# Patient Record
Sex: MALE | Race: WHITE | Employment: UNEMPLOYED | URBAN - METROPOLITAN AREA
[De-identification: names, ages, dates, MRNs, and addresses within clinical notes are randomized per-mention and may not be internally consistent; named-entity substitution may affect disease eponyms.]

---

## 2017-12-01 ENCOUNTER — HOSPITAL ENCOUNTER (OUTPATIENT)
Dept: OTHER | Age: 57
Discharge: HOME | End: 2017-12-01
Attending: EMERGENCY MEDICINE | Admitting: EMERGENCY MEDICINE

## 2017-12-05 ENCOUNTER — HOSPITAL ENCOUNTER (OUTPATIENT)
Dept: GENERAL RADIOLOGY | Age: 57
Discharge: HOME | End: 2017-12-05
Attending: INTERNAL MEDICINE | Admitting: INTERNAL MEDICINE

## 2017-12-05 LAB
ANION GAP SERPL CALCULATED.3IONS-SCNC: 12 MMOL/L (ref 4–16)
APTT: 23.5 SECONDS (ref 21.2–33)
BASOPHILS ABSOLUTE: 0.1 K/CU MM
BASOPHILS RELATIVE PERCENT: 0.6 % (ref 0–1)
BUN BLDV-MCNC: 17 MG/DL (ref 6–23)
CALCIUM SERPL-MCNC: 9.2 MG/DL (ref 8.3–10.6)
CHLORIDE BLD-SCNC: 100 MMOL/L (ref 99–110)
CO2: 30 MMOL/L (ref 21–32)
CREAT SERPL-MCNC: 0.8 MG/DL (ref 0.9–1.3)
DIFFERENTIAL TYPE: ABNORMAL
EOSINOPHILS ABSOLUTE: 0.1 K/CU MM
EOSINOPHILS RELATIVE PERCENT: 1.4 % (ref 0–3)
GFR AFRICAN AMERICAN: >60 ML/MIN/1.73M2
GFR NON-AFRICAN AMERICAN: >60 ML/MIN/1.73M2
GLUCOSE BLD-MCNC: 91 MG/DL (ref 70–99)
HCT VFR BLD CALC: 49.4 % (ref 42–52)
HEMOGLOBIN: 15.7 GM/DL (ref 13.5–18)
IMMATURE NEUTROPHIL %: 0.5 % (ref 0–0.43)
INR BLD: 0.97 INDEX
LYMPHOCYTES ABSOLUTE: 1.5 K/CU MM
LYMPHOCYTES RELATIVE PERCENT: 14.4 % (ref 24–44)
MCH RBC QN AUTO: 28.3 PG (ref 27–31)
MCHC RBC AUTO-ENTMCNC: 31.8 % (ref 32–36)
MCV RBC AUTO: 89 FL (ref 78–100)
MONOCYTES ABSOLUTE: 0.9 K/CU MM
MONOCYTES RELATIVE PERCENT: 9.1 % (ref 0–4)
NUCLEATED RBC %: 0 %
PDW BLD-RTO: 14.2 % (ref 11.7–14.9)
PLATELET # BLD: 247 K/CU MM (ref 140–440)
PMV BLD AUTO: 11.7 FL (ref 7.5–11.1)
POTASSIUM SERPL-SCNC: 5.5 MMOL/L (ref 3.5–5.1)
PROTHROMBIN TIME: 11.3 SECONDS (ref 9.12–12.5)
RBC # BLD: 5.55 M/CU MM (ref 4.6–6.2)
SEGMENTED NEUTROPHILS ABSOLUTE COUNT: 7.4 K/CU MM
SEGMENTED NEUTROPHILS RELATIVE PERCENT: 74 % (ref 36–66)
SODIUM BLD-SCNC: 142 MMOL/L (ref 135–145)
TOTAL IMMATURE NEUTOROPHIL: 0.05 K/CU MM
TOTAL NUCLEATED RBC: 0 K/CU MM
WBC # BLD: 10.1 K/CU MM (ref 4–10.5)

## 2017-12-08 PROBLEM — I50.43 CHF (CONGESTIVE HEART FAILURE), NYHA CLASS I, ACUTE ON CHRONIC, COMBINED (HCC): Status: ACTIVE | Noted: 2017-12-08

## 2017-12-08 PROBLEM — I50.31 ACUTE DIASTOLIC CHF (CONGESTIVE HEART FAILURE), NYHA CLASS 4 (HCC): Status: ACTIVE | Noted: 2017-12-08

## 2017-12-10 PROBLEM — I50.31 ACUTE DIASTOLIC CHF (CONGESTIVE HEART FAILURE), NYHA CLASS 4 (HCC): Status: RESOLVED | Noted: 2017-12-08 | Resolved: 2017-12-10

## 2017-12-10 PROBLEM — I50.43 CHF (CONGESTIVE HEART FAILURE), NYHA CLASS I, ACUTE ON CHRONIC, COMBINED (HCC): Status: RESOLVED | Noted: 2017-12-08 | Resolved: 2017-12-10

## 2018-09-25 ENCOUNTER — HOSPITAL ENCOUNTER (INPATIENT)
Age: 58
LOS: 2 days | Discharge: HOME | DRG: 309 | End: 2018-09-27
Attending: EMERGENCY MEDICINE | Admitting: INTERNAL MEDICINE
Payer: COMMERCIAL

## 2018-09-25 ENCOUNTER — APPOINTMENT (OUTPATIENT)
Dept: GENERAL RADIOLOGY | Age: 58
DRG: 309 | End: 2018-09-25
Payer: COMMERCIAL

## 2018-09-25 ENCOUNTER — APPOINTMENT (OUTPATIENT)
Dept: ULTRASOUND IMAGING | Age: 58
DRG: 309 | End: 2018-09-25
Payer: COMMERCIAL

## 2018-09-25 DIAGNOSIS — R77.8 ELEVATED TROPONIN: ICD-10-CM

## 2018-09-25 DIAGNOSIS — I48.91 NEW ONSET ATRIAL FIBRILLATION (HCC): Primary | ICD-10-CM

## 2018-09-25 PROBLEM — E66.01 CLASS 3 SEVERE OBESITY DUE TO EXCESS CALORIES WITH BODY MASS INDEX (BMI) OF 45.0 TO 49.9 IN ADULT (HCC): Status: ACTIVE | Noted: 2018-09-25

## 2018-09-25 PROBLEM — Z79.4 TYPE 2 DIABETES MELLITUS WITH HYPERGLYCEMIA, WITH LONG-TERM CURRENT USE OF INSULIN (HCC): Status: ACTIVE | Noted: 2018-09-25

## 2018-09-25 PROBLEM — E66.813 CLASS 3 SEVERE OBESITY DUE TO EXCESS CALORIES WITH BODY MASS INDEX (BMI) OF 45.0 TO 49.9 IN ADULT: Status: ACTIVE | Noted: 2018-09-25

## 2018-09-25 PROBLEM — R79.89 ELEVATED TROPONIN: Status: ACTIVE | Noted: 2018-09-25

## 2018-09-25 PROBLEM — E11.65 TYPE 2 DIABETES MELLITUS WITH HYPERGLYCEMIA, WITH LONG-TERM CURRENT USE OF INSULIN (HCC): Status: ACTIVE | Noted: 2018-09-25

## 2018-09-25 LAB
ALBUMIN SERPL-MCNC: 3.8 GM/DL (ref 3.4–5)
ALP BLD-CCNC: 84 IU/L (ref 40–128)
ALT SERPL-CCNC: 19 U/L (ref 10–40)
ANION GAP SERPL CALCULATED.3IONS-SCNC: 14 MMOL/L (ref 4–16)
AST SERPL-CCNC: 18 IU/L (ref 15–37)
BASOPHILS ABSOLUTE: 0 K/CU MM
BASOPHILS RELATIVE PERCENT: 0.4 % (ref 0–1)
BILIRUB SERPL-MCNC: 0.3 MG/DL (ref 0–1)
BUN BLDV-MCNC: 16 MG/DL (ref 6–23)
CALCIUM SERPL-MCNC: 9.6 MG/DL (ref 8.3–10.6)
CHLORIDE BLD-SCNC: 96 MMOL/L (ref 99–110)
CO2: 26 MMOL/L (ref 21–32)
CREAT SERPL-MCNC: 0.8 MG/DL (ref 0.9–1.3)
DIFFERENTIAL TYPE: ABNORMAL
EOSINOPHILS ABSOLUTE: 0.1 K/CU MM
EOSINOPHILS RELATIVE PERCENT: 1.1 % (ref 0–3)
GFR AFRICAN AMERICAN: >60 ML/MIN/1.73M2
GFR NON-AFRICAN AMERICAN: >60 ML/MIN/1.73M2
GLUCOSE BLD-MCNC: 177 MG/DL (ref 70–99)
GLUCOSE BLD-MCNC: 178 MG/DL (ref 70–99)
HCT VFR BLD CALC: 50.5 % (ref 42–52)
HEMOGLOBIN: 16.4 GM/DL (ref 13.5–18)
IMMATURE NEUTROPHIL %: 0.4 % (ref 0–0.43)
LYMPHOCYTES ABSOLUTE: 1.2 K/CU MM
LYMPHOCYTES RELATIVE PERCENT: 11.9 % (ref 24–44)
MCH RBC QN AUTO: 28.8 PG (ref 27–31)
MCHC RBC AUTO-ENTMCNC: 32.5 % (ref 32–36)
MCV RBC AUTO: 88.8 FL (ref 78–100)
MONOCYTES ABSOLUTE: 0.8 K/CU MM
MONOCYTES RELATIVE PERCENT: 8 % (ref 0–4)
NUCLEATED RBC %: 0 %
PDW BLD-RTO: 14 % (ref 11.7–14.9)
PLATELET # BLD: 252 K/CU MM (ref 140–440)
PMV BLD AUTO: 10.9 FL (ref 7.5–11.1)
POTASSIUM SERPL-SCNC: 3.8 MMOL/L (ref 3.5–5.1)
PRO-BNP: 239.7 PG/ML
RBC # BLD: 5.69 M/CU MM (ref 4.6–6.2)
SEGMENTED NEUTROPHILS ABSOLUTE COUNT: 7.8 K/CU MM
SEGMENTED NEUTROPHILS RELATIVE PERCENT: 78.2 % (ref 36–66)
SODIUM BLD-SCNC: 136 MMOL/L (ref 135–145)
TOTAL IMMATURE NEUTOROPHIL: 0.04 K/CU MM
TOTAL NUCLEATED RBC: 0 K/CU MM
TOTAL PROTEIN: 7.3 GM/DL (ref 6.4–8.2)
TROPONIN T: 0.03 NG/ML
TROPONIN T: 0.03 NG/ML
TROPONIN T: 0.04 NG/ML
TSH HIGH SENSITIVITY: 2.09 UIU/ML (ref 0.27–4.2)
WBC # BLD: 10 K/CU MM (ref 4–10.5)

## 2018-09-25 PROCEDURE — 83036 HEMOGLOBIN GLYCOSYLATED A1C: CPT

## 2018-09-25 PROCEDURE — 83880 ASSAY OF NATRIURETIC PEPTIDE: CPT

## 2018-09-25 PROCEDURE — 93005 ELECTROCARDIOGRAM TRACING: CPT | Performed by: EMERGENCY MEDICINE

## 2018-09-25 PROCEDURE — 36415 COLL VENOUS BLD VENIPUNCTURE: CPT

## 2018-09-25 PROCEDURE — 84484 ASSAY OF TROPONIN QUANT: CPT

## 2018-09-25 PROCEDURE — 2140000000 HC CCU INTERMEDIATE R&B

## 2018-09-25 PROCEDURE — 71045 X-RAY EXAM CHEST 1 VIEW: CPT

## 2018-09-25 PROCEDURE — G0378 HOSPITAL OBSERVATION PER HR: HCPCS

## 2018-09-25 PROCEDURE — 96372 THER/PROPH/DIAG INJ SC/IM: CPT

## 2018-09-25 PROCEDURE — 96361 HYDRATE IV INFUSION ADD-ON: CPT

## 2018-09-25 PROCEDURE — 99285 EMERGENCY DEPT VISIT HI MDM: CPT

## 2018-09-25 PROCEDURE — 80050 GENERAL HEALTH PANEL: CPT

## 2018-09-25 PROCEDURE — 96374 THER/PROPH/DIAG INJ IV PUSH: CPT

## 2018-09-25 PROCEDURE — 6360000002 HC RX W HCPCS: Performed by: NURSE PRACTITIONER

## 2018-09-25 PROCEDURE — 6370000000 HC RX 637 (ALT 250 FOR IP): Performed by: NURSE PRACTITIONER

## 2018-09-25 PROCEDURE — 93971 EXTREMITY STUDY: CPT

## 2018-09-25 PROCEDURE — 2500000003 HC RX 250 WO HCPCS: Performed by: EMERGENCY MEDICINE

## 2018-09-25 PROCEDURE — 2580000003 HC RX 258: Performed by: EMERGENCY MEDICINE

## 2018-09-25 PROCEDURE — 2580000003 HC RX 258: Performed by: NURSE PRACTITIONER

## 2018-09-25 PROCEDURE — 82962 GLUCOSE BLOOD TEST: CPT

## 2018-09-25 PROCEDURE — 6370000000 HC RX 637 (ALT 250 FOR IP): Performed by: EMERGENCY MEDICINE

## 2018-09-25 PROCEDURE — 93005 ELECTROCARDIOGRAM TRACING: CPT | Performed by: PHYSICIAN ASSISTANT

## 2018-09-25 RX ORDER — FAMOTIDINE 20 MG/1
20 TABLET, FILM COATED ORAL 2 TIMES DAILY
Status: DISCONTINUED | OUTPATIENT
Start: 2018-09-25 | End: 2018-09-27 | Stop reason: HOSPADM

## 2018-09-25 RX ORDER — ASPIRIN 81 MG/1
324 TABLET, CHEWABLE ORAL ONCE
Status: COMPLETED | OUTPATIENT
Start: 2018-09-25 | End: 2018-09-25

## 2018-09-25 RX ORDER — SODIUM CHLORIDE 0.9 % (FLUSH) 0.9 %
10 SYRINGE (ML) INJECTION EVERY 12 HOURS SCHEDULED
Status: DISCONTINUED | OUTPATIENT
Start: 2018-09-25 | End: 2018-09-27 | Stop reason: HOSPADM

## 2018-09-25 RX ORDER — NICOTINE POLACRILEX 4 MG
15 LOZENGE BUCCAL PRN
Status: DISCONTINUED | OUTPATIENT
Start: 2018-09-25 | End: 2018-09-27 | Stop reason: HOSPADM

## 2018-09-25 RX ORDER — DEXTROSE MONOHYDRATE 25 G/50ML
12.5 INJECTION, SOLUTION INTRAVENOUS PRN
Status: DISCONTINUED | OUTPATIENT
Start: 2018-09-25 | End: 2018-09-27 | Stop reason: HOSPADM

## 2018-09-25 RX ORDER — LISINOPRIL 10 MG/1
10 TABLET ORAL DAILY
Status: DISCONTINUED | OUTPATIENT
Start: 2018-09-26 | End: 2018-09-27 | Stop reason: HOSPADM

## 2018-09-25 RX ORDER — SODIUM CHLORIDE 0.9 % (FLUSH) 0.9 %
10 SYRINGE (ML) INJECTION PRN
Status: DISCONTINUED | OUTPATIENT
Start: 2018-09-25 | End: 2018-09-27 | Stop reason: HOSPADM

## 2018-09-25 RX ORDER — FUROSEMIDE 40 MG/1
80 TABLET ORAL DAILY
Status: DISCONTINUED | OUTPATIENT
Start: 2018-09-25 | End: 2018-09-25

## 2018-09-25 RX ORDER — SIMVASTATIN 40 MG
40 TABLET ORAL NIGHTLY
Status: DISCONTINUED | OUTPATIENT
Start: 2018-09-25 | End: 2018-09-27 | Stop reason: HOSPADM

## 2018-09-25 RX ORDER — ASPIRIN 81 MG/1
81 TABLET, CHEWABLE ORAL DAILY
Status: DISCONTINUED | OUTPATIENT
Start: 2018-09-25 | End: 2018-09-27 | Stop reason: HOSPADM

## 2018-09-25 RX ORDER — DILTIAZEM HYDROCHLORIDE 5 MG/ML
10 INJECTION INTRAVENOUS ONCE
Status: COMPLETED | OUTPATIENT
Start: 2018-09-25 | End: 2018-09-25

## 2018-09-25 RX ORDER — FUROSEMIDE 80 MG
80 TABLET ORAL DAILY
Status: DISCONTINUED | OUTPATIENT
Start: 2018-09-26 | End: 2018-09-27 | Stop reason: HOSPADM

## 2018-09-25 RX ORDER — METOLAZONE 5 MG/1
5 TABLET ORAL
Status: DISCONTINUED | OUTPATIENT
Start: 2018-09-27 | End: 2018-09-27 | Stop reason: HOSPADM

## 2018-09-25 RX ORDER — SODIUM CHLORIDE 9 MG/ML
INJECTION, SOLUTION INTRAVENOUS CONTINUOUS
Status: DISCONTINUED | OUTPATIENT
Start: 2018-09-25 | End: 2018-09-27

## 2018-09-25 RX ORDER — DEXTROSE MONOHYDRATE 50 MG/ML
100 INJECTION, SOLUTION INTRAVENOUS PRN
Status: DISCONTINUED | OUTPATIENT
Start: 2018-09-25 | End: 2018-09-27 | Stop reason: HOSPADM

## 2018-09-25 RX ORDER — ONDANSETRON 2 MG/ML
4 INJECTION INTRAMUSCULAR; INTRAVENOUS EVERY 6 HOURS PRN
Status: DISCONTINUED | OUTPATIENT
Start: 2018-09-25 | End: 2018-09-27 | Stop reason: HOSPADM

## 2018-09-25 RX ORDER — HYDROCODONE BITARTRATE AND ACETAMINOPHEN 5; 325 MG/1; MG/1
1 TABLET ORAL ONCE
Status: COMPLETED | OUTPATIENT
Start: 2018-09-25 | End: 2018-09-25

## 2018-09-25 RX ADMIN — ENOXAPARIN SODIUM 160 MG: 80 INJECTION SUBCUTANEOUS at 20:42

## 2018-09-25 RX ADMIN — ASPIRIN 81 MG CHEWABLE TABLET 324 MG: 81 TABLET CHEWABLE at 15:58

## 2018-09-25 RX ADMIN — HYDROCODONE BITARTRATE AND ACETAMINOPHEN 1 TABLET: 5; 325 TABLET ORAL at 15:58

## 2018-09-25 RX ADMIN — SODIUM CHLORIDE: 9 INJECTION, SOLUTION INTRAVENOUS at 15:58

## 2018-09-25 RX ADMIN — DILTIAZEM HYDROCHLORIDE 30 MG: 30 TABLET, FILM COATED ORAL at 20:41

## 2018-09-25 RX ADMIN — DILTIAZEM HYDROCHLORIDE 10 MG: 5 INJECTION INTRAVENOUS at 15:59

## 2018-09-25 RX ADMIN — SIMVASTATIN 40 MG: 40 TABLET, FILM COATED ORAL at 20:41

## 2018-09-25 RX ADMIN — FAMOTIDINE 20 MG: 20 TABLET ORAL at 20:41

## 2018-09-25 RX ADMIN — SODIUM CHLORIDE, PRESERVATIVE FREE 10 ML: 5 INJECTION INTRAVENOUS at 20:42

## 2018-09-25 ASSESSMENT — PAIN DESCRIPTION - PAIN TYPE
TYPE: ACUTE PAIN
TYPE: ACUTE PAIN

## 2018-09-25 ASSESSMENT — PAIN SCALES - GENERAL
PAINLEVEL_OUTOF10: 0
PAINLEVEL_OUTOF10: 8
PAINLEVEL_OUTOF10: 7
PAINLEVEL_OUTOF10: 0
PAINLEVEL_OUTOF10: 0
PAINLEVEL_OUTOF10: 2

## 2018-09-25 ASSESSMENT — PAIN DESCRIPTION - FREQUENCY
FREQUENCY: INTERMITTENT
FREQUENCY: INTERMITTENT

## 2018-09-25 ASSESSMENT — PAIN DESCRIPTION - LOCATION
LOCATION: ARM
LOCATION: ARM

## 2018-09-25 ASSESSMENT — PAIN DESCRIPTION - ONSET: ONSET: ON-GOING

## 2018-09-25 ASSESSMENT — PAIN DESCRIPTION - ORIENTATION
ORIENTATION: RIGHT
ORIENTATION: RIGHT

## 2018-09-25 ASSESSMENT — PAIN DESCRIPTION - DESCRIPTORS: DESCRIPTORS: ACHING

## 2018-09-25 NOTE — ED NOTES
REPORT CALLED TO JAMES RN 3N.  PT READY FOR TRANSPORT TO [facility]     Rica Bynum RN  09/25/18 [id number]

## 2018-09-25 NOTE — LETTER
Erika Shah [facility] [address]  Phone: [phone]             September 27, 2018    Patient: Ariadna Anderson   YOB: 1960   Date of Visit: 9/25/2018       To Whom It May Concern:    Ariadna Anderson  may return to work on 9/28/2018.       Sincerely,       Riley Reyes MD         Signature:__________________________________

## 2018-09-25 NOTE — CARE COORDINATION
CM -reviewed pt admission criterion for inpatient status --which was met -MCG . Consideration upon admission was given to the Vaso - Vagal pt experienced leaving pt with a significant drop in BP, this added to the recognition that this was a new onset of Atrial fibrillation with RVR. The rate was eventually controlled with Cardizem , and further interventions were instituted with ASA, NTG Paste, and fluids. History reveals pt was at his PCP where the new onset of A-fib was discovered , it was further revealed that the pt was experiencing right arm pain for 10 days prior to his PCP visit.  Additionally his EKG was reviewed for considered results, and the pt was known to have a heart cath 1 year prior to this, MURTAZA / RN

## 2018-09-25 NOTE — ED PROVIDER NOTES
I independently examined and evaluated Ankur Banerjee. In brief their history revealed right arm pain for several weeks intermittently. Denies any chest pain, went to see PCP and was found to be in new onset a fib, Had heart cath with Dr. Shana Shah last year. . Their exam revealed obese male in no acute distress, clear lungs, soft abdomen. No pitting leg edema. . All diagnostic, treatment, and disposition decisions were made by myself in conjunction with the mid-level provider. Before disposition, questions were sought and answered with the patient. They have voiced understanding and agree with the plan: CBC and CMP are within normal limits. Troponin is detectable at 0.029. BNP is normal..  Chest x-ray does not show any acute findings. Patient was given aspirin, Nitropaste, IV fluids and a Cardizem pressure 10 mg. His heart rate did come down into the 90s. Did discuss with patient's cardiologist, Dr. Kyra Buitrago, Will admit to the hospitalist for further eval and treatment. For all further details of the patient's emergency department visit, please see the mid-level practitioner's documentation. CRITICAL CARE NOTE:  There was a high probability of clinically significant life-threatening deterioration of the patient's condition requiring my urgent intervention due to new onset af ib with rapid ventricular rate, concern for anginal equivalent. ASA, nitro, cardizem, push, Iv antiarrthymic consult, admit was performed to address this. Total critical care time is at least  35 minutes. This includes vital sign monitoring, pulse oximetry monitoring, telemetry monitoring, clinical response to the IV medications, reviewing the nursing notes, consultation time, dictation/documentation time, and interpretation of the lab work. This time excludes time spent performing procedures and separately billable procedures and family discussion time.         The Ekg interpreted by me shows  atrial fibrillation with a rate of 113  Axis is   Left axis deviation  QTc is  normal  Intervals and Durations are unremarkable. ST Segments: no acute change  No no old EKG         Repeat EKG: The Ekg interpreted by me shows  A flutter at rate of 85  Axis is   Left axis deviation  QTc is  normal  Intervals and Durations are unremarkable.       ST Segments: no acute change  No significant change from prior EKG             Ishan Peterson MD  09/25/18 [id number]

## 2018-09-26 ENCOUNTER — APPOINTMENT (OUTPATIENT)
Dept: NUCLEAR MEDICINE | Age: 58
DRG: 309 | End: 2018-09-26
Payer: COMMERCIAL

## 2018-09-26 LAB
ANION GAP SERPL CALCULATED.3IONS-SCNC: 13 MMOL/L (ref 4–16)
BASOPHILS ABSOLUTE: 0.1 K/CU MM
BASOPHILS RELATIVE PERCENT: 0.7 % (ref 0–1)
BUN BLDV-MCNC: 17 MG/DL (ref 6–23)
CALCIUM SERPL-MCNC: 9.2 MG/DL (ref 8.3–10.6)
CHLORIDE BLD-SCNC: 98 MMOL/L (ref 99–110)
CO2: 28 MMOL/L (ref 21–32)
CREAT SERPL-MCNC: 0.9 MG/DL (ref 0.9–1.3)
DIFFERENTIAL TYPE: ABNORMAL
EKG ATRIAL RATE: 141 BPM
EKG DIAGNOSIS: NORMAL
EKG Q-T INTERVAL: 338 MS
EKG QRS DURATION: 88 MS
EKG QTC CALCULATION (BAZETT): 463 MS
EKG R AXIS: -7 DEGREES
EKG T AXIS: 83 DEGREES
EKG VENTRICULAR RATE: 113 BPM
EOSINOPHILS ABSOLUTE: 0.2 K/CU MM
EOSINOPHILS RELATIVE PERCENT: 3.2 % (ref 0–3)
ESTIMATED AVERAGE GLUCOSE: 246 MG/DL
GFR AFRICAN AMERICAN: >60 ML/MIN/1.73M2
GFR NON-AFRICAN AMERICAN: >60 ML/MIN/1.73M2
GLUCOSE BLD-MCNC: 140 MG/DL (ref 70–99)
GLUCOSE BLD-MCNC: 148 MG/DL (ref 70–99)
GLUCOSE BLD-MCNC: 181 MG/DL (ref 70–99)
GLUCOSE BLD-MCNC: 195 MG/DL (ref 70–99)
GLUCOSE BLD-MCNC: 227 MG/DL (ref 70–99)
GLUCOSE BLD-MCNC: 70 MG/DL (ref 70–99)
HBA1C MFR BLD: 10.2 % (ref 4.2–6.3)
HCT VFR BLD CALC: 48.3 % (ref 42–52)
HEMOGLOBIN: 15.2 GM/DL (ref 13.5–18)
IMMATURE NEUTROPHIL %: 0.4 % (ref 0–0.43)
LV EF: 55 %
LV EF: 58 %
LVEF MODALITY: NORMAL
LVEF MODALITY: NORMAL
LYMPHOCYTES ABSOLUTE: 1.5 K/CU MM
LYMPHOCYTES RELATIVE PERCENT: 20.1 % (ref 24–44)
MCH RBC QN AUTO: 28.4 PG (ref 27–31)
MCHC RBC AUTO-ENTMCNC: 31.5 % (ref 32–36)
MCV RBC AUTO: 90.1 FL (ref 78–100)
MONOCYTES ABSOLUTE: 0.9 K/CU MM
MONOCYTES RELATIVE PERCENT: 11.6 % (ref 0–4)
NUCLEATED RBC %: 0 %
PDW BLD-RTO: 14.3 % (ref 11.7–14.9)
PLATELET # BLD: 232 K/CU MM (ref 140–440)
PMV BLD AUTO: 10.9 FL (ref 7.5–11.1)
POTASSIUM SERPL-SCNC: 3.6 MMOL/L (ref 3.5–5.1)
RBC # BLD: 5.36 M/CU MM (ref 4.6–6.2)
SEGMENTED NEUTROPHILS ABSOLUTE COUNT: 4.8 K/CU MM
SEGMENTED NEUTROPHILS RELATIVE PERCENT: 64 % (ref 36–66)
SODIUM BLD-SCNC: 139 MMOL/L (ref 135–145)
TOTAL IMMATURE NEUTOROPHIL: 0.03 K/CU MM
TOTAL NUCLEATED RBC: 0 K/CU MM
WBC # BLD: 7.6 K/CU MM (ref 4–10.5)

## 2018-09-26 PROCEDURE — 93010 ELECTROCARDIOGRAM REPORT: CPT | Performed by: INTERNAL MEDICINE

## 2018-09-26 PROCEDURE — 78452 HT MUSCLE IMAGE SPECT MULT: CPT

## 2018-09-26 PROCEDURE — 2140000000 HC CCU INTERMEDIATE R&B

## 2018-09-26 PROCEDURE — 85025 COMPLETE CBC W/AUTO DIFF WBC: CPT

## 2018-09-26 PROCEDURE — 6370000000 HC RX 637 (ALT 250 FOR IP): Performed by: INTERNAL MEDICINE

## 2018-09-26 PROCEDURE — 6360000002 HC RX W HCPCS: Performed by: INTERNAL MEDICINE

## 2018-09-26 PROCEDURE — 82962 GLUCOSE BLOOD TEST: CPT

## 2018-09-26 PROCEDURE — G0378 HOSPITAL OBSERVATION PER HR: HCPCS

## 2018-09-26 PROCEDURE — 36415 COLL VENOUS BLD VENIPUNCTURE: CPT

## 2018-09-26 PROCEDURE — A9500 TC99M SESTAMIBI: HCPCS | Performed by: INTERNAL MEDICINE

## 2018-09-26 PROCEDURE — 93306 TTE W/DOPPLER COMPLETE: CPT

## 2018-09-26 PROCEDURE — 93017 CV STRESS TEST TRACING ONLY: CPT

## 2018-09-26 PROCEDURE — 80048 BASIC METABOLIC PNL TOTAL CA: CPT

## 2018-09-26 PROCEDURE — 90686 IIV4 VACC NO PRSV 0.5 ML IM: CPT | Performed by: INTERNAL MEDICINE

## 2018-09-26 PROCEDURE — 2580000003 HC RX 258: Performed by: NURSE PRACTITIONER

## 2018-09-26 PROCEDURE — 6370000000 HC RX 637 (ALT 250 FOR IP): Performed by: NURSE PRACTITIONER

## 2018-09-26 PROCEDURE — G0008 ADMIN INFLUENZA VIRUS VAC: HCPCS | Performed by: INTERNAL MEDICINE

## 2018-09-26 PROCEDURE — 3430000000 HC RX DIAGNOSTIC RADIOPHARMACEUTICAL: Performed by: INTERNAL MEDICINE

## 2018-09-26 RX ADMIN — APIXABAN 5 MG: 5 TABLET, FILM COATED ORAL at 21:12

## 2018-09-26 RX ADMIN — Medication 10 MILLICURIE: at 13:15

## 2018-09-26 RX ADMIN — DILTIAZEM HYDROCHLORIDE 30 MG: 30 TABLET, FILM COATED ORAL at 21:11

## 2018-09-26 RX ADMIN — SODIUM CHLORIDE, PRESERVATIVE FREE 10 ML: 5 INJECTION INTRAVENOUS at 08:15

## 2018-09-26 RX ADMIN — FUROSEMIDE 80 MG: 80 TABLET ORAL at 08:13

## 2018-09-26 RX ADMIN — Medication 30 MILLICURIE: at 13:15

## 2018-09-26 RX ADMIN — DILTIAZEM HYDROCHLORIDE 30 MG: 30 TABLET, FILM COATED ORAL at 08:12

## 2018-09-26 RX ADMIN — INFLUENZA A VIRUS A/MICHIGAN/45/2015 X-275 (H1N1) ANTIGEN (FORMALDEHYDE INACTIVATED), INFLUENZA A VIRUS A/SINGAPORE/INFIMH-16-0019/2016 IVR-186 (H3N2) ANTIGEN (FORMALDEHYDE INACTIVATED), INFLUENZA B VIRUS B/PHUKET/3073/2013 ANTIGEN (FORMALDEHYDE INACTIVATED), AND INFLUENZA B VIRUS B/MARYLAND/15/2016 BX-69A ANTIGEN (FORMALDEHYDE INACTIVATED) 0.5 ML: 15; 15; 15; 15 INJECTION, SUSPENSION INTRAMUSCULAR at 08:14

## 2018-09-26 RX ADMIN — FAMOTIDINE 20 MG: 20 TABLET ORAL at 08:12

## 2018-09-26 RX ADMIN — ASPIRIN 81 MG CHEWABLE TABLET 81 MG: 81 TABLET CHEWABLE at 08:12

## 2018-09-26 RX ADMIN — FAMOTIDINE 20 MG: 20 TABLET ORAL at 21:13

## 2018-09-26 RX ADMIN — LISINOPRIL 10 MG: 10 TABLET ORAL at 08:12

## 2018-09-26 RX ADMIN — SIMVASTATIN 40 MG: 40 TABLET, FILM COATED ORAL at 21:13

## 2018-09-26 RX ADMIN — REGADENOSON 0.4 MG: 0.08 INJECTION, SOLUTION INTRAVENOUS at 12:24

## 2018-09-26 ASSESSMENT — PAIN SCALES - GENERAL
PAINLEVEL_OUTOF10: 0

## 2018-09-26 NOTE — CONSULTS
LUNGS:  Has Vesicular Breath Sounds, Some wheezing  CARDIOVASCULAR:  Atrial fibrillation with slow ventricular response  ABDOMEN:  No scars, normal bowel sounds, soft, non-distended, non-tender, no masses palpated, no hepatolienomegaly  Musculoskeletal: Normal  Extremities: Normal, peripheral pulses normal, , has  4+ edema and signs of venous stasis of the legs  NEUROLOGIC:  Awake, alert, oriented to name, place and time. Cranial nerves II-XII are grossly intact. Motor is  intact. SensoryNeuropathy ,  and gait is normal.    DATA:    CBC:   Recent Labs      09/25/18   1520   WBC  10.0   HGB  16.4   PLT  252    CMP:  Recent Labs      09/25/18   1520   NA  136   K  3.8   CL  96*   CO2  26   BUN  16   CREATININE  0.8*   CALCIUM  9.6   PROT  7.3   LABALBU  3.8   BILITOT  0.3   ALKPHOS  84   AST  18   ALT  19     Lipids:   Lab Results   Component Value Date    CHOL 134 12/08/2017    HDL 32 12/08/2017    TRIG 190 12/08/2017     Glucose: No results for input(s): POCGLU in the last 72 hours. Hemoglobin A1C: No results found for: LABA1C  Free T4: No results found for: T4FREE  Free T3: No results found for: FT3  TSH High Sensitivity:   Lab Results   Component Value Date    TSHHS 2.090 09/25/2018       Xr Chest Portable    Result Date: 9/25/2018  EXAMINATION: SINGLE XRAY VIEW OF THE CHEST 9/25/2018 3:00 pm COMPARISON: 12/08/2017 HISTORY: ORDERING SYSTEM PROVIDED HISTORY: Chest pain TECHNOLOGIST PROVIDED HISTORY: Reason for exam:->Chest pain Ordering Physician Provided Reason for Exam: irr heart beat Acuity: Acute Type of Exam: Initial FINDINGS: Low lung volumes. No focal consolidation, pleural effusion or pneumothorax. The cardiomediastinal silhouette is stable. No overt pulmonary edema. The osseous structures are stable. Low lung volumes. Stable cardiomegaly. No acute cardiopulmonary findings.      Vl Dup Upper Extremity Venous Right    Result Date: 9/25/2018  EXAMINATION: VENOUS ULTRASOUND OF THE RIGHT UPPER Insulin  4. Monitor Blood glucose frequently   5. Modify  the dose of Insulin  as needed        Will follow with you  Again thank you for sharing pt's care with me.      Truly yours,       Viet Kebede MD

## 2018-09-26 NOTE — CONSULTS
per day   enoxaparin (LOVENOX) injection 160 mg BID   glucose (GLUTOSE) 40 % oral gel 15 g PRN   dextrose 50 % solution 12.5 g PRN   glucagon (rDNA) injection 1 mg PRN   dextrose 5 % solution PRN   furosemide (LASIX) tablet 80 mg Daily   insulin regular human (humuLIN R U-500 KWIKPEN) 500 UNIT/ML concentrated injection pen 50 Units BID WC   insulin regular human (humuLIN R U-500 KWIKPEN) 500 UNIT/ML concentrated injection pen 25 Units Lunch   insulin regular human (humuLIN R U-500 KWIKPEN) 500 UNIT/ML concentrated injection pen 0-30 Units 4x Daily AC & HS     Current Facility-Administered Medications   Medication Dose Route Frequency Provider Last Rate Last Dose    0.9 % sodium chloride infusion   Intravenous Continuous Barbi Davila  mL/hr at 09/25/18 1558      aspirin chewable tablet 81 mg  81 mg Oral Daily Lucero Helton APRN - CNP   81 mg at 09/26/18 0380    simvastatin (ZOCOR) tablet 40 mg  40 mg Oral Nightly Adam Raul, APRN - CNP   40 mg at 09/25/18 2041    lisinopril (PRINIVIL;ZESTRIL) tablet 10 mg  10 mg Oral Daily Adam Raul, APRN - CNP   10 mg at 09/26/18 0812    [START ON 9/27/2018] metolazone (ZAROXOLYN) tablet 5 mg  5 mg Oral Once per day on Tue Thu Adam Raul, APRN - CNP        sodium chloride flush 0.9 % injection 10 mL  10 mL Intravenous 2 times per day Adam Raul, APRN - CNP   10 mL at 09/26/18 0815    sodium chloride flush 0.9 % injection 10 mL  10 mL Intravenous PRN Lucero Helton APRN - CNP        magnesium hydroxide (MILK OF MAGNESIA) 400 MG/5ML suspension 30 mL  30 mL Oral Daily PRN Adam Raul, APRN - CNP        ondansetron (ZOFRAN) injection 4 mg  4 mg Intravenous Q6H PRN Adam Raul, APRN - CNP        famotidine (PEPCID) tablet 20 mg  20 mg Oral BID Adam Raul, APRN - CNP   20 mg at 09/26/18 0812    diltiazem (CARDIZEM) tablet 30 mg  30 mg Oral 2 times per day HAYLEY White CNP   30 mg at 09/26/18 0812    enoxaparin (LOVENOX)

## 2018-09-26 NOTE — PLAN OF CARE
Problem: Safety:  Goal: Free from accidental physical injury  Free from accidental physical injury   Outcome: Ongoing      Problem: Daily Care:  Goal: Daily care needs are met  Daily care needs are met   Outcome: Ongoing      Problem: Pain:  Goal: Patient's pain/discomfort is manageable  Patient's pain/discomfort is manageable   Outcome: Ongoing      Problem: Skin Integrity:  Goal: Skin integrity will stabilize  Skin integrity will stabilize   Outcome: Ongoing      Problem: Discharge Planning:  Goal: Patients continuum of care needs are met  Patients continuum of care needs are met   Outcome: Ongoing

## 2018-09-27 VITALS
DIASTOLIC BLOOD PRESSURE: 64 MMHG | WEIGHT: 315 LBS | HEART RATE: 94 BPM | OXYGEN SATURATION: 94 % | SYSTOLIC BLOOD PRESSURE: 113 MMHG | BODY MASS INDEX: 42.66 KG/M2 | RESPIRATION RATE: 20 BRPM | TEMPERATURE: 98.2 F | HEIGHT: 72 IN

## 2018-09-27 LAB
EKG ATRIAL RATE: 394 BPM
EKG DIAGNOSIS: NORMAL
EKG P AXIS: 0 DEGREES
EKG P-R INTERVAL: 126 MS
EKG Q-T INTERVAL: 342 MS
EKG QRS DURATION: 84 MS
EKG QTC CALCULATION (BAZETT): 406 MS
EKG R AXIS: -23 DEGREES
EKG T AXIS: -2 DEGREES
EKG VENTRICULAR RATE: 85 BPM
GLUCOSE BLD-MCNC: 166 MG/DL (ref 70–99)
GLUCOSE BLD-MCNC: 173 MG/DL (ref 70–99)

## 2018-09-27 PROCEDURE — 2580000003 HC RX 258: Performed by: NURSE PRACTITIONER

## 2018-09-27 PROCEDURE — 93010 ELECTROCARDIOGRAM REPORT: CPT | Performed by: INTERNAL MEDICINE

## 2018-09-27 PROCEDURE — 82962 GLUCOSE BLOOD TEST: CPT

## 2018-09-27 PROCEDURE — 6370000000 HC RX 637 (ALT 250 FOR IP): Performed by: INTERNAL MEDICINE

## 2018-09-27 PROCEDURE — G0378 HOSPITAL OBSERVATION PER HR: HCPCS

## 2018-09-27 PROCEDURE — 6370000000 HC RX 637 (ALT 250 FOR IP): Performed by: NURSE PRACTITIONER

## 2018-09-27 RX ORDER — METOPROLOL SUCCINATE 50 MG/1
50 TABLET, EXTENDED RELEASE ORAL DAILY
Status: DISCONTINUED | OUTPATIENT
Start: 2018-09-27 | End: 2018-09-27 | Stop reason: HOSPADM

## 2018-09-27 RX ORDER — METOPROLOL SUCCINATE 50 MG/1
50 TABLET, EXTENDED RELEASE ORAL DAILY
Qty: 30 TABLET | Refills: 0 | Status: SHIPPED | OUTPATIENT
Start: 2018-09-28 | End: 2018-09-27

## 2018-09-27 RX ORDER — METOPROLOL SUCCINATE 50 MG/1
50 TABLET, EXTENDED RELEASE ORAL DAILY
Qty: 30 TABLET | Refills: 0 | Status: ON HOLD | OUTPATIENT
Start: 2018-09-28 | End: 2018-12-04

## 2018-09-27 RX ADMIN — METOPROLOL SUCCINATE 50 MG: 50 TABLET, EXTENDED RELEASE ORAL at 08:40

## 2018-09-27 RX ADMIN — ASPIRIN 81 MG CHEWABLE TABLET 81 MG: 81 TABLET CHEWABLE at 08:40

## 2018-09-27 RX ADMIN — METOLAZONE 5 MG: 5 TABLET ORAL at 08:40

## 2018-09-27 RX ADMIN — FAMOTIDINE 20 MG: 20 TABLET ORAL at 08:39

## 2018-09-27 RX ADMIN — APIXABAN 5 MG: 5 TABLET, FILM COATED ORAL at 08:39

## 2018-09-27 RX ADMIN — SODIUM CHLORIDE, PRESERVATIVE FREE 10 ML: 5 INJECTION INTRAVENOUS at 08:42

## 2018-09-27 RX ADMIN — FUROSEMIDE 80 MG: 80 TABLET ORAL at 08:40

## 2018-09-27 RX ADMIN — LISINOPRIL 10 MG: 10 TABLET ORAL at 08:39

## 2018-09-27 NOTE — H&P
Oral Once per day on Tue Thu    sodium chloride flush  10 mL Intravenous 2 times per day    famotidine  20 mg Oral BID    diltiazem  30 mg Oral 2 times per day    furosemide  80 mg Oral Daily    insulin regular human  50 Units Subcutaneous BID WC    insulin regular human  25 Units Subcutaneous Lunch    insulin regular human  0-30 Units Subcutaneous 4x Daily AC & HS      Infusions:    sodium chloride 100 mL/hr at 09/25/18 1558    dextrose       PRN Meds:   sodium chloride flush 10 mL PRN   magnesium hydroxide 30 mL Daily PRN   ondansetron 4 mg Q6H PRN   glucose 15 g PRN   dextrose 12.5 g PRN   glucagon (rDNA) 1 mg PRN   dextrose 100 mL/hr PRN         Electronically signed by James Aguiar MD on 9/26/2018 at 10:45 PM
FINDINGS: Low lung volumes. No focal consolidation, pleural effusion or pneumothorax. The cardiomediastinal silhouette is stable. No overt pulmonary edema. The osseous structures are stable. Low lung volumes. Stable cardiomegaly. No acute cardiopulmonary findings. Vl Dup Upper Extremity Venous Right    Result Date: 9/25/2018  EXAMINATION: VENOUS ULTRASOUND OF THE RIGHT UPPER EXTREMITY, 9/25/2018 3:55 pm TECHNIQUE: Duplex ultrasound and Doppler images were obtained of the deep venous structures of the upper extremity. COMPARISON: None. HISTORY: ORDERING SYSTEM PROVIDED HISTORY: states arm pain TECHNOLOGIST PROVIDED HISTORY: Reason for exam:->states arm pain Ordering Physician Provided Reason for Exam: right arm pain Acuity: Acute Type of Exam: Initial FINDINGS: There is normal flow and compressibility of the visualized venous structures. There is no evidence of echogenic thrombus. No evidence of DVT. EKG this visit:  EKG results personally reviewed. EKG: Atrial fibrillation, rate 85. Abnormal ECG. No previous ECGs available.     Current Treatment Team:  Treatment Team: Attending Provider: Bentley Billy MD; Physician Assistant: Jayashree Yeh PA-C; Registered Nurse: Michael Salazar RN; Consulting Physician: Danny Prader, MD Kyla Marie, [facility]   Internal Medicine Hospitalist  [facility]  9/25/2018 5:19 PM      Electronically signed by HAYLEY Christianson CNP on 9/25/2018 at 5:19 PM

## 2018-09-30 NOTE — DISCHARGE SUMMARY
criteria. No infarct or ischemia noted. Normal perfusion scan   Normal EF 58 % with normal ventricular contractility. Recommendation   Medical management. Signatures      ------------------------------------------------------------------   Electronically signed by Ijeoma Mario MD   (Interpreting cardiologist) on 09/26/2018 at 14:48   ------------------------------------------------------------------     Procedure  Procedure Type:      Nuclear Stress Test:Pharmacological, Myocardial Perfusion Imaging with   Pharm, NM MYOCARDIAL SPECT REST EXERCISE OR RX     Indications: Elevated Troponins and abnormal rest ECG. Stress Protocols      Resting HR:90 bpm     Resting BP:119/70 mmHg      Pre-stress physical exam: Within normal limits. Stress Protocol:Pharmacologic - Lexiscan     Peak HR:100 bpm                  HR response: Appropriate   Peak BP:141/67 mmHg              BP response: Normal resting BP -   Predicted HR: 162 bpm            appropriate response   % of predicted HR: 62            HR/BP product:[id number]      Exercise duration: 01:10 min   Reason for termination:Completed      ECG Findings   Normal sinus rhythm. Arrhythmias   No rhythm abnormality. Symptoms   No cardiovascular symptoms with maximal exercise. Stress Interpretation   ECG portion of stress test is negative for ischemia by diagnostic criteria. Procedure Medications    - Lexiscan I.V. bolus (over 15sec.) 0.4 mg admininstered @ 09/26/2018 12:25. Imaging Protocols      Rest                             Stress      Isotope:Sestamibi 99mTc          Isotope: Sestamibi 99mTc   Isotope dose:11 mCi              Isotope dose:33 mCi   Administration route: I.V. Administration route: I.V.    Injection Date:09/26/2018 11:10  Injection Date:09/26/2018 12:25   Scan Date:09/26/2018 11:55       Scan Date:09/26/2018 13:25      Technique:        SPECT          Technique:        Gated

## 2018-10-08 PROBLEM — Z98.890 HISTORY OF CARDIAC CATH: Status: ACTIVE | Noted: 2018-10-08

## 2018-10-25 PROBLEM — R77.8 ELEVATED TROPONIN: Status: RESOLVED | Noted: 2018-09-25 | Resolved: 2018-10-25

## 2018-10-25 PROBLEM — R79.89 ELEVATED TROPONIN: Status: RESOLVED | Noted: 2018-09-25 | Resolved: 2018-10-25

## 2018-11-18 ENCOUNTER — APPOINTMENT (OUTPATIENT)
Dept: GENERAL RADIOLOGY | Age: 58
DRG: 065 | End: 2018-11-18
Payer: COMMERCIAL

## 2018-11-18 ENCOUNTER — APPOINTMENT (OUTPATIENT)
Dept: CT IMAGING | Age: 58
DRG: 065 | End: 2018-11-18
Payer: COMMERCIAL

## 2018-11-18 ENCOUNTER — HOSPITAL ENCOUNTER (INPATIENT)
Age: 58
LOS: 2 days | Discharge: NURSING, REHAB OR LONG-TERM CARE | DRG: 065 | End: 2018-11-20
Attending: EMERGENCY MEDICINE | Admitting: HOSPITALIST
Payer: COMMERCIAL

## 2018-11-18 DIAGNOSIS — I63.9 CEREBROVASCULAR ACCIDENT (CVA), UNSPECIFIED MECHANISM (HCC): Primary | ICD-10-CM

## 2018-11-18 PROBLEM — R29.898 WEAKNESS OF RIGHT ARM: Status: ACTIVE | Noted: 2018-11-18

## 2018-11-18 LAB
ALBUMIN SERPL-MCNC: 3.7 GM/DL (ref 3.4–5)
ALP BLD-CCNC: 85 IU/L (ref 40–129)
ALT SERPL-CCNC: 22 U/L (ref 10–40)
ANION GAP SERPL CALCULATED.3IONS-SCNC: 14 MMOL/L (ref 4–16)
APTT: 30.7 SECONDS (ref 21.2–33)
AST SERPL-CCNC: 14 IU/L (ref 15–37)
BASOPHILS ABSOLUTE: 0.1 K/CU MM
BASOPHILS RELATIVE PERCENT: 0.8 % (ref 0–1)
BILIRUB SERPL-MCNC: 0.2 MG/DL (ref 0–1)
BUN BLDV-MCNC: 21 MG/DL (ref 6–23)
CALCIUM SERPL-MCNC: 9.1 MG/DL (ref 8.3–10.6)
CHLORIDE BLD-SCNC: 100 MMOL/L (ref 99–110)
CHP ED QC CHECK: YES
CO2: 25 MMOL/L (ref 21–32)
CREAT SERPL-MCNC: 0.8 MG/DL (ref 0.9–1.3)
DIFFERENTIAL TYPE: ABNORMAL
EOSINOPHILS ABSOLUTE: 0.3 K/CU MM
EOSINOPHILS RELATIVE PERCENT: 3.7 % (ref 0–3)
GFR AFRICAN AMERICAN: >60 ML/MIN/1.73M2
GFR NON-AFRICAN AMERICAN: >60 ML/MIN/1.73M2
GLUCOSE BLD-MCNC: 152 MG/DL (ref 70–99)
GLUCOSE BLD-MCNC: 239 MG/DL
GLUCOSE BLD-MCNC: 239 MG/DL (ref 70–99)
GLUCOSE BLD-MCNC: 254 MG/DL (ref 70–99)
HCT VFR BLD CALC: 46.4 % (ref 42–52)
HEMOGLOBIN: 15.1 GM/DL (ref 13.5–18)
IMMATURE NEUTROPHIL %: 0.5 % (ref 0–0.43)
INR BLD: 1.4 INDEX
LYMPHOCYTES ABSOLUTE: 1.6 K/CU MM
LYMPHOCYTES RELATIVE PERCENT: 18.4 % (ref 24–44)
MCH RBC QN AUTO: 28.9 PG (ref 27–31)
MCHC RBC AUTO-ENTMCNC: 32.5 % (ref 32–36)
MCV RBC AUTO: 88.9 FL (ref 78–100)
MONOCYTES ABSOLUTE: 0.8 K/CU MM
MONOCYTES RELATIVE PERCENT: 9.6 % (ref 0–4)
NUCLEATED RBC %: 0 %
PDW BLD-RTO: 14 % (ref 11.7–14.9)
PLATELET # BLD: 234 K/CU MM (ref 140–440)
PMV BLD AUTO: 11.5 FL (ref 7.5–11.1)
POTASSIUM SERPL-SCNC: 4.1 MMOL/L (ref 3.5–5.1)
PROTHROMBIN TIME: 15.9 SECONDS (ref 9.12–12.5)
RBC # BLD: 5.22 M/CU MM (ref 4.6–6.2)
SEGMENTED NEUTROPHILS ABSOLUTE COUNT: 5.9 K/CU MM
SEGMENTED NEUTROPHILS RELATIVE PERCENT: 67 % (ref 36–66)
SODIUM BLD-SCNC: 139 MMOL/L (ref 135–145)
TOTAL IMMATURE NEUTOROPHIL: 0.04 K/CU MM
TOTAL NUCLEATED RBC: 0 K/CU MM
TOTAL PROTEIN: 7.1 GM/DL (ref 6.4–8.2)
TOTAL RETICULOCYTE COUNT: 0.07 K/CU MM
TROPONIN T: <0.01 NG/ML
WBC # BLD: 8.7 K/CU MM (ref 4–10.5)

## 2018-11-18 PROCEDURE — 36415 COLL VENOUS BLD VENIPUNCTURE: CPT

## 2018-11-18 PROCEDURE — 80053 COMPREHEN METABOLIC PANEL: CPT

## 2018-11-18 PROCEDURE — 6370000000 HC RX 637 (ALT 250 FOR IP): Performed by: HOSPITALIST

## 2018-11-18 PROCEDURE — 1200000000 HC SEMI PRIVATE

## 2018-11-18 PROCEDURE — 71045 X-RAY EXAM CHEST 1 VIEW: CPT

## 2018-11-18 PROCEDURE — 85025 COMPLETE CBC W/AUTO DIFF WBC: CPT

## 2018-11-18 PROCEDURE — 2580000003 HC RX 258: Performed by: HOSPITALIST

## 2018-11-18 PROCEDURE — 6360000004 HC RX CONTRAST MEDICATION: Performed by: EMERGENCY MEDICINE

## 2018-11-18 PROCEDURE — 84484 ASSAY OF TROPONIN QUANT: CPT

## 2018-11-18 PROCEDURE — 70498 CT ANGIOGRAPHY NECK: CPT

## 2018-11-18 PROCEDURE — 93005 ELECTROCARDIOGRAM TRACING: CPT | Performed by: EMERGENCY MEDICINE

## 2018-11-18 PROCEDURE — 99285 EMERGENCY DEPT VISIT HI MDM: CPT

## 2018-11-18 PROCEDURE — 6370000000 HC RX 637 (ALT 250 FOR IP): Performed by: EMERGENCY MEDICINE

## 2018-11-18 PROCEDURE — G0378 HOSPITAL OBSERVATION PER HR: HCPCS

## 2018-11-18 PROCEDURE — 85610 PROTHROMBIN TIME: CPT

## 2018-11-18 PROCEDURE — 70496 CT ANGIOGRAPHY HEAD: CPT

## 2018-11-18 PROCEDURE — 82962 GLUCOSE BLOOD TEST: CPT

## 2018-11-18 PROCEDURE — 70450 CT HEAD/BRAIN W/O DYE: CPT

## 2018-11-18 PROCEDURE — 85730 THROMBOPLASTIN TIME PARTIAL: CPT

## 2018-11-18 RX ORDER — ASPIRIN 81 MG/1
81 TABLET, CHEWABLE ORAL DAILY
Status: DISCONTINUED | OUTPATIENT
Start: 2018-11-19 | End: 2018-11-19

## 2018-11-18 RX ORDER — SODIUM CHLORIDE 0.9 % (FLUSH) 0.9 %
10 SYRINGE (ML) INJECTION PRN
Status: DISCONTINUED | OUTPATIENT
Start: 2018-11-18 | End: 2018-11-20 | Stop reason: HOSPADM

## 2018-11-18 RX ORDER — ASPIRIN 81 MG/1
81 TABLET ORAL DAILY
Status: DISCONTINUED | OUTPATIENT
Start: 2018-11-19 | End: 2018-11-18

## 2018-11-18 RX ORDER — LISINOPRIL 10 MG/1
10 TABLET ORAL DAILY
Status: DISCONTINUED | OUTPATIENT
Start: 2018-11-19 | End: 2018-11-20 | Stop reason: HOSPADM

## 2018-11-18 RX ORDER — FUROSEMIDE 40 MG/1
80 TABLET ORAL DAILY
Status: DISCONTINUED | OUTPATIENT
Start: 2018-11-19 | End: 2018-11-20 | Stop reason: HOSPADM

## 2018-11-18 RX ORDER — NICOTINE POLACRILEX 4 MG
15 LOZENGE BUCCAL PRN
Status: DISCONTINUED | OUTPATIENT
Start: 2018-11-18 | End: 2018-11-20 | Stop reason: HOSPADM

## 2018-11-18 RX ORDER — DEXTROSE MONOHYDRATE 50 MG/ML
100 INJECTION, SOLUTION INTRAVENOUS PRN
Status: DISCONTINUED | OUTPATIENT
Start: 2018-11-18 | End: 2018-11-20 | Stop reason: HOSPADM

## 2018-11-18 RX ORDER — METOLAZONE 5 MG/1
5 TABLET ORAL
Status: DISCONTINUED | OUTPATIENT
Start: 2018-11-20 | End: 2018-11-20 | Stop reason: HOSPADM

## 2018-11-18 RX ORDER — SIMVASTATIN 10 MG
10 TABLET ORAL NIGHTLY
Status: DISCONTINUED | OUTPATIENT
Start: 2018-11-18 | End: 2018-11-20 | Stop reason: HOSPADM

## 2018-11-18 RX ORDER — SODIUM CHLORIDE 0.9 % (FLUSH) 0.9 %
10 SYRINGE (ML) INJECTION EVERY 12 HOURS SCHEDULED
Status: DISCONTINUED | OUTPATIENT
Start: 2018-11-18 | End: 2018-11-20 | Stop reason: HOSPADM

## 2018-11-18 RX ORDER — ONDANSETRON 2 MG/ML
4 INJECTION INTRAMUSCULAR; INTRAVENOUS EVERY 6 HOURS PRN
Status: DISCONTINUED | OUTPATIENT
Start: 2018-11-18 | End: 2018-11-20 | Stop reason: HOSPADM

## 2018-11-18 RX ORDER — ASPIRIN 81 MG/1
324 TABLET, CHEWABLE ORAL ONCE
Status: COMPLETED | OUTPATIENT
Start: 2018-11-18 | End: 2018-11-18

## 2018-11-18 RX ORDER — DEXTROSE MONOHYDRATE 25 G/50ML
12.5 INJECTION, SOLUTION INTRAVENOUS PRN
Status: DISCONTINUED | OUTPATIENT
Start: 2018-11-18 | End: 2018-11-20 | Stop reason: HOSPADM

## 2018-11-18 RX ORDER — METOPROLOL SUCCINATE 50 MG/1
50 TABLET, EXTENDED RELEASE ORAL DAILY
Status: DISCONTINUED | OUTPATIENT
Start: 2018-11-19 | End: 2018-11-20 | Stop reason: HOSPADM

## 2018-11-18 RX ADMIN — ASPIRIN 81 MG 324 MG: 81 TABLET ORAL at 22:54

## 2018-11-18 RX ADMIN — SIMVASTATIN 10 MG: 10 TABLET, FILM COATED ORAL at 22:55

## 2018-11-18 RX ADMIN — SODIUM CHLORIDE, PRESERVATIVE FREE 10 ML: 5 INJECTION INTRAVENOUS at 22:56

## 2018-11-18 RX ADMIN — IOPAMIDOL 100 ML: 755 INJECTION, SOLUTION INTRAVENOUS at 18:05

## 2018-11-18 ASSESSMENT — PAIN SCALES - GENERAL: PAINLEVEL_OUTOF10: 0

## 2018-11-18 NOTE — ED PROVIDER NOTES
Triage Chief Complaint:   Dizziness and Extremity Weakness (right arm onset last pm at about 7 pm)    [place]:  Mellissa Peter is a 62 y.o. male that presents with right sided weakness, dizziness and slurred speech. Patient's last known well is reported as 200 yesterday, per patient and wife. Patient reports symptoms worsened today, which prompted ED visit. Patient has never felt like this before. No fall or trauma. No numbness. No change in vision. No headache. Patient is on eliquis; last dose this evening. ROS:  At least 10 systems reviewed and otherwise acutely negative except as in the [address] Past Medical History:   Diagnosis Date    Atrial fibrillation ([facility] [address]     History of cardiac cath 2017    --RCA has mid 50% stenosis and PLB branch has 70% stenosis noted. LVEDP very high    Hypertension     Type II or unspecified type diabetes mellitus without mention of complication, not stated as uncontrolled     Diagnsed about 1998    Unspecified sleep apnea      Past Surgical History:   Procedure Laterality Date    CARDIAC CATHETERIZATION      TONSILLECTOMY      AT 13YEARS OLD     Family History   Problem Relation Age of Onset    Diabetes Mother     Diabetes Father     Cancer Father     Cancer Maternal Grandfather         PROSTATE CANCER     Social History     Social History    Marital status:      Spouse name: N/A    Number of children: N/A    Years of education: N/A     Occupational History    Not on file.      Social History Main Topics    Smoking status: Never Smoker    Smokeless tobacco: Never Used    Alcohol use No    Drug use: No    Sexual activity: Not on file     Other Topics Concern    Not on file     Social History Narrative    No narrative on file     Current Facility-Administered Medications   Medication Dose Route Frequency Provider Last Rate Last Dose    sodium chloride flush 0.9 % injection 10 mL  10 mL Intravenous PRN Stewart Nunn MD        aspirin chewable visualized skull or soft tissues. No acute intracranial abnormality. Xr Chest Portable    Result Date: 11/18/2018  EXAMINATION: SINGLE XRAY VIEW OF THE CHEST 11/18/2018 6:02 pm COMPARISON: Chest radiograph 09/25/2018. HISTORY: ORDERING SYSTEM PROVIDED HISTORY: CVA TECHNOLOGIST PROVIDED HISTORY: Reason for exam:->CVA Ordering Physician Provided Reason for Exam: cva FINDINGS: The cardiac silhouette is enlarged without significant change. Remaining mediastinal contours are unremarkable. Insert chest no acute osseous abnormality. No acute process. Cta Neck W Contrast    Result Date: 11/18/2018  EXAMINATION: CTA OF THE NECK; CTA OF THE HEAD WITH CONTRAST 11/18/2018 5:54 pm: TECHNIQUE: CTA of the neck was performed with the administration of intravenous contrast. Multiplanar reformatted images are provided for review. MIP images are provided for review. Stenosis of the internal carotid arteries measured using NASCET criteria. Dose modulation, iterative reconstruction, and/or weight based adjustment of the mA/kV was utilized to reduce the radiation dose to as low as reasonably achievable.; CTA of the head/brain was performed with the administration of intravenous contrast. Multiplanar reformatted images are provided for review. MIP images are provided for review. Dose modulation, iterative reconstruction, and/or weight based adjustment of the mA/kV was utilized to reduce the radiation dose to as low as reasonably achievable. COMPARISON: None. HISTORY: ORDERING SYSTEM PROVIDED HISTORY: right sided weakness, dizziness TECHNOLOGIST PROVIDED HISTORY: Has a \"code stroke\" or \"stroke alert\" been called? ->Yes; ORDERING SYSTEM PROVIDED HISTORY: dizziness, right sided weakness TECHNOLOGIST PROVIDED HISTORY: Has a \"code stroke\" or \"stroke alert\" been called? ->Yes FINDINGS: CTA NECK: AORTIC ARCH/ARCH VESSELS: No significant stenosis is seen of the innominate artery or subclavian arteries.  CAROTID ARTERIES: The common carotid arteries are normal in appearance without evidence of a flow limiting stenosis. Mild atherosclerotic disease at the bifurcation. Otherwise, the internal carotid arteries are normal in appearance without evidence of a flow limiting stenosis by NASCET criteria. No dissection or arterial injury is seen. VERTEBRAL ARTERIES: The vertebral arteries both arise from the subclavian arteries and are normal in caliber without evidence of flow limiting stenosis or dissection. SOFT TISSUES: The lung apices are clear. No significant cervical lymphadenopathy by size criteria. No evidence of acute findings within the visualized neck. The nasopharynx, oral cavity, oropharynx, hypopharynx, and laryngeal structures are unremarkable. BONES: Mild multilevel degenerative changes. CTA HEAD: ANTERIOR CIRCULATION: The internal carotid arteries are normal in course and caliber without focal stenosis. The anterior cerebral and middle cerebral arteries demonstrate no focal stenosis. POSTERIOR CIRCULATION: The posterior cerebral arteries demonstrate no focal stenosis. The vertebral and basilar arteries appear unremarkable. No high-grade stenosis or focal occlusion involving the intracranial vasculature or cervical vasculature. No evidence of acute dissection. No evidence of aneurysm. Cta Head W Contrast    Result Date: 11/18/2018  EXAMINATION: CTA OF THE NECK; CTA OF THE HEAD WITH CONTRAST 11/18/2018 5:54 pm: TECHNIQUE: CTA of the neck was performed with the administration of intravenous contrast. Multiplanar reformatted images are provided for review. MIP images are provided for review. Stenosis of the internal carotid arteries measured using NASCET criteria.  Dose modulation, iterative reconstruction, and/or weight based adjustment of the mA/kV was utilized to reduce the radiation dose to as low as reasonably achievable.; CTA of the head/brain was performed with the administration of intravenous contrast. LP within last 24 hours may be at higher risk. Assess  for signs of traumatic or repeated punctures. [] Gastrointestinal or urinary tract hemorrhage within last 21 days  [] Myocardial infarction involving left anterior myocardium within last 3 months  [] Suspected or known infective endocarditis or pericarditis    t-PA NOT given due to consideration of the following RELATIVE CONTRAINDICATIONS in those patients with last known well within 3-4.5 hours:  []Taking an oral anticoagulant (apixaban, dabigatran, edoxaban, rivaroxaban) other than warfarin regardless of time since last ose. Note: This does not include warfarn. A patient on Tari Dodrill remains eligible if INR less than or equal to 1.7    5. Patient will be disposition[facility] to [facility]      Clinical Impression:  1.  Cerebrovascular accident (CVA), unspecified mechanism ([facility] [address]      (Please note that portions of this note may have been completed with a voice recognition program. Efforts were made to edit the dictations but occasionally words are mis-transcribed.)    MD Tc Hermosillo MD  11/19/18 4753

## 2018-11-19 ENCOUNTER — APPOINTMENT (OUTPATIENT)
Dept: MRI IMAGING | Age: 58
DRG: 065 | End: 2018-11-19
Payer: COMMERCIAL

## 2018-11-19 LAB
BASOPHILS ABSOLUTE: 0 K/CU MM
BASOPHILS RELATIVE PERCENT: 0.6 % (ref 0–1)
CHOLESTEROL: 161 MG/DL
DIFFERENTIAL TYPE: ABNORMAL
EOSINOPHILS ABSOLUTE: 0.4 K/CU MM
EOSINOPHILS RELATIVE PERCENT: 5 % (ref 0–3)
GLUCOSE BLD-MCNC: 237 MG/DL (ref 70–99)
GLUCOSE BLD-MCNC: 238 MG/DL (ref 70–99)
GLUCOSE BLD-MCNC: 254 MG/DL (ref 70–99)
GLUCOSE BLD-MCNC: 264 MG/DL (ref 70–99)
GLUCOSE BLD-MCNC: 97 MG/DL (ref 70–99)
HCT VFR BLD CALC: 44.1 % (ref 42–52)
HDLC SERPL-MCNC: 40 MG/DL
HEMOGLOBIN: 14.3 GM/DL (ref 13.5–18)
IMMATURE NEUTROPHIL %: 0.6 % (ref 0–0.43)
LDL CHOLESTEROL DIRECT: 118 MG/DL
LYMPHOCYTES ABSOLUTE: 1.2 K/CU MM
LYMPHOCYTES RELATIVE PERCENT: 17.3 % (ref 24–44)
MCH RBC QN AUTO: 28.8 PG (ref 27–31)
MCHC RBC AUTO-ENTMCNC: 32.4 % (ref 32–36)
MCV RBC AUTO: 88.7 FL (ref 78–100)
MONOCYTES ABSOLUTE: 0.8 K/CU MM
MONOCYTES RELATIVE PERCENT: 10.5 % (ref 0–4)
NUCLEATED RBC %: 0 %
PDW BLD-RTO: 14 % (ref 11.7–14.9)
PLATELET # BLD: 231 K/CU MM (ref 140–440)
PMV BLD AUTO: 11 FL (ref 7.5–11.1)
RBC # BLD: 4.97 M/CU MM (ref 4.6–6.2)
SEGMENTED NEUTROPHILS ABSOLUTE COUNT: 4.7 K/CU MM
SEGMENTED NEUTROPHILS RELATIVE PERCENT: 66 % (ref 36–66)
TOTAL IMMATURE NEUTOROPHIL: 0.04 K/CU MM
TOTAL NUCLEATED RBC: 0 K/CU MM
TRIGL SERPL-MCNC: 100 MG/DL
TROPONIN T: <0.01 NG/ML
TROPONIN T: <0.01 NG/ML
WBC # BLD: 7.1 K/CU MM (ref 4–10.5)

## 2018-11-19 PROCEDURE — 6370000000 HC RX 637 (ALT 250 FOR IP): Performed by: INTERNAL MEDICINE

## 2018-11-19 PROCEDURE — G0378 HOSPITAL OBSERVATION PER HR: HCPCS

## 2018-11-19 PROCEDURE — 97163 PT EVAL HIGH COMPLEX 45 MIN: CPT

## 2018-11-19 PROCEDURE — 1200000000 HC SEMI PRIVATE

## 2018-11-19 PROCEDURE — 90670 PCV13 VACCINE IM: CPT | Performed by: INTERNAL MEDICINE

## 2018-11-19 PROCEDURE — G8988 SELF CARE GOAL STATUS: HCPCS

## 2018-11-19 PROCEDURE — G8978 MOBILITY CURRENT STATUS: HCPCS

## 2018-11-19 PROCEDURE — 97166 OT EVAL MOD COMPLEX 45 MIN: CPT

## 2018-11-19 PROCEDURE — 97116 GAIT TRAINING THERAPY: CPT

## 2018-11-19 PROCEDURE — 6360000002 HC RX W HCPCS: Performed by: INTERNAL MEDICINE

## 2018-11-19 PROCEDURE — G8987 SELF CARE CURRENT STATUS: HCPCS

## 2018-11-19 PROCEDURE — 97167 OT EVAL HIGH COMPLEX 60 MIN: CPT

## 2018-11-19 PROCEDURE — 85025 COMPLETE CBC W/AUTO DIFF WBC: CPT

## 2018-11-19 PROCEDURE — 97530 THERAPEUTIC ACTIVITIES: CPT

## 2018-11-19 PROCEDURE — G0009 ADMIN PNEUMOCOCCAL VACCINE: HCPCS | Performed by: INTERNAL MEDICINE

## 2018-11-19 PROCEDURE — G8979 MOBILITY GOAL STATUS: HCPCS

## 2018-11-19 PROCEDURE — 84484 ASSAY OF TROPONIN QUANT: CPT

## 2018-11-19 PROCEDURE — 82962 GLUCOSE BLOOD TEST: CPT

## 2018-11-19 PROCEDURE — 80061 LIPID PANEL: CPT

## 2018-11-19 PROCEDURE — 97112 NEUROMUSCULAR REEDUCATION: CPT

## 2018-11-19 PROCEDURE — 36415 COLL VENOUS BLD VENIPUNCTURE: CPT

## 2018-11-19 PROCEDURE — 70551 MRI BRAIN STEM W/O DYE: CPT

## 2018-11-19 PROCEDURE — 2580000003 HC RX 258: Performed by: HOSPITALIST

## 2018-11-19 PROCEDURE — 83721 ASSAY OF BLOOD LIPOPROTEIN: CPT

## 2018-11-19 PROCEDURE — 6370000000 HC RX 637 (ALT 250 FOR IP): Performed by: HOSPITALIST

## 2018-11-19 RX ORDER — CLOPIDOGREL BISULFATE 75 MG/1
75 TABLET ORAL DAILY
Status: DISCONTINUED | OUTPATIENT
Start: 2018-11-20 | End: 2018-11-20 | Stop reason: HOSPADM

## 2018-11-19 RX ADMIN — ASPIRIN 81 MG 81 MG: 81 TABLET ORAL at 10:12

## 2018-11-19 RX ADMIN — SODIUM CHLORIDE, PRESERVATIVE FREE 10 ML: 5 INJECTION INTRAVENOUS at 20:42

## 2018-11-19 RX ADMIN — PNEUMOCOCCAL 13-VALENT CONJUGATE VACCINE 0.5 ML: 2.2; 2.2; 2.2; 2.2; 2.2; 4.4; 2.2; 2.2; 2.2; 2.2; 2.2; 2.2; 2.2 INJECTION, SUSPENSION INTRAMUSCULAR at 13:51

## 2018-11-19 RX ADMIN — INSULIN HUMAN 10 UNITS: 500 INJECTION, SOLUTION SUBCUTANEOUS at 17:43

## 2018-11-19 RX ADMIN — APIXABAN 5 MG: 5 TABLET, FILM COATED ORAL at 10:11

## 2018-11-19 RX ADMIN — METOPROLOL SUCCINATE 50 MG: 50 TABLET, EXTENDED RELEASE ORAL at 10:12

## 2018-11-19 RX ADMIN — SODIUM CHLORIDE, PRESERVATIVE FREE 10 ML: 5 INJECTION INTRAVENOUS at 10:13

## 2018-11-19 RX ADMIN — FUROSEMIDE 80 MG: 40 TABLET ORAL at 10:10

## 2018-11-19 RX ADMIN — LISINOPRIL 10 MG: 10 TABLET ORAL at 10:11

## 2018-11-19 RX ADMIN — SIMVASTATIN 10 MG: 10 TABLET, FILM COATED ORAL at 20:42

## 2018-11-19 RX ADMIN — APIXABAN 5 MG: 5 TABLET, FILM COATED ORAL at 20:42

## 2018-11-19 ASSESSMENT — PAIN SCALES - GENERAL
PAINLEVEL_OUTOF10: 0

## 2018-11-19 NOTE — PROGRESS NOTES
Skin assessment complete with Sissy MCLEOD. Pt has healing wound on L great toe. BLE are red, flaky, warm and swollen with scabbed areas, pt states this is normal for him. Redness present on pt bottom.

## 2018-11-19 NOTE — FLOWSHEET NOTE
Notified Dr Ananda Espino of MRI brain result at [phone]: 7x6 mm infarct within left thalamus. Physician stated he will see patient tonight. Notified Dr Zachary Bourne of MRI result at 1904. Message read without reply.

## 2018-11-19 NOTE — PROGRESS NOTES
Occupational Therapy   Occupational Therapy Initial Assessment  Date: 2018   Patient Name: Mellissa Peter  MRN: [id number]     : 1960    Date of Service: 2018    Discharge Recommendations:  IP Rehab, S Level 3, Home with Home health OT, Home with assist PRN (wife works FT and pt will be alone during the day)    Based on reported PLOF, current status, and anticipated d/c needs,pt will benefit from acute OT services as well as cont'd OT services at d/c on ARU. If ARU is not an option d/t insurance or if pt refuses, home w/HHC services, Level 3, is recommended (wife works FT days and pt will be alone during this time. He is unsafe and unable to drive for any OP services at this time d/t R side ataxia/dyscoordination). Patient Diagnosis(es): The encounter diagnosis was Cerebrovascular accident (CVA), unspecified mechanism ([facility] [address] has a past medical history of Atrial fibrillation ([facility] [address] History of cardiac cath; Hyperlipidemia; Hypertension; Type II or unspecified type diabetes mellitus without mention of complication, not stated as uncontrolled; and Unspecified sleep apnea. has a past surgical history that includes Tonsillectomy and Cardiac catheterization. Restrictions  Restrictions/Precautions  Restrictions/Precautions: General Precautions, Fall Risk  Position Activity Restriction  Other position/activity restrictions: Cleared by RN, José Cifuentes, for OT/PT evals. HR 89, PIV, bed exit. Sitting /83; standing /74. Pt states he gets a bit dizzy when transitioning positions, new per pt for 1-2 days. Subjective   General  Chart Reviewed: Yes  Patient assessed for rehabilitation services?: Yes  Family / Caregiver Present: Yes (wife, Tristan Sumner)  Subjective  Subjective: Pt lying in bed, agreable to services. Confirmed and demonstrated speech is back to baseline.    Pain Assessment  Patient Currently in Pain: Denies  Pain Assessment: 0-10  Pain Level: 0    Social/Functional History  Social/Functional History  Lives With: Spouse (dogs that do not get under feet + cat)  Type of Home: House  Home Layout: Two level, Laundry in basement, Bed/Bath upstairs (does not go to basement)  Home Access: Stairs to enter with rails  Entrance Stairs - Number of Steps: 4-5  Entrance Stairs - Rails: Both (front entrance, most often used; full flight of steps to 2nd level w/L asc HR; does not go to basement)  Bathroom Shower/Tub: Tub/Shower unit (will use OH shower door to steady self)  Bathroom Toilet: Standard (half wall L side plus wall on R for support getting up/down)  Home Equipment:  (no DME)  ADL Assistance: Independent  Homemaking Assistance: Independent (wife is primary but pt does meal prep)  Ambulation Assistance: Independent  Transfer Assistance: Independent  Active : Yes  Occupation: Full time employment  Type of occupation: [address]: denies  Additional Comments: Pt denies any falls. Wife also work FT days. Objective   Vision: Impaired  Vision Exceptions:  (trifocals; wears for driving; denies new onset of visual changes but reports blurred vision when BS elevated)  Hearing: Within functional limits    Orientation  Overall Orientation Status: Within Functional Limits  Observation/Palpation  Posture: Good  Balance  Sitting Balance: Supervision (to mod indep dynamic unsupported on side of bed)  Standing Balance: Contact guard assistance (to min A light dynamic)  Standing Balance  Sit to stand: Contact guard assistance (to SBA from bed, CGA from toilet)  Stand to sit: Contact guard assistance (to toilet, chair)     Tone RUE  RUE Tone: Normotonic;Hypotonic  Tone LUE  LUE Tone: Normotonic  Coordination  Coordination and Movement description: Fine motor impairments;Gross motor impairments;Right UE; Ataxia; Decreased speed;Decreased accuracy  Quality of Movement Other  Comment: Pronator drift noted w/gross AROM RUE.  Dysdiadochokinesia w/sup/pro R forearm,

## 2018-11-19 NOTE — PROGRESS NOTES
Static: Good  Sitting - Dynamic: Good  Standing - Static: Fair;-  Standing - Dynamic: Poor;+  Exercises  Comments: patient completed 10 sets of cup stacking to improve RUE coordination (with verbal cues for slow and controlled movement)     Gait Training:  Cues were given for safety, sequence, device management, balance, posture, awareness, path. Therapeutic Activity Training:   Therapeutic activity training was instructed today. Cues were given for safety, sequence, UE/LE placement, awareness, and balance. Activities performed today included bed mobility training, sup-sit, sit-stand. Neuro-Muscular re-education:  Cues were given for position, posture, kinesthetic sense, safety, recruitment, and rationale. Cues were verbal and/or tactile. Assessment   Assessment: Pt is a 62 y.o. Male with medical history, surgical history, co-morbidities, and personal factors including Atrial fibrillation; History of cardiac cath; Hyperlipidemia; Hypertension; Type II or unspecified type diabetes mellitus without mention of complication, not stated as uncontrolled; Unspecified sleep apnea, Tonsillectomy, and Cardiac catheterization with admission for acute infarct within the left thalamus. Prior to admission, pt was independent with functional mobility and ADLs. Examination of body systems reveals decreased strength, decreased balance, decreased aerobic capacity, impaired coordination, and decreased independence with functional mobility.         Prognosis: Good  Decision Making: High Complexity  Clinical Presentation: unpredictable characteristics  REQUIRES PT FOLLOW UP: Yes  Activity Tolerance  Activity Tolerance: Patient Tolerated treatment well         Plan   Plan  Times per week: 5+  Current Treatment Recommendations: Strengthening, ROM, Balance Training, Functional Mobility Training, Transfer Training, ADL/Self-care Training, IADL Training, Gait Training, Stair training, Patient/Caregiver Education &

## 2018-11-20 ENCOUNTER — HOSPITAL ENCOUNTER (INPATIENT)
Age: 58
LOS: 14 days | Discharge: HOME | DRG: 057 | End: 2018-12-04
Attending: PHYSICAL MEDICINE & REHABILITATION | Admitting: PHYSICAL MEDICINE & REHABILITATION
Payer: COMMERCIAL

## 2018-11-20 VITALS
HEART RATE: 90 BPM | HEIGHT: 72 IN | RESPIRATION RATE: 21 BRPM | WEIGHT: 315 LBS | OXYGEN SATURATION: 94 % | BODY MASS INDEX: 42.66 KG/M2 | TEMPERATURE: 97.6 F | SYSTOLIC BLOOD PRESSURE: 132 MMHG | DIASTOLIC BLOOD PRESSURE: 72 MMHG

## 2018-11-20 PROBLEM — I10 ESSENTIAL HYPERTENSION: Status: ACTIVE | Noted: 2018-11-20

## 2018-11-20 PROBLEM — I63.9 ACUTE CVA (CEREBROVASCULAR ACCIDENT) (HCC): Status: ACTIVE | Noted: 2018-11-20

## 2018-11-20 PROBLEM — I69.391 DYSPHAGIA DUE TO RECENT STROKE: Status: ACTIVE | Noted: 2018-11-20

## 2018-11-20 PROBLEM — E66.01 MORBID OBESITY WITH BODY MASS INDEX OF 45.0-49.9 IN ADULT (HCC): Status: ACTIVE | Noted: 2018-11-20

## 2018-11-20 PROBLEM — G81.11 SPASTIC HEMIPARESIS OF RIGHT DOMINANT SIDE (HCC): Status: ACTIVE | Noted: 2018-11-20

## 2018-11-20 LAB
ANION GAP SERPL CALCULATED.3IONS-SCNC: 11 MMOL/L (ref 4–16)
APTT: 25.9 SECONDS (ref 21.2–33)
BUN BLDV-MCNC: 25 MG/DL (ref 6–23)
CALCIUM SERPL-MCNC: 9.1 MG/DL (ref 8.3–10.6)
CHLORIDE BLD-SCNC: 102 MMOL/L (ref 99–110)
CO2: 30 MMOL/L (ref 21–32)
CREAT SERPL-MCNC: 1 MG/DL (ref 0.9–1.3)
EKG ATRIAL RATE: 81 BPM
EKG DIAGNOSIS: NORMAL
EKG P AXIS: 25 DEGREES
EKG P-R INTERVAL: 134 MS
EKG Q-T INTERVAL: 378 MS
EKG QRS DURATION: 92 MS
EKG QTC CALCULATION (BAZETT): 439 MS
EKG R AXIS: -10 DEGREES
EKG T AXIS: -11 DEGREES
EKG VENTRICULAR RATE: 81 BPM
FIBRINOGEN LEVEL: 501 MG/DL (ref 196.9–442.1)
GFR AFRICAN AMERICAN: >60 ML/MIN/1.73M2
GFR NON-AFRICAN AMERICAN: >60 ML/MIN/1.73M2
GLUCOSE BLD-MCNC: 108 MG/DL (ref 70–99)
GLUCOSE BLD-MCNC: 111 MG/DL (ref 70–99)
GLUCOSE BLD-MCNC: 140 MG/DL (ref 70–99)
GLUCOSE BLD-MCNC: 216 MG/DL (ref 70–99)
GLUCOSE BLD-MCNC: 220 MG/DL (ref 70–99)
GLUCOSE BLD-MCNC: 54 MG/DL (ref 70–99)
GLUCOSE BLD-MCNC: 80 MG/DL (ref 70–99)
HCT VFR BLD CALC: 43.5 % (ref 42–52)
HEMOGLOBIN: 14 GM/DL (ref 13.5–18)
HOMOCYSTEINE: 12.8 UMOL/L (ref 0–10)
INR BLD: 1.25 INDEX
LV EF: 53 %
LVEF MODALITY: NORMAL
MCH RBC QN AUTO: 28.3 PG (ref 27–31)
MCHC RBC AUTO-ENTMCNC: 32.2 % (ref 32–36)
MCV RBC AUTO: 88.1 FL (ref 78–100)
PDW BLD-RTO: 14.3 % (ref 11.7–14.9)
PLATELET # BLD: 240 K/CU MM (ref 140–440)
PMV BLD AUTO: 10.7 FL (ref 7.5–11.1)
POTASSIUM SERPL-SCNC: 3.7 MMOL/L (ref 3.5–5.1)
PROTHROMBIN TIME: 14.2 SECONDS (ref 9.12–12.5)
RBC # BLD: 4.94 M/CU MM (ref 4.6–6.2)
SODIUM BLD-SCNC: 143 MMOL/L (ref 135–145)
WBC # BLD: 9 K/CU MM (ref 4–10.5)

## 2018-11-20 PROCEDURE — 99223 1ST HOSP IP/OBS HIGH 75: CPT | Performed by: PHYSICAL MEDICINE & REHABILITATION

## 2018-11-20 PROCEDURE — 97530 THERAPEUTIC ACTIVITIES: CPT

## 2018-11-20 PROCEDURE — 81241 F5 GENE: CPT

## 2018-11-20 PROCEDURE — 6370000000 HC RX 637 (ALT 250 FOR IP): Performed by: INTERNAL MEDICINE

## 2018-11-20 PROCEDURE — 85305 CLOT INHIBIT PROT S TOTAL: CPT

## 2018-11-20 PROCEDURE — 7100000000 HC PACU RECOVERY - FIRST 15 MIN

## 2018-11-20 PROCEDURE — 81240 F2 GENE: CPT

## 2018-11-20 PROCEDURE — 93312 ECHO TRANSESOPHAGEAL: CPT

## 2018-11-20 PROCEDURE — 36415 COLL VENOUS BLD VENIPUNCTURE: CPT

## 2018-11-20 PROCEDURE — 85027 COMPLETE CBC AUTOMATED: CPT

## 2018-11-20 PROCEDURE — 93226 XTRNL ECG REC<48 HR SCAN A/R: CPT

## 2018-11-20 PROCEDURE — 85610 PROTHROMBIN TIME: CPT

## 2018-11-20 PROCEDURE — 81291 MTHFR GENE: CPT

## 2018-11-20 PROCEDURE — 85384 FIBRINOGEN ACTIVITY: CPT

## 2018-11-20 PROCEDURE — 93225 XTRNL ECG REC<48 HRS REC: CPT

## 2018-11-20 PROCEDURE — 86147 CARDIOLIPIN ANTIBODY EA IG: CPT

## 2018-11-20 PROCEDURE — 86148 ANTI-PHOSPHOLIPID ANTIBODY: CPT

## 2018-11-20 PROCEDURE — 6370000000 HC RX 637 (ALT 250 FOR IP): Performed by: HOSPITALIST

## 2018-11-20 PROCEDURE — 97110 THERAPEUTIC EXERCISES: CPT

## 2018-11-20 PROCEDURE — 85303 CLOT INHIBIT PROT C ACTIVITY: CPT

## 2018-11-20 PROCEDURE — 97116 GAIT TRAINING THERAPY: CPT

## 2018-11-20 PROCEDURE — 80048 BASIC METABOLIC PNL TOTAL CA: CPT

## 2018-11-20 PROCEDURE — 85240 CLOT FACTOR VIII AHG 1 STAGE: CPT

## 2018-11-20 PROCEDURE — 82962 GLUCOSE BLOOD TEST: CPT

## 2018-11-20 PROCEDURE — 1280000000 HC REHAB R&B

## 2018-11-20 PROCEDURE — 97112 NEUROMUSCULAR REEDUCATION: CPT

## 2018-11-20 PROCEDURE — 85300 ANTITHROMBIN III ACTIVITY: CPT

## 2018-11-20 PROCEDURE — 2580000003 HC RX 258: Performed by: HOSPITALIST

## 2018-11-20 PROCEDURE — 7100000001 HC PACU RECOVERY - ADDTL 15 MIN

## 2018-11-20 PROCEDURE — 2580000003 HC RX 258: Performed by: INTERNAL MEDICINE

## 2018-11-20 PROCEDURE — 85730 THROMBOPLASTIN TIME PARTIAL: CPT

## 2018-11-20 PROCEDURE — 6370000000 HC RX 637 (ALT 250 FOR IP): Performed by: PSYCHIATRY & NEUROLOGY

## 2018-11-20 PROCEDURE — 83090 ASSAY OF HOMOCYSTEINE: CPT

## 2018-11-20 PROCEDURE — B246ZZ4 ULTRASONOGRAPHY OF RIGHT AND LEFT HEART, TRANSESOPHAGEAL: ICD-10-PCS | Performed by: INTERNAL MEDICINE

## 2018-11-20 PROCEDURE — 93308 TTE F-UP OR LMTD: CPT

## 2018-11-20 RX ORDER — CLOPIDOGREL BISULFATE 75 MG/1
75 TABLET ORAL DAILY
Status: CANCELLED | OUTPATIENT
Start: 2018-11-21

## 2018-11-20 RX ORDER — METOLAZONE 5 MG/1
5 TABLET ORAL
Status: CANCELLED | OUTPATIENT
Start: 2018-11-22

## 2018-11-20 RX ORDER — NICOTINE POLACRILEX 4 MG
15 LOZENGE BUCCAL PRN
Status: DISCONTINUED | OUTPATIENT
Start: 2018-11-20 | End: 2018-12-04 | Stop reason: HOSPADM

## 2018-11-20 RX ORDER — ATORVASTATIN CALCIUM 40 MG/1
40 TABLET, FILM COATED ORAL DAILY
Qty: 30 TABLET | Refills: 0 | Status: SHIPPED | OUTPATIENT
Start: 2018-11-20 | End: 2019-04-09

## 2018-11-20 RX ORDER — B12/LEVOMEFOLATE CALCIUM/B-6 2-1.13-25
1 TABLET ORAL DAILY
Status: DISCONTINUED | OUTPATIENT
Start: 2018-11-21 | End: 2018-12-04 | Stop reason: HOSPADM

## 2018-11-20 RX ORDER — METOPROLOL SUCCINATE 50 MG/1
50 TABLET, EXTENDED RELEASE ORAL DAILY
Status: DISCONTINUED | OUTPATIENT
Start: 2018-11-21 | End: 2018-11-30

## 2018-11-20 RX ORDER — LISINOPRIL 10 MG/1
10 TABLET ORAL DAILY
Status: DISCONTINUED | OUTPATIENT
Start: 2018-11-21 | End: 2018-11-30

## 2018-11-20 RX ORDER — CLOPIDOGREL BISULFATE 75 MG/1
75 TABLET ORAL DAILY
Qty: 30 TABLET | Refills: 0 | Status: ON HOLD
Start: 2018-11-21 | End: 2018-12-04

## 2018-11-20 RX ORDER — ATORVASTATIN CALCIUM 40 MG/1
40 TABLET, FILM COATED ORAL NIGHTLY
Status: CANCELLED | OUTPATIENT
Start: 2018-11-20

## 2018-11-20 RX ORDER — SODIUM CHLORIDE 9 MG/ML
INJECTION, SOLUTION INTRAVENOUS CONTINUOUS
Status: DISCONTINUED | OUTPATIENT
Start: 2018-11-20 | End: 2018-11-20

## 2018-11-20 RX ORDER — CLOPIDOGREL BISULFATE 75 MG/1
75 TABLET ORAL DAILY
Status: DISCONTINUED | OUTPATIENT
Start: 2018-11-21 | End: 2018-12-04 | Stop reason: HOSPADM

## 2018-11-20 RX ORDER — FUROSEMIDE 40 MG/1
80 TABLET ORAL DAILY
Status: DISCONTINUED | OUTPATIENT
Start: 2018-11-21 | End: 2018-12-04 | Stop reason: HOSPADM

## 2018-11-20 RX ORDER — NICOTINE POLACRILEX 4 MG
15 LOZENGE BUCCAL PRN
Status: CANCELLED | OUTPATIENT
Start: 2018-11-20

## 2018-11-20 RX ORDER — B12/LEVOMEFOLATE CALCIUM/B-6 2-1.13-25
1 TABLET ORAL DAILY
Qty: 30 TABLET | Refills: 0 | Status: ON HOLD
Start: 2018-11-21 | End: 2018-12-04

## 2018-11-20 RX ORDER — METOPROLOL SUCCINATE 50 MG/1
50 TABLET, EXTENDED RELEASE ORAL DAILY
Status: CANCELLED | OUTPATIENT
Start: 2018-11-21

## 2018-11-20 RX ORDER — SIMVASTATIN 10 MG
10 TABLET ORAL NIGHTLY
Status: CANCELLED | OUTPATIENT
Start: 2018-11-20

## 2018-11-20 RX ORDER — EZETIMIBE 10 MG/1
10 TABLET ORAL DAILY
Qty: 30 TABLET | Refills: 1 | Status: SHIPPED | OUTPATIENT
Start: 2018-11-20 | End: 2019-10-10

## 2018-11-20 RX ORDER — B12/LEVOMEFOLATE CALCIUM/B-6 2-1.13-25
1 TABLET ORAL DAILY
Status: DISCONTINUED | OUTPATIENT
Start: 2018-11-20 | End: 2018-11-20 | Stop reason: HOSPADM

## 2018-11-20 RX ORDER — B12/LEVOMEFOLATE CALCIUM/B-6 2-1.13-25
1 TABLET ORAL DAILY
Status: CANCELLED | OUTPATIENT
Start: 2018-11-21

## 2018-11-20 RX ORDER — METOLAZONE 2.5 MG/1
5 TABLET ORAL
Status: DISCONTINUED | OUTPATIENT
Start: 2018-11-22 | End: 2018-11-30

## 2018-11-20 RX ORDER — ACETAMINOPHEN 325 MG/1
650 TABLET ORAL EVERY 4 HOURS PRN
Status: DISCONTINUED | OUTPATIENT
Start: 2018-11-20 | End: 2018-12-04 | Stop reason: HOSPADM

## 2018-11-20 RX ORDER — ATORVASTATIN CALCIUM 40 MG/1
40 TABLET, FILM COATED ORAL NIGHTLY
Status: DISCONTINUED | OUTPATIENT
Start: 2018-11-20 | End: 2018-12-04 | Stop reason: HOSPADM

## 2018-11-20 RX ORDER — LISINOPRIL 10 MG/1
10 TABLET ORAL DAILY
Status: CANCELLED | OUTPATIENT
Start: 2018-11-21

## 2018-11-20 RX ORDER — FUROSEMIDE 40 MG/1
80 TABLET ORAL DAILY
Status: CANCELLED | OUTPATIENT
Start: 2018-11-21

## 2018-11-20 RX ADMIN — METOLAZONE 5 MG: 5 TABLET ORAL at 12:16

## 2018-11-20 RX ADMIN — SODIUM CHLORIDE: 9 INJECTION, SOLUTION INTRAVENOUS at 08:23

## 2018-11-20 RX ADMIN — LISINOPRIL 10 MG: 10 TABLET ORAL at 12:16

## 2018-11-20 RX ADMIN — METOPROLOL SUCCINATE 50 MG: 50 TABLET, EXTENDED RELEASE ORAL at 12:15

## 2018-11-20 RX ADMIN — Medication 1 TABLET: at 14:22

## 2018-11-20 RX ADMIN — CLOPIDOGREL BISULFATE 75 MG: 75 TABLET ORAL at 14:47

## 2018-11-20 RX ADMIN — RIVAROXABAN 20 MG: 20 TABLET, FILM COATED ORAL at 16:52

## 2018-11-20 RX ADMIN — FUROSEMIDE 80 MG: 40 TABLET ORAL at 12:15

## 2018-11-20 RX ADMIN — INSULIN HUMAN 10 UNITS: 500 INJECTION, SOLUTION SUBCUTANEOUS at 13:06

## 2018-11-20 RX ADMIN — SODIUM CHLORIDE, PRESERVATIVE FREE 10 ML: 5 INJECTION INTRAVENOUS at 08:26

## 2018-11-20 RX ADMIN — ATORVASTATIN CALCIUM 40 MG: 40 TABLET, FILM COATED ORAL at 21:02

## 2018-11-20 ASSESSMENT — PAIN SCALES - GENERAL
PAINLEVEL_OUTOF10: 0

## 2018-11-20 NOTE — PLAN OF CARE
ARU Interdisciplinary Plan of Care [facility] Dr. Katheryn Cummings [facility], [address]  [phone]  Fax: [phone]        Harley Dyson    : 1960  Acct #: [de-identified]  MRN: [id number]   PHYSICIAN:  Milind Tian MD  Primary Active Problems:   Active Hospital Problems    Diagnosis Date Noted    Acute CVA (cerebrovascular accident) ([address] [I63.9] 2018    Spastic hemiparesis of right dominant side ([address] [G81.11] 2018    Dysphagia due to recent stroke [I69.391] 2018    Essential hypertension [I10] 2018    Morbid obesity with body mass index of 45.0-49.9 in adult [facility]) [E66.01, Z68.42] 2018    Type 2 diabetes mellitus with hyperglycemia, with long-term current use of insulin ([facility] [E11.65, Z79.4] 2018    New onset atrial fibrillation ([facility] [I48.91] 2018       Rehabilitation Diagnosis:     Acute CVA (cerebrovascular accident) [facility]) [I63.9]       ADMIT DATE:2018   CARE PLAN     NURSING:  Harley Dyson while on this unit will:      Bowel and Bladder   [x] Be continent of bowel and bladder      [x] Have an adequate number of bowel movements   [] Urinate with no urinary retention >300ml in bladder   [] Bladder Scan: (details)   [] Complete bladder protocol with gill removal   [] Initiate Bladder Program to toilet every ___ hours   [] Initiate Bowel Program to toilet every ___hours   [] Bladder training    [] Bowel training  Pulmonary   [x] Maintain O2 SATs at 92%or greater  Pain Management   [x] Have pain managed while on ARU        [x] Be pain free by discharge    [x] Medication Management and Education  Maintenance of Skin Integrity/Wound Management   [x] Have no skin breakdown while on ARU   [] Have improved skin integrity via wound measurements   [] Have no signs/symptoms of infection via infection protection and monitoring at the          wound site  Fall Prevention   [x] Be free from injury during hospitalization via fall prevention measures     [x] Disease management and Education  Precautions   [] Weight Bearing Precautions   [] Swallowing Precautions   [x] Monitoring of Risks of Complications   [x] DVT Prophylaxis    [x] Fluid/electrolyte/Nutrition Management    [x] Complete education with patient/family with understanding demonstrated for          in-room safety with transfers to bed, toilet, wheelchair, shower as well as                bathroom activities and hygiene. [] Adjustment   [] Other:   Nursing interventions may include bowel/bladder training, education for medical assistive devices, medication education, O2 saturation management, energy conservation, stress management techniques, fall prevention, alarms protocol, seating and positioning, skin/wound care, pressure relief instruction,dressing changes,  infection protection, DVT prophylaxis, and/or assistance with in room safety with transfers to bed, toilet, wheelchair, shower as well as bathroom activities and hygiene. Patient/caregiver education for:   [] Disease/sustained injury/management      [x] Medication Use   [] Surgical intervention   [x] Safety/Precautions   [] Body mechanics and or joint protection   [x] Health maintenance         PHYSICAL THERAPY:  Goals:                  Short term goals  Time Frame for Short term goals: 10 tx days or until discharge:   Short term goal 1: Pt will consistently complete bed mobility and sup<->sit Ind. Short term goal 2: Pt will complete OOB transfers Mod Ind. Short term goal 3: Pt will ambulate >/=150 ft using LRAD Mod Ind. Short term goal 4: Pt will ascend/descend 1 flight of stairs with 1 rail Mod Ind following step-to pattern. Short term goal 5: Pt will ascend/descend curb step and ambulte over uneven surfaces using LRAD Mod Ind. These goals were reviewed with this patient at the time of assessment and Woodrow Guajardo is in agreement.      Plan of

## 2018-11-20 NOTE — PROGRESS NOTES
Patient's tolerance of treatment:  well   Adverse Reaction: none  Significant change in status and impact:  none  Barriers to improvement:  Right sided weakness  Discharge plan: Recommending ARU    Plan for Next Session:    Per POC    Time in:  1130  Time out:  1208  Timed treatment minutes:  38  Total treatment time:  45    Previously filed items:  Social/Functional History  Lives With: Spouse (dogs that do not get under feet + cat)  Type of Home: House  Home Layout: Two level, Laundry in basement, Bed/Bath upstairs (does not go to basement)  Home Access: Stairs to enter with rails  Entrance Stairs - Number of Steps: 4-5  Entrance Stairs - Rails: Both (front entrance, most often used; full flight of steps to 2nd level w/L asc HR; does not go to basement)  Bathroom Shower/Tub: Tub/Shower unit (will use OH shower door to steady self)  Bathroom Toilet: Standard (half wall L side plus wall on R for support getting up/down)  Home Equipment:  (no DME)  ADL Assistance: Independent  Homemaking Assistance: Independent (wife is primary but pt does meal prep)  Ambulation Assistance: Independent  Transfer Assistance: Independent  Active : Yes  Occupation: Full time employment  Type of occupation: [employer] [address]: denies  Additional Comments: Pt denies any falls. Wife also work FT days. Long term goals  Long term goal 1: In one week, pt will complete all bed mobility independently  Long term goal 2: In one week, pt will complete sit <> stand transfers with supervision  Long term goal 3: In one week, pt will ambulate 100 feet with CGAx1 with LRAD  Long term goal 4: In one week, pt will ascend/descend 6 steps with a handrail with modAx2  Long term goal 5:  In one week, pt will independently complete 3 sets of 10 reps of BLE AROM exercises in available and allowed ROM    Electronically signed by:    Daniel Bell PTA [id number]

## 2018-11-20 NOTE — PROCEDURES
[address], [address] [place] [address]                                 ECHOCARDIOGRAM    PATIENT NAME: Jaime Medina                    :        1960  MED REC NO:   [id number]                          ROOM:       0291  ACCOUNT NO:   [de-identified]                           ADMIT DATE: 2018  PROVIDER:     Karena Wade MD    TEJ    INDICATION:  Stroke. This is a 51-year-old male patient brought to noninvasive lab today. The patient received 4 mg Versed and 25 mg Fentanyl in incremental  doses. The posterior pharynx was anesthetized using lidocaine viscous  gel. A mouthguard was placed. The TEJ probe was advanced to the  posterior pharynx, mid esophagus and images were obtained. The finding is that the left atrium is normal sized. No clot or  thrombus noted in the left atrium or left atrial appendage. Mitral  valve was normal, mild mitral regurgitation. LV cavity was normal  sized. LV function was preserved, greater than 55%. No pericardial  effusion present. Inter-atrial septum was aneurysmal.  It bows to the  right. There is a left-to-right shunt present with color Doppler _____  left-to-right shunt present. Tricuspid is normal.  Pulmonic is normal.   Aortic valve is normal.  Descending aorta is normal.  Ascending is  normal.    IMPRESSION:  1. No clot or thrombus noted in the left atrium, left atrial appendage  or LV cavity. 2.  LV function preserved greater than 55%. 3.  No pericardial effusion noted. 4. Inter-atrial septum is aneurysmal and it bows to the right. There  is a small PFO noted with left-to-right shunt present. 5.  No cardioembolic source was noted for stroke. 6.  Tricuspid valve, pulmonic valve, mitral valve and aortic valve are  grossly normal.    The TEJ probe was removed with no complication. The patient tolerated  the procedure well.         Kesha Cid MD    D: 2018 10:22:37       T: 11/20/2018 10:57:21     [email or web address]  Job#: [id number]     Doc#: [id number]    CC:

## 2018-11-20 NOTE — CARE COORDINATION
LSW reviewed Pt chart. PT/OT are recommending IP Rehab. LSW into room to speak with Pt and spouse at bedside. Pt does not want to go to IP Rehab. Pt would like to go home. Pt is undecided on weather Pt would like to go home with [facility] or do out pt PT. Pt and spouse to decide. LSW notified ARU/[facility] that the Pt has declined their services.    Electronically signed by MALU Tao on 11/20/2018 at 11:13 AM

## 2018-11-20 NOTE — DISCHARGE SUMMARY
cardiology this admission. The patient did tell me that me that he was not taking Eliquis regularly prior to admission. Recent admit in September with new onset AFIB at that time - he was started on Eliquis and Toprol XL then    DMII for about 20 years    Hyperhomocysteinemia - level is 12.8 - neuro started Foltx    MARINO - on CPAP at home - continue    Cath in 2017 - had RCA 50% and PLB branch 70% - has moderate CAD per Dr. Ferny Doherty    HFpEF - cath in 2017 showed severely elevated LVEDP and severe right heart pressure elevation - resume Lasix and he is also on metolazone 5 mg twice a week, continue    PCP: Dr. Edvin Freire    Morbid Obesity. Body mass index is 45.33 kg/m². PFO seen on TEJ this admit. Per neuro note \"Dr. Sally Orta foster not think its significant for cardio embolic event\". The patient expressed appropriate understanding of and agreement with the discharge recommendations, medications, and plan. Consults this admission:  IP CONSULT TO HOSPITALIST  IP CONSULT TO NEUROLOGY  IP CONSULT TO ENDOCRINOLOGY  IP CONSULT TO ENDOCRINOLOGY  IP CONSULT TO CARDIOLOGY  IP CONSULT TO SOCIAL WORK    Discharge Instruction:   Follow up appointments: neuro  Primary care physician:  within 2 weeks    Diet:  diabetic diet   Activity: activity as tolerated  Disposition: Discharged to:   []Home, []HHC, []SNF, []Acute Rehab, []Hospice    Home  Condition on discharge: Stable    Discharge Medications:      Viva    Home Medication Instructions KALYAN:[id number]    Printed on:11/28/18 1801   Medication Information                      atorvastatin (LIPITOR) 40 MG tablet  Take 1 tablet by mouth daily             clopidogrel (PLAVIX) 75 MG tablet  Take 1 tablet by mouth daily             ezetimibe (ZETIA) 10 MG tablet  Take 1 tablet by mouth daily             folic acid-pyridoxine-cyancobalamin (FOLTX) 1.13-25-2 MG TABS  Take 1 tablet by mouth daily             furosemide (LASIX) 80 MG tablet  Take 80 mg by mouth daily. images are provided for review. MIP images  are provided for review. Stenosis of the internal carotid arteries measured  using NASCET criteria. Dose modulation, iterative reconstruction, and/or  weight based adjustment of the mA/kV was utilized to reduce the radiation  dose to as low as reasonably achievable.; CTA of the head/brain was performed  with the administration of intravenous contrast. Multiplanar reformatted  images are provided for review. MIP images are provided for review. Dose  modulation, iterative reconstruction, and/or weight based adjustment of the  mA/kV was utilized to reduce the radiation dose to as low as reasonably  achievable. COMPARISON:  None. HISTORY:  ORDERING SYSTEM PROVIDED HISTORY: right sided weakness, dizziness  TECHNOLOGIST PROVIDED HISTORY:  Has a \"code stroke\" or \"stroke alert\" been called? ->Yes; ORDERING SYSTEM  PROVIDED HISTORY: dizziness, right sided weakness  TECHNOLOGIST PROVIDED HISTORY:  Has a \"code stroke\" or \"stroke alert\" been called? ->Yes    FINDINGS:    CTA NECK:    AORTIC ARCH/ARCH VESSELS:    No significant stenosis is seen of the innominate artery or subclavian  arteries. CAROTID ARTERIES:    The common carotid arteries are normal in appearance without evidence of a  flow limiting stenosis. Mild atherosclerotic disease at the bifurcation. Otherwise, the internal carotid arteries are normal in appearance without  evidence of a flow limiting stenosis by NASCET criteria. No dissection or  arterial injury is seen. VERTEBRAL ARTERIES:    The vertebral arteries both arise from the subclavian arteries and are normal  in caliber without evidence of flow limiting stenosis or dissection. SOFT TISSUES:    No significant abnormalities. BONES:    Mild multilevel degenerative changes. CTA HEAD:    ANTERIOR CIRCULATION:    The internal carotid arteries are normal in course and caliber without focal  stenosis.  The anterior cerebral and middle cerebral arteries demonstrate no  focal stenosis. POSTERIOR CIRCULATION:    The posterior cerebral arteries demonstrate no focal stenosis. The vertebral  and basilar arteries appear unremarkable. Impression:       No high-grade stenosis or focal occlusion involving the intracranial  vasculature or cervical vasculature. No evidence of acute dissection. No  evidence of aneurysm. CTA NECK W CONTRAST [[id number]] Collected: 11/18/18 1824     Order Status: Completed Updated: 11/20/18 5129     Narrative:       EXAMINATION:  CTA OF THE NECK; CTA OF THE HEAD WITH CONTRAST 11/18/2018 5:54 pm:    TECHNIQUE:  CTA of the neck was performed with the administration of intravenous  contrast. Multiplanar reformatted images are provided for review. MIP images  are provided for review. Stenosis of the internal carotid arteries measured  using NASCET criteria. Dose modulation, iterative reconstruction, and/or  weight based adjustment of the mA/kV was utilized to reduce the radiation  dose to as low as reasonably achievable.; CTA of the head/brain was performed  with the administration of intravenous contrast. Multiplanar reformatted  images are provided for review. MIP images are provided for review. Dose  modulation, iterative reconstruction, and/or weight based adjustment of the  mA/kV was utilized to reduce the radiation dose to as low as reasonably  achievable. COMPARISON:  None. HISTORY:  ORDERING SYSTEM PROVIDED HISTORY: right sided weakness, dizziness  TECHNOLOGIST PROVIDED HISTORY:  Has a \"code stroke\" or \"stroke alert\" been called? ->Yes; ORDERING SYSTEM  PROVIDED HISTORY: dizziness, right sided weakness  TECHNOLOGIST PROVIDED HISTORY:  Has a \"code stroke\" or \"stroke alert\" been called? ->Yes    FINDINGS:    CTA NECK:    AORTIC ARCH/ARCH VESSELS:    No significant stenosis is seen of the innominate artery or subclavian  arteries.     CAROTID ARTERIES:    The common carotid arteries are normal in appearance without evidence of a  flow limiting stenosis. Mild atherosclerotic disease at the bifurcation. Otherwise, the internal carotid arteries are normal in appearance without  evidence of a flow limiting stenosis by NASCET criteria. No dissection or  arterial injury is seen. VERTEBRAL ARTERIES:    The vertebral arteries both arise from the subclavian arteries and are normal  in caliber without evidence of flow limiting stenosis or dissection. SOFT TISSUES:    No significant abnormalities. BONES:    Mild multilevel degenerative changes. CTA HEAD:    ANTERIOR CIRCULATION:    The internal carotid arteries are normal in course and caliber without focal  stenosis. The anterior cerebral and middle cerebral arteries demonstrate no  focal stenosis. POSTERIOR CIRCULATION:    The posterior cerebral arteries demonstrate no focal stenosis. The vertebral  and basilar arteries appear unremarkable. Impression:       No high-grade stenosis or focal occlusion involving the intracranial  vasculature or cervical vasculature. No evidence of acute dissection. No  evidence of aneurysm. MRI brain without contrast [[id number]] Collected: 11/19/18 1602     Order Status: Completed Updated: 11/19/18 2229     Narrative:       EXAMINATION:  MRI OF THE BRAIN WITHOUT CONTRAST  11/19/2018 3:54 pm    TECHNIQUE:  Multiplanar multisequence MRI of the brain was performed without the  administration of intravenous contrast.    COMPARISON:  CT brain and CT angiogram brain 11/18/2018    HISTORY:  ORDERING SYSTEM PROVIDED HISTORY: SPEECH CHANGES (OR APHASIA), NEW OR  PROGRESSIVE  TECHNOLOGIST PROVIDED HISTORY:  Ordering Physician Provided Reason for Exam: rt side weakness//dizzy    FINDINGS:  INTRACRANIAL STRUCTURES/VENTRICLES: There is an ovoid 7 x 6 mm acute infarct  within the left thalamus. There is no acute intracranial hemorrhage. There  is no mass effect or midline shift.   There are several foci of abnormal  increased T2/FLAIR

## 2018-11-20 NOTE — PROGRESS NOTES
NEUROLOGY NOTE  DR. Tanya Sawant MD.  -------------------------------------------------  Subjective:    Doing well    No new symptoms    Doing better. Denies any new symptoms. Denies headache nausea vomiting dizziness    Denies numbness weakness extremities    Denies blurring of vision double vision    Objective:    /76   Pulse 91   Temp 98.6 °F (37 °C) (Oral)   Resp 20   Ht 6' (1.829 m)   Wt (!) 334 lb 3.2 oz (151.6 kg)   SpO2 94%   BMI 45.33 kg/m²   HEENT nl      Neuro exam    Alert Oriented  X 3  Follow simple commands  EOMI Pupils 3 mm av  5/5 all 4 extremities      RADIOLOGY  -----------------    Ct Head Wo Contrast    Result Date: 11/19/2018  EXAMINATION: CT OF THE HEAD WITHOUT CONTRAST,  11/18/2018 5:54 pm TECHNIQUE: CT of the head was performed without the administration of intravenous contrast. Dose modulation, iterative reconstruction, and/or weight based adjustment of the mA/kV was utilized to reduce the radiation dose to as low as reasonably achievable. COMPARISON: None HISTORY: ORDERING SYSTEM PROVIDED HISTORY: R sided weakness, dizziness TECHNOLOGIST PROVIDED HISTORY: CVA Has a \"code stroke\" or \"stroke alert\" been called? ->Yes Ordering Physician Provided Reason for Exam: R sided weakness, dizziness Acuity: Acute Type of Exam: Initial Additional signs and symptoms: pt states symptoms started last night Relevant Medical/Surgical History: None FINDINGS: BRAIN/VENTRICLES: There is no acute intracranial hemorrhage, mass effect or midline shift. No abnormal extra-axial fluid collection. The gray-white differentiation is maintained without evidence of an acute infarct. There is prominence of the ventricles and sulci due to global parenchymal volume loss. There are nonspecific areas of hypoattenuation within the periventricular and subcortical white matter, which likely represent chronic microvascular ischemic change.  ORBITS: The visualized portion of the orbits demonstrate no acute CO2 30 21 - 32 MMOL/L    Anion Gap 11 4 - 16    BUN 25 (H) 6 - 23 MG/DL    CREATININE 1.0 0.9 - 1.3 MG/DL    Glucose 111 (H) 70 - 99 MG/DL    Calcium 9.1 8.3 - 10.6 MG/DL    GFR Non-African American >60 >60 mL/min/1.73m2    GFR African American >60 >60 mL/min/1.73m2   CBC    Collection Time: 11/20/18  3:27 AM   Result Value Ref Range    WBC 9.0 4.0 - 10.5 K/CU MM    RBC 4.94 4.6 - 6.2 M/CU MM    Hemoglobin 14.0 13.5 - 18.0 GM/DL    Hematocrit 43.5 42 - 52 %    MCV 88.1 78 - 100 FL    MCH 28.3 27 - 31 PG    MCHC 32.2 32.0 - 36.0 %    RDW 14.3 11.7 - 14.9 %    Platelets 267 466 - 219 K/CU MM    MPV 10.7 7.5 - 11.1 FL   Homocysteine, Serum    Collection Time: 11/20/18  3:27 AM   Result Value Ref Range    Homocysteine 12.8 (H) 0 - 10 umol/L   Fibrinogen    Collection Time: 11/20/18  3:27 AM   Result Value Ref Range    Fibrinogen 501 (H) 196.9 - 442.1 MG/DL   Protime/INR & PTT    Collection Time: 11/20/18  3:27 AM   Result Value Ref Range    Protime 14.2 (H) 9.12 - 12.5 SECONDS    INR 1.25 INDEX    aPTT 25.9 21.2 - 33.0 SECONDS   POCT Glucose    Collection Time: 11/20/18  6:41 AM   Result Value Ref Range    POC Glucose 140 (H) 70 - 99 MG/DL   POCT Glucose    Collection Time: 11/20/18 12:11 PM   Result Value Ref Range    POC Glucose 216 (H) 70 - 99 MG/DL         Medical problems    Patient Active Problem List:     MARINO on CPAP     New onset atrial fibrillation (HCC)     Type 2 diabetes mellitus with hyperglycemia, with long-term current use of insulin (HCC)     Class 3 severe obesity due to excess calories with body mass index (BMI) of 45.0 to 49.9 in adult [facility])     History of cardiac cath     Weakness of right arm      ASSESSMENT:  ---------------------    Left Thalamic acute infarct r/o cardio-carotid embolic event     DM HTN     PLAN:     CT brain neg     Mri brain as above     CTA head neck neg     Hyper coag labs     Continue eliquis     Continue  Plavix    Dr. Shauna Lawler changed eliquis to [facility]     Consult cardiology for TEJ- PFO and interatrial septal aneurysm-Dr. Bro foster not think its significant for cardio embolic event     Discussed dx prognosis meds side effects benefit vs risk of using plavix with eliquis with patient and his wife and answered all qustions.         Electronically signed by Noemy Miller MD on 11/20/2018 at 2:08 PM

## 2018-11-20 NOTE — CONSULTS
[address] [place] [address], [facility]                                  CONSULTATION    PATIENT NAME: Flaco Urias                    :        1960  MED REC NO:   [id number]                          ROOM:       0258  ACCOUNT NO:   [de-identified]                           ADMIT DATE: 2018  PROVIDER:     Kelvin Peterson MD    CONSULT DATE:  2018    INDICATION:  Stroke. HISTORY OF PRESENT ILLNESS:  This is a 14-year-old male patient who came  to the hospital with slurred speech for one day and weakness present and  also he was having difficulty with his movements. This morning, he was  feeling better. The patient came on 2018. The patient was seen  by neurologist and recommended for TEJ to rule out the cardioembolic  source for stroke. The patient had an MRI of the head done, found to have acute 7 x 6 mm  infarct within the left thalamus present. No acute hemorrhage present. CT of the head without contrast was performed. No acute finding was  noted. CT of the neck was performed and no high-grade stenosis or focal  occlusion involving the intracranial or cervical vasculature. The patient is undergoing echo this morning. We will review that. The  patient had an echo done back in 2018 and echo at that time showed LV  function was preserved. The patient had a stress test done in 2018 and stress test was  negative for ischemia, LV function was preserved. The patient had a heart catheterization done back in 2017. Left main  was patent. LAD and circ had mild disease. RCA had 50% stenosis. PLV  branch had 70% stenosis and was a 2 mm vessel. The patient was treated  medically. Moderate pulmonary hypertension noted with elevated right  side pressures, PA pressure was 59/26 with a mean of 41 present.     PAST MEDICAL HISTORY:  The patient has a history of having right heart  failure, sleep
Consult to Neurology  Consult performed by: Gaviota Don  Consult ordered by: Shira Avery  Assessment/Recommendations: Candis Smart MD.  Section of General Neurology - Adult  Consult Note        Reason for Consult:    Requesting Physician:  No referring provider defined for this encounter. Thank you for your kind referral.    CHIEF COMPLAINT:  Right sided weakness and slurred speech         HISTORY OF PRESENT ILLNESS:              The patient is a 62 y.o. male with a history of male who presented to the emergency department due to a 1 day onset of right-sided numbness and weakness of his right upper arm and right lower leg with dizziness. It resolved and then returned with slurred speech today. So he presented to the emergency department with worsening symptoms. Patient remains with some right-sided weakness compared to the left side at this time his speech is clear. Past Medical History:       No date: Atrial fibrillation ([facility] [address]  2017: History of cardiac cath      Comment: --RCA has mid 50% stenosis and PLB branch has                70% stenosis noted.  LVEDP very high  No date: Hyperlipidemia  No date: Hypertension  No date: Type II or unspecified type diabetes mellitus *      Comment: Diagnsed about 1998  No date: Unspecified sleep apnea  Past Surgical History:       No date: CARDIAC CATHETERIZATION  No date: TONSILLECTOMY      Comment: AT 13YEARS OLD  Current Medications:   Current Facility-Administered Medications: insulin regular human (humuLIN R U-500) 500 UNIT/ML concentrated injection vial 50 Units, 50 Units, Subcutaneous, TID ACinsulin regular human (humuLIN R U-500) 500 UNIT/ML concentrated injection vial 0-12 Units, 0-12 Units, Subcutaneous, Onceinsulin regular human (humuLIN R U-500) 500 UNIT/ML concentrated injection vial 0-30 Units, 0-30 Units, Subcutaneous, 4x Daily AC & HSsodium chloride flush 0.9 % injection 10 mL, 10 mL, Intravenous, PRNaspirin chewable tablet 81 mg, 81 mg,
sided weakness, dizziness TECHNOLOGIST PROVIDED HISTORY: Has a \"code stroke\" or \"stroke alert\" been called? ->Yes; ORDERING SYSTEM PROVIDED HISTORY: dizziness, right sided weakness TECHNOLOGIST PROVIDED HISTORY: Has a \"code stroke\" or \"stroke alert\" been called? ->Yes FINDINGS: CTA NECK: AORTIC ARCH/ARCH VESSELS: No significant stenosis is seen of the innominate artery or subclavian arteries. CAROTID ARTERIES: The common carotid arteries are normal in appearance without evidence of a flow limiting stenosis. Mild atherosclerotic disease at the bifurcation. Otherwise, the internal carotid arteries are normal in appearance without evidence of a flow limiting stenosis by NASCET criteria. No dissection or arterial injury is seen. VERTEBRAL ARTERIES: The vertebral arteries both arise from the subclavian arteries and are normal in caliber without evidence of flow limiting stenosis or dissection. SOFT TISSUES: The lung apices are clear. No significant cervical lymphadenopathy by size criteria. No evidence of acute findings within the visualized neck. The nasopharynx, oral cavity, oropharynx, hypopharynx, and laryngeal structures are unremarkable. BONES: Mild multilevel degenerative changes. CTA HEAD: ANTERIOR CIRCULATION: The internal carotid arteries are normal in course and caliber without focal stenosis. The anterior cerebral and middle cerebral arteries demonstrate no focal stenosis. POSTERIOR CIRCULATION: The posterior cerebral arteries demonstrate no focal stenosis. The vertebral and basilar arteries appear unremarkable. No high-grade stenosis or focal occlusion involving the intracranial vasculature or cervical vasculature. No evidence of acute dissection. No evidence of aneurysm.        Scheduled Medicines   Medications:    aspirin  81 mg Oral Daily    simvastatin  10 mg Oral Nightly    furosemide  80 mg Oral Daily    lisinopril  10 mg Oral Daily    [START ON 11/20/2018] metolazone  5 mg Oral Once per

## 2018-11-21 LAB
GLUCOSE BLD-MCNC: 149 MG/DL (ref 70–99)
GLUCOSE BLD-MCNC: 160 MG/DL (ref 70–99)
GLUCOSE BLD-MCNC: 179 MG/DL (ref 70–99)
GLUCOSE BLD-MCNC: 207 MG/DL (ref 70–99)
GLUCOSE BLD-MCNC: 215 MG/DL (ref 70–99)
GLUCOSE BLD-MCNC: 217 MG/DL (ref 70–99)
GLUCOSE BLD-MCNC: 226 MG/DL (ref 70–99)
GLUCOSE BLD-MCNC: 267 MG/DL (ref 70–99)
GLUCOSE BLD-MCNC: 53 MG/DL (ref 70–99)
GLUCOSE BLD-MCNC: 62 MG/DL (ref 70–99)

## 2018-11-21 PROCEDURE — 92522 EVALUATE SPEECH PRODUCTION: CPT

## 2018-11-21 PROCEDURE — 6370000000 HC RX 637 (ALT 250 FOR IP): Performed by: INTERNAL MEDICINE

## 2018-11-21 PROCEDURE — 92507 TX SP LANG VOICE COMM INDIV: CPT

## 2018-11-21 PROCEDURE — 82962 GLUCOSE BLOOD TEST: CPT

## 2018-11-21 PROCEDURE — 1280000000 HC REHAB R&B

## 2018-11-21 PROCEDURE — 97163 PT EVAL HIGH COMPLEX 45 MIN: CPT

## 2018-11-21 PROCEDURE — 94761 N-INVAS EAR/PLS OXIMETRY MLT: CPT

## 2018-11-21 PROCEDURE — 94150 VITAL CAPACITY TEST: CPT

## 2018-11-21 PROCEDURE — 97535 SELF CARE MNGMENT TRAINING: CPT

## 2018-11-21 PROCEDURE — 97530 THERAPEUTIC ACTIVITIES: CPT

## 2018-11-21 PROCEDURE — 97116 GAIT TRAINING THERAPY: CPT

## 2018-11-21 PROCEDURE — 97167 OT EVAL HIGH COMPLEX 60 MIN: CPT

## 2018-11-21 RX ADMIN — Medication 1 TABLET: at 08:54

## 2018-11-21 RX ADMIN — METOPROLOL SUCCINATE 50 MG: 50 TABLET, EXTENDED RELEASE ORAL at 09:00

## 2018-11-21 RX ADMIN — CLOPIDOGREL BISULFATE 75 MG: 75 TABLET ORAL at 08:54

## 2018-11-21 RX ADMIN — ATORVASTATIN CALCIUM 40 MG: 40 TABLET, FILM COATED ORAL at 20:54

## 2018-11-21 RX ADMIN — RIVAROXABAN 20 MG: 20 TABLET, FILM COATED ORAL at 18:12

## 2018-11-21 RX ADMIN — FUROSEMIDE 80 MG: 40 TABLET ORAL at 08:54

## 2018-11-21 RX ADMIN — LISINOPRIL 10 MG: 10 TABLET ORAL at 08:54

## 2018-11-21 ASSESSMENT — PAIN SCALES - GENERAL: PAINLEVEL_OUTOF10: 0

## 2018-11-21 NOTE — PROGRESS NOTES
Speech Language Pathology  Facility/Department: [address] [place] [address]  Initial Speech/Language/Cognitive Assessment    NAME: Jorge Orellana  : 1960   MRN: [id number]  ADMISSION DATE: 2018  ADMITTING DIAGNOSIS: has MARINO on CPAP; New onset atrial fibrillation ([facility] [address] Type 2 diabetes mellitus with hyperglycemia, with long-term current use of insulin ([facility] [address] Class 3 severe obesity due to excess calories with body mass index (BMI) of 45.0 to 49.9 in [facility]); History of cardiac cath; Weakness of right arm; Acute CVA (cerebrovascular accident) ([facility] [address] Spastic hemiparesis of right dominant side ([facility] [address] Dysphagia due to recent stroke; Essential hypertension; and Morbid obesity with body mass index of 45.0-49.9 in [facility]) on his problem list.  DATE ONSET: 18    Date of Eval: 2018   Evaluating Therapist: IRMA Soto    RECENT RESULTS  CT OF HEAD/MRI:      Impression   Acute 7 x 6 mm infarct within the left thalamus.       No acute intracranial hemorrhage. Primary Complaint: My speech changes when I get tired. It gets slurred. Wife reported reduced volume. Pain:  Pain Assessment  Patient Currently in Pain: Denies  Pain Assessment: 0-10  Pain Level: 0    Assessment: This 63 yo male was admitted with slurred speech, difficulty moving his right side, and choking. Imaging showed an acute L thalamic infarct without hemorrhage. Pt with hx of HTN, emphysema, obesity,Afib, and diabetes. Cognitive Diagnosis: WFL  Speech Diagnosis: Minimal dysarthria characterized by R labial facial weakness and reduced articulation of labial sounds. Recommendations:  Requires SLP Intervention: Yes  Duration/Frequency of Treatment: 1-2 x/week x 1 week. Plan:   Goals:  Short-term Goals  Timeframe for Short-term Goals: 1-2 sessions to target education for labial facial ROM/STR exercises. LTG: Pt will improve R Labial Facial ROM/STR/Coord with minimal to no distortions in conversatiknoal speech. Limits  Problem Solving  Problem Solving: Within Functional Limits  Numeric Reasoning  Numeric Reasoning: Within Functional Limits  Safety/Judgement  Safety/Judgement: Within Functional Limits (Some impulsivity that pt id--said \"I just feel like the more I do and try the better I will get. \")    Additional Assessments:    The [place] Cognitive Assessment (MoCA) was administered 11/21/2018 as a rapid screening instrument for mild cognitive dysfunction. It assesses different cognitive domains: attention and concentration, executive functions, memory, language, visuoconstructional skills, conceptual thinking, calculations, and orientation. The total possible score = 30 points  26 or above is considered normal  Below 26 indicates mild cognitive impairment. Carly Queen scored  27/30 indicating normal cognitive fx. PT/OT report some impulsivity. Prognosis:  Speech Therapy Prognosis  Prognosis: Good  Prognosis Considerations: Potential;Participation Level  Individuals consulted  Consulted and agree with results and recommendations: Patient; Family member (wife)    Education:  Patient Education: completed facial massage and tapping demonstration and practice with pt.   Patient Education Response: Verbalizes understanding                Therapy Time:   Individual Concurrent Group Co-treatment   Time In [facility] 73         Time Out 1435         Minutes [place], [place]  11/21/2018 3:57 PM

## 2018-11-21 NOTE — PROGRESS NOTES
Records reviewed. Pt examined on the ARU this afternoon.     ADMITTING DIAGNOSIS:  Ischemic stroke with infarction. COMORBID DIAGNOSES IMPACTING REHABILITATION:  Right hemiparesis,  uncontrolled diabetes, hypertension, morbid obesity with BMI of 45.0,  atrial fibrillation requiring anticoagulation, and dysphagia. CHIEF COMPLAINT:  Right-sided weakness and slurred speech. He has weakness on the right side, slurred speech, choking with meals,  and infrequent bowel movements. He is controlling his bladder. He  denies numbness or tingling of the limbs aside from his baseline toe  tingling with his diabetes. He does get out of breathe with exertion. Remainder of his review of systems is negative. PHYSICAL EXAMINATION:  VITAL SIGNS:  Blood pressure (!) 140/70, pulse 84, temperature 98.1 °F (36.7 °C), resp. rate 14, height 6' (1.829 m), weight (!) 331 lb (150.1 kg), SpO2 98 %. GENERAL:  The patient is seen semi-recumbent in bed. He is alert and  oriented x3, and able to participate in simple conversation. HEENT:  He gets out of breathe with conversation. He has right facial  droop and slurred speech. His tongue deviates to the right. There are  no signs of trauma to his head or neck. Mucous membranes are moist.   There is no cervical adenopathy. PSYCHIATRIC:  He follows one-step commands consistently. LUNGS:  The patient's respirations are labored under his girth. No  rales or rhonchi are noted. HEART:  Sounds reveal an occasional premature beat with no murmur, rub,  or gallop. The rate is controlled. ABDOMEN:  His obese abdomen is soft. Bowel sounds are present. There  is no rebound, guarding, or masses noted. BACK:  The skin overlying his spine is moist, but intact. Lumbar  lordosis is exaggerated. Trunk rotation is compromised by his  significant weakness and girth. EXTREMITIES:  The patient has spastic weakness to the right arm and  right leg.   He can bring his hand

## 2018-11-21 NOTE — PROGRESS NOTES
Progress Note( Dr. Teresa Alvarado)  11/21/2018  Subjective:   Admit Date: 11/20/2018  PCP: Mitch Green MD    Admitted For :  Right arm weakness and slurred speech     Consulted For: Better control of DM    Interval History: was transferred to ARU last night 11/20/2018   Had hypoglycemic episodes yesterday     Denies any chest pains,   Denies SOB . Denies nausea or vomiting. No new bowel or bladder symptoms. No intake or output data in the 24 hours ending 11/21/18 0708    DATA    CBC:   Recent Labs      11/18/18   1742  11/19/18   0458  11/20/18   0327   WBC  8.7  7.1  9.0   HGB  15.1  14.3  14.0   PLT  234  231  240    CMP:  Recent Labs      11/18/18   1742  11/20/18   0327   NA  139  143   K  4.1  3.7   CL  100  102   CO2  25  30   BUN  21  25*   CREATININE  0.8*  1.0   CALCIUM  9.1  9.1   PROT  7.1   --    LABALBU  3.7   --    BILITOT  0.2   --    ALKPHOS  85   --    AST  14*   --    ALT  22   --      Lipids:   Lab Results   Component Value Date    CHOL 161 11/19/2018    HDL 40 11/19/2018    TRIG 100 11/19/2018     Glucose:  Recent Labs      11/20/18   2237  11/20/18   2302  11/21/18   0302   POCGLU  54*  80  149*     [id number]:  Lab Results   Component Value Date    LABA1C 10.2 09/25/2018     High Sensitivity TSH:   Lab Results   Component Value Date    TSHHS 2.090 09/25/2018     Free T3: No results found for: FT3  Free T4:No results found for: T4FREE    Ct Head Wo Contrast    Result Date: 11/19/2018  EXAMINATION: CT OF THE HEAD WITHOUT CONTRAST,  11/18/2018 5:54 pm TECHNIQUE: . No acute intracranial abnormality. Findings were discussed with Dr. Rimma Carney at 6:05pm on 11/18/2018. Xr Chest Portable    Result Date: 11/18/2018  EXAMINATION: SINGLE XRAY VIEW OF THE CHEST 11/18/2018 6:02 pm COMPARISON: Chest radiograph 09/25/2018.  HISTORY: ORDERING SYSTEM PROVIDED HISTORY: CVA TECHNOLOGIST PROVIDED HISTORY: Reason for exam:->CVA Ordering Physician Provided Reason for Exam: cva FINDINGS: The cardiac

## 2018-11-21 NOTE — H&P
[address] [place] [address], [address] [place] [address]                              HISTORY AND PHYSICAL    PATIENT NAME: Jaime Medina                    :        1960  MED REC NO:   [id number]                          ROOM:       3388  ACCOUNT NO:   [de-identified]                           ADMIT DATE: 2018  PROVIDER:     Mary Chirinos MD    ADMITTING DIAGNOSIS:  Ischemic stroke with infarction. COMORBID DIAGNOSES IMPACTING REHABILITATION:  Right hemiparesis,  uncontrolled diabetes, hypertension, morbid obesity with BMI of 45.0,  atrial fibrillation requiring anticoagulation, and dysphagia. CHIEF COMPLAINT:  Right-sided weakness and slurred speech. HISTORY OF PRESENT ILLNESS:  The patient is a 63-year-old previously  right-hand dominant male, who presented to our ED on 2018 with  difficulty moving his right side, slurred speech, and choking. These  symptoms had been present for nearly a full day upon presentation. He  was not a candidate for thrombolytic therapy. Brain imaging showed an  acute left thalamic infarction without hemorrhage, mass, or erythema. He has required a diet modified for carbohydrates, thickness, and solid  consistency. No seizure activity, falls with injury to his head, or  change in vision were noted. He has had slurred speech, choking with  meals, and significant right hemiparesis. He is participating with  therapies, but is requiring moderate physical assistance with basic  mobility and self-care. He has weakness on the right side, slurred speech, choking with meals,  and infrequent bowel movements. He is controlling his bladder. He  denies numbness or tingling of the limbs aside from his baseline toe  tingling with his diabetes. He does get out of breathe with exertion. Remainder of his review of systems is negative.     SOCIAL HISTORY:  The patient is  and he has several pets

## 2018-11-21 NOTE — PROGRESS NOTES
Nutrition Assessment    Type and Reason for Visit: Initial (rehab admit )    Nutrition Recommendations: Continue cardiac diet at this time, resume carb controlled diet     Nutrition Assessment: Patient currently low nutrition risk at this time, appetite remains good, no malnutrition identified. Usually monitors CHO intake and meal timing for glycemic control with meal time insulin.      Malnutrition Assessment:  · Malnutrition Status: No malnutrition  · Context: Acute illness or injury    Nutrition Risk Level: Low    Nutrient Needs:  · Estimated Daily Total Kcal: 4911-8132 (may decrease if loss desired)  · Estimated Daily Protein (g): 65-81 (0.8-1 g/kg for maintentance)  · Estimated Daily Total Fluid (ml/day): 4905-9846 (1 ml/des)     Nutrition Diagnosis:   · Problem: Altered nutrition-related lab values  · Etiology: related to Endocrine dysfunction     Signs and symptoms:  as evidenced by Lab values    Objective Information:  · Nutrition-Focused Physical Findings: nutrition emmanuel 4, abdominal obesity, edema in lower legs   · Wound Type: None  · Current Nutrition Therapies:  · Oral Diet Orders: Cardiac   · Oral Diet intake: 51-75%, %  · Oral Nutrition Supplement (ONS) Orders: None  · ONS intake:    · Anthropometric Measures:  · Ht: 6' (182.9 cm)   · Current Body Wt: 330 lb 14.6 oz (150.1 kg)  · Usual Body Wt:  (per records weight over 300# for past year)  · % Weight Change:  ,  potential loss in past 3 months ~3%   · Ideal Body Wt: 178 lb (80.7 kg), % Ideal Body 185  · BMI Classification: BMI > or equal to 40.0 Obese Class III (BMI-45)    Nutrition Interventions:   Continue current diet  Continued Inpatient Monitoring, Education not appropriate at this time, Coordination of Care    Nutrition Evaluation:   · Evaluation: Goals set   · Goals: Patient will consume consistent meals with consistent carb intake at meals    · Monitoring: Meal Intake, Diet Tolerance, Weight, Pertinent Labs      Electronically

## 2018-11-21 NOTE — PROGRESS NOTES
thalamus. Pt with Hx of DM and HTN. Pt continued to present with RUE/LE weakness and slurred speech on this date. He was alert and receptive to instructions, however appeared anxious. He had varying level of dizziness at rest and with activities today along with some vision changes during this tx session that he believes is related to his high blood sugar. Due to the unpredictable nature of his dizziness, he required x2-person assist to safely complete ambulation over level surface and stairs today. His dizziness improved the more he participated in mobility tasks. He denied pain or sensory deficits on the affected site, however he presented with coordination and balance deficits. Due to his balance and strength deficits, the use of a RW today has allowed him to be able to ambulate at least a household distance. It is anticipated that he will benefit from AD training for mobility considering all the deficits that were aforementioned, however this specific AD is uncertain at this time as it will highly depend on his progress from his current physical deficits. Pt demonstrates good potential to improve towards established PT goals based on his Ind PLOF, motivation, and ability for new learning.       Body structures, Functions, Activity limitations: Decreased functional mobility , Decreased endurance, Decreased balance, Decreased high-level IADLs, Decreased coordination, Decreased ADL status, Decreased strength, Decreased fine motor control     Prognosis: Good  Decision Making: High Complexity  Clinical Presentation: unstable/unpredictable characteristics (DM, HTN, A-fib, R hemiparesis, dizziness, anxiety)   Patient education:   ARU schedule, ARU expectations for participation, plan of care  Treatment Initiated:  Functional mobility training, gait training, patient education  Barriers to Improvement:  Dizziness   Discharge Recommendations:  Home with spouse, likely OP PT   Equipment Recommendations:  TBD

## 2018-11-21 NOTE — PROGRESS NOTES
Occupational Therapy                                                  [facility] OCCUPATIONAL THERAPY EVALUATION    Chart Review:  Past Medical History:   Diagnosis Date    Atrial fibrillation ([facility] [address]     Cerebral artery occlusion with cerebral infarction ([facility] [address]     History of cardiac cath 2017    --RCA has mid 50% stenosis and PLB branch has 70% stenosis noted. LVEDP very high    Hyperlipidemia     Hypertension     Type II or unspecified type diabetes mellitus without mention of complication, not stated as uncontrolled     Diagnsed about 1998    Unspecified sleep apnea      Past Surgical History:   Procedure Laterality Date    CARDIAC CATHETERIZATION      TONSILLECTOMY      AT 13YEARS OLD     Social History:  Social/Functional History  Lives With: Spouse Vero Church (works full time))  Type of Home: [employer] Layout: Two level, Bed/Bath upstairs (1 flight of stairs with L ascending rail )  Home Access: Stairs to enter with rails  Entrance Stairs - Number of Steps: 4; pt also has another entrance available with 4-5 steps with 1 rail   Entrance Stairs - Rails: None  Bathroom Shower/Tub: Tub/Shower unit  Bathroom Toilet: Standard  ADL Assistance: Independent  Homemaking Assistance: Independent  Meal Prep Responsibility: Primary  Laundry Responsibility: No  Cleaning Responsibility: No  Bill Paying/Finance Responsibility: No  Shopping Responsibility: Primary  Dependent Care Responsibility: No (spouse takes care of 2 dogs and 1 cat )  Health Care Management: Primary (pt indicated instances of missing doses of prescribed meds )  Ambulation Assistance: Independent  Transfer Assistance: Independent  Active : Yes  Mode of Transportation: [facility]  Occupation: Full time employment  Type of occupation: ;   Leisure & Hobbies: Has animals at home, Netflix, garden--out to eat, pt manages meds by stacking pill bottles on shelf, wife manages finances. Pt has tablet at home.    Additional

## 2018-11-22 LAB
ANTICARDIOLIPIN IGA ANTIBODY: 4
ANTICARDIOLIPIN IGG ANTIBODY: 3
CARDIOLIPIN AB IGM: 0
FACTOR VIII ACTIVITY: 280 % (ref 50–150)
GLUCOSE BLD-MCNC: 148 MG/DL (ref 70–99)
GLUCOSE BLD-MCNC: 204 MG/DL (ref 70–99)
GLUCOSE BLD-MCNC: 230 MG/DL (ref 70–99)
GLUCOSE BLD-MCNC: 232 MG/DL (ref 70–99)
GLUCOSE BLD-MCNC: 243 MG/DL (ref 70–99)
GLUCOSE BLD-MCNC: 300 MG/DL (ref 70–99)
PROTEIN C ACTIVITY: 132 % (ref 70–130)
PROTEIN S ACTIVITY: 110 % (ref 65–140)

## 2018-11-22 PROCEDURE — 6370000000 HC RX 637 (ALT 250 FOR IP): Performed by: PHYSICAL MEDICINE & REHABILITATION

## 2018-11-22 PROCEDURE — 6370000000 HC RX 637 (ALT 250 FOR IP): Performed by: INTERNAL MEDICINE

## 2018-11-22 PROCEDURE — 82962 GLUCOSE BLOOD TEST: CPT

## 2018-11-22 PROCEDURE — 1280000000 HC REHAB R&B

## 2018-11-22 RX ADMIN — RIVAROXABAN 20 MG: 20 TABLET, FILM COATED ORAL at 16:51

## 2018-11-22 RX ADMIN — CLOPIDOGREL BISULFATE 75 MG: 75 TABLET ORAL at 08:06

## 2018-11-22 RX ADMIN — LISINOPRIL 10 MG: 10 TABLET ORAL at 08:06

## 2018-11-22 RX ADMIN — METOLAZONE 5 MG: 2.5 TABLET ORAL at 08:06

## 2018-11-22 RX ADMIN — ATORVASTATIN CALCIUM 40 MG: 40 TABLET, FILM COATED ORAL at 21:08

## 2018-11-22 RX ADMIN — FUROSEMIDE 80 MG: 40 TABLET ORAL at 08:07

## 2018-11-22 RX ADMIN — Medication 1 TABLET: at 08:07

## 2018-11-22 RX ADMIN — METOPROLOL SUCCINATE 50 MG: 50 TABLET, EXTENDED RELEASE ORAL at 08:07

## 2018-11-22 RX ADMIN — MAGNESIUM HYDROXIDE 30 ML: 400 SUSPENSION ORAL at 08:06

## 2018-11-23 LAB
ANTITHROMBIN ACTIVITY: 81
GLUCOSE BLD-MCNC: 187 MG/DL (ref 70–99)
GLUCOSE BLD-MCNC: 188 MG/DL (ref 70–99)
GLUCOSE BLD-MCNC: 227 MG/DL (ref 70–99)
GLUCOSE BLD-MCNC: 252 MG/DL (ref 70–99)
GLUCOSE BLD-MCNC: 281 MG/DL (ref 70–99)
GLUCOSE BLD-MCNC: 286 MG/DL (ref 70–99)

## 2018-11-23 PROCEDURE — 97112 NEUROMUSCULAR REEDUCATION: CPT

## 2018-11-23 PROCEDURE — 97530 THERAPEUTIC ACTIVITIES: CPT

## 2018-11-23 PROCEDURE — 6370000000 HC RX 637 (ALT 250 FOR IP): Performed by: INTERNAL MEDICINE

## 2018-11-23 PROCEDURE — 82962 GLUCOSE BLOOD TEST: CPT

## 2018-11-23 PROCEDURE — 97535 SELF CARE MNGMENT TRAINING: CPT

## 2018-11-23 PROCEDURE — 1280000000 HC REHAB R&B

## 2018-11-23 PROCEDURE — 97110 THERAPEUTIC EXERCISES: CPT

## 2018-11-23 PROCEDURE — 97150 GROUP THERAPEUTIC PROCEDURES: CPT

## 2018-11-23 PROCEDURE — 97116 GAIT TRAINING THERAPY: CPT

## 2018-11-23 PROCEDURE — 92507 TX SP LANG VOICE COMM INDIV: CPT

## 2018-11-23 RX ORDER — SENNA PLUS 8.6 MG/1
1 TABLET ORAL 2 TIMES DAILY
Status: DISCONTINUED | OUTPATIENT
Start: 2018-11-23 | End: 2018-12-03

## 2018-11-23 RX ORDER — POLYETHYLENE GLYCOL 3350 17 G/17G
17 POWDER, FOR SOLUTION ORAL DAILY PRN
Status: DISCONTINUED | OUTPATIENT
Start: 2018-11-23 | End: 2018-12-04 | Stop reason: HOSPADM

## 2018-11-23 RX ORDER — DOCUSATE SODIUM 100 MG/1
100 CAPSULE, LIQUID FILLED ORAL 2 TIMES DAILY
Status: DISCONTINUED | OUTPATIENT
Start: 2018-11-23 | End: 2018-12-03

## 2018-11-23 RX ADMIN — RIVAROXABAN 20 MG: 20 TABLET, FILM COATED ORAL at 17:04

## 2018-11-23 RX ADMIN — ATORVASTATIN CALCIUM 40 MG: 40 TABLET, FILM COATED ORAL at 21:48

## 2018-11-23 RX ADMIN — LISINOPRIL 10 MG: 10 TABLET ORAL at 08:23

## 2018-11-23 RX ADMIN — Medication 1 TABLET: at 08:23

## 2018-11-23 RX ADMIN — FUROSEMIDE 80 MG: 40 TABLET ORAL at 08:23

## 2018-11-23 RX ADMIN — CLOPIDOGREL BISULFATE 75 MG: 75 TABLET ORAL at 08:23

## 2018-11-23 RX ADMIN — METOPROLOL SUCCINATE 50 MG: 50 TABLET, EXTENDED RELEASE ORAL at 08:23

## 2018-11-23 NOTE — PROGRESS NOTES
[facility]  ACUTE REHAB UNIT  SPEECH/LANGUAGE PATHOLOGY      [x] Daily           [] Discharge    Ciera Conway      QAA:[birth year]  XSV:[id number]  Rehab Dx/Hx: Acute CVA (cerebrovascular accident) ([facility] [address] [I63.9]   No Known Allergies  Precautions: ; Restrictions/Precautions: General Precautions, Fall Risk (R hemiparesis )          Home Situation/IADL:   Social/Functional History  Lives With: Spouse Anastasia Cole (works full time))  Type of Home: '[employer] Layout: Two level, Bed/Bath upstairs (1 flight of stairs with L ascending rail )  Home Access: Stairs to enter with rails  Entrance Stairs - Number of Steps: 4; pt also has another entrance available with 4-5 steps with 1 rail   Entrance Stairs - Rails: None  Bathroom Shower/Tub: Tub/Shower unit  Bathroom Toilet: Standard  ADL Assistance: Independent  Homemaking Assistance: Independent  Meal Prep Responsibility: Primary  Laundry Responsibility: No  Cleaning Responsibility: No  Bill Paying/Finance Responsibility: No  Shopping Responsibility: Primary  Dependent Care Responsibility: No (spouse takes care of 2 dogs and 1 cat )  Health Care Management: Primary (pt indicated instances of missing doses of prescribed meds )  Ambulation Assistance: Independent  Transfer Assistance: Independent  Active : Yes  Mode of Transportation: SUV  Occupation: Full time employment  Type of occupation: ;   Leisure & Hobbies: Has animals at home, Netflix, garden--out to eat, pt manages meds by stacking pill bottles on shelf, wife manages finances. Pt has tablet at home. Additional Comments: sleeps in regular bed, no fall Hx, R hand dominant     POC: Pt will be seen 5x/week for a minimum of 30 minutes per day. Co-treats where appropriate with PT or OT to facilitate patient goals in functional tasks.     Date of Admit: 11/20/2018  Room #: 1008/1008-A     ST Dx:   []   Cognition/  Memory Deficits  []   Dysphagia

## 2018-11-23 NOTE — PROGRESS NOTES
Assistive device required for transfer:  RW, // bars     Gait:    Distance:  6 feet forward/backwards x 7 trials   Assistance:  Min A  Device:  // bars   Activity was focused on R knee control during midstance and to assess any change of motor function status on the RLE as pt reports feeling weaker today    Additional Therapeutic activities/exercises completed this date:     []   Nu-step:  Time:        Level:         #Steps:       []   Rebounder:    []  Seated     []  Standing        []   Balance training         [x]   Postural training in standing using postural mirror for visual cues and VCs to have equal weight bearing on BLE with wide MICHELLE for increased stability. []   Supine ther ex (reps/sets):     [x]   Seated ther ex (reps/sets): AROM R ankle PF/DF, LAQ x 5 sec hold, knee flexion, and hip flexion x 10 reps x 1 set each for strengthening. [x]   Standing ther ex (reps/sets) with BUE support in // bars: mini squats, unilateral high marches x 10 reps x 1 set each. Pt with 5 episodes of mild R knee buckling when performing L hip flexion. []   Picked up object from floor     Assist Level:                     []   Reacher used   []   Other:   []   Other:   []   Other:    Comments:      Patient/Caregiver Education and Training:   [x]   Bed Mobility/Transfer technique/safety  [x]   Gait technique/sequencing  []   Proper use of assistive device  []   Advanced mobility safety and technique  []   Reinforced patient's precautions/mobility while maintaining precautions  [x]   Postural awareness  []   Family training  []   Progress was updated and reviewed in Rehabtracker with patient and/or family this         date.   Treatment Plan for Next Session: strengthening exercises, gait training (will need close w/c follow of 2nd person if gait training with RW will be addressed due to pt's high fall risk)    Assessment:  Pt appeared to have increased anxiety following episode of LOB during the 1st sit->stand transfer during this tx session. He was convinced that his RUE/RLE were not working today but was actively moving it as assessed on PT eval. The movements were slow and weak however. He then perseverated about his blood sugar, BP, and RLE edema as factors that weree making him feel weaker today. He denied dizziness throughout this tx session. Due to pt's increased weakness, gait training using RW was not safe to perform today. He tolerated gait training in the // bars instead and his gait quality improved with repeated trials.      Treatment/Activity Tolerance:   [] Tolerated treatment with no adverse effects    [] Patient limited by fatigue  [] Patient limited by pain   [x] Patient limited by medical complications: increased weakness   [] Adverse reaction to Tx:   [] Significant change in status    Safety:       []  bed alarm set    []  chair alarm set    []  Pt refused alarms                []  Telesitter activated      [x]  Gait belt used during tx session      [x]other: pt left in w/c under PTA's care for group therapy          Number of Minutes/Billable Intervention  Gait Training 25   Therapeutic Exercise 20   Neuro Re-Ed    Therapeutic Activity 10   Wheelchair Propulsion    Group    Other:    TOTAL 55         Social History  Social/Functional History  Lives With: Spouse Silvia Heard (works full time))  Type of Home: [employer] Layout: Two level, Bed/Bath upstairs (1 flight of stairs with L ascending rail )  Home Access: Stairs to enter with rails  Entrance Stairs - Number of Steps: 4; pt also has another entrance available with 4-5 steps with 1 rail   Entrance Stairs - Rails: None  Bathroom Shower/Tub: Tub/Shower unit  Bathroom Toilet: Standard  ADL Assistance: Independent  Homemaking Assistance: Independent  Meal Prep Responsibility: Primary  Laundry Responsibility: No  Cleaning Responsibility: No  Bill Paying/Finance Responsibility: No  Shopping Responsibility: Primary  Dependent Care Responsibility: No (spouse takes care of 2 dogs and 1 cat )  Health Care Management: Primary (pt indicated instances of missing doses of prescribed meds )  Ambulation Assistance: Independent  Transfer Assistance: Independent  Active : Yes  Mode of Transportation: SUV  Occupation: Full time employment  Type of occupation: ;   Leisure & Hobbies: Has animals at home, Netflix, garden--out to eat, pt manages meds by stacking pill bottles on shelf, wife manages finances. Pt has tablet at home. Additional Comments: sleeps in regular bed, no fall Hx, R hand dominant     Objective                                                                                    Goals:  (Update in navigator)  Short term goals  Time Frame for Short term goals: 10 tx days or until discharge:   Short term goal 1: Pt will consistently complete bed mobility and sup<->sit Ind. Short term goal 2: Pt will complete OOB transfers Mod Ind. Short term goal 3: Pt will ambulate >/=150 ft using LRAD Mod Ind. Short term goal 4: Pt will ascend/descend 1 flight of stairs with 1 rail Mod Ind following step-to pattern. Short term goal 5: Pt will ascend/descend curb step and ambulte over uneven surfaces using LRAD Mod Ind. :   :        Plan of Care                                                                              Times per week: 5 days per week for a minimum of 60 minutes/day plus group as appropriate for 60 minutes.   Treatment to include Current Treatment Recommendations: Strengthening, Balance Training, Transfer Training, Endurance Training, Gait Training, Neuromuscular Re-education, Home Exercise Program, Patient/Caregiver Education & Training, Equipment Evaluation, Education, & procurement, [employer], Functional Mobility Training, Stair training, Safety Education & Training    Electronically signed by   Luc Landin PT   11/23/2018, 12:09 PM

## 2018-11-23 NOTE — PROGRESS NOTES
Occupational Therapy   Physical Rehabilitation: OCCUPATIONAL THERAPY     [x] daily progress note       [] discharge       Patient Name:  Daniel Buchanan   :  1960 MRN: [id number]  Room:  [address] [place], [place] [address] Date of Admission: 2018  Rehabilitation Diagnosis:   Acute CVA (cerebrovascular accident) [facility]) [I63.9]       Date       2018       Day of ARU Week:  4   Time IN/OUT 3319-0924   Individual Tx Minutes 45   Group Tx Minutes    Co-Treat Minutes    Concurrent Tx Minutes    TOTAL Tx Time Mins 45   Variance Time    Variance Time []   Refusal due to:     []   Medical hold/reason:    []   Illness   []   Off Unit for test/procedure  []   Extra time needed to complete task  []   Therapeutic need  []   Other (specify):   Restrictions Restrictions/Precautions: General Precautions, Fall Risk (R hemiparesis )         Communication with other providers: [x]   OK to see per nursing:     []   Spoke with team member regarding:      Subjective observations and cognitive status: Pleasant cooperative     Pain level/location:    /10       Location:   No c/o    Discharge recommendations  Anticipated discharge date:  TBD  Destination: []home alone   []home alone w assist prn   [] home w/ family    [] Continuous supervision       []SNF    [] Assisted living     [] Other:   Continued therapy: []HHC OT  []OUTPATIENT  OT   [] No Further OT  Equipment needs: TBD     Toileting:   na       Toilet Transfers:  na  Device Used:    []   Standard Toilet         []   Grab Bars           []  Bedside Commode       []   Elevated Toilet          []   Other:        Bed Mobility:           [x]   Pt received out of bed   Rolling R/L:    Scooting:    Supine --> Sit:    Sit --> Supine:      Transfers:    Sit--> Stand:  S v/cs for tech  Stand --> Sit:   S. V/cs for tech  Stand-Pivot:     Other:    Assistive device required for transfer:         Functional Mobility:   Declined ambulation and standing bal act.   PT reports near fall this AM. RUE neuro re-ed) c CGA.:  Long term goals  Time Frame for Long term goals : 10-14 days or until d/c. Long term goal 1: Pt will complete toileting mod I.  Long term goal 2: Pt will complete UB dressing I; LB dressing mod I using AE PRN. Long term goal 3: Pt will complete total body bathing mod I using AE PRN. Long term goal 4: Pt will complete grooming tasks I. Long term goal 5: Pt will complete functional transfers (bed, chair, shower, toilet) c DME PRN and mod I.  Long term goals 6: Pt will perform therex/therax to facilitate increased strength/endurance/ax tolerance (c emphasis on dynamic standing balance/tolerance >12 mins and RUE neuro re-ed) c SBA. Long term goal 7: Pt will complete simple homemaking tasks c DME PRN and mod I.:        Plan of Care                                                                              Times per week: 5 days per week for a minimum of 60 minutes/day plus group as appropriate for 60 minutes.   Treatment to include Plan  Times per day: Daily  Current Treatment Recommendations: Strengthening, Balance Training, Functional Mobility Training, Endurance Training, Neuromuscular Re-education, Safety Education & Training, Patient/Caregiver Education & Training, Equipment Evaluation, Education, & procurement, Self-Care / ADL, Home Management Training    Electronically signed by   mindi Aguirre  11/23/2018, 8:24 AM

## 2018-11-24 LAB
FACTOR V LEIDEN: NEGATIVE
FACTOR V LEIDEN: NORMAL
GLUCOSE BLD-MCNC: 187 MG/DL (ref 70–99)
GLUCOSE BLD-MCNC: 204 MG/DL (ref 70–99)
GLUCOSE BLD-MCNC: 226 MG/DL (ref 70–99)
GLUCOSE BLD-MCNC: 236 MG/DL (ref 70–99)
MTHFR BY PCR SPECIMEN: NORMAL
MTHFR BY PCR SPECIMEN: NORMAL
MTHFR INTERPRETATION: NORMAL
MTHFR MUTATION A1298C: NORMAL
MTHFR MUTATION C677T: NORMAL
PHOSPHATIDYLSERINE IGA ANTIBODY: 6
PHOSPHATIDYLSERINE IGG ANTIBODY: 4
PHOSPHATIDYLSERINE IGM ANTIBODY: 3
PROTHROMBIN G20210A MUTATION: NEGATIVE
PROTHROMBIN SPECIMEN SOURCE: NORMAL
PROTHROMBIN SPECIMEN SOURCE: NORMAL

## 2018-11-24 PROCEDURE — 94664 DEMO&/EVAL PT USE INHALER: CPT

## 2018-11-24 PROCEDURE — 94150 VITAL CAPACITY TEST: CPT

## 2018-11-24 PROCEDURE — 97116 GAIT TRAINING THERAPY: CPT

## 2018-11-24 PROCEDURE — 6370000000 HC RX 637 (ALT 250 FOR IP): Performed by: INTERNAL MEDICINE

## 2018-11-24 PROCEDURE — 94761 N-INVAS EAR/PLS OXIMETRY MLT: CPT

## 2018-11-24 PROCEDURE — 97542 WHEELCHAIR MNGMENT TRAINING: CPT

## 2018-11-24 PROCEDURE — 97530 THERAPEUTIC ACTIVITIES: CPT

## 2018-11-24 PROCEDURE — 1280000000 HC REHAB R&B

## 2018-11-24 PROCEDURE — 97535 SELF CARE MNGMENT TRAINING: CPT

## 2018-11-24 PROCEDURE — 97110 THERAPEUTIC EXERCISES: CPT

## 2018-11-24 PROCEDURE — 97150 GROUP THERAPEUTIC PROCEDURES: CPT

## 2018-11-24 PROCEDURE — 82962 GLUCOSE BLOOD TEST: CPT

## 2018-11-24 RX ADMIN — ATORVASTATIN CALCIUM 40 MG: 40 TABLET, FILM COATED ORAL at 20:26

## 2018-11-24 RX ADMIN — METOPROLOL SUCCINATE 50 MG: 50 TABLET, EXTENDED RELEASE ORAL at 08:26

## 2018-11-24 RX ADMIN — CLOPIDOGREL BISULFATE 75 MG: 75 TABLET ORAL at 08:26

## 2018-11-24 RX ADMIN — Medication 1 TABLET: at 08:26

## 2018-11-24 RX ADMIN — FUROSEMIDE 80 MG: 40 TABLET ORAL at 08:26

## 2018-11-24 RX ADMIN — LISINOPRIL 10 MG: 10 TABLET ORAL at 08:25

## 2018-11-24 RX ADMIN — RIVAROXABAN 20 MG: 20 TABLET, FILM COATED ORAL at 17:05

## 2018-11-24 NOTE — PROGRESS NOTES
Objective:   Vitals: /72   Pulse 71   Temp 95.3 °F (35.2 °C) (Oral)   Resp 16   Ht 6' (1.829 m)   Wt (!) 331 lb (150.1 kg)   SpO2 95%   BMI 44.89 kg/m²   General appearance: alert and cooperative with exam  Neck: no JVD or bruit  Thyroid : Normal lobes   Lungs: Has Vesicular Breath sounds   Heart:  Atrial Fib . Abdomen: soft, non-tender; bowel sounds normal; no masses,  no organomegaly  Musculoskeletal: Normal  Extremities: extremities normal, , has edema  Neurologic:  Awake, alert, oriented to name, place and time. Cranial nerves II-XII are grossly intact. Motor right side weakness better   Sensory right dose paresthesia better   and gait is normal.    Assessment:     Patient Active Problem List:     MARINO on CPAP     New onset atrial fibrillation (HCC)     Type 2 diabetes mellitus with hyperglycemia, with long-term current use of insulin (HCC)     Class 3 severe obesity due to excess calories with body mass index (BMI) of 45.0 to 49.9 in adult [facility])     History of cardiac cath     Weakness of right arm      Plan:     1. Reviewed POC blood glucose . Labs and X ray results   2. Reviewed Current Medicines   3. On meal/ Correction bolus U 500 Regular Insulin  4. Monitor Blood glucose frequently   5. Modified  the dose of Insulin/ other medicines as needed   6. Now in ARU as of 11/20 /2018   7. Will follow     .      Trisha Larsen MD

## 2018-11-24 NOTE — PROGRESS NOTES
Reinforced patient's precautions/mobility while maintaining precautions  []   Postural awareness  []   Family training  []   Progress was updated and reviewed in Rehabtracker with patient and/or family this         date.   Treatment Plan for Next Session: gait training, B LE therapeutic strengthening exercises     Treatment/Activity Tolerance:   [x] Tolerated treatment with no adverse effects    [] Patient limited by fatigue  [] Patient limited by pain   [] Patient limited by medical complications:    [] Adverse reaction to Tx:   [] Significant change in status    Safety:       []  bed alarm set    []  chair alarm set    []  Pt refused alarms                []  Telesitter activated      [x]  Gait belt used during tx session      []other:         Number of Minutes/Billable Intervention  Gait Training 15   Therapeutic Exercise 15   Neuro Re-Ed    Therapeutic Activity 15   Wheelchair Propulsion 15   Group 60   Other:    TOTAL 120         Social History  Social/Functional History  Lives With: Spouse Nneka Sharma (works full time))  Type of Home: [employer] Layout: Two level, Bed/Bath upstairs (1 flight of stairs with L ascending rail )  Home Access: Stairs to enter with rails  Entrance Stairs - Number of Steps: 4; pt also has another entrance available with 4-5 steps with 1 rail   Entrance Stairs - Rails: None  Bathroom Shower/Tub: Tub/Shower unit  Bathroom Toilet: Standard  ADL Assistance: Independent  Homemaking Assistance: Independent  Meal Prep Responsibility: Primary  Laundry Responsibility: No  Cleaning Responsibility: No  Bill Paying/Finance Responsibility: No  Shopping Responsibility: Primary  Dependent Care Responsibility: No (spouse takes care of 2 dogs and 1 cat )  Health Care Management: Primary (pt indicated instances of missing doses of prescribed meds )  Ambulation Assistance: Independent  Transfer Assistance: Independent  Active : Yes  Mode of Transportation: [facility]  Occupation: Full time employment  Type

## 2018-11-25 LAB
GLUCOSE BLD-MCNC: 209 MG/DL (ref 70–99)
GLUCOSE BLD-MCNC: 289 MG/DL (ref 70–99)
GLUCOSE BLD-MCNC: 291 MG/DL (ref 70–99)
GLUCOSE BLD-MCNC: 309 MG/DL (ref 70–99)
GLUCOSE BLD-MCNC: 64 MG/DL (ref 70–99)
GLUCOSE BLD-MCNC: 73 MG/DL (ref 70–99)

## 2018-11-25 PROCEDURE — 97530 THERAPEUTIC ACTIVITIES: CPT

## 2018-11-25 PROCEDURE — 97110 THERAPEUTIC EXERCISES: CPT

## 2018-11-25 PROCEDURE — 1280000000 HC REHAB R&B

## 2018-11-25 PROCEDURE — 97150 GROUP THERAPEUTIC PROCEDURES: CPT

## 2018-11-25 PROCEDURE — 82962 GLUCOSE BLOOD TEST: CPT

## 2018-11-25 PROCEDURE — 94150 VITAL CAPACITY TEST: CPT

## 2018-11-25 PROCEDURE — 97116 GAIT TRAINING THERAPY: CPT

## 2018-11-25 PROCEDURE — 92507 TX SP LANG VOICE COMM INDIV: CPT

## 2018-11-25 PROCEDURE — 6370000000 HC RX 637 (ALT 250 FOR IP): Performed by: INTERNAL MEDICINE

## 2018-11-25 PROCEDURE — 6370000000 HC RX 637 (ALT 250 FOR IP): Performed by: PHYSICAL MEDICINE & REHABILITATION

## 2018-11-25 RX ADMIN — CLOPIDOGREL BISULFATE 75 MG: 75 TABLET ORAL at 08:28

## 2018-11-25 RX ADMIN — LISINOPRIL 10 MG: 10 TABLET ORAL at 08:33

## 2018-11-25 RX ADMIN — RIVAROXABAN 20 MG: 20 TABLET, FILM COATED ORAL at 17:46

## 2018-11-25 RX ADMIN — FUROSEMIDE 80 MG: 40 TABLET ORAL at 08:27

## 2018-11-25 RX ADMIN — DOCUSATE SODIUM 100 MG: 100 CAPSULE, LIQUID FILLED ORAL at 19:54

## 2018-11-25 RX ADMIN — ATORVASTATIN CALCIUM 40 MG: 40 TABLET, FILM COATED ORAL at 19:54

## 2018-11-25 RX ADMIN — METOPROLOL SUCCINATE 50 MG: 50 TABLET, EXTENDED RELEASE ORAL at 08:33

## 2018-11-25 RX ADMIN — Medication 1 TABLET: at 08:28

## 2018-11-25 RX ADMIN — DOCUSATE SODIUM 100 MG: 100 CAPSULE, LIQUID FILLED ORAL at 08:33

## 2018-11-25 NOTE — PROGRESS NOTES
Speech Language Pathology  [facility] UNIT  SPEECH/LANGUAGE PATHOLOGY      [x] Daily           [] Discharge    Silva Leventhal      MORELIA:[id number]  DID:[id number]  Rehab Dx/Hx: Acute CVA (cerebrovascular accident) ([facility] [address] [I63.9]   No Known Allergies  Precautions:  none          Home Situation/IADL:   Social/Functional History  Lives With: Spouse Colton Estrada (works full time))  Type of Home: [employer] Layout: Two level, Bed/Bath upstairs (1 flight of stairs with L ascending rail )  Home Access: Stairs to enter with rails  Entrance Stairs - Number of Steps: 4; pt also has another entrance available with 4-5 steps with 1 rail   Entrance Stairs - Rails: None  Bathroom Shower/Tub: Tub/Shower unit  Bathroom Toilet: Standard  ADL Assistance: Independent  Homemaking Assistance: Independent  Meal Prep Responsibility: Primary  Laundry Responsibility: No  Cleaning Responsibility: No  Bill Paying/Finance Responsibility: No  Shopping Responsibility: Primary  Dependent Care Responsibility: No (spouse takes care of 2 dogs and 1 cat )  Health Care Management: Primary (pt indicated instances of missing doses of prescribed meds )  Ambulation Assistance: Independent  Transfer Assistance: Independent  Active : Yes  Mode of Transportation: SUV  Occupation: Full time employment  Type of occupation: ;   Leisure & Hobbies: Has animals at home, Netflix, garden--out to eat, pt manages meds by stacking pill bottles on shelf, wife manages finances. Pt has tablet at home. Additional Comments: sleeps in regular bed, no fall Hx, R hand dominant     POC: Pt will be seen 5x/week for a minimum of 30 minutes per day. Co-treats where appropriate with PT or OT to facilitate patient goals in functional tasks.     Date of Admit: 11/20/2018  Room #: 1008/1008-A     ST Dx:   []   Cognition/  Memory Deficits  []   Dysphagia  [x]  Dysarthria  []  Apraxia  []   Aphasia

## 2018-11-26 LAB
ANION GAP SERPL CALCULATED.3IONS-SCNC: 17 MMOL/L (ref 4–16)
BUN BLDV-MCNC: 37 MG/DL (ref 6–23)
CALCIUM SERPL-MCNC: 9.5 MG/DL (ref 8.3–10.6)
CHLORIDE BLD-SCNC: 90 MMOL/L (ref 99–110)
CO2: 27 MMOL/L (ref 21–32)
CREAT SERPL-MCNC: 1 MG/DL (ref 0.9–1.3)
GFR AFRICAN AMERICAN: >60 ML/MIN/1.73M2
GFR NON-AFRICAN AMERICAN: >60 ML/MIN/1.73M2
GLUCOSE BLD-MCNC: 122 MG/DL (ref 70–99)
GLUCOSE BLD-MCNC: 124 MG/DL (ref 70–99)
GLUCOSE BLD-MCNC: 183 MG/DL (ref 70–99)
GLUCOSE BLD-MCNC: 247 MG/DL (ref 70–99)
GLUCOSE BLD-MCNC: 248 MG/DL (ref 70–99)
GLUCOSE BLD-MCNC: 273 MG/DL (ref 70–99)
HCT VFR BLD CALC: 47.3 % (ref 42–52)
HEMOGLOBIN: 15 GM/DL (ref 13.5–18)
MCH RBC QN AUTO: 28.4 PG (ref 27–31)
MCHC RBC AUTO-ENTMCNC: 31.7 % (ref 32–36)
MCV RBC AUTO: 89.4 FL (ref 78–100)
PDW BLD-RTO: 13.5 % (ref 11.7–14.9)
PLATELET # BLD: 259 K/CU MM (ref 140–440)
PMV BLD AUTO: 11.2 FL (ref 7.5–11.1)
POTASSIUM SERPL-SCNC: 3.9 MMOL/L (ref 3.5–5.1)
RBC # BLD: 5.29 M/CU MM (ref 4.6–6.2)
SODIUM BLD-SCNC: 134 MMOL/L (ref 135–145)
WBC # BLD: 11.1 K/CU MM (ref 4–10.5)

## 2018-11-26 PROCEDURE — 97530 THERAPEUTIC ACTIVITIES: CPT

## 2018-11-26 PROCEDURE — 80048 BASIC METABOLIC PNL TOTAL CA: CPT

## 2018-11-26 PROCEDURE — 6370000000 HC RX 637 (ALT 250 FOR IP): Performed by: PHYSICAL MEDICINE & REHABILITATION

## 2018-11-26 PROCEDURE — 1280000000 HC REHAB R&B

## 2018-11-26 PROCEDURE — 97116 GAIT TRAINING THERAPY: CPT

## 2018-11-26 PROCEDURE — 97110 THERAPEUTIC EXERCISES: CPT

## 2018-11-26 PROCEDURE — 94150 VITAL CAPACITY TEST: CPT

## 2018-11-26 PROCEDURE — 97150 GROUP THERAPEUTIC PROCEDURES: CPT

## 2018-11-26 PROCEDURE — 82962 GLUCOSE BLOOD TEST: CPT

## 2018-11-26 PROCEDURE — 99232 SBSQ HOSP IP/OBS MODERATE 35: CPT | Performed by: PHYSICAL MEDICINE & REHABILITATION

## 2018-11-26 PROCEDURE — 97112 NEUROMUSCULAR REEDUCATION: CPT

## 2018-11-26 PROCEDURE — 36415 COLL VENOUS BLD VENIPUNCTURE: CPT

## 2018-11-26 PROCEDURE — 85027 COMPLETE CBC AUTOMATED: CPT

## 2018-11-26 PROCEDURE — 6370000000 HC RX 637 (ALT 250 FOR IP): Performed by: INTERNAL MEDICINE

## 2018-11-26 PROCEDURE — 94761 N-INVAS EAR/PLS OXIMETRY MLT: CPT

## 2018-11-26 RX ORDER — HYDROXYZINE HYDROCHLORIDE 10 MG/1
10 TABLET, FILM COATED ORAL 3 TIMES DAILY PRN
Status: DISCONTINUED | OUTPATIENT
Start: 2018-11-26 | End: 2018-12-04 | Stop reason: HOSPADM

## 2018-11-26 RX ORDER — HYDROXYZINE HYDROCHLORIDE 10 MG/1
10 TABLET, FILM COATED ORAL 3 TIMES DAILY PRN
Status: DISCONTINUED | OUTPATIENT
Start: 2018-11-26 | End: 2018-11-26

## 2018-11-26 RX ADMIN — METFORMIN HYDROCHLORIDE 500 MG: 500 TABLET ORAL at 12:31

## 2018-11-26 RX ADMIN — ATORVASTATIN CALCIUM 40 MG: 40 TABLET, FILM COATED ORAL at 19:49

## 2018-11-26 RX ADMIN — Medication 1 TABLET: at 08:17

## 2018-11-26 RX ADMIN — METOPROLOL SUCCINATE 50 MG: 50 TABLET, EXTENDED RELEASE ORAL at 08:17

## 2018-11-26 RX ADMIN — FUROSEMIDE 80 MG: 40 TABLET ORAL at 08:18

## 2018-11-26 RX ADMIN — DOCUSATE SODIUM 100 MG: 100 CAPSULE, LIQUID FILLED ORAL at 08:17

## 2018-11-26 RX ADMIN — LISINOPRIL 10 MG: 10 TABLET ORAL at 08:18

## 2018-11-26 RX ADMIN — CLOPIDOGREL BISULFATE 75 MG: 75 TABLET ORAL at 08:17

## 2018-11-26 RX ADMIN — RIVAROXABAN 20 MG: 20 TABLET, FILM COATED ORAL at 17:13

## 2018-11-26 RX ADMIN — METFORMIN HYDROCHLORIDE 500 MG: 500 TABLET ORAL at 17:13

## 2018-11-26 RX ADMIN — DOCUSATE SODIUM 100 MG: 100 CAPSULE, LIQUID FILLED ORAL at 19:49

## 2018-11-26 RX ADMIN — SENNOSIDES 8.6 MG: 8.6 TABLET, FILM COATED ORAL at 19:49

## 2018-11-26 NOTE — PROGRESS NOTES
Occupational Therapy  Physical Rehabilitation: OCCUPATIONAL THERAPY     [x] daily progress note       [] discharge       Patient Name:  Jonathan Negron   :  1960 MRN: [id number]  Room:  [address] [place], [place] [address] Date of Admission: 2018  Rehabilitation Diagnosis:   Acute CVA (cerebrovascular accident) [facility]) [I63.9]       Date 2018       Day of ARU Week:  7   Time IN/OUT 1450 / 26   Individual Tx Minutes 40   Group Tx Minutes    Co-Treat Minutes    Concurrent Tx Minutes    TOTAL Tx Time Mins 37    Variance Time N/A (See PT note for balance of minutes this date)   Variance Time []   Refusal due to:     []   Medical hold/reason:    []   Illness   []   Off Unit for test/procedure  []   Extra time needed to complete task  []   Therapeutic need  []   Other (specify):   Restrictions Restrictions/Precautions: General Precautions, Fall Risk (R hemiparesis )         Communication with other providers: [x]   OK to see per nursing:     []   Spoke with team member regarding:      Subjective observations and cognitive status: Pt seated in wheelchair on approach, agreeable to OT Tx session, actively participated c use of rest breaks PRN.    Pain level/location:   0 /10       Location:    Discharge recommendations  Anticipated discharge date:  TBD  Destination: []home alone   []home alone w assist prn   [] home w/ family    [] Continuous supervision       []SNF    [] Assisted living     [] Other:   Continued therapy: []HHC OT  []OUTPATIENT  OT   [] No Further OT  Equipment needs: TBD     Toileting:   Declined need x2       Toilet Transfers:   Declined need x2  Device Used:    []   Standard Toilet         []   Grab Bars           []  Bedside Commode       []   Elevated Toilet          []   Other:        Bed Mobility:           [x]   Pt received out of bed        Transfers:    Sit--> Stand:  CGA  Stand --> Sit:   CGA  Stand-Pivot:   SBA to CGA  Other:  Required occasional to min vc's for safe body positioning  Assistive device tolerance and RUE neuro re-ed during functional tasks      Assessment:        Treatment/Activity Tolerance:   [x] Tolerated treatment with no adverse effects    [x] Patient limited by fatigue - managed c rest breaks PRN  [] Patient limited by pain   [] Patient limited by medical complications:    [] Adverse reaction to Tx:   [] Significant change in status    Safety:       []  bed alarm set    []  chair alarm set    [x]  Pt refused alarms                []  Telesitter activated      [x]  Gait belt used during tx session      [x]other:   All needs within reach, spouse present at end of Tx session    Number of Minutes/Billable Intervention  Therapeutic Exercise    ADL Self-care    Neuro Re-Ed 15   Therapeutic Activity 22   Group    Other:    TOTAL 37       Social History  Social/Functional History  Lives With: Spouse Ghada Dykes (works full time))  Type of Home: [employer] Layout: Two level, Bed/Bath upstairs (1 flight of stairs with L ascending rail )  Home Access: Stairs to enter with rails  Entrance Stairs - Number of Steps: 4; pt also has another entrance available with 4-5 steps with 1 rail   Entrance Stairs - Rails: None  Bathroom Shower/Tub: Tub/Shower unit  Bathroom Toilet: Standard  ADL Assistance: Independent  Homemaking Assistance: Independent  Meal Prep Responsibility: Primary  Laundry Responsibility: No  Cleaning Responsibility: No  Bill Paying/Finance Responsibility: No  Shopping Responsibility: Primary  Dependent Care Responsibility: No (spouse takes care of 2 dogs and 1 cat )  Health Care Management: Primary (pt indicated instances of missing doses of prescribed meds )  Ambulation Assistance: Independent  Transfer Assistance: Independent  Active : Yes  Mode of Transportation: [facility]  Occupation: Full time employment  Type of occupation: ;   Leisure & Hobbies: Has animals at home, Netflix, garden--out to eat, pt manages meds by stacking pill bottles on shelf, wife manages

## 2018-11-26 NOTE — FLOWSHEET NOTE
Leisure & Hobbies: Has animals at home, Netflix, garden--out to eat, pt manages meds by stacking pill bottles on shelf, wife manages finances. Pt has tablet at home. Additional Comments: sleeps in regular bed, no fall Hx, R hand dominant     Objective                                                                                    Goals:  (Update in navigator)  Short term goals  Time Frame for Short term goals: 10 tx days or until discharge:   Short term goal 1: Pt will consistently complete bed mobility and sup<->sit Ind. Short term goal 2: Pt will complete OOB transfers Mod Ind. Short term goal 3: Pt will ambulate >/=150 ft using LRAD Mod Ind. Short term goal 4: Pt will ascend/descend 1 flight of stairs with 1 rail Mod Ind following step-to pattern. Short term goal 5: Pt will ascend/descend curb step and ambulte over uneven surfaces using LRAD Mod Ind. :   :        Plan of Care                                                                              Times per week: Pt to be seen at least  5x per week for a minimum of 60 minutes as                               appropriate.   Treatment to include Current Treatment Recommendations: Strengthening, Balance Training, Transfer Training, Endurance Training, Gait Training, Neuromuscular Re-education, Home Exercise Program, Patient/Caregiver Education & Training, Equipment Evaluation, Education, & procurement, [employer], Functional Mobility Training, Stair training, Safety Education & Training      Electronically signed by   Riley Bird,  [id number]  11/26/2018, 3:17 PM

## 2018-11-26 NOTE — PATIENT CARE CONFERENCE
no urinary retention >300ml in bladder   [] Targeting bladder protocol with gill removal   [x] Maintaining O2 SATs at 92%   [x] Has pain managed while on ARU         [] Has had no skin breakdown while on ARU   [x] Has edema and scaly skin/discoloration to lower legs   [] Has no signs/symptoms of infection at the wound site  [x] Has been free from injury during hospitalization   [] Has experienced a fall during hospitalization  [x] Ongoing education with patient/family with understanding demonstrated for CVA  [] Receives IV Fluids  [x] Diabetic PTA and on insulin at home    NURSING  FIMS: Bladder Level of Assistance: 7- Urinates in toilet Independently, does not take medication to manager bladder function  Bladder Frequency of Accidents: 7 - Urinates in toilet without need for device or medication, no bladder accidents  Bowel Level of Assistance: 6- Requires device like bedpan, diaper, bedside commode, but patient obtains and empties own device including colostomy. Or requires bowel management medication including stool softeners, laxatives, inserts own suppository (colace)  Bowel Frequency of Accidents: 6 - No accidents, uses device like bedpan, diaper, bedside commode colostomy, or requires medication to manage bowels    NUTRITION  Weight: (!) 329 lb (149.2 kg) / Body mass index is 44.62 kg/m². Current diet: DIET CARDIAC; Intake: Consistent meal intake %, good appetite. Describe changes in above areas: Improved mobility, fatigues, blood sugars fluctuating, good intake    Team goals for next treatment period/Intervention for current barriers:   1.  Improve lower body dressing with reduced assist.  2. Improve shower transfers with reduced assist       Patient Strengths: Motivated, Cooperative and Pleasant    Ongoing tx following discharge: []HHC   [x]OUTPATIENT PT/OT      [] No Further Treatment     [] Family/Caregiver Training  []  Transitional Living Arrangement   [] Home Assessment (date  )     []

## 2018-11-26 NOTE — PROGRESS NOTES
training:  Time: 10 min       Level: 2       #Steps: 857       []   Rebounder:    []  Seated     []  Standing        []   Balance training         []   Postural training    []   Supine ther ex (reps/sets):     []   Seated ther ex (reps/sets):     [x]   Standing ther ex (reps/sets) with BUE support in // bars: mini squats x 10 reps x 2 sets, unilateral high marches x 10 reps, foot taps on 8\" step height x 10 reps, R hip extension with knee extended x 10 reps x 2 sets, and R hip abduction x 10 reps x  1 set for strengthening for steadier gait quality    []   Picked up object from floor     Assist Level:                     []   Reacher used   []   Other:   []   Other:   []   Other:    Comments:      Patient/Caregiver Education and Training:   [x]   Bed Mobility/Transfer technique/safety  [x]   Gait technique/sequencing  []   Proper use of assistive device  []   Advanced mobility safety and technique  []   Reinforced patient's precautions/mobility while maintaining precautions  []   Postural awareness  [x]   Pt and spouse were educated about care conference process and goals, benefits of OP PT versus HHC PT, pt's current functional status, and PT POC  [x]   Progress was updated and reviewed in Rehabtracker with patient and/or family this         date. Treatment Plan for Next Session: gait training, stairs, advanced gait training, strengthening thera ex       Assessment: Pt was able to tolerate 2 bouts of gait training with 1-person assist consistently today. He was able to functionally involve his R hand during transfers, ambulation, and thera ex. He reported vision changes as he was more challenged physically during thera ex but denied dizziness or pain. Pt's discharge location appears to be uncertain at this time as they are considering different options that include staying temporarily at his son's house that is a 1-level set-up versus purchasing a 1-level, accessible home.      Treatment/Activity Tolerance:   [x]

## 2018-11-27 LAB
GLUCOSE BLD-MCNC: 182 MG/DL (ref 70–99)
GLUCOSE BLD-MCNC: 265 MG/DL (ref 70–99)
GLUCOSE BLD-MCNC: 289 MG/DL (ref 70–99)
GLUCOSE BLD-MCNC: 307 MG/DL (ref 70–99)
GLUCOSE BLD-MCNC: 326 MG/DL (ref 70–99)
GLUCOSE BLD-MCNC: 72 MG/DL (ref 70–99)

## 2018-11-27 PROCEDURE — 97110 THERAPEUTIC EXERCISES: CPT

## 2018-11-27 PROCEDURE — 97535 SELF CARE MNGMENT TRAINING: CPT

## 2018-11-27 PROCEDURE — 1280000000 HC REHAB R&B

## 2018-11-27 PROCEDURE — 97116 GAIT TRAINING THERAPY: CPT

## 2018-11-27 PROCEDURE — 6370000000 HC RX 637 (ALT 250 FOR IP): Performed by: PHYSICAL MEDICINE & REHABILITATION

## 2018-11-27 PROCEDURE — 82962 GLUCOSE BLOOD TEST: CPT

## 2018-11-27 PROCEDURE — 99233 SBSQ HOSP IP/OBS HIGH 50: CPT | Performed by: PHYSICAL MEDICINE & REHABILITATION

## 2018-11-27 PROCEDURE — 97542 WHEELCHAIR MNGMENT TRAINING: CPT

## 2018-11-27 PROCEDURE — 97530 THERAPEUTIC ACTIVITIES: CPT

## 2018-11-27 PROCEDURE — 94150 VITAL CAPACITY TEST: CPT

## 2018-11-27 PROCEDURE — 6370000000 HC RX 637 (ALT 250 FOR IP): Performed by: INTERNAL MEDICINE

## 2018-11-27 RX ADMIN — METOLAZONE 5 MG: 2.5 TABLET ORAL at 08:35

## 2018-11-27 RX ADMIN — ATORVASTATIN CALCIUM 40 MG: 40 TABLET, FILM COATED ORAL at 21:07

## 2018-11-27 RX ADMIN — CLOPIDOGREL BISULFATE 75 MG: 75 TABLET ORAL at 08:35

## 2018-11-27 RX ADMIN — METFORMIN HYDROCHLORIDE 500 MG: 500 TABLET ORAL at 17:07

## 2018-11-27 RX ADMIN — RIVAROXABAN 20 MG: 20 TABLET, FILM COATED ORAL at 17:07

## 2018-11-27 RX ADMIN — SENNOSIDES 8.6 MG: 8.6 TABLET, FILM COATED ORAL at 08:35

## 2018-11-27 RX ADMIN — LISINOPRIL 10 MG: 10 TABLET ORAL at 08:35

## 2018-11-27 RX ADMIN — METOPROLOL SUCCINATE 50 MG: 50 TABLET, EXTENDED RELEASE ORAL at 08:35

## 2018-11-27 RX ADMIN — METFORMIN HYDROCHLORIDE 500 MG: 500 TABLET ORAL at 08:35

## 2018-11-27 RX ADMIN — DOCUSATE SODIUM 100 MG: 100 CAPSULE, LIQUID FILLED ORAL at 08:34

## 2018-11-27 RX ADMIN — Medication 1 TABLET: at 08:35

## 2018-11-27 RX ADMIN — FUROSEMIDE 80 MG: 40 TABLET ORAL at 08:34

## 2018-11-27 NOTE — PROGRESS NOTES
due to the patient's unsteady gait, upper body weakness, and inability to  an ambulation device; and he can ambulate only by pushing a walker instead of a lesser assistive device such as a cane, crutch, or standard walker. The need for this equipment was discussed with the patient and he understands and is in agreement. Bed Mobility:           [x]   Pt received out of bed  []  Mat    Transfers:    Sit--> Stand:  CGA to Min A   Stand --> Sit:   CGA to Min A   Stand-Pivot:   CGA to Min A to Nu-step  Other:    Assistive device required for transfer:   RW    Gait:   - Ommitted in the AM session d/t increased fatigue & lethargy     Wheelchair Propulsion:  Distance:   69 + 30 feet  Assistance:   Sup  Extremities Used:   BLE forward for increased strengthening    Stairs - Performed to increase R knee stability and coordination, as well as, increased overall strength  # Completed:   20 step ups initially with the LLE, then with the RLE  Assistance:    Dep (SBA x 1 + CGA to Min A x 1)  Supportive Device:  B handrails  Height:   4\"    Additional Therapeutic activities/exercises completed this date:     [x]   Nu-step:  Time:  10      Level:      3   #Steps:    680   [x]   Seated ther ex (reps/sets):   Red resistance band: Knee flexion x 20 B focusing on increased eccentric control;    [x]   Standing ther ex (reps/sets):   Red resistance band training: Standing R knee TKE x 20, then stabilizing on the R as the stance limb L knee bending/stabilizing; Anterior/posterior/lateral tap outs with resistance at R knee throughout both stabilizing and active tap out phases x 20 B;      Comments:      Patient/Caregiver Education and Training:   [x]   Bed Mobility/Transfer technique/safety  []   Gait technique/sequencing  [x]   Proper use of assistive device  [x]   Advanced mobility safety and technique  []   Reinforced patient's precautions/mobility while maintaining precautions  [x]   Postural awareness  []   Family Stair training, Safety Education & Training    Electronically signed by   Mecca Milan PT, DPT #[id number]  11/27/2018, 10:44 AM

## 2018-11-27 NOTE — PROGRESS NOTES
Objective:   Vitals: /75   Pulse 91   Temp 98.5 °F (36.9 °C) (Oral)   Resp 16   Ht 6' (1.829 m)   Wt (!) 329 lb (149.2 kg)   SpO2 95%   BMI 44.62 kg/m²   General appearance: alert and cooperative with exam  Neck: no JVD or bruit  Thyroid : Normal lobes   Lungs: Has Vesicular Breath sounds   Heart:  Atrial Fib . Abdomen: soft, non-tender; bowel sounds normal; no masses,  no organomegaly  Musculoskeletal: Normal  Extremities: extremities normal, , has edema  Neurologic:  Awake, alert, oriented to name, place and time. Cranial nerves II-XII are grossly intact. Motor right side weakness better   Sensory right dose paresthesia better   and gait is normal.    Assessment:     Patient Active Problem List:     MARINO on CPAP     New onset atrial fibrillation (HCC)     Type 2 diabetes mellitus with hyperglycemia, with long-term current use of insulin (HCC)     Class 3 severe obesity due to excess calories with body mass index (BMI) of 45.0 to 49.9 in adult [facility])     History of cardiac cath     Weakness of right arm      Plan:     1. Reviewed POC blood glucose . Labs and X ray results   2. Reviewed Current Medicines   3. On meal/ Correction bolus U 500 Regular Insulin  4. Monitor Blood glucose frequently   5. Modified  the dose of Insulin/ other medicines as needed   6. Now in ARU as of 11/20 /2018   7. Will follow     .      Micaela Awan MD

## 2018-11-27 NOTE — PROGRESS NOTES
Records reviewed. Pt examined on the ARU this afternoon.     ADMITTING DIAGNOSIS:  Ischemic stroke with infarction. COMORBID DIAGNOSES IMPACTING REHABILITATION:  Right hemiparesis,  uncontrolled diabetes, hypertension, morbid obesity with BMI of 45.0,  atrial fibrillation requiring anticoagulation, and dysphagia. CHIEF COMPLAINT:  Right-sided weakness and slurred speech. He has weakness on the right side, slurred speech, choking with meals,  and infrequent bowel movements. He is controlling his bladder. He  denies numbness or tingling of the limbs aside from his baseline toe  tingling with his diabetes. He does get out of breathe with exertion. Remainder of his review of systems is negative. PHYSICAL EXAMINATION:  VITAL SIGNS:  Blood pressure 130/75, pulse 91, temperature 98.5 °F (36.9 °C), temperature source Oral, resp. rate 16, height 6' (1.829 m), weight (!) 329 lb (149.2 kg), SpO2 95 %. GENERAL:  The patient is seen s sitting up in a bedside chair. He is much more alert alert and  oriented x3, and able to participate in simple conversation. HEENT: Minimal effort of respiration. He has right facial  droop and slurred speech. His tongue deviates to the right. .  Mucous membranes are moist.     PSYCHIATRIC:  He follows one-step commands consistently. LUNGS:  The patient's respirations are labored under his girth. No  rales or rhonchi are noted. HEART:  Sounds reveal an occasional premature beat with no murmur, rub,  or gallop. The rate is controlled. ABDOMEN:  His obese abdomen is soft. Bowel sounds are present. There  is no rebound, guarding, or masses noted. BACK:  The skin overlying his spine is moist, but intact. Lumbar  lordosis is exaggerated. Trunk rotation is compromised by his  significant weakness and girth. EXTREMITIES:  The patient has spastic weakness to the right arm and  right leg.   He can bring his hand up to his chin, but has weakened   compared to

## 2018-11-27 NOTE — PROGRESS NOTES
Body Dressing:  Setup  Lower Body Dressing:  Min A: Used reacher to doff and don clothing PRN. Able to don L sock and shoe and R sock, unable to don R shoe d/t RLE weakness and obesity despite trial of long handled shoe horn at this time. May benefit from elastic shoelaces pending progress. Tub/Shower Transfer:   CGA to<>from W/C  Device Used:    [x]   Shower Bench +grab bar      []   Shower Chair      []   Tub Transfer Bench           []   Bathtub    []   Shower         []   Other:         Bed Mobility:           [x]   Pt received out of bed     Transfers:    Sit--> Stand:  CGA-SBA  Stand --> Sit:   CGA-SBA  Stand-Pivot:   CGA  Other:    Assistive device required for transfer:   W/C      Functional Mobility:    Assistance:  D/t pt not having non skid footwear on and proceeding to shower, W/C was used for safety (with supervision). Device:   []   Rolling Walker     []   Standard Kati Morn [x]   Wheelchair        []   [employer]       []   4-Wheeled Kati Morn         []   Cardiac Kati Morn       []   Other:        Additional Therapeutic activities/exercises completed this date:     [x]   ADL Training   [x]   Balance/Postural training     [x]   Bed/Transfer Training   [x]   Endurance Training   []   Neuromuscular Re-ed   []   Nu-step:  Time:        Level:         #Steps:       []   Rebounder:    []  Seated     []  Standing        []   Supine Ther Ex (reps/sets):     [x]   Seated Ther Ex (reps/sets): To increase RUE endurance for ADLs and transfers, pt completed 1 set x10 reps of the following therex c a 3# weight:   Shoulder flexion/extension   Shoulder horizontal abduction/adduction  Concentric arm circles (clockwise and counterclockwise)  Bicep curls    To increase R intrinsic hand strength and coordination, pt performed gross grasp/release and alternating tip pinch c green theraputty. Comments:  All intervention performed to increase pt's endurance, ax tolerance, balance, RUE strength/coordination, and I c ADLs/IADLs and functional transfers/mobility. Patient/Caregiver Education and Training:   []   [employer] Equipment Use  [x]   Bed Mobility/Transfer Technique/Safety  []   Energy Conservation Tips  []   Family training  [x]   Postural Awareness  [x]   Safety During Functional Activities  []   Reinforced Patient's Precautions   []   Progress was updated and reviewed in Rehabtracker with patient and/or family this         date. Treatment Plan for Next Session: Continue OT POC, RUE neuro re-ed     Assessment:  Pt is progressing towards goals; RLE weakness combined with obesity are ongoing barriers for LB dressing.         Treatment/Activity Tolerance:   [x] Tolerated treatment with no adverse effects    [] Patient limited by fatigue  [] Patient limited by pain   [] Patient limited by medical complications:    [] Adverse reaction to Tx:   [] Significant change in status    Safety:       []  bed alarm set    []  chair alarm set    [x]  Pt refused alarms                []  Telesitter activated      [x]  Gait belt used during tx session      []other:       Number of Minutes/Billable Intervention  Therapeutic Exercise 20   ADL Self-care 42   Neuro Re-Ed    Therapeutic Activity    Group    Other:    TOTAL 62       Social History  Social/Functional History  Lives With: Spouse Isidro Ann (works full time))  Type of Home: [employer] Layout: Two level, Bed/Bath upstairs (1 flight of stairs with L ascending rail )  Home Access: Stairs to enter with rails  Entrance Stairs - Number of Steps: 4; pt also has another entrance available with 4-5 steps with 1 rail   Entrance Stairs - Rails: None  Bathroom Shower/Tub: Tub/Shower unit  Bathroom Toilet: Standard  ADL Assistance: Independent  Homemaking Assistance: Independent  Meal Prep Responsibility: Primary  Laundry Responsibility: No  Cleaning Responsibility: No  Bill Paying/Finance Responsibility: No  Shopping Responsibility: Primary  Dependent Care Responsibility: No (spouse takes care of 2 dogs and 1 cat )  Health Care Management: Primary (pt indicated instances of missing doses of prescribed meds )  Ambulation Assistance: Independent  Transfer Assistance: Independent  Active : Yes  Mode of Transportation: SUV  Occupation: Full time employment  Type of occupation: ;   Leisure & Hobbies: Has animals at home, Netflix, garden--out to eat, pt manages meds by stacking pill bottles on shelf, wife manages finances. Pt has tablet at home. Additional Comments: sleeps in regular bed, no fall Hx, R hand dominant     Objective                                                                                    Goals:  (Update in navigator)  Short term goals  Time Frame for Short term goals: 1 week. Short term goal 1: Pt will complete toileting c SBA. Short term goal 2: Pt will complete LB dressing c CGA using AE PRN. Short term goal 3: Pt will complete functional transfers (bed, chair, shower, toilet) c DME PRN and SBA. Short term goal 4: Pt will perform therex/therax to facilitate increased strength/endurance/ax tolerance (c emphasis on dynamic standing balance/tolerance >6 mins and RUE neuro re-ed) c CGA.:  Long term goals  Time Frame for Long term goals : 10-14 days or until d/c. Long term goal 1: Pt will complete toileting mod I.  Long term goal 2: Pt will complete UB dressing I; LB dressing mod I using AE PRN. Long term goal 3: Pt will complete total body bathing mod I using AE PRN. Long term goal 4: Pt will complete grooming tasks I. Long term goal 5: Pt will complete functional transfers (bed, chair, shower, toilet) c DME PRN and mod I.  Long term goals 6: Pt will perform therex/therax to facilitate increased strength/endurance/ax tolerance (c emphasis on dynamic standing balance/tolerance >12 mins and RUE neuro re-ed) c SBA.   Long term goal 7: Pt will complete simple homemaking tasks c DME PRN and mod I.:        Plan of Care

## 2018-11-27 NOTE — PROGRESS NOTES
Objective:   Vitals: /72   Pulse 73   Temp 97.6 °F (36.4 °C)   Resp 14   Ht 6' (1.829 m)   Wt (!) 329 lb (149.2 kg)   SpO2 97%   BMI 44.62 kg/m²   General appearance: alert and cooperative with exam  Neck: no JVD or bruit  Thyroid : Normal lobes   Lungs: Has Vesicular Breath sounds   Heart:  Atrial Fib . Abdomen: soft, non-tender; bowel sounds normal; no masses,  no organomegaly  Musculoskeletal: Normal  Extremities: extremities normal, , has edema  Neurologic:  Awake, alert, oriented to name, place and time. Cranial nerves II-XII are grossly intact. Motor right side weakness better   Sensory right dose paresthesia better   and gait is normal.    Assessment:     Patient Active Problem List:     MARINO on CPAP     New onset atrial fibrillation (HCC)     Type 2 diabetes mellitus with hyperglycemia, with long-term current use of insulin (HCC)     Class 3 severe obesity due to excess calories with body mass index (BMI) of 45.0 to 49.9 in adult [facility])     History of cardiac cath     Weakness of right arm      Plan:     1. Reviewed POC blood glucose . Labs and X ray results   2. Reviewed Current Medicines   3. On meal/ Correction bolus U 500 Regular Insulin  4. Monitor Blood glucose frequently   5. Modified  the dose of Insulin/ other medicines as needed   6. Now in ARU as of 11/20 /2018   7. Will follow     .      Brigitte White MD

## 2018-11-28 LAB
GLUCOSE BLD-MCNC: 100 MG/DL (ref 70–99)
GLUCOSE BLD-MCNC: 122 MG/DL (ref 70–99)
GLUCOSE BLD-MCNC: 193 MG/DL (ref 70–99)
GLUCOSE BLD-MCNC: 201 MG/DL (ref 70–99)
GLUCOSE BLD-MCNC: 301 MG/DL (ref 70–99)

## 2018-11-28 PROCEDURE — 97530 THERAPEUTIC ACTIVITIES: CPT

## 2018-11-28 PROCEDURE — 97112 NEUROMUSCULAR REEDUCATION: CPT

## 2018-11-28 PROCEDURE — 6370000000 HC RX 637 (ALT 250 FOR IP): Performed by: INTERNAL MEDICINE

## 2018-11-28 PROCEDURE — 99232 SBSQ HOSP IP/OBS MODERATE 35: CPT | Performed by: PHYSICAL MEDICINE & REHABILITATION

## 2018-11-28 PROCEDURE — 97110 THERAPEUTIC EXERCISES: CPT

## 2018-11-28 PROCEDURE — 94150 VITAL CAPACITY TEST: CPT

## 2018-11-28 PROCEDURE — 1280000000 HC REHAB R&B

## 2018-11-28 PROCEDURE — 82962 GLUCOSE BLOOD TEST: CPT

## 2018-11-28 PROCEDURE — 6370000000 HC RX 637 (ALT 250 FOR IP): Performed by: PHYSICAL MEDICINE & REHABILITATION

## 2018-11-28 PROCEDURE — 97116 GAIT TRAINING THERAPY: CPT

## 2018-11-28 PROCEDURE — 94760 N-INVAS EAR/PLS OXIMETRY 1: CPT

## 2018-11-28 PROCEDURE — 99211 OFF/OP EST MAY X REQ PHY/QHP: CPT

## 2018-11-28 RX ADMIN — FUROSEMIDE 80 MG: 40 TABLET ORAL at 08:05

## 2018-11-28 RX ADMIN — METOPROLOL SUCCINATE 50 MG: 50 TABLET, EXTENDED RELEASE ORAL at 08:05

## 2018-11-28 RX ADMIN — LISINOPRIL 10 MG: 10 TABLET ORAL at 08:06

## 2018-11-28 RX ADMIN — INSULIN HUMAN 25 UNITS: 500 INJECTION, SOLUTION SUBCUTANEOUS at 16:56

## 2018-11-28 RX ADMIN — Medication 1 TABLET: at 08:05

## 2018-11-28 RX ADMIN — INSULIN HUMAN 25 UNITS: 500 INJECTION, SOLUTION SUBCUTANEOUS at 08:04

## 2018-11-28 RX ADMIN — CLOPIDOGREL BISULFATE 75 MG: 75 TABLET ORAL at 08:05

## 2018-11-28 RX ADMIN — METFORMIN HYDROCHLORIDE 500 MG: 500 TABLET ORAL at 16:51

## 2018-11-28 RX ADMIN — ATORVASTATIN CALCIUM 40 MG: 40 TABLET, FILM COATED ORAL at 21:41

## 2018-11-28 RX ADMIN — METFORMIN HYDROCHLORIDE 500 MG: 500 TABLET ORAL at 08:05

## 2018-11-28 RX ADMIN — RIVAROXABAN 20 MG: 20 TABLET, FILM COATED ORAL at 16:51

## 2018-11-28 RX ADMIN — DOCUSATE SODIUM 100 MG: 100 CAPSULE, LIQUID FILLED ORAL at 21:42

## 2018-11-28 NOTE — PROGRESS NOTES
Physical Therapy    [x] daily progress note       [] discharge       Patient Name:  Erna Blanco   :  1960 MRN: [id number]  Room:  [address] [place], [place] [address]A Date of Admission: 2018  Rehabilitation Diagnosis:   Acute CVA (cerebrovascular accident) [facility]) [I63.9]       Date 2018       Day of ARU Week:  2   Time IN/OUT 1105/1205  1355/1455   Individual Tx Minutes 60+60   Group Tx Minutes    Co-Treat Minutes    Concurrent Tx Minutes    TOTAL Tx Time Mins 120   Variance Time    Variance Time []   Refusal due to:     []   Medical hold/reason:    []   Illness   []   Off Unit for test/procedure  []   Extra time needed to complete task  []   Therapeutic need  []   Other (specify):   Restrictions Restrictions/Precautions  Restrictions/Precautions: General Precautions, Fall Risk (R hemiparesis )  Position Activity Restriction  Other position/activity restrictions: holter monitor    Communication with other providers: [x]   OK to see per nursing:     [x]   (AM) Spoke with RN Catherine Patel regarding pt's Sx and self-report that his blood sugar may be high; RN assessed blood glucose at 122   Subjective observations and cognitive status: (AM) Pt alert, cooperative, and reports an incident of dizziness earlier this AM and resolved after resting in bed. (PM) Pt alert, reports feeling tired but willing to participate in PT. Pain level/location:   0 /10       Location:    Discharge recommendations  Anticipated discharge date:  18  Destination: []home alone   []home alone with assist PRN     [x] home w/ family      [] Continuous supervision  []SNF    [] Assisted living     [] Other:   Continued therapy: []HHC PT  [x]OUTPATIENT  PT   [] No Further PT  Equipment needs: heavy duty RW  Erna Blanco requires the assistance of a heavy duty rolling walker to successfully ambulate from room to room at home to allow completion of daily living tasks such as: bathing, toileting, dressing and grooming.  A heavy duty rolling walker is necessary due to the patient's weight of 329 lbs,  impaired ambulation and mobility restrictions. This patient can ambulate around the home only by using a heavy duty rolling walker instead of a lesser assistive device, such as a cane or crutch. Germain Melendez is able to safely use a walker with functional tasks. Bed Mobility:           [x]   Pt received out of bed     Transfers:    Sit--> Stand:  CGA  Stand --> Sit:   Min A (R hand placement was inconsistent; 1 episode of abandoning RW to the side but initiated correction when prompted)   Stand-Pivot:   CGA with/without AD  Assistive device required for transfer:  RW    Gait:    Distance:  (AM) 228 feet  (PM) 176 ft   Assistance:  CGA x 1  Device:  RW  Gait Quality:  Step-through, slow fortunato  Pt's ambulation tolerance was limited by RLE weakness/fatigue    Wheelchair Propulsion:  Distance:   180 ft with 1 rest break  Assistance: Mod Ind   Extremities Used:  BLE     Stairs   # Completed:   15  Assistance:    Min A x 1  Supportive Device:  bilat rails   Height:   4/6\"    Curb       Assistance:    Min A x 1  Supportive Device:  RW  Height:   4\" x 4 trials     Additional Therapeutic activities/exercises completed this date:     [x]   Nu-step for BUE/BLE strength and endurance training:  Time: 10 minutes        Level: 3        #Steps: 940      [x]   Reactive balance control and hand-eye coordination training using rebounder in standing with 2.2 weighted ball x 2 min  X 2 trials with Min A. Pt's standing tolerance was limited by LLE fatigue. [x]   Balance training: Ambulation around obstacles (~50 ft total distance) x 5 trials using RW with Min A->CGA; Functional mobility training with focus on safe sit<->stand transfers in regular chair with armrests and short distance ambulation (~40 ft distance; straight path ambulation and then progressed with obstacles along path) using RW x 6 trials with SBA->CGA.           []   Postural training    []

## 2018-11-28 NOTE — CONSULTS
[address] [id number] Continence Nurse  Consult Note       Kathleen Gamboa  AGE: 62 y.o. GENDER: male  : 1960  TODAY'S DATE:  2018    Subjective:     Reason for [facility] Evaluation and Assessment: wound       Kathleen Gamboa is a 62 y.o. male referred by:   [x] Physician  [] Nursing  [] Other:     Wound Identification:  Wound Type: traumatic  Contributing Factors: obesity        PAST MEDICAL HISTORY        Diagnosis Date    [facility] fibrillation ([facility] [facility]     Cerebral artery occlusion with cerebral infarction ([facility] [address] [facility]     History of cardiac cath     --RCA has mid 50% stenosis and PLB branch has 70% stenosis noted. LVEDP very high    Hyperlipidemia     Hypertension     Type II or unspecified type diabetes mellitus without mention of complication, not stated as uncontrolled     Diagnsed about     Unspecified sleep apnea        PAST SURGICAL HISTORY    Past Surgical History:   Procedure Laterality Date    CARDIAC CATHETERIZATION      TONSILLECTOMY      AT 13YEARS OLD       FAMILY HISTORY    Family History   Problem Relation Age of Onset    Diabetes Mother     Diabetes Father     Cancer Father     Cancer Maternal Grandfather         PROSTATE CANCER       SOCIAL HISTORY    Social History   Substance Use Topics    Smoking status: Never Smoker    Smokeless tobacco: Never Used    Alcohol use No       ALLERGIES    No Known Allergies    MEDICATIONS    No current facility-administered medications on file prior to encounter.       Current Outpatient Prescriptions on File Prior to Encounter   Medication Sig Dispense Refill    atorvastatin (LIPITOR) 40 MG tablet Take 1 tablet by mouth daily 30 tablet 0    metoprolol succinate (TOPROL XL) 50 MG extended release tablet Take 1 tablet by mouth daily 30 tablet 0    insulin regular human (HUMULIN R U-500 KWIKPEN) 500 UNIT/ML SOPN concentrated injection pen Inject 25-50 Units into the skin 3 times daily (before meals) (Patient taking differently: Inject 50

## 2018-11-28 NOTE — PROGRESS NOTES
Case mgt telephoned PCP to schedule followup visit. No answer. msg left requesting return call to schedule appt.

## 2018-11-28 NOTE — PROGRESS NOTES
abnormality. No acute process. Cta Neck W Contrast    Result Date: 11/18/2018  EXAMINATION: CTA OF THE NECK; No high-grade stenosis or focal occlusion involving the intracranial vasculature or cervical vasculature. No evidence of acute dissection. No evidence of aneurysm. Cta Head W Contrast    Result Date: 11/18/2018  EXAMINATION: CTA OF THE NECK;  reduce the radiation dose to as low as reasonably achievable. COMPARISON: None. HISTORY: ORDERING SYSTEM PROVIDED HISTORY: right sided weakness, dizziness     No high-grade stenosis or focal occlusion involving the intracranial vasculature or cervical vasculature. No evidence of acute dissection. No evidence of aneurysm. Mri Brain Without Contrast    Result Date: 11/19/2018  EXAMINATION: MRI OF THE BRAIN WITHOUT CONTRAST  11/19/2018 3:54 pm TECHNIQUE: Multiplanar multisequence MRI of the brain was performed without the administration of intravenous contrast. COMPARISON: CT brain and CT angiogram brain 11/18/2018 HISTORY: ORDERING SYSTEM PROVIDED HISTORY: SPEECH CHANGES (OR APHASIA),    Acute 7 x 6 mm infarct within the left thalamus. No acute intracranial hemorrhage. The findings were sent to the Radiology Results [address] at 4:04 pm on 11/19/2018to be communicated to a licensed caregiver.        Scheduled Medicines   Medications:    insulin regular human  25 Units Subcutaneous TID AC    metFORMIN  500 mg Oral BID WC    senna  1 tablet Oral BID    docusate sodium  100 mg Oral BID    insulin regular human  0-20 Units Subcutaneous 4x Daily AC & HS    atorvastatin  40 mg Oral Nightly    rivaroxaban  20 mg Oral Dinner    clopidogrel  75 mg Oral Daily    folic acid-pyridoxine-cyancobalamin  1 tablet Oral Daily    furosemide  80 mg Oral Daily    lisinopril  10 mg Oral Daily    metolazone  5 mg Oral Once per day on Tue Thu    metoprolol succinate  50 mg Oral Daily      Infusions:         Objective:   Vitals: /70   Pulse 78

## 2018-11-29 LAB
GLUCOSE BLD-MCNC: 123 MG/DL (ref 70–99)
GLUCOSE BLD-MCNC: 158 MG/DL (ref 70–99)
GLUCOSE BLD-MCNC: 215 MG/DL (ref 70–99)
GLUCOSE BLD-MCNC: 233 MG/DL (ref 70–99)
GLUCOSE BLD-MCNC: 243 MG/DL (ref 70–99)

## 2018-11-29 PROCEDURE — 6370000000 HC RX 637 (ALT 250 FOR IP): Performed by: PHYSICAL MEDICINE & REHABILITATION

## 2018-11-29 PROCEDURE — 97116 GAIT TRAINING THERAPY: CPT

## 2018-11-29 PROCEDURE — 97530 THERAPEUTIC ACTIVITIES: CPT

## 2018-11-29 PROCEDURE — 97110 THERAPEUTIC EXERCISES: CPT

## 2018-11-29 PROCEDURE — 99232 SBSQ HOSP IP/OBS MODERATE 35: CPT | Performed by: PHYSICAL MEDICINE & REHABILITATION

## 2018-11-29 PROCEDURE — 94761 N-INVAS EAR/PLS OXIMETRY MLT: CPT

## 2018-11-29 PROCEDURE — 6370000000 HC RX 637 (ALT 250 FOR IP): Performed by: INTERNAL MEDICINE

## 2018-11-29 PROCEDURE — 94150 VITAL CAPACITY TEST: CPT

## 2018-11-29 PROCEDURE — 82962 GLUCOSE BLOOD TEST: CPT

## 2018-11-29 PROCEDURE — 1280000000 HC REHAB R&B

## 2018-11-29 PROCEDURE — 97112 NEUROMUSCULAR REEDUCATION: CPT

## 2018-11-29 RX ADMIN — SENNOSIDES 8.6 MG: 8.6 TABLET, FILM COATED ORAL at 08:10

## 2018-11-29 RX ADMIN — INSULIN HUMAN 25 UNITS: 500 INJECTION, SOLUTION SUBCUTANEOUS at 12:29

## 2018-11-29 RX ADMIN — METOLAZONE 2.5 MG: 2.5 TABLET ORAL at 08:08

## 2018-11-29 RX ADMIN — SENNOSIDES 8.6 MG: 8.6 TABLET, FILM COATED ORAL at 20:35

## 2018-11-29 RX ADMIN — Medication 1 TABLET: at 08:07

## 2018-11-29 RX ADMIN — DOCUSATE SODIUM 100 MG: 100 CAPSULE, LIQUID FILLED ORAL at 08:09

## 2018-11-29 RX ADMIN — RIVAROXABAN 20 MG: 20 TABLET, FILM COATED ORAL at 17:49

## 2018-11-29 RX ADMIN — CLOPIDOGREL BISULFATE 75 MG: 75 TABLET ORAL at 08:09

## 2018-11-29 RX ADMIN — METOPROLOL SUCCINATE 50 MG: 50 TABLET, EXTENDED RELEASE ORAL at 08:10

## 2018-11-29 RX ADMIN — METFORMIN HYDROCHLORIDE 500 MG: 500 TABLET ORAL at 08:08

## 2018-11-29 RX ADMIN — METFORMIN HYDROCHLORIDE 500 MG: 500 TABLET ORAL at 17:49

## 2018-11-29 RX ADMIN — FUROSEMIDE 80 MG: 40 TABLET ORAL at 08:07

## 2018-11-29 RX ADMIN — DOCUSATE SODIUM 100 MG: 100 CAPSULE, LIQUID FILLED ORAL at 20:35

## 2018-11-29 RX ADMIN — ATORVASTATIN CALCIUM 40 MG: 40 TABLET, FILM COATED ORAL at 20:35

## 2018-11-29 RX ADMIN — LISINOPRIL 10 MG: 10 TABLET ORAL at 08:08

## 2018-11-29 RX ADMIN — INSULIN HUMAN 25 UNITS: 500 INJECTION, SOLUTION SUBCUTANEOUS at 08:17

## 2018-11-29 NOTE — PROGRESS NOTES
Pulse 78   Temp 97.7 °F (36.5 °C) (Oral)   Resp 16   Ht 6' (1.829 m)   Wt (!) 329 lb (149.2 kg)   SpO2 95%   BMI 44.62 kg/m²   General appearance: alert and cooperative with exam  Neck: no JVD or bruit  Thyroid : Normal lobes   Lungs: Has Vesicular Breath sounds   Heart:  Atrial Fib . Abdomen: soft, non-tender; bowel sounds normal; no masses,  no organomegaly  Musculoskeletal: Normal  Extremities: extremities normal, , has edema  Neurologic:  Awake, alert, oriented to name, place and time. Cranial nerves II-XII are grossly intact. Motor right side weakness better   Sensory right dose paresthesia better   and gait is normal.    Assessment:     Patient Active Problem List:     MARINO on CPAP     New onset atrial fibrillation (HCC)     Type 2 diabetes mellitus with hyperglycemia, with long-term current use of insulin (HCC)     Class 3 severe obesity due to excess calories with body mass index (BMI) of 45.0 to 49.9 in adult [facility])     History of cardiac cath     Weakness of right arm      Plan:     1. Reviewed POC blood glucose . Labs and X ray results   2. Reviewed Current Medicines   3. On meal/ Correction bolus U 500 Regular Insulin  4. Monitor Blood glucose frequently   5. Modified  the dose of Insulin/ other medicines as needed   6. Now in ARU as of 11/20 /2018   7. Will follow     .      Nannette Gates MD

## 2018-11-29 NOTE — PROGRESS NOTES
Occupational Therapy  Physical Rehabilitation: OCCUPATIONAL THERAPY     [x] daily progress note       [] discharge       Patient Name:  Deyanira Carlisle   :  1960 MRN: [id number]  Room:  [address] [place], [place] [address] Date of Admission: 2018  Rehabilitation Diagnosis:   Acute CVA (cerebrovascular accident) [facility]) [I63.9]       Date 2018       Day of ARU Week:  3   Time IN/OUT 1407 / 1507   Individual Tx Minutes 60   Group Tx Minutes    Co-Treat Minutes    Concurrent Tx Minutes    TOTAL Tx Time Mins 60   Variance Time    Variance Time []   Refusal due to:     []   Medical hold/reason:    []   Illness   []   Off Unit for test/procedure  []   Extra time needed to complete task  []   Therapeutic need  []   Other (specify):   Restrictions Restrictions/Precautions: General Precautions, Fall Risk (R hemiparesis )         Communication with other providers: [x]   OK to see per nursing:     []   Spoke with team member regarding:      Subjective observations and cognitive status: Pt seated in wheelchair on approach manipulating Rubic's cube on approach. \"Look what I've been doing all morning. It's helping my (R) hand. \" Pt agreeable to OT Tx session. Pt actively participated in therapeutic discussion regarding recommendations for home DME needs, including introduction to TTB and sequencing a dry transfer c emphasis on safe body positioning. Pt reports plan for installation of grab bars near toilet and in shower area. Pain level/location:    0/10       Location:    Discharge recommendations  Anticipated discharge date:  18  Destination: []home alone   []home alone w assist prn   [] home w/ family    [] Continuous supervision       []SNF    [] Assisted living     [] Other:   Continued therapy: []HHC OT  []OUTPATIENT  OT   [] No Further OT  Equipment needs:   Recommend TTB and installation of grab bars in shower and near toilet s/p pt expressed preference for grab bars vs use of BSC frame over toilet.  Pt also provide support. Toileting:   Declined need       Toilet Transfers:   Declined need  Device Used:    []   Standard Toilet         []   Grab Bars           []  Bedside Commode       []   Elevated Toilet          []   Other:        Bed Mobility:           [x]   Pt received out of bed        Transfers:    Sit--> Stand:  CGA to Min A  Stand --> Sit:   SBA to CGA  Stand-Pivot:   CGA to Min A  Other:  Required occasional vc for safe body positioning  Assistive device required for transfer:   RW      Functional Mobility:    Assistance:  CGA c RW to perform functional mobility inside room, otherwise Sup c wheelchair for pt to conserve energy for PT Tx session to follow. Device:   []   Rolling Walker     []   Standard Walker []   Wheelchair        []   [place]       []   4-Wheeled [place]         []   Cardiac Walker       []   Other:          Additional Therapeutic activities/exercises completed this date:     [x]   ADL Training   [x]   Balance/Postural training     [x]   Transfer Training   [x]   Endurance Training - multiple stands, exhibited 7 minutes standing tolerance   [x]   Neuromuscular Re-ed - Emphasis on RUE functional use, including fine motor tasks for therapeutic nuts and bolts activity, Sup seated or SBA in stance   []   Nu-step:  Time:        Level:         #Steps:       []   Rebounder:    []  Seated     []  Standing        []   Supine Ther Ex (reps/sets):     [x]   Seated Ther Ex (reps/sets):  Sup seated + occasional cue for technique to perform BUE therapeutic exercises c 2# weight x20 reps each for shoulder flex/extension, chest presses, biceps curls, supination/prontation, internal/external rotation, and rowing c rest breaks PRN. []   Standing Ther Ex (reps/sets):     []   Other:      Comments:   All therapeutic intervention performed c emphasis on RUE neuromuscular re-education /  dynamic balance / standing tolerance to inc strength, endurance and act tolerance for inc Indep c ADL / IADL tasks, func transfers

## 2018-11-30 LAB
ANION GAP SERPL CALCULATED.3IONS-SCNC: 15 MMOL/L (ref 4–16)
BUN BLDV-MCNC: 52 MG/DL (ref 6–23)
CALCIUM SERPL-MCNC: 9.8 MG/DL (ref 8.3–10.6)
CHLORIDE BLD-SCNC: 88 MMOL/L (ref 99–110)
CO2: 30 MMOL/L (ref 21–32)
CREAT SERPL-MCNC: 1.4 MG/DL (ref 0.9–1.3)
EKG ATRIAL RATE: 89 BPM
EKG DIAGNOSIS: NORMAL
EKG P AXIS: 35 DEGREES
EKG P-R INTERVAL: 134 MS
EKG Q-T INTERVAL: 366 MS
EKG QRS DURATION: 116 MS
EKG QTC CALCULATION (BAZETT): 445 MS
EKG R AXIS: -7 DEGREES
EKG T AXIS: 255 DEGREES
EKG VENTRICULAR RATE: 89 BPM
GFR AFRICAN AMERICAN: >60 ML/MIN/1.73M2
GFR NON-AFRICAN AMERICAN: 52 ML/MIN/1.73M2
GLUCOSE BLD-MCNC: 147 MG/DL (ref 70–99)
GLUCOSE BLD-MCNC: 153 MG/DL (ref 70–99)
GLUCOSE BLD-MCNC: 193 MG/DL (ref 70–99)
GLUCOSE BLD-MCNC: 201 MG/DL (ref 70–99)
GLUCOSE BLD-MCNC: 230 MG/DL (ref 70–99)
GLUCOSE BLD-MCNC: 269 MG/DL (ref 70–99)
GLUCOSE BLD-MCNC: 305 MG/DL (ref 70–99)
HCT VFR BLD CALC: 44.9 % (ref 42–52)
HEMOGLOBIN: 14.5 GM/DL (ref 13.5–18)
MCH RBC QN AUTO: 28.2 PG (ref 27–31)
MCHC RBC AUTO-ENTMCNC: 32.3 % (ref 32–36)
MCV RBC AUTO: 87.2 FL (ref 78–100)
PDW BLD-RTO: 13.3 % (ref 11.7–14.9)
PLATELET # BLD: 244 K/CU MM (ref 140–440)
PMV BLD AUTO: 11.2 FL (ref 7.5–11.1)
POTASSIUM SERPL-SCNC: 3.2 MMOL/L (ref 3.5–5.1)
RBC # BLD: 5.15 M/CU MM (ref 4.6–6.2)
SODIUM BLD-SCNC: 133 MMOL/L (ref 135–145)
TROPONIN T: 0.02 NG/ML
WBC # BLD: 10.2 K/CU MM (ref 4–10.5)

## 2018-11-30 PROCEDURE — 99232 SBSQ HOSP IP/OBS MODERATE 35: CPT | Performed by: PHYSICAL MEDICINE & REHABILITATION

## 2018-11-30 PROCEDURE — 36415 COLL VENOUS BLD VENIPUNCTURE: CPT

## 2018-11-30 PROCEDURE — 97530 THERAPEUTIC ACTIVITIES: CPT

## 2018-11-30 PROCEDURE — 6370000000 HC RX 637 (ALT 250 FOR IP): Performed by: INTERNAL MEDICINE

## 2018-11-30 PROCEDURE — 1280000000 HC REHAB R&B

## 2018-11-30 PROCEDURE — 97110 THERAPEUTIC EXERCISES: CPT

## 2018-11-30 PROCEDURE — 6370000000 HC RX 637 (ALT 250 FOR IP): Performed by: PHYSICAL MEDICINE & REHABILITATION

## 2018-11-30 PROCEDURE — 94761 N-INVAS EAR/PLS OXIMETRY MLT: CPT

## 2018-11-30 PROCEDURE — 97116 GAIT TRAINING THERAPY: CPT

## 2018-11-30 PROCEDURE — 94750 HC PULMONARY COMPLIANCE STUDY: CPT

## 2018-11-30 PROCEDURE — 80048 BASIC METABOLIC PNL TOTAL CA: CPT

## 2018-11-30 PROCEDURE — 94150 VITAL CAPACITY TEST: CPT

## 2018-11-30 PROCEDURE — 82962 GLUCOSE BLOOD TEST: CPT

## 2018-11-30 PROCEDURE — 84484 ASSAY OF TROPONIN QUANT: CPT

## 2018-11-30 PROCEDURE — 85027 COMPLETE CBC AUTOMATED: CPT

## 2018-11-30 RX ORDER — LISINOPRIL 5 MG/1
5 TABLET ORAL
Status: DISCONTINUED | OUTPATIENT
Start: 2018-12-01 | End: 2018-12-04

## 2018-11-30 RX ORDER — POTASSIUM CHLORIDE 20 MEQ/1
40 TABLET, EXTENDED RELEASE ORAL
Status: DISCONTINUED | OUTPATIENT
Start: 2018-11-30 | End: 2018-11-30

## 2018-11-30 RX ORDER — POTASSIUM CHLORIDE 20 MEQ/1
40 TABLET, EXTENDED RELEASE ORAL ONCE
Status: COMPLETED | OUTPATIENT
Start: 2018-11-30 | End: 2018-11-30

## 2018-11-30 RX ORDER — POTASSIUM CHLORIDE 20 MEQ/1
20 TABLET, EXTENDED RELEASE ORAL
Status: DISCONTINUED | OUTPATIENT
Start: 2018-12-01 | End: 2018-12-04 | Stop reason: HOSPADM

## 2018-11-30 RX ORDER — METOPROLOL SUCCINATE 50 MG/1
50 TABLET, EXTENDED RELEASE ORAL EVERY EVENING
Status: DISCONTINUED | OUTPATIENT
Start: 2018-12-01 | End: 2018-12-04 | Stop reason: HOSPADM

## 2018-11-30 RX ORDER — POTASSIUM CHLORIDE 20 MEQ/1
20 TABLET, EXTENDED RELEASE ORAL 2 TIMES DAILY WITH MEALS
Status: DISCONTINUED | OUTPATIENT
Start: 2018-11-30 | End: 2018-11-30

## 2018-11-30 RX ORDER — LISINOPRIL 5 MG/1
5 TABLET ORAL DAILY
Status: DISCONTINUED | OUTPATIENT
Start: 2018-12-01 | End: 2018-11-30

## 2018-11-30 RX ADMIN — ATORVASTATIN CALCIUM 40 MG: 40 TABLET, FILM COATED ORAL at 20:54

## 2018-11-30 RX ADMIN — METOPROLOL SUCCINATE 50 MG: 50 TABLET, EXTENDED RELEASE ORAL at 08:03

## 2018-11-30 RX ADMIN — CLOPIDOGREL BISULFATE 75 MG: 75 TABLET ORAL at 08:03

## 2018-11-30 RX ADMIN — RIVAROXABAN 20 MG: 20 TABLET, FILM COATED ORAL at 17:12

## 2018-11-30 RX ADMIN — LISINOPRIL 10 MG: 10 TABLET ORAL at 08:03

## 2018-11-30 RX ADMIN — INSULIN HUMAN 25 UNITS: 500 INJECTION, SOLUTION SUBCUTANEOUS at 17:09

## 2018-11-30 RX ADMIN — METFORMIN HYDROCHLORIDE 500 MG: 500 TABLET ORAL at 17:12

## 2018-11-30 RX ADMIN — METFORMIN HYDROCHLORIDE 500 MG: 500 TABLET ORAL at 08:03

## 2018-11-30 RX ADMIN — FUROSEMIDE 80 MG: 40 TABLET ORAL at 08:03

## 2018-11-30 RX ADMIN — INSULIN HUMAN 25 UNITS: 500 INJECTION, SOLUTION SUBCUTANEOUS at 08:00

## 2018-11-30 RX ADMIN — DOCUSATE SODIUM 100 MG: 100 CAPSULE, LIQUID FILLED ORAL at 08:03

## 2018-11-30 RX ADMIN — SENNOSIDES 8.6 MG: 8.6 TABLET, FILM COATED ORAL at 08:03

## 2018-11-30 RX ADMIN — HYDROXYZINE HYDROCHLORIDE 10 MG: 10 TABLET ORAL at 10:34

## 2018-11-30 RX ADMIN — POTASSIUM CHLORIDE 40 MEQ: 20 TABLET, EXTENDED RELEASE ORAL at 17:12

## 2018-11-30 RX ADMIN — Medication 1 TABLET: at 08:03

## 2018-11-30 NOTE — PROGRESS NOTES
Physical Therapy    [x] daily progress note       [] discharge       Patient Name:  Ethlyn Cranker   :  1960 MRN: [id number]  Room:  [address] [place], [place] [address] Date of Admission: 2018  Rehabilitation Diagnosis:   Acute CVA (cerebrovascular accident) [facility]) [I63.9]       Date 2018       Day of ARU Week:  4   Time IN//930  1300/1400   Individual Tx Minutes 60+60   Group Tx Minutes    Co-Treat Minutes    Concurrent Tx Minutes    TOTAL Tx Time Mins 120   Variance Time    Variance Time []   Refusal due to:     []   Medical hold/reason:    []   Illness   []   Off Unit for test/procedure  []   Extra time needed to complete task  []   Therapeutic need  []   Other (specify):   Restrictions Restrictions/Precautions  Restrictions/Precautions: General Precautions, Fall Risk (R hemiparesis )  Position Activity Restriction  Other position/activity restrictions: holter monitor    Communication with other providers: [x]   OK to see per nursing:     [x]   Spoke with RN Bill Nelson) regarding pt's Sx towards the end of PT session and request for blood glucose check    Subjective observations and cognitive status: (AM) Pt alert, appeared to have fluctuating mood during tx session but cooperative. (PM) \"I feel woozy. \" Pt reports feeling better since incident during OT session this AM and was willing to participate in PT. Pain level/location: 0/10       Location:    Discharge recommendations  Anticipated discharge date:  18  Destination: []home alone   []home alone with assist PRN     [x] home w/ family      [] Continuous supervision  []SNF    [] Assisted living     [] Other:   Continued therapy: []C PT  [x]OUTPATIENT  PT   [] No Further PT  Equipment needs: heavy duty RW  Sebastian Handler the assistance of a heavy duty rolling walker to successfully ambulate from room to room at home to allow completion of daily living tasks such as: bathing, toileting, dressing and grooming.  A heavy duty rolling  walker

## 2018-11-30 NOTE — PROGRESS NOTES
Oral Once per day on Tue Thu    metoprolol succinate  50 mg Oral Daily      Infusions:         Objective:   Vitals: /67   Pulse 77   Temp 98.3 °F (36.8 °C) (Oral)   Resp 16   Ht 6' (1.829 m)   Wt (!) 329 lb (149.2 kg)   SpO2 96%   BMI 44.62 kg/m²   General appearance: alert and cooperative with exam  Neck: no JVD or bruit  Thyroid : Normal lobes   Lungs: Has Vesicular Breath sounds   Heart:  Atrial Fib . Abdomen: soft, non-tender; bowel sounds normal; no masses,  no organomegaly  Musculoskeletal: Normal  Extremities: extremities normal, , has edema  Neurologic:  Awake, alert, oriented to name, place and time. Cranial nerves II-XII are grossly intact. Motor right side weakness better   Sensory right dose paresthesia better   and gait is normal.    Assessment:     Patient Active Problem List:     MARINO on CPAP     New onset atrial fibrillation (HCC)     Type 2 diabetes mellitus with hyperglycemia, with long-term current use of insulin (HCC)     Class 3 severe obesity due to excess calories with body mass index (BMI) of 45.0 to 49.9 in adult [facility])     History of cardiac cath     Weakness of right arm      Plan:     1. Reviewed POC blood glucose . Labs and X ray results   2. Reviewed Current Medicines   3. On meal/ Correction bolus U 500 Regular Insulin  4. Monitor Blood glucose frequently   5. Modified  the dose of Insulin/ other medicines as needed   6. Now in ARU as of 11/20 /2018   7. Will follow       .      Magaly Smith MD

## 2018-11-30 NOTE — PROGRESS NOTES
Records reviewed. Pt examined on the ARU this afternoon.     ADMITTING DIAGNOSIS:  Ischemic stroke with infarction. COMORBID DIAGNOSES IMPACTING REHABILITATION:  Right hemiparesis,  uncontrolled diabetes, hypertension, morbid obesity with BMI of 45.0,  atrial fibrillation requiring anticoagulation, and dysphagia. CHIEF COMPLAINT: Situational dizziness. No chest pain, chills or resting shortness of breath. He has weakness on the right side, mildly slurred speech and infrequent bowel movements. He is controlling his bladder. He denies numbness or tingling of the limbs aside from his baseline toe tingling with his diabetes. He does get out of breathe with exertion. Remainder of his review of systems is negative. PHYSICAL EXAMINATION:  VITAL SIGNS:  Blood pressure 137/70, pulse 95, temperature 97.9 °F (36.6 °C), resp. rate 18, height 6' (1.829 m), weight (!) 329 lb (149.2 kg), SpO2 96 %. GENERAL:  The patient is seen sitting up in a wheelchair. He is alert and  oriented x3, and able to participate in simple conversation. HEENT:  He gets mildly out of breathe with conversation. He has right facial  droop and mildly slurred speech. His tongue is moist.      PSYCHIATRIC:  He follows one-step commands. LUNGS:  The patient's respirations are minimally labored under his girth. No  rales or rhonchi are noted. HEART:  Sounds reveal an occasional premature beat with no murmur, rub,  or gallop. The rate is controlled. ABDOMEN:  His obese abdomen is soft. Bowel sounds are present. There  is no rebound, guarding, or masses noted. EXTREMITIES:  Clumsy  with both upper limbs. Tone is increased on the right Hoffmans reflex. In the lower limbs, he has coarse movements about the hip, knee, and  ankle. Both heels are clear and there are no signs of active DVT. He  fatigues with manual muscle testing, but roughly has 3+/5 strength across  the major joints of the right leg.     Sitting

## 2018-12-01 LAB
GLUCOSE BLD-MCNC: 104 MG/DL (ref 70–99)
GLUCOSE BLD-MCNC: 188 MG/DL (ref 70–99)
GLUCOSE BLD-MCNC: 201 MG/DL (ref 70–99)
GLUCOSE BLD-MCNC: 288 MG/DL (ref 70–99)
GLUCOSE BLD-MCNC: 298 MG/DL (ref 70–99)
TROPONIN T: <0.01 NG/ML

## 2018-12-01 PROCEDURE — 94010 BREATHING CAPACITY TEST: CPT

## 2018-12-01 PROCEDURE — 36415 COLL VENOUS BLD VENIPUNCTURE: CPT

## 2018-12-01 PROCEDURE — 97150 GROUP THERAPEUTIC PROCEDURES: CPT

## 2018-12-01 PROCEDURE — 6370000000 HC RX 637 (ALT 250 FOR IP): Performed by: INTERNAL MEDICINE

## 2018-12-01 PROCEDURE — 97112 NEUROMUSCULAR REEDUCATION: CPT

## 2018-12-01 PROCEDURE — 94761 N-INVAS EAR/PLS OXIMETRY MLT: CPT

## 2018-12-01 PROCEDURE — 6370000000 HC RX 637 (ALT 250 FOR IP): Performed by: PHYSICAL MEDICINE & REHABILITATION

## 2018-12-01 PROCEDURE — 84484 ASSAY OF TROPONIN QUANT: CPT

## 2018-12-01 PROCEDURE — 97110 THERAPEUTIC EXERCISES: CPT

## 2018-12-01 PROCEDURE — 1280000000 HC REHAB R&B

## 2018-12-01 PROCEDURE — 82962 GLUCOSE BLOOD TEST: CPT

## 2018-12-01 PROCEDURE — 97116 GAIT TRAINING THERAPY: CPT

## 2018-12-01 RX ADMIN — CLOPIDOGREL BISULFATE 75 MG: 75 TABLET ORAL at 08:37

## 2018-12-01 RX ADMIN — FUROSEMIDE 80 MG: 40 TABLET ORAL at 08:36

## 2018-12-01 RX ADMIN — RIVAROXABAN 20 MG: 20 TABLET, FILM COATED ORAL at 18:51

## 2018-12-01 RX ADMIN — METFORMIN HYDROCHLORIDE 500 MG: 500 TABLET ORAL at 18:52

## 2018-12-01 RX ADMIN — METOPROLOL SUCCINATE 50 MG: 50 TABLET, EXTENDED RELEASE ORAL at 18:52

## 2018-12-01 RX ADMIN — Medication 1 TABLET: at 08:37

## 2018-12-01 RX ADMIN — METFORMIN HYDROCHLORIDE 500 MG: 500 TABLET ORAL at 08:37

## 2018-12-01 RX ADMIN — INSULIN HUMAN 25 UNITS: 500 INJECTION, SOLUTION SUBCUTANEOUS at 12:44

## 2018-12-01 RX ADMIN — INSULIN HUMAN 25 UNITS: 500 INJECTION, SOLUTION SUBCUTANEOUS at 08:31

## 2018-12-01 RX ADMIN — LISINOPRIL 5 MG: 5 TABLET ORAL at 06:28

## 2018-12-01 RX ADMIN — DOCUSATE SODIUM 100 MG: 100 CAPSULE, LIQUID FILLED ORAL at 21:12

## 2018-12-01 RX ADMIN — POTASSIUM CHLORIDE 20 MEQ: 20 TABLET, EXTENDED RELEASE ORAL at 08:37

## 2018-12-01 RX ADMIN — ATORVASTATIN CALCIUM 40 MG: 40 TABLET, FILM COATED ORAL at 21:12

## 2018-12-01 NOTE — PROGRESS NOTES
Physical Therapy  [x] daily progress note       [] discharge       Patient Name:  Ethlyn Cranker   :  1960 MRN: [id number]  Room:  [address] [place] [address] Date of Admission: 2018  Rehabilitation Diagnosis:   Acute CVA (cerebrovascular accident) [facility]) [I63.9]       Date 2018       Day of ARU Week:  5   Time IN/OUT   6194-1989   Individual Tx Minutes 60   Group Tx Minutes 60   Co-Treat Minutes    Concurrent Tx Minutes    TOTAL Tx Time Mins 120   Variance Time    Variance Time []   Refusal due to:     []   Medical hold/reason:    []   Illness   []   Off Unit for test/procedure  []   Extra time needed to complete task  []   Therapeutic need  []   Other (specify):   Restrictions Restrictions/Precautions  Restrictions/Precautions: General Precautions, Fall Risk (R hemiparesis )  Position Activity Restriction  Other position/activity restrictions: holter monitor    Communication with other providers: [x]   OK to see per nursing:     []   Spoke with team member regarding:      Subjective observations and cognitive status: Pt sitting in wheelchair agreeable to therapy. Pain level/location:   0 /10       Location:    Discharge recommendations  Anticipated discharge date:  18  Destination: []home alone   []home alone with assist PRN     [x] home w/ family      [] Continuous supervision  []SNF    [] Assisted living     [] Other:   Continued therapy: []HHC PT  [x]OUTPATIENT  PT   [] No Further PT  Equipment needs: heavy duty RW  Sebastian Handler the assistance of a heavy duty rolling walker to successfully ambulate from room to room at home to allow completion of daily living tasks such as: bathing, toileting, dressing and grooming.  A heavy duty rolling  walker is necessary due to the patient's weight of 329 lbs,  impaired ambulation and mobility restrictions.  This patient can ambulate around the home only by using [facility] [place] duty rolling walker instead of a lesser assistive device, such as a cane Re-education, Home Exercise Program, Patient/Caregiver Education & Training, Equipment Evaluation, Education, & procurement, ROM, Functional Mobility Training, Stair training, Safety Education & Training    Electronically signed by   Dru Magana, PTA [id number]  12/1/2018, 8:16 AM

## 2018-12-01 NOTE — PROGRESS NOTES
Daily Progress Note     patient is awake alert feeling better  Sitting In chair  Not light headed or dizzy  No chest pain  BP is stable  Trop is decreased  For now continue medical treatment  Recheck BMP in am  Hx of PAFIB on AC     Recent CVA  Hx of  CAD  Hx of obesity and MARINO   Chronic lower extremity edema    holter--IMPRESSION:  1. Sinus rhythm. 2.  Sinus tachycardia. 3.  Short runs of nonsustained VT. Longest was 4 beats present. Isolated PACs also present. Also bigeminy present. No atrial fibrillation noted    Summary--TEJ   Left ventricular systolic function is normal.   Ejection fraction is visually estimated at 50-55%.   Thinned and mobile interatrial septum.   Color Doppler is positive for left to right shunt PFO. The patient has a history of having coronary artery disease present and  did a heart cath on him back in 12/2017, left main was patent, LAD had  mild disease, circ had mild disease, and RCA with 60% stenosis. PDA had  mild disease, PLB had 70% stenosis and small vessel, which was treated  medically at that time.     PAST MEDICAL HISTORY:  Found to have recent stroke present. The MRI of  the brain from 11/19/2018 had showed there was an acute 7 x 6 mm infarct  within the left thalamus.   No bleeding was noted, history of moderate  coronary artery disease and small vessel, which was treated medically  and the patient has history of paroxysmal atrial fibrillation noted,  sleep apnea, COPD, obesity present, and diabetic retinopathy present.     Objective:   /71   Pulse 90   Temp 97.5 °F (36.4 °C) (Oral)   Resp 16   Ht 6' (1.829 m)   Wt (!) 329 lb (149.2 kg)   SpO2 95%   BMI 44.62 kg/m²     Intake/Output Summary (Last 24 hours) at 12/01/18 1017  Last data filed at 11/30/18 1202   Gross per 24 hour   Intake              120 ml   Output                0 ml   Net              120 ml       Medications:   Scheduled Meds:   metoprolol succinate  50 mg Oral QPM    lisinopril  5

## 2018-12-02 LAB
ANION GAP SERPL CALCULATED.3IONS-SCNC: 14 MMOL/L (ref 4–16)
BUN BLDV-MCNC: 48 MG/DL (ref 6–23)
CALCIUM SERPL-MCNC: 9.1 MG/DL (ref 8.3–10.6)
CHLORIDE BLD-SCNC: 90 MMOL/L (ref 99–110)
CO2: 29 MMOL/L (ref 21–32)
CREAT SERPL-MCNC: 1.2 MG/DL (ref 0.9–1.3)
GFR AFRICAN AMERICAN: >60 ML/MIN/1.73M2
GFR NON-AFRICAN AMERICAN: >60 ML/MIN/1.73M2
GLUCOSE BLD-MCNC: 221 MG/DL (ref 70–99)
GLUCOSE BLD-MCNC: 248 MG/DL (ref 70–99)
GLUCOSE BLD-MCNC: 263 MG/DL (ref 70–99)
GLUCOSE BLD-MCNC: 276 MG/DL (ref 70–99)
GLUCOSE BLD-MCNC: 88 MG/DL (ref 70–99)
POTASSIUM SERPL-SCNC: 3.8 MMOL/L (ref 3.5–5.1)
SODIUM BLD-SCNC: 133 MMOL/L (ref 135–145)

## 2018-12-02 PROCEDURE — 1280000000 HC REHAB R&B

## 2018-12-02 PROCEDURE — 36415 COLL VENOUS BLD VENIPUNCTURE: CPT

## 2018-12-02 PROCEDURE — 6370000000 HC RX 637 (ALT 250 FOR IP): Performed by: INTERNAL MEDICINE

## 2018-12-02 PROCEDURE — 82962 GLUCOSE BLOOD TEST: CPT

## 2018-12-02 PROCEDURE — 94150 VITAL CAPACITY TEST: CPT

## 2018-12-02 PROCEDURE — 94761 N-INVAS EAR/PLS OXIMETRY MLT: CPT

## 2018-12-02 PROCEDURE — 80048 BASIC METABOLIC PNL TOTAL CA: CPT

## 2018-12-02 PROCEDURE — 6370000000 HC RX 637 (ALT 250 FOR IP): Performed by: PHYSICAL MEDICINE & REHABILITATION

## 2018-12-02 RX ADMIN — RIVAROXABAN 20 MG: 20 TABLET, FILM COATED ORAL at 19:03

## 2018-12-02 RX ADMIN — POTASSIUM CHLORIDE 20 MEQ: 20 TABLET, EXTENDED RELEASE ORAL at 08:42

## 2018-12-02 RX ADMIN — INSULIN HUMAN 25 UNITS: 500 INJECTION, SOLUTION SUBCUTANEOUS at 08:37

## 2018-12-02 RX ADMIN — Medication 1 TABLET: at 08:42

## 2018-12-02 RX ADMIN — METOPROLOL SUCCINATE 50 MG: 50 TABLET, EXTENDED RELEASE ORAL at 17:23

## 2018-12-02 RX ADMIN — LISINOPRIL 5 MG: 5 TABLET ORAL at 05:48

## 2018-12-02 RX ADMIN — INSULIN HUMAN 25 UNITS: 500 INJECTION, SOLUTION SUBCUTANEOUS at 12:59

## 2018-12-02 RX ADMIN — INSULIN HUMAN 25 UNITS: 500 INJECTION, SOLUTION SUBCUTANEOUS at 17:24

## 2018-12-02 RX ADMIN — DOCUSATE SODIUM 100 MG: 100 CAPSULE, LIQUID FILLED ORAL at 22:00

## 2018-12-02 RX ADMIN — ATORVASTATIN CALCIUM 40 MG: 40 TABLET, FILM COATED ORAL at 21:58

## 2018-12-02 RX ADMIN — CLOPIDOGREL BISULFATE 75 MG: 75 TABLET ORAL at 08:41

## 2018-12-02 RX ADMIN — METFORMIN HYDROCHLORIDE 500 MG: 500 TABLET ORAL at 08:42

## 2018-12-02 RX ADMIN — METFORMIN HYDROCHLORIDE 500 MG: 500 TABLET ORAL at 17:23

## 2018-12-02 RX ADMIN — FUROSEMIDE 80 MG: 40 TABLET ORAL at 08:41

## 2018-12-02 NOTE — PROGRESS NOTES
Progress Note( Dr. Flori Rueda)  11/22/2018  Subjective:   Admit Date: 11/20/2018  PCP: Viet Kebede MD    Admitted For :Right arm weakness and slurred speech     Consulted For: Better control of DM     Interval History: was transferred to ARU  night of 11/20/2018  Had an episode of Hypotension./ Cardiology was re consulted . Reduced and altered dose and timing of Lisinopril and  Toporal .       Denies any chest pains,   Denies SOB . Denies nausea or vomiting. GI diarrhea as noted above  No new bowel or bladder symptoms. Intake/Output Summary (Last 24 hours) at 12/02/18 0650  Last data filed at 12/01/18 1901   Gross per 24 hour   Intake              960 ml   Output                0 ml   Net              960 ml       DATA    CBC:   Recent Labs      11/30/18   1100   WBC  10.2   HGB  14.5   PLT  244    CMP:  Recent Labs      11/30/18   1100   NA  133*   K  3.2*   CL  88*   CO2  30   BUN  52*   CREATININE  1.4*   CALCIUM  9.8     Lipids:   Lab Results   Component Value Date    CHOL 161 11/19/2018    HDL 40 11/19/2018    TRIG 100 11/19/2018     Glucose:  Recent Labs      12/01/18   1711  12/01/18   1850  12/01/18   2111   POCGLU  104*  201*  188*     [id number]:  Lab Results   Component Value Date    LABA1C 10.2 09/25/2018     High Sensitivity TSH:   Lab Results   Component Value Date    TSHHS 2.090 09/25/2018     Free T3: No results found for: FT3  Free T4:No results found for: T4FREE    Ct Head Wo Contrast    Result Date: 11/19/2018  EXAMINATION: CT OF THE HEAD WITHOUT CONTRAST,  11/18/2018 5:54 pm TECHNIQUE: . No acute intracranial abnormality. Findings were discussed with Dr. Bryan Gifford at 6:05pm on 11/18/2018. Xr Chest Portable    Result Date: 11/18/2018  EXAMINATION: SINGLE XRAY VIEW OF THE CHEST 11/18/2018 6:02 pm COMPARISON: Chest radiograph 09/25/2018.  HISTORY: ORDERING SYSTEM PROVIDED HISTORY: CVA TECHNOLOGIST PROVIDED HISTORY: Reason for exam:->CVA Ordering Physician Provided Reason for Exam:

## 2018-12-02 NOTE — PROGRESS NOTES
body mass index of 45.0-49.9 in [facility]) 11/20/2018    Weakness of right arm 11/18/2018    History of cardiac cath 10/08/2018    New onset atrial fibrillation ([facility] 09/25/2018    Type 2 diabetes mellitus with hyperglycemia, with long-term current use of insulin [facility] 09/25/2018    Class 3 severe obesity due to excess calories with body mass index (BMI) of 45.0 to 49.9 in [facility]) 09/25/2018       I have seen ,spoken to  and examined this patient personally, independently of the Physician assistant . I have reviewed the hospital care given to date and reviewed all pertinent labs and imaging. The plan was developed mutually at the time of the visit with the patient,  PA  and myself. I have spoken with patient, nursing staff and provided written and verbal instructions . The above note has been reviewed and I agree with the assessment, diagnosis, and treatment plan with changes made by me as follows     CARDIOLOGY ATTENDING ADDENDUM    HPI:  I have reviewed the above HPI  And agree with above   Luis Alberto Dinh is a 62 y. o.year old who and presents with had no chief complaint listed for this encounter. No chief complaint on file. Interval history:  No change feeling better     Physical Exam:  General:  Awake, alert, NAD  Head:normal  Eye:normal  Neck:  No JVD   Chest:  Clear to auscultation, respiration easy  Cardiovascular:  RRR S1S2  Abdomen:   nontender  Extremities:  3+ edema  Pulses; palpable  Neuro: grossly normal    Follow up in office on discharge  Need fluid restriction  Will check for venous insuffiencey as outpatient     MEDICAL DECISION MAKING;    I agree with the above plan, which was planned by myself and discussed with PA. Arcadio Ojeda Electronically signed by Rualito Plascencia MD [facility] on 12/3/2018 at 5:39 PM       Raulito Plascencia MD 12/2/2018 10:05 AM

## 2018-12-03 LAB
GLUCOSE BLD-MCNC: 171 MG/DL (ref 70–99)
GLUCOSE BLD-MCNC: 179 MG/DL (ref 70–99)
GLUCOSE BLD-MCNC: 218 MG/DL (ref 70–99)
GLUCOSE BLD-MCNC: 223 MG/DL (ref 70–99)
GLUCOSE BLD-MCNC: 70 MG/DL (ref 70–99)

## 2018-12-03 PROCEDURE — 6370000000 HC RX 637 (ALT 250 FOR IP): Performed by: PHYSICAL MEDICINE & REHABILITATION

## 2018-12-03 PROCEDURE — 99232 SBSQ HOSP IP/OBS MODERATE 35: CPT | Performed by: PHYSICAL MEDICINE & REHABILITATION

## 2018-12-03 PROCEDURE — 6370000000 HC RX 637 (ALT 250 FOR IP): Performed by: INTERNAL MEDICINE

## 2018-12-03 PROCEDURE — 97116 GAIT TRAINING THERAPY: CPT

## 2018-12-03 PROCEDURE — 1280000000 HC REHAB R&B

## 2018-12-03 PROCEDURE — 97530 THERAPEUTIC ACTIVITIES: CPT

## 2018-12-03 PROCEDURE — 82962 GLUCOSE BLOOD TEST: CPT

## 2018-12-03 PROCEDURE — 97150 GROUP THERAPEUTIC PROCEDURES: CPT

## 2018-12-03 PROCEDURE — 97535 SELF CARE MNGMENT TRAINING: CPT

## 2018-12-03 PROCEDURE — 94761 N-INVAS EAR/PLS OXIMETRY MLT: CPT

## 2018-12-03 PROCEDURE — 97110 THERAPEUTIC EXERCISES: CPT

## 2018-12-03 PROCEDURE — 94150 VITAL CAPACITY TEST: CPT

## 2018-12-03 RX ORDER — RANOLAZINE 500 MG/1
500 TABLET, EXTENDED RELEASE ORAL 2 TIMES DAILY
Qty: 60 TABLET | Refills: 0 | Status: SHIPPED | OUTPATIENT
Start: 2018-12-03

## 2018-12-03 RX ORDER — POLYETHYLENE GLYCOL 3350 17 G/17G
17 POWDER, FOR SOLUTION ORAL DAILY PRN
Qty: 527 G | Refills: 1 | COMMUNITY
Start: 2018-12-03 | End: 2019-01-02

## 2018-12-03 RX ORDER — RANOLAZINE 500 MG/1
500 TABLET, EXTENDED RELEASE ORAL 2 TIMES DAILY
Status: DISCONTINUED | OUTPATIENT
Start: 2018-12-03 | End: 2018-12-04 | Stop reason: HOSPADM

## 2018-12-03 RX ORDER — DOCUSATE SODIUM 100 MG/1
100 CAPSULE, LIQUID FILLED ORAL 2 TIMES DAILY PRN
Status: DISCONTINUED | OUTPATIENT
Start: 2018-12-03 | End: 2018-12-04 | Stop reason: HOSPADM

## 2018-12-03 RX ORDER — PSEUDOEPHEDRINE HCL 30 MG
100 TABLET ORAL 2 TIMES DAILY PRN
COMMUNITY
Start: 2018-12-03 | End: 2019-10-10

## 2018-12-03 RX ORDER — SENNA PLUS 8.6 MG/1
1 TABLET ORAL NIGHTLY PRN
Status: DISCONTINUED | OUTPATIENT
Start: 2018-12-03 | End: 2018-12-04 | Stop reason: HOSPADM

## 2018-12-03 RX ORDER — POTASSIUM CHLORIDE 20 MEQ/1
20 TABLET, EXTENDED RELEASE ORAL
Qty: 30 TABLET | Refills: 0 | Status: SHIPPED | OUTPATIENT
Start: 2018-12-04 | End: 2021-05-25 | Stop reason: CLARIF

## 2018-12-03 RX ORDER — LISINOPRIL 5 MG/1
5 TABLET ORAL
Qty: 30 TABLET | Refills: 0 | Status: SHIPPED | OUTPATIENT
Start: 2018-12-04 | End: 2018-12-04 | Stop reason: HOSPADM

## 2018-12-03 RX ADMIN — METOPROLOL SUCCINATE 50 MG: 50 TABLET, EXTENDED RELEASE ORAL at 17:48

## 2018-12-03 RX ADMIN — METFORMIN HYDROCHLORIDE 500 MG: 500 TABLET ORAL at 17:48

## 2018-12-03 RX ADMIN — ATORVASTATIN CALCIUM 40 MG: 40 TABLET, FILM COATED ORAL at 21:51

## 2018-12-03 RX ADMIN — CLOPIDOGREL BISULFATE 75 MG: 75 TABLET ORAL at 09:01

## 2018-12-03 RX ADMIN — DOCUSATE SODIUM 100 MG: 100 CAPSULE, LIQUID FILLED ORAL at 09:01

## 2018-12-03 RX ADMIN — RANOLAZINE 500 MG: 500 TABLET, FILM COATED, EXTENDED RELEASE ORAL at 09:02

## 2018-12-03 RX ADMIN — Medication 1 TABLET: at 09:01

## 2018-12-03 RX ADMIN — INSULIN HUMAN 25 UNITS: 500 INJECTION, SOLUTION SUBCUTANEOUS at 08:09

## 2018-12-03 RX ADMIN — LISINOPRIL 5 MG: 5 TABLET ORAL at 05:17

## 2018-12-03 RX ADMIN — MAGNESIUM HYDROXIDE 30 ML: 400 SUSPENSION ORAL at 21:51

## 2018-12-03 RX ADMIN — RIVAROXABAN 20 MG: 20 TABLET, FILM COATED ORAL at 17:48

## 2018-12-03 RX ADMIN — METFORMIN HYDROCHLORIDE 500 MG: 500 TABLET ORAL at 09:02

## 2018-12-03 RX ADMIN — FUROSEMIDE 80 MG: 40 TABLET ORAL at 09:02

## 2018-12-03 RX ADMIN — INSULIN HUMAN 25 UNITS: 500 INJECTION, SOLUTION SUBCUTANEOUS at 12:20

## 2018-12-03 RX ADMIN — RANOLAZINE 500 MG: 500 TABLET, FILM COATED, EXTENDED RELEASE ORAL at 21:51

## 2018-12-03 RX ADMIN — POTASSIUM CHLORIDE 20 MEQ: 20 TABLET, EXTENDED RELEASE ORAL at 09:01

## 2018-12-03 ASSESSMENT — PAIN SCALES - GENERAL: PAINLEVEL_OUTOF10: 10

## 2018-12-03 NOTE — PROGRESS NOTES
AE PRN. Short term goal 3: Pt will complete functional transfers (bed, chair, shower, toilet) c DME PRN and SBA. Short term goal 4: Pt will perform therex/therax to facilitate increased strength/endurance/ax tolerance (c emphasis on dynamic standing balance/tolerance >6 mins and RUE neuro re-ed) c CGA.:  Long term goals  Time Frame for Long term goals : 10-14 days or until d/c. Long term goal 1: Pt will complete toileting mod I. MET 12/3  Long term goal 2: Pt will complete UB dressing I; LB dressing mod I using AE PRN. MET 12/3  Long term goal 3: Pt will complete total body bathing mod I using AE PRN. MET 12/3  Long term goal 4: Pt will complete grooming tasks I. MET 12/3  Long term goal 5: Pt will complete functional transfers (bed, chair, shower, toilet) c DME PRN and mod I. MET 12/3  Long term goals 6: Pt will perform therex/therax to facilitate increased strength/endurance/ax tolerance (c emphasis on dynamic standing balance/tolerance >12 mins and RUE neuro re-ed) c SBA. MET 12/3  Long term goal 7: Pt will complete simple homemaking tasks c DME PRN and mod I.: MET 12/3          Plan of Care                                                                              Times per week: 5 days per week for a minimum of 60 minutes/day plus group as appropriate for 60 minutes.   Treatment to include Plan  Times per day: Daily  Current Treatment Recommendations: Strengthening, Balance Training, Functional Mobility Training, Endurance Training, Neuromuscular Re-education, Safety Education & Training, Patient/Caregiver Education & Training, Equipment Evaluation, Education, & procurement, Self-Care / ADL, Home Management Training    Electronically signed by   Sherry Huitron. mindi Dahl  12/3/2018, 8:19 AM

## 2018-12-03 NOTE — FLOWSHEET NOTE
Independent    Transfers:    Sit--> Stand: Mod I  Stand --> Sit:   Mod I  Stand-Pivot: Mod I  Car Transfers: Mod I   Assistive device required for transfer:   RW    Gait:    Distance:  415' ( max potential) + 76' (not max potential)  Assistance:  SBA  Device:  RW  Gait Quality:  Pt had a step through pattern w/ a slow fortunato    Stairs   # Completed:  12 steps  Assistance:    CGA  Supportive Device:  Bilateral rails  Height:   6\"    Curb       Assistance:   CGA   Supportive Device:  RW  Height:   6\"    Uneven Surfaces:       Assistance:   SBA-CGA  Device:   RW  Surfaces Completed:   [x]  Green mat with bean bags beneath      []  Throw rugs             []  Outdoor pavements      []  Grass          []  Loose gravel        []  Other:    Pt had 1 LOB requiring CGA, pt self-correct. Additional Therapeutic activities/exercises completed this date:     [x]   Nu-step:  Time: 10 min        Level: 4    (for strengthening and endurance)          []   Rebounder:    []  Seated     []  Standing        []   Balance training         []   Postural training    []   Supine ther ex (reps/sets):     []   Seated ther ex (reps/sets):     []   Standing ther ex (reps/sets):     [x]   Picked up object from floor     Assist Level: Mod I                    [x]   Reacher used   []   Other:   []   Other:   []   Other:    Patient/Caregiver Education and Training:   [x]   Bed Mobility/Transfer technique/safety  [x]   Gait technique/sequencing  [x]   Proper use of assistive device  [x]   Advanced mobility safety and technique  []   Reinforced patient's precautions/mobility while maintaining precautions  []   Postural awareness  []   Family training  [x]   Progress was updated in [facility] this date.     Treatment Plan for Next Session: Pt to discharge from [facility] tomorrow 12/4/18      Assessment:    Treatment/Activity Tolerance:   [x] Tolerated treatment with no adverse effects    [] Patient limited by fatigue  [] Patient limited by Short term goals: 10 tx days or until discharge:   Short term goal 1: Pt will consistently complete bed mobility and sup<->sit Ind. Short term goal 2: Pt will complete OOB transfers Mod Ind. Short term goal 3: Pt will ambulate >/=150 ft using LRAD Mod Ind. Short term goal 4: Pt will ascend/descend 1 flight of stairs with 1 rail Mod Ind following step-to pattern. Short term goal 5: Pt will ascend/descend curb step and ambulte over uneven surfaces using LRAD Mod Ind. :   :        Plan of Care                                                                              Times per week: 5 days per week for a minimum of 60 minutes/day plus group as appropriate for 60 minutes.   Treatment to include Current Treatment Recommendations: Strengthening, Balance Training, Transfer Training, Endurance Training, Gait Training, Neuromuscular Re-education, Home Exercise Program, Patient/Caregiver Education & Training, Equipment Evaluation, Education, & procurement, ROM, Functional Mobility Training, Stair training, Safety Education & Training    Electronically signed by   ROMMEL Ribera  12/3/2018, 2:57 PM  I have read and agree with the documentation above:  Alma Dubois, [id number]

## 2018-12-03 NOTE — PROGRESS NOTES
exam:->CVA Ordering Physician Provided Reason for Exam: cva FINDINGS: The cardiac silhouette is enlarged without significant change. Remaining mediastinal contours are unremarkable. Insert chest no acute osseous abnormality. No acute process. Cta Neck W Contrast    Result Date: 11/18/2018  EXAMINATION: CTA OF THE NECK; No high-grade stenosis or focal occlusion involving the intracranial vasculature or cervical vasculature. No evidence of acute dissection. No evidence of aneurysm. Cta Head W Contrast    Result Date: 11/18/2018  EXAMINATION: CTA OF THE NECK;  reduce the radiation dose to as low as reasonably achievable. COMPARISON: None. HISTORY: ORDERING SYSTEM PROVIDED HISTORY: right sided weakness, dizziness     No high-grade stenosis or focal occlusion involving the intracranial vasculature or cervical vasculature. No evidence of acute dissection. No evidence of aneurysm. Mri Brain Without Contrast    Result Date: 11/19/2018  EXAMINATION: MRI OF THE BRAIN WITHOUT CONTRAST  11/19/2018 3:54 pm TECHNIQUE: Multiplanar multisequence MRI of the brain was performed without the administration of intravenous contrast. COMPARISON: CT brain and CT angiogram brain 11/18/2018 HISTORY: ORDERING SYSTEM PROVIDED HISTORY: SPEECH CHANGES (OR APHASIA),    Acute 7 x 6 mm infarct within the left thalamus. No acute intracranial hemorrhage. The findings were sent to the Radiology Results [address] at 4:04 pm on 11/19/2018to be communicated to a licensed caregiver.        Scheduled Medicines   Medications:    metoprolol succinate  50 mg Oral QPM    lisinopril  5 mg Oral QAM AC    potassium chloride  20 mEq Oral Daily with breakfast    insulin regular human  25 Units Subcutaneous TID AC    metFORMIN  500 mg Oral BID WC    senna  1 tablet Oral BID    docusate sodium  100 mg Oral BID    insulin regular human  0-20 Units Subcutaneous 4x Daily AC & HS    atorvastatin  40 mg Oral Nightly   

## 2018-12-04 VITALS
HEIGHT: 72 IN | DIASTOLIC BLOOD PRESSURE: 68 MMHG | TEMPERATURE: 98.7 F | HEART RATE: 78 BPM | OXYGEN SATURATION: 94 % | RESPIRATION RATE: 16 BRPM | SYSTOLIC BLOOD PRESSURE: 122 MMHG | BODY MASS INDEX: 42.66 KG/M2 | WEIGHT: 315 LBS

## 2018-12-04 LAB
ANION GAP SERPL CALCULATED.3IONS-SCNC: 14 MMOL/L (ref 4–16)
BUN BLDV-MCNC: 47 MG/DL (ref 6–23)
CALCIUM SERPL-MCNC: 9.1 MG/DL (ref 8.3–10.6)
CHLORIDE BLD-SCNC: 89 MMOL/L (ref 99–110)
CO2: 28 MMOL/L (ref 21–32)
CREAT SERPL-MCNC: 1.2 MG/DL (ref 0.9–1.3)
GFR AFRICAN AMERICAN: >60 ML/MIN/1.73M2
GFR NON-AFRICAN AMERICAN: >60 ML/MIN/1.73M2
GLUCOSE BLD-MCNC: 265 MG/DL (ref 70–99)
GLUCOSE BLD-MCNC: 293 MG/DL (ref 70–99)
GLUCOSE BLD-MCNC: 302 MG/DL (ref 70–99)
POTASSIUM SERPL-SCNC: 4.2 MMOL/L (ref 3.5–5.1)
SODIUM BLD-SCNC: 131 MMOL/L (ref 135–145)

## 2018-12-04 PROCEDURE — 6370000000 HC RX 637 (ALT 250 FOR IP): Performed by: INTERNAL MEDICINE

## 2018-12-04 PROCEDURE — 6370000000 HC RX 637 (ALT 250 FOR IP): Performed by: PHYSICAL MEDICINE & REHABILITATION

## 2018-12-04 PROCEDURE — 36415 COLL VENOUS BLD VENIPUNCTURE: CPT

## 2018-12-04 PROCEDURE — 94150 VITAL CAPACITY TEST: CPT

## 2018-12-04 PROCEDURE — 82962 GLUCOSE BLOOD TEST: CPT

## 2018-12-04 PROCEDURE — 99239 HOSP IP/OBS DSCHRG MGMT >30: CPT | Performed by: PHYSICAL MEDICINE & REHABILITATION

## 2018-12-04 PROCEDURE — 80048 BASIC METABOLIC PNL TOTAL CA: CPT

## 2018-12-04 PROCEDURE — 94761 N-INVAS EAR/PLS OXIMETRY MLT: CPT

## 2018-12-04 RX ORDER — B12/LEVOMEFOLATE CALCIUM/B-6 2-1.13-25
1 TABLET ORAL DAILY
Qty: 30 TABLET | Refills: 0 | Status: SHIPPED | OUTPATIENT
Start: 2018-12-04

## 2018-12-04 RX ORDER — CLOPIDOGREL BISULFATE 75 MG/1
75 TABLET ORAL DAILY
Qty: 30 TABLET | Refills: 0 | Status: SHIPPED | OUTPATIENT
Start: 2018-12-04

## 2018-12-04 RX ORDER — METOPROLOL SUCCINATE 50 MG/1
50 TABLET, EXTENDED RELEASE ORAL DAILY
Qty: 30 TABLET | Refills: 0 | Status: SHIPPED | OUTPATIENT
Start: 2018-12-04

## 2018-12-04 RX ADMIN — Medication 1 TABLET: at 08:11

## 2018-12-04 RX ADMIN — METFORMIN HYDROCHLORIDE 500 MG: 500 TABLET ORAL at 08:11

## 2018-12-04 RX ADMIN — POLYETHYLENE GLYCOL (3350) 17 G: 17 POWDER, FOR SOLUTION ORAL at 08:11

## 2018-12-04 RX ADMIN — CLOPIDOGREL BISULFATE 75 MG: 75 TABLET ORAL at 08:11

## 2018-12-04 RX ADMIN — DOCUSATE SODIUM 100 MG: 100 CAPSULE, LIQUID FILLED ORAL at 08:11

## 2018-12-04 RX ADMIN — INSULIN HUMAN 25 UNITS: 500 INJECTION, SOLUTION SUBCUTANEOUS at 08:05

## 2018-12-04 RX ADMIN — FUROSEMIDE 80 MG: 40 TABLET ORAL at 08:11

## 2018-12-04 RX ADMIN — LISINOPRIL 5 MG: 5 TABLET ORAL at 06:12

## 2018-12-04 RX ADMIN — RANOLAZINE 500 MG: 500 TABLET, FILM COATED, EXTENDED RELEASE ORAL at 08:11

## 2018-12-04 RX ADMIN — POTASSIUM CHLORIDE 20 MEQ: 20 TABLET, EXTENDED RELEASE ORAL at 08:11

## 2018-12-04 ASSESSMENT — PAIN SCALES - GENERAL: PAINLEVEL_OUTOF10: 0

## 2018-12-04 NOTE — PROGRESS NOTES
The cardiac silhouette is enlarged without significant change. Remaining mediastinal contours are unremarkable. Insert chest no acute osseous abnormality. No acute process. Cta Neck W Contrast    Result Date: 11/18/2018  EXAMINATION: CTA OF THE NECK; No high-grade stenosis or focal occlusion involving the intracranial vasculature or cervical vasculature. No evidence of acute dissection. No evidence of aneurysm. Cta Head W Contrast    Result Date: 11/18/2018  EXAMINATION: CTA OF THE NECK;  reduce the radiation dose to as low as reasonably achievable. COMPARISON: None. HISTORY: ORDERING SYSTEM PROVIDED HISTORY: right sided weakness, dizziness     No high-grade stenosis or focal occlusion involving the intracranial vasculature or cervical vasculature. No evidence of acute dissection. No evidence of aneurysm. Mri Brain Without Contrast    Result Date: 11/19/2018  EXAMINATION: MRI OF THE BRAIN WITHOUT CONTRAST  11/19/2018 3:54 pm TECHNIQUE: Multiplanar multisequence MRI of the brain was performed without the administration of intravenous contrast. COMPARISON: CT brain and CT angiogram brain 11/18/2018 HISTORY: ORDERING SYSTEM PROVIDED HISTORY: SPEECH CHANGES (OR APHASIA),    Acute 7 x 6 mm infarct within the left thalamus. No acute intracranial hemorrhage. The findings were sent to the Radiology Results [address] at 4:04 pm on 11/19/2018to be communicated to a licensed caregiver.        Scheduled Medicines   Medications:    ranolazine  500 mg Oral BID    metoprolol succinate  50 mg Oral QPM    lisinopril  5 mg Oral QAM AC    potassium chloride  20 mEq Oral Daily with breakfast    insulin regular human  25 Units Subcutaneous TID AC    metFORMIN  500 mg Oral BID WC    insulin regular human  0-20 Units Subcutaneous 4x Daily AC & HS    atorvastatin  40 mg Oral Nightly    rivaroxaban  20 mg Oral Dinner    clopidogrel  75 mg Oral Daily    folic acid-pyridoxine-cyancobalamin  1 tablet Oral Daily    furosemide  80 mg Oral Daily      Infusions:         Objective:   Vitals: /67   Pulse 78   Temp 98.7 °F (37.1 °C)   Resp 16   Ht 6' (1.829 m)   Wt (!) 329 lb (149.2 kg)   SpO2 94%   BMI 44.62 kg/m²   General appearance: alert and cooperative with exam  Neck: no JVD or bruit  Thyroid : Normal lobes   Lungs: Has Vesicular Breath sounds   Heart:  Atrial Fib . Abdomen: soft, non-tender; bowel sounds normal; no masses,  no organomegaly  Musculoskeletal: Normal  Extremities: extremities normal, , has edema  Neurologic:  Awake, alert, oriented to name, place and time. Cranial nerves II-XII are grossly intact. Motor right side weakness better   Sensory right dose paresthesia better   and gait is normal.    Assessment:     Patient Active Problem List:     MARINO on CPAP     New onset atrial fibrillation (HCC)     Type 2 diabetes mellitus with hyperglycemia, with long-term current use of insulin (HCC)     Class 3 severe obesity due to excess calories with body mass index (BMI) of 45.0 to 49.9 in adult [facility])     History of cardiac cath     Weakness of right arm      Plan:     1. Reviewed POC blood glucose . Labs and X ray results   2. Reviewed Current Medicines   3. On meal/ Correction bolus U 500 Regular Insulin  4. Monitor Blood glucose frequently   5. Modified  the dose of Insulin/ other medicines as needed   6. Now in ARU as of 11/20 /2018   7. Will follow   8. Possibly Home today       .      Laura Felix MD

## 2018-12-04 NOTE — PROGRESS NOTES
Daily Progress Note     Pt. Awake alert and feeling well. Heart rate and BP stable  Cr 1.2 today, stable     Recent CVA    Pt. Is stable and improved      Hx of PAFIB    HR is stable     Continue Toprol    Continue Xarelto     LE Edema    Metolazone was stopped due to low BP    Continue Lasix 80 mg    Fluid restriction 1500 ml/day    Will look to see if pt. Has had venous duplex, may consider outpt.     Chest pain, Hx of CAD    Start on Ranexa 500mg BID    Continue medical management    Diabetes Mellitus    Endocrine following     Pt. Is stable from Cardiac stand ok to D/C  Continue all cardiac meds  Pt. May D/c today      Hold ACEI for now  As BP is low  Will adjust as outpatient       holter--IMPRESSION:  1.  Sinus rhythm. 2.  Sinus tachycardia. 3.  Short runs of nonsustained VT.  Longest was 4 beats present.  Isolated PACs also present.  Also bigeminy present.  No atrial fibrillation noted     Summary--TEJ   Left ventricular systolic function is normal.   Ejection fraction is visually estimated at 50-55%.   Thinned and mobile interatrial septum.   Color Doppler is positive for left to right shunt PFO.     The patient has a history of having coronary artery disease present and  did a heart cath on him back in 12/2017, left main was patent, LAD had  mild disease, circ had mild disease, and RCA with 60% stenosis.  PDA had  mild disease, PLB had 70% stenosis and small vessel, which was treated  medically at that time.     PAST MEDICAL HISTORY:  Found to have recent stroke present.  The MRI of  the brain from 11/19/2018 had showed there was an acute 7 x 6 mm infarct  within the left thalamus.  No bleeding was noted, history of moderate  coronary artery disease and small vessel, which was treated medically  and the patient has history of paroxysmal atrial fibrillation noted,  sleep apnea, COPD, obesity present, and diabetic retinopathy present.       Objective:   /68   Pulse 78   Temp 98.7 °F (37.1 °C) Resp 16   Ht 6' (1.829 m)   Wt (!) 329 lb (149.2 kg)   SpO2 94%   BMI 44.62 kg/m²     Intake/Output Summary (Last 24 hours) at 12/04/18 0949  Last data filed at 12/04/18 0147   Gross per 24 hour   Intake              440 ml   Output              700 ml   Net             -260 ml       Medications:   Scheduled Meds:   ranolazine  500 mg Oral BID    metoprolol succinate  50 mg Oral QPM    lisinopril  5 mg Oral QAM AC    potassium chloride  20 mEq Oral Daily with breakfast    insulin regular human  25 Units Subcutaneous TID AC    metFORMIN  500 mg Oral BID WC    insulin regular human  0-20 Units Subcutaneous 4x Daily AC & HS    atorvastatin  40 mg Oral Nightly    rivaroxaban  20 mg Oral Dinner    clopidogrel  75 mg Oral Daily    folic acid-pyridoxine-cyancobalamin  1 tablet Oral Daily    furosemide  80 mg Oral Daily      Infusions:     PRN Meds:  docusate sodium, senna, hydrOXYzine, polyethylene glycol, glucagon (rDNA), glucose, acetaminophen, magnesium hydroxide       Physical Exam:  Vitals:    12/04/18 0814   BP: 122/68   Pulse:    Resp:    Temp:    SpO2:         General: AAO, NAD  Chest: Nontender  Cardiac: First and Second Heart Sounds are Normal, No Murmurs or Gallops noted  Lungs:Clear to auscultation and percussion. Abdomen: Soft, NT, ND, +BS  Extremities: No clubbing, no edema  Vascular:  Equal 2+ peripheral pulses. Lab Data:  CBC: No results for input(s): WBC, HGB, HCT, MCV, PLT in the last 72 hours. BMP: Recent Labs      12/02/18   0606  12/04/18   0649   NA  133*  131*   K  3.8  4.2   CL  90*  89*   CO2  29  28   BUN  48*  47*   CREATININE  1.2  1.2     LIVER PROFILE: No results for input(s): AST, ALT, LIPASE, BILIDIR, BILITOT, ALKPHOS in the last 72 hours. Invalid input(s): AMYLASE,  ALB  PT/INR: No results for input(s): PROTIME, INR in the last 72 hours. APTT: No results for input(s): APTT in the last 72 hours.   BNP:  No results for input(s): BNP in the last 72

## 2018-12-05 ENCOUNTER — HOSPITAL ENCOUNTER (OUTPATIENT)
Dept: OCCUPATIONAL THERAPY | Age: 58
Setting detail: THERAPIES SERIES
Discharge: HOME | End: 2018-12-05
Payer: COMMERCIAL

## 2018-12-05 PROCEDURE — G8985 CARRY GOAL STATUS: HCPCS

## 2018-12-05 PROCEDURE — 97166 OT EVAL MOD COMPLEX 45 MIN: CPT

## 2018-12-05 PROCEDURE — 97530 THERAPEUTIC ACTIVITIES: CPT

## 2018-12-05 PROCEDURE — G8984 CARRY CURRENT STATUS: HCPCS

## 2018-12-05 NOTE — FLOWSHEET NOTE
[x][facility] [address]      [facility]SE[facility] [address]       [address], [address]     [phone] [phone] [phone] [phone] [phone]  ________________________________________________________________________  Occupational Therapy Daily Treatment Note    Date:  2018  Patient Name:  Daniel Buchanan    :  1960  Restrictions/Precautions:  None  Diagnosis:   CVA  Treatment Diagnosis:  lack of coordination  Insurance/Certification information:  Julissa  Referring Physician:   Dr. Galileo Greene of care signed (Y/N):    Visit# / total visits: 1/10  Pain level: 0/10     Subjective:   Prior Level of Function:  Independent with all ADL and I ADL  Patient Goals:     Treatment Flowsheet   Right Left            eval completed              Issued fine motor coord.  Handout and stress ball for strengthening x                                                                                                               Interventions/Modalities used:  [] Therapeutic Exercise   [] Modalities:  [] Therapeutic Activity    [] Ultrasound [] Elec Stimulation   [] Total Motion Release    [] Fluido [] Kinesiotaping  [] Neuromuscular Re-education   [] Ionto [] Coldpack/hotpack   [] Instruction in HEP    Other:    Objective Findings:  See eval    Communication with other providers:  fax'd POC to physician    Education provided to patient: home program issued for hand strengthening and coordination    Adverse Reactions to treatment:    Timed Code Treatment Minutes:  15    Total Treatment Minutes:  45    Treatment/Activity Tolerance:     [x]  Patient tolerated treatment well []  Patient limited by fatique    []  Patient limited by pain []  Patient limited by other medical complications   []  Other:     Goals:    Long term goal 1: pt. will increase R UE gross strength to 5/5 to increase I ADL

## 2018-12-08 ENCOUNTER — HOSPITAL ENCOUNTER (OUTPATIENT)
Dept: PHYSICAL THERAPY | Age: 58
Setting detail: THERAPIES SERIES
Discharge: HOME | End: 2018-12-08
Payer: COMMERCIAL

## 2018-12-08 PROCEDURE — G8979 MOBILITY GOAL STATUS: HCPCS

## 2018-12-08 PROCEDURE — G8978 MOBILITY CURRENT STATUS: HCPCS

## 2018-12-08 PROCEDURE — 97162 PT EVAL MOD COMPLEX 30 MIN: CPT

## 2018-12-08 PROCEDURE — 97112 NEUROMUSCULAR REEDUCATION: CPT

## 2018-12-08 PROCEDURE — 97530 THERAPEUTIC ACTIVITIES: CPT

## 2018-12-08 NOTE — FLOWSHEET NOTE
Physical Therapy Daily Treatment Note  Date:  2018    Patient Name:  Ariadna Anderson    :  1960  MRN: [id number]      Diagnosis: CVA with Hemiparesis  Treatment Diagnosis: CVA with mild incoordination R leg; decreased dynamic stand balance; decreased gait endurance; neuropathy in feet and recent cellulitis. Date of injury/Date of Surgery  PT Insurance Information: A[employer]  Referring Practitioner: Angela Kilpatrick  Referring Practitioner Follow-Up:     POC Signed: pending  POC Date Range:    Progress Note Due:    Visit# / total visits:                    Goals:  Short term goals  Time Frame for Short term goals: 4 wks  Short term goal 1: Ind with HEP with good compliance  Short term goal 2: Gait up to 25 min without use of gait device  Short term goal 3: TUG score 17     Patient goals : I want to be able to go back to work    Summary of Eval:   Neuropathy in feet, cellulitis in legs; s/p CVA with incoordination R side    INITIAL PAIN LEVEL:  /10   SUBJECTIVE:      Any changes to Ambulatory Summary Sheet? Any major status changes?      ACTIVITIES: Date 18  #1 Date Date   Outcomes Measure TUG 21     Stork stands 1-3 seconds, 10 reps each leg, x 1-2 sessions     Standing crossovers in front each way x 1 step   At sink, 2 x 10 reps     Progress balance acts with standing activities        Gait endurance progression off walker                                                                                                                                                                             HEP: As above     Supervision/Cues:       Objectives:      Response to intervention:      Post Tx Pain Rating:      Overall change since Evaluation:      Prognosis:      Plan for Next Session: As above       Plan:  _2_x/week x _4_ weeks    Intervention used today:  [] Therapeutic Exercise    [x] Therapeutic Activity     [] Ultrasound  [] Elec  Stim  [x] Gait Training      [] Cervical Traction [] Lumbar

## 2018-12-08 NOTE — PLAN OF CARE
2WW currently being used     Current functional level (based on )   :  TUG 21      Planned Services:  [] Therapeutic Exercise    [] Aquatics:  [x] Therapeutic Activity    [] Ultrasound  [] Elec Stimulation  [x] Gait Training     [] Cervical Traction [] Lumbar Traction  [x] Neuromuscular Re-education [] Cold/hotpack [] Iontophoresis   [x] Instruction in HEP       [] Manual Therapy     [] vasopneumatic            [] Self care home management        []Dry needling trigger point point/pain management      Plan of Care Date Range:      ? Frequency/Duration:  # Days per week: [] 1 day # Weeks: [] 1 week [] 5 weeks     [x] 2 days? [] 2 weeks [] 6 weeks     [] 3 days   [] 3 weeks [] 7 weeks     [] 4 days   [x] 4 weeks [] 8 weeks    Rehab Potential: [] Excellent [x] Good [] Fair  [] Poor     Goals:  Short term goals  Time Frame for Short term goals: 4 wks  Short term goal 1: Ind with HEP with good compliance  Short term goal 2: Gait up to 25 min without use of gait device  Short term goal 3: TUG score 17       Electronically signed by:  Raza Mcdowell PT, 12/8/2018, 12:34 PM          If you have any questions or concerns, please don't hesitate to call.   Thank you for your referral.    Physician Signature:_________________Date:____________Time: ________  By signing above, therapists plan is approved by physician

## 2018-12-10 ENCOUNTER — HOSPITAL ENCOUNTER (OUTPATIENT)
Dept: PHYSICAL THERAPY | Age: 58
Setting detail: THERAPIES SERIES
Discharge: HOME | End: 2018-12-10
Payer: COMMERCIAL

## 2018-12-10 PROCEDURE — 97112 NEUROMUSCULAR REEDUCATION: CPT

## 2018-12-10 NOTE — FLOWSHEET NOTE
HEP: As above Continue as instructed    Supervision/Cues:   Verbal and tactile cueing.      Objectives:  Requires SBA/CGA to occasional min A with balance activities    Response to intervention:  Reports of min fatigue    Post Tx Pain Rating:  No pain    Overall change since Evaluation:      Prognosis:      Plan for Next Session: As above Progress as tolerated      Plan:  _2_x/week x _4_ weeks    Intervention used today:  [] Therapeutic Exercise    [x] Therapeutic Activity     [] Ultrasound  [] Elec  Stim  [x] Gait Training      [] Cervical Traction [] Lumbar Traction  [] Neuromuscular Re-education    [] Cold/hotpack [] Iontophoresis   [x] Instruction in HEP      [] Vasopneumatic     [] Manual Therapy               [] Self care home management                    (    ) Dry needling    Time In: 1520  Time Out: 2776  Timed Code Treatment Minutes: 45   Total Treatment Minutes:  45    Electronically signed by:  Ro Abebe PTA 12/10/2018, 3:51 PM

## 2018-12-12 ENCOUNTER — HOSPITAL ENCOUNTER (OUTPATIENT)
Dept: OCCUPATIONAL THERAPY | Age: 58
Setting detail: THERAPIES SERIES
Discharge: HOME | End: 2018-12-12
Payer: COMMERCIAL

## 2018-12-12 PROCEDURE — 97530 THERAPEUTIC ACTIVITIES: CPT

## 2018-12-12 PROCEDURE — 97112 NEUROMUSCULAR REEDUCATION: CPT

## 2018-12-12 PROCEDURE — 97110 THERAPEUTIC EXERCISES: CPT

## 2018-12-12 NOTE — FLOWSHEET NOTE
mimi    []  Patient limited by pain []  Patient limited by other medical complications   []  Other:     Goals:    Long term goal 1: pt. will increase R UE gross strength to 5/5 to increase I ADL independence  Long term goal 2: Pt. will increase R  10# to open a bottle.   Long term goal 3: pt. will increase R pinches to turn a key  Long term goal 4: Pt. will increase R fine motor coordination as evidenced by 9 hole peg test.     Patient Requires Follow-up:  [x]  Yes  []  No    Plan: [x]  Continue per plan of care []  Alter current plan (see comments)   []  Plan of care initiated []  Hold pending MD visit []  Discharge    Plan for Next Session:      Electronically signed by:  YRIS Krishna/L, 12/12/2018, 11:38 AM

## 2018-12-19 ENCOUNTER — HOSPITAL ENCOUNTER (OUTPATIENT)
Dept: PHYSICAL THERAPY | Age: 58
Setting detail: THERAPIES SERIES
Discharge: HOME | End: 2018-12-19
Payer: COMMERCIAL

## 2018-12-19 ENCOUNTER — HOSPITAL ENCOUNTER (OUTPATIENT)
Dept: OCCUPATIONAL THERAPY | Age: 58
Setting detail: THERAPIES SERIES
Discharge: HOME | End: 2018-12-19
Payer: COMMERCIAL

## 2018-12-19 PROCEDURE — 97110 THERAPEUTIC EXERCISES: CPT

## 2018-12-19 PROCEDURE — 97112 NEUROMUSCULAR REEDUCATION: CPT

## 2018-12-19 PROCEDURE — 97530 THERAPEUTIC ACTIVITIES: CPT

## 2018-12-19 NOTE — FLOWSHEET NOTE
1: pt. will increase R UE gross strength to 5/5 to increase I ADL independence  Long term goal 2: Pt. will increase R  10# to open a bottle.   Long term goal 3: pt. will increase R pinches to turn a key  Long term goal 4: Pt. will increase R fine motor coordination as evidenced by 9 hole peg test.     Patient Requires Follow-up:  [x]  Yes  []  No    Plan: [x]  Continue per plan of care []  Alter current plan (see comments)   []  Plan of care initiated []  Hold pending MD visit []  Discharge    Plan for Next Session:      Electronically signed by:  YRIS Krishna/L, 12/19/2018, 4:07 PM

## 2018-12-21 ENCOUNTER — HOSPITAL ENCOUNTER (OUTPATIENT)
Dept: OCCUPATIONAL THERAPY | Age: 58
Setting detail: THERAPIES SERIES
Discharge: HOME | End: 2018-12-21
Payer: COMMERCIAL

## 2018-12-21 ENCOUNTER — HOSPITAL ENCOUNTER (OUTPATIENT)
Dept: PHYSICAL THERAPY | Age: 58
Setting detail: THERAPIES SERIES
Discharge: HOME | End: 2018-12-21
Payer: COMMERCIAL

## 2018-12-21 PROCEDURE — 97110 THERAPEUTIC EXERCISES: CPT

## 2018-12-21 PROCEDURE — 97112 NEUROMUSCULAR REEDUCATION: CPT

## 2018-12-21 PROCEDURE — 97530 THERAPEUTIC ACTIVITIES: CPT

## 2018-12-26 ENCOUNTER — HOSPITAL ENCOUNTER (OUTPATIENT)
Dept: PHYSICAL THERAPY | Age: 58
Setting detail: THERAPIES SERIES
Discharge: HOME | End: 2018-12-26
Payer: COMMERCIAL

## 2018-12-26 ENCOUNTER — HOSPITAL ENCOUNTER (OUTPATIENT)
Dept: OCCUPATIONAL THERAPY | Age: 58
Setting detail: THERAPIES SERIES
Discharge: HOME | End: 2018-12-26
Payer: COMMERCIAL

## 2018-12-26 PROCEDURE — 97530 THERAPEUTIC ACTIVITIES: CPT

## 2018-12-26 PROCEDURE — 97110 THERAPEUTIC EXERCISES: CPT

## 2018-12-26 PROCEDURE — 97112 NEUROMUSCULAR REEDUCATION: CPT

## 2018-12-28 ENCOUNTER — HOSPITAL ENCOUNTER (OUTPATIENT)
Dept: OCCUPATIONAL THERAPY | Age: 58
Setting detail: THERAPIES SERIES
Discharge: HOME | End: 2018-12-28
Payer: COMMERCIAL

## 2018-12-28 ENCOUNTER — HOSPITAL ENCOUNTER (OUTPATIENT)
Dept: PHYSICAL THERAPY | Age: 58
Setting detail: THERAPIES SERIES
Discharge: HOME | End: 2018-12-28
Payer: COMMERCIAL

## 2018-12-28 PROCEDURE — 97530 THERAPEUTIC ACTIVITIES: CPT

## 2018-12-28 PROCEDURE — 97112 NEUROMUSCULAR REEDUCATION: CPT

## 2018-12-28 PROCEDURE — 97110 THERAPEUTIC EXERCISES: CPT

## 2019-01-02 ENCOUNTER — HOSPITAL ENCOUNTER (OUTPATIENT)
Dept: OCCUPATIONAL THERAPY | Age: 59
Setting detail: THERAPIES SERIES
Discharge: HOME | End: 2019-01-02
Payer: COMMERCIAL

## 2019-01-02 ENCOUNTER — HOSPITAL ENCOUNTER (OUTPATIENT)
Dept: PHYSICAL THERAPY | Age: 59
Setting detail: THERAPIES SERIES
Discharge: HOME | End: 2019-01-02
Payer: COMMERCIAL

## 2019-01-02 PROCEDURE — 97530 THERAPEUTIC ACTIVITIES: CPT

## 2019-01-02 PROCEDURE — 97112 NEUROMUSCULAR REEDUCATION: CPT

## 2019-01-02 PROCEDURE — 97110 THERAPEUTIC EXERCISES: CPT

## 2019-01-04 ENCOUNTER — HOSPITAL ENCOUNTER (OUTPATIENT)
Dept: PHYSICAL THERAPY | Age: 59
Setting detail: THERAPIES SERIES
Discharge: HOME | End: 2019-01-04
Payer: COMMERCIAL

## 2019-01-04 ENCOUNTER — HOSPITAL ENCOUNTER (OUTPATIENT)
Dept: OCCUPATIONAL THERAPY | Age: 59
Setting detail: THERAPIES SERIES
Discharge: HOME | End: 2019-01-04
Payer: COMMERCIAL

## 2019-01-04 PROCEDURE — 97112 NEUROMUSCULAR REEDUCATION: CPT

## 2019-01-04 PROCEDURE — 97110 THERAPEUTIC EXERCISES: CPT

## 2019-01-04 PROCEDURE — 97530 THERAPEUTIC ACTIVITIES: CPT

## 2019-01-09 ENCOUNTER — HOSPITAL ENCOUNTER (OUTPATIENT)
Dept: OCCUPATIONAL THERAPY | Age: 59
Setting detail: THERAPIES SERIES
Discharge: HOME | End: 2019-01-09
Payer: COMMERCIAL

## 2019-01-09 ENCOUNTER — HOSPITAL ENCOUNTER (OUTPATIENT)
Dept: PHYSICAL THERAPY | Age: 59
Setting detail: THERAPIES SERIES
Discharge: HOME | End: 2019-01-09
Payer: COMMERCIAL

## 2019-01-09 PROCEDURE — 97110 THERAPEUTIC EXERCISES: CPT

## 2019-01-09 PROCEDURE — 97112 NEUROMUSCULAR REEDUCATION: CPT

## 2019-01-09 PROCEDURE — 97116 GAIT TRAINING THERAPY: CPT

## 2019-01-09 PROCEDURE — 97530 THERAPEUTIC ACTIVITIES: CPT

## 2019-01-11 ENCOUNTER — HOSPITAL ENCOUNTER (OUTPATIENT)
Dept: OCCUPATIONAL THERAPY | Age: 59
Setting detail: THERAPIES SERIES
Discharge: HOME | End: 2019-01-11
Payer: COMMERCIAL

## 2019-01-11 ENCOUNTER — HOSPITAL ENCOUNTER (OUTPATIENT)
Dept: PHYSICAL THERAPY | Age: 59
Setting detail: THERAPIES SERIES
Discharge: HOME | End: 2019-01-11
Payer: COMMERCIAL

## 2019-01-11 PROCEDURE — G8979 MOBILITY GOAL STATUS: HCPCS

## 2019-01-11 PROCEDURE — 97110 THERAPEUTIC EXERCISES: CPT

## 2019-01-11 PROCEDURE — 97112 NEUROMUSCULAR REEDUCATION: CPT

## 2019-01-11 PROCEDURE — 97530 THERAPEUTIC ACTIVITIES: CPT

## 2019-01-11 PROCEDURE — G8978 MOBILITY CURRENT STATUS: HCPCS

## 2019-01-16 ENCOUNTER — HOSPITAL ENCOUNTER (OUTPATIENT)
Dept: OCCUPATIONAL THERAPY | Age: 59
Setting detail: THERAPIES SERIES
Discharge: HOME | End: 2019-01-16
Payer: COMMERCIAL

## 2019-01-16 ENCOUNTER — HOSPITAL ENCOUNTER (OUTPATIENT)
Dept: PHYSICAL THERAPY | Age: 59
Setting detail: THERAPIES SERIES
Discharge: HOME | End: 2019-01-16
Payer: COMMERCIAL

## 2019-01-16 PROCEDURE — 97530 THERAPEUTIC ACTIVITIES: CPT

## 2019-01-16 PROCEDURE — 97112 NEUROMUSCULAR REEDUCATION: CPT

## 2019-01-16 PROCEDURE — 97110 THERAPEUTIC EXERCISES: CPT

## 2019-01-18 ENCOUNTER — HOSPITAL ENCOUNTER (OUTPATIENT)
Dept: PHYSICAL THERAPY | Age: 59
Setting detail: THERAPIES SERIES
Discharge: HOME | End: 2019-01-18
Payer: COMMERCIAL

## 2019-01-18 ENCOUNTER — HOSPITAL ENCOUNTER (OUTPATIENT)
Dept: OCCUPATIONAL THERAPY | Age: 59
Setting detail: THERAPIES SERIES
Discharge: HOME | End: 2019-01-18
Payer: COMMERCIAL

## 2019-01-18 PROCEDURE — 97110 THERAPEUTIC EXERCISES: CPT

## 2019-01-18 PROCEDURE — 97116 GAIT TRAINING THERAPY: CPT

## 2019-01-18 PROCEDURE — 97112 NEUROMUSCULAR REEDUCATION: CPT

## 2019-01-18 PROCEDURE — 97530 THERAPEUTIC ACTIVITIES: CPT

## 2019-01-23 ENCOUNTER — HOSPITAL ENCOUNTER (OUTPATIENT)
Dept: OCCUPATIONAL THERAPY | Age: 59
Setting detail: THERAPIES SERIES
Discharge: HOME | End: 2019-01-23
Payer: COMMERCIAL

## 2019-01-23 ENCOUNTER — HOSPITAL ENCOUNTER (OUTPATIENT)
Dept: PHYSICAL THERAPY | Age: 59
Setting detail: THERAPIES SERIES
Discharge: HOME | End: 2019-01-23
Payer: COMMERCIAL

## 2019-01-23 PROCEDURE — 97530 THERAPEUTIC ACTIVITIES: CPT

## 2019-01-23 PROCEDURE — 97112 NEUROMUSCULAR REEDUCATION: CPT

## 2019-01-23 PROCEDURE — 97110 THERAPEUTIC EXERCISES: CPT

## 2019-01-25 ENCOUNTER — HOSPITAL ENCOUNTER (OUTPATIENT)
Dept: PHYSICAL THERAPY | Age: 59
Setting detail: THERAPIES SERIES
Discharge: HOME | End: 2019-01-25
Payer: COMMERCIAL

## 2019-01-25 ENCOUNTER — HOSPITAL ENCOUNTER (OUTPATIENT)
Dept: OCCUPATIONAL THERAPY | Age: 59
Setting detail: THERAPIES SERIES
Discharge: HOME | End: 2019-01-25
Payer: COMMERCIAL

## 2019-01-25 PROCEDURE — 97116 GAIT TRAINING THERAPY: CPT

## 2019-01-25 PROCEDURE — 97110 THERAPEUTIC EXERCISES: CPT

## 2019-01-25 PROCEDURE — 97112 NEUROMUSCULAR REEDUCATION: CPT

## 2019-01-25 PROCEDURE — 97530 THERAPEUTIC ACTIVITIES: CPT

## 2019-01-30 ENCOUNTER — HOSPITAL ENCOUNTER (OUTPATIENT)
Dept: PHYSICAL THERAPY | Age: 59
Setting detail: THERAPIES SERIES
Discharge: HOME | End: 2019-01-30
Payer: COMMERCIAL

## 2019-01-30 PROCEDURE — 97112 NEUROMUSCULAR REEDUCATION: CPT

## 2019-02-01 ENCOUNTER — HOSPITAL ENCOUNTER (OUTPATIENT)
Dept: PHYSICAL THERAPY | Age: 59
Setting detail: THERAPIES SERIES
Discharge: HOME | End: 2019-02-01
Payer: COMMERCIAL

## 2019-02-01 PROCEDURE — 97112 NEUROMUSCULAR REEDUCATION: CPT

## 2019-02-06 ENCOUNTER — HOSPITAL ENCOUNTER (OUTPATIENT)
Dept: PHYSICAL THERAPY | Age: 59
Setting detail: THERAPIES SERIES
Discharge: HOME | End: 2019-02-06
Payer: COMMERCIAL

## 2019-02-06 PROCEDURE — 97112 NEUROMUSCULAR REEDUCATION: CPT

## 2019-02-08 ENCOUNTER — HOSPITAL ENCOUNTER (OUTPATIENT)
Dept: PHYSICAL THERAPY | Age: 59
Setting detail: THERAPIES SERIES
Discharge: HOME | End: 2019-02-08
Payer: COMMERCIAL

## 2019-02-08 PROCEDURE — 97112 NEUROMUSCULAR REEDUCATION: CPT

## 2019-02-08 PROCEDURE — 97530 THERAPEUTIC ACTIVITIES: CPT

## 2019-02-25 ENCOUNTER — HOSPITAL ENCOUNTER (OUTPATIENT)
Age: 59
Setting detail: SPECIMEN
Discharge: HOME | End: 2019-02-25
Payer: COMMERCIAL

## 2019-02-25 LAB
ALBUMIN SERPL-MCNC: 4 GM/DL (ref 3.4–5)
ALP BLD-CCNC: 78 IU/L (ref 40–128)
ALT SERPL-CCNC: 35 U/L (ref 10–40)
ANION GAP SERPL CALCULATED.3IONS-SCNC: 10 MMOL/L (ref 4–16)
AST SERPL-CCNC: 18 IU/L (ref 15–37)
BILIRUB SERPL-MCNC: 0.3 MG/DL (ref 0–1)
BUN BLDV-MCNC: 26 MG/DL (ref 6–23)
CALCIUM SERPL-MCNC: 9.7 MG/DL (ref 8.3–10.6)
CHLORIDE BLD-SCNC: 100 MMOL/L (ref 99–110)
CO2: 31 MMOL/L (ref 21–32)
CREAT SERPL-MCNC: 1.2 MG/DL (ref 0.9–1.3)
GFR AFRICAN AMERICAN: >60 ML/MIN/1.73M2
GFR NON-AFRICAN AMERICAN: >60 ML/MIN/1.73M2
GLUCOSE BLD-MCNC: 365 MG/DL (ref 70–99)
POTASSIUM SERPL-SCNC: 5.2 MMOL/L (ref 3.5–5.1)
SODIUM BLD-SCNC: 141 MMOL/L (ref 135–145)
TOTAL PROTEIN: 6.3 GM/DL (ref 6.4–8.2)

## 2019-02-25 PROCEDURE — 80053 COMPREHEN METABOLIC PANEL: CPT

## 2019-03-26 ENCOUNTER — HOSPITAL ENCOUNTER (OUTPATIENT)
Dept: ULTRASOUND IMAGING | Age: 59
Discharge: HOME | End: 2019-03-26
Payer: COMMERCIAL

## 2019-03-26 DIAGNOSIS — R79.89 ABNORMAL LFTS: ICD-10-CM

## 2019-03-26 PROCEDURE — 76705 ECHO EXAM OF ABDOMEN: CPT

## 2019-04-22 ENCOUNTER — HOSPITAL ENCOUNTER (OUTPATIENT)
Dept: GENERAL RADIOLOGY | Age: 59
Discharge: HOME | End: 2019-04-22
Payer: COMMERCIAL

## 2019-04-22 ENCOUNTER — HOSPITAL ENCOUNTER (OUTPATIENT)
Age: 59
Discharge: HOME | End: 2019-04-22
Payer: COMMERCIAL

## 2019-04-22 DIAGNOSIS — N20.1 CALCULUS OF URETER: ICD-10-CM

## 2019-04-22 PROCEDURE — 74018 RADEX ABDOMEN 1 VIEW: CPT

## 2019-06-17 ENCOUNTER — HOSPITAL ENCOUNTER (OUTPATIENT)
Age: 59
Setting detail: SPECIMEN
Discharge: HOME | End: 2019-06-17
Payer: COMMERCIAL

## 2019-06-17 LAB
ALBUMIN SERPL-MCNC: 4.2 GM/DL (ref 3.4–5)
ALP BLD-CCNC: 81 IU/L (ref 40–128)
ALT SERPL-CCNC: 24 U/L (ref 10–40)
ANION GAP SERPL CALCULATED.3IONS-SCNC: 10 MMOL/L (ref 4–16)
AST SERPL-CCNC: 16 IU/L (ref 15–37)
BILIRUB SERPL-MCNC: 0.4 MG/DL (ref 0–1)
BUN BLDV-MCNC: 21 MG/DL (ref 6–23)
CALCIUM SERPL-MCNC: 9.3 MG/DL (ref 8.3–10.6)
CHLORIDE BLD-SCNC: 97 MMOL/L (ref 99–110)
CO2: 31 MMOL/L (ref 21–32)
CREAT SERPL-MCNC: 1.1 MG/DL (ref 0.9–1.3)
GFR AFRICAN AMERICAN: >60 ML/MIN/1.73M2
GFR NON-AFRICAN AMERICAN: >60 ML/MIN/1.73M2
GLUCOSE BLD-MCNC: 294 MG/DL (ref 70–99)
POTASSIUM SERPL-SCNC: 5 MMOL/L (ref 3.5–5.1)
SODIUM BLD-SCNC: 138 MMOL/L (ref 135–145)
TOTAL PROTEIN: 6.4 GM/DL (ref 6.4–8.2)

## 2019-06-17 PROCEDURE — 80053 COMPREHEN METABOLIC PANEL: CPT

## 2019-08-29 ENCOUNTER — HOSPITAL ENCOUNTER (OUTPATIENT)
Age: 59
Discharge: HOME | End: 2019-08-29
Payer: COMMERCIAL

## 2019-08-29 LAB
ALBUMIN SERPL-MCNC: 4 GM/DL (ref 3.4–5)
ALBUMIN SERPL-MCNC: 4 GM/DL (ref 3.4–5)
ALP BLD-CCNC: 77 IU/L (ref 40–128)
ALP BLD-CCNC: 77 IU/L (ref 40–129)
ALT SERPL-CCNC: 25 U/L (ref 10–40)
ALT SERPL-CCNC: 25 U/L (ref 10–40)
ANION GAP SERPL CALCULATED.3IONS-SCNC: 13 MMOL/L (ref 4–16)
AST SERPL-CCNC: 17 IU/L (ref 15–37)
AST SERPL-CCNC: 17 IU/L (ref 15–37)
BASOPHILS ABSOLUTE: 0 K/CU MM
BASOPHILS RELATIVE PERCENT: 0.6 % (ref 0–1)
BILIRUB SERPL-MCNC: 0.4 MG/DL (ref 0–1)
BILIRUB SERPL-MCNC: 0.4 MG/DL (ref 0–1)
BILIRUBIN DIRECT: 0.2 MG/DL (ref 0–0.3)
BILIRUBIN, INDIRECT: 0.2 MG/DL (ref 0–0.7)
BUN BLDV-MCNC: 19 MG/DL (ref 6–23)
CALCIUM SERPL-MCNC: 9.5 MG/DL (ref 8.3–10.6)
CHLORIDE BLD-SCNC: 99 MMOL/L (ref 99–110)
CO2: 30 MMOL/L (ref 21–32)
CREAT SERPL-MCNC: 1 MG/DL (ref 0.9–1.3)
DIFFERENTIAL TYPE: ABNORMAL
EOSINOPHILS ABSOLUTE: 0.7 K/CU MM
EOSINOPHILS RELATIVE PERCENT: 10.6 % (ref 0–3)
ERYTHROCYTE SEDIMENTATION RATE: 9 MM/HR (ref 0–20)
FOLATE: >20 NG/ML (ref 3.1–17.5)
GFR AFRICAN AMERICAN: >60 ML/MIN/1.73M2
GFR NON-AFRICAN AMERICAN: >60 ML/MIN/1.73M2
GLUCOSE BLD-MCNC: 284 MG/DL (ref 70–99)
HCT VFR BLD CALC: 48.1 % (ref 42–52)
HEMOGLOBIN: 14.9 GM/DL (ref 13.5–18)
IMMATURE NEUTROPHIL %: 0.5 % (ref 0–0.43)
LYMPHOCYTES ABSOLUTE: 1.3 K/CU MM
LYMPHOCYTES RELATIVE PERCENT: 19.2 % (ref 24–44)
MCH RBC QN AUTO: 28.7 PG (ref 27–31)
MCHC RBC AUTO-ENTMCNC: 31 % (ref 32–36)
MCV RBC AUTO: 92.7 FL (ref 78–100)
MONOCYTES ABSOLUTE: 0.6 K/CU MM
MONOCYTES RELATIVE PERCENT: 9.5 % (ref 0–4)
NUCLEATED RBC %: 0 %
PDW BLD-RTO: 13.3 % (ref 11.7–14.9)
PLATELET # BLD: 215 K/CU MM (ref 140–440)
PMV BLD AUTO: 11.4 FL (ref 7.5–11.1)
POTASSIUM SERPL-SCNC: 4.5 MMOL/L (ref 3.5–5.1)
RBC # BLD: 5.19 M/CU MM (ref 4.6–6.2)
SEGMENTED NEUTROPHILS ABSOLUTE COUNT: 4 K/CU MM
SEGMENTED NEUTROPHILS RELATIVE PERCENT: 59.6 % (ref 36–66)
SODIUM BLD-SCNC: 142 MMOL/L (ref 135–145)
TOTAL CK: 161 IU/L (ref 38–174)
TOTAL IMMATURE NEUTOROPHIL: 0.03 K/CU MM
TOTAL NUCLEATED RBC: 0 K/CU MM
TOTAL PROTEIN: 6.4 GM/DL (ref 6.4–8.2)
TOTAL PROTEIN: 6.4 GM/DL (ref 6.4–8.2)
TSH HIGH SENSITIVITY: 1.8 UIU/ML (ref 0.27–4.2)
VITAMIN B-12: 1790 PG/ML (ref 211–911)
WBC # BLD: 6.6 K/CU MM (ref 4–10.5)

## 2019-08-29 PROCEDURE — 36415 COLL VENOUS BLD VENIPUNCTURE: CPT

## 2019-08-29 PROCEDURE — 82248 BILIRUBIN DIRECT: CPT

## 2019-08-29 PROCEDURE — 82607 VITAMIN B-12: CPT

## 2019-08-29 PROCEDURE — 84443 ASSAY THYROID STIM HORMONE: CPT

## 2019-08-29 PROCEDURE — 84165 PROTEIN E-PHORESIS SERUM: CPT

## 2019-08-29 PROCEDURE — 85025 COMPLETE CBC W/AUTO DIFF WBC: CPT

## 2019-08-29 PROCEDURE — 84436 ASSAY OF TOTAL THYROXINE: CPT

## 2019-08-29 PROCEDURE — 82746 ASSAY OF FOLIC ACID SERUM: CPT

## 2019-08-29 PROCEDURE — 85652 RBC SED RATE AUTOMATED: CPT

## 2019-08-29 PROCEDURE — 86038 ANTINUCLEAR ANTIBODIES: CPT

## 2019-08-29 PROCEDURE — 84479 ASSAY OF THYROID (T3 OR T4): CPT

## 2019-08-29 PROCEDURE — 82550 ASSAY OF CK (CPK): CPT

## 2019-08-29 PROCEDURE — 80053 COMPREHEN METABOLIC PANEL: CPT

## 2019-08-30 LAB
T3 UPTAKE PERCENT: 36 % (ref 28–41)
T3 UPTAKE PERCENT: NORMAL % (ref 28–41)
T4 TOTAL: 8.54 UG/DL (ref 5.1–14.1)
T4 TOTAL: NORMAL UG/DL (ref 5.1–14.1)

## 2019-08-31 LAB
ANTI-NUCLEAR ANTIBODY (ANA): NORMAL
ANTI-NUCLEAR ANTIBODY (ANA): NORMAL

## 2019-09-03 LAB
ALBUMIN ELP: 3.5 GM/DL (ref 3.2–5.6)
ALPHA-1-GLOBULIN: 0.3 GM/DL (ref 0.1–0.4)
ALPHA-2-GLOBULIN: 0.8 GM/DL (ref 0.4–1.2)
BETA GLOBULIN: 1.1 GM/DL (ref 0.5–1.3)
GAMMA GLOBULIN: 0.8 GM/DL (ref 0.5–1.6)
SPEP INTERPRETATION: NORMAL
TOTAL PROTEIN: 6.4 GM/DL (ref 6.4–8.2)

## 2019-10-09 ENCOUNTER — HOSPITAL ENCOUNTER (OUTPATIENT)
Age: 59
Discharge: HOME | End: 2019-10-09
Payer: COMMERCIAL

## 2019-10-09 LAB
ALBUMIN SERPL-MCNC: 3.7 GM/DL (ref 3.4–5)
ALP BLD-CCNC: 70 IU/L (ref 40–128)
ALT SERPL-CCNC: 29 U/L (ref 10–40)
ANION GAP SERPL CALCULATED.3IONS-SCNC: 13 MMOL/L (ref 4–16)
AST SERPL-CCNC: 22 IU/L (ref 15–37)
BILIRUB SERPL-MCNC: 0.3 MG/DL (ref 0–1)
BUN BLDV-MCNC: 22 MG/DL (ref 6–23)
CALCIUM SERPL-MCNC: 8.8 MG/DL (ref 8.3–10.6)
CHLORIDE BLD-SCNC: 98 MMOL/L (ref 99–110)
CO2: 29 MMOL/L (ref 21–32)
CREAT SERPL-MCNC: 1 MG/DL (ref 0.9–1.3)
GFR AFRICAN AMERICAN: >60 ML/MIN/1.73M2
GFR NON-AFRICAN AMERICAN: >60 ML/MIN/1.73M2
GLUCOSE BLD-MCNC: 270 MG/DL (ref 70–99)
HCT VFR BLD CALC: 45.7 % (ref 42–52)
HEMOGLOBIN: 14.1 GM/DL (ref 13.5–18)
MCH RBC QN AUTO: 28.4 PG (ref 27–31)
MCHC RBC AUTO-ENTMCNC: 30.9 % (ref 32–36)
MCV RBC AUTO: 92 FL (ref 78–100)
PDW BLD-RTO: 13.2 % (ref 11.7–14.9)
PLATELET # BLD: 246 K/CU MM (ref 140–440)
PMV BLD AUTO: 11.7 FL (ref 7.5–11.1)
POTASSIUM SERPL-SCNC: 4.2 MMOL/L (ref 3.5–5.1)
PRO-BNP: 91.38 PG/ML
RBC # BLD: 4.97 M/CU MM (ref 4.6–6.2)
SODIUM BLD-SCNC: 140 MMOL/L (ref 135–145)
TOTAL PROTEIN: 6.1 GM/DL (ref 6.4–8.2)
WBC # BLD: 9.9 K/CU MM (ref 4–10.5)

## 2019-10-09 PROCEDURE — 36415 COLL VENOUS BLD VENIPUNCTURE: CPT

## 2019-10-09 PROCEDURE — 85027 COMPLETE CBC AUTOMATED: CPT

## 2019-10-09 PROCEDURE — 80053 COMPREHEN METABOLIC PANEL: CPT

## 2019-10-09 PROCEDURE — 83880 ASSAY OF NATRIURETIC PEPTIDE: CPT

## 2020-05-26 LAB
A/G RATIO: 1.6 (ref 1.1–2.2)
ALBUMIN SERPL-MCNC: 3.9 G/DL (ref 3.4–5)
ALP BLD-CCNC: 87 U/L (ref 40–129)
ALT SERPL-CCNC: 22 U/L (ref 10–40)
ANION GAP SERPL CALCULATED.3IONS-SCNC: 12 MMOL/L (ref 3–16)
AST SERPL-CCNC: 18 U/L (ref 15–37)
BILIRUB SERPL-MCNC: 0.3 MG/DL (ref 0–1)
BUN BLDV-MCNC: 22 MG/DL (ref 7–20)
CALCIUM SERPL-MCNC: 9.6 MG/DL (ref 8.3–10.6)
CHLORIDE BLD-SCNC: 100 MMOL/L (ref 99–110)
CHOLESTEROL, TOTAL: 236 MG/DL (ref 0–199)
CO2: 26 MMOL/L (ref 21–32)
CREAT SERPL-MCNC: 1 MG/DL (ref 0.9–1.3)
GFR AFRICAN AMERICAN: >60
GFR NON-AFRICAN AMERICAN: >60
GLOBULIN: 2.4 G/DL
GLUCOSE BLD-MCNC: 263 MG/DL (ref 70–99)
HDLC SERPL-MCNC: 42 MG/DL (ref 40–60)
LDL CHOLESTEROL CALCULATED: 159 MG/DL
POTASSIUM SERPL-SCNC: 4.2 MMOL/L (ref 3.5–5.1)
SODIUM BLD-SCNC: 138 MMOL/L (ref 136–145)
TOTAL PROTEIN: 6.3 G/DL (ref 6.4–8.2)
TRIGL SERPL-MCNC: 175 MG/DL (ref 0–150)
VLDLC SERPL CALC-MCNC: 35 MG/DL

## 2020-05-27 LAB
ESTIMATED AVERAGE GLUCOSE: 260.4 MG/DL
HBA1C MFR BLD: 10.7 %

## 2020-11-15 ENCOUNTER — APPOINTMENT (OUTPATIENT)
Dept: CT IMAGING | Age: 60
DRG: 948 | End: 2020-11-15
Payer: COMMERCIAL

## 2020-11-15 ENCOUNTER — APPOINTMENT (OUTPATIENT)
Dept: GENERAL RADIOLOGY | Age: 60
DRG: 948 | End: 2020-11-15
Payer: COMMERCIAL

## 2020-11-15 ENCOUNTER — HOSPITAL ENCOUNTER (INPATIENT)
Age: 60
LOS: 1 days | Discharge: HOME | DRG: 948 | End: 2020-11-17
Attending: STUDENT IN AN ORGANIZED HEALTH CARE EDUCATION/TRAINING PROGRAM | Admitting: STUDENT IN AN ORGANIZED HEALTH CARE EDUCATION/TRAINING PROGRAM
Payer: COMMERCIAL

## 2020-11-15 LAB
ALBUMIN SERPL-MCNC: 3.9 GM/DL (ref 3.4–5)
ALP BLD-CCNC: 77 IU/L (ref 40–129)
ALT SERPL-CCNC: 24 U/L (ref 10–40)
ANION GAP SERPL CALCULATED.3IONS-SCNC: 17 MMOL/L (ref 4–16)
AST SERPL-CCNC: 39 IU/L (ref 15–37)
BASOPHILS ABSOLUTE: 0 K/CU MM
BASOPHILS RELATIVE PERCENT: 0.1 % (ref 0–1)
BILIRUB SERPL-MCNC: 0.5 MG/DL (ref 0–1)
BUN BLDV-MCNC: 25 MG/DL (ref 6–23)
CALCIUM SERPL-MCNC: 9 MG/DL (ref 8.3–10.6)
CHLORIDE BLD-SCNC: 94 MMOL/L (ref 99–110)
CO2: 25 MMOL/L (ref 21–32)
CREAT SERPL-MCNC: 1.2 MG/DL (ref 0.9–1.3)
DIFFERENTIAL TYPE: ABNORMAL
EOSINOPHILS ABSOLUTE: 0 K/CU MM
EOSINOPHILS RELATIVE PERCENT: 0 % (ref 0–3)
GFR AFRICAN AMERICAN: >60 ML/MIN/1.73M2
GFR NON-AFRICAN AMERICAN: >60 ML/MIN/1.73M2
GLUCOSE BLD-MCNC: 90 MG/DL (ref 70–99)
HCT VFR BLD CALC: 44.4 % (ref 42–52)
HEMOGLOBIN: 14.7 GM/DL (ref 13.5–18)
IMMATURE NEUTROPHIL %: 0.8 % (ref 0–0.43)
LYMPHOCYTES ABSOLUTE: 0.6 K/CU MM
LYMPHOCYTES RELATIVE PERCENT: 3.1 % (ref 24–44)
MAGNESIUM: 1.7 MG/DL (ref 1.8–2.4)
MCH RBC QN AUTO: 29.2 PG (ref 27–31)
MCHC RBC AUTO-ENTMCNC: 33.1 % (ref 32–36)
MCV RBC AUTO: 88.1 FL (ref 78–100)
MONOCYTES ABSOLUTE: 1.2 K/CU MM
MONOCYTES RELATIVE PERCENT: 6.4 % (ref 0–4)
NUCLEATED RBC %: 0 %
PDW BLD-RTO: 14.1 % (ref 11.7–14.9)
PLATELET # BLD: 234 K/CU MM (ref 140–440)
PMV BLD AUTO: 11.1 FL (ref 7.5–11.1)
POTASSIUM SERPL-SCNC: 3.5 MMOL/L (ref 3.5–5.1)
PRO-BNP: 428.7 PG/ML
RBC # BLD: 5.04 M/CU MM (ref 4.6–6.2)
SEGMENTED NEUTROPHILS ABSOLUTE COUNT: 16.2 K/CU MM
SEGMENTED NEUTROPHILS RELATIVE PERCENT: 89.6 % (ref 36–66)
SODIUM BLD-SCNC: 136 MMOL/L (ref 135–145)
TOTAL IMMATURE NEUTOROPHIL: 0.14 K/CU MM
TOTAL NUCLEATED RBC: 0 K/CU MM
TOTAL PROTEIN: 7.4 GM/DL (ref 6.4–8.2)
TROPONIN T: 0.02 NG/ML
WBC # BLD: 18.1 K/CU MM (ref 4–10.5)

## 2020-11-15 PROCEDURE — 80053 COMPREHEN METABOLIC PANEL: CPT

## 2020-11-15 PROCEDURE — 72125 CT NECK SPINE W/O DYE: CPT

## 2020-11-15 PROCEDURE — 85025 COMPLETE CBC W/AUTO DIFF WBC: CPT

## 2020-11-15 PROCEDURE — 84484 ASSAY OF TROPONIN QUANT: CPT

## 2020-11-15 PROCEDURE — 99285 EMERGENCY DEPT VISIT HI MDM: CPT

## 2020-11-15 PROCEDURE — 71045 X-RAY EXAM CHEST 1 VIEW: CPT

## 2020-11-15 PROCEDURE — 83880 ASSAY OF NATRIURETIC PEPTIDE: CPT

## 2020-11-15 PROCEDURE — 81001 URINALYSIS AUTO W/SCOPE: CPT

## 2020-11-15 PROCEDURE — 70450 CT HEAD/BRAIN W/O DYE: CPT

## 2020-11-15 PROCEDURE — 93005 ELECTROCARDIOGRAM TRACING: CPT | Performed by: PHYSICIAN ASSISTANT

## 2020-11-15 PROCEDURE — 83735 ASSAY OF MAGNESIUM: CPT

## 2020-11-16 ENCOUNTER — APPOINTMENT (OUTPATIENT)
Dept: CT IMAGING | Age: 60
DRG: 948 | End: 2020-11-16
Payer: COMMERCIAL

## 2020-11-16 PROBLEM — R29.6 FREQUENT FALLS: Status: ACTIVE | Noted: 2020-11-16

## 2020-11-16 PROBLEM — Z86.73 HISTORY OF CVA (CEREBROVASCULAR ACCIDENT): Status: ACTIVE | Noted: 2020-11-16

## 2020-11-16 PROBLEM — E66.2 OBESITY HYPOVENTILATION SYNDROME (HCC): Status: ACTIVE | Noted: 2020-11-16

## 2020-11-16 PROBLEM — I87.2 CHRONIC VENOUS STASIS DERMATITIS OF BOTH LOWER EXTREMITIES: Status: ACTIVE | Noted: 2020-11-16

## 2020-11-16 LAB
BACTERIA: ABNORMAL /HPF
BILIRUBIN URINE: NEGATIVE MG/DL
BLOOD, URINE: ABNORMAL
CALCIUM OXALATE CRYSTALS: ABNORMAL /HPF
CLARITY: ABNORMAL
COLOR: YELLOW
EKG ATRIAL RATE: 103 BPM
EKG DIAGNOSIS: NORMAL
EKG P AXIS: 110 DEGREES
EKG P-R INTERVAL: 124 MS
EKG Q-T INTERVAL: 334 MS
EKG QRS DURATION: 96 MS
EKG QTC CALCULATION (BAZETT): 437 MS
EKG R AXIS: -20 DEGREES
EKG T AXIS: 152 DEGREES
EKG VENTRICULAR RATE: 103 BPM
ESTIMATED AVERAGE GLUCOSE: 197 MG/DL
GLUCOSE BLD-MCNC: 197 MG/DL (ref 70–99)
GLUCOSE BLD-MCNC: 273 MG/DL (ref 70–99)
GLUCOSE BLD-MCNC: 278 MG/DL (ref 70–99)
GLUCOSE BLD-MCNC: 56 MG/DL (ref 70–99)
GLUCOSE BLD-MCNC: 56 MG/DL (ref 70–99)
GLUCOSE BLD-MCNC: 69 MG/DL (ref 70–99)
GLUCOSE BLD-MCNC: 71 MG/DL (ref 70–99)
GLUCOSE, URINE: NEGATIVE MG/DL
HBA1C MFR BLD: 8.5 % (ref 4.2–6.3)
KETONES, URINE: ABNORMAL MG/DL
LEUKOCYTE ESTERASE, URINE: NEGATIVE
LV EF: 50 %
LVEF MODALITY: NORMAL
MAGNESIUM: 2.4 MG/DL (ref 1.8–2.4)
MUCUS: ABNORMAL HPF
NITRITE URINE, QUANTITATIVE: NEGATIVE
PH, URINE: 5 (ref 5–8)
PROTEIN UA: 100 MG/DL
RBC URINE: 636 /HPF (ref 0–3)
SPECIFIC GRAVITY UA: 1.02 (ref 1–1.03)
TRICHOMONAS: ABNORMAL /HPF
TROPONIN T: 0.01 NG/ML
UROBILINOGEN, URINE: NORMAL MG/DL (ref 0.2–1)
WBC UA: 8 /HPF (ref 0–2)

## 2020-11-16 PROCEDURE — 96366 THER/PROPH/DIAG IV INF ADDON: CPT

## 2020-11-16 PROCEDURE — G0378 HOSPITAL OBSERVATION PER HR: HCPCS

## 2020-11-16 PROCEDURE — 2580000003 HC RX 258: Performed by: STUDENT IN AN ORGANIZED HEALTH CARE EDUCATION/TRAINING PROGRAM

## 2020-11-16 PROCEDURE — 2580000003 HC RX 258: Performed by: INTERNAL MEDICINE

## 2020-11-16 PROCEDURE — 83036 HEMOGLOBIN GLYCOSYLATED A1C: CPT

## 2020-11-16 PROCEDURE — 85610 PROTHROMBIN TIME: CPT

## 2020-11-16 PROCEDURE — 6360000002 HC RX W HCPCS: Performed by: INTERNAL MEDICINE

## 2020-11-16 PROCEDURE — 99211 OFF/OP EST MAY X REQ PHY/QHP: CPT

## 2020-11-16 PROCEDURE — 96365 THER/PROPH/DIAG IV INF INIT: CPT

## 2020-11-16 PROCEDURE — 6370000000 HC RX 637 (ALT 250 FOR IP): Performed by: STUDENT IN AN ORGANIZED HEALTH CARE EDUCATION/TRAINING PROGRAM

## 2020-11-16 PROCEDURE — 94761 N-INVAS EAR/PLS OXIMETRY MLT: CPT

## 2020-11-16 PROCEDURE — 1200000000 HC SEMI PRIVATE

## 2020-11-16 PROCEDURE — 36415 COLL VENOUS BLD VENIPUNCTURE: CPT

## 2020-11-16 PROCEDURE — 71275 CT ANGIOGRAPHY CHEST: CPT

## 2020-11-16 PROCEDURE — 97535 SELF CARE MNGMENT TRAINING: CPT

## 2020-11-16 PROCEDURE — 97116 GAIT TRAINING THERAPY: CPT

## 2020-11-16 PROCEDURE — 83735 ASSAY OF MAGNESIUM: CPT

## 2020-11-16 PROCEDURE — 97162 PT EVAL MOD COMPLEX 30 MIN: CPT

## 2020-11-16 PROCEDURE — 97112 NEUROMUSCULAR REEDUCATION: CPT

## 2020-11-16 PROCEDURE — 82962 GLUCOSE BLOOD TEST: CPT

## 2020-11-16 PROCEDURE — 97166 OT EVAL MOD COMPLEX 45 MIN: CPT

## 2020-11-16 PROCEDURE — 93306 TTE W/DOPPLER COMPLETE: CPT

## 2020-11-16 PROCEDURE — 93010 ELECTROCARDIOGRAM REPORT: CPT | Performed by: INTERNAL MEDICINE

## 2020-11-16 PROCEDURE — 84484 ASSAY OF TROPONIN QUANT: CPT

## 2020-11-16 PROCEDURE — 6360000004 HC RX CONTRAST MEDICATION: Performed by: INTERNAL MEDICINE

## 2020-11-16 RX ORDER — ACETAMINOPHEN 325 MG/1
650 TABLET ORAL EVERY 6 HOURS PRN
Status: DISCONTINUED | OUTPATIENT
Start: 2020-11-16 | End: 2020-11-17 | Stop reason: HOSPADM

## 2020-11-16 RX ORDER — DEXTROSE MONOHYDRATE 50 MG/ML
100 INJECTION, SOLUTION INTRAVENOUS PRN
Status: DISCONTINUED | OUTPATIENT
Start: 2020-11-16 | End: 2020-11-17 | Stop reason: HOSPADM

## 2020-11-16 RX ORDER — MAGNESIUM SULFATE IN WATER 40 MG/ML
2 INJECTION, SOLUTION INTRAVENOUS ONCE
Status: COMPLETED | OUTPATIENT
Start: 2020-11-16 | End: 2020-11-16

## 2020-11-16 RX ORDER — FUROSEMIDE 40 MG/1
80 TABLET ORAL DAILY
Status: DISCONTINUED | OUTPATIENT
Start: 2020-11-16 | End: 2020-11-17 | Stop reason: HOSPADM

## 2020-11-16 RX ORDER — PROMETHAZINE HYDROCHLORIDE 25 MG/1
12.5 TABLET ORAL EVERY 6 HOURS PRN
Status: DISCONTINUED | OUTPATIENT
Start: 2020-11-16 | End: 2020-11-17 | Stop reason: HOSPADM

## 2020-11-16 RX ORDER — POLYETHYLENE GLYCOL 3350 17 G/17G
17 POWDER, FOR SOLUTION ORAL DAILY PRN
Status: DISCONTINUED | OUTPATIENT
Start: 2020-11-16 | End: 2020-11-17 | Stop reason: HOSPADM

## 2020-11-16 RX ORDER — SODIUM CHLORIDE 0.9 % (FLUSH) 0.9 %
10 SYRINGE (ML) INJECTION PRN
Status: DISCONTINUED | OUTPATIENT
Start: 2020-11-16 | End: 2020-11-17 | Stop reason: HOSPADM

## 2020-11-16 RX ORDER — B12/LEVOMEFOLATE CALCIUM/B-6 2-1.13-25
1 TABLET ORAL DAILY
Status: DISCONTINUED | OUTPATIENT
Start: 2020-11-16 | End: 2020-11-17 | Stop reason: HOSPADM

## 2020-11-16 RX ORDER — ONDANSETRON 2 MG/ML
4 INJECTION INTRAMUSCULAR; INTRAVENOUS EVERY 6 HOURS PRN
Status: DISCONTINUED | OUTPATIENT
Start: 2020-11-16 | End: 2020-11-17 | Stop reason: HOSPADM

## 2020-11-16 RX ORDER — CLOPIDOGREL BISULFATE 75 MG/1
75 TABLET ORAL DAILY
Status: DISCONTINUED | OUTPATIENT
Start: 2020-11-16 | End: 2020-11-17 | Stop reason: HOSPADM

## 2020-11-16 RX ORDER — DEXTROSE MONOHYDRATE 25 G/50ML
12.5 INJECTION, SOLUTION INTRAVENOUS PRN
Status: DISCONTINUED | OUTPATIENT
Start: 2020-11-16 | End: 2020-11-17 | Stop reason: HOSPADM

## 2020-11-16 RX ORDER — SODIUM CHLORIDE 9 MG/ML
INJECTION, SOLUTION INTRAVENOUS CONTINUOUS
Status: DISCONTINUED | OUTPATIENT
Start: 2020-11-16 | End: 2020-11-17 | Stop reason: HOSPADM

## 2020-11-16 RX ORDER — NICOTINE POLACRILEX 4 MG
15 LOZENGE BUCCAL PRN
Status: DISCONTINUED | OUTPATIENT
Start: 2020-11-16 | End: 2020-11-17 | Stop reason: HOSPADM

## 2020-11-16 RX ORDER — RANOLAZINE 500 MG/1
500 TABLET, EXTENDED RELEASE ORAL 2 TIMES DAILY
Status: DISCONTINUED | OUTPATIENT
Start: 2020-11-16 | End: 2020-11-17 | Stop reason: HOSPADM

## 2020-11-16 RX ORDER — SODIUM CHLORIDE 0.9 % (FLUSH) 0.9 %
10 SYRINGE (ML) INJECTION EVERY 12 HOURS SCHEDULED
Status: DISCONTINUED | OUTPATIENT
Start: 2020-11-16 | End: 2020-11-17 | Stop reason: HOSPADM

## 2020-11-16 RX ORDER — POTASSIUM CHLORIDE 20 MEQ/1
20 TABLET, EXTENDED RELEASE ORAL
Status: DISCONTINUED | OUTPATIENT
Start: 2020-11-16 | End: 2020-11-17 | Stop reason: HOSPADM

## 2020-11-16 RX ORDER — ACETAMINOPHEN 650 MG/1
650 SUPPOSITORY RECTAL EVERY 6 HOURS PRN
Status: DISCONTINUED | OUTPATIENT
Start: 2020-11-16 | End: 2020-11-17 | Stop reason: HOSPADM

## 2020-11-16 RX ORDER — METOPROLOL SUCCINATE 50 MG/1
50 TABLET, EXTENDED RELEASE ORAL DAILY
Status: DISCONTINUED | OUTPATIENT
Start: 2020-11-16 | End: 2020-11-17

## 2020-11-16 RX ADMIN — FUROSEMIDE 80 MG: 40 TABLET ORAL at 08:55

## 2020-11-16 RX ADMIN — SODIUM CHLORIDE: 9 INJECTION, SOLUTION INTRAVENOUS at 09:02

## 2020-11-16 RX ADMIN — MAGNESIUM SULFATE HEPTAHYDRATE 2 G: 40 INJECTION, SOLUTION INTRAVENOUS at 09:05

## 2020-11-16 RX ADMIN — RANOLAZINE 500 MG: 500 TABLET, EXTENDED RELEASE ORAL at 08:55

## 2020-11-16 RX ADMIN — IOPAMIDOL 80 ML: 755 INJECTION, SOLUTION INTRAVENOUS at 10:35

## 2020-11-16 RX ADMIN — Medication 1 TABLET: at 08:55

## 2020-11-16 RX ADMIN — METOPROLOL SUCCINATE 50 MG: 50 TABLET, EXTENDED RELEASE ORAL at 08:55

## 2020-11-16 RX ADMIN — CLOPIDOGREL BISULFATE 75 MG: 75 TABLET ORAL at 08:55

## 2020-11-16 RX ADMIN — SODIUM CHLORIDE, PRESERVATIVE FREE 10 ML: 5 INJECTION INTRAVENOUS at 21:05

## 2020-11-16 RX ADMIN — RIVAROXABAN 20 MG: 20 TABLET, FILM COATED ORAL at 16:45

## 2020-11-16 RX ADMIN — RANOLAZINE 500 MG: 500 TABLET, EXTENDED RELEASE ORAL at 21:05

## 2020-11-16 RX ADMIN — SODIUM CHLORIDE, PRESERVATIVE FREE 10 ML: 5 INJECTION INTRAVENOUS at 09:02

## 2020-11-16 RX ADMIN — POTASSIUM CHLORIDE 20 MEQ: 1500 TABLET, EXTENDED RELEASE ORAL at 08:55

## 2020-11-16 ASSESSMENT — PAIN SCALES - GENERAL
PAINLEVEL_OUTOF10: 0

## 2020-11-16 ASSESSMENT — ENCOUNTER SYMPTOMS
ABDOMINAL PAIN: 0
WHEEZING: 0
DIARRHEA: 0
NAUSEA: 0
VOMITING: 0
SHORTNESS OF BREATH: 0
VOICE CHANGE: 0
CHEST TIGHTNESS: 0
COLOR CHANGE: 0
COUGH: 0

## 2020-11-16 NOTE — H&P
History and Physical      Name:  Luisito Salazar /Age/Sex: 1960  (61 y.o. male)   MRN & CSN:  [id number] & [id number] Admission Date/Time: 11/15/2020  8:19 PM   Location:  [address]- PCP: Rajani Vicente MD       Hospital Day: 2    Assessment and Plan:   Luisito Salazar is a 61 y.o.  male  who presents with Frequent falls    1. Frequent falls   Patient is neurologically intact with no focal neurologic deficits cranial nerves II through XII grossly intact.  Admit to inpatient services   PT/OT, neurochecks, orthostatic blood pressure and pulse, and telemetry   Low suspicion for CVA at this time. Day team to determine if need for MRI and/or neurology consult. 2. Elevated troponins   Without chest pain   Trend troponins and EKG in a.m. [facility] consult, appreciate recommendations    3. Lower extremity venous stasis with dermatitis   Wound care consult    4. Hematuria   Denies per patient   Noted to have a large amount in UA   Would recommend outpatient follow-up with urology    Other chronic medical conditions:   A. Fib: Continue home Xarelto, and Toprol   Hypertension continue home medication   MARINO on CPAP: Continue home CPAP   HFpEF: Continue home medications   History of CVA: Continue home medications, neurologically intact as above   Morbid obesity: Recommend extensive lifestyle modifications    Diet No diet orders on file   DVT Prophylaxis [] Lovenox, []  Heparin, [] SCDs, [] Ambulation   GI Prophylaxis [] PPI,  [] H2 Blocker,  [] Carafate,  [] Diet/Tube Feeds   Code Status Prior   Disposition Patient requires continued admission due to Frequent falls   MDM [] Low, [x] Moderate,[]  High  Patient's risk as above due to acuity of condition with potential for decompensation.      History of Present Illness:     Chief Complaint: Frequent falls  Luisito Salazar is a 61 y.o.  male with family history as stated below and a PMH as stated above, who presents with multiple falls over the last 2 days. Patient states he had his first fall while mowing the yard. States he had 3 falls today. Patient denies hitting his head, syncopal episode, dizziness, lightheadedness, seizure-like activity, tinnitus, blurry vision, change in vision, palpitations, chest pain, or shortness of breath prior to the episodes. Patient states \"my legs just gave out and I had no strength. \"  Patient denies any unilateral weakness, numbness, dysphagia, or facial droop. Patient does endorse MARINO on CPAP and his only complaint was that he had chills. Patient denied fevers, chills, cough, abdominal pain, nausea, vomiting, diarrhea, dark stools, bright red blood per rectum, frequency, urgency, or hematuria. Discussed case with ED provider. Review of Systems   Constitutional: Positive for chills. Negative for appetite change, diaphoresis, fatigue and fever. HENT: Negative for tinnitus and voice change. Eyes: Negative for visual disturbance. Respiratory: Negative for cough, chest tightness, shortness of breath and wheezing. Cardiovascular: Negative for chest pain and palpitations. Gastrointestinal: Negative for abdominal pain, diarrhea, nausea and vomiting. Genitourinary: Negative for dysuria, frequency, hematuria and urgency. Musculoskeletal: Negative for arthralgias. Skin: Positive for wound (Chronic lower extremity stasis dermatitis). Negative for color change and rash. Neurological: Positive for weakness (\"Legs gave out. \"). Negative for dizziness, tremors, seizures, syncope, facial asymmetry, speech difficulty, light-headedness, numbness and headaches. Psychiatric/Behavioral: Negative for agitation. Objective:   No intake or output data in the 24 hours ending 11/16/20 0230   Vitals:   Vitals:    11/16/20 0030   BP: 139/63   Pulse: 103   Resp: 14   Temp:    SpO2: 98%     Physical Exam:   Physical Exam  Vitals signs and nursing note reviewed.    Constitutional:       General: He is awake. He is not in acute distress. Appearance: Normal appearance. He is morbidly obese. He is not ill-appearing, toxic-appearing or diaphoretic. Interventions: He is not intubated. HENT:      Head: Atraumatic. Right Ear: External ear normal.      Left Ear: External ear normal.      Nose: Nose normal. No rhinorrhea. Mouth/Throat:      Mouth: Mucous membranes are moist.      Tongue: Tongue does not deviate from midline. Pharynx: Uvula midline. Eyes:      General: No scleral icterus. Extraocular Movements: Extraocular movements intact. Conjunctiva/sclera: Conjunctivae normal.      Pupils: Pupils are equal, round, and reactive to light. Neck:      Musculoskeletal: Neck supple. Cardiovascular:      Rate and Rhythm: Regular rhythm. Tachycardia present. Pulses: Normal pulses. Heart sounds: Normal heart sounds. No murmur. No gallop. Pulmonary:      Effort: Pulmonary effort is normal. No tachypnea, bradypnea, prolonged expiration or respiratory distress. He is not intubated. Breath sounds: Decreased breath sounds present. No wheezing, rhonchi or rales. Abdominal:      General: Abdomen is protuberant. Bowel sounds are normal. There is no distension. Palpations: Abdomen is soft. There is no fluid wave. Tenderness: There is no abdominal tenderness. There is no guarding. Negative signs include Garza's sign and McBurney's sign. Hernia: A hernia is present. Hernia is present in the umbilical area. Musculoskeletal: Normal range of motion. Right lower leg: No edema. Left lower leg: No edema. Skin:     General: Skin is warm and dry. Capillary Refill: Capillary refill takes less than 2 seconds. Findings: Wound present. Comments: Bilateral lower extremity venous stasis with dermatitis. Neurological:      General: No focal deficit present. Mental Status: He is alert and oriented to person, place, and time.  Mental status is at baseline. Cranial Nerves: Cranial nerves are intact. No cranial nerve deficit, dysarthria or facial asymmetry. Sensory: Sensation is intact. No sensory deficit. Motor: Motor function is intact. No weakness, tremor, seizure activity or pronator drift. Coordination: Coordination is intact. Finger-Nose-Finger Test normal.      Gait: Gait abnormal (Repeated falls, did not test ambulation today in ED). Psychiatric:         Attention and Perception: Attention normal.         Mood and Affect: Mood normal. Affect is not inappropriate. Speech: Speech normal. Speech is not rapid and pressured or slurred. Behavior: Behavior is agitated. Behavior is cooperative. Past Medical History:      Past Medical History:   Diagnosis Date    Atrial fibrillation ([address]     Cerebral artery occlusion with cerebral infarction ([address]     History of cardiac cath 2017    --RCA has mid 50% stenosis and PLB branch has 70% stenosis noted. LVEDP very high    Hyperlipidemia     Hypertension     Type II or unspecified type diabetes mellitus without mention of complication, not stated as uncontrolled     Diagnsed about 1998    Unspecified sleep apnea      PSHX:  has a past surgical history that includes Tonsillectomy and Cardiac catheterization. Allergies: No Known Allergies    FAM HX: family history includes Cancer in his father and maternal grandfather; Diabetes in his father and mother.   Soc HX:   Social History     Socioeconomic History    Marital status:      Spouse name: None    Number of children: None    Years of education: None    Highest education level: None   Occupational History    None   Social Needs    Financial resource strain: None    Food insecurity     Worry: None     Inability: None    Transportation needs     Medical: None     Non-medical: None   Tobacco Use    Smoking status: Never Smoker    Smokeless tobacco: Never Used   Substance and Sexual Activity    Alcohol use: No    Drug use: No    Sexual activity: None   Lifestyle    Physical activity     Days per week: None     Minutes per session: None    Stress: None   Relationships    Social connections     Talks on phone: None     Gets together: None     Attends Zoroastrianism service: None     Active member of club or organization: None     Attends meetings of clubs or organizations: None     Relationship status: None    Intimate partner violence     Fear of current or ex partner: None     Emotionally abused: None     Physically abused: None     Forced sexual activity: None   Other Topics Concern    None   Social History Narrative    None       Medications:   Medications:    Infusions:   PRN Meds:     Electronically signed by Traci Araya DO on 11/16/2020 at 2:30 AM      This dictation was created with voice recognition software. While attempts have been made to review the dictation as it is transcribed, on occasion the spoken word can be misinterpreted by the technology leading to omissions or inappropriate words, phrases or sentences.

## 2020-11-16 NOTE — ED NOTES
Straight catheter for urine per [facility] MARCELINA Anthony RN  11/15/20 1428

## 2020-11-16 NOTE — ED NOTES
Bed: 04TR-04  Expected date:   Expected time:   Means of arrival:   Comments:  Ems fall      [address]  11/15/20 2019

## 2020-11-16 NOTE — CONSULTS
[facility] PHYSICAL THERAPY EVALUATION  Mary Vazquez, 1960, 3030/3030-A, 11/16/2020    History  [place]:  The primary encounter diagnosis was Multiple falls. Diagnoses of Elevated troponin, Abrasions of multiple sites, and Leukocytosis, unspecified type were also pertinent to this visit. Patient  has a past medical history of Atrial fibrillation [facility]), Cerebral artery occlusion with cerebral infarction [facility]), History of cardiac cath, Hyperlipidemia, Hypertension, Type II or unspecified type diabetes mellitus without mention of complication, not stated as uncontrolled, and Unspecified sleep apnea. Patient  has a past surgical history that includes Tonsillectomy and Cardiac catheterization. Subjective:  Patient states:  \"my leg feels numb. \"    Pain:  denies. Communication with other providers:  Handoff to RN, OT  Restrictions: general precautions, fall risk    Home Setup/Prior level of function  Social/Functional History  Lives With: Spouse  Type of Home: House  Home Layout: Multi-level  Home Access: Stairs to enter with rails  Entrance Stairs - Number of Steps: 7  Entrance Stairs - Rails: Left  Bathroom Shower/Tub: Tub/Shower unit(typically stands)  Bathroom Toilet: Standard  Bathroom Equipment: Tub transfer bench  Bathroom Accessibility: Accessible  Home Equipment: Rolling walker, Cane, Reacher  ADL Assistance: Independent  Homemaking Assistance: Independent  Homemaking Responsibilities: Yes  Ambulation Assistance: Independent(uses no AD)  Transfer Assistance: Independent  Active : Yes  Occupation: Retired  Type of occupation:  [employer]    Examination of body systems (includes body structures/functions, activity/participation limitations):  · Observation:  Pt supine in bed upon arrival and agreeable to therapy  · [facility] [place]  · [facility]  [facility]  · Cardiopulmonary:  No O2 needs  · Cognition: slightly impulsive throughout session, see OT/SLP note for further evaluation. Musculoskeletal  · ROM R/L:  WFL. · Strength R LE: 5/5; L LE: 4/5    · Neuro:  Pt reports \"numbness\" sensation in entire L LE and diminished proprioception of L LE      Mobility:  · Rolling L/R:  supervision  · Supine to sit:  supervision  · Transfers: Pt transferred STS from bed CGA and to chair CGA with cues for sequencing. · Sitting balance:  good. · Standing balance:  Fair-.    · Gait: Pt ambulated 40' + 15' with min A and RW with two LOB when turning requiring mod A to recover. Pt ambulated with decreased fortunato, decreased stance phase on L LE, and decreased step length of R LE. Cues provided for walker management, standing posture, and pathway negotiation. [facility] 6 Clicks Inpatient Mobility:  AM-PAC Inpatient Mobility Raw Score : 16      Safety: patient left in chair with alarm on, call light within reach, RN notified, gait belt used. Assessment:  Pt is a 61 y.o. male admitted to the hospital for frequent falls. Pt is typically ambulating and transferring at home without an AD. Pt now requires up to mod A with RW for ambulation and CGA for transfers. Pt is presenting with impaired balance, decreased endurance, impaired proprioception, decreased safety awareness, and impaired transfer status. Pt would benefit from continued acute care PT as well as ARU placement to continue to address impairments after medically discharged. Complexity: moderate  Prognosis: Good, no significant barriers to participation at this time.    Plan Times per week: 3+/week     Equipment: TBD at next level of care    Goals:  Short term goals  Time Frame for Short term goals: 1 week  Short term goal 1: Pt to complete all bed mobility mod I  Short term goal 2: Pt to complete all STS transfers to/from bed, commode, and chair mod I  Short term goal 3: Pt to ambulate 48' with LRAD mod I  Short term goal 4: Pt to perform dynamic standing activities with CGA       Treatment plan:  Bed mobility, transfers, balance, gait, TA, TX.     Recommendations for NURSING mobility: amb mod A RW, gait belt    Time:   Time in: 1053  Time out: 1124  Timed treatment minutes: 23  Total time: 31    Electronically signed by:    Miriam De Luna, PT  11/16/2020, 12:44 PM

## 2020-11-16 NOTE — CARE COORDINATION
CM in to see Pt to discuss discharge planning. Pt from home with wife and plans to return. Pt wife will provide transportation at discharge. Pt has DME to include walker and cane. Pt states that everything is accessible on first floor of home. Pt has insurance, pcp, and can afford medications. Pt denies any needs at this time. CM available if needs arise.

## 2020-11-16 NOTE — CONSULTS
[address], [facility]                                  CONSULTATION    PATIENT NAME: Stephanie Mattson                  :        1960  MED REC NO:   [id number]                          ROOM:       7849  ACCOUNT NO:   [de-identified]                           ADMIT DATE: 11/15/2020  PROVIDER:     Ricky Haro MD    CONSULT DATE:  2020    INDICATION:  Falls. HISTORY OF PRESENT ILLNESS:  This is a 59-year-old male patient,  morbidly obese. He came to the hospital having a fall. He said in the  last 3 to 4 days, he has been having falls at home. He said he has been  tripping on his legs and falling down. He has abrasion to the right  knee present. He denies any loss of consciousness present, which is new  for him these symptoms; therefore, he came in to the hospital.  He is in  a sinus rhythm on the monitor. The patient denies any chest pain. No  shortness of breath. No fever, no chills. No cough or sputum  production. No other  or GI complaints present. The patient does use  a CPAP also. He brought it with him this time. The patient had a Holter done back in 2018, found to have sinus  rhythm, sinus tachycardia, short run of nonsustained ventricular  tachycardia noted. No atrial fibrillation was noted at that time. Some  PACs and PVCs noted. The patient had a TEJ done back in 2018. LV  function was preserved. A small PFO was noted. Left to right shunt was  noted. The patient did have a heart catheterization done back in . Left  main was patent. LAD had mild disease, circ had mild disease, RCA had  50% stenosis and PL branch was a small vessel and 70% stenosis noted,  pulmonary hypertension was noted. PA pressures were 59/26, mean of 41  present. Wedge was also elevated at 26. The patient was placed on  diuretics.     PAST MEDICAL HISTORY:  History of having sleep apnea, uses CPAP, history  of having stroke, history of having pulmonary hypertension with elevated  pressures present, and moderate coronary artery disease noted. The  patient had, I think, MRI in 2018, showed infarct also present. History  of paroxysmal atrial fibrillation, COPD, sleep apnea present, diabetic  retinopathy was noted, diabetes and obesity present. PAST SURGICAL HISTORY:  Tonsillectomy. SOCIAL HISTORY:  He does not smoke and does not drink. ALLERGIES:  NKDA. MEDICATIONS AT HOME:  He is on Xarelto 20 mg a day, Toprol 50 mg once a  day, Plavix, Foltx, Ranexa, potassium, Glucophage and Lasix 80 once a  day. PHYSICAL EXAMINATION:  GENERAL:  The patient is awake, alert and answering questions. VITAL SIGNS:  Temperature is afebrile, pulse is 100 and sinus  tachycardia, blood pressure 153/66. HEENT:  Head is normocephalic and atraumatic. Pupils are equal and  reactive. CHEST:  Equal expansion. LUNGS:  Clear to auscultation. Decreased breath sounds present. HEART:  Tachycardic. ABDOMEN:  Soft and nontender. Bowel sounds are present. No  hepatosplenomegaly or guarding appreciated. EXTREMITIES:  +2 edema present. NEUROLOGIC:  Cranial nerves II through XII are grossly intact. LABORATORY DATA:  Creatinine is 1.2, mag is 1.7. GFR is more than 60. Troponins are elevated, not positive. LFTs are normal.  CBC is in the  normal range. UA suggests some protein in the urine present and blood  also present. EKG shows sinus tachycardia present. The patient had a CT of the abdomen done in the past, found to have 2 mm  distal left ureteral stone was noted. IMPRESSION:  A 49-year-old male patient who comes to the hospital with  having frequent falls present, but no syncope present. From cardiac  stand, I will get a CT of the chest to rule out PE as he has pulmonary  hypertension and right heart failure present.   He did have an ultrasound  of the legs done in 2018 and I think it was negative for DVT at that  time. Further recommendations based on the hospital course. He has paroxysmal atrial fibrillation. He is on anticoagulation  already.         Diamond Frias MD    D: 11/16/2020 8:07:08       T: 11/16/2020 11:58:59     [email or web address]  Job#: [id number]     Doc#: [id number]    CC:

## 2020-11-16 NOTE — PROGRESS NOTES
He has no chest pain   Trop elevated but no ACS  Reviewed his cath from 2017  For now will  Max medical treatment  He came in with leg weakness and fall  CTA negative for PE

## 2020-11-16 NOTE — CONSULTS
[address] [address] Continence Nurse  Consult Note       Issa Rodriguez  AGE: 61 y.o. GENDER: male  : 1960  TODAY'S DATE:  2020    Subjective:     Reason for  Evaluation and Assessment: wound assessment      Issa Rodriguez is a 61 y.o. male referred by:   [x] Physician  [] Nursing  [] Other:     Wound Identification:  Wound Type: venous and traumatic  Contributing Factors: edema, venous stasis, diabetes, decreased mobility and obesity        PAST MEDICAL HISTORY        Diagnosis Date    Atrial fibrillation ([address]     Cerebral artery occlusion with cerebral infarction [facility]     History of cardiac cath     --RCA has mid 50% stenosis and PLB branch has 70% stenosis noted. LVEDP very high    Hyperlipidemia     Hypertension     Type II or unspecified type diabetes mellitus without mention of complication, not stated as uncontrolled     Diagnsed about     Unspecified sleep apnea        PAST SURGICAL HISTORY    Past Surgical History:   Procedure Laterality Date    CARDIAC CATHETERIZATION      TONSILLECTOMY      AT 13YEARS OLD       FAMILY HISTORY    Family History   Problem Relation Age of Onset    Diabetes Mother     Diabetes Father     Cancer Father     Cancer Maternal Grandfather         PROSTATE CANCER       SOCIAL HISTORY    Social History     Tobacco Use    Smoking status: Never Smoker    Smokeless tobacco: Never Used   Substance Use Topics    Alcohol use: No    Drug use: No       ALLERGIES    No Known Allergies    MEDICATIONS    No current facility-administered medications on file prior to encounter.       Current Outpatient Medications on File Prior to Encounter   Medication Sig Dispense Refill    rivaroxaban (XARELTO) 20 MG TABS tablet Take 1 tablet by mouth Daily with supper 30 tablet 0    metoprolol succinate (TOPROL XL) 50 MG extended release tablet Take 1 tablet by mouth daily 30 tablet 0    clopidogrel (PLAVIX) 75 MG tablet Take 1 tablet by mouth daily 30 tablet 0    folic acid-pyridoxine-cyancobalamin (FOLTX) 1.13-25-2 MG TABS Take 1 tablet by mouth daily 30 tablet 0    ranolazine (RANEXA) 500 MG extended release tablet Take 1 tablet by mouth 2 times daily 60 tablet 0    potassium chloride (KLOR-CON M) 20 MEQ extended release tablet Take 1 tablet by mouth daily (with breakfast) 30 tablet 0    Misc. Devices (STRAINER/STAINLESS STEEL/2.5\") MISC 1 Units by Does not apply route as needed (when urinating) 1 each 0    insulin regular human (HUMULIN R U-500 KWIKPEN) 500 UNIT/ML SOPN concentrated injection pen Inject 25-50 Units into the skin 3 times daily (before meals) (Patient taking differently: Inject 50 Units into the skin See Admin Instructions 09/25/18 per instructions patient takes 50 units every morning and dinner) 5 pen 3    metFORMIN (GLUCOPHAGE) 500 MG tablet Take 500 mg by mouth 2 times daily.  furosemide (LASIX) 80 MG tablet Take 80 mg by mouth daily.            Objective:      BP (!) 144/76   Pulse 94   Temp 98.5 °F (36.9 °C) (Oral)   Resp 19   Ht 6' 1\" (1.854 m)   Wt (!) 354 lb (160.6 kg)   SpO2 95%   BMI 46.70 kg/m²   David Risk Score: David Scale Score: 21    LABS    CBC:   Lab Results   Component Value Date    WBC 18.1 11/15/2020    RBC 5.04 11/15/2020    HGB 14.7 11/15/2020    HCT 44.4 11/15/2020    MCV 88.1 11/15/2020    MCH 29.2 11/15/2020    MCHC 33.1 11/15/2020    RDW 14.1 11/15/2020     11/15/2020    MPV 11.1 11/15/2020     CMP:    Lab Results   Component Value Date     11/15/2020    K 3.5 11/15/2020    CL 94 11/15/2020    CO2 25 11/15/2020    BUN 25 11/15/2020    CREATININE 1.2 11/15/2020    GFRAA >60 11/15/2020    AGRATIO 1.6 05/26/2020    LABGLOM >60 11/15/2020    GLUCOSE 90 11/15/2020    PROT 7.4 11/15/2020    LABALBU 3.9 11/15/2020    CALCIUM 9.0 11/15/2020    BILITOT 0.5 11/15/2020    ALKPHOS 77 11/15/2020    AST 39 11/15/2020    ALT 24 11/15/2020     Albumin:    Lab Results   Component Value Date    LABALBU 3.9 11/15/2020     PT/INR:    Lab Results   Component Value Date    PROTIME 14.2 11/20/2018    INR 1.25 11/20/2018     HgBA1c:    Lab Results   Component Value Date    LABA1C 8.5 11/16/2020         Assessment:     Patient Active Problem List   Diagnosis    MARINO on CPAP    New onset atrial fibrillation (HCC)    Type 2 diabetes mellitus with hyperglycemia, with long-term current use of insulin (HCC)    Class 3 severe obesity due to excess calories with body mass index (BMI) of 45.0 to 49.9 in [facility])    History of cardiac cath    Weakness of right arm    Acute CVA (cerebrovascular accident) ([facility]    Spastic hemiparesis of right dominant side ([facility]    Dysphagia due to recent stroke    Essential hypertension    Morbid obesity with body mass index of 45.0-49.9 in [facility])    Frequent falls    Chronic venous stasis dermatitis of both lower extremities    History of CVA (cerebrovascular accident)    Obesity hypoventilation syndrome ([facility] [facility]    Hypertension    Hyperlipidemia       Measurements:  Wound 11/27/18 Right distal posterior leg (Active)   Number of days: 720       Wound 11/28/18 Right mid posterior leg (Active)   Number of days: 718       Wound 11/28/18 Right posterior proximal leg  (Active)   Number of days: 718       Wound 11/16/20 Knee Anterior;Right (Active)   Wound Etiology Traumatic 11/16/20 0845   Dressing Status New dressing applied 11/16/20 0845   Wound Cleansed Cleansed with saline 11/16/20 0845   Dressing/Treatment Silicone border 10/18/17 0845   Wound Length (cm) 1.8 cm 11/16/20 0845   Wound Width (cm) 2.1 cm 11/16/20 0845   Wound Depth (cm) 0.1 cm 11/16/20 0845   Wound Surface Area (cm^2) 3.78 cm^2 11/16/20 0845   Wound Volume (cm^3) 0.38 cm^3 11/16/20 0845   Distance Tunneling (cm) 0 cm 11/16/20 0845   Tunneling Position ___ O'Clock 0 11/16/20 0845   Undermining Starts ___ O'Clock 0 11/16/20 0845   Undermining Ends___ O'Clock 0 11/16/20 0845   Undermining Maxium Distance (cm) 0 11/16/20 0845   Wound Assessment Purple/maroon;Pink/red 11/16/20 0845   Drainage Amount Small 11/16/20 0845   Drainage Description Serosanguinous 11/16/20 0845   Odor None 11/16/20 0845   Berenice-wound Assessment Fragile 11/16/20 0845   Margins Defined edges 11/16/20 0845   Wound Thickness Description not for Pressure Injury Partial thickness 11/16/20 0845   Number of days: 0       Wound 11/16/20 Pretibial Left;Posterior (Active)   Wound Etiology Venous 11/16/20 0845   Dressing Status New dressing applied 11/16/20 0845   Wound Cleansed Cleansed with saline 11/16/20 0845   Wound Length (cm) 3 cm 11/16/20 0845   Wound Width (cm) 2 cm 11/16/20 0845   Wound Depth (cm) 0.1 cm 11/16/20 0845   Wound Surface Area (cm^2) 6 cm^2 11/16/20 0845   Wound Volume (cm^3) 0.6 cm^3 11/16/20 0845   Distance Tunneling (cm) 0 cm 11/16/20 0845   Tunneling Position ___ O'Clock 0 11/16/20 0845   Undermining Starts ___ O'Clock 0 11/16/20 0845   Undermining Ends___ O'Clock 0 11/16/20 0845   Undermining Maxium Distance (cm) 0 11/16/20 0845   Wound Assessment Pink/red;Slough 11/16/20 0845   Drainage Amount Small 11/16/20 0845   Drainage Description Serous 11/16/20 0845   Odor None 11/16/20 0845   Berenice-wound Assessment Dry/flaky 11/16/20 0845   Margins Defined edges 11/16/20 0845   Wound Thickness Description not for Pressure Injury Full thickness 11/16/20 0845   Number of days: 0       Response to treatment:  Well tolerated by patient. Pain Assessment:  Severity:  none  Quality of pain: na  Wound Pain Timing/Severity: na  Premedicated: no    Plan:     Plan of Care: Wound 11/16/20 Knee Anterior;Right-Dressing/Treatment: Silicone border  Wound 11/16/20 Pretibial Left;Posterior-Dressing/Treatment: ([employer], kerlix)     Pt in bed. Agreeable to wound assessment. Bilateral lower legs dry and scaly. Washed with Alphabath, rinsed, and dried. Hydraguard cream applied. Heels intact. Right knee abrasion noted. Pt stated occurred from fall at home.

## 2020-11-16 NOTE — ED PROVIDER NOTES
Emergency [address] [place] [address] EMERGENCY DEPARTMENT    Patient: Nila Mahoney  MRN: [id number]  : 1960  Date of Evaluation: 11/15/2020  ED Provider: John Tian PA-C    Chief Complaint       Chief Complaint   Patient presents with    Fall       [place]     Nila Mahoney is a 61 y.o. male who presents to the emergency department following multiple falls at home. Patient reports 1 fall yesterday while mowing his lawn and then 3 falls at home today. He states his legs just feel weak and give out on him. He denies hitting his head, LOC, n/v.  He is on Xarelto and Plavix. Denies any dizziness or lightheadedness with falls. Denies chest pain, palpitations, sob, cough/congestion, n/v/d. He reports abrasions to the bilateral knees but no other injuries. He did have a stroke about 2 years ago but reports no residual deficits. He is typically ambulatory without assistance at baseline. ROS     CONSTITUTIONAL:  Denies fever. EYES:  Denies visual changes. HEAD:  Denies headache. ENT:  Denies earache, nasal congestion, sore throat. NECK:  Denies neck pain. RESPIRATORY:  Denies any shortness of breath. CARDIOVASCULAR:  Denies chest pain. GI:  Denies nausea or vomiting. :  Denies urinary symptoms. MUSCULOSKELETAL:  + LE weakness. BACK:  Denies back pain. INTEGUMENT:  + abrasions bilateral knees. LYMPHATIC:  Denies lymphadenopathy. NEUROLOGIC:  Denies any numbness/tingling. PSYCHIATRIC:  Denies SI/HI. Past History     Past Medical History:   Diagnosis Date    Atrial fibrillation ([address]     Cerebral artery occlusion with cerebral infarction ([facility] [facility]     History of cardiac cath 2017    --RCA has mid 50% stenosis and PLB branch has 70% stenosis noted.  LVEDP very high    Hyperlipidemia     Hypertension     Type II or unspecified type diabetes mellitus without mention of complication, not stated as uncontrolled     Diagnsed about   Unspecified sleep apnea      Past Surgical History:   Procedure Laterality Date    CARDIAC CATHETERIZATION      TONSILLECTOMY      AT 13YEARS OLD     Social History     Socioeconomic History    Marital status:      Spouse name: None    Number of children: None    Years of education: None    Highest education level: None   Occupational History    None   Social Needs    Financial resource strain: None    Food insecurity     Worry: None     Inability: None    Transportation needs     Medical: None     Non-medical: None   Tobacco Use    Smoking status: Never Smoker    Smokeless tobacco: Never Used   Substance and Sexual Activity    Alcohol use: No    Drug use: No    Sexual activity: None   Lifestyle    Physical activity     Days per week: None     Minutes per session: None    Stress: None   Relationships    Social connections     Talks on phone: None     Gets together: None     Attends Jew service: None     Active member of club or organization: None     Attends meetings of clubs or organizations: None     Relationship status: None    Intimate partner violence     Fear of current or ex partner: None     Emotionally abused: None     Physically abused: None     Forced sexual activity: None   Other Topics Concern    None   Social History Narrative    None       Medications/Allergies     Previous Medications    CLOPIDOGREL (PLAVIX) 75 MG TABLET    Take 1 tablet by mouth daily    FOLIC ACID-PYRIDOXINE-CYANCOBALAMIN (FOLTX) 1.13-25-2 MG TABS    Take 1 tablet by mouth daily    FUROSEMIDE (LASIX) 80 MG TABLET    Take 80 mg by mouth daily. INSULIN REGULAR HUMAN (HUMULIN R U-500 KWIKPEN) 500 UNIT/ML SOPN CONCENTRATED INJECTION PEN    Inject 25-50 Units into the skin 3 times daily (before meals)    METFORMIN (GLUCOPHAGE) 500 MG TABLET    Take 500 mg by mouth 2 times daily. METOPROLOL SUCCINATE (TOPROL XL) 50 MG EXTENDED RELEASE TABLET    Take 1 tablet by mouth daily    MISC.  DEVICES (STRAINER/STAINLESS STEEL/2.5\") MISC    1 Units by Does not apply route as needed (when urinating)    POTASSIUM CHLORIDE (KLOR-CON M) 20 MEQ EXTENDED RELEASE TABLET    Take 1 tablet by mouth daily (with breakfast)    RANOLAZINE (RANEXA) 500 MG EXTENDED RELEASE TABLET    Take 1 tablet by mouth 2 times daily    RIVAROXABAN (XARELTO) 20 MG TABS TABLET    Take 1 tablet by mouth Daily with supper     No Known Allergies     Physical Exam       ED Triage Vitals [11/15/20 2021]   BP Temp Temp Source Pulse Resp SpO2 Height Weight   (!) 116/55 99.7 °F (37.6 °C) Oral 105 18 97 % 6' 1\" (1.854 m) (!) 330 lb (149.7 kg)     GENERAL APPEARANCE:  Well-developed, well-nourished, no acute distress. HEAD:  NC/AT. EYES:  Sclera anicteric. ENT:  Ears, nose, mouth normal.     NECK:  Supple. CARDIO:  RRR. LUNGS:   CTAB. Respirations unlabored. ABDOMEN:  Soft, non-distended, non-tender. BS active. EXTREMITIES:  No acute deformities. 2+ edema of bilateral LE with chronic skin changes--patient reports unchanged. SKIN:  Warm and dry. Abrasions to bilateral knees. NEUROLOGICAL:  Alert and oriented. PSYCHIATRIC:  Normal mood.      Diagnostics     Labs:  Results for orders placed or performed during the hospital encounter of 11/15/20   CBC Auto Differential   Result Value Ref Range    WBC 18.1 (H) 4.0 - 10.5 K/CU MM    RBC 5.04 4.6 - 6.2 M/CU MM    Hemoglobin 14.7 13.5 - 18.0 GM/DL    Hematocrit 44.4 42 - 52 %    MCV 88.1 78 - 100 FL    MCH 29.2 27 - 31 PG    MCHC 33.1 32.0 - 36.0 %    RDW 14.1 11.7 - 14.9 %    Platelets 165 603 - 467 K/CU MM    MPV 11.1 7.5 - 11.1 FL    Differential Type AUTOMATED DIFFERENTIAL     Segs Relative 89.6 (H) 36 - 66 %    Lymphocytes % 3.1 (L) 24 - 44 %    Monocytes % 6.4 (H) 0 - 4 %    Eosinophils % 0.0 0 - 3 %    Basophils % 0.1 0 - 1 %    Segs Absolute 16.2 K/CU MM    Lymphocytes Absolute 0.6 K/CU MM    Monocytes Absolute 1.2 K/CU MM    Eosinophils Absolute 0.0 K/CU MM    Basophils Absolute 0.0 K/CU MM    Nucleated RBC % 0.0 %    Total Nucleated RBC 0.0 K/CU MM    Total Immature Neutrophil 0.14 K/CU MM    Immature Neutrophil % 0.8 (H) 0 - 0.43 %   Comprehensive Metabolic Panel w/ Reflex to MG   Result Value Ref Range    Sodium 136 135 - 145 MMOL/L    Potassium 3.5 3.5 - 5.1 MMOL/L    Chloride 94 (L) 99 - 110 mMol/L    CO2 25 21 - 32 MMOL/L    BUN 25 (H) 6 - 23 MG/DL    CREATININE 1.2 0.9 - 1.3 MG/DL    Glucose 90 70 - 99 MG/DL    Calcium 9.0 8.3 - 10.6 MG/DL    Alb 3.9 3.4 - 5.0 GM/DL    Total Protein 7.4 6.4 - 8.2 GM/DL    Total Bilirubin 0.5 0.0 - 1.0 MG/DL    ALT 24 10 - 40 U/L    AST 39 (H) 15 - 37 IU/L    Alkaline Phosphatase 77 40 - 129 IU/L    GFR Non-African American >60 >60 mL/min/1.73m2    GFR African American >60 >60 mL/min/1.73m2    Anion Gap 17 (H) 4 - 16   Troponin   Result Value Ref Range    Troponin T 0.017 (H) <0.01 NG/ML   Brain Natriuretic Peptide   Result Value Ref Range    Pro-.7 (H) <300 PG/ML   Magnesium   Result Value Ref Range    Magnesium 1.7 (L) 1.8 - 2.4 mg/dl   EKG 12 Lead   Result Value Ref Range    Ventricular Rate 103 BPM    Atrial Rate 103 BPM    P-R Interval 124 ms    QRS Duration 96 ms    Q-T Interval 334 ms    QTc Calculation (Bazett) 437 ms    P Axis 110 degrees    R Axis -20 degrees    T Axis 152 degrees    Diagnosis       Sinus tachycardia  Inferior infarct (cited on or before 18-NOV-2018)  ST & T wave abnormality, consider lateral ischemia  Abnormal ECG  When compared with ECG of 30-NOV-2018 10:32,  Nonspecific T wave abnormality has replaced inverted T waves in Inferior leads  Inverted T waves have replaced nonspecific T wave abnormality in Lateral leads         Radiographs:  Ct Head Wo Contrast    Result Date: 11/15/2020  EXAMINATION: CT OF THE HEAD WITHOUT CONTRAST  11/15/2020 9:07 pm TECHNIQUE: CT of the head was performed without the administration of intravenous contrast. Dose modulation, iterative reconstruction, and/or weight based adjustment of the mA/kV was utilized to reduce the radiation dose to as low as reasonably achievable. COMPARISON: MRI brain 11/19/2018. CT brain 11/18/2018. HISTORY: ORDERING SYSTEM PROVIDED HISTORY: frequent falls TECHNOLOGIST PROVIDED HISTORY: Reason for exam:->frequent falls Has a \"code stroke\" or \"stroke alert\" been called? ->No Reason for Exam: fall Acuity: Acute Type of Exam: Initial Mechanism of Injury: Pt was brought in by EMS from home because the pt has been having frequent falls. Pt states that he has been falling due to bilateral leg weakness and balance issues. Pt states that he had a stroke two years ago, but denies any permanent deficits. Relevant Medical/Surgical History: alert FINDINGS: BRAIN/VENTRICLES: There is no acute intracranial hemorrhage, mass effect or midline shift. No abnormal extra-axial fluid collection. The gray-white differentiation is maintained without evidence of an acute infarct. There is no evidence of hydrocephalus. ORBITS: The visualized portion of the orbits demonstrate no acute abnormality. SINUSES: The visualized paranasal sinuses and mastoid air cells demonstrate no acute abnormality. SOFT TISSUES/SKULL:  No acute abnormality of the visualized skull or soft tissues. No acute intracranial abnormality. Ct Cervical Spine Wo Contrast    Result Date: 11/15/2020  EXAMINATION: CT OF THE CERVICAL SPINE WITHOUT CONTRAST 11/15/2020 9:07 pm TECHNIQUE: CT of the cervical spine was performed without the administration of intravenous contrast. Multiplanar reformatted images are provided for review. Dose modulation, iterative reconstruction, and/or weight based adjustment of the mA/kV was utilized to reduce the radiation dose to as low as reasonably achievable. COMPARISON: None.  HISTORY: ORDERING SYSTEM PROVIDED HISTORY: falls TECHNOLOGIST PROVIDED HISTORY: Reason for exam:->falls Reason for Exam: fall Acuity: Acute Type of Exam: Initial Mechanism of Injury: Pt was brought in by EMS from home because the pt has been having frequent falls. Pt states that he has been falling due to bilateral leg weakness and balance issues. Pt states that he had a stroke two years ago, but denies any permanent deficits. Relevant Medical/Surgical History: pain FINDINGS: BONES/ALIGNMENT: There is no acute fracture or traumatic malalignment. DEGENERATIVE CHANGES: Multilevel degenerative disc disease most pronounced at C5-6 to a moderate degree. SOFT TISSUES: There is no prevertebral soft tissue swelling. No acute abnormality of the cervical spine. Xr Chest Portable    Result Date: 11/15/2020  EXAMINATION: ONE XRAY VIEW OF THE CHEST 11/15/2020 8:25 pm COMPARISON: 11/18/2018. HISTORY: ORDERING SYSTEM PROVIDED HISTORY: falls TECHNOLOGIST PROVIDED HISTORY: Reason for exam:->falls FINDINGS: The cardiac silhouette appears magnified and likely enlarged, as before. No confluent airspace opacity, pleural effusion or pneumothorax is seen. Magnified likely enlarged cardiac silhouette as seen on the prior study. No radiographic evidence of acute pulmonary abnormality       Procedures/EKG:   Please see attending physician's note for interpretation. ED Course and MDM   -  Patient seen and evaluated in the emergency department. -  Triage and nursing notes reviewed and incorporated. -  Old chart records reviewed and incorporated. -  Supervising physician was Dr. Belen Jacobsen. Patient was seen independently. -  Work-up included:  See above  -  Results discussed with patient. CT head and c-spine negative. CXR with stable cardiomegaly. Leukocytosis of 18,000. Troponin is detectable at 0.017. Renal function is normal.  Electrolytes WNL. I do recommend admission for serial troponins and PT/OT eval, possible rehab placement. I spoke with Dr. Harshil Inman, hospitalist, who will see and admit.     In light of current events, I did utilize appropriate PPE (including N95 and surgical face mask, safety glasses, and gloves, as recommended by the health facility/national standard best practice, during my bedside interactions with the patient. Final Impression      1. Multiple falls    2. Elevated troponin    3. Abrasions of multiple sites    4.  Leukocytosis, unspecified type          DISPOSITION        Lou Castaneda, [address] [place] [address]  11/16/20 0586

## 2020-11-16 NOTE — PROGRESS NOTES
Occupational Therapy    [facility] ACUTE CARE OCCUPATIONAL THERAPY EVALUATION    Pooja Rivera, 1960, 3030/3030-A, 11/16/2020    Discharge Recommendation: Inpatient Rehabilitation      History:  [place]:  The primary encounter diagnosis was Multiple falls. Diagnoses of Elevated troponin, Abrasions of multiple sites, and Leukocytosis, unspecified type were also pertinent to this visit. Subjective:  Patient states: \"My left leg feels a little off right now. \"  Pain: Pt denied pain this date  Communication with other providers: PT Reynaldo Jain  Restrictions: General Precautions, Fall Risk, Telemetry, Pulse Ox, BP cuff, IV, Bed/chair alarm    Home Setup/Prior level of function:  Social/Functional History  Lives With: Spouse  Type of Home: House  Home Layout: Multi-level  Home Access: Stairs to enter with rails  Entrance Stairs - Number of Steps: 7  Entrance Stairs - Rails: Left  Bathroom Shower/Tub: Tub/Shower unit (typically stands)  Bathroom Toilet: Standard  Bathroom Equipment: Tub transfer bench  Bathroom Accessibility: Accessible  Home Equipment: Rolling walker, Cane, Reacher  ADL Assistance: Independent  Homemaking Assistance: Independent  Homemaking Responsibilities: Yes  Ambulation Assistance: Independent (uses no AD)  Transfer Assistance: Independent  Active : Yes  Occupation: Retired  Type of occupation: [employer]    Examination:  · Observation: Supine in bed upon arrival. Agreeable to evaluation. Voiced needing to urinate.    · Vision: [facility] (denies new visual changes)  · Hearing: WFL  · Vitals: Stable vitals throughout session    Body Systems and functions:  · ROM: WNL all joints in BL UEs   · Strength: 4+/5 MMT all major muscle groups BL UEs (no asymmetries in UEs)  · Sensation: WFL (denies numbness/tingling)  · Tone: Normal  · Coordination: WFL for ADLs    Activities of Daily Living (ADLs):  · Feeding: Independent   · Grooming: CGA (completed hand hygiene in standing at sink; mod cues for safe RW placement at sink and maintaining trunk extension/upright posture throughout task)  · UB bathing: SBA   · LB bathing: Mod A (reaching distal LEs/buttocks)  · UB dressing: SBA (donning clean robe seated EOB)  · LB dressing: Max A (dependent with donning BL socks, anticipate pt able to participate in mgmt of brief to hips in standing)  · Toileting: CGA (pt urinated in regular toilet while standing; mod cues for safe RW placement over toilet, pt able to manage clothing and aim appropriately)    Cognitive and Psychosocial Functioning:  · Overall cognitive status: WFL (for ADLs; old CVA, mildly delayed thought processing and response time)  · Affect: Normal     Balance:   · Sitting: SBA in unsupported sitting EOB  · Standing: CGA with RW    Functional Mobility:  · Bed Mobility: SBA supine to sitting EOB (HOB elevated to 20', utilized bed rail)  · Transfers: CGA sit to stand from bed, Min A stand to sit to recliner (mod cues for safe hand placement each direction)  · Ambulation: Min A with RW to/from bathroom for ADLs; 2 episodes of LOB with turning in bathroom requiring mod A for postural correction. Pt at risk for continued falls. AM-PAC 6 click short form for inpatient daily activity:   How much help from another person does the patient currently need. .. Unable  Dep A Lot  Max A A Lot   Mod A A Little  Min A A Little   CGA  SBA None   Mod I  Indep  Sup   1. Putting on and taking off regular lower body clothing? [] 1    [x] 2   [] 2   [] 3   [] 3   [] 4      2. Bathing (including washing, rinsing, drying)? [] 1   [] 2   [x] 2 [] 3 [] 3 [] 4   3. Toileting, which includes using toilet, bedpan, or urinal? [] 1    [] 2   [] 2   [] 3   [x] 3   [] 4     4. Putting on and taking off regular upper body clothing? [] 1   [] 2   [] 2   [] 3   [x] 3    [] 4      5. Taking care of personal grooming such as brushing teeth? [] 1   [] 2    [] 2 [] 3    [x] 3   [] 4      6. Eating meals?    [] 1   [] 2   [] 2   [] 3 tasks      Time:   Time in: 1052  Time out: 1124  Timed treatment minutes: 12  Total time: 32      Electronically signed by:    YRIS Box/L, [place], [id number]

## 2020-11-16 NOTE — ED TRIAGE NOTES
Pt was brought in by EMS from home because the pt has been having frequent falls. Pt states that he has been falling due to bilateral leg weakness and balance issues. Pt states that he had a stroke two years ago, but denies any permanent deficits.

## 2020-11-17 VITALS
HEIGHT: 73 IN | TEMPERATURE: 98 F | RESPIRATION RATE: 22 BRPM | HEART RATE: 93 BPM | DIASTOLIC BLOOD PRESSURE: 66 MMHG | SYSTOLIC BLOOD PRESSURE: 137 MMHG | OXYGEN SATURATION: 97 % | BODY MASS INDEX: 41.75 KG/M2 | WEIGHT: 315 LBS

## 2020-11-17 LAB
ALBUMIN SERPL-MCNC: 3.5 GM/DL (ref 3.4–5)
ALP BLD-CCNC: 62 IU/L (ref 40–128)
ALT SERPL-CCNC: 32 U/L (ref 10–40)
ANION GAP SERPL CALCULATED.3IONS-SCNC: 11 MMOL/L (ref 4–16)
AST SERPL-CCNC: 50 IU/L (ref 15–37)
BASOPHILS ABSOLUTE: 0 K/CU MM
BASOPHILS RELATIVE PERCENT: 0.2 % (ref 0–1)
BILIRUB SERPL-MCNC: 0.3 MG/DL (ref 0–1)
BUN BLDV-MCNC: 34 MG/DL (ref 6–23)
CALCIUM SERPL-MCNC: 8.2 MG/DL (ref 8.3–10.6)
CHLORIDE BLD-SCNC: 95 MMOL/L (ref 99–110)
CO2: 29 MMOL/L (ref 21–32)
CREAT SERPL-MCNC: 1.1 MG/DL (ref 0.9–1.3)
DIFFERENTIAL TYPE: ABNORMAL
EKG ATRIAL RATE: 102 BPM
EKG ATRIAL RATE: 87 BPM
EKG DIAGNOSIS: NORMAL
EKG DIAGNOSIS: NORMAL
EKG P AXIS: -29 DEGREES
EKG P-R INTERVAL: 112 MS
EKG Q-T INTERVAL: 326 MS
EKG Q-T INTERVAL: 362 MS
EKG QRS DURATION: 78 MS
EKG QRS DURATION: 98 MS
EKG QTC CALCULATION (BAZETT): 435 MS
EKG QTC CALCULATION (BAZETT): 492 MS
EKG R AXIS: -12 DEGREES
EKG R AXIS: 99 DEGREES
EKG T AXIS: 107 DEGREES
EKG T AXIS: 261 DEGREES
EKG VENTRICULAR RATE: 137 BPM
EKG VENTRICULAR RATE: 87 BPM
EOSINOPHILS ABSOLUTE: 0 K/CU MM
EOSINOPHILS RELATIVE PERCENT: 0.3 % (ref 0–3)
GFR AFRICAN AMERICAN: >60 ML/MIN/1.73M2
GFR NON-AFRICAN AMERICAN: >60 ML/MIN/1.73M2
GLUCOSE BLD-MCNC: 138 MG/DL (ref 70–99)
GLUCOSE BLD-MCNC: 201 MG/DL (ref 70–99)
GLUCOSE BLD-MCNC: 217 MG/DL (ref 70–99)
GLUCOSE BLD-MCNC: 220 MG/DL (ref 70–99)
GLUCOSE BLD-MCNC: 270 MG/DL (ref 70–99)
HCT VFR BLD CALC: 38.7 % (ref 42–52)
HEMOGLOBIN: 12.4 GM/DL (ref 13.5–18)
IMMATURE NEUTROPHIL %: 0.4 % (ref 0–0.43)
LYMPHOCYTES ABSOLUTE: 1.2 K/CU MM
LYMPHOCYTES RELATIVE PERCENT: 11.8 % (ref 24–44)
MCH RBC QN AUTO: 28.6 PG (ref 27–31)
MCHC RBC AUTO-ENTMCNC: 32 % (ref 32–36)
MCV RBC AUTO: 89.2 FL (ref 78–100)
MONOCYTES ABSOLUTE: 1.3 K/CU MM
MONOCYTES RELATIVE PERCENT: 13.2 % (ref 0–4)
NUCLEATED RBC %: 0 %
PDW BLD-RTO: 13.8 % (ref 11.7–14.9)
PLATELET # BLD: 216 K/CU MM (ref 140–440)
PMV BLD AUTO: 10.9 FL (ref 7.5–11.1)
POTASSIUM SERPL-SCNC: 4.1 MMOL/L (ref 3.5–5.1)
RBC # BLD: 4.34 M/CU MM (ref 4.6–6.2)
SEGMENTED NEUTROPHILS ABSOLUTE COUNT: 7.3 K/CU MM
SEGMENTED NEUTROPHILS RELATIVE PERCENT: 74.1 % (ref 36–66)
SODIUM BLD-SCNC: 135 MMOL/L (ref 135–145)
TOTAL IMMATURE NEUTOROPHIL: 0.04 K/CU MM
TOTAL NUCLEATED RBC: 0 K/CU MM
TOTAL PROTEIN: 5.9 GM/DL (ref 6.4–8.2)
TROPONIN T: 0.01 NG/ML
WBC # BLD: 9.9 K/CU MM (ref 4–10.5)

## 2020-11-17 PROCEDURE — 85025 COMPLETE CBC W/AUTO DIFF WBC: CPT

## 2020-11-17 PROCEDURE — 90686 IIV4 VACC NO PRSV 0.5 ML IM: CPT | Performed by: STUDENT IN AN ORGANIZED HEALTH CARE EDUCATION/TRAINING PROGRAM

## 2020-11-17 PROCEDURE — 6360000002 HC RX W HCPCS: Performed by: STUDENT IN AN ORGANIZED HEALTH CARE EDUCATION/TRAINING PROGRAM

## 2020-11-17 PROCEDURE — G0008 ADMIN INFLUENZA VIRUS VAC: HCPCS | Performed by: STUDENT IN AN ORGANIZED HEALTH CARE EDUCATION/TRAINING PROGRAM

## 2020-11-17 PROCEDURE — 6370000000 HC RX 637 (ALT 250 FOR IP): Performed by: STUDENT IN AN ORGANIZED HEALTH CARE EDUCATION/TRAINING PROGRAM

## 2020-11-17 PROCEDURE — 93010 ELECTROCARDIOGRAM REPORT: CPT | Performed by: INTERNAL MEDICINE

## 2020-11-17 PROCEDURE — G0378 HOSPITAL OBSERVATION PER HR: HCPCS

## 2020-11-17 PROCEDURE — 84484 ASSAY OF TROPONIN QUANT: CPT

## 2020-11-17 PROCEDURE — 80053 COMPREHEN METABOLIC PANEL: CPT

## 2020-11-17 PROCEDURE — 2580000003 HC RX 258: Performed by: STUDENT IN AN ORGANIZED HEALTH CARE EDUCATION/TRAINING PROGRAM

## 2020-11-17 PROCEDURE — 94761 N-INVAS EAR/PLS OXIMETRY MLT: CPT

## 2020-11-17 PROCEDURE — 82962 GLUCOSE BLOOD TEST: CPT

## 2020-11-17 PROCEDURE — 93005 ELECTROCARDIOGRAM TRACING: CPT | Performed by: STUDENT IN AN ORGANIZED HEALTH CARE EDUCATION/TRAINING PROGRAM

## 2020-11-17 PROCEDURE — 93005 ELECTROCARDIOGRAM TRACING: CPT | Performed by: INTERNAL MEDICINE

## 2020-11-17 PROCEDURE — 36415 COLL VENOUS BLD VENIPUNCTURE: CPT

## 2020-11-17 RX ORDER — INSULIN HUMAN 500 [IU]/ML
0-30 INJECTION, SOLUTION SUBCUTANEOUS
Qty: 5 PEN | Refills: 0 | Status: ON HOLD | OUTPATIENT
Start: 2020-11-17 | End: 2022-09-13 | Stop reason: HOSPADM

## 2020-11-17 RX ORDER — METOPROLOL SUCCINATE 50 MG/1
50 TABLET, EXTENDED RELEASE ORAL 2 TIMES DAILY
Status: DISCONTINUED | OUTPATIENT
Start: 2020-11-17 | End: 2020-11-17 | Stop reason: HOSPADM

## 2020-11-17 RX ORDER — INSULIN HUMAN 500 [IU]/ML
15 INJECTION, SOLUTION SUBCUTANEOUS
Qty: 3 PEN | Refills: 0 | Status: SHIPPED | OUTPATIENT
Start: 2020-11-17 | End: 2021-05-25 | Stop reason: CLARIF

## 2020-11-17 RX ADMIN — INFLUENZA A VIRUS A/VICTORIA/2454/2019 IVR-207 (H1N1) ANTIGEN (PROPIOLACTONE INACTIVATED), INFLUENZA A VIRUS A/HONG KONG/2671/2019 IVR-208 (H3N2) ANTIGEN (PROPIOLACTONE INACTIVATED), INFLUENZA B VIRUS B/VICTORIA/705/2018 BVR-11 ANTIGEN (PROPIOLACTONE INACTIVATED), INFLUENZA B VIRUS B/PHUKET/3073/2013 BVR-1B ANTIGEN (PROPIOLACTONE INACTIVATED) 0.5 ML: 15; 15; 15; 15 INJECTION, SUSPENSION INTRAMUSCULAR at 15:37

## 2020-11-17 RX ADMIN — CLOPIDOGREL BISULFATE 75 MG: 75 TABLET ORAL at 09:46

## 2020-11-17 RX ADMIN — POTASSIUM CHLORIDE 20 MEQ: 1500 TABLET, EXTENDED RELEASE ORAL at 09:46

## 2020-11-17 RX ADMIN — METOPROLOL SUCCINATE 50 MG: 50 TABLET, EXTENDED RELEASE ORAL at 09:46

## 2020-11-17 RX ADMIN — Medication 1 TABLET: at 09:46

## 2020-11-17 RX ADMIN — RANOLAZINE 500 MG: 500 TABLET, EXTENDED RELEASE ORAL at 09:46

## 2020-11-17 RX ADMIN — SODIUM CHLORIDE, PRESERVATIVE FREE 10 ML: 5 INJECTION INTRAVENOUS at 09:58

## 2020-11-17 RX ADMIN — FUROSEMIDE 80 MG: 40 TABLET ORAL at 09:46

## 2020-11-17 ASSESSMENT — PAIN SCALES - GENERAL
PAINLEVEL_OUTOF10: 0
PAINLEVEL_OUTOF10: 0

## 2020-11-17 NOTE — CARE COORDINATION
LSW read PT/OT are recommending ARU. LSW spoke with pt and he is not agreeable to go to ARU. Pt requested to go home with [facility]. [facility] list given to pt and pt requested [facility]. LSW PS Dr. Sasha Peace and asked for a [facility] order for nursing and PT/OT. LSW PS Raiza with [facility] and gave referral. If [facility] is able to take pt Phu Santiago will initiate [facility] services.

## 2020-11-17 NOTE — PROGRESS NOTES
[facility] Liaison spoke with pt and is aware of discharge & will initiate Deo Moreno.

## 2020-11-17 NOTE — DISCHARGE SUMMARY
[address] Hospitalist     Discharge Summary    Name:  Lawyer Marie /Age/Sex: 1960  (61 y.o. male)   MRN & CSN:  [id number] & [id number] Admission Date/Time: 11/15/2020  8:19 PM   Attending:  Modesto Hill MD Discharging Physician: Modesto Hill MD     HPI:     Please, see admission HPI in [address] and patient's hospital course below    Hospital Course:   Lawyer Marie is a 61 y.o.  male  who presents with Frequent falls  and the following assessments reflect patient's hospital course     Frequent falls    Patient is neurologically intact with no focal neurologic   PT/OT, neurochecks, orthostatic blood pressure and pulse, and telemetry  Low suspicion for CVA at this time  Therapy recommended ARU but patient declined and wanted to go home with PT/OT which is ordered     Detectable troponin - no chest pain - no significance     Without chest pain  No significant elevation in troponin  Cardiology consult, appreciate recommendations     Lower extremity venous stasis with dermatitis -Wound care follow-up as an outpatient     Hematuria  Denies per patient  Noted to have a large amount in UA  Would recommend outpatient follow-up with urology    DM type II -not well controlled -evaluated by endocrinology and was started on regular insulin U-500, advised to be very cautious in dosage amount -prescription provided, counseling on follow-up with endocrinology    Other chronic medical conditions:  A. Fib: Continue home Xarelto, and Toprol  Hypertension continue home medication  MARINO on CPAP: Continue home CPAP  HFpEF: Continue home medications  History of CVA: Continue home medications, neurologically intact as above  Morbid obesity: Recommend extensive lifestyle modifications    Patient is hemodynamically stable for DC to home meds PT/OT    The patient expressed appropriate understanding of and agreement with the discharge recommendations, medications, and plan.      Consults this admission:  IP CONSULT TO HOSPITALIST  IP CONSULT TO CARDIOLOGY  IP CONSULT TO CASE MANAGEMENT  IP CONSULT TO ENDOCRINOLOGY  IP CONSULT TO RESPIRATORY CARE    Discharge Instruction:   Follow up appointments: Cardiology, urology, wound care clinic  Primary care physician:  within 1 to 2 weeks    Diet:  diabetic diet   Activity: activity as tolerated  Disposition: Discharged to:   []Home, [x]C, []SNF, []Acute Rehab, []Hospice   Condition on discharge: Stable    Discharge Medications:      [facility] OKI:[id number]    Printed on:11/17/20 3283   Medication Information                      clopidogrel (PLAVIX) 75 MG tablet  Take 1 tablet by mouth daily             folic acid-pyridoxine-cyancobalamin (FOLTX) 1.13-25-2 MG TABS  Take 1 tablet by mouth daily             furosemide (LASIX) 80 MG tablet  Take 80 mg by mouth daily. insulin regular human (HUMULIN R U-500 KWIKPEN) 500 UNIT/ML SOPN concentrated injection pen  Inject 25-50 Units into the skin 3 times daily (before meals)             metFORMIN (GLUCOPHAGE) 500 MG tablet  Take 500 mg by mouth 2 times daily. metoprolol succinate (TOPROL XL) 50 MG extended release tablet  Take 1 tablet by mouth daily             Misc.  Devices (STRAINER/STAINLESS STEEL/2.5\") MISC  1 Units by Does not apply route as needed (when urinating)             potassium chloride (KLOR-CON M) 20 MEQ extended release tablet  Take 1 tablet by mouth daily (with breakfast)             ranolazine (RANEXA) 500 MG extended release tablet  Take 1 tablet by mouth 2 times daily             rivaroxaban (XARELTO) 20 MG TABS tablet  Take 1 tablet by mouth Daily with supper                 Subjective _patient is sitting at the edge of the bed was walker at hand, feels better than when he came in and would like to go home with therapy instead of staying at ARU    Objective Findings at Discharge:   /66   Pulse 93   Temp 98 °F (36.7 °C) (Oral)   Resp 22   Ht 6' 1\" (1.854 m)   Wt (!) 353 lb 9.6 oz (160.4 kg)   SpO2 97%   BMI 46.65 kg/m²            PHYSICAL EXAM   GEN Awake male, resting in bed in no apparent distress. Appears given age. RESP Clear to auscultation, no wheezes, rales or rhonchi. CARDIO/VAS - S1/S2 auscultated. Regular rate without appreciable murmurs, rubs, or gallops. Peripheral pulses equal bilaterally and palpable. No peripheral edema. GI Abdomen is soft without significant tenderness, masses, or guarding. Bowel sounds are normoactive  MSK No gross joint deformities. Spontaneous movement of all extremities  SKIN Normal coloration, warm, dry. NEURO Cranial nerves appear grossly intact, normal speech, no lateralizing weakness.     BMP/CBC  Recent Labs     11/15/20  2028 11/17/20  0607    135   K 3.5 4.1   CL 94* 95*   CO2 25 29   BUN 25* 34*   CREATININE 1.2 1.1   WBC 18.1* 9.9   HCT 44.4 38.7*    216         Discharge Time of 33 minutes    Electronically signed by Dougie Gale MD on 11/17/2020 at 1:14 PM

## 2020-11-17 NOTE — CONSULTS
Endocrinology   Consult Note      Dear Doctor Gregory Mejias     Thank You for the Consult     Pt. Was Admitted for : Leg weakness and history of falling episodes    Reason for Consult: Better control of blood glucose    History Obtained From:  Patient/ EMR       HISTORY OF PRESENT ILLNESS:                The patient is a 61 y.o. male with significant past medical history of type 2 diabetes mellitus, morbid obesity, atrial fibrillation, hyperlipidemia, hypertension, sleep apnea using CPAP and CVA was admitted to hospital for falling down episode and unstable gait assisted patient has had CVA and has recovered almost but lately noted to have leg weakness and giveaway legs and falling down frequently. I was  consulted for better control of blood glucose. ROS:   Pt's ROS done in detail. Abnormal ROS are noted in Medical and Surgical History Section below: Other Medical History:        Diagnosis Date    Atrial fibrillation ([address]     Cerebral artery occlusion with cerebral infarction ([address]     History of cardiac cath 2017    --RCA has mid 50% stenosis and PLB branch has 70% stenosis noted. LVEDP very high    Hyperlipidemia     Hypertension     Type II or unspecified type diabetes mellitus without mention of complication, not stated as uncontrolled     Diagnsed about 1998    Unspecified sleep apnea      Surgical History:        Procedure Laterality Date    CARDIAC CATHETERIZATION      TONSILLECTOMY      AT 13YEARS OLD       Allergies:  Patient has no known allergies.     Family History:       Problem Relation Age of Onset    Diabetes Mother     Diabetes Father     Cancer Father     Cancer Maternal Grandfather         PROSTATE CANCER     REVIEW OF SYSTEMS:  Review of System Done as noted above     PHYSICAL EXAM:      Vitals:    /77   Pulse 80   Temp 98.2 °F (36.8 °C) (Oral)   Resp 14   Ht 6' 1\" (1.854 m)   Wt (!) 354 lb (160.6 kg)   SpO2 97%   BMI 46.70 kg/m²     CONSTITUTIONAL:  awake, alert, cooperative, appears stated age   EYES:  vision intact Fundoscopic Exam not performed   ENT:Normal  NECK:  Supple, No JVD. Thyroid Exam:Normal   LUNGS:  Has Vesicular Breath Sounds, some wheezing and rales  CARDIOVASCULAR: Atrial fibrillation ABDOMEN:  No scars, normal bowel sounds, soft, non-distended, non-tender, no masses palpated, no hepatolienomegaly  Musculoskeletal: Normal  Extremities: Normal, peripheral pulses normal, , has massive  edema of both feet   NEUROLOGIC:  Awake, alert, oriented to name, place and time. Cranial nerves II-XII are grossly intact. Motor is  intact. Sensory neuropathy. ,  and gait is abnormal.  And unstable uses cane or walker at times    DATA:    CBC:   Recent Labs     11/15/20  2028   WBC 18.1*   HGB 14.7       CMP:  Recent Labs     11/15/20  2028      K 3.5   CL 94*   CO2 25   BUN 25*   CREATININE 1.2   CALCIUM 9.0   PROT 7.4   LABALBU 3.9   BILITOT 0.5   ALKPHOS 77   AST 39*   ALT 24     Lipids:   Lab Results   Component Value Date    CHOL 236 05/26/2020    HDL 42 05/26/2020    TRIG 175 05/26/2020     Glucose:   Recent Labs     11/16/20 2058 11/16/20  2113 11/16/20  2136   POCGLU 56* 56* 71     Hemoglobin A1C:   Lab Results   Component Value Date    LABA1C 8.5 11/16/2020     Free T4: No results found for: T4FREE  Free T3: No results found for: FT3  TSH High Sensitivity:   Lab Results   Component Value Date    TSHHS 1.800 08/29/2019       Ct Head Wo Contrast    Result Date: 11/15/2020  EXAMINATION: CT OF THE HEAD WITHOUT CONTRAST  11/15/2020 9:07 pm     No acute intracranial abnormality. Ct Cervical Spine Wo Contrast    Result Date: 11/15/2020  EXAMINATION: CT OF THE CERVICAL SPINE WITHOUT CONTRAST 11/15/2020 9:07 pm        No acute abnormality of the cervical spine. Xr Chest Portable    Result Date: 11/15/2020  EXAMINATION: ONE XRAY VIEW OF THE CHEST 11/15/2020 8:25 pm COMPARISON: 11/18/2018.  HISTORY: ORDERING SYSTEM PROVIDED HISTORY: falls TECHNOLOGIST PROVIDED HISTORY: Reason for exam:->falls FINDINGS: The cardiac silhouette appears magnified and likely enlarged, as before. No   fluent airspace opacity, pleural effusion or pneumothorax is seen. Magnified likely enlarged cardiac silhouette as seen on the prior study. No radiographic evidence of acute pulmonary abnormality     Cta Pulmonary W Contrast    Result Date: 11/16/2020  EXAMINATION: CTA OF THE CHEST 11/16/2020 10:18 am     1. No central pulmonary emboli. Segmental and subsegmental emboli cannot be excluded given the motion artifact. 2. Debris noted in the esophagus could be related to reflux versus esophageal dysmotility. 3. No focal lung infiltrate. 4. Small pericardial effusion.        Scheduled Medicines   Medications:    clopidogrel  75 mg Oral Daily    folic acid-pyridoxine-cyancobalamin  1 tablet Oral Daily    furosemide  80 mg Oral Daily    metoprolol succinate  50 mg Oral Daily    ranolazine  500 mg Oral BID    rivaroxaban  20 mg Oral Dinner    potassium chloride  20 mEq Oral Daily with breakfast    sodium chloride flush  10 mL Intravenous 2 times per day    influenza virus vaccine  0.5 mL Intramuscular Prior to discharge    insulin regular human  0-30 Units Subcutaneous BID    insulin regular human  0-30 Units Subcutaneous TID AC    [START ON 11/18/2020] metFORMIN  500 mg Oral BID WC    [START ON 11/17/2020] insulin regular human  10 Units Subcutaneous Daily before lunch    [START ON 11/17/2020] insulin regular human  15 Units Subcutaneous Dinner    [START ON 11/17/2020] insulin regular human  10 Units Subcutaneous QAM AC      Infusions:    dextrose      sodium chloride 50 mL/hr at 11/16/20 0902         IMPRESSION    Patient Active Problem List   Diagnosis    MARINO on CPAP    New onset atrial fibrillation (HCC)    Type 2 diabetes mellitus with hyperglycemia, with long-term current use of insulin (HCC)    Class 3 severe obesity due to excess calories with body mass index (BMI) of 45.0 to 49.9 in [facility])    History of cardiac cath    Weakness of right arm    Acute [facility]) [facility]    Spastic hemiparesis of right dominant side ([facility]    Dysphagia due to recent stroke    Essential hypertension    Morbid obesity with body mass index of 45.0-49.9 in [facility])    Frequent falls    Chronic venous stasis dermatitis of both lower extremities    History of CVA (cerebrovascular accident)    Obesity hypoventilation syndrome ([facility]    Hypertension    Hyperlipidemia         RECOMMENDATIONS:      1. Reviewed POC blood glucose . Labs and X ray results   2. Reviewed Home and Current Medicines   3. Will Start On meal/ Correction bolus U 500 Regular Insulin  / OHGD   4. Monitor Blood glucose frequently   5. Modify  the dose of Insulin/ OHGD  as needed        Will follow with you  Again thank you for sharing pt's care with me.      Truly yours,       Adiel Centeno MD

## 2020-11-17 NOTE — PROGRESS NOTES
Physician Progress Note      PATIENT:               Melinda Greene  CSN #:                  [id number]  :                       1960  ADMIT DATE:       11/15/2020 8:19 PM  [address] [place] DATE:  RESPONDING  PROVIDER #:        Jude Canela MD          QUERY TEXT:    Dear Hospitalist    Pt admitted with Weakness and falls and has CHF documented. If possible,   please document in progress notes and discharge summary further specificity   regarding the type and acuity of CHF:    The medical record reflects the following:  Risk Factors: CHF  Clinical Indicators: Documented in the H&P\"  HFpEF: Continue home medications   \", Pro .7  Treatment: Lasix 80 mg daily per home medication, Labs    Thank you Екатерина Flowers RN, CDS ([phone])  Options provided:  -- Chronic Diastolic CHF/HFpEF  -- Acute on Chronic Diastolic CHF/HFpEF  -- Other - I will add my own diagnosis  -- Disagree - Not applicable / Not valid  -- Disagree - Clinically unable to determine / Unknown  -- Refer to Clinical Documentation Reviewer    PROVIDER RESPONSE TEXT:    This patient has chronic diastolic CHF/HFpEF.     Query created by: Ferny Casarez on 2020 3:33 PM      Electronically signed by:  Jude Canela MD 2020 11:10 AM

## 2020-11-17 NOTE — PROGRESS NOTES
Daily Progress Note    No chest pain  Had some short NSVT increase Toprol dose BID  Ok for home from cardiac stand  Hx  Of moderate CAD--Medical treatment   Hx of PAFIB keep on AC       Pt. Awake, alert and feeling ok  HR stable, NSR, BP stable  No CP, SOB, dizziness    Frequent falls    States he has been falling d/t tripping over own feet    Denies claudication, dizziness    Echo done and reassuring overall    CT chest done and neg. Hx of Moderate CAD on last [facility] but no CP per pt. -Trop. Mildly elevated- no ACS noted    NSVT    Short 5 beat run yesterday on Tele    Will increase Toprol to 50 BID today to treat VT and concomitant CAD    Hx of PAF- on AC  Stable from cardiac standpoint  Will follow    Echo-11/16/20  Summary   Technically difficult study due to body habitus. Left ventricular systolic function is normal.   Ejection fraction is visually estimated at 50-%. Mild left ventricular hypertrophy. Trace aortic regurgitation. No evidence of any pericardial effusion. Grade II diastolic dysfunction . PAST MEDICAL HISTORY:  History of having sleep apnea, uses CPAP, history  of having stroke, history of having pulmonary hypertension with elevated  pressures present, and moderate coronary artery disease noted. The  patient had, I think, MRI in 2018, showed infarct also present. History  of paroxysmal atrial fibrillation, COPD, sleep apnea present, diabetic  retinopathy was noted, diabetes and obesity present.     PAST SURGICAL HISTORY:  Tonsillectomy.     SOCIAL HISTORY:  He does not smoke and does not drink.     ALLERGIES:  NKDA.     MEDICATIONS AT HOME:  He is on Xarelto 20 mg a day, Toprol 50 mg once a  day, Plavix, Foltx, Ranexa, potassium, Glucophage and Lasix 80 once a  day.     Objective:   /66   Pulse 93   Temp 98 °F (36.7 °C) (Oral)   Resp 22   Ht 6' 1\" (1.854 m)   Wt (!) 353 lb 9.6 oz (160.4 kg)   SpO2 97%   BMI 46.65 kg/m²       Intake/Output Summary (Last 24 hours) at 11/17/2020 1001  Last data filed at 11/16/2020 2332  Gross per 24 hour   Intake --   Output 500 ml   Net -500 ml       Medications:   Scheduled Meds:   metoprolol succinate  50 mg Oral BID    clopidogrel  75 mg Oral Daily    folic acid-pyridoxine-cyancobalamin  1 tablet Oral Daily    furosemide  80 mg Oral Daily    ranolazine  500 mg Oral BID    rivaroxaban  20 mg Oral Dinner    potassium chloride  20 mEq Oral Daily with breakfast    sodium chloride flush  10 mL Intravenous 2 times per day    influenza virus vaccine  0.5 mL Intramuscular Prior to discharge    insulin regular human  0-30 Units Subcutaneous BID    insulin regular human  0-30 Units Subcutaneous TID AC    [START ON 11/18/2020] metFORMIN  500 mg Oral BID WC    insulin regular human  10 Units Subcutaneous Daily before lunch    insulin regular human  15 Units Subcutaneous Dinner    insulin regular human  10 Units Subcutaneous QAM AC      Infusions:   dextrose      sodium chloride 50 mL/hr at 11/16/20 0902      PRN Meds:  sodium chloride flush, acetaminophen **OR** acetaminophen, polyethylene glycol, promethazine **OR** ondansetron, glucose, dextrose, glucagon (rDNA), dextrose, sodium chloride flush       Physical Exam:  Vitals:    11/17/20 0946   BP: 137/66   Pulse: 93   Resp: 22   Temp: 98 °F (36.7 °C)   SpO2: 97%        General: AAO, NAD  Chest: Nontender  Cardiac: First and Second Heart Sounds are Normal, No Murmurs or Gallops noted  Lungs:Clear to auscultation and percussion. Abdomen: Soft, NT, ND, +BS  Extremities: No clubbing, no edema  Vascular:  Equal 2+ peripheral pulses.         Lab Data:  CBC:   Recent Labs     11/15/20  2028 11/17/20  0607   WBC 18.1* 9.9   HGB 14.7 12.4*   HCT 44.4 38.7*   MCV 88.1 89.2    216     BMP:   Recent Labs     11/15/20  2028 11/17/20  0607    135   K 3.5 4.1   CL 94* 95*   CO2 25 29   BUN 25* 34*   CREATININE 1.2 1.1     LIVER PROFILE:   Recent Labs     11/15/20  2028 11/17/20  7825 AST 39* 50*   ALT 24 32   BILITOT 0.5 0.3   ALKPHOS 77 62     PT/INR: No results for input(s): PROTIME, INR in the last 72 hours. APTT: No results for input(s): APTT in the last 72 hours. BNP:  No results for input(s): BNP in the last 72 hours.       Assessment:  Patient Active Problem List    Diagnosis Date Noted    Frequent falls 11/16/2020    Chronic venous stasis dermatitis of both lower extremities 11/16/2020    History of CVA (cerebrovascular accident) 11/16/2020    Obesity hypoventilation syndrome ([address] 11/16/2020    Hypertension     Hyperlipidemia     Acute CVA (cerebrovascular accident) ([address] 11/20/2018    Spastic hemiparesis of right dominant side ([address] 11/20/2018    Dysphagia due to recent stroke 11/20/2018    Essential hypertension 11/20/2018    Morbid obesity with body mass index of 45.0-49.9 in adult [facility]) 11/20/2018    Weakness of right arm 11/18/2018    History of cardiac cath 10/08/2018    New onset atrial fibrillation ([address] 09/25/2018    Type 2 diabetes mellitus with hyperglycemia, with long-term current use of insulin ([address] 09/25/2018    Class 3 severe obesity due to excess calories with body mass index (BMI) of 45.0 to 49.9 in adult ([address] 09/25/2018    MARINO on CPAP 10/14/2013       Electronically signed by Danilo Steiner PA-C on 11/17/2020 at 10:01 AM

## 2020-11-25 LAB
A/G RATIO: 1.3 (ref 1.1–2.2)
ALBUMIN SERPL-MCNC: 3.7 G/DL (ref 3.4–5)
ALP BLD-CCNC: 83 U/L (ref 40–129)
ALT SERPL-CCNC: 27 U/L (ref 10–40)
ANION GAP SERPL CALCULATED.3IONS-SCNC: 13 MMOL/L (ref 3–16)
AST SERPL-CCNC: 18 U/L (ref 15–37)
BILIRUB SERPL-MCNC: 0.4 MG/DL (ref 0–1)
BUN BLDV-MCNC: 19 MG/DL (ref 7–20)
CALCIUM SERPL-MCNC: 9 MG/DL (ref 8.3–10.6)
CHLORIDE BLD-SCNC: 102 MMOL/L (ref 99–110)
CHOLESTEROL, TOTAL: 109 MG/DL (ref 0–199)
CO2: 26 MMOL/L (ref 21–32)
CREAT SERPL-MCNC: 0.9 MG/DL (ref 0.8–1.3)
GFR AFRICAN AMERICAN: >60
GFR NON-AFRICAN AMERICAN: >60
GLOBULIN: 2.8 G/DL
GLUCOSE BLD-MCNC: 199 MG/DL (ref 70–99)
HDLC SERPL-MCNC: 41 MG/DL (ref 40–60)
LDL CHOLESTEROL CALCULATED: 45 MG/DL
POTASSIUM SERPL-SCNC: 4.9 MMOL/L (ref 3.5–5.1)
SODIUM BLD-SCNC: 141 MMOL/L (ref 136–145)
TOTAL PROTEIN: 6.5 G/DL (ref 6.4–8.2)
TRIGL SERPL-MCNC: 114 MG/DL (ref 0–150)
VLDLC SERPL CALC-MCNC: 23 MG/DL

## 2020-11-26 LAB
ESTIMATED AVERAGE GLUCOSE: 214.5 MG/DL
HBA1C MFR BLD: 9.1 %

## 2021-10-24 ENCOUNTER — HOSPITAL ENCOUNTER (INPATIENT)
Age: 61
LOS: 8 days | Discharge: HOME | DRG: 871 | End: 2021-11-02
Attending: EMERGENCY MEDICINE | Admitting: HOSPITALIST
Payer: MEDICARE

## 2021-10-24 DIAGNOSIS — R60.0 BILATERAL LEG EDEMA: Primary | ICD-10-CM

## 2021-10-24 DIAGNOSIS — R77.8 ELEVATED TROPONIN: ICD-10-CM

## 2021-10-24 DIAGNOSIS — L03.115 CELLULITIS OF RIGHT LOWER EXTREMITY: ICD-10-CM

## 2021-10-24 PROCEDURE — 99285 EMERGENCY DEPT VISIT HI MDM: CPT

## 2021-10-24 PROCEDURE — 93005 ELECTROCARDIOGRAM TRACING: CPT | Performed by: EMERGENCY MEDICINE

## 2021-10-25 ENCOUNTER — APPOINTMENT (OUTPATIENT)
Dept: ULTRASOUND IMAGING | Age: 61
DRG: 871 | End: 2021-10-25
Payer: MEDICARE

## 2021-10-25 ENCOUNTER — APPOINTMENT (OUTPATIENT)
Dept: GENERAL RADIOLOGY | Age: 61
DRG: 871 | End: 2021-10-25
Payer: MEDICARE

## 2021-10-25 PROBLEM — I50.9 CONGESTIVE HEART FAILURE DUE TO CARDIOMYOPATHY (HCC): Status: ACTIVE | Noted: 2021-10-25

## 2021-10-25 PROBLEM — I42.9 CONGESTIVE HEART FAILURE DUE TO CARDIOMYOPATHY (HCC): Status: ACTIVE | Noted: 2021-10-25

## 2021-10-25 LAB
ALBUMIN SERPL-MCNC: 4.1 GM/DL (ref 3.4–5)
ALP BLD-CCNC: 77 IU/L (ref 40–129)
ALT SERPL-CCNC: 40 U/L (ref 10–40)
ANION GAP SERPL CALCULATED.3IONS-SCNC: 14 MMOL/L (ref 4–16)
AST SERPL-CCNC: 31 IU/L (ref 15–37)
BASOPHILS ABSOLUTE: 0 K/CU MM
BASOPHILS RELATIVE PERCENT: 0.2 % (ref 0–1)
BILIRUB SERPL-MCNC: 0.3 MG/DL (ref 0–1)
BUN BLDV-MCNC: 28 MG/DL (ref 6–23)
CALCIUM SERPL-MCNC: 8.8 MG/DL (ref 8.3–10.6)
CHLORIDE BLD-SCNC: 101 MMOL/L (ref 99–110)
CHP ED QC CHECK: NORMAL
CHP ED QC CHECK: NORMAL
CO2: 24 MMOL/L (ref 21–32)
CREAT SERPL-MCNC: 0.9 MG/DL (ref 0.9–1.3)
DIFFERENTIAL TYPE: ABNORMAL
EKG ATRIAL RATE: 118 BPM
EKG DIAGNOSIS: NORMAL
EKG P AXIS: 73 DEGREES
EKG P-R INTERVAL: 138 MS
EKG Q-T INTERVAL: 320 MS
EKG QRS DURATION: 100 MS
EKG QTC CALCULATION (BAZETT): 448 MS
EKG R AXIS: -20 DEGREES
EKG T AXIS: 107 DEGREES
EKG VENTRICULAR RATE: 118 BPM
EOSINOPHILS ABSOLUTE: 0 K/CU MM
EOSINOPHILS RELATIVE PERCENT: 0.1 % (ref 0–3)
ESTIMATED AVERAGE GLUCOSE: 180 MG/DL
GFR AFRICAN AMERICAN: >60 ML/MIN/1.73M2
GFR NON-AFRICAN AMERICAN: >60 ML/MIN/1.73M2
GLUCOSE BLD-MCNC: 105 MG/DL
GLUCOSE BLD-MCNC: 105 MG/DL (ref 70–99)
GLUCOSE BLD-MCNC: 166 MG/DL
GLUCOSE BLD-MCNC: 166 MG/DL (ref 70–99)
GLUCOSE BLD-MCNC: 276 MG/DL (ref 70–99)
GLUCOSE BLD-MCNC: 279 MG/DL
GLUCOSE BLD-MCNC: 89 MG/DL (ref 70–99)
HBA1C MFR BLD: 7.9 % (ref 4.2–6.3)
HCT VFR BLD CALC: 44.1 % (ref 42–52)
HEMOGLOBIN: 13.9 GM/DL (ref 13.5–18)
IMMATURE NEUTROPHIL %: 1 % (ref 0–0.43)
LACTATE: 2.2 MMOL/L (ref 0.4–2)
LACTATE: 2.5 MMOL/L (ref 0.4–2)
LV EF: 50 %
LVEF MODALITY: NORMAL
LYMPHOCYTES ABSOLUTE: 0.5 K/CU MM
LYMPHOCYTES RELATIVE PERCENT: 2.6 % (ref 24–44)
MCH RBC QN AUTO: 28.4 PG (ref 27–31)
MCHC RBC AUTO-ENTMCNC: 31.5 % (ref 32–36)
MCV RBC AUTO: 90 FL (ref 78–100)
MONOCYTES ABSOLUTE: 1.3 K/CU MM
MONOCYTES RELATIVE PERCENT: 6.9 % (ref 0–4)
NUCLEATED RBC %: 0 %
PDW BLD-RTO: 15.4 % (ref 11.7–14.9)
PLATELET # BLD: 220 K/CU MM (ref 140–440)
PMV BLD AUTO: 11.3 FL (ref 7.5–11.1)
POTASSIUM SERPL-SCNC: 4 MMOL/L (ref 3.5–5.1)
PRO-BNP: 174.1 PG/ML
PROCALCITONIN: 0.19
RBC # BLD: 4.9 M/CU MM (ref 4.6–6.2)
SEGMENTED NEUTROPHILS ABSOLUTE COUNT: 17 K/CU MM
SEGMENTED NEUTROPHILS RELATIVE PERCENT: 89.2 % (ref 36–66)
SODIUM BLD-SCNC: 139 MMOL/L (ref 135–145)
TOTAL IMMATURE NEUTOROPHIL: 0.2 K/CU MM
TOTAL NUCLEATED RBC: 0 K/CU MM
TOTAL PROTEIN: 6.4 GM/DL (ref 6.4–8.2)
TROPONIN T: 0.03 NG/ML
WBC # BLD: 19.1 K/CU MM (ref 4–10.5)

## 2021-10-25 PROCEDURE — 84484 ASSAY OF TROPONIN QUANT: CPT

## 2021-10-25 PROCEDURE — 87040 BLOOD CULTURE FOR BACTERIA: CPT

## 2021-10-25 PROCEDURE — 87150 DNA/RNA AMPLIFIED PROBE: CPT

## 2021-10-25 PROCEDURE — 84145 PROCALCITONIN (PCT): CPT

## 2021-10-25 PROCEDURE — 6360000002 HC RX W HCPCS: Performed by: EMERGENCY MEDICINE

## 2021-10-25 PROCEDURE — 96365 THER/PROPH/DIAG IV INF INIT: CPT

## 2021-10-25 PROCEDURE — 93306 TTE W/DOPPLER COMPLETE: CPT

## 2021-10-25 PROCEDURE — 2140000000 HC CCU INTERMEDIATE R&B

## 2021-10-25 PROCEDURE — 6370000000 HC RX 637 (ALT 250 FOR IP): Performed by: HOSPITALIST

## 2021-10-25 PROCEDURE — 2580000003 HC RX 258: Performed by: HOSPITALIST

## 2021-10-25 PROCEDURE — 83036 HEMOGLOBIN GLYCOSYLATED A1C: CPT

## 2021-10-25 PROCEDURE — 83880 ASSAY OF NATRIURETIC PEPTIDE: CPT

## 2021-10-25 PROCEDURE — 6370000000 HC RX 637 (ALT 250 FOR IP): Performed by: INTERNAL MEDICINE

## 2021-10-25 PROCEDURE — 2580000003 HC RX 258: Performed by: PHYSICIAN ASSISTANT

## 2021-10-25 PROCEDURE — 96367 TX/PROPH/DG ADDL SEQ IV INF: CPT

## 2021-10-25 PROCEDURE — 83605 ASSAY OF LACTIC ACID: CPT

## 2021-10-25 PROCEDURE — 6360000002 HC RX W HCPCS: Performed by: HOSPITALIST

## 2021-10-25 PROCEDURE — 93010 ELECTROCARDIOGRAM REPORT: CPT | Performed by: INTERNAL MEDICINE

## 2021-10-25 PROCEDURE — 82962 GLUCOSE BLOOD TEST: CPT

## 2021-10-25 PROCEDURE — 93970 EXTREMITY STUDY: CPT

## 2021-10-25 PROCEDURE — 2500000003 HC RX 250 WO HCPCS: Performed by: INTERNAL MEDICINE

## 2021-10-25 PROCEDURE — 94761 N-INVAS EAR/PLS OXIMETRY MLT: CPT

## 2021-10-25 PROCEDURE — 71045 X-RAY EXAM CHEST 1 VIEW: CPT

## 2021-10-25 PROCEDURE — 2580000003 HC RX 258: Performed by: EMERGENCY MEDICINE

## 2021-10-25 PROCEDURE — 36415 COLL VENOUS BLD VENIPUNCTURE: CPT

## 2021-10-25 PROCEDURE — 85025 COMPLETE CBC W/AUTO DIFF WBC: CPT

## 2021-10-25 PROCEDURE — 80053 COMPREHEN METABOLIC PANEL: CPT

## 2021-10-25 RX ORDER — NICOTINE POLACRILEX 4 MG
15 LOZENGE BUCCAL PRN
Status: DISCONTINUED | OUTPATIENT
Start: 2021-10-25 | End: 2021-11-02 | Stop reason: HOSPADM

## 2021-10-25 RX ORDER — SODIUM CHLORIDE 0.9 % (FLUSH) 0.9 %
5-40 SYRINGE (ML) INJECTION EVERY 12 HOURS SCHEDULED
Status: DISCONTINUED | OUTPATIENT
Start: 2021-10-25 | End: 2021-11-02 | Stop reason: HOSPADM

## 2021-10-25 RX ORDER — POTASSIUM CHLORIDE 7.45 MG/ML
10 INJECTION INTRAVENOUS PRN
Status: DISCONTINUED | OUTPATIENT
Start: 2021-10-25 | End: 2021-11-02 | Stop reason: HOSPADM

## 2021-10-25 RX ORDER — ONDANSETRON 2 MG/ML
4 INJECTION INTRAMUSCULAR; INTRAVENOUS EVERY 6 HOURS PRN
Status: DISCONTINUED | OUTPATIENT
Start: 2021-10-25 | End: 2021-11-02 | Stop reason: HOSPADM

## 2021-10-25 RX ORDER — RANOLAZINE 500 MG/1
500 TABLET, EXTENDED RELEASE ORAL 2 TIMES DAILY
Status: DISCONTINUED | OUTPATIENT
Start: 2021-10-25 | End: 2021-11-02 | Stop reason: HOSPADM

## 2021-10-25 RX ORDER — DEXTROSE MONOHYDRATE 25 G/50ML
12.5 INJECTION, SOLUTION INTRAVENOUS PRN
Status: DISCONTINUED | OUTPATIENT
Start: 2021-10-25 | End: 2021-11-02 | Stop reason: HOSPADM

## 2021-10-25 RX ORDER — BUMETANIDE 0.25 MG/ML
2 INJECTION, SOLUTION INTRAMUSCULAR; INTRAVENOUS ONCE
Status: COMPLETED | OUTPATIENT
Start: 2021-10-25 | End: 2021-10-25

## 2021-10-25 RX ORDER — POTASSIUM CHLORIDE 20 MEQ/1
40 TABLET, EXTENDED RELEASE ORAL PRN
Status: DISCONTINUED | OUTPATIENT
Start: 2021-10-25 | End: 2021-11-02 | Stop reason: HOSPADM

## 2021-10-25 RX ORDER — ONDANSETRON 4 MG/1
4 TABLET, ORALLY DISINTEGRATING ORAL EVERY 8 HOURS PRN
Status: DISCONTINUED | OUTPATIENT
Start: 2021-10-25 | End: 2021-11-02 | Stop reason: HOSPADM

## 2021-10-25 RX ORDER — SODIUM CHLORIDE 0.9 % (FLUSH) 0.9 %
5-40 SYRINGE (ML) INJECTION PRN
Status: DISCONTINUED | OUTPATIENT
Start: 2021-10-25 | End: 2021-11-02 | Stop reason: HOSPADM

## 2021-10-25 RX ORDER — INSULIN GLARGINE 100 [IU]/ML
50 INJECTION, SOLUTION SUBCUTANEOUS NIGHTLY
Status: DISCONTINUED | OUTPATIENT
Start: 2021-10-25 | End: 2021-10-26

## 2021-10-25 RX ORDER — ACETAMINOPHEN 325 MG/1
650 TABLET ORAL EVERY 6 HOURS PRN
Status: DISCONTINUED | OUTPATIENT
Start: 2021-10-25 | End: 2021-11-02 | Stop reason: HOSPADM

## 2021-10-25 RX ORDER — MAGNESIUM SULFATE IN WATER 40 MG/ML
2000 INJECTION, SOLUTION INTRAVENOUS PRN
Status: DISCONTINUED | OUTPATIENT
Start: 2021-10-25 | End: 2021-11-02 | Stop reason: HOSPADM

## 2021-10-25 RX ORDER — INSULIN GLARGINE 100 [IU]/ML
0.25 INJECTION, SOLUTION SUBCUTANEOUS NIGHTLY
Status: DISCONTINUED | OUTPATIENT
Start: 2021-10-25 | End: 2021-10-25

## 2021-10-25 RX ORDER — SODIUM CHLORIDE 9 MG/ML
25 INJECTION, SOLUTION INTRAVENOUS PRN
Status: DISCONTINUED | OUTPATIENT
Start: 2021-10-25 | End: 2021-11-02 | Stop reason: HOSPADM

## 2021-10-25 RX ORDER — ACETAMINOPHEN 650 MG/1
650 SUPPOSITORY RECTAL EVERY 6 HOURS PRN
Status: DISCONTINUED | OUTPATIENT
Start: 2021-10-25 | End: 2021-11-02 | Stop reason: HOSPADM

## 2021-10-25 RX ORDER — POLYETHYLENE GLYCOL 3350 17 G/17G
17 POWDER, FOR SOLUTION ORAL DAILY PRN
Status: DISCONTINUED | OUTPATIENT
Start: 2021-10-25 | End: 2021-11-02 | Stop reason: HOSPADM

## 2021-10-25 RX ORDER — FUROSEMIDE 10 MG/ML
20 INJECTION INTRAMUSCULAR; INTRAVENOUS 2 TIMES DAILY
Status: DISCONTINUED | OUTPATIENT
Start: 2021-10-25 | End: 2021-10-27

## 2021-10-25 RX ORDER — FAMOTIDINE 20 MG/1
20 TABLET, FILM COATED ORAL 2 TIMES DAILY
Status: DISCONTINUED | OUTPATIENT
Start: 2021-10-25 | End: 2021-11-02 | Stop reason: HOSPADM

## 2021-10-25 RX ORDER — ATORVASTATIN CALCIUM 40 MG/1
40 TABLET, FILM COATED ORAL DAILY
Status: DISCONTINUED | OUTPATIENT
Start: 2021-10-25 | End: 2021-11-02 | Stop reason: HOSPADM

## 2021-10-25 RX ORDER — METOPROLOL SUCCINATE 50 MG/1
50 TABLET, EXTENDED RELEASE ORAL DAILY
Status: DISCONTINUED | OUTPATIENT
Start: 2021-10-25 | End: 2021-10-25

## 2021-10-25 RX ORDER — DEXTROSE MONOHYDRATE 50 MG/ML
100 INJECTION, SOLUTION INTRAVENOUS PRN
Status: DISCONTINUED | OUTPATIENT
Start: 2021-10-25 | End: 2021-11-02 | Stop reason: HOSPADM

## 2021-10-25 RX ORDER — METOPROLOL TARTRATE 50 MG/1
50 TABLET, FILM COATED ORAL 2 TIMES DAILY
Status: DISCONTINUED | OUTPATIENT
Start: 2021-10-25 | End: 2021-11-02 | Stop reason: HOSPADM

## 2021-10-25 RX ORDER — 0.9 % SODIUM CHLORIDE 0.9 %
500 INTRAVENOUS SOLUTION INTRAVENOUS ONCE
Status: COMPLETED | OUTPATIENT
Start: 2021-10-25 | End: 2021-10-25

## 2021-10-25 RX ORDER — CLOPIDOGREL BISULFATE 75 MG/1
75 TABLET ORAL DAILY
Status: DISCONTINUED | OUTPATIENT
Start: 2021-10-25 | End: 2021-11-02 | Stop reason: HOSPADM

## 2021-10-25 RX ORDER — EZETIMIBE 10 MG/1
10 TABLET ORAL NIGHTLY
Status: DISCONTINUED | OUTPATIENT
Start: 2021-10-25 | End: 2021-11-02 | Stop reason: HOSPADM

## 2021-10-25 RX ORDER — FOLIC ACID 1 MG/1
1 TABLET ORAL DAILY
Status: DISCONTINUED | OUTPATIENT
Start: 2021-10-25 | End: 2021-11-02 | Stop reason: HOSPADM

## 2021-10-25 RX ORDER — B12/LEVOMEFOLATE CALCIUM/B-6 2-1.13-25
1 TABLET ORAL DAILY
Status: DISCONTINUED | OUTPATIENT
Start: 2021-10-25 | End: 2021-11-02 | Stop reason: HOSPADM

## 2021-10-25 RX ORDER — VANCOMYCIN 2 G/400ML
2000 INJECTION, SOLUTION INTRAVENOUS ONCE
Status: COMPLETED | OUTPATIENT
Start: 2021-10-25 | End: 2021-10-25

## 2021-10-25 RX ADMIN — RANOLAZINE 500 MG: 500 TABLET, FILM COATED, EXTENDED RELEASE ORAL at 20:27

## 2021-10-25 RX ADMIN — CLOPIDOGREL BISULFATE 75 MG: 75 TABLET, FILM COATED ORAL at 10:36

## 2021-10-25 RX ADMIN — FAMOTIDINE 20 MG: 20 TABLET ORAL at 10:35

## 2021-10-25 RX ADMIN — CEFEPIME HYDROCHLORIDE 2000 MG: 2 INJECTION, POWDER, FOR SOLUTION INTRAVENOUS at 02:16

## 2021-10-25 RX ADMIN — VANCOMYCIN 2000 MG: 2 INJECTION, SOLUTION INTRAVENOUS at 02:53

## 2021-10-25 RX ADMIN — SODIUM CHLORIDE 500 ML: 9 INJECTION, SOLUTION INTRAVENOUS at 05:07

## 2021-10-25 RX ADMIN — EZETIMIBE 10 MG: 10 TABLET ORAL at 20:26

## 2021-10-25 RX ADMIN — RANOLAZINE 500 MG: 500 TABLET, FILM COATED, EXTENDED RELEASE ORAL at 10:57

## 2021-10-25 RX ADMIN — BUMETANIDE 2 MG: 0.25 INJECTION INTRAMUSCULAR; INTRAVENOUS at 10:37

## 2021-10-25 RX ADMIN — INSULIN GLARGINE 50 UNITS: 100 INJECTION, SOLUTION SUBCUTANEOUS at 20:33

## 2021-10-25 RX ADMIN — ATORVASTATIN CALCIUM 40 MG: 40 TABLET, FILM COATED ORAL at 10:34

## 2021-10-25 RX ADMIN — FOLIC ACID 1 MG: 1 TABLET ORAL at 10:35

## 2021-10-25 RX ADMIN — FUROSEMIDE 20 MG: 10 INJECTION, SOLUTION INTRAVENOUS at 10:42

## 2021-10-25 RX ADMIN — RIVAROXABAN 20 MG: 20 TABLET, FILM COATED ORAL at 20:27

## 2021-10-25 RX ADMIN — FAMOTIDINE 20 MG: 20 TABLET ORAL at 20:27

## 2021-10-25 RX ADMIN — Medication 1 TABLET: at 10:36

## 2021-10-25 RX ADMIN — METOPROLOL TARTRATE 50 MG: 50 TABLET, FILM COATED ORAL at 20:27

## 2021-10-25 RX ADMIN — FUROSEMIDE 20 MG: 10 INJECTION, SOLUTION INTRAVENOUS at 20:27

## 2021-10-25 RX ADMIN — METOPROLOL TARTRATE 50 MG: 50 TABLET, FILM COATED ORAL at 10:35

## 2021-10-25 RX ADMIN — SODIUM CHLORIDE, PRESERVATIVE FREE 10 ML: 5 INJECTION INTRAVENOUS at 10:49

## 2021-10-25 RX ADMIN — SODIUM CHLORIDE, PRESERVATIVE FREE 10 ML: 5 INJECTION INTRAVENOUS at 23:00

## 2021-10-25 RX ADMIN — INSULIN LISPRO 1 UNITS: 100 INJECTION, SOLUTION INTRAVENOUS; SUBCUTANEOUS at 10:48

## 2021-10-25 ASSESSMENT — PAIN SCALES - GENERAL: PAINLEVEL_OUTOF10: 0

## 2021-10-25 NOTE — CONSULTS
Endocrinology   Consult Note      Dear Doctor Dante Kessler for the Consult     Pt. Was Admitted for : Severe leg swelling and generalized weakness and unable to ambulate    Reason for Consult: Better control of blood glucose      History Obtained From:  Patient/ EMR       HISTORY OF PRESENT ILLNESS:                The patient is a 64 y.o. male with significant past medical history of atrial fibrillation, CVA, CAD, hyperlipidemia, hypertension, type 2 diabetes with insulin severe insulin resistance morbid obesity, apnea, pulmonary hypertension comes in complaining of increasing swelling of the legs and unable to ambulate. Patient has been at home U5 100 insulin for better control of blood glucose I was  consulted for better control of blood glucose. ROS:   Pt's ROS done in detail. Abnormal ROS are noted in Medical and Surgical History Section below: Other Medical History:        Diagnosis Date    Atrial fibrillation ([address]     Cerebral artery occlusion with [facility]     History of cardiac cath 2017    --RCA has mid 50% stenosis and PLB branch has 70% stenosis noted. LVEDP very high    Hyperlipidemia     Hypertension     Type II or unspecified type diabetes mellitus without mention of complication, not stated as uncontrolled     Diagnsed about 1998    Unspecified sleep apnea      Surgical History:        Procedure Laterality Date    CARDIAC CATHETERIZATION      TONSILLECTOMY      AT 13YEARS OLD       Allergies:  Patient has no known allergies.     Family History:       Problem Relation Age of Onset    Diabetes Mother     Diabetes Father     Cancer Father     Cancer Maternal Grandfather         PROSTATE CANCER     REVIEW OF SYSTEMS:  Review of System Done as noted above     PHYSICAL EXAM:      Vitals:    BP (!) 118/59   Pulse 98   Temp 99.6 °F (37.6 °C) (Oral)   Resp 25   Ht 6' 1\" (1.854 m)   Wt (!) 460 lb (208.7 kg)   SpO2 90%   BMI 60.69 kg/m²     CONSTITUTIONAL: Daily    ezetimibe  10 mg Oral Nightly    atorvastatin  40 mg Oral Daily    folic acid  1 mg Oral Daily    sodium chloride flush  5-40 mL IntraVENous 2 times per day    famotidine  20 mg Oral BID    furosemide  20 mg IntraVENous BID    insulin lispro  0-6 Units SubCUTAneous TID WC    insulin lispro  15 Units SubCUTAneous TID WC    insulin glargine  50 Units SubCUTAneous Nightly    insulin lispro  0-3 Units SubCUTAneous 2 times per day      Infusions:    sodium chloride      dextrose           IMPRESSION    Patient Active Problem List   Diagnosis    MARINO on CPAP    New onset atrial fibrillation (HCC)    Type 2 diabetes mellitus with hyperglycemia, with long-term current use of insulin ([facility])    Class 3 severe obesity due to excess calories with body mass index (BMI) of 45.0 to 49.9 in adult [facility])    History of cardiac cath    Weakness of right arm    Acute CVA (cerebrovascular accident) ([address]    Spastic hemiparesis of right dominant side ([address]    Dysphagia due to recent stroke    Essential hypertension    Morbid obesity with body mass index of 45.0-49.9 in adult ([facility])    Frequent falls    Chronic venous stasis dermatitis of both lower extremities    History of CVA (cerebrovascular accident)    Obesity hypoventilation syndrome ([address]    Hypertension    Hyperlipidemia    Congestive heart failure due to cardiomyopathy ([address]         RECOMMENDATIONS:      1. Reviewed POC blood glucose . Labs and X ray results   2. Reviewed Home and Current Medicines   3. Will Start On meal/ Correction bolus Humalog/ Lantus Insulin regime    4. Monitor Blood glucose frequently   5. Modify  the dose of Insulin  as needed        Will follow with you  Again thank you for sharing pt's care with me.      Truly yours,       Katherin Gonzalez MD

## 2021-10-25 NOTE — ED PROVIDER NOTES
I independently examined and evaluated Varsha Qureshi. In brief their history revealed worsening swelling in bilateral legs. States he takes Lasix at home. States he is having difficulty walking secondary to how large his legs are. Patient is on Plavix and Xarelto. States he takes Lasix 80 mg a day denies any chest pain. Denies feeling more short of breath than normal.  States he does wear CPAP at night. Their exam revealed gentleman who is awake, alert, oriented x4. Largely swollen legs, right swollen more than left with redness and warmth. Left leg without redness and warmth. Does have chronic venous stasis changes to bilateral lower extremities. Elevated BMI. Speaks in full sentences. . All diagnostic, treatment, and disposition decisions were made by myself in conjunction with the mid-level provider. Before disposition, questions were sought and answered with the patient. They have voiced understanding and agree with the plan: Patient's blood pressure is 152/69. Afebrile. Mildly tacky at 117. Sats normal on room air. EKG shows no acute findings. . CBC shows a white count of 19.1. Normal hemoglobin at 13.9. Patient started on cefepime and vancomycin for concern of right lower extremity cellulitis. Electrolytes normal.  BNP within normal limits. Troponin elevated at 0.026. Barnetta Chaparro CXR shows cardiomegaly. Mild central venous congestion. Venous doppler of legs no evidence of dvt from groin to knee, unable to see calf veins due to body habitus. Patient on plavix and xarelto. On cpap now as he wears it as night, will admit for leg swelling, decreased ability to ambulate due to swelling and cellulitis of right lower leg. He agrees to plan. .     For all further details of the patient's emergency department visit, please see the mid-level practitioner's documentation.        The Ekg interpreted by me shows  sinus tachycardia, ecxg=260 with a rate of 118  Axis is   Left axis deviation  QTc is normal  Intervals and Durations are unremarkable.       ST Segments: no acute change  No significant change from prior EKG dated 11-           Maxwell Riggins MD  10/25/21 2779

## 2021-10-25 NOTE — ED NOTES
Lab called with critical lactic of 2.5 primary nurse made aware      Idalia Hammond, RN  10/25/21 7673

## 2021-10-25 NOTE — CONSULTS
CARDIOLOGY CONSULT NOTE       Reason for consultation:     Referring physician:  Bob Zhang MD     Primary care physician: Rosalinda Joe MD  Thanks for the consult. History of present illness:Bandar is a 64 y. o.year old who  presents with had concerns including Leg Swelling (bilateral) and Gait Problem (unable to ambulate). Chief Complaint   Patient presents with    Leg Swelling     bilateral    Gait Problem     unable to ambulate   Patient with history of paroxysmal atrial fibrillation, chronic lymphedema, morbid obesity, CAD cardiac cath 2017 LAD and left circumflex mild disease RCA 50% also patient has obstructive sleep apnea, COPD, history of CVA, pulmonary hypertension and diabetes  Patient presented with shortness of breath increased lower extremity edema despite Lasix also was found in atrial fibrillation with RVR heart rate currently at 110  denies any chest pain      Past medical history:    has a past medical history of Atrial fibrillation ([facility] Cerebral artery occlusion with cerebral infarction [facility]), History of cardiac cath, Hyperlipidemia, Hypertension, Type II or unspecified type diabetes mellitus without mention of complication, not stated as uncontrolled, and Unspecified sleep apnea. Past surgical history:   has a past surgical history that includes Tonsillectomy and Cardiac catheterization. Social History:   reports that he has never smoked. He has never used smokeless tobacco. He reports that he does not drink alcohol and does not use drugs. Family history:  family history includes Cancer in his father and maternal grandfather; Diabetes in his father and mother.     No Known Allergies    ranolazine (RANEXA) extended release tablet 500 mg, BID  rivaroxaban (XARELTO) tablet 20 mg, Dinner  metoprolol succinate (TOPROL XL) extended release tablet 50 mg, Daily  clopidogrel (PLAVIX) tablet 75 mg, Daily  folic acid-pyridoxine-cyancobalamin (FOLTX) 1.13-25-2 MG TABS 1 tablet, Daily  ezetimibe (ZETIA) tablet 10 mg, Nightly  atorvastatin (LIPITOR) tablet 40 mg, Daily  folic acid (FOLVITE) tablet 1 mg, Daily  sodium chloride flush 0.9 % injection 5-40 mL, 2 times per day  sodium chloride flush 0.9 % injection 5-40 mL, PRN  0.9 % sodium chloride infusion, PRN  ondansetron (ZOFRAN-ODT) disintegrating tablet 4 mg, Q8H PRN   Or  ondansetron (ZOFRAN) injection 4 mg, Q6H PRN  polyethylene glycol (GLYCOLAX) packet 17 g, Daily PRN  acetaminophen (TYLENOL) tablet 650 mg, Q6H PRN   Or  acetaminophen (TYLENOL) suppository 650 mg, Q6H PRN  potassium chloride (KLOR-CON M) extended release tablet 40 mEq, PRN   Or  potassium bicarb-citric acid (EFFER-K) effervescent tablet 40 mEq, PRN   Or  potassium chloride 10 mEq/100 mL IVPB (Peripheral Line), PRN  magnesium sulfate 2000 mg in 50 mL IVPB premix, PRN  famotidine (PEPCID) tablet 20 mg, BID  furosemide (LASIX) injection 20 mg, BID  glucose (GLUTOSE) 40 % oral gel 15 g, PRN  dextrose 50 % IV solution, PRN  glucagon (rDNA) injection 1 mg, PRN  dextrose 5 % solution, PRN  insulin lispro (HUMALOG) injection vial 0-6 Units, TID WC  insulin lispro (HUMALOG) injection vial 15 Units, TID WC  insulin glargine (LANTUS) injection vial 50 Units, Nightly  insulin lispro (HUMALOG) injection vial 0-3 Units, 2 times per day  bumetanide (BUMEX) injection 2 mg, Once      Current Facility-Administered Medications   Medication Dose Route Frequency Provider Last Rate Last Admin    ranolazine (RANEXA) extended release tablet 500 mg  500 mg Oral BID Stephanie Rob MD        rivaroxaban Fradario Coyanosa) tablet 20 mg  20 mg Oral Dinner Salvador Mosquera MD        metoprolol succinate (TOPROL XL) extended release tablet 50 mg  50 mg Oral Daily Stephanie Rob MD        clopidogrel (PLAVIX) tablet 75 mg  75 mg Oral Daily Salvador Mosquera MD        folic acid-pyridoxine-cyancobalamin (FOLTX) 1.13-25-2 MG TABS 1 tablet  1 tablet Oral Daily Prerna Tompkins Lizeth Chong MD        ezetimibe (ZETIA) tablet 10 mg  10 mg Oral Nightly Matt Munoz MD        atorvastatin (LIPITOR) tablet 40 mg  40 mg Oral Daily Matt Munoz MD        folic acid (FOLVITE) tablet 1 mg  1 mg Oral Daily Matt Munoz MD        sodium chloride flush 0.9 % injection 5-40 mL  5-40 mL IntraVENous 2 times per day Matt Munoz MD        sodium chloride flush 0.9 % injection 5-40 mL  5-40 mL IntraVENous PRN Matt Munoz MD        0.9 % sodium chloride infusion  25 mL IntraVENous PRN Matt Munoz MD        ondansetron (ZOFRAN-ODT) disintegrating tablet 4 mg  4 mg Oral Q8H PRN Matt Munoz MD        Or    ondansetron [facility]) injection 4 mg  4 mg IntraVENous Q6H PRN Matt Munoz MD        polyethylene glycol [facility]) packet 17 g  17 g Oral Daily PRN Mtat Munoz MD        acetaminophen (TYLENOL) tablet 650 mg  650 mg Oral Q6H PRN Matt Munoz MD        Or    acetaminophen (TYLENOL) suppository 650 mg  650 mg Rectal Q6H PRN Matt Munoz MD        potassium chloride (KLOR-CON M) extended release tablet 40 mEq  40 mEq Oral PRN Matt Munoz MD        Or    potassium bicarb-citric acid (EFFER-K) effervescent tablet 40 mEq  40 mEq Oral PRN Matt Munoz MD        Or    potassium chloride 10 mEq/100 mL IVPB (Peripheral Line)  10 mEq IntraVENous PRN Matt Munoz MD        magnesium sulfate 2000 mg in 50 mL IVPB premix  2,000 mg IntraVENous PRN Matt Munoz MD        famotidine (PEPCID) tablet 20 mg  20 mg Oral BID Matt Munoz MD        furosemide (LASIX) injection 20 mg  20 mg IntraVENous BID Matt Munoz MD        glucose (GLUTOSE) 40 % oral gel 15 g  15 g Oral PRN Matt Munoz MD        dextrose 50 % IV solution  12.5 g IntraVENous PRN Matt Munoz MD        glucagon (rDNA) injection 1 mg  1 mg IntraMUSCular SHAN Blandon MD        dextrose 5 % solution  100 mL/hr IntraVENous SHAN Blandon MD        insulin lispro (HUMALOG) injection vial 0-6 Units  0-6 Units SubCUTAneous TID MAY Blandon MD        insulin lispro (HUMALOG) injection vial 15 Units  15 Units SubCUTAneous TID MAY Coleman MD        insulin glargine (LANTUS) injection vial 50 Units  50 Units SubCUTAneous Nightly  Berto Carmona MD        insulin lispro (HUMALOG) injection vial 0-3 Units  0-3 Units SubCUTAneous 2 times per day  Berto Carmona MD        bumetanide [facility]) injection 2 mg  2 mg IntraVENous Once Carmencita Small MD         Current Outpatient Medications   Medication Sig Dispense Refill    ezetimibe (ZETIA) 10 MG tablet       simvastatin (ZOCOR) 40 MG tablet       folic acid (FOLVITE) 1 MG tablet TAKE 1 TABLET BY MOUTH ONCE DAILY      insulin regular human (HUMULIN R U-500 KWIKPEN) 500 UNIT/ML SOPN concentrated injection pen Inject 0-30 Units into the skin 3 times daily (before meals) This is a highly concentrated insulin. After attaching the pen needle, prime with 5 units to ensure proper dosing. If 140-199 5 Units  200-249 10 Units  250-299 15 Units  300-349 20 Units  350-400 25 Units  Above 400      30 Units 5 pen 0    rivaroxaban (XARELTO) 20 MG TABS tablet Take 1 tablet by mouth Daily with supper 30 tablet 0    metoprolol succinate (TOPROL XL) 50 MG extended release tablet Take 1 tablet by mouth daily 30 tablet 0    clopidogrel (PLAVIX) 75 MG tablet Take 1 tablet by mouth daily 30 tablet 0    folic acid-pyridoxine-cyancobalamin (FOLTX) 1.13-25-2 MG TABS Take 1 tablet by mouth daily 30 tablet 0    ranolazine (RANEXA) 500 MG extended release tablet Take 1 tablet by mouth 2 times daily 60 tablet 0    metFORMIN (GLUCOPHAGE) 500 MG tablet Take 500 mg by mouth 2 times daily.  furosemide (LASIX) 80 MG tablet Take 80 mg by mouth daily.        Review of Systems: · Constitutional: No Fever or Weight Loss   · Eyes: No Decreased Vision  · ENT: No Headaches, Hearing Loss or Vertigo  · Cardiovascular: No chest pain, dyspnea on exertion, palpitations or loss of consciousness  · Respiratory: No cough or wheezing    · Gastrointestinal: No abdominal pain, appetite loss, blood in stools, constipation, diarrhea or heartburn  · Genitourinary: No dysuria, trouble voiding, or hematuria  · Musculoskeletal:  No gait disturbance, weakness or joint complaints  · Integumentary: No rash or pruritis  · Neurological: No TIA or stroke symptoms  · Psychiatric: No anxiety or depression  · Endocrine: No malaise, fatigue or temperature intolerance  · Hematologic/Lymphatic: No bleeding problems, blood clots or swollen lymph nodes  · Allergic/Immunologic: No nasal congestion or hives  ·   ·      Physical Examination:    Vitals:    10/25/21 0602   BP: (!) 118/59   Pulse: 98   Resp:    Temp:    SpO2: 90%        General Appearance: Morbidly obese gentleman currently on BiPAP in mild respiratory distress    HEENT: normal    NECK: No JVP or carotid bruits  No thromegaly  Cardiovascular: Auscultation: Normal S1 and S2,      Respiratory: Bilateral decreased breath sound    Extremities: 3+ pitting edema with chronic venous stasis discoloration    SKIN: Warm and well perfused, no pallor or cyanosis    Vascular exam:  : ,Femoral Arteries: 2+ and equal, Pedal Pulses: 2+ and equal     Abdomen: nontender      Neurological/Psychiatric:  nonfocal  Normal affect  Lab Review   Recent Labs     10/25/21  0002   WBC 19.1*   HGB 13.9   HCT 44.1         Recent Labs     10/25/21  0002      K 4.0      CO2 24   BUN 28*   CREATININE 0.9     Recent Labs     10/25/21  0002   AST 31   ALT 40   BILITOT 0.3   ALKPHOS 77     No results for input(s): TROPONINI in the last 72 hours.   No results found for: BNP  Lab Results   Component Value Date    INR 1.25 11/20/2018    PROTIME 14.2 (H) 11/20/2018       QUALITY MEASURES:  CAD:     EKG:    Chest Xray:    ECHO:  Labs, echo, meds reviewed  Assessment:   [unfilled]   Patient Active Problem List   Diagnosis Code    MARINO on CPAP G47.33, Z99.89    New onset atrial fibrillation ([facility]) I48.91    Type 2 diabetes mellitus with hyperglycemia, with long-term current use of insulin ([facility]) E11.65, Z79.4    Class 3 severe obesity due to excess calories with body mass index (BMI) of 45.0 to 49.9 in adult ([facility]) E66.01, Z68.42    History of cardiac cath Z98.890    Weakness of right arm R29.898    Acute CVA (cerebrovascular accident) ([facility] I63.9    Spastic hemiparesis of right dominant side ([facility]) G81.11    Dysphagia due to recent stroke I69.391    Essential hypertension I10    Morbid obesity with body mass index of 45.0-49.9 in adult ([facility]) E66.01, Z68.42    Frequent falls R29.6    Chronic venous stasis dermatitis of both lower extremities I87.2    History of CVA (cerebrovascular accident) Z86.73    Obesity hypoventilation syndrome ([facility]) E66.2    Hypertension I10    Hyperlipidemia E78.5    Congestive heart failure due to cardiomyopathy ([facility]) I50.9, I42.9     [unfilled]  Problem List Items Addressed This Visit     None      Visit Diagnoses     Bilateral leg edema    -  Primary    Cellulitis of right lower extremity        Elevated troponin          1 atrial fibrillation with RVR  Increase Lopressor to 50 twice daily  2 acute on chronic diastolic heart failure  Give extra Bumex 2 mg  3 CAD no chest pain continue medical treatment  4 chronic lymphedema  With pitting edema likely contribution of pulmonary hypertension and venous stasis  5 diabetes management hospitalist group  6 morbid obesity chronic due to excess caloric intake  Plan  Bumex 2 mg IV now  Change Toprol-XL 50 mg to Lopressor 50 twice daily  2D echo reassess LV function    Recommendations:          Panchito Gomez MD, 10/25/2021 7:45 AM

## 2021-10-25 NOTE — ED NOTES
Spouse at  instructed to call with any questions or concerns regarding patient   Natalee Simpson [phone]            Ronaldo Heck RN  10/25/21 0003

## 2021-10-25 NOTE — PROGRESS NOTES
Carmen Duron MD, [address]                Internal Medicine Hospitalist             Daily Progress  Note   Subjective:     Chief Complaint   Patient presents with    Leg Swelling     bilateral    Gait Problem     unable to ambulate     Mr. Bishop Forward of shortness of breath, very uncomfortable and on the gurney in the ER. During propped up. Objective:    /83   Pulse 111   Temp 99.6 °F (37.6 °C) (Oral)   Resp 24   Ht 6' 1\" (1.854 m)   Wt (!) 460 lb (208.7 kg)   SpO2 96%   BMI 60.69 kg/m²      Intake/Output Summary (Last 24 hours) at 10/25/2021 1508  Last data filed at 10/25/2021 1049  Gross per 24 hour   Intake 10 ml   Output --   Net 10 ml      Physical Exam:  Heart: Tachycardia, normal S1 and S2 in all 4 auscultatory areas. No rubs  Murmurs or gallops heard. Lungs: Mostly clear to auscultation, decreased breath sounds at bases. No wheezes appreciated no crackles heard. Moderate air exchange. Abdomen: Soft,  distended. Bowel sounds not appreciated. No obvious liver or spleen enlargement. Non tender, no rebound noted. Difficult exam due to morbid obesity  Extremities: Non tender, nonpitting swelling noted, move all 4. Lateral lymphedema  CNS: Grossly intact.     Labs:  CBC with Differential:    Lab Results   Component Value Date    WBC 19.1 10/25/2021    RBC 4.90 10/25/2021    HGB 13.9 10/25/2021    HCT 44.1 10/25/2021     10/25/2021    MCV 90.0 10/25/2021    MCH 28.4 10/25/2021    MCHC 31.5 10/25/2021    RDW 15.4 10/25/2021    SEGSPCT 89.2 10/25/2021    LYMPHOPCT 2.6 10/25/2021    MONOPCT 6.9 10/25/2021    BASOPCT 0.2 10/25/2021    MONOSABS 1.3 10/25/2021    LYMPHSABS 0.5 10/25/2021    EOSABS 0.0 10/25/2021    BASOSABS 0.0 10/25/2021    DIFFTYPE AUTOMATED DIFFERENTIAL 10/25/2021     CMP:    Lab Results   Component Value Date     10/25/2021    K 4.0 10/25/2021     10/25/2021    CO2 24 10/25/2021    BUN 28 10/25/2021    CREATININE 0.9 10/25/2021    GFRAA >60 10/25/2021 AGRATIO 1.3 11/25/2020    LABGLOM >60 10/25/2021    GLUCOSE 166 10/25/2021    PROT 6.4 10/25/2021    LABALBU 4.1 10/25/2021    CALCIUM 8.8 10/25/2021    BILITOT 0.3 10/25/2021    ALKPHOS 77 10/25/2021    AST 31 10/25/2021    ALT 40 10/25/2021     Recent Labs     10/25/21  0002   TROPONINT 0.026*     Lab Results   Component Value Date    [facility] 1.800 08/29/2019         sodium chloride      dextrose        ranolazine  500 mg Oral BID    rivaroxaban  20 mg Oral Dinner    clopidogrel  75 mg Oral Daily    folic acid-pyridoxine-cyancobalamin  1 tablet Oral Daily    ezetimibe  10 mg Oral Nightly    atorvastatin  40 mg Oral Daily    folic acid  1 mg Oral Daily    sodium chloride flush  5-40 mL IntraVENous 2 times per day    famotidine  20 mg Oral BID    furosemide  20 mg IntraVENous BID    insulin lispro  0-6 Units SubCUTAneous TID WC    insulin lispro  15 Units SubCUTAneous TID WC    insulin glargine  50 Units SubCUTAneous Nightly    insulin lispro  0-3 Units SubCUTAneous 2 times per day    metoprolol tartrate  50 mg Oral BID         Assessment:       Patient Active Problem List    Diagnosis Date Noted    Congestive heart failure due to cardiomyopathy ([address] 10/25/2021    Frequent falls 11/16/2020    Chronic venous stasis dermatitis of both lower extremities 11/16/2020    History of CVA (cerebrovascular accident) 11/16/2020    Obesity hypoventilation syndrome ([address] 11/16/2020    Hypertension     Hyperlipidemia     Acute CVA (cerebrovascular accident) ([address] 11/20/2018    Spastic hemiparesis of right dominant side ([address] 11/20/2018    Dysphagia due to recent stroke 11/20/2018    Essential hypertension 11/20/2018    Morbid obesity with body mass index of 45.0-49.9 in [facility]) 11/20/2018    Weakness of right arm 11/18/2018    History of cardiac cath 10/08/2018    New onset atrial fibrillation ([address] 09/25/2018    Type 2 diabetes mellitus with hyperglycemia, with long-term current use of insulin ([address] 09/25/2018    Class 3 severe obesity due to excess calories with body mass index (BMI) of 45.0 to 49.9 in adult [facility]) 09/25/2018    MARINO on CPAP 10/14/2013       Plan:     Problems being addressed this admission:   Acute on chronic HFpEF  Atrial fibrillation on DOAC  Chronic lymphedema  MARINO  DM 2  Morbid obesity    Patient has been seen by cardiology to continue diuretics for now. Patient is oxygenating okay present . If needed will have ABG done. Further recommendation as per cardiology. Chest x-ray done shows cardiomegaly mild central venous congestion. Patient is on Xarelto 20 mg once a day for his atrial fib seem to monitor. Patient has chronic lymphedema unchanged from before. Has had ultrasound done both legs no evidence of DVT noted. He has history obstructive sleep apnea. We will have him on AutoPap at night  Sugars 166 today we will check A1c on him as well we will have him on sliding scale. We will make referral for dietary consultation for him for his diabetes and the city. Consultants:  Cardiology    General Orders:  Repeat basic labs again in am.  I have explained to the patient and discussed with him/her the treatment plan. The above chart was generated partly using Dragon dictation system, it may contain dictation errors given the limitations of this technology.      Mirela Dos Santos MD, [address]

## 2021-10-25 NOTE — ED NOTES
Bed: ED-31  Expected date:   Expected time:   Means of arrival:   Comments:  [address], RN  10/25/21 5621

## 2021-10-25 NOTE — H&P
History and Physical      Name:  Prosper Campos /Age/Sex: 1960  (64 y.o. male)   MRN & CSN:  [id number] & [id number] Admission Date/Time: 10/24/2021 11:53 PM   Location:  ED[facility] PCP: Sven Cano MD       Hospital Day: 2    Assessment and Plan:   Prosper Campos is a 64 y.o.  male with history of atrial fibrillation, CVA, lymphedema who presents with increased swelling of his legs to the extent that he is unable to ambulate thus necessitating this admission. CHF acute on chronic  -Last echo is from .  -EF is 50% with grade 2 diastolic dysfunction.  -We will transition to IV Lasix and monitor closely  -We will consult cardiology for further input. Lymphedema acute on chronic.  -Has chronic venous stasis as well. -We will consult wound care for Ace wrap and elevation.  -We will also require skin emollient for skin changes.  -Patient received vancomycin and cefepime in the ER for presumed cellulitis.  -Hold off on further antibiotics till procalcitonin is available. Atrial fibrillation  -Continue Xarelto, metoprolol. Coronary artery disease  -Continue beta-blockade with metoprolol,  Ranexa, Plavix  -Patient does not endorse any chest pain at this time.  -Last coronary angiogram reported that his LV EDP was very high  -Initial troponin was slightly high at 0.026.  -Twelve-lead ECG does not show any changes of acute ischemia. Obstructive sleep apnea  -Continue noninvasive positive pressure ventilation especially at night.  -Consulting Dr. Sadie Roth his pulmonologist for further input.  -Pulmonary hypertension secondary to MARINO, morbid obesity is probably playing a part in his symptomatology. Diabetes mellitus  -Continue sliding scale insulin while hospitalized. -Hold oral hypoglycemic agents while hospitalized. Diet ADULT DIET;  Regular; Low Fat/Low Chol/High Fiber/2 gm Na   DVT Prophylaxis [] Lovenox, []  Heparin, [] SCDs, [] Ambulation   GI Prophylaxis [x] PPI,  [] H2 Blocker,  [] Carafate,  [] Diet/Tube Feeds   Code Status Full Code   Disposition Patient requires continued admission due to multiple medical problems   MDM [] Low, [] Moderate,[x]  High  Patient's risk as above due to multiple medical problems     History of Present Illness:     Chief Complaint: \"My legs have been swelling \"\". The patient states to me  Matt Gregg is a 64 y.o.  male with chronic lymphedema chronic diastolic CHF, coronary artery disease and diabetes who presents with swelling in his legs bilaterally right greater than left despite taking his Lasix. Patient has history of lymphedema and chronic venous hemostasis. His swelling has increased to such an extent that he is unable to ambulate and do his ADLs. He denies any fever chills or rigors. He denies any cough. He denies any hemoptysis hematemesis melena or sputum production. Denies any abdominal pain denies any urinary symptoms. He denies any chest pain. In the ER his serum chemistries revealed sodium 139, potassium 4, chloride 1, bicarb 24, blood urea 28, creatinine 0.9, anion gap 14, glucose 89 calcium 8.8 total protein 6.4.  proBNP 174.1, troponin T slightly elevated 0.026. ECG does not show any signs of acute ischemia. Troponin is probably still a leak secondary to his CHF. Albumin 4.1 and liver function test within normal limits. His hematology profile shows WBC 19.1 hemoglobin 13.9 hematocrit 44.1 and platelet count of 886. X-ray chest shows cardiomegaly with mild central venous. Preliminary Doppler of the lower extremities shows no evidence of DVT in the lower extremity congestion. Patient received vancomycin and cefepime in the ER for presumed cellulitis. Patient would be admitted to the hospital service. We will transition him to IV diuretics. Repeat echocardiogram.  Consult PT OT and wound care nurses for leg elevation and local wound care with Ace wraps and other compression therapies. PT OT to eval and treat. Further therapy would depend hospital course the patient overall prognosis is very guarded. Follow-up blood cultures drawn in the ER. Ten point ROS reviewed negative, unless as noted above    Objective:   No intake or output data in the 24 hours ending 10/25/21 0322   Vitals:   Vitals:    10/25/21 0031   BP: 136/65   Pulse: 111   Resp: (!) 32   Temp:    SpO2: 95%     Physical Exam:   GEN Awake male, sitting upright in bed in no apparent distress. Appears given age. EYES Pupils are equally round. No scleral erythema, discharge, or conjunctivitis. HENT Mucous membranes are moist. Oral pharynx without exudates, no evidence of thrush. NECK Supple, no apparent thyromegaly or masses. RESP Clear to auscultation, no wheezes, rales or rhonchi. Symmetric chest movement while on room air. CARDIO/VASC S1/S2 auscultated. Regular rate without appreciable murmurs, rubs, or gallops. No JVD or carotid bruits. Peripheral pulses equal bilaterally and palpable. +4 peripheral edema. GI Abdomen is soft without significant tenderness, masses, or guarding. Bowel sounds are normoactive. Rectal exam deferred.  No costovertebral angle tenderness. Normal appearing external genitalia. Roe catheter is not present. HEME/LYMPH No palpable cervical lymphadenopathy and no hepatosplenomegaly. No petechiae or ecchymoses. MSK No gross joint deformities. SKIN Normal coloration, warm, dry. Lymphedema with chronic venous hemostasis  NEURO Cranial nerves appear grossly intact, normal speech, no lateralizing weakness. PSYCH Awake, alert, oriented x 4. Affect appropriate. Past Medical History:      Past Medical History:   Diagnosis Date    Atrial fibrillation ([address]     Cerebral artery occlusion with cerebral infarction ([address]     History of cardiac cath 2017    --RCA has mid 50% stenosis and PLB branch has 70% stenosis noted.  LVEDP very high    Hyperlipidemia     Hypertension     Type II or unspecified type diabetes mellitus without mention of complication, not stated as uncontrolled     Diagnsed about 1998    Unspecified sleep apnea      PSHX:  has a past surgical history that includes Tonsillectomy and Cardiac catheterization. Allergies: No Known Allergies    FAM HX: family history includes Cancer in his father and maternal grandfather; Diabetes in his father and mother. Soc HX:   Social History     Socioeconomic History    Marital status:      Spouse name: None    Number of children: None    Years of education: None    Highest education level: None   Occupational History    None   Tobacco Use    Smoking status: Never Smoker    Smokeless tobacco: Never Used   Vaping Use    Vaping Use: Never used   Substance and Sexual Activity    Alcohol use: No    Drug use: No    Sexual activity: Yes   Other Topics Concern    None   Social History Narrative    None     Social Determinants of Health     Financial Resource Strain:     Difficulty of Paying Living Expenses:    Food Insecurity:     Worried About Running Out of Food in the Last Year:     Ran Out of Food in the Last Year:    Transportation Needs:     Lack of Transportation (Medical):      Lack of Transportation (Non-Medical):    Physical Activity:     Days of Exercise per Week:     Minutes of Exercise per Session:    Stress:     Feeling of Stress :    Social Connections:     Frequency of Communication with Friends and Family:     Frequency of Social Gatherings with Friends and Family:     Attends [employer] Services:     Active Member of Clubs or Organizations:     Attends Club or Organization Meetings:     Marital Status:    Intimate Partner Violence:     Fear of Current or Ex-Partner:     Emotionally Abused:     Physically Abused:     Sexually Abused:        Data:   CBC with Differential:    Lab Results   Component Value Date    WBC 19.1 10/25/2021    RBC 4.90 10/25/2021    HGB 13.9 10/25/2021    HCT 44.1 10/25/2021     10/25/2021 MCV 90.0 10/25/2021    MCH 28.4 10/25/2021    MCHC 31.5 10/25/2021    RDW 15.4 10/25/2021    SEGSPCT 89.2 10/25/2021    LYMPHOPCT 2.6 10/25/2021    MONOPCT 6.9 10/25/2021    BASOPCT 0.2 10/25/2021    MONOSABS 1.3 10/25/2021    LYMPHSABS 0.5 10/25/2021    EOSABS 0.0 10/25/2021    BASOSABS 0.0 10/25/2021    DIFFTYPE AUTOMATED DIFFERENTIAL 10/25/2021       CMP:     Lab Results   Component Value Date     10/25/2021    K 4.0 10/25/2021     10/25/2021    CO2 24 10/25/2021    BUN 28 10/25/2021    CREATININE 0.9 10/25/2021    GFRAA >60 10/25/2021    AGRATIO 1.3 11/25/2020    LABGLOM >60 10/25/2021    GLUCOSE 89 10/25/2021    PROT 6.4 10/25/2021    LABALBU 4.1 10/25/2021    CALCIUM 8.8 10/25/2021    BILITOT 0.3 10/25/2021    ALKPHOS 77 10/25/2021    AST 31 10/25/2021    ALT 40 10/25/2021       Troponin:  Lab Results   Component Value Date    TROPONINT 0.026 10/25/2021       U/A:    Lab Results   Component Value Date    COLORU YELLOW 11/15/2020    PROTEINU 100 11/15/2020    WBCUA 8 11/15/2020    RBCUA 636 11/15/2020    MUCUS FEW 11/15/2020    TRICHOMONAS NONE SEEN 11/15/2020    BACTERIA RARE 11/15/2020    CLARITYU HAZY 11/15/2020    SPECGRAV 1.018 11/15/2020    LEUKOCYTESUR NEGATIVE 11/15/2020    UROBILINOGEN NORMAL 11/15/2020    BILIRUBINUR NEGATIVE 11/15/2020    BLOODU LARGE 11/15/2020       Urine Culture:  No components found for: CURINE    Radiology results:  XR CHEST PORTABLE   Final Result   1. Cardiomegaly, mild central venous congestion         VL DUP LOWER EXTREMITY VENOUS BILATERAL   Preliminary Result   No evidence of DVT in either lower extremity from groin to knee. Calf veins   were not visualized due to body habitus and edema.                Medications:   Medications:    vancomycin  2,000 mg IntraVENous Once    sodium chloride  500 mL IntraVENous Once    ranolazine  500 mg Oral BID    rivaroxaban  20 mg Oral Dinner    metoprolol succinate  50 mg Oral Daily    clopidogrel  75 mg Oral Daily  folic acid-pyridoxine-cyancobalamin  1 tablet Oral Daily    ezetimibe  10 mg Oral Nightly    atorvastatin  40 mg Oral Daily    folic acid  1 tablet Oral Daily    sodium chloride flush  5-40 mL IntraVENous 2 times per day    famotidine  20 mg Oral BID    furosemide  20 mg IntraVENous BID    insulin glargine  0.25 Units/kg SubCUTAneous Nightly    insulin lispro  0.08 Units/kg SubCUTAneous TID WC    insulin lispro  0-6 Units SubCUTAneous TID WC    insulin lispro  0-3 Units SubCUTAneous Nightly      Infusions:    sodium chloride      dextrose       PRN Meds: sodium chloride flush, 5-40 mL, PRN  sodium chloride, 25 mL, PRN  ondansetron, 4 mg, Q8H PRN   Or  ondansetron, 4 mg, Q6H PRN  polyethylene glycol, 17 g, Daily PRN  acetaminophen, 650 mg, Q6H PRN   Or  acetaminophen, 650 mg, Q6H PRN  potassium chloride, 40 mEq, PRN   Or  potassium alternative oral replacement, 40 mEq, PRN   Or  potassium chloride, 10 mEq, PRN  magnesium sulfate, 2,000 mg, PRN  glucose, 15 g, PRN  dextrose, 12.5 g, PRN  glucagon (rDNA), 1 mg, PRN  dextrose, 100 mL/hr, PRN          Electronically signed by Willy Blandon MD on 10/25/2021 at 3:22 AM

## 2021-10-25 NOTE — ED NOTES
Bed: ED-06  Expected date:   Expected time:   Means of arrival:   Comments:  [id number] RN  10/24/21 5958

## 2021-10-25 NOTE — ED PROVIDER NOTES
EMERGENCY DEPARTMENT ENCOUNTER        PCP: Tacho Gresham MD    [facility]    Chief Complaint   Patient presents with    Leg Swelling     bilateral    Gait Problem     unable to ambulate       Of note, this patient was also evaluated by the attending physician, Dr. Dutton Maurice is a 64 y.o. male with PMH of Afib, type 2 DM, CVA, HTN, HLD who presents with bilateral lower extremity swelling and difficulty walking today due to worsening swelling of both legs. Patient reports that swelling has worsened over the last few days and difficulty ambulate began today. Patient reports associated chills. Patient reports that his legs have almost doubled in size. He reports that his right leg is slightly more swollen than his left leg but has also had some redness of his right leg. Patient denies any pain. He reports that normally his leg swelling improves overnight and then worsens during the day but when he awoke today his legs did not seem to have improved overnight like usual.    Patient reports compliance with his Lasix 80 mg daily. He also reports compliance with his Xarelto and Plavix. Patient uses CPAP at night. Patient denies shortness of breath, chest pain, recent trauma or direct injury. Patient denies lower extremity swelling, recent long travel, history of PE/DVT, exogenous hormone use, recent surgeries/hospitalizations, recent trauma, hemoptysis. REVIEW OF SYSTEMS    Constitutional:  + chills; Denies fever  HENT:  Denies sore throat or ear pain   Cardiovascular:   Denies palpitations,  Denies syncope, no extremity edema. Respiratory:  Denies cough, shortness of breath, or hemoptysis    GI:  Denies abdominal pain, nausea, vomiting, or diarrhea  :  Denies any urinary symptoms  Musculoskeletal:  See HPI;  Denies back pain, extremity pain   Skin:  See HPI  Neurologic:  Denies lightheadedness, dizziness, headache, focal weakness or sensory changes   Endocrine:  Denies polyuria or polydypsia   Lymphatic:  Denies swollen glands     All other review of systems are negative  See HPI and nursing notes for additional information         PAST MEDICAL & SURGICAL HISTORY    Past Medical History:   Diagnosis Date    Atrial fibrillation ([address]     [address]erebral artery occlusion with cerebral infarction [address] [address]     History of cardiac cath 2017    --RCA has mid 50% stenosis and PLB branch has 70% stenosis noted. LVEDP very high    Hyperlipidemia     Hypertension     Type II or unspecified type diabetes mellitus without mention of complication, not stated as uncontrolled     Diagnsed about 1998    Unspecified sleep apnea      Past Surgical History:   Procedure Laterality Date    CARDIAC CATHETERIZATION      TONSILLECTOMY      AT 13YEARS OLD       CURRENT MEDICATIONS    Current Outpatient Rx   Medication Sig Dispense Refill    ezetimibe (ZETIA) 10 MG tablet       simvastatin (ZOCOR) 40 MG tablet       folic acid (FOLVITE) 1 MG tablet TAKE 1 TABLET BY MOUTH ONCE DAILY      insulin regular human (HUMULIN R U-500 KWIKPEN) 500 UNIT/ML SOPN concentrated injection pen Inject 0-30 Units into the skin 3 times daily (before meals) This is a highly concentrated insulin. After attaching the pen needle, prime with 5 units to ensure proper dosing.   If 140-199 5 Units  200-249 10 Units  250-299 15 Units  300-349 20 Units  350-400 25 Units  Above 400      30 Units 5 pen 0    rivaroxaban (XARELTO) 20 MG TABS tablet Take 1 tablet by mouth Daily with supper 30 tablet 0    metoprolol succinate (TOPROL XL) 50 MG extended release tablet Take 1 tablet by mouth daily 30 tablet 0    clopidogrel (PLAVIX) 75 MG tablet Take 1 tablet by mouth daily 30 tablet 0    folic acid-pyridoxine-cyancobalamin (FOLTX) 1.13-25-2 MG TABS Take 1 tablet by mouth daily 30 tablet 0    ranolazine (RANEXA) 500 MG extended release tablet Take 1 tablet by mouth 2 times daily 60 tablet 0    metFORMIN (GLUCOPHAGE) 500 MG tablet Take 500 mg by mouth 2 times daily.  furosemide (LASIX) 80 MG tablet Take 80 mg by mouth daily. ALLERGIES    No Known Allergies    SOCIAL & FAMILY HISTORY    Social History     Socioeconomic History    Marital status:      Spouse name: None    Number of children: None    Years of education: None    Highest education level: None   Occupational History    None   Tobacco Use    Smoking status: Never Smoker    Smokeless tobacco: Never Used   Vaping Use    Vaping Use: Never used   Substance and Sexual Activity    Alcohol use: No    Drug use: No    Sexual activity: Yes   Other Topics Concern    None   Social History Narrative    None     Social Determinants of Health     Financial Resource Strain:     Difficulty of Paying Living Expenses:    Food Insecurity:     Worried About Running Out of Food in the Last Year:     Ran Out of Food in the Last Year:    Transportation Needs:     Lack of Transportation (Medical):      Lack of Transportation (Non-Medical):    Physical Activity:     Days of Exercise per Week:     Minutes of Exercise per Session:    Stress:     Feeling of Stress :    Social Connections:     Frequency of Communication with Friends and Family:     Frequency of Social Gatherings with Friends and Family:     Attends Restorationist Services:     Active Member of Clubs or Organizations:     Attends Club or Organization Meetings:     Marital Status:    Intimate Partner Violence:     Fear of Current or Ex-Partner:     Emotionally Abused:     Physically Abused:     Sexually Abused:      Family History   Problem Relation Age of Onset    Diabetes Mother     Diabetes Father     Cancer Father     Cancer Maternal Grandfather         PROSTATE CANCER       PHYSICAL EXAM    VITAL SIGNS: /65   Pulse 111   Temp 99.6 °F (37.6 °C) (Oral)   Resp (!) 32   Ht 6' 1\" (1.854 m)   Wt (!) 460 lb (208.7 kg)   SpO2 95%   BMI 60.69 kg/m²    Constitutional:  Well developed, well nourished, no acute distress   HENT:  Atraumatic, moist mucus membranes  Neck/Lymphatics: supple, no JVD, no swollen nodes  Respiratory:  Lungs Clear, no retractions, no wheezing, rhonchi, rales  Cardiovascular:  Rate regular, regular Rhythm,  no murmurs/rubs/gallops. Vascular: Radial pulses and DP pulses 2+ equal bilaterally  GI:  Soft, nontender, normal bowel sounds  Musculoskeletal: No gross deformity. There is pitting edema of the bilateral lower legs as well as diffuse edema of the bilateral lower legs. On the right lower extremity is more swollen than left lower extremity. No thigh/calf tenderness to palpation bilaterally. Compartments are soft in bilateral lower extremities. Integument:  Skin warm and dry, no petechiae. There is macular erythema with increased warmth to palpation of the right anterior lower leg. No fluctuance. No lymphangitic streaking. No crepitus. Neurologic:  Alert & oriented, normal speech. Bilateral lower extremities distally neurovascular intact.   Psych: Pleasant affect, no hallucinations        LABS:  Results for orders placed or performed during the hospital encounter of 10/24/21   CBC with Auto Diff   Result Value Ref Range    WBC 19.1 (H) 4.0 - 10.5 K/CU MM    RBC 4.90 4.6 - 6.2 M/CU MM    Hemoglobin 13.9 13.5 - 18.0 GM/DL    Hematocrit 44.1 42 - 52 %    MCV 90.0 78 - 100 FL    MCH 28.4 27 - 31 PG    MCHC 31.5 (L) 32.0 - 36.0 %    RDW 15.4 (H) 11.7 - 14.9 %    Platelets 057 672 - 568 K/CU MM    MPV 11.3 (H) 7.5 - 11.1 FL    Differential Type AUTOMATED DIFFERENTIAL     Segs Relative 89.2 (H) 36 - 66 %    Lymphocytes % 2.6 (L) 24 - 44 %    Monocytes % 6.9 (H) 0 - 4 %    Eosinophils % 0.1 0 - 3 %    Basophils % 0.2 0 - 1 %    Segs Absolute 17.0 K/CU MM    Lymphocytes Absolute 0.5 K/CU MM    Monocytes Absolute 1.3 K/CU MM    Eosinophils Absolute 0.0 K/CU MM    Basophils Absolute 0.0 K/CU MM    Nucleated RBC % 0.0 %    Total Nucleated RBC 0.0 K/CU MM    Total Immature mild central venous congestion. Venous duplex ultrasound of bilateral lower extremities reveals no evidence of DVT but calf veins are unable to be visualized to body habitus and edema. Patient does report compliance with his Eliquis and Plavix and I do have low clinical suspicion for DVT. Patient does appear to have cellulitis of the anterior right lower extremity and given his tachycardia, leukocytosis, and elevated lactic acid did start patient on broad-spectrum antibiotics of IV cefepime and IV vancomycin to cover for sepsis. Patient not treated with significant amount of fluids given bilateral lower extremity edema concerning for dependent edema versus CHF. Will admit patient for further IV antibiotics as well as further evaluation care. I consulted with hospitalist and we discussed the patient's case. Hospitalist, Dr. Lea Cook, agrees to admit the patient at this time for further evaluation and care. All pertinent Lab data and radiographic results reviewed with patient at bedside. The patient and/or the family were informed of the results of any tests/labs/imaging, the treatment plan, and time was allotted to answer questions. Diagnosis, disposition, and treatment plan reviewed in detail with patient who understands and agrees to admission at this time. See chart for details of medications given during the ED stay. Clinical  IMPRESSION    1. Bilateral leg edema    2. Cellulitis of right lower extremity    3. Elevated troponin        PLAN:  Admission to the hospital    Comment: Please note this report has been produced using speech recognition software and may contain errors related to that system including errors in grammar, punctuation, and spelling, as well as words and phrases that may be inappropriate. If there are any questions or concerns please feel free to contact the dictating provider for clarification.         Davey Gomez PA-C  10/25/21 [id number]

## 2021-10-26 LAB
ALBUMIN SERPL-MCNC: 3.5 GM/DL (ref 3.4–5)
ALP BLD-CCNC: 72 IU/L (ref 40–128)
ALT SERPL-CCNC: 32 U/L (ref 10–40)
ANION GAP SERPL CALCULATED.3IONS-SCNC: 15 MMOL/L (ref 4–16)
AST SERPL-CCNC: 41 IU/L (ref 15–37)
BASOPHILS ABSOLUTE: 0.1 K/CU MM
BASOPHILS RELATIVE PERCENT: 0.3 % (ref 0–1)
BILIRUB SERPL-MCNC: 0.4 MG/DL (ref 0–1)
BUN BLDV-MCNC: 33 MG/DL (ref 6–23)
CALCIUM SERPL-MCNC: 8.7 MG/DL (ref 8.3–10.6)
CHLORIDE BLD-SCNC: 99 MMOL/L (ref 99–110)
CO2: 24 MMOL/L (ref 21–32)
CREAT SERPL-MCNC: 1.2 MG/DL (ref 0.9–1.3)
DIFFERENTIAL TYPE: ABNORMAL
EOSINOPHILS ABSOLUTE: 0 K/CU MM
EOSINOPHILS RELATIVE PERCENT: 0.1 % (ref 0–3)
GFR AFRICAN AMERICAN: >60 ML/MIN/1.73M2
GFR NON-AFRICAN AMERICAN: >60 ML/MIN/1.73M2
GLUCOSE BLD-MCNC: 154 MG/DL (ref 70–99)
GLUCOSE BLD-MCNC: 183 MG/DL (ref 70–99)
GLUCOSE BLD-MCNC: 292 MG/DL (ref 70–99)
GLUCOSE BLD-MCNC: 326 MG/DL (ref 70–99)
GLUCOSE BLD-MCNC: 348 MG/DL (ref 70–99)
GLUCOSE BLD-MCNC: 383 MG/DL (ref 70–99)
HCT VFR BLD CALC: 39.9 % (ref 42–52)
HEMOGLOBIN: 12.7 GM/DL (ref 13.5–18)
IMMATURE NEUTROPHIL %: 0.5 % (ref 0–0.43)
LYMPHOCYTES ABSOLUTE: 0.8 K/CU MM
LYMPHOCYTES RELATIVE PERCENT: 5.6 % (ref 24–44)
MCH RBC QN AUTO: 28.3 PG (ref 27–31)
MCHC RBC AUTO-ENTMCNC: 31.8 % (ref 32–36)
MCV RBC AUTO: 88.9 FL (ref 78–100)
MONOCYTES ABSOLUTE: 1 K/CU MM
MONOCYTES RELATIVE PERCENT: 7 % (ref 0–4)
NUCLEATED RBC %: 0 %
PDW BLD-RTO: 15.9 % (ref 11.7–14.9)
PLATELET # BLD: 195 K/CU MM (ref 140–440)
PMV BLD AUTO: 11.7 FL (ref 7.5–11.1)
POTASSIUM SERPL-SCNC: 3.6 MMOL/L (ref 3.5–5.1)
RBC # BLD: 4.49 M/CU MM (ref 4.6–6.2)
SEGMENTED NEUTROPHILS ABSOLUTE COUNT: 12.5 K/CU MM
SEGMENTED NEUTROPHILS RELATIVE PERCENT: 86.5 % (ref 36–66)
SODIUM BLD-SCNC: 138 MMOL/L (ref 135–145)
TOTAL IMMATURE NEUTOROPHIL: 0.07 K/CU MM
TOTAL NUCLEATED RBC: 0 K/CU MM
TOTAL PROTEIN: 6.2 GM/DL (ref 6.4–8.2)
WBC # BLD: 14.4 K/CU MM (ref 4–10.5)

## 2021-10-26 PROCEDURE — 97162 PT EVAL MOD COMPLEX 30 MIN: CPT

## 2021-10-26 PROCEDURE — 36415 COLL VENOUS BLD VENIPUNCTURE: CPT

## 2021-10-26 PROCEDURE — 85025 COMPLETE CBC W/AUTO DIFF WBC: CPT

## 2021-10-26 PROCEDURE — 6360000002 HC RX W HCPCS: Performed by: HOSPITALIST

## 2021-10-26 PROCEDURE — 99233 SBSQ HOSP IP/OBS HIGH 50: CPT | Performed by: INTERNAL MEDICINE

## 2021-10-26 PROCEDURE — 82962 GLUCOSE BLOOD TEST: CPT

## 2021-10-26 PROCEDURE — 6370000000 HC RX 637 (ALT 250 FOR IP): Performed by: INTERNAL MEDICINE

## 2021-10-26 PROCEDURE — 97535 SELF CARE MNGMENT TRAINING: CPT

## 2021-10-26 PROCEDURE — 2140000000 HC CCU INTERMEDIATE R&B

## 2021-10-26 PROCEDURE — 97530 THERAPEUTIC ACTIVITIES: CPT

## 2021-10-26 PROCEDURE — 2500000003 HC RX 250 WO HCPCS: Performed by: INTERNAL MEDICINE

## 2021-10-26 PROCEDURE — XW03396 INTRODUCTION OF CEFTOLOZANE/TAZOBACTAM ANTI-INFECTIVE INTO PERIPHERAL VEIN, PERCUTANEOUS APPROACH, NEW TECHNOLOGY GROUP 6: ICD-10-PCS | Performed by: INTERNAL MEDICINE

## 2021-10-26 PROCEDURE — 80053 COMPREHEN METABOLIC PANEL: CPT

## 2021-10-26 PROCEDURE — 6370000000 HC RX 637 (ALT 250 FOR IP): Performed by: HOSPITALIST

## 2021-10-26 PROCEDURE — 99211 OFF/OP EST MAY X REQ PHY/QHP: CPT

## 2021-10-26 PROCEDURE — 6360000002 HC RX W HCPCS: Performed by: INTERNAL MEDICINE

## 2021-10-26 PROCEDURE — 97166 OT EVAL MOD COMPLEX 45 MIN: CPT

## 2021-10-26 PROCEDURE — 2580000003 HC RX 258: Performed by: INTERNAL MEDICINE

## 2021-10-26 PROCEDURE — 94761 N-INVAS EAR/PLS OXIMETRY MLT: CPT

## 2021-10-26 PROCEDURE — 97116 GAIT TRAINING THERAPY: CPT

## 2021-10-26 RX ORDER — INSULIN GLARGINE 100 [IU]/ML
40 INJECTION, SOLUTION SUBCUTANEOUS NIGHTLY
Status: DISCONTINUED | OUTPATIENT
Start: 2021-10-27 | End: 2021-10-28

## 2021-10-26 RX ORDER — BUMETANIDE 0.25 MG/ML
2 INJECTION, SOLUTION INTRAMUSCULAR; INTRAVENOUS ONCE
Status: COMPLETED | OUTPATIENT
Start: 2021-10-26 | End: 2021-10-26

## 2021-10-26 RX ORDER — BUMETANIDE 0.25 MG/ML
2 INJECTION, SOLUTION INTRAMUSCULAR; INTRAVENOUS ONCE
Status: DISCONTINUED | OUTPATIENT
Start: 2021-10-26 | End: 2021-10-27

## 2021-10-26 RX ORDER — INSULIN GLARGINE 100 [IU]/ML
60 INJECTION, SOLUTION SUBCUTANEOUS NIGHTLY
Status: DISCONTINUED | OUTPATIENT
Start: 2021-10-26 | End: 2021-10-26

## 2021-10-26 RX ADMIN — FUROSEMIDE 20 MG: 10 INJECTION, SOLUTION INTRAVENOUS at 16:29

## 2021-10-26 RX ADMIN — PIPERACILLIN AND TAZOBACTAM 3375 MG: 3; .375 INJECTION, POWDER, LYOPHILIZED, FOR SOLUTION INTRAVENOUS at 11:48

## 2021-10-26 RX ADMIN — RIVAROXABAN 20 MG: 20 TABLET, FILM COATED ORAL at 16:29

## 2021-10-26 RX ADMIN — INSULIN GLARGINE 30 UNITS: 100 INJECTION, SOLUTION SUBCUTANEOUS at 20:40

## 2021-10-26 RX ADMIN — METOPROLOL TARTRATE 50 MG: 50 TABLET, FILM COATED ORAL at 20:42

## 2021-10-26 RX ADMIN — FAMOTIDINE 20 MG: 20 TABLET ORAL at 20:42

## 2021-10-26 RX ADMIN — ATORVASTATIN CALCIUM 40 MG: 40 TABLET, FILM COATED ORAL at 10:09

## 2021-10-26 RX ADMIN — METOPROLOL TARTRATE 50 MG: 50 TABLET, FILM COATED ORAL at 10:09

## 2021-10-26 RX ADMIN — FUROSEMIDE 20 MG: 10 INJECTION, SOLUTION INTRAVENOUS at 11:47

## 2021-10-26 RX ADMIN — BUMETANIDE 2 MG: 0.25 INJECTION, SOLUTION INTRAMUSCULAR; INTRAVENOUS at 11:46

## 2021-10-26 RX ADMIN — FOLIC ACID 1 MG: 1 TABLET ORAL at 10:09

## 2021-10-26 RX ADMIN — Medication 1 TABLET: at 10:08

## 2021-10-26 RX ADMIN — PIPERACILLIN AND TAZOBACTAM 3375 MG: 3; .375 INJECTION, POWDER, LYOPHILIZED, FOR SOLUTION INTRAVENOUS at 19:44

## 2021-10-26 RX ADMIN — CLOPIDOGREL BISULFATE 75 MG: 75 TABLET, FILM COATED ORAL at 10:09

## 2021-10-26 RX ADMIN — INSULIN HUMAN 25 UNITS: 100 INJECTION, SUSPENSION SUBCUTANEOUS at 09:33

## 2021-10-26 RX ADMIN — FAMOTIDINE 20 MG: 20 TABLET ORAL at 10:10

## 2021-10-26 RX ADMIN — RANOLAZINE 500 MG: 500 TABLET, FILM COATED, EXTENDED RELEASE ORAL at 10:09

## 2021-10-26 RX ADMIN — EZETIMIBE 10 MG: 10 TABLET ORAL at 20:41

## 2021-10-26 RX ADMIN — RANOLAZINE 500 MG: 500 TABLET, FILM COATED, EXTENDED RELEASE ORAL at 20:42

## 2021-10-26 ASSESSMENT — PAIN SCALES - GENERAL
PAINLEVEL_OUTOF10: 0

## 2021-10-26 NOTE — PROGRESS NOTES
Progress Note( Dr. Telly Burnham)  10/26/2021  Subjective:   Admit Date: 10/24/2021  PCP: Kyler Al MD    Admitted For :for : Severe leg swelling and generalized weakness and unable to ambulate    Consulted For: Better control of blood glucose    Interval History: Feels somewhat better leg swelling is less    Denies any chest pains,   Denies SOB . Denies nausea or vomiting. No new bowel or bladder symptoms. Intake/Output Summary (Last 24 hours) at 10/26/2021 0653  Last data filed at 10/26/2021 0600  Gross per 24 hour   Intake 250 ml   Output 500 ml   Net -250 ml       DATA    CBC:   Recent Labs     10/25/21  0002   WBC 19.1*   HGB 13.9       CMP:  Recent Labs     10/25/21  0002      K 4.0      CO2 24   BUN 28*   CREATININE 0.9   CALCIUM 8.8   PROT 6.4   LABALBU 4.1   BILITOT 0.3   ALKPHOS 77   AST 31   ALT 40     Lipids:   Lab Results   Component Value Date    CHOL 109 11/25/2020    HDL 41 11/25/2020    TRIG 114 11/25/2020     Glucose:  Recent Labs     10/25/21  0926 10/25/21  1925 10/26/21  0205   POCGLU 166* 276* 383*     [id number]:  Lab Results   Component Value Date    LABA1C 7.9 10/25/2021     High Sensitivity TSH:   Lab Results   Component Value Date    TSHHS 1.800 08/29/2019     Free T3: No results found for: FT3  Free T4:No results found for: T4FREE    XR CHEST PORTABLE   Final Result   1. Cardiomegaly, mild central venous congestion         VL DUP LOWER EXTREMITY VENOUS BILATERAL   Final Result   No evidence of DVT in either lower extremity from groin to knee. Calf veins   were not visualized due to body habitus and edema.               Scheduled Medicines   Medications:    influenza virus vaccine  0.5 mL IntraMUSCular Prior to discharge    insulin NPH  25 Units SubCUTAneous QAM    insulin glargine  60 Units SubCUTAneous Nightly    insulin lispro  0-18 Units SubCUTAneous TID WC    insulin lispro  0-18 Units SubCUTAneous 2 times per day    insulin lispro  20 Units SubCUTAneous TID WC    ranolazine  500 mg Oral BID    rivaroxaban  20 mg Oral Dinner    clopidogrel  75 mg Oral Daily    folic acid-pyridoxine-cyancobalamin  1 tablet Oral Daily    ezetimibe  10 mg Oral Nightly    atorvastatin  40 mg Oral Daily    folic acid  1 mg Oral Daily    sodium chloride flush  5-40 mL IntraVENous 2 times per day    famotidine  20 mg Oral BID    furosemide  20 mg IntraVENous BID    metoprolol tartrate  50 mg Oral BID      Infusions:    sodium chloride      dextrose           Objective:   Vitals: BP (!) 142/80   Pulse 81   Temp 97.5 °F (36.4 °C) (Oral)   Resp 23   Ht 6' 1\" (1.854 m)   Wt (!) 360 lb 7.2 oz (163.5 kg)   SpO2 95%   BMI 47.56 kg/m²   General appearance: alert and cooperative with exam  Neck: no JVD or bruit  Thyroid : Normal lobes   Lungs: Has Vesicular Breath sounds   Heart:  regular rate and rhythm  Abdomen: soft, non-tender; bowel sounds normal; no masses,  no organomegaly  Musculoskeletal: Normal  Extremities: extremities normal, , yes massive edema  Neurologic:  Awake, alert, oriented to name, place and time. Cranial nerves II-XII are grossly intact. Motor is  intact.   Sensory neuropathy t.,  and gait is abnormal.    Assessment:     Patient Active Problem List:     MARINO on CPAP     New onset atrial fibrillation (HCC)     Type 2 diabetes mellitus with hyperglycemia, with long-term current use of insulin (HCC)     Class 3 severe obesity due to excess calories with body mass index (BMI) of 45.0 to 49.9 in [facility])     History of cardiac cath     Weakness of right arm     Acute CVA (cerebrovascular accident) ([address]     Spastic hemiparesis of right dominant side ([address]     Dysphagia due to recent stroke     Essential hypertension     Morbid obesity with body mass index of 45.0-49.9 in [facility])     Frequent falls     Chronic venous stasis dermatitis of both lower extremities     History of CVA (cerebrovascular accident)     Obesity hypoventilation syndrome ([facility]     Hypertension     Hyperlipidemia     Congestive heart failure due to cardiomyopathy ([facility]      Plan:     1. Reviewed POC blood glucose . Labs and X ray results   2. Reviewed Current Medicines   3. On meal/ Correction bolus Humalog/ Basal Lantus Insulin regime / and Oral Hypoglycemic drugs   4. Monitor Blood glucose frequently   5. Modified  the dose of Insulin/ other medicines as needed   6. Will follow     .      Janice Stein MD, MD

## 2021-10-26 NOTE — CONSULTS
[address] [id number] Continence Nurse  Consult Note       Tamiko Bob  AGE: 64 y.o. GENDER: male  : 1960  TODAY'S DATE:  10/26/2021    Subjective:     Reason for CWOCN Evaluation and Assessment: skin assessment      Tamiko Bob is a 64 y.o. male referred by:   [x] Physician  [] Nursing  [] Other:     Wound Identification:  Wound Type: no wounds  Contributing Factors: edema, lymphedema, diabetes, decreased mobility, obesity, non-adherence and cellulitis        PAST MEDICAL HISTORY        Diagnosis Date    [facility]     Cerebral artery occlusion with cerebral infarction ([facility]     History of cardiac cath     --RCA has mid 50% stenosis and PLB branch has 70% stenosis noted. LVEDP very high    Hyperlipidemia     Hypertension     Type II or unspecified type diabetes mellitus without mention of complication, not stated as uncontrolled     Diagnsed about     Unspecified sleep apnea        PAST SURGICAL HISTORY    Past Surgical History:   Procedure Laterality Date    CARDIAC CATHETERIZATION      TONSILLECTOMY      AT 13YEARS OLD       FAMILY HISTORY    Family History   Problem Relation Age of Onset    Diabetes Mother     Diabetes Father     Cancer Father     Cancer Maternal Grandfather         PROSTATE CANCER       SOCIAL HISTORY    Social History     Tobacco Use    Smoking status: Never Smoker    Smokeless tobacco: Never Used   Vaping Use    Vaping Use: Never used   Substance Use Topics    Alcohol use: No    Drug use: No       ALLERGIES    No Known Allergies    MEDICATIONS    No current facility-administered medications on file prior to encounter.      Current Outpatient Medications on File Prior to Encounter   Medication Sig Dispense Refill    ezetimibe (ZETIA) 10 MG tablet       simvastatin (ZOCOR) 40 MG tablet       folic acid (FOLVITE) 1 MG tablet TAKE 1 TABLET BY MOUTH ONCE DAILY      insulin regular human (HUMULIN R U-500 KWIKPEN) 500 UNIT/ML SOPN Results   Component Value Date    LABALBU 3.5 10/26/2021     PT/INR:    Lab Results   Component Value Date    PROTIME 14.2 11/20/2018    INR 1.25 11/20/2018     HgBA1c:    Lab Results   Component Value Date    LABA1C 7.9 10/25/2021         Assessment:     Patient Active Problem List   Diagnosis    MARINO on CPAP    New onset atrial fibrillation (HCC)    Type 2 diabetes mellitus with hyperglycemia, with long-term current use of insulin (HCC)    Class 3 severe obesity due to excess calories with body mass index (BMI) of 45.0 to 49.9 in adult [facility])    History of cardiac cath    Weakness of right arm    [facility]    Spastic hemiparesis of right dominant side ([facility]    Dysphagia due to recent stroke    Essential hypertension    Morbid obesity with body mass index of 45.0-49.9 in adult ([facility]    Frequent falls    Chronic venous stasis dermatitis of both lower extremities    History of CVA (cerebrovascular accident)    Obesity hypoventilation syndrome ([facility]    Hypertension    Hyperlipidemia    Congestive heart failure due to cardiomyopathy ([facility]       Measurements:  Wound 11/27/18 Right distal posterior leg (Active)   Number of days: 1064       Wound 11/28/18 Right mid posterior leg (Active)   Number of days: 1062       Wound 11/28/18 Right posterior proximal leg  (Active)   Number of days: 1062       Wound 11/16/20 Knee Anterior;Right (Active)   Number of days: 344       Wound 11/16/20 Pretibial Left;Posterior (Active)   Number of days: 344       Response to treatment:  Well tolerated by patient. Pain Assessment:  Severity:  none  Quality of pain: na  Wound Pain Timing/Severity: na  Premedicated: no    Plan:     Plan of Care:       Pt sitting on side of bed with legs dependent. Bilateral lower legs edematous R>L. Hx lymphedema. Currently admitted with CHF/cellulitis of right leg. No open wounds currently to legs. Dry. Washed with bath oil and pat dry.  Venous US negative for DVT. Right leg with erythema. Compression contraindicated due to acute CHF/cellulitis. Encouraged to keep legs elevated above level of heart to reduce edema. Elevated right leg on 2 pillows in bed but pt indicated he was going to sit on EOB again despite education. Declined posterior skin assessment. Heels intact. Recommend follow up at outpatient lymphedema clinic. Specialty Bed Required : no  [] Low Air Loss   [] Pressure Redistribution  [] Fluid Immersion  [] Bariatric  [] Total Pressure Relief  [] Other:     Discharge Plan:  Placement for patient upon discharge: tbd  Hospice Care: no  Patient appropriate for Outpatient [address] [facility]: recommend follow up with outpatient lymphedema clinic    Patient/Caregiver Teaching:  Level of patient/caregiver understanding able to:   Voiced understanding.         Electronically signed by Katy Noble RN, Rubin Bhakta on 10/26/2021 at 12:23 PM

## 2021-10-26 NOTE — CONSULTS
[facility] PHYSICAL THERAPY EVALUATION  Grace Brothers, 1960, 3104/3104-A, 10/26/2021    History  [place]:  The primary encounter diagnosis was Bilateral leg edema. Diagnoses of Cellulitis of right lower extremity and Elevated troponin were also pertinent to this visit. Patient  has a past medical history of Atrial fibrillation [facility]), Cerebral artery occlusion with cerebral infarction [facility]), History of cardiac cath, Hyperlipidemia, Hypertension, Type II or unspecified type diabetes mellitus without mention of complication, not stated as uncontrolled, and Unspecified sleep apnea. Patient  has a past surgical history that includes Tonsillectomy and Cardiac catheterization. Subjective:  Patient states:  \"I knew I could do it, I just didn't want to brag. \"    Pain:  Denies pain.     Communication with other providers:  Handoff to RN, OT  Restrictions: general precautions, fall risk    Home Setup/Prior level of function  Social/Functional History  Lives With: Spouse  Type of Home: House  Home Layout: Multi-level  Home Access: Stairs to enter with rails  Entrance Stairs - Number of Steps: 7  Bathroom Shower/Tub: Tub/Shower unit, Shower chair with back (does not normally use chair)  Bathroom Toilet: Handicap height  Home Equipment: Rolling walker, Cane, Reacher (PRN use of SPC in community)  ADL Assistance: Independent  Homemaking Assistance: Independent  Homemaking Responsibilities: Yes  Ambulation Assistance: Independent  Transfer Assistance: Independent  Active : Yes (but wife typically drives)  Mode of Transportation: Car  Occupation: Retired  Type of occupation: made molds  Additional Comments: Pt with CVA 3 years ago    Examination of body systems (includes body structures/functions, activity/participation limitations):  · Observation:  Pt supine in bed upon arrival and agreeable to therapy  · [facility]  · [facility]  · Cardiopulmonary:  No O2 needs  · Cognition: appears WFL, see OT/SLP note for further evaluation. Musculoskeletal  · ROM R/L:  WFL. · Strength R/L:  4/5, minimal impairments in function and endurance. · Neuro:  Peripheral neuropathy in bilateral feet      Mobility:  · Rolling L/R:  Mod I  · Supine <-> sit:  Mod I  · Transfers: Pt completed STS transfer to/from bed mod I  · Sitting balance:  good. · Standing balance:  good. · Gait: Pt ambulated 300' with RW with decreased stance phase on L LE which is chronic. Pt seems to be ambulating at baseline. [facility] 6 Clicks Inpatient Mobility:  AM-PAC Inpatient Mobility Raw Score : 22    Safety: patient left supine in bed with alarm on, call light within reach, RN notified, gait belt used. Assessment:  Pt is a 64 y.o. male admitted to the hospital for congestive heart failure with cardiomyopathy. Pt is typically independent with all ambulation and transfers with PRN use of cane. Pt is currently performing bed mobility mod I, transferring mod I, and ambulating 300' with RW CGA. Pt seems to be functioning close to baseline and does not require any further acute care PT. Pt will be discharged from caseload. Complexity: moderate    Prognosis: Good, no significant barriers to participation at this time.      Equipment: none, pt owns all necessary equipment    Recommendations for NURSING mobility: amb with RW and gait belt    Time:   Time in: 1241  Time out: 1510  Timed treatment minutes: 18  Total time: 29    Electronically signed by:    Jonathon Sanabria, PT  10/26/2021, 4:12 PM

## 2021-10-26 NOTE — PROGRESS NOTES
Internal Medicine Hospitalist             Daily Progress  Note   Subjective:     Chief Complaint   Patient presents with    Leg Swelling     bilateral    Gait Problem     unable to ambulate     Mr. Princess Lomas feels significant improvement compared to yesterday. Objective:    /72   Pulse 83   Temp 97.6 °F (36.4 °C) (Oral)   Resp 20   Ht 6' 1\" (1.854 m)   Wt (!) 360 lb 7.2 oz (163.5 kg)   SpO2 96%   BMI 47.56 kg/m²      Intake/Output Summary (Last 24 hours) at 10/26/2021 0952  Last data filed at 10/26/2021 0700  Gross per 24 hour   Intake 250 ml   Output 900 ml   Net -650 ml      Physical Exam:  Heart: , normal S1 and S2 in all 4 auscultatory areas. No rubs  Murmurs or gallops heard. Lungs: Mostly clear to auscultation, decreased breath sounds at bases. No wheezes appreciated no crackles heard. Abdomen: Soft,  distended. Bowel sounds not appreciated. No obvious liver or spleen enlargement. Non tender, no rebound noted. Difficult exam due to morbid obesity  Extremities: Non tender, nonpitting swelling noted, move all 4. Lateral lymphedema. Has erythema. CNS: Grossly intact.      Labs:  CBC with Differential:    Lab Results   Component Value Date    WBC 14.4 10/26/2021    RBC 4.49 10/26/2021    HGB 12.7 10/26/2021    HCT 39.9 10/26/2021     10/26/2021    MCV 88.9 10/26/2021    MCH 28.3 10/26/2021    MCHC 31.8 10/26/2021    RDW 15.9 10/26/2021    SEGSPCT 86.5 10/26/2021    LYMPHOPCT 5.6 10/26/2021    MONOPCT 7.0 10/26/2021    BASOPCT 0.3 10/26/2021    MONOSABS 1.0 10/26/2021    LYMPHSABS 0.8 10/26/2021    EOSABS 0.0 10/26/2021    BASOSABS 0.1 10/26/2021    DIFFTYPE AUTOMATED DIFFERENTIAL 10/26/2021     CMP:    Lab Results   Component Value Date     10/25/2021    K 4.0 10/25/2021     10/25/2021    CO2 24 10/25/2021    BUN 28 10/25/2021    CREATININE 0.9 10/25/2021    GFRAA >60 10/25/2021    AGRATIO 1.3 11/25/2020    LABGLOM >60 10/25/2021    GLUCOSE 279 10/25/2021    PROT 6.4 10/25/2021    LABALBU 4.1 10/25/2021    CALCIUM 8.8 10/25/2021    BILITOT 0.3 10/25/2021    ALKPHOS 77 10/25/2021    AST 31 10/25/2021    ALT 40 10/25/2021     Recent Labs     10/25/21  0002   TROPONINT 0.026*     Lab Results   Component Value Date    [facility] 1.800 08/29/2019         sodium chloride      dextrose        influenza virus vaccine  0.5 mL IntraMUSCular Prior to discharge    insulin NPH  25 Units SubCUTAneous QAM    insulin glargine  60 Units SubCUTAneous Nightly    insulin lispro  0-18 Units SubCUTAneous TID WC    insulin lispro  0-18 Units SubCUTAneous 2 times per day    insulin lispro  20 Units SubCUTAneous TID WC    bumetanide  2 mg IntraVENous Once    bumetanide  2 mg IntraVENous Once    ranolazine  500 mg Oral BID    rivaroxaban  20 mg Oral Dinner    clopidogrel  75 mg Oral Daily    folic acid-pyridoxine-cyancobalamin  1 tablet Oral Daily    ezetimibe  10 mg Oral Nightly    atorvastatin  40 mg Oral Daily    folic acid  1 mg Oral Daily    sodium chloride flush  5-40 mL IntraVENous 2 times per day    famotidine  20 mg Oral BID    furosemide  20 mg IntraVENous BID    metoprolol tartrate  50 mg Oral BID         Assessment:       Patient Active Problem List    Diagnosis Date Noted    Congestive heart failure due to cardiomyopathy ([address] 10/25/2021    Frequent falls 11/16/2020    Chronic venous stasis dermatitis of both lower extremities 11/16/2020    History of CVA (cerebrovascular accident) 11/16/2020    Obesity hypoventilation syndrome ([address] 11/16/2020    Hypertension     Hyperlipidemia     Acute CVA (cerebrovascular accident) ([address] 11/20/2018    Spastic hemiparesis of right dominant side ([address] 11/20/2018    Dysphagia due to recent stroke 11/20/2018    Essential hypertension 11/20/2018    Morbid obesity with body mass index of 45.0-49.9 in [facility]) 11/20/2018    Weakness of right arm 11/18/2018    History of cardiac cath 10/08/2018   Maico Kebede New onset atrial fibrillation ([facility] 09/25/2018    Type 2 diabetes mellitus with hyperglycemia, with long-term current use of insulin ([facility] 09/25/2018    Class 3 severe obesity due to excess calories with body mass index (BMI) of 45.0 to 49.9 in adult [facility]) 09/25/2018    MARINO on CPAP 10/14/2013       Plan:     Problems being addressed this admission:   Acute on chronic HFpEF  Atrial fibrillation on DOAC  Chronic lymphedema  MARINO  DM 2  Morbid obesity    Acute on chronic heart failure  Patient has been seen by cardiology to continue diuretics for now. Atrial fibrillation: Xarelto 20 mg once a day and metoprolol    chronic lymphedema unchanged from before. Has had ultrasound done both legs no evidence of DVT noted. obstructive sleep apnea. We will have him on AutoPap at night    Diabetes mellitus type 2  -Blood glucose elevated today. Does not appear that he got his Lantus  -Endocrinology on board. Consultants:  Cardiology    General Orders:  Repeat basic labs again in am.  I have explained to the patient and discussed with him/her the treatment plan. The above chart was generated partly using Dragon dictation system, it may contain dictation errors given the limitations of this technology.      Jay Mcgee MD 9:53 AM 10/26/21

## 2021-10-26 NOTE — CONSULTS
[address] [place] [address], [facility]                                  CONSULTATION    PATIENT NAME: Sam Valenzuela                  :        1960  MED REC NO:   [id number]                          ROOM:       3104  ACCOUNT NO:   [de-identified]                           ADMIT DATE: 10/24/2021  PROVIDER:     Steph Serna MD    CONSULT DATE:  10/26/2021    HISTORY OF PRESENT ILLNESS:  The patient is a 75-year-old gentleman with  history of diabetes, hypertension, obstructive sleep apnea on CPAP,  atrial fibrillation, hyperlipidemia who was admitted through the  emergency room with complaints of swelling of the legs and altered  mental status. According to the patient's wife, he had some slurring of  the speech and he was slightly confused. He denies any hemoptysis. He  denies any hematemesis. He denies any significant cough. The patient  has swelling of the lower extremities and some redness. His blood  cultures are growing group B strep agalactiae. He had an elevated white  count of 14.4. He was subsequently admitted to the hospital.    PAST MEDICAL HISTORY:  Significant for diabetes, hypertension, sleep  apnea, atrial fibrillation, and hyperlipidemia. PAST SURGICAL HISTORY:  Remarkable for tonsillectomy and cardiac  catheterization. FAMILY HISTORY:  Reveals that his mother had diabetes. Father had  cancer and diabetes. SOCIAL HISTORY:  Reveals that he is a nonsmoker. No history of alcohol  or drug abuse. MEDICATIONS:  His medications were reviewed. ALLERGIES:  He has no known allergies. REVIEW OF SYSTEMS:  A 10- to 14-point review of systems were reviewed  and are negative except for what is mentioned in history of present  illness. PHYSICAL EXAMINATION:  GENERAL: The patient is alert and oriented x3, in no acute respiratory  distress.   VITAL SIGNS:  Blood pressure was 134/72 mmHg, pulse of 83 per minute and  respiratory rate of 20 per minute. He is afebrile. His saturation is  96% on room air. HEENT:  Exam is essentially unremarkable. There is no JVD, no  lymphadenopathy. NECK:  Supple. LUNGS:  Exam reveals diminished breath sounds. There are no rhonchi. HEART:  Exam shows normal S1, S2. There is no S3, S4 noted. ABDOMEN:  Exam is benign. There is no evidence of any organomegaly. The bowel sounds are present. NEUROLOGIC:  Exam reveals that he is awake and responsive, answering  questions appropriately, moving his extremities. EXTREMITIES:  Exam shows swelling of the lower extremities, lymphedema,  some redness and chronic venous stasis. LABORATORY DATA:  His CBC showed a white count of 14.4, hemoglobin 12.7,  hematocrit 39.9. His electrolytes were unremarkable. His Duplex leg  scan was negative for DVT. His chest x-ray showed mild pulmonary  vascular congestion. IMPRESSION:  1. Sepsis syndrome. 2.  Cellulitis of the lower extremities. 3.  Obstructive sleep apnea on CPAP. PLAN:  1. We will start the patient on Zosyn. 2.  AutoCPAP while sleeping. 3.  Check procalcitonin level. 4.  Check mag and phos. 5.  Discussed with the family in detail. 6.  As per orders.       Dustin Saeed MD    D: 10/26/2021 10:59:11       T: 10/26/2021 11:01:30     /[email or web address]  Job#: [id number]     Doc#: [id number]    CC:

## 2021-10-26 NOTE — CONSULTS
[facility] THERAPY EVALUATION    Angi Fallon, 1960, 3104/3104-A, 10/26/2021    Discharge Recommendation: Home with initial 24 hour supervision/assistance, PRN assistance. History:  [place]:  The primary encounter diagnosis was Bilateral leg edema. Diagnoses of Cellulitis of right lower extremity and Elevated troponin were also pertinent to this visit. Subjective:  Patient states: Agreeable to therapy. Pain: Pt denied pain this date (0/10). Communication with other providers: PT, RN, LSW  Restrictions: General Precautions, Fall Risk    Home Setup/Prior level of function:  Social/Functional History  Lives With: Spouse  Type of Home: House  Home Layout: Multi-level  Home Access: Stairs to enter with rails  Entrance Stairs - Number of Steps: 7  Bathroom Shower/Tub: Tub/Shower unit, Shower chair with back (does not normally use chair)  Bathroom Toilet: Handicap height  Home Equipment: Rolling walker, Cane, Reacher (PRN use of SPC in community)  ADL Assistance: Independent  Homemaking Assistance: Independent  Homemaking Responsibilities: Yes  Ambulation Assistance: Independent  Transfer Assistance: Independent  Active : Yes (but wife typically drives)  Mode of Transportation: Car  Occupation: Retired  Type of occupation: made molds  Additional Comments: Pt with CVA 3 years ago    Examination:  · Observation: Supine in bed upon arrival  · Vision: [facility]  · Hearing: WFL  · Vitals: Stable vitals throughout session    Body Systems and functions:  · ROM: WFL   · Strength: 5/5 BUE shoulder flexion. · Sensation: WFL  · Tone: Normal  · Coordination: WFL  · Perception: WNL    Activities of Daily Living (ADLs):  · Feeding: aTwanda  setup and increased time. · Grooming: SBA in standing with setup, increased time, VC.   · UB bathing: Tawanda  recommend pt complete in seated with setup and increased time.    · LB bathing: Ros in seated with setup, increased time, VC, and assistance for distal reaching. · UB dressing: Tawanda  in seated with setup and increased time. · LB dressing: Ros in seated with assistance for distal reaching. Pt required assist to carmen socks while seated upright at EOB this date. Pt reports at baseline he utilizes a reacher. · Toileting: CGA during commode transfers and while balancing in standing to complete kris care and LB clothing management. Cognitive and Psychosocial Functioning:  · Overall cognitive status: Oriented to time, date, person, place, city  · Affect: Normal     Balance:   · Sitting: SUP (pt sat EOB demo good dynamic sitting balance). · Standing: SBA-CGA (pt stood with RW. Demo fair+ dynamic standing balance). Functional Mobility:   · Bed Mobility: SUP (pt performed supine to seated bed mobility with HOB slightly elevated, increased time, and VC throughout for sequencing). · Transfers: SBA  (pt performed STS from EOB with RW. Required VC throughout for sequencing and increased time). · Ambulation: SBA - CGA (pt ambulated approx 125ft with RW. Demo fair pace throughout and 0 LOB. Intermittent VC for RW management). AM-PAC 6 click short form for inpatient daily activity:   How much help from another person does the patient currently need. .. Unable  Dep A Lot  Max A A Lot   Mod A A Little  Min A A Little   CGA  SBA None   Mod I  Indep  Sup   1. Putting on and taking off regular lower body clothing? [] 1    [] 2   [] 2   [x] 3   [] 3   [] 4      2. Bathing (including washing, rinsing, drying)? [] 1   [] 2   [] 2 [x] 3 [] 3 [] 4   3. Toileting, which includes using toilet, bedpan, or urinal? [] 1    [] 2   [] 2   [] 3   [x] 3   [] 4     4. Putting on and taking off regular upper body clothing? [] 1   [] 2   [] 2   [] 3   [] 3    [x] 4      5. Taking care of personal grooming such as brushing teeth? [] 1   [] 2    [] 2 [] 3    [x] 3   [] 4      6. Eating meals?    [] 1   [] 2   [] 2   [] 3   [] 3   [x] 4      Raw Score:  20

## 2021-10-26 NOTE — PROGRESS NOTES
Cardiology Progress Note       Angi Fallon is a 64 y.o. male   1960     SUBJECTIVE:   Patient with history of paroxysmal atrial fibrillation, chronic lymphedema, morbid obesity, CAD cardiac cath 2017 LAD and left circumflex mild disease RCA 50% also patient has obstructive sleep apnea, COPD, history of CVA, pulmonary hypertension and diabetes  Patient presented with shortness of breath increased lower extremity edema despite Lasix also was found in atrial fibrillation with RVR heart rate currently at 110  denies any chest pain  10/26  Patient seen and examined said he feels better shortness of breath is better no chest pain lungs clear on exam    OBJECTIVE:    Review of Systems:  General appearance: alert, appears stated age and cooperative  Skin: Skin color, texture, normal. No rashes or lesions  HEENT: No nose bleed, headache, vision problems  CV: C/O chest pain, tightness, pressure,   Respiratory: C/o no SOB, ROSARIO, Orthopnea, PND  GI: No abdominal pain, black stool, bloating  Limbs: No c/o edema, pain, swelling, intermittent claudication, joint pains  Neuro: No dizziness, lightheadedness, syncope, gait problems, memory problems  Psych: grossly normal. No SI/depression. Vitals:   Blood pressure 134/72, pulse 83, temperature 97.6 °F (36.4 °C), temperature source Oral, resp. rate 20, height 6' 1\" (1.854 m), weight (!) 360 lb 7.2 oz (163.5 kg), SpO2 96 %. HEENT: AT, NC, PERRLA  Neck: No JVD  Heart: S1 S2 audible, no murmur   Lungs: CTA   Abdomen: Nontender   Limbs: No edema   CNS: no focal deficit      Past Medical History:   Diagnosis Date    Atrial fibrillation ([address]     Cerebral artery occlusion with cerebral infarction ([address]     History of cardiac cath 2017    --RCA has mid 50% stenosis and PLB branch has 70% stenosis noted.  LVEDP very high    Hyperlipidemia     Hypertension     Type II or unspecified type diabetes mellitus without mention of complication, not stated as uncontrolled Diagnsed about 1998    Unspecified sleep apnea         Patient Active Problem List   Diagnosis    MARINO on CPAP    New onset atrial fibrillation (HCC)    Type 2 diabetes mellitus with hyperglycemia, with long-term current use of insulin (HCC)    Class 3 severe obesity due to excess calories with body mass index (BMI) of 45.0 to 49.9 in [facility])    History of cardiac cath    Weakness of right arm    Acute CVA (cerebrovascular accident) ([address]    Spastic hemiparesis of right dominant side ([address]    Dysphagia due to recent stroke    Essential hypertension    Morbid obesity with body mass index of 45.0-49.9 in [facility])    Frequent falls    Chronic venous stasis dermatitis of both lower extremities    History of CVA (cerebrovascular accident)    Obesity hypoventilation syndrome ([address]    Hypertension    Hyperlipidemia    Congestive heart failure due to cardiomyopathy ([facility]        No Known Allergies     Current Inpatient Medications:    Current Facility-Administered Medications   Medication Dose Route Frequency Provider Last Rate Last Admin    influenza quadrivalent split vaccine (FLUZONE;FLUARIX;FLULAVAL;AFLURIA) injection 0.5 mL  0.5 mL IntraMUSCular Prior to discharge Monse Aponte MD        insulin NPH (HUMULIN N;NOVOLIN N) injection vial 25 Units  25 Units SubCUTAneous QAM  Nicolette Parekh MD        insulin glargine (LANTUS) injection vial 60 Units  60 Units SubCUTAneous Nightly  Nicolette Parekh MD        insulin lispro (HUMALOG) injection vial 0-18 Units  0-18 Units SubCUTAneous TID  Foreign Smith MD        insulin lispro (HUMALOG) injection vial 0-18 Units  0-18 Units SubCUTAneous 2 times per day Foreign Smith MD        insulin lispro (HUMALOG) injection vial 20 Units  20 Units SubCUTAneous TID [facility] Nicolette Parekh MD        bumetanide [facility]) injection 2 mg  2 mg IntraVENous Once Evan Finley MD        bumetanide (BUMEX) injection 2 mg  2 mg IntraVENous Once Lorenzo Hanson MD        ranolazine [facility]) extended release tablet 500 mg  500 mg Oral BID Shyanne Cm MD   500 mg at 10/25/21 2027    rivaroxaban (XARELTO) tablet 20 mg  20 mg Oral Dinner Shyanne Cm MD   20 mg at 10/25/21 2027    clopidogrel (PLAVIX) tablet 75 mg  75 mg Oral Daily Shyanne Cm MD   75 mg at 41/92/58 1699    folic acid-pyridoxine-cyancobalamin (FOLTX) 1.13-25-2 MG TABS 1 tablet  1 tablet Oral Daily Salvador Pelayo MD   1 tablet at 10/25/21 1036    ezetimibe (ZETIA) tablet 10 mg  10 mg Oral Nightly Shyanne Cm MD   10 mg at 10/25/21 2026    atorvastatin (LIPITOR) tablet 40 mg  40 mg Oral Daily Shyanne Cm MD   40 mg at 00/79/12 0847    folic acid (FOLVITE) tablet 1 mg  1 mg Oral Daily Shyanne Cm MD   1 mg at 10/25/21 1035    sodium chloride flush 0.9 % injection 5-40 mL  5-40 mL IntraVENous 2 times per day Shyanne Cm MD   10 mL at 10/25/21 2300    sodium chloride flush 0.9 % injection 5-40 mL  5-40 mL IntraVENous PRN Shyanne Cm MD        0.9 % sodium chloride infusion  25 mL IntraVENous PRN Shyanne Cm MD        ondansetron (ZOFRAN-ODT) disintegrating tablet 4 mg  4 mg Oral Q8H PRN Shyanne Cm MD        Or    ondansetron [facility]) injection 4 mg  4 mg IntraVENous Q6H PRN Shyanne Cm MD        polyethylene glycol [facility]) packet 17 g  17 g Oral Daily PRN Shyanne Cm MD        acetaminophen (TYLENOL) tablet 650 mg  650 mg Oral Q6H PRN Shyanne Cm MD        Or    acetaminophen (TYLENOL) suppository 650 mg  650 mg Rectal Q6H PRN Shyanne Cm MD        potassium chloride (KLOR-CON M) extended release tablet 40 mEq  40 mEq Oral PRN Shyanne Cm MD        Or    potassium bicarb-citric acid (EFFER-K) effervescent tablet 40 mEq  40 mEq Oral PRN Shyanne Cm MD        Or    potassium chloride 10 mEq/100 mL IVPB (Peripheral Line)  10 mEq IntraVENous PRMILAGROS Blandon MD        magnesium sulfate 2000 mg in 50 mL IVPB premix  2,000 mg IntraVENous PRMILAGROS Blandon MD        famotidine (PEPCID) tablet 20 mg  20 mg Oral BID Willy Blandon MD   20 mg at 10/25/21 2027    furosemide (LASIX) injection 20 mg  20 mg IntraVENous BID Willy Blandon MD   20 mg at 10/25/21 2027    glucose (GLUTOSE) 40 % oral gel 15 g  15 g Oral PRMILAGROS Blandon MD        dextrose 50 % IV solution  12.5 g IntraVENous PRN Willy Blandon MD        glucagon (rDNA) injection 1 mg  1 mg IntraMUSCular PRMILAGROS Blandon MD        dextrose 5 % solution  100 mL/hr IntraVENous PRMILAGROS Blandon MD        metoprolol tartrate (LOPRESSOR) tablet 50 mg  50 mg Oral BID Carmencita Small MD   50 mg at 10/25/21 2027           Labs:  CBC with Differential:    Lab Results   Component Value Date    WBC 14.4 10/26/2021    RBC 4.49 10/26/2021    HGB 12.7 10/26/2021    HCT 39.9 10/26/2021     10/26/2021    MCV 88.9 10/26/2021    MCH 28.3 10/26/2021    MCHC 31.8 10/26/2021    RDW 15.9 10/26/2021    SEGSPCT 86.5 10/26/2021    LYMPHOPCT 5.6 10/26/2021    MONOPCT 7.0 10/26/2021    BASOPCT 0.3 10/26/2021    MONOSABS 1.0 10/26/2021    LYMPHSABS 0.8 10/26/2021    EOSABS 0.0 10/26/2021    BASOSABS 0.1 10/26/2021    DIFFTYPE AUTOMATED DIFFERENTIAL 10/26/2021     CMP:    Lab Results   Component Value Date     10/25/2021    K 4.0 10/25/2021     10/25/2021    CO2 24 10/25/2021    BUN 28 10/25/2021    CREATININE 0.9 10/25/2021    GFRAA >60 10/25/2021    AGRATIO 1.3 11/25/2020    LABGLOM >60 10/25/2021    GLUCOSE 279 10/25/2021    PROT 6.4 10/25/2021    LABALBU 4.1 10/25/2021    CALCIUM 8.8 10/25/2021    BILITOT 0.3 10/25/2021    ALKPHOS 77 10/25/2021    AST 31 10/25/2021    ALT 40 10/25/2021     Hepatic Function Panel:    Lab Results   Component Value Date    ALKPHOS 77 10/25/2021 ALT 40 10/25/2021    AST 31 10/25/2021    PROT 6.4 10/25/2021    BILITOT 0.3 10/25/2021    BILIDIR 0.2 08/29/2019    IBILI 0.2 08/29/2019    LABALBU 4.1 10/25/2021     Magnesium:    Lab Results   Component Value Date    MG 2.4 11/16/2020     PT/INR:    Lab Results   Component Value Date    PROTIME 14.2 11/20/2018    INR 1.25 11/20/2018     Last 3 Troponin:  No results found for: TROPONINI  U/A:    Lab Results   Component Value Date    COLORU YELLOW 11/15/2020    WBCUA 8 11/15/2020    RBCUA 636 11/15/2020    MUCUS FEW 11/15/2020    TRICHOMONAS NONE SEEN 11/15/2020    BACTERIA RARE 11/15/2020    CLARITYU HAZY 11/15/2020    SPECGRAV 1.018 11/15/2020    LEUKOCYTESUR NEGATIVE 11/15/2020    UROBILINOGEN NORMAL 11/15/2020    BILIRUBINUR NEGATIVE 11/15/2020    BLOODU LARGE 11/15/2020     ABG:  No results found for: PHART, SMR2XTY, PO2ART, MWQ0TTH, BEART, THGBART, HNE1FOT, K8BKEOTK  FLP:    Lab Results   Component Value Date    TRIG 114 11/25/2020    HDL 41 11/25/2020    LDLCALC 45 11/25/2020    LDLDIRECT 118 11/19/2018    LABVLDL 23 11/25/2020     TSH:  No results found for: TSH   DATA:   ECG: Sinus Rhythm       ASSESSMENT:     1 atrial fibrillation with RVR  Increase Lopressor to 50 twice daily   Rhythm appears to be sinus rhythm some movement artifact will obtain twelve-lead EKG to confirm    2 acute on chronic diastolic heart failure  Give extra Bumex 2 mg  Again  , patient clinically much better  2D echo showed borderline normal LV function with severely dilated left atrium    #Dilated ascending aorta measured 4.6 cm  Will need follow-up  probably 6 months    3 CAD no chest pain continue medical treatment    4 chronic lymphedema  With pitting edema likely contribution of pulmonary hypertension and venous stasis  5 diabetes management hospitalist group  6 morbid obesity chronic due to excess caloric intake  Plan  Bumex 2 mg IV now  Change Toprol-XL 50 mg to Lopressor 50 twice daily

## 2021-10-26 NOTE — PROGRESS NOTES
Physician Progress Note      PATIENT:               Soni Storey  CSN #:                  [id number]  :                       1960  ADMIT DATE:       10/24/2021 11:53 PM  [address] [place] DATE:  RESPONDING  PROVIDER #:        Adilene Manley MD          QUERY TEXT:    Pt admitted with bilat leg swelling. Noted documentation of acute on chronic   diastolic CHF in Dr. Jonny Merrill 10/25 consult note. If possible, please document in   progress notes and discharge summary:    The medical record reflects the following:  Risk Factors: Hx CHF, morbid obesity  Clinical Indicators: H&P reflects \"CHF acute on chronic -Last echo is from   . -EF is 50% with grade 2 diastolic dysfunction. \", Dr. Jonny Merrill noted \"acute   on chronic diastolic heart failure\", Pro-.1. Treatment: CARD consult, IV Bumex, IV Lasix. Thank you,  ЮЛИЯ FlowersN, RN, CDS  [phone]  Options provided:  -- Acute on chronic diastolic CHF confirmed present on admission  -- Acute on chronic diastolic CHF ruled out  -- Other - I will add my own diagnosis  -- Disagree - Not applicable / Not valid  -- Disagree - Clinically unable to determine / Unknown  -- Refer to Clinical Documentation Reviewer    PROVIDER RESPONSE TEXT:    The diagnosis of acute on chronic diastolic CHF was confirmed as present on   admission.     Query created by: Sharif Jama on 10/26/2021 9:55 AM      Electronically signed by:  Adilene Manley MD 10/26/2021 12:53 PM

## 2021-10-26 NOTE — ED NOTES
Report called to [facility]. Pt to transfer to room 3104.      Jazlyn Childress RN  10/25/21 2057

## 2021-10-26 NOTE — CONSULTS
Nutrition Education    · Verbally reviewed information with Patient  · Educated on Carb Counting for People with Diabetes, Planning Your Portions, Heart Healthy Reduced Sodium Nutrition Therapy (Nutrition Care Manual)  · Written educational materials provided. · Contact name and number provided. · Refer to Patient Education activity for more details.     Electronically signed by Shelia Duckworth RD, LD on 10/26/21 at 1:14 PM EDT    Contact: [phone]

## 2021-10-27 ENCOUNTER — APPOINTMENT (OUTPATIENT)
Dept: ULTRASOUND IMAGING | Age: 61
DRG: 871 | End: 2021-10-27
Payer: MEDICARE

## 2021-10-27 LAB
ALBUMIN SERPL-MCNC: 3.3 GM/DL (ref 3.4–5)
ALP BLD-CCNC: 66 IU/L (ref 40–128)
ALT SERPL-CCNC: 29 U/L (ref 10–40)
ANION GAP SERPL CALCULATED.3IONS-SCNC: 13 MMOL/L (ref 4–16)
AST SERPL-CCNC: 34 IU/L (ref 15–37)
BACTERIA: NEGATIVE /HPF
BASOPHILS ABSOLUTE: 0 K/CU MM
BASOPHILS RELATIVE PERCENT: 0.3 % (ref 0–1)
BILIRUB SERPL-MCNC: 0.3 MG/DL (ref 0–1)
BILIRUBIN URINE: NEGATIVE MG/DL
BLOOD, URINE: ABNORMAL
BUN BLDV-MCNC: 29 MG/DL (ref 6–23)
CALCIUM SERPL-MCNC: 8.5 MG/DL (ref 8.3–10.6)
CHLORIDE BLD-SCNC: 100 MMOL/L (ref 99–110)
CHLORIDE URINE RANDOM: 63 MMOL/L (ref 43–210)
CLARITY: CLEAR
CO2: 24 MMOL/L (ref 21–32)
COLOR: YELLOW
CREAT SERPL-MCNC: 1.1 MG/DL (ref 0.9–1.3)
CREATININE URINE: 52.8 MG/DL (ref 39–259)
CULTURE: ABNORMAL
DIFFERENTIAL TYPE: ABNORMAL
EOSINOPHILS ABSOLUTE: 0.1 K/CU MM
EOSINOPHILS RELATIVE PERCENT: 1 % (ref 0–3)
GFR AFRICAN AMERICAN: >60 ML/MIN/1.73M2
GFR NON-AFRICAN AMERICAN: >60 ML/MIN/1.73M2
GLUCOSE BLD-MCNC: 169 MG/DL (ref 70–99)
GLUCOSE BLD-MCNC: 212 MG/DL (ref 70–99)
GLUCOSE BLD-MCNC: 220 MG/DL (ref 70–99)
GLUCOSE BLD-MCNC: 248 MG/DL (ref 70–99)
GLUCOSE BLD-MCNC: 255 MG/DL (ref 70–99)
GLUCOSE, URINE: 50 MG/DL
HAV IGM SER IA-ACNC: NON REACTIVE
HCT VFR BLD CALC: 39.3 % (ref 42–52)
HEMOGLOBIN: 12.6 GM/DL (ref 13.5–18)
HEPATITIS B CORE IGM ANTIBODY: NON REACTIVE
HEPATITIS B SURFACE ANTIGEN: NON REACTIVE
HEPATITIS C ANTIBODY: NON REACTIVE
IMMATURE NEUTROPHIL %: 0.3 % (ref 0–0.43)
KETONES, URINE: NEGATIVE MG/DL
LEUKOCYTE ESTERASE, URINE: NEGATIVE
LYMPHOCYTES ABSOLUTE: 1.1 K/CU MM
LYMPHOCYTES RELATIVE PERCENT: 11.6 % (ref 24–44)
Lab: ABNORMAL
Lab: ABNORMAL
MAGNESIUM: 2.1 MG/DL (ref 1.8–2.4)
MCH RBC QN AUTO: 28.4 PG (ref 27–31)
MCHC RBC AUTO-ENTMCNC: 32.1 % (ref 32–36)
MCV RBC AUTO: 88.7 FL (ref 78–100)
MONOCYTES ABSOLUTE: 0.9 K/CU MM
MONOCYTES RELATIVE PERCENT: 9.8 % (ref 0–4)
NITRITE URINE, QUANTITATIVE: NEGATIVE
NUCLEATED RBC %: 0 %
PDW BLD-RTO: 15.6 % (ref 11.7–14.9)
PH, URINE: 5 (ref 5–8)
PHOSPHORUS: 2.5 MG/DL (ref 2.5–4.9)
PLATELET # BLD: 186 K/CU MM (ref 140–440)
PMV BLD AUTO: 11.2 FL (ref 7.5–11.1)
POTASSIUM SERPL-SCNC: 3.6 MMOL/L (ref 3.5–5.1)
POTASSIUM, UR: 22.7 MMOL/L (ref 22–119)
PRO-BNP: 326.7 PG/ML
PROCALCITONIN: 0.41
PROT/CREAT RATIO, UR: 0.4
PROTEIN UA: NEGATIVE MG/DL
RBC # BLD: 4.43 M/CU MM (ref 4.6–6.2)
RBC URINE: 157 /HPF (ref 0–3)
SEGMENTED NEUTROPHILS ABSOLUTE COUNT: 7.3 K/CU MM
SEGMENTED NEUTROPHILS RELATIVE PERCENT: 77 % (ref 36–66)
SODIUM BLD-SCNC: 137 MMOL/L (ref 135–145)
SODIUM URINE: 56 MMOL/L (ref 35–167)
SPECIFIC GRAVITY UA: 1.01 (ref 1–1.03)
SPECIMEN: ABNORMAL
SPECIMEN: ABNORMAL
TOTAL IMMATURE NEUTOROPHIL: 0.03 K/CU MM
TOTAL NUCLEATED RBC: 0 K/CU MM
TOTAL PROTEIN: 5.9 GM/DL (ref 6.4–8.2)
TRICHOMONAS: ABNORMAL /HPF
URINE TOTAL PROTEIN: 18.5 MG/DL
UROBILINOGEN, URINE: NEGATIVE MG/DL (ref 0.2–1)
WBC # BLD: 9.4 K/CU MM (ref 4–10.5)
WBC UA: 8 /HPF (ref 0–2)

## 2021-10-27 PROCEDURE — 51798 US URINE CAPACITY MEASURE: CPT

## 2021-10-27 PROCEDURE — 82962 GLUCOSE BLOOD TEST: CPT

## 2021-10-27 PROCEDURE — 6370000000 HC RX 637 (ALT 250 FOR IP): Performed by: INTERNAL MEDICINE

## 2021-10-27 PROCEDURE — 36415 COLL VENOUS BLD VENIPUNCTURE: CPT

## 2021-10-27 PROCEDURE — 83735 ASSAY OF MAGNESIUM: CPT

## 2021-10-27 PROCEDURE — 81001 URINALYSIS AUTO W/SCOPE: CPT

## 2021-10-27 PROCEDURE — 2580000003 HC RX 258: Performed by: INTERNAL MEDICINE

## 2021-10-27 PROCEDURE — 82436 ASSAY OF URINE CHLORIDE: CPT

## 2021-10-27 PROCEDURE — 6360000002 HC RX W HCPCS: Performed by: INTERNAL MEDICINE

## 2021-10-27 PROCEDURE — 85025 COMPLETE CBC W/AUTO DIFF WBC: CPT

## 2021-10-27 PROCEDURE — 6360000002 HC RX W HCPCS: Performed by: HOSPITALIST

## 2021-10-27 PROCEDURE — 84133 ASSAY OF URINE POTASSIUM: CPT

## 2021-10-27 PROCEDURE — 6370000000 HC RX 637 (ALT 250 FOR IP): Performed by: HOSPITALIST

## 2021-10-27 PROCEDURE — 80074 ACUTE HEPATITIS PANEL: CPT

## 2021-10-27 PROCEDURE — 94761 N-INVAS EAR/PLS OXIMETRY MLT: CPT

## 2021-10-27 PROCEDURE — 83880 ASSAY OF NATRIURETIC PEPTIDE: CPT

## 2021-10-27 PROCEDURE — 0T9B70Z DRAINAGE OF BLADDER WITH DRAINAGE DEVICE, VIA NATURAL OR ARTIFICIAL OPENING: ICD-10-PCS | Performed by: INTERNAL MEDICINE

## 2021-10-27 PROCEDURE — 76775 US EXAM ABDO BACK WALL LIM: CPT

## 2021-10-27 PROCEDURE — 86160 COMPLEMENT ANTIGEN: CPT

## 2021-10-27 PROCEDURE — 2580000003 HC RX 258: Performed by: HOSPITALIST

## 2021-10-27 PROCEDURE — 82570 ASSAY OF URINE CREATININE: CPT

## 2021-10-27 PROCEDURE — 84100 ASSAY OF PHOSPHORUS: CPT

## 2021-10-27 PROCEDURE — 84165 PROTEIN E-PHORESIS SERUM: CPT

## 2021-10-27 PROCEDURE — 93926 LOWER EXTREMITY STUDY: CPT

## 2021-10-27 PROCEDURE — 84156 ASSAY OF PROTEIN URINE: CPT

## 2021-10-27 PROCEDURE — 84300 ASSAY OF URINE SODIUM: CPT

## 2021-10-27 PROCEDURE — 84166 PROTEIN E-PHORESIS/URINE/CSF: CPT

## 2021-10-27 PROCEDURE — 99233 SBSQ HOSP IP/OBS HIGH 50: CPT | Performed by: INTERNAL MEDICINE

## 2021-10-27 PROCEDURE — 80053 COMPREHEN METABOLIC PANEL: CPT

## 2021-10-27 PROCEDURE — 2140000000 HC CCU INTERMEDIATE R&B

## 2021-10-27 PROCEDURE — 84145 PROCALCITONIN (PCT): CPT

## 2021-10-27 RX ORDER — METOLAZONE 2.5 MG/1
2.5 TABLET ORAL DAILY
Status: DISCONTINUED | OUTPATIENT
Start: 2021-10-27 | End: 2021-10-31

## 2021-10-27 RX ORDER — POTASSIUM CHLORIDE 20 MEQ/1
20 TABLET, EXTENDED RELEASE ORAL 2 TIMES DAILY WITH MEALS
Status: DISCONTINUED | OUTPATIENT
Start: 2021-10-27 | End: 2021-10-29

## 2021-10-27 RX ADMIN — SODIUM CHLORIDE, PRESERVATIVE FREE 10 ML: 5 INJECTION INTRAVENOUS at 09:15

## 2021-10-27 RX ADMIN — EZETIMIBE 10 MG: 10 TABLET ORAL at 20:34

## 2021-10-27 RX ADMIN — CLOPIDOGREL BISULFATE 75 MG: 75 TABLET, FILM COATED ORAL at 09:09

## 2021-10-27 RX ADMIN — PIPERACILLIN AND TAZOBACTAM 3375 MG: 3; .375 INJECTION, POWDER, LYOPHILIZED, FOR SOLUTION INTRAVENOUS at 12:01

## 2021-10-27 RX ADMIN — POTASSIUM CHLORIDE 20 MEQ: 1500 TABLET, EXTENDED RELEASE ORAL at 17:47

## 2021-10-27 RX ADMIN — INSULIN GLARGINE 40 UNITS: 100 INJECTION, SOLUTION SUBCUTANEOUS at 20:36

## 2021-10-27 RX ADMIN — RANOLAZINE 500 MG: 500 TABLET, FILM COATED, EXTENDED RELEASE ORAL at 20:34

## 2021-10-27 RX ADMIN — RANOLAZINE 500 MG: 500 TABLET, FILM COATED, EXTENDED RELEASE ORAL at 09:09

## 2021-10-27 RX ADMIN — FUROSEMIDE 10 MG/HR: 10 INJECTION, SOLUTION INTRAMUSCULAR; INTRAVENOUS at 14:22

## 2021-10-27 RX ADMIN — METOPROLOL TARTRATE 50 MG: 50 TABLET, FILM COATED ORAL at 20:34

## 2021-10-27 RX ADMIN — PIPERACILLIN AND TAZOBACTAM 3375 MG: 3; .375 INJECTION, POWDER, LYOPHILIZED, FOR SOLUTION INTRAVENOUS at 17:50

## 2021-10-27 RX ADMIN — METOPROLOL TARTRATE 50 MG: 50 TABLET, FILM COATED ORAL at 09:23

## 2021-10-27 RX ADMIN — FAMOTIDINE 20 MG: 20 TABLET ORAL at 09:09

## 2021-10-27 RX ADMIN — FUROSEMIDE 20 MG: 10 INJECTION, SOLUTION INTRAVENOUS at 09:09

## 2021-10-27 RX ADMIN — FUROSEMIDE 10 MG/HR: 10 INJECTION, SOLUTION INTRAMUSCULAR; INTRAVENOUS at 22:35

## 2021-10-27 RX ADMIN — METOLAZONE 2.5 MG: 2.5 TABLET ORAL at 14:23

## 2021-10-27 RX ADMIN — ATORVASTATIN CALCIUM 40 MG: 40 TABLET, FILM COATED ORAL at 09:09

## 2021-10-27 RX ADMIN — FOLIC ACID 1 MG: 1 TABLET ORAL at 09:09

## 2021-10-27 RX ADMIN — FAMOTIDINE 20 MG: 20 TABLET ORAL at 20:34

## 2021-10-27 RX ADMIN — Medication 1 TABLET: at 09:09

## 2021-10-27 RX ADMIN — RIVAROXABAN 20 MG: 20 TABLET, FILM COATED ORAL at 17:47

## 2021-10-27 RX ADMIN — PIPERACILLIN AND TAZOBACTAM 3375 MG: 3; .375 INJECTION, POWDER, LYOPHILIZED, FOR SOLUTION INTRAVENOUS at 03:30

## 2021-10-27 ASSESSMENT — PAIN SCALES - GENERAL
PAINLEVEL_OUTOF10: 0

## 2021-10-27 NOTE — CONSULTS
Nephrology Service Consultation    Patient:  Stephania Ponce  MRN: [id number]  Consulting physician:  Rachel Davis MD  Reason for Consult: Acute renal failure with peripheral edema    History Obtained From:  patient, spouse, electronic medical record  PCP: Marnie Michaels MD    HISTORY OF PRESENT ILLNESS:   The patient is a 64 y.o. male who presents with weakness and shortness of breath presents with weakness in legs with increased pain. Appears to have cellulitis in right lower extremity. Has been issues with chronic lymphedema in the past.  With diastolic congestive heart failure coronary disease. Was on diuretics as an outpatient but not improved. Patient stable living factors have been decreased as he is unable to manage himself as well and increased abdominal girth. Patient sees Dr. Verna Coker with diabetes and I was asked to see in the above setting for worsening edema as he is receiving is an outpatient. I know his wife very well as well. In the work-up patient had a bladder scan which showed over 6 800 cc of urine. After Roe increased urine output. Discussed with him plans and options of the above setting with likely sleep apnea in the setting of congestive heart failure overweight with lymphedema. Renal asked to help with diuresis    Past Medical History:        Diagnosis Date    Atrial fibrillation ([address]     Cerebral artery occlusion with cerebral infarction ([address]     History of cardiac cath 2017    --RCA has mid 50% stenosis and PLB branch has 70% stenosis noted.  LVEDP very high    Hyperlipidemia     Hypertension     Type II or unspecified type diabetes mellitus without mention of complication, not stated as uncontrolled     Diagnsed about 1998    Unspecified sleep apnea        Past Surgical History:        Procedure Laterality Date    CARDIAC CATHETERIZATION      TONSILLECTOMY      AT 13YEARS OLD       Medications:   Scheduled Meds:   metOLazone  2.5 mg Oral Daily    potassium chloride  20 mEq Oral BID     influenza virus vaccine  0.5 mL IntraMUSCular Prior to discharge    insulin NPH  25 Units SubCUTAneous QAM    insulin lispro  0-18 Units SubCUTAneous TID WC    insulin lispro  0-18 Units SubCUTAneous 2 times per day    insulin lispro  20 Units SubCUTAneous TID     piperacillin-tazobactam  3,375 mg IntraVENous Q8H    insulin glargine  40 Units SubCUTAneous Nightly    ranolazine  500 mg Oral BID    rivaroxaban  20 mg Oral Dinner    clopidogrel  75 mg Oral Daily    folic acid-pyridoxine-cyancobalamin  1 tablet Oral Daily    ezetimibe  10 mg Oral Nightly    atorvastatin  40 mg Oral Daily    folic acid  1 mg Oral Daily    sodium chloride flush  5-40 mL IntraVENous 2 times per day    famotidine  20 mg Oral BID    metoprolol tartrate  50 mg Oral BID     Continuous Infusions:   furosemide (LASIX) 1mg/ml infusion 10 mg/hr (10/27/21 1422)    sodium chloride      dextrose       PRN Meds:.sodium chloride flush, sodium chloride, ondansetron **OR** ondansetron, polyethylene glycol, acetaminophen **OR** acetaminophen, potassium chloride **OR** potassium alternative oral replacement **OR** potassium chloride, magnesium sulfate, glucose, dextrose, glucagon (rDNA), dextrose    Allergies:  Patient has no known allergies. Social History:   TOBACCO:   reports that he has never smoked. He has never used smokeless tobacco.  ETOH:   reports no history of alcohol use. OCCUPATION:      Family History:       Problem Relation Age of Onset    Diabetes Mother     Diabetes Father     Cancer Father     Cancer Maternal Grandfather         PROSTATE CANCER       REVIEW OF SYSTEMS:  Negative except for weak short of breath edema.     Physical Exam:    I/O: 10/26 0701 - 10/27 0700  In: -   Out: 2100 [Urine:2100]    Vitals: /76   Pulse 76   Temp 98.1 °F (36.7 °C) (Oral)   Resp 22   Ht 6' 1\" (1.854 m)   Wt (!) 360 lb 7.2 oz (163.5 kg)   SpO2 96%   BMI 47.56 kg/m² General appearance: awake weak  HEENT: Head: Normal, normocephalic, atraumatic. Neck: supple, symmetrical, trachea midline  Lungs: diminished breath sounds bilaterally  Heart: S1, S2 normal  Abdomen: abnormal findings:  soft NT obese  Extremities: edema +2 bilaterally worse in the right leg  Neurologic: Mental status: alertness: alert      CBC:   Recent Labs     10/25/21  0002 10/26/21  0810 10/27/21  0832   WBC 19.1* 14.4* 9.4   HGB 13.9 12.7* 12.6*    195 186     BMP:    Recent Labs     10/25/21  0002 10/25/21  0430 10/25/21  1928 10/26/21  0810 10/27/21  0832     --   --  138 137   K 4.0  --   --  3.6 3.6     --   --  99 100   CO2 24  --   --  24 24   BUN 28*  --   --  33* 29*   CREATININE 0.9  --   --  1.2 1.1   GLUCOSE 89   < > 279 348* 248*    < > = values in this interval not displayed. Hepatic:   Recent Labs     10/25/21  0002 10/26/21  0810 10/27/21  0832   AST 31 41* 34   ALT 40 32 29   BILITOT 0.3 0.4 0.3   ALKPHOS 77 72 66     Troponin: No results for input(s): TROPONINI in the last 72 hours. BNP: No results for input(s): BNP in the last 72 hours. Lipids: No results for input(s): CHOL, HDL in the last 72 hours. Invalid input(s): LDLCALCU  ABGs: No results found for: PHART, PO2ART, OZR9FHI  INR: No results for input(s): INR in the last 72 hours.   Renal Labs  Albumin:    Lab Results   Component Value Date    LABALBU 3.3 10/27/2021     Calcium:    Lab Results   Component Value Date    CALCIUM 8.5 10/27/2021     Phosphorus:    Lab Results   Component Value Date    PHOS 2.5 10/27/2021     U/A:    Lab Results   Component Value Date    NITRU NEGATIVE 11/15/2020    COLORU YELLOW 11/15/2020    WBCUA 8 11/15/2020    RBCUA 636 11/15/2020    MUCUS FEW 11/15/2020    TRICHOMONAS NONE SEEN 11/15/2020    BACTERIA RARE 11/15/2020    CLARITYU HAZY 11/15/2020    SPECGRAV 1.018 11/15/2020    UROBILINOGEN NORMAL 11/15/2020    BILIRUBINUR NEGATIVE 11/15/2020    BLOODU LARGE 11/15/2020    Vlad Sickle MODERATE 11/15/2020     ABG:  No results found for: PHART, VNS4JKD, PO2ART, YZM9PEL, BEART, THGBART, EDG1KGN, U9FIDCMS  HgBA1c:    Lab Results   Component Value Date    LABA1C 7.9 10/25/2021     Microalbumen/Creatinine ratio:  No components found for: RUCREAT  TSH:  No results found for: TSH  IRON:  No results found for: IRON  Iron Saturation:  No components found for: PERCENTFE  TIBC:  No results found for: TIBC  FERRITIN:  No results found for: FERRITIN  RPR:  No results found for: RPR  FLORES:    Lab Results   Component Value Date    FLORES None Detected 08/29/2019    FLORES  08/29/2019     (NOTE)  If suspicion of connective tissue disease is strong and FLORES EIA is   negative, consider testing for FLORES by IFA ([id number]). INTERPRETIVE INFORMATION: Anti-Nuclear Antibodies (FLORES), IgG by   RODOLFO  Antinuclear Antibodies (FLORES), IgG by RODOLFO: FLORES specimens are   screened using enzyme-linked immunosorbent assay (RODOLFO)   methodology. All RODOLFO results reported as Detected are further   tested by indirect fluorescent assay (IFA) using HEp-2 substrate   with an IgG-specific conjugate. The FLORES RODOLFO screen is designed   to detect antibodies against dsDNA, histones, SS-A (Ro), SS-B   (La), Sam, Sam/RNP, Scl-70, Sharron-1, centromeric proteins, other   antigens extracted from the HEp-2 cell nucleus. FLORES RODOLFO assays   have been reported to have lower sensitivities than FLORES IFA for   systemic autoimmune rheumatic diseases (SARD). Negative results do not necessarily rule out SARD. Performed by [place] [id number], [id number] [facility] [phone]  www. Lissette Johnson MD, Lab.  Director       24 Hour Urine for Creatinine Clearance:  No components found for: CREAT4, UHRS10, UTV10  -----------------------------------------------------------------      Assessment and Recommendations     Patient Active Problem List   Diagnosis Code    MARINO on CPAP G47.33, Z99.89    New onset atrial fibrillation (HCC) I48.91    Type 2 diabetes mellitus with hyperglycemia, with long-term current use of insulin ([facility]) E11.65, Z79.4    Class 3 severe obesity due to excess calories with body mass index (BMI) of 45.0 to 49.9 in adult ([facility]) E66.01, Z68.42    History of cardiac cath Z98.890    Weakness of right arm R29.898    Acute CVA (cerebrovascular accident) [facility] I63.9    Spastic hemiparesis of right dominant side ([facility] G81.11    Dysphagia due to recent stroke I69.391    Essential hypertension I10    Morbid obesity with body mass index of 45.0-49.9 in adult ([facility] E66.01, Z68.42    Frequent falls R29.6    Chronic venous stasis dermatitis of both lower extremities I87.2    History of CVA (cerebrovascular accident) Z86.73    Obesity hypoventilation syndrome ([facility]) E66.2    Hypertension I10    Hyperlipidemia E78.5    Congestive heart failure due to cardiomyopathy ([facility]) I50.9, I42.9     Impression plan  #1 congestive heart failure with diastolic dysfunction  #2 lymphedema with anasarca  #3 acute versus chronic kidney failure with hematuria and proteinuria  #4 hypertension  #5 sleep apnea  #6 type 2 diabetes    Plan  1. Aggressive Diuresis Place, Roe catheter will monitor  2. Above setting with diuresis will follow renal function as well  3.   Recheck UA and protein in the urine to see if truly acute change or chronic may need work-up for the etiology if hematuria still present next #4 blood pressure monitor in setting of diuresis #5 maintain CPAP next #6 work on improving glucose control  Electronically signed by Pasquale Prieto MD on 10/27/2021 at 2:30 PM

## 2021-10-27 NOTE — PROGRESS NOTES
Pt seen and examined and dw pt and wife and dr Matthew Mancuso.  Increase diuretic and monitor with likely cardiorenal with Dm2 and possible cellulitis and full note to follow

## 2021-10-27 NOTE — CARE COORDINATION
.CM met with pt for d/c planning. Introduced self and updated white board. Pt lives with spouse and is independent with ADL's. Pt's wife provides his transportation. He has a PCP, has insurance, and is able to afford his medication. Pt has a rolling walker, shower seat, cane, and a c-pap. [facility] offered and pt declined. Pt denies any d/c needs at this time. D/c plan is home with wife, no needs. Notify CM if any new d/c needs arise.   TE

## 2021-10-27 NOTE — PROGRESS NOTES
Daily Progress Note      Awake and alert, overall feeling better  Denies any CP or SOB  NSR on tele  BP and HR stable  Afibrile  Edema improving  Continue diuretics   He feels better  His right leg is still swollen and big and red  Sepsis noted  Heart rate is stable -afib  Hx of afib, pulmonary HTN-cor-pulmonale/MARINO and right heart failure -hx of NSVT and hx of moderate CAD      Started on lasix drip agree with that  Check for arterial circulation also        Summary==echo --10/21   Left ventricular systolic function is normal.   Ejection fraction is visually estimated at 50%. Moderate left ventricular hypertrophy. Severely dilated left atrium. Dilated ascending aorta measuring 4.6cm. No evidence of any pericardial effusion. Dilated IVC with poor inspiratory collapse. The patient did have a heart catheterization done back in 2017. Left  main was patent. LAD had mild disease, circ had mild disease, RCA had  50% stenosis and PL branch was a small vessel and 70% stenosis noted,  pulmonary hypertension was noted. PA pressures were 59/26, mean of 41  present. Wedge was also elevated at 26. The patient was placed on  diuretics.     PAST MEDICAL HISTORY:  History of having sleep apnea, uses CPAP, history  of having stroke, history of having pulmonary hypertension with elevated  pressures present, and moderate coronary artery disease noted. The  patient had, I think, MRI in 2018, showed infarct also present. History  of paroxysmal atrial fibrillation, COPD, sleep apnea present, diabetic  retinopathy was noted, diabetes and obesity present. Us of legs   Impression:        No evidence of DVT in either lower extremity from groin to knee.  Calf veins   were not visualized due to body habitus and edema.          Afib with RVR    NSR on tele today    Continue Xarleto and Lopressor    Left atrium severely dilated     HFpEF    EF 50%    ProBNP 326    Edema continues to improve    Denies any SOB Continue IV diuretics    Increase activity    Positive Blood cultures    Cellulitis of RLE    Abx per primary    US negative for DVT    Hx of Lymphedema     Ascending aortic aneurism     4.6cm repeat study in 6 months    Echo 10/25/2021 Summary   Left jin/tricular systolic function is normal.   Ejection fraction is visually estimated at 50%. Moderate left ventricular hypertrophy. Severely dilated left atrium. Dilated ascending aorta measuring 4.6cm. No evidence of any pericardial effusion. Dilated IVC with poor inspiratory collapse. Past medical history:    has a past medical history of Atrial fibrillation ([address] Cerebral artery occlusion with cerebral infarction [facility]), History of cardiac cath, Hyperlipidemia, Hypertension, Type II or unspecified type diabetes mellitus without mention of complication, not stated as uncontrolled, and Unspecified sleep apnea. Past surgical history:   has a past surgical history that includes Tonsillectomy and Cardiac catheterization. Social History:   reports that he has never smoked. He has never used smokeless tobacco. He reports that he does not drink alcohol and does not use drugs.   Family history:  family history includes Cancer in his father and maternal grandfather; Diabetes in his father and mother.     No Known Allergies    Objective:   BP (!) 144/76   Pulse 81   Temp 98.2 °F (36.8 °C) (Oral)   Resp 20   Ht 6' 1\" (1.854 m)   Wt (!) 360 lb 7.2 oz (163.5 kg)   SpO2 95%   BMI 47.56 kg/m²     Intake/Output Summary (Last 24 hours) at 10/27/2021 1050  Last data filed at 10/27/2021 1023  Gross per 24 hour   Intake --   Output 2450 ml   Net -2450 ml       Medications:   Scheduled Meds:   influenza virus vaccine  0.5 mL IntraMUSCular Prior to discharge    insulin NPH  25 Units SubCUTAneous QAM    insulin lispro  0-18 Units SubCUTAneous TID WC    insulin lispro  0-18 Units SubCUTAneous 2 times per day    insulin lispro  20 Units SubCUTAneous TID     bumetanide  2 mg IntraVENous Once    piperacillin-tazobactam  3,375 mg IntraVENous Q8H    insulin glargine  40 Units SubCUTAneous Nightly    ranolazine  500 mg Oral BID    rivaroxaban  20 mg Oral Dinner    clopidogrel  75 mg Oral Daily    folic acid-pyridoxine-cyancobalamin  1 tablet Oral Daily    ezetimibe  10 mg Oral Nightly    atorvastatin  40 mg Oral Daily    folic acid  1 mg Oral Daily    sodium chloride flush  5-40 mL IntraVENous 2 times per day    famotidine  20 mg Oral BID    furosemide  20 mg IntraVENous BID    metoprolol tartrate  50 mg Oral BID      Infusions:   sodium chloride      dextrose        PRN Meds:  sodium chloride flush, sodium chloride, ondansetron **OR** ondansetron, polyethylene glycol, acetaminophen **OR** acetaminophen, potassium chloride **OR** potassium alternative oral replacement **OR** potassium chloride, magnesium sulfate, glucose, dextrose, glucagon (rDNA), dextrose       Physical Exam:  Vitals:    10/27/21 0800   BP: (!) 144/76   Pulse: 81   Resp: 20   Temp: 98.2 °F (36.8 °C)   SpO2: 95%        General: AAO, NAD  Chest: Nontender  Cardiac: First and Second Heart Sounds are Normal, No Murmurs or Gallops noted  Lungs:Clear to auscultation and percussion. Abdomen: Soft, NT, ND, +BS  Extremities: No clubbing, 3 + Edema  Vascular:  Equal 2+ peripheral pulses.         Lab Data:  CBC:   Recent Labs     10/25/21  0002 10/26/21  0810 10/27/21  0832   WBC 19.1* 14.4* 9.4   HGB 13.9 12.7* 12.6*   HCT 44.1 39.9* 39.3*   MCV 90.0 88.9 88.7    195 186     BMP:   Recent Labs     10/25/21  0002 10/26/21  0810 10/27/21  0832    138 137   K 4.0 3.6 3.6    99 100   CO2 24 24 24   PHOS  --   --  2.5   BUN 28* 33* 29*   CREATININE 0.9 1.2 1.1     LIVER PROFILE:   Recent Labs     10/25/21  0002 10/26/21  0810 10/27/21  0832   AST 31 41* 34   ALT 40 32 29   BILITOT 0.3 0.4 0.3   ALKPHOS 77 72 66     PT/INR: No results for input(s): PROTIME, INR in the last 72 hours.  APTT: No results for input(s): APTT in the last 72 hours. BNP:  No results for input(s): BNP in the last 72 hours.       Assessment:  Patient Active Problem List    Diagnosis Date Noted    Congestive heart failure due to cardiomyopathy ([address] 10/25/2021    Frequent falls 11/16/2020    Chronic venous stasis dermatitis of both lower extremities 11/16/2020    History of CVA (cerebrovascular accident) 11/16/2020    Obesity hypoventilation syndrome ([address] 11/16/2020    Hypertension     Hyperlipidemia     Acute CVA (cerebrovascular accident) ([address] 11/20/2018    Spastic hemiparesis of right dominant side ([address] 11/20/2018    Dysphagia due to recent stroke 11/20/2018    Essential hypertension 11/20/2018    Morbid obesity with body mass index of 45.0-49.9 in [facility]) 11/20/2018    Weakness of right arm 11/18/2018    History of cardiac cath 10/08/2018    New onset atrial fibrillation ([address] 09/25/2018    Type 2 diabetes mellitus with hyperglycemia, with long-term current use of insulin ([address] 09/25/2018    Class 3 severe obesity due to excess calories with body mass index (BMI) of 45.0 to 49.9 in [facility]) 09/25/2018    MARINO on CPAP 10/14/2013       Electronically signed by HAYLEY Baptiste CNP on 10/27/2021 at 10:50 AM     Electronically signed by Xiomara Zaidi MD on 10/27/21 at 12:39 PM EDT

## 2021-10-27 NOTE — PROGRESS NOTES
Pulmonary and Critical Care  Progress Note    Subjective: The patient has improved. WBC down. Shortness of breath has improved  Chest pain none  Addressing respiratory complaints Patient is negative for  hemoptysis and cyanosis  CONSTITUTIONAL:  negative for fevers and chills      Past Medical History:     has a past medical history of Atrial fibrillation ([facility] Cerebral artery occlusion with cerebral infarction [facility]), History of cardiac cath, Hyperlipidemia, Hypertension, Type II or unspecified type diabetes mellitus without mention of complication, not stated as uncontrolled, and Unspecified sleep apnea. has a past surgical history that includes Tonsillectomy and Cardiac catheterization. reports that he has never smoked. He has never used smokeless tobacco. He reports that he does not drink alcohol and does not use drugs. Family history:  family history includes Cancer in his father and maternal grandfather; Diabetes in his father and mother. No Known Allergies  Social History:    Reviewed; no changes    Objective:   PHYSICAL EXAM:        VITALS:  BP (!) 144/76   Pulse 81   Temp 98.2 °F (36.8 °C) (Oral)   Resp 20   Ht 6' 1\" (1.854 m)   Wt (!) 360 lb 7.2 oz (163.5 kg)   SpO2 95%   BMI 47.56 kg/m²     24HR INTAKE/OUTPUT:    Intake/Output Summary (Last 24 hours) at 10/27/2021 1105  Last data filed at 10/27/2021 1023  Gross per 24 hour   Intake --   Output 2450 ml   Net -2450 ml       CONSTITUTIONAL:  awake, alert, cooperative, no apparent distress, and appears stated age  LUNGS:  decreased breath sounds  CARDIOVASCULAR:  normal S1 and S2 and negative JVD  ABD:Abdomen soft, non-tender. BS normal. No masses,  No organomegaly  NEURO:Alert and oriented x3. Gait normal. Reflexes and motor strength normal and symmetric. Cranial nerves 2-12 and sensation grossly intact.   DATA:    CBC:  Recent Labs     10/25/21  0002 10/26/21  0810 10/27/21  0832   WBC 19.1* 14.4* 9.4   RBC 4.90 4.49* 4.43*   HGB 13.9 12.7* 12.6*   HCT 44.1 39.9* 39.3*    195 186   MCV 90.0 88.9 88.7   MCH 28.4 28.3 28.4   MCHC 31.5* 31.8* 32.1   RDW 15.4* 15.9* 15.6*   SEGSPCT 89.2* 86.5* 77.0*      BMP:  Recent Labs     10/25/21  0002 10/25/21  0430 10/25/21  1928 10/26/21  0810 10/27/21  0832     --   --  138 137   K 4.0  --   --  3.6 3.6     --   --  99 100   CO2 24  --   --  24 24   BUN 28*  --   --  33* 29*   CREATININE 0.9  --   --  1.2 1.1   CALCIUM 8.8  --   --  8.7 8.5   GLUCOSE 89   < > 279 348* 248*    < > = values in this interval not displayed. ABG:  No results for input(s): PH, PO2ART, KJN7PMT, HCO3, BEART, O2SAT in the last 72 hours. Lab Results   Component Value Date    PROBNP 326.7 (H) 10/27/2021    PROBNP 174.1 10/25/2021    PROBNP 428.7 (H) 11/15/2020     No results found for: [facility]    Radiology Review:  Pertinent images / reports were reviewed as a part of this visit. Assessment:     Patient Active Problem List   Diagnosis    MARINO on CPAP    New onset atrial fibrillation (HCC)    Type 2 diabetes mellitus with hyperglycemia, with long-term current use of insulin (HCC)    Class 3 severe obesity due to excess calories with body mass index (BMI) of 45.0 to 49.9 in [facility])    History of cardiac cath    Weakness of right arm    Acute CVA (cerebrovascular accident) ([address]    Spastic hemiparesis of right dominant side ([address]    Dysphagia due to recent stroke    Essential hypertension    Morbid obesity with body mass index of 45.0-49.9 in [facility])    Frequent falls    Chronic venous stasis dermatitis of both lower extremities    History of CVA (cerebrovascular accident)    Obesity hypoventilation syndrome ([address]    Hypertension    Hyperlipidemia    Congestive heart failure due to cardiomyopathy ([address]       Plan:   1. Overall the patient has improved. 2. Inc. activity.       Sonia Cr MD   10/27/2021  11:05 AM

## 2021-10-27 NOTE — PROGRESS NOTES
Physician Progress Note      PATIENT:               Maria Eugenia Lua  CSN #:                  [id number]  :                       1960  ADMIT DATE:       10/24/2021 11:53 PM  [address] [place] DATE:  RESPONDING  PROVIDER #:        Harika Cohen MD          QUERY TEXT:    Pt admitted with bilateral leg edema. Noted documentation of sepsis syndrome   on 10/24 Dr. Madison Kang consultant note. If possible, please document in   progress notes and discharge summary:    The medical record reflects the following:  Risk Factors: Cellulitis bilateral legs, morbid obesity  Clinical Indicators: Dr. Opal Rivera noted \"swelling of the lower extremities,   lymphedema, some redness and chronic venous stasis. Armando Power Armando Power Armando Power Sepsis syndrome. 2.Cellulitis of the lower extremities. \", Hong Hughes PA-C in ED Provider   Note \"appear to have cellulitis of the anterior right lower extremity and   given his tachycardia, leukocytosis, and elevated lactic acid did start   patient on broad-spectrum antibiotics of IV cefepime and IV vancomycin to   cover for sepsis. \", procal 0.191, lactate 2.5 - 2.2, WBC 19.1 - 14.4, T 99.6 -   97.5, P 111 - 77, R 32 - 14. Treatment: IV cefepime and IV Vanc (one dose), IV Zosyn started on 10/26, 0.9%    ml bolus, procal, serial CBC, wound nurse consult, lactate. Thank you,  ЮЛИЯ GuoN, RN, CDS  [phone]  Options provided:  -- Sepsis confirmed present on admission  -- Sepsis ruled out  -- Other - I will add my own diagnosis  -- Disagree - Not applicable / Not valid  -- Disagree - Clinically unable to determine / Unknown  -- Refer to Clinical Documentation Reviewer    PROVIDER RESPONSE TEXT:    The diagnosis of sepsis was confirmed as present on admission.     Query created by: Monico Horne on 10/27/2021 6:59 AM      Electronically signed by:  Harika Cohen MD 10/27/2021 1:13 PM

## 2021-10-28 ENCOUNTER — APPOINTMENT (OUTPATIENT)
Dept: GENERAL RADIOLOGY | Age: 61
DRG: 871 | End: 2021-10-28
Payer: MEDICARE

## 2021-10-28 LAB
ALBUMIN SERPL-MCNC: 3.6 GM/DL (ref 3.4–5)
ANION GAP SERPL CALCULATED.3IONS-SCNC: 14 MMOL/L (ref 4–16)
BASOPHILS ABSOLUTE: 0 K/CU MM
BASOPHILS RELATIVE PERCENT: 0.5 % (ref 0–1)
BUN BLDV-MCNC: 26 MG/DL (ref 6–23)
CALCIUM SERPL-MCNC: 9 MG/DL (ref 8.3–10.6)
CHLORIDE BLD-SCNC: 95 MMOL/L (ref 99–110)
CO2: 32 MMOL/L (ref 21–32)
CREAT SERPL-MCNC: 1.2 MG/DL (ref 0.9–1.3)
DIFFERENTIAL TYPE: ABNORMAL
EOSINOPHILS ABSOLUTE: 0.1 K/CU MM
EOSINOPHILS RELATIVE PERCENT: 1 % (ref 0–3)
GFR AFRICAN AMERICAN: >60 ML/MIN/1.73M2
GFR NON-AFRICAN AMERICAN: >60 ML/MIN/1.73M2
GLUCOSE BLD-MCNC: 142 MG/DL (ref 70–99)
GLUCOSE BLD-MCNC: 164 MG/DL (ref 70–99)
GLUCOSE BLD-MCNC: 170 MG/DL (ref 70–99)
GLUCOSE BLD-MCNC: 192 MG/DL (ref 70–99)
GLUCOSE BLD-MCNC: 198 MG/DL (ref 70–99)
GLUCOSE BLD-MCNC: 201 MG/DL (ref 70–99)
HCT VFR BLD CALC: 41.6 % (ref 42–52)
HEMOGLOBIN: 13.1 GM/DL (ref 13.5–18)
IMMATURE NEUTROPHIL %: 0.4 % (ref 0–0.43)
LYMPHOCYTES ABSOLUTE: 1.1 K/CU MM
LYMPHOCYTES RELATIVE PERCENT: 14.2 % (ref 24–44)
MCH RBC QN AUTO: 27.8 PG (ref 27–31)
MCHC RBC AUTO-ENTMCNC: 31.5 % (ref 32–36)
MCV RBC AUTO: 88.3 FL (ref 78–100)
MONOCYTES ABSOLUTE: 1 K/CU MM
MONOCYTES RELATIVE PERCENT: 12 % (ref 0–4)
NUCLEATED RBC %: 0 %
PDW BLD-RTO: 15.5 % (ref 11.7–14.9)
PHOSPHORUS: 3.3 MG/DL (ref 2.5–4.9)
PLATELET # BLD: 223 K/CU MM (ref 140–440)
PMV BLD AUTO: 11.6 FL (ref 7.5–11.1)
POTASSIUM SERPL-SCNC: 3.3 MMOL/L (ref 3.5–5.1)
RBC # BLD: 4.71 M/CU MM (ref 4.6–6.2)
SEGMENTED NEUTROPHILS ABSOLUTE COUNT: 5.8 K/CU MM
SEGMENTED NEUTROPHILS RELATIVE PERCENT: 71.9 % (ref 36–66)
SODIUM BLD-SCNC: 141 MMOL/L (ref 135–145)
TOTAL IMMATURE NEUTOROPHIL: 0.03 K/CU MM
TOTAL NUCLEATED RBC: 0 K/CU MM
WBC # BLD: 8 K/CU MM (ref 4–10.5)

## 2021-10-28 PROCEDURE — 2140000000 HC CCU INTERMEDIATE R&B

## 2021-10-28 PROCEDURE — 85025 COMPLETE CBC W/AUTO DIFF WBC: CPT

## 2021-10-28 PROCEDURE — B41F1ZZ FLUOROSCOPY OF RIGHT LOWER EXTREMITY ARTERIES USING LOW OSMOLAR CONTRAST: ICD-10-PCS | Performed by: INTERNAL MEDICINE

## 2021-10-28 PROCEDURE — 2500000003 HC RX 250 WO HCPCS

## 2021-10-28 PROCEDURE — 2709999900 HC NON-CHARGEABLE SUPPLY

## 2021-10-28 PROCEDURE — 80069 RENAL FUNCTION PANEL: CPT

## 2021-10-28 PROCEDURE — 6360000002 HC RX W HCPCS: Performed by: INTERNAL MEDICINE

## 2021-10-28 PROCEDURE — 2580000003 HC RX 258: Performed by: HOSPITALIST

## 2021-10-28 PROCEDURE — 6370000000 HC RX 637 (ALT 250 FOR IP): Performed by: INTERNAL MEDICINE

## 2021-10-28 PROCEDURE — 2580000003 HC RX 258: Performed by: INTERNAL MEDICINE

## 2021-10-28 PROCEDURE — 71045 X-RAY EXAM CHEST 1 VIEW: CPT

## 2021-10-28 PROCEDURE — 6360000002 HC RX W HCPCS

## 2021-10-28 PROCEDURE — 36247 INS CATH ABD/L-EXT ART 3RD: CPT

## 2021-10-28 PROCEDURE — 36415 COLL VENOUS BLD VENIPUNCTURE: CPT

## 2021-10-28 PROCEDURE — 6370000000 HC RX 637 (ALT 250 FOR IP): Performed by: PHYSICIAN ASSISTANT

## 2021-10-28 PROCEDURE — C1894 INTRO/SHEATH, NON-LASER: HCPCS

## 2021-10-28 PROCEDURE — 75710 ARTERY X-RAYS ARM/LEG: CPT

## 2021-10-28 PROCEDURE — 99233 SBSQ HOSP IP/OBS HIGH 50: CPT | Performed by: INTERNAL MEDICINE

## 2021-10-28 PROCEDURE — 6360000004 HC RX CONTRAST MEDICATION

## 2021-10-28 PROCEDURE — C1887 CATHETER, GUIDING: HCPCS

## 2021-10-28 PROCEDURE — 84153 ASSAY OF PSA TOTAL: CPT

## 2021-10-28 PROCEDURE — 82962 GLUCOSE BLOOD TEST: CPT

## 2021-10-28 PROCEDURE — C1769 GUIDE WIRE: HCPCS

## 2021-10-28 PROCEDURE — 6370000000 HC RX 637 (ALT 250 FOR IP): Performed by: HOSPITALIST

## 2021-10-28 RX ORDER — SODIUM CHLORIDE 9 MG/ML
INJECTION, SOLUTION INTRAVENOUS CONTINUOUS
Status: DISCONTINUED | OUTPATIENT
Start: 2021-10-28 | End: 2021-10-29

## 2021-10-28 RX ORDER — SODIUM CHLORIDE 0.9 % (FLUSH) 0.9 %
5-40 SYRINGE (ML) INJECTION PRN
Status: DISCONTINUED | OUTPATIENT
Start: 2021-10-28 | End: 2021-11-02 | Stop reason: HOSPADM

## 2021-10-28 RX ORDER — POTASSIUM CHLORIDE 29.8 MG/ML
20 INJECTION INTRAVENOUS ONCE
Status: DISCONTINUED | OUTPATIENT
Start: 2021-10-28 | End: 2021-10-31 | Stop reason: SDUPTHER

## 2021-10-28 RX ORDER — ATROPINE SULFATE 0.4 MG/ML
0.5 AMPUL (ML) INJECTION
Status: ACTIVE | OUTPATIENT
Start: 2021-10-28 | End: 2021-10-28

## 2021-10-28 RX ORDER — MORPHINE SULFATE/0.9% NACL/PF 1 MG/ML
1 SYRINGE (ML) INJECTION
Status: ACTIVE | OUTPATIENT
Start: 2021-10-28 | End: 2021-10-28

## 2021-10-28 RX ORDER — SODIUM CHLORIDE 0.9 % (FLUSH) 0.9 %
5-40 SYRINGE (ML) INJECTION EVERY 12 HOURS SCHEDULED
Status: DISCONTINUED | OUTPATIENT
Start: 2021-10-28 | End: 2021-11-02 | Stop reason: HOSPADM

## 2021-10-28 RX ORDER — ACETAMINOPHEN 325 MG/1
650 TABLET ORAL EVERY 4 HOURS PRN
Status: DISCONTINUED | OUTPATIENT
Start: 2021-10-28 | End: 2021-11-02 | Stop reason: HOSPADM

## 2021-10-28 RX ORDER — SODIUM CHLORIDE 9 MG/ML
25 INJECTION, SOLUTION INTRAVENOUS PRN
Status: DISCONTINUED | OUTPATIENT
Start: 2021-10-28 | End: 2021-11-02 | Stop reason: HOSPADM

## 2021-10-28 RX ORDER — INSULIN GLARGINE 100 [IU]/ML
50 INJECTION, SOLUTION SUBCUTANEOUS NIGHTLY
Status: DISCONTINUED | OUTPATIENT
Start: 2021-10-28 | End: 2021-11-02 | Stop reason: HOSPADM

## 2021-10-28 RX ORDER — TAMSULOSIN HYDROCHLORIDE 0.4 MG/1
0.4 CAPSULE ORAL DAILY
Status: DISCONTINUED | OUTPATIENT
Start: 2021-10-28 | End: 2021-11-02 | Stop reason: HOSPADM

## 2021-10-28 RX ADMIN — TAMSULOSIN HYDROCHLORIDE 0.4 MG: 0.4 CAPSULE ORAL at 13:33

## 2021-10-28 RX ADMIN — Medication 1 TABLET: at 08:17

## 2021-10-28 RX ADMIN — PIPERACILLIN AND TAZOBACTAM 3375 MG: 3; .375 INJECTION, POWDER, LYOPHILIZED, FOR SOLUTION INTRAVENOUS at 03:22

## 2021-10-28 RX ADMIN — FAMOTIDINE 20 MG: 20 TABLET ORAL at 22:17

## 2021-10-28 RX ADMIN — FOLIC ACID 1 MG: 1 TABLET ORAL at 08:17

## 2021-10-28 RX ADMIN — METOLAZONE 2.5 MG: 2.5 TABLET ORAL at 08:17

## 2021-10-28 RX ADMIN — EZETIMIBE 10 MG: 10 TABLET ORAL at 22:17

## 2021-10-28 RX ADMIN — RANOLAZINE 500 MG: 500 TABLET, FILM COATED, EXTENDED RELEASE ORAL at 08:17

## 2021-10-28 RX ADMIN — RANOLAZINE 500 MG: 500 TABLET, FILM COATED, EXTENDED RELEASE ORAL at 22:17

## 2021-10-28 RX ADMIN — SODIUM CHLORIDE, PRESERVATIVE FREE 10 ML: 5 INJECTION INTRAVENOUS at 22:17

## 2021-10-28 RX ADMIN — SODIUM CHLORIDE, PRESERVATIVE FREE 10 ML: 5 INJECTION INTRAVENOUS at 22:21

## 2021-10-28 RX ADMIN — RIVAROXABAN 20 MG: 20 TABLET, FILM COATED ORAL at 17:19

## 2021-10-28 RX ADMIN — CLOPIDOGREL BISULFATE 75 MG: 75 TABLET, FILM COATED ORAL at 08:17

## 2021-10-28 RX ADMIN — METOPROLOL TARTRATE 50 MG: 50 TABLET, FILM COATED ORAL at 08:17

## 2021-10-28 RX ADMIN — FUROSEMIDE 10 MG/HR: 10 INJECTION, SOLUTION INTRAMUSCULAR; INTRAVENOUS at 23:58

## 2021-10-28 RX ADMIN — FUROSEMIDE 10 MG/HR: 10 INJECTION, SOLUTION INTRAMUSCULAR; INTRAVENOUS at 08:18

## 2021-10-28 RX ADMIN — METOPROLOL TARTRATE 50 MG: 50 TABLET, FILM COATED ORAL at 22:17

## 2021-10-28 RX ADMIN — POTASSIUM CHLORIDE 20 MEQ: 1500 TABLET, EXTENDED RELEASE ORAL at 08:17

## 2021-10-28 RX ADMIN — INSULIN GLARGINE 50 UNITS: 100 INJECTION, SOLUTION SUBCUTANEOUS at 22:17

## 2021-10-28 RX ADMIN — SODIUM CHLORIDE 25 ML: 9 INJECTION, SOLUTION INTRAVENOUS at 03:18

## 2021-10-28 RX ADMIN — PIPERACILLIN AND TAZOBACTAM 3375 MG: 3; .375 INJECTION, POWDER, LYOPHILIZED, FOR SOLUTION INTRAVENOUS at 13:33

## 2021-10-28 RX ADMIN — POTASSIUM CHLORIDE 20 MEQ: 1500 TABLET, EXTENDED RELEASE ORAL at 17:19

## 2021-10-28 RX ADMIN — ATORVASTATIN CALCIUM 40 MG: 40 TABLET, FILM COATED ORAL at 08:17

## 2021-10-28 RX ADMIN — FAMOTIDINE 20 MG: 20 TABLET ORAL at 08:17

## 2021-10-28 ASSESSMENT — PAIN SCALES - GENERAL
PAINLEVEL_OUTOF10: 0

## 2021-10-28 NOTE — PROGRESS NOTES
Daily Progress Note  Awake and alert  Overall feeling better  Swelling continues to improve   Great UOP  Possible peripheral angiogram today  NPO   RIGHT LEG angio mild PAD noted  Venous swelling -right heart failure and corpulmonale  Continue diuretics  Watch cr closely  Continue CPAP for MARINO and morbidly obese    P-afib rate control and AC-now sinus   Afib with RVR    NSR on tele today    Continue Xarleto and Lopressor    Left atrium severely dilated    PVD    Single vessel run with severe stenosis on Distal Postoral tibial artery. Non visual of peroneal artery and proximal and mid anterial tibial. Likely d/t occlusion   Cellulitis of right leg    Thready pulse on right leg    Will look at peripheral angiogram with possible PTA     Procedure was explained, pt.  Was agreeable     HFpEF    EF 50%    ProBNP 326    Edema continues to improve    Denies any SOB    Continue lasix gtt    Increase activity    11.9L of UOP     Positive Blood cultures    Cellulitis of RLE    Abx per primary    US negative for DVT    Hx of Lymphedema     Ascending aortic aneurism     4.6cm repeat study in 6 months    Right LE Arteries  Impression   No significant inflow stenosis suspected, though mild to moderate disease   seen within the proximal and mid aspect of the SFA.  No severe stenosis seen   from the common femoral artery through the popliteal artery.       Single vessel runoff through the posterior tibial artery, with a severe   distal stenosis.       Nonvisualization of the peroneal artery, as well as the proximal and mid   anterior tibial artery likely related to occlusion.  Reconstitution of flow   at the distal anterior tibial artery.         Echo 10/25/2021 Summary   Left jin/tricular systolic function is normal.   Ejection fraction is visually estimated at 50%.   Moderate left ventricular hypertrophy.   Severely dilated left atrium.   Dilated ascending aorta measuring 4.6cm.   No evidence of any pericardial effusion.   Dilated IVC with poor inspiratory collapse.     Past medical history:    has a past medical history of Atrial fibrillation ([facility] [address] [facility] History of cardiac cath, Hyperlipidemia, Hypertension, Type II or unspecified type diabetes mellitus without mention of complication, not stated as uncontrolled, and Unspecified sleep apnea. Past surgical history:   has a past surgical history that includes Tonsillectomy and Cardiac catheterization. Social History:   reports that he has never smoked. He has never used smokeless tobacco. He reports that he does not drink alcohol and does not use drugs.   Family history:  family history includes Cancer in his father and maternal grandfather; Diabetes in his father and mother.     No Known Allergies  Objective:   /63   Pulse 83   Temp 98.5 °F (36.9 °C) (Oral)   Resp 24   Ht 6' 1\" (1.854 m)   Wt (!) 343 lb 9.6 oz (155.9 kg)   SpO2 95%   BMI 45.33 kg/m²     Intake/Output Summary (Last 24 hours) at 10/28/2021 1002  Last data filed at 10/28/2021 0317  Gross per 24 hour   Intake 547.2 ml   Output 9737 ml   Net -9189.8 ml       Medications:   Scheduled Meds:   insulin glargine  50 Units SubCUTAneous Nightly    insulin NPH  30 Units SubCUTAneous QAM    metOLazone  2.5 mg Oral Daily    potassium chloride  20 mEq Oral BID WC    insulin lispro  0-24 Units SubCUTAneous TID WC    insulin lispro  0-24 Units SubCUTAneous 2 times per day    influenza virus vaccine  0.5 mL IntraMUSCular Prior to discharge    insulin lispro  20 Units SubCUTAneous TID     piperacillin-tazobactam  3,375 mg IntraVENous Q8H    ranolazine  500 mg Oral BID    rivaroxaban  20 mg Oral Dinner    clopidogrel  75 mg Oral Daily    folic acid-pyridoxine-cyancobalamin  1 tablet Oral Daily    ezetimibe  10 mg Oral Nightly    atorvastatin  40 mg Oral Daily    folic acid  1 mg Oral Daily    sodium chloride flush  5-40 mL IntraVENous 2 times per day    famotidine  20 mg Oral BID    metoprolol tartrate  50 mg Oral BID      Infusions:   furosemide (LASIX) 1mg/ml infusion 10 mg/hr (10/28/21 0818)    sodium chloride 25 mL (10/28/21 0318)    dextrose        PRN Meds:  sodium chloride flush, sodium chloride, ondansetron **OR** ondansetron, polyethylene glycol, acetaminophen **OR** acetaminophen, potassium chloride **OR** potassium alternative oral replacement **OR** potassium chloride, magnesium sulfate, glucose, dextrose, glucagon (rDNA), dextrose       Physical Exam:  Vitals:    10/28/21 0800   BP: 132/63   Pulse:    Resp:    Temp: 98.5 °F (36.9 °C)   SpO2: 95%        General: AAO, NAD  Chest: Nontender  Cardiac: First and Second Heart Sounds are Normal, No Murmurs or Gallops noted  Lungs:Clear to auscultation and percussion. Abdomen: Soft, NT, ND, +BS  Extremities: No clubbing, 3+ edema  Vascular:  Equal 2+ peripheral pulses. Lab Data:  CBC:   Recent Labs     10/26/21  0810 10/27/21  0832 10/28/21  0613   WBC 14.4* 9.4 8.0   HGB 12.7* 12.6* 13.1*   HCT 39.9* 39.3* 41.6*   MCV 88.9 88.7 88.3    186 223     BMP:   Recent Labs     10/26/21  0810 10/27/21  0832 10/28/21  0613    137 141   K 3.6 3.6 3.3*   CL 99 100 95*   CO2 24 24 32   PHOS  --  2.5 3.3   BUN 33* 29* 26*   CREATININE 1.2 1.1 1.2     LIVER PROFILE:   Recent Labs     10/26/21  0810 10/27/21  0832   AST 41* 34   ALT 32 29   BILITOT 0.4 0.3   ALKPHOS 72 66     PT/INR: No results for input(s): PROTIME, INR in the last 72 hours. APTT: No results for input(s): APTT in the last 72 hours. BNP:  No results for input(s): BNP in the last 72 hours.       Assessment:  Patient Active Problem List    Diagnosis Date Noted    Congestive heart failure due to cardiomyopathy ([address] 10/25/2021    Frequent falls 11/16/2020    Chronic venous stasis dermatitis of both lower extremities 11/16/2020    History of CVA (cerebrovascular accident) 11/16/2020    Obesity hypoventilation syndrome ([facility] 11/16/2020    Hypertension     Hyperlipidemia     [facility] 11/20/2018    Spastic hemiparesis of right dominant side ([facility] 11/20/2018    Dysphagia due to recent stroke 11/20/2018    Essential hypertension 11/20/2018    Morbid obesity with body mass index of 45.0-49.9 in [facility]) 11/20/2018    Weakness of right arm 11/18/2018    History of cardiac cath 10/08/2018    New onset [facility] 09/25/2018    Type 2 diabetes mellitus with hyperglycemia, with long-term current use of insulin ([facility] 09/25/2018    Class 3 severe obesity due to excess calories with body mass index (BMI) of 45.0 to 49.9 in [facility]) 09/25/2018    MARINO on CPAP 10/14/2013       Electronically signed by HAYLEY Robertson CNP on 10/28/2021 at 10:02 AM    Electronically signed by Christopher Crow MD on 10/28/21 at 12:26 PM EDT

## 2021-10-28 NOTE — CONSULTS
[facility] [address]   Consult Note  [facility] 1 2 3 4 5    Date: 10/28/2021   Patient: Escobar Mauro   : 1960   DOA: 10/24/2021   MRN: [id number]   ROOM#: [id number]     Reason for Consult: Urinary retention  Requesting Physician:  Dr. Iain Arboleda  Collaborating Urologist on Call at time of admission: Dr. Pattie Price: Leg swelling and difficulty walking    History Obtained From:  patient, electronic medical record    HISTORY OF PRESENT ILLNESS:                The patient is a 64 y.o. male with significant past medical history of CAD (on Plavix), A-fib (on Xarelto), CHF, DM, HLD, HTN, and kidney stones who presented with leg swelling and difficulty walking. Work-up revealed acute on chronic CHF with chronic lymphedema and right leg cellulitis. Yesterday, bladder scan revealed 867cc. Pt voided 400cc and a gill was placed with >400cc urine return. Pt endorses weak stream, urgency/frequency, feelings of incomplete bladder emptying, and nocturia x1 at home. Endorses worsening of symptoms since being admitted. He has seen Dr. Adalgisa Anthony in the past for h/o kidney stones. ED Provider's HPI 10/24/21: Escobar Mauro is a 64 y.o. male with PMH of Afib, type 2 DM, CVA, HTN, HLD who presents with bilateral lower extremity swelling and difficulty walking today due to worsening swelling of both legs. Patient reports that swelling has worsened over the last few days and difficulty ambulate began today. Patient reports associated chills. Patient reports that his legs have almost doubled in size. He reports that his right leg is slightly more swollen than his left leg but has also had some redness of his right leg. Patient denies any pain. He reports that normally his leg swelling improves overnight and then worsens during the day but when he awoke today his legs did not seem to have improved overnight like usual.  Patient reports compliance with his Lasix 80 mg daily.   He also reports compliance with his Xarelto and Plavix. Patient uses CPAP at night. Patient denies shortness of breath, chest pain, recent trauma or direct injury. Patient denies lower extremity swelling, recent long travel, history of PE/DVT, exogenous hormone use, recent surgeries/hospitalizations, recent trauma, hemoptysis. Past Medical History:        Diagnosis Date    Atrial fibrillation ([address]     Cerebral artery occlusion with cerebral infarction ([address]     History of cardiac cath 2017    --RCA has mid 50% stenosis and PLB branch has 70% stenosis noted.  LVEDP very high    Hyperlipidemia     Hypertension     Type II or unspecified type diabetes mellitus without mention of complication, not stated as uncontrolled     Diagnsed about 1998    Unspecified sleep apnea      Past Surgical History:        Procedure Laterality Date    CARDIAC CATHETERIZATION      TONSILLECTOMY      AT 13YEARS OLD     Current Medications:   Current Facility-Administered Medications: insulin glargine (LANTUS) injection vial 50 Units, 50 Units, SubCUTAneous, Nightly  insulin NPH (HUMULIN N;NOVOLIN N) injection vial 30 Units, 30 Units, SubCUTAneous, QAM  furosemide (LASIX) 100 mg in dextrose 5 % 100 mL infusion, 10 mg/hr, IntraVENous, Continuous  metOLazone (ZAROXOLYN) tablet 2.5 mg, 2.5 mg, Oral, Daily  potassium chloride (KLOR-CON M) extended release tablet 20 mEq, 20 mEq, Oral, BID WC  insulin lispro (HUMALOG) injection vial 0-24 Units, 0-24 Units, SubCUTAneous, TID WC  insulin lispro (HUMALOG) injection vial 0-24 Units, 0-24 Units, SubCUTAneous, 2 times per day  influenza quadrivalent split vaccine (FLUZONE;FLUARIX;FLULAVAL;AFLURIA) injection 0.5 mL, 0.5 mL, IntraMUSCular, Prior to discharge  insulin lispro (HUMALOG) injection vial 20 Units, 20 Units, SubCUTAneous, TID WC  piperacillin-tazobactam (ZOSYN) 3,375 mg in dextrose 5 % 50 mL IVPB extended infusion (mini-bag), 3,375 mg, IntraVENous, Q8H  ranolazine (RANEXA) extended release tablet 500 mg, 500 mg, Oral, BID  rivaroxaban (XARELTO) tablet 20 mg, 20 mg, Oral, Dinner  clopidogrel (PLAVIX) tablet 75 mg, 75 mg, Oral, Daily  folic acid-pyridoxine-cyancobalamin (FOLTX) 1.13-25-2 MG TABS 1 tablet, 1 tablet, Oral, Daily  ezetimibe (ZETIA) tablet 10 mg, 10 mg, Oral, Nightly  atorvastatin (LIPITOR) tablet 40 mg, 40 mg, Oral, Daily  folic acid (FOLVITE) tablet 1 mg, 1 mg, Oral, Daily  sodium chloride flush 0.9 % injection 5-40 mL, 5-40 mL, IntraVENous, 2 times per day  sodium chloride flush 0.9 % injection 5-40 mL, 5-40 mL, IntraVENous, PRN  0.9 % sodium chloride infusion, 25 mL, IntraVENous, PRN  ondansetron (ZOFRAN-ODT) disintegrating tablet 4 mg, 4 mg, Oral, Q8H PRN **OR** ondansetron (ZOFRAN) injection 4 mg, 4 mg, IntraVENous, Q6H PRN  polyethylene glycol (GLYCOLAX) packet 17 g, 17 g, Oral, Daily PRN  acetaminophen (TYLENOL) tablet 650 mg, 650 mg, Oral, Q6H PRN **OR** acetaminophen (TYLENOL) suppository 650 mg, 650 mg, Rectal, Q6H PRN  potassium chloride (KLOR-CON M) extended release tablet 40 mEq, 40 mEq, Oral, PRN **OR** potassium bicarb-citric acid (EFFER-K) effervescent tablet 40 mEq, 40 mEq, Oral, PRN **OR** potassium chloride 10 mEq/100 mL IVPB (Peripheral Line), 10 mEq, IntraVENous, PRN  magnesium sulfate 2000 mg in 50 mL IVPB premix, 2,000 mg, IntraVENous, PRN  famotidine (PEPCID) tablet 20 mg, 20 mg, Oral, BID  glucose (GLUTOSE) 40 % oral gel 15 g, 15 g, Oral, PRN  dextrose 50 % IV solution, 12.5 g, IntraVENous, PRN  glucagon (rDNA) injection 1 mg, 1 mg, IntraMUSCular, PRN  dextrose 5 % solution, 100 mL/hr, IntraVENous, PRN  metoprolol tartrate (LOPRESSOR) tablet 50 mg, 50 mg, Oral, BID    Allergies:  Patient has no known allergies. Social History:   TOBACCO:   reports that he has never smoked. He has never used smokeless tobacco.  ETOH:   reports no history of alcohol use. DRUGS:   reports no history of drug use.     Family History:       Problem Relation Age of Onset    Amie Barron NAYE  Date of Study       10/25/2021   Date of Birth      1960         Gender              Male   Age                64 year(s)         Race                   Patient Number     [id number]         Room Number         RW15   Visit Number       [id number]   Corporate ID       [id number]   Accession Number   [id number]         [address],                                                            RDMS   Ordering Physician Chloe Kailash    Interpreting        Kentrell Parada MD           Physician           Lev Nichols MD  Procedure Type of Study   TTE procedure:ECHOCARDIOGRAM COMPLETE 2D W DOPPLER W COLOR. Procedure Date Date: 10/25/2021 Start: 02:23 PM Study Location: Portable Technical Quality: Poor visualization due to body habitus. Indications:Generalized Edema. Patient Status: Routine Height: 73 inches Weight: 460 pounds BSA: 3.07 m2 BMI: 60.69 kg/m2 HR: 91 bpm BP: 123/83 mmHg  Conclusions   Summary  Left ventricular systolic function is normal.  Ejection fraction is visually estimated at 50%. Moderate left ventricular hypertrophy. Severely dilated left atrium. Dilated ascending aorta measuring 4.6cm. No evidence of any pericardial effusion. Dilated IVC with poor inspiratory collapse. Signature   ------------------------------------------------------------------  Electronically signed by Raúl Andres MD  (Interpreting physician) on 10/25/2021 at 04:21 PM  ------------------------------------------------------------------   Findings   Left Ventricle  Left ventricular systolic function is normal.  Ejection fraction is visually estimated at 50%. Moderate left ventricular hypertrophy. Left ventricle size is normal.  No regional wall motion abnormalites. Indeterminate diastolic function; E/A flow reversal is noted. Left Atrium  Severely dilated left atrium. Dilated ascending aorta measuring 4.6cm.    Right Atrium THE CHEST 10/28/2021 3:32 am COMPARISON: 10/25/2021 HISTORY: ORDERING SYSTEM PROVIDED HISTORY: SOB TECHNOLOGIST PROVIDED HISTORY: Reason for exam:->SOB Reason for Exam: SOB Acuity: Unknown Type of Exam: Subsequent/Follow-up Additional signs and symptoms: SOB Relevant Medical/Surgical History: SOB FINDINGS: The cardiac silhouette is enlarged. There is no pneumothorax. Central vascular congestion has increased. There is no focal consolidation. The lung volumes are slightly low. Increased central vascular congestion. Stable cardiac silhouette enlargement. XR CHEST PORTABLE    Result Date: 10/25/2021  EXAMINATION: ONE XRAY VIEW OF THE CHEST 10/24/2021 7:01 pm COMPARISON: November 15, 2020 HISTORY: ORDERING SYSTEM PROVIDED HISTORY: Lower extremity edema TECHNOLOGIST PROVIDED HISTORY: Reason for exam:->Lower extremity edema Reason for Exam: Lower extremity edema Acuity: Acute Type of Exam: Initial FINDINGS: Cardiomegaly is present. Mild central venous congestion is present. No focal area of consolidation or pneumothorax is present. Osseous structures are stable. 1. Cardiomegaly, mild central venous congestion     VL DUP LOWER EXTREMITY ARTERIES RIGHT    Result Date: 10/27/2021  EXAMINATION: ARTERIAL DUPLEX ULTRASOUND OF THE  LOWER EXTREMITY  10/27/2021 4:22 pm TECHNIQUE: Duplex ultrasound using B-mode/gray scaled imaging, Doppler spectral analysis and color flow Doppler was obtained of the lower extremity. COMPARISON: None. HISTORY: ORDERING SYSTEM PROVIDED HISTORY: PAD TECHNOLOGIST PROVIDED HISTORY: Reason for exam:->PAD Reason for Exam: PAD, RT leg swelling, pain, and redness Acuity: Acute Type of Exam: Initial FINDINGS: Loss of normal multiphasic waveforms seen, with spectral broadening identified. Subcutaneous soft tissue edema is identified. Study somewhat difficult secondary to patient's body habitus per the ultrasound technologist. Flow velocities were measured as follows: Com. Fem.   144 cm/sec Prof.           65 cm/sec SFA Prox. 153 cm/sec SFA Mid.     214 cm/sec SFA Dist.     150 cm/sec Pop. 161, 150 cm/sec PTA            58, 24, 107 cm/sec Peron. Not visualized DINESH            not visualized, not visualized, 30 cm/sec     No significant inflow stenosis suspected, though mild to moderate disease seen within the proximal and mid aspect of the SFA. No severe stenosis seen from the common femoral artery through the popliteal artery. Single vessel runoff through the posterior tibial artery, with a severe distal stenosis. Nonvisualization of the peroneal artery, as well as the proximal and mid anterior tibial artery likely related to occlusion. Reconstitution of flow at the distal anterior tibial artery. VL DUP LOWER EXTREMITY VENOUS BILATERAL    Result Date: 10/26/2021  EXAMINATION: DUPLEX VENOUS ULTRASOUND OF THE BILATERAL LOWER [id number]/25/2021 12:24 am TECHNIQUE: Duplex ultrasound using B-mode/gray scaled imaging, Doppler spectral analysis and color flow Doppler was obtained of the deep venous structures of the lower bilateral extremities. COMPARISON: None. HISTORY: ORDERING SYSTEM PROVIDED HISTORY: worsening swelling, right is more swollen than left and warm. rule out DVT TECHNOLOGIST PROVIDED HISTORY: Reason for exam:->worsening swelling, right is more swollen than left and warm. rule out DVT Reason for Exam: Leg swelling and redness Acuity: Chronic Type of Exam: Initial Additional signs and symptoms: Hx of Rt GSV ablation. Pt states his legs have been like this for some time Relevant Medical/Surgical History: Rt leg worse. Skin scales, redness, gross edema, extreme habitus FINDINGS: The visualized veins of the bilateral lower extremities are patent and free of echogenic thrombus. The veins demonstrate good compressibility with normal color flow study and spectral analysis. The calf veins are not visualized due to body habitus and leg condition.      No evidence of DVT in either lower extremity from groin to knee. Calf veins were not visualized due to body habitus and edema. US RETROPERITONEAL LIMITED    Result Date: 10/27/2021  EXAMINATION: ULTRASOUND OF THE KIDNEYS 10/27/2021 4:22 pm COMPARISON: None. HISTORY: ORDERING SYSTEM PROVIDED HISTORY: arf TECHNOLOGIST PROVIDED HISTORY: Reason for exam:->arf Reason for Exam: ARF Acuity: Acute Type of Exam: Initial FINDINGS: The right kidney measures 14.3 cm in length and the left kidney measures 14.1 cm in length. Kidneys demonstrate normal cortical echogenicity. No hydronephrosis or intrarenal stones. No focal lesions. Unremarkable ultrasound of the kidneys. Assessment & Plan:      Shari Dowling is a 64y.o. year old male admitted 10/24/2021 for congestive heart failure due to cardiomyopathy, cellulitis/lymphedema of RLE. Known to Dr. Meaghan Cazares, last seen 2019    1) Incomplete Bladder Emptyinfr gill catheter placed 10/27/21 with >400cc urine return. UA w large blood, otherwise unremarkable   Start Flomax, monitor for dizziness   Obtain PSA   Recommend voiding trial prior to discharge once off Lasix drip. Pt stable from a  standpoint. Will sign off, please call with any questions. Pt to follow up in our office with Dr. Meaghan Cazares in 2 weeks for reevaluation. Patient seen and examined, chart reviewed.      Electronically signed by France Bedolla PA-C on 10/28/2021 at 9:35 AM

## 2021-10-28 NOTE — PROGRESS NOTES
Nephrology Progress Note  10/28/2021 6:01 PM  Subjective: Interval History: Bárbara Torrez is a 64 y.o. male with weakness and stable diuresis after gill and dw cardio sp angio and mild pad. flet issue from obstruction        Data:   Scheduled Meds:   insulin glargine  50 Units SubCUTAneous Nightly    insulin NPH  30 Units SubCUTAneous QAM    tamsulosin  0.4 mg Oral Daily    metOLazone  2.5 mg Oral Daily    potassium chloride  20 mEq Oral BID WC    insulin lispro  0-24 Units SubCUTAneous TID WC    insulin lispro  0-24 Units SubCUTAneous 2 times per day    influenza virus vaccine  0.5 mL IntraMUSCular Prior to discharge    insulin lispro  20 Units SubCUTAneous TID WC    piperacillin-tazobactam  3,375 mg IntraVENous Q8H    ranolazine  500 mg Oral BID    rivaroxaban  20 mg Oral Dinner    clopidogrel  75 mg Oral Daily    folic acid-pyridoxine-cyancobalamin  1 tablet Oral Daily    ezetimibe  10 mg Oral Nightly    atorvastatin  40 mg Oral Daily    folic acid  1 mg Oral Daily    sodium chloride flush  5-40 mL IntraVENous 2 times per day    famotidine  20 mg Oral BID    metoprolol tartrate  50 mg Oral BID     Continuous Infusions:   furosemide (LASIX) 1mg/ml infusion 10 mg/hr (10/28/21 0818)    sodium chloride 25 mL (10/28/21 0318)    dextrose           CBC   Recent Labs     10/26/21  0810 10/27/21  0832 10/28/21  0613   WBC 14.4* 9.4 8.0   HGB 12.7* 12.6* 13.1*   HCT 39.9* 39.3* 41.6*    186 223      BMP   Recent Labs     10/26/21  0810 10/27/21  0832 10/28/21  0613    137 141   K 3.6 3.6 3.3*   CL 99 100 95*   CO2 24 24 32   PHOS  --  2.5 3.3   BUN 33* 29* 26*   CREATININE 1.2 1.1 1.2     Hepatic:   Recent Labs     10/26/21  0810 10/27/21  0832   AST 41* 34   ALT 32 29   BILITOT 0.4 0.3   ALKPHOS 72 66     Troponin: No results for input(s): TROPONINI in the last 72 hours. BNP: No results for input(s): BNP in the last 72 hours.   Lipids: No results for input(s): CHOL, HDL in the last 72 hours. Invalid input(s): LDLCALCU  ABGs: No results found for: PHART, PO2ART, CBG1VRG  INR: No results for input(s): INR in the last 72 hours. Renal Labs  Albumin:    Lab Results   Component Value Date    LABALBU 3.6 10/28/2021     Calcium:    Lab Results   Component Value Date    CALCIUM 9.0 10/28/2021     Phosphorus:    Lab Results   Component Value Date    PHOS 3.3 10/28/2021     U/A:    Lab Results   Component Value Date    NITRU NEGATIVE 10/27/2021    COLORU YELLOW 10/27/2021    WBCUA 8 10/27/2021    RBCUA 157 10/27/2021    MUCUS FEW 11/15/2020    TRICHOMONAS NONE SEEN 10/27/2021    BACTERIA NEGATIVE 10/27/2021    CLARITYU CLEAR 10/27/2021    SPECGRAV 1.012 10/27/2021    UROBILINOGEN NEGATIVE 10/27/2021    BILIRUBINUR NEGATIVE 10/27/2021    BLOODU LARGE 10/27/2021    KETUA NEGATIVE 10/27/2021     ABG:  No results found for: PHART, UOS7PUB, PO2ART, QVX3BPU, BEART, THGBART, LNF3GSQ, B7GRTGNF  HgBA1c:    Lab Results   Component Value Date    LABA1C 7.9 10/25/2021     Microalbumen/Creatinine ratio:  No components found for: RUCREAT  TSH:  No results found for: TSH  IRON:  No results found for: IRON  Iron Saturation:  No components found for: PERCENTFE  TIBC:  No results found for: TIBC  FERRITIN:  No results found for: FERRITIN  RPR:  No results found for: RPR  FLORES:    Lab Results   Component Value Date    FLORES None Detected 08/29/2019    FLORES  08/29/2019     (NOTE)  If suspicion of connective tissue disease is strong and FLORES EIA is   negative, consider testing for FLORES by IFA ([id number]). INTERPRETIVE INFORMATION: Anti-Nuclear Antibodies (FLORES), IgG by   RODOLFO  Antinuclear Antibodies (FLORES), IgG by RODOLFO: FLORES specimens are   screened using enzyme-linked immunosorbent assay (RODOLFO)   methodology. All RODOLFO results reported as Detected are further   tested by indirect fluorescent assay (IFA) using HEp-2 substrate   with an IgG-specific conjugate.  The FLORES RODOLFO screen is designed   to detect antibodies against dsDNA, histones, SS-A (Ro), SS-B   (La), Sam, Sam/RNP, Scl-70, Sharron-1, centromeric proteins, other   antigens extracted from the HEp-2 cell nucleus. FLORES RODOLFO assays   have been reported to have lower sensitivities than FLORES IFA for   systemic autoimmune rheumatic diseases (SARD). Negative results do not necessarily rule out SARD. Performed by [place] [id number], [address] [facility] [phone]  www. Zaid Paiz MD, Lab. Director       24 Hour Urine for Creatinine Clearance:  No components found for: CREAT4, UHRS10, UTV10      Objective:   I/O: 10/27 0701 - 10/28 0700  In: 547.2 [P.O.:360; I.V.:34.7]  Out: 9737 [YSOCX:2539]  I/O last 3 completed shifts: In: 547.2 [P.O.:360; I.V.:34.7; IV Piggyback:152.5]  Out: 9105 [JXMYV:7885]  I/O this shift:  In: -   Out: 1200 [Urine:1200]  Vitals: /70   Pulse 78   Temp 98.5 °F (36.9 °C) (Oral)   Resp 21   Ht 6' 1\" (1.854 m)   Wt (!) 343 lb 9.6 oz (155.9 kg)   SpO2 93%   BMI 45.33 kg/m²  {  General appearance: awake weak  HEENT: Head: Normal, normocephalic, atraumatic.   Neck: supple, symmetrical, trachea midline  Lungs: diminished breath sounds bilaterally  Heart: S1, S2 normal  Abdomen: abnormal findings:  soft obese  Extremities: edema ++  Neurologic: Mental status: alertness: alert        Assessment and Plan:      IMP:  #1 congestive heart failure with diastolic dysfunction  #2 lymphedema with anasarca  #3 acute versus chronic kidney failure with hematuria and proteinuria  #4 hypertension  #5 sleep apnea  #6 type 2 diabetes    Plan     1 fu cardio and maintain diuresis  2 sp gill and keep negative replete k  3 fu repeat ua in am and doing workup  4 bp stable  5 o2 mointor  6 ssi  willl follow           Cecelia Grey MD, MD

## 2021-10-28 NOTE — OP NOTE
Operative Note      Patient: Lisbeth Shen  YOB: 1960  MRN: [id number]    Date of Procedure:   10/28/21  Pre-Op Diagnosis: PAD     Post-Op Diagnosis: Same    Estimated Blood Loss (mL): less than 50     Complications: None    Electronically signed by Kirill Kirby MD on 10/28/2021 at 12:07 PM     DICTATED ==[id number]  RIGHT SELECTIVE ANGIO  COMMON/EXTERNAL/INTERNAL/ ILIAC PATENT  PROFUNDA PATENT  SFA PATENT  POPLITEAL PATENT  AT/PT AND PERONEAL MILD DX  FOOT 2 V RUN OFF  NO COMPLICATIONS  LEFT BRACHIAL ACCESS    MEDICAL TREATMENT     Electronically signed by Kirill Kirby MD on 10/28/21 at 12:13 PM EDT

## 2021-10-28 NOTE — PROGRESS NOTES
Progress Note( Dr. Romy Hercules)  10/27/2021  Subjective:   Admit Date: 10/24/2021  PCP: Manny Rosas MD    Admitted For :for : Severe leg swelling and generalized weakness and unable to ambulate    Consulted For: Better control of blood glucose    Interval History: Feels somewhat better leg swelling is less    Denies any chest pains,   Denies SOB . Denies nausea or vomiting. No new bowel or bladder symptoms. Intake/Output Summary (Last 24 hours) at 10/27/2021 2130  Last data filed at 10/27/2021 1946  Gross per 24 hour   Intake 547.2 ml   Output 7087 ml   Net -6539.8 ml       DATA    CBC:   Recent Labs     10/25/21  0002 10/26/21  0810 10/27/21  0832   WBC 19.1* 14.4* 9.4   HGB 13.9 12.7* 12.6*    195 186    CMP:  Recent Labs     10/25/21  0002 10/25/21  0002 10/26/21  0810 10/27/21  0832 10/27/21  1411     --  138 137  --    K 4.0  --  3.6 3.6  --      --  99 100  --    CO2 24  --  24 24  --    BUN 28*  --  33* 29*  --    CREATININE 0.9  --  1.2 1.1  --    CALCIUM 8.8  --  8.7 8.5  --    PROT 6.4   < > 6.2* 5.9* 5.8*   LABALBU 4.1  --  3.5 3.3*  --    BILITOT 0.3  --  0.4 0.3  --    ALKPHOS 77  --  72 66  --    AST 31  --  41* 34  --    ALT 40  --  32 29  --     < > = values in this interval not displayed. Lipids:   Lab Results   Component Value Date    CHOL 109 11/25/2020    HDL 41 11/25/2020    TRIG 114 11/25/2020     Glucose:  Recent Labs     10/27/21  1108 10/27/21  1734 10/27/21  2036   POCGLU 169* 212* 255*     AjreihzrhqF0K:  Lab Results   Component Value Date    LABA1C 7.9 10/25/2021     High Sensitivity TSH:   Lab Results   Component Value Date    TSHHS 1.800 08/29/2019     Free T3: No results found for: FT3  Free T4:No results found for: T4FREE    VL DUP LOWER EXTREMITY ARTERIES RIGHT   Final Result   No significant inflow stenosis suspected, though mild to moderate disease   seen within the proximal and mid aspect of the SFA.   No severe stenosis seen   from the common femoral artery through the popliteal artery. Single vessel runoff through the posterior tibial artery, with a severe   distal stenosis. Nonvisualization of the peroneal artery, as well as the proximal and mid   anterior tibial artery likely related to occlusion. Reconstitution of flow   at the distal anterior tibial artery. US RETROPERITONEAL LIMITED   Final Result   Unremarkable ultrasound of the kidneys. XR CHEST PORTABLE   Final Result   1. Cardiomegaly, mild central venous congestion         VL DUP LOWER EXTREMITY VENOUS BILATERAL   Final Result   No evidence of DVT in either lower extremity from groin to knee. Calf veins   were not visualized due to body habitus and edema.          XR CHEST PORTABLE    (Results Pending)        Scheduled Medicines   Medications:    metOLazone  2.5 mg Oral Daily    potassium chloride  20 mEq Oral BID WC    influenza virus vaccine  0.5 mL IntraMUSCular Prior to discharge    insulin NPH  25 Units SubCUTAneous QAM    insulin lispro  0-18 Units SubCUTAneous TID WC    insulin lispro  0-18 Units SubCUTAneous 2 times per day    insulin lispro  20 Units SubCUTAneous TID WC    piperacillin-tazobactam  3,375 mg IntraVENous Q8H    insulin glargine  40 Units SubCUTAneous Nightly    ranolazine  500 mg Oral BID    rivaroxaban  20 mg Oral Dinner    clopidogrel  75 mg Oral Daily    folic acid-pyridoxine-cyancobalamin  1 tablet Oral Daily    ezetimibe  10 mg Oral Nightly    atorvastatin  40 mg Oral Daily    folic acid  1 mg Oral Daily    sodium chloride flush  5-40 mL IntraVENous 2 times per day    famotidine  20 mg Oral BID    metoprolol tartrate  50 mg Oral BID      Infusions:    furosemide (LASIX) 1mg/ml infusion 10 mg/hr (10/27/21 1422)    sodium chloride      dextrose           Objective:   Vitals: BP (!) 148/81   Pulse 82   Temp 97.7 °F (36.5 °C) (Oral)   Resp 22   Ht 6' 1\" (1.854 m)   Wt (!) 356 lb (161.5 kg)   SpO2 94%   BMI 46.97 kg/m²   General appearance: alert and cooperative with exam  Neck: no JVD or bruit  Thyroid : Normal lobes   Lungs: Has Vesicular Breath sounds   Heart:  regular rate and rhythm  Abdomen: soft, non-tender; bowel sounds normal; no masses,  no organomegaly  Musculoskeletal: Normal  Extremities: extremities normal, , yes massive edema  Neurologic:  Awake, alert, oriented to name, place and time. Cranial nerves II-XII are grossly intact. Motor is  intact. Sensory neuropathy t.,  and gait is abnormal.    Assessment:     Patient Active Problem List:     MARINO on CPAP     New onset atrial fibrillation (HCC)     Type 2 diabetes mellitus with hyperglycemia, with long-term current use of insulin ([facility])     Class 3 severe obesity due to excess calories with body mass index (BMI) of 45.0 to 49.9 in [facility])     History of cardiac cath     Weakness of right arm     Acute CVA (cerebrovascular accident) ([address]     Spastic hemiparesis of right dominant side ([address]     Dysphagia due to recent stroke     Essential hypertension     Morbid obesity with body mass index of 45.0-49.9 in [facility])     Frequent falls     Chronic venous stasis dermatitis of both lower extremities     History of CVA (cerebrovascular accident)     Obesity hypoventilation syndrome ([address]     Hypertension     Hyperlipidemia     Congestive heart failure due to cardiomyopathy ([address]      Plan:     1. Reviewed POC blood glucose . Labs and X ray results   2. Reviewed Current Medicines   3. On meal/ Correction bolus Humalog/ Basal Lantus Insulin regime / and Oral Hypoglycemic drugs   4. Monitor Blood glucose frequently   5. Modified  the dose of Insulin/ other medicines as needed   6. Consulted nephrology Case discussed with nephrology going to start diuresing him   7. Will follow     .      Jen Broussard MD, MD

## 2021-10-28 NOTE — PROGRESS NOTES
Pulmonary and Critical Care  Progress Note  Pt seen in am.  Subjective: The patient is better. On RA. Shortness of breath has improved. Chest pain none  Addressing respiratory complaints Patient is negative for  hemoptysis and cyanosis  CONSTITUTIONAL:  negative for fevers and chills      Past Medical History:     has a past medical history of Atrial fibrillation ([facility] Cerebral artery occlusion with cerebral infarction [facility]), History of cardiac cath, Hyperlipidemia, Hypertension, Type II or unspecified type diabetes mellitus without mention of complication, not stated as uncontrolled, and Unspecified sleep apnea. has a past surgical history that includes Tonsillectomy and Cardiac catheterization. reports that he has never smoked. He has never used smokeless tobacco. He reports that he does not drink alcohol and does not use drugs. Family history:  family history includes Cancer in his father and maternal grandfather; Diabetes in his father and mother. No Known Allergies  Social History:    Reviewed; no changes    Objective:   PHYSICAL EXAM:        VITALS:  /70   Pulse 78   Temp 98.5 °F (36.9 °C) (Oral)   Resp 21   Ht 6' 1\" (1.854 m)   Wt (!) 343 lb 9.6 oz (155.9 kg)   SpO2 93%   BMI 45.33 kg/m²     24HR INTAKE/OUTPUT:      Intake/Output Summary (Last 24 hours) at 10/28/2021 1719  Last data filed at 10/28/2021 1341  Gross per 24 hour   Intake 394.7 ml   Output 9105 ml   Net -8710.3 ml       CONSTITUTIONAL:  awake, alert, cooperative, no apparent distress, and appears stated age  LUNGS:  decreased breath sounds  CARDIOVASCULAR:  normal S1 and S2 and negative JVD  ABD:Abdomen soft, non-tender. BS normal. No masses,  No organomegaly  NEURO:Alert and oriented x3. Gait normal. Reflexes and motor strength normal and symmetric. Cranial nerves 2-12 and sensation grossly intact.   DATA:    CBC:  Recent Labs     10/26/21  0810 10/27/21  0832 10/28/21  0613   WBC 14.4* 9.4 8.0   RBC 4.49* 4.43* 4.71 HGB 12.7* 12.6* 13.1*   HCT 39.9* 39.3* 41.6*    186 223   MCV 88.9 88.7 88.3   MCH 28.3 28.4 27.8   MCHC 31.8* 32.1 31.5*   RDW 15.9* 15.6* 15.5*   SEGSPCT 86.5* 77.0* 71.9*      BMP:  Recent Labs     10/26/21  0810 10/27/21  0832 10/28/21  0613    137 141   K 3.6 3.6 3.3*   CL 99 100 95*   CO2 24 24 32   BUN 33* 29* 26*   CREATININE 1.2 1.1 1.2   CALCIUM 8.7 8.5 9.0   GLUCOSE 348* 248* 170*      ABG:  No results for input(s): PH, PO2ART, RBE0JIE, HCO3, BEART, O2SAT in the last 72 hours. Lab Results   Component Value Date    PROBNP 326.7 (H) 10/27/2021    PROBNP 174.1 10/25/2021    PROBNP 428.7 (H) 11/15/2020     No results found for: [facility]    Radiology Review:  Pertinent images / reports were reviewed as a part of this visit. Assessment:     Patient Active Problem List   Diagnosis    MARINO on CPAP    New onset atrial fibrillation (HCC)    Type 2 diabetes mellitus with hyperglycemia, with long-term current use of insulin (HCC)    Class 3 severe obesity due to excess calories with body mass index (BMI) of 45.0 to 49.9 in [facility])    History of cardiac cath    Weakness of right arm    Acute CVA (cerebrovascular accident) ([address]    Spastic hemiparesis of right dominant side ([address]    Dysphagia due to recent stroke    Essential hypertension    Morbid obesity with body mass index of 45.0-49.9 in [facility])    Frequent falls    Chronic venous stasis dermatitis of both lower extremities    History of CVA (cerebrovascular accident)    Obesity hypoventilation syndrome ([address]    Hypertension    Hyperlipidemia    Congestive heart failure due to cardiomyopathy ([address]       Plan:   1. Overall the patient has improved. 2. Inc. activity.       Gladys Walker MD   10/28/2021  5:19 PM

## 2021-10-28 NOTE — PROGRESS NOTES
Internal Medicine Hospitalist             Daily Progress  Note   Subjective:     Chief Complaint   Patient presents with    Leg Swelling     bilateral    Gait Problem     unable to ambulate     Mr. Tonja Winston reports significant improvement in his edema. Reportedly with 9 liters uop. Objective:    /63   Pulse 83   Temp 98.5 °F (36.9 °C) (Oral)   Resp 24   Ht 6' 1\" (1.854 m)   Wt (!) 343 lb 9.6 oz (155.9 kg)   SpO2 95%   BMI 45.33 kg/m²      Intake/Output Summary (Last 24 hours) at 10/28/2021 1010  Last data filed at 10/28/2021 0317  Gross per 24 hour   Intake 547.2 ml   Output 9737 ml   Net -9189.8 ml      Physical Exam:  Heart: , normal S1 and S2 in all 4 auscultatory areas. No rubs  Murmurs or gallops heard. Lungs: Mostly clear to auscultation, decreased breath sounds at bases. No wheezes appreciated no crackles heard. Abdomen: Soft,  distended. Bowel sounds not appreciated. No obvious liver or spleen enlargement. Non tender, no rebound noted. Difficult exam due to morbid obesity  Extremities: Non tender, nonpitting swelling noted, move all 4. Lateral lymphedema. Has erythema. CNS: Grossly intact.      Labs:  CBC with Differential:    Lab Results   Component Value Date    WBC 8.0 10/28/2021    RBC 4.71 10/28/2021    HGB 13.1 10/28/2021    HCT 41.6 10/28/2021     10/28/2021    MCV 88.3 10/28/2021    MCH 27.8 10/28/2021    MCHC 31.5 10/28/2021    RDW 15.5 10/28/2021    SEGSPCT 71.9 10/28/2021    LYMPHOPCT 14.2 10/28/2021    MONOPCT 12.0 10/28/2021    BASOPCT 0.5 10/28/2021    MONOSABS 1.0 10/28/2021    LYMPHSABS 1.1 10/28/2021    EOSABS 0.1 10/28/2021    BASOSABS 0.0 10/28/2021    DIFFTYPE AUTOMATED DIFFERENTIAL 10/28/2021     CMP:    Lab Results   Component Value Date     10/28/2021    K 3.3 10/28/2021    CL 95 10/28/2021    CO2 32 10/28/2021    BUN 26 10/28/2021    CREATININE 1.2 10/28/2021    GFRAA >60 10/28/2021    AGRATIO 1.3 11/25/2020    LABGLOM >60 index of 45.0-49.9 in adult [facility]) 11/20/2018    Weakness of right arm 11/18/2018    History of cardiac cath 10/08/2018    New onset atrial fibrillation ([facility] 09/25/2018    Type 2 diabetes mellitus with hyperglycemia, with long-term current use of insulin ([facility] 09/25/2018    Class 3 severe obesity due to excess calories with body mass index (BMI) of 45.0 to 49.9 in adult [facility]) 09/25/2018    MARINO on CPAP 10/14/2013       Plan:     Problems being addressed this admission:   Acute on chronic HFpEF  Atrial fibrillation on DOAC  Chronic lymphedema  MARINO  DM 2  Morbid obesity    Acute on chronic heart failure  Patient has been seen by cardiology to continue diuretics for now. On lasix drip. Atrial fibrillation: Xarelto 20 mg once a day and metoprolol    chronic lymphedema unchanged from before. Has had ultrasound done both legs no evidence of DVT noted. Started on antibiotics by pulmonology. obstructive sleep apnea. We will have him on AutoPap at night    Diabetes mellitus type 2  -Blood glucose with improved control.   -Endocrinology on board. Consultants:  Cardiology    General Orders:  Repeat basic labs again in am.  I have explained to the patient and discussed with him/her the treatment plan. The above chart was generated partly using Dragon dictation system, it may contain dictation errors given the limitations of this technology.      Holden Carr MD 10:10 AM 10/28/21

## 2021-10-28 NOTE — PROCEDURES
[address], [address] [facility]                            CARDIAC CATHETERIZATION    PATIENT NAME: Brittanie Hassan                  :        1960  MED REC NO:   [id number]                          ROOM:  ACCOUNT NO:   [de-identified]                           ADMIT DATE: 10/24/2021  PROVIDER:     Sivakumar Kelly MD    DATE OF PROCEDURE:  10/28/2021    PERIPHERAL ANGIOGRAM REPORT    INDICATION FOR PROCEDURE:  Peripheral vascular disease and right leg  ulcer. This is a 80-year-old male patient brought to cath lab today. Informed  consent was obtained from the patient. The patient was prepped and  draped in a sterile fashion. The patient was injected with 5 mL of 2%  lidocaine in the _____ brachial artery. Then a 4-Chinese sheath was  placed in the _____ brachial artery. Using JR-4 catheter, a right leg selective angiogram was performed. Right leg selective angiogram shows right common iliac artery was  patent, and right internal and right external arteries are patent. The  catheter was advanced into the left leg. Right common femoral artery is  patent. Right profunda is patent and selective right SFA angiogram was  performed. The right SFA angiogram shows right SFA is patent. There  was good flow noted. No significant lesions present. Right distal SFA  was patent. Right popliteal was patent. The catheter was advanced and an angiogram performed. Right popliteal  is patent. Right AT has a high takeoff present, but right AT is patent. Right PT trunk is patent. Right PT is patent. Right peroneal artery is  patent. There is a three-vessel runoff noted below the knee. AT has  some mild disease noted. Peroneal is patent. Right PT is patent. There is a three-vessel runoff noted below the knee. There is a  two-vessel runoff to the foot present, AT and PT. IMPRESSION:  1.   Selective right leg angiogram

## 2021-10-29 LAB
ALBUMIN ELP: 2.5 GM/DL (ref 3.2–5.6)
ALBUMIN SERPL-MCNC: 3.5 GM/DL (ref 3.4–5)
ALBUMIN, U: 50 %
ALPHA-1-GLOBULIN, U: 5 %
ALPHA-1-GLOBULIN: 0.5 GM/DL (ref 0.1–0.4)
ALPHA-2-GLOBULIN, U: 13 %
ALPHA-2-GLOBULIN: 0.9 GM/DL (ref 0.4–1.2)
ANION GAP SERPL CALCULATED.3IONS-SCNC: 13 MMOL/L (ref 4–16)
ANION GAP SERPL CALCULATED.3IONS-SCNC: 15 MMOL/L (ref 4–16)
BACTERIA: ABNORMAL /HPF
BASOPHILS ABSOLUTE: 0.1 K/CU MM
BASOPHILS RELATIVE PERCENT: 0.6 % (ref 0–1)
BETA GLOBULIN, U: 18 %
BETA GLOBULIN: 1.1 GM/DL (ref 0.5–1.3)
BILIRUBIN URINE: NEGATIVE MG/DL
BLOOD, URINE: ABNORMAL
BUN BLDV-MCNC: 29 MG/DL (ref 6–23)
BUN BLDV-MCNC: 33 MG/DL (ref 6–23)
CALCIUM SERPL-MCNC: 8.8 MG/DL (ref 8.3–10.6)
CALCIUM SERPL-MCNC: 8.9 MG/DL (ref 8.3–10.6)
CHLORIDE BLD-SCNC: 89 MMOL/L (ref 99–110)
CHLORIDE BLD-SCNC: 91 MMOL/L (ref 99–110)
CLARITY: CLEAR
CO2: 33 MMOL/L (ref 21–32)
CO2: 35 MMOL/L (ref 21–32)
COLOR: YELLOW
CREAT SERPL-MCNC: 1.1 MG/DL (ref 0.9–1.3)
CREAT SERPL-MCNC: 1.3 MG/DL (ref 0.9–1.3)
DIFFERENTIAL TYPE: ABNORMAL
EOSINOPHILS ABSOLUTE: 0.1 K/CU MM
EOSINOPHILS RELATIVE PERCENT: 1.3 % (ref 0–3)
GAMMA GLOBULIN, U: 15 %
GAMMA GLOBULIN: 0.8 GM/DL (ref 0.5–1.6)
GFR AFRICAN AMERICAN: >60 ML/MIN/1.73M2
GFR AFRICAN AMERICAN: >60 ML/MIN/1.73M2
GFR NON-AFRICAN AMERICAN: 56 ML/MIN/1.73M2
GFR NON-AFRICAN AMERICAN: >60 ML/MIN/1.73M2
GLUCOSE BLD-MCNC: 102 MG/DL (ref 70–99)
GLUCOSE BLD-MCNC: 153 MG/DL (ref 70–99)
GLUCOSE BLD-MCNC: 159 MG/DL (ref 70–99)
GLUCOSE BLD-MCNC: 163 MG/DL (ref 70–99)
GLUCOSE BLD-MCNC: 197 MG/DL (ref 70–99)
GLUCOSE BLD-MCNC: 208 MG/DL (ref 70–99)
GLUCOSE BLD-MCNC: 73 MG/DL (ref 70–99)
GLUCOSE, URINE: NEGATIVE MG/DL
HCT VFR BLD CALC: 42.4 % (ref 42–52)
HEMOGLOBIN: 13.7 GM/DL (ref 13.5–18)
IMMATURE NEUTROPHIL %: 0.7 % (ref 0–0.43)
KETONES, URINE: NEGATIVE MG/DL
LEUKOCYTE ESTERASE, URINE: ABNORMAL
LYMPHOCYTES ABSOLUTE: 1.1 K/CU MM
LYMPHOCYTES RELATIVE PERCENT: 12.3 % (ref 24–44)
MCH RBC QN AUTO: 28.4 PG (ref 27–31)
MCHC RBC AUTO-ENTMCNC: 32.3 % (ref 32–36)
MCV RBC AUTO: 88 FL (ref 78–100)
MONOCYTES ABSOLUTE: 1.1 K/CU MM
MONOCYTES RELATIVE PERCENT: 12.7 % (ref 0–4)
MUCUS: ABNORMAL HPF
NITRITE URINE, QUANTITATIVE: NEGATIVE
NUCLEATED RBC %: 0 %
PDW BLD-RTO: 15 % (ref 11.7–14.9)
PH, URINE: 5 (ref 5–8)
PHOSPHORUS: 4.3 MG/DL (ref 2.5–4.9)
PLATELET # BLD: 245 K/CU MM (ref 140–440)
PMV BLD AUTO: 11.1 FL (ref 7.5–11.1)
POTASSIUM SERPL-SCNC: 2.9 MMOL/L (ref 3.5–5.1)
POTASSIUM SERPL-SCNC: 3.5 MMOL/L (ref 3.5–5.1)
PROTEIN UA: NEGATIVE MG/DL
PSA ULTRASENSITIVE: 3.94 NG/ML (ref 0–4)
RBC # BLD: 4.82 M/CU MM (ref 4.6–6.2)
RBC URINE: 5 /HPF (ref 0–3)
SEGMENTED NEUTROPHILS ABSOLUTE COUNT: 6.5 K/CU MM
SEGMENTED NEUTROPHILS RELATIVE PERCENT: 72.4 % (ref 36–66)
SODIUM BLD-SCNC: 137 MMOL/L (ref 135–145)
SODIUM BLD-SCNC: 139 MMOL/L (ref 135–145)
SPECIFIC GRAVITY UA: 1.01 (ref 1–1.03)
SPEP INTERPRETATION: ABNORMAL
SPEP INTERPRETATION: NORMAL
SQUAMOUS EPITHELIAL: <1 /HPF
TOTAL IMMATURE NEUTOROPHIL: 0.06 K/CU MM
TOTAL NUCLEATED RBC: 0 K/CU MM
TOTAL PROTEIN: 5.8 GM/DL (ref 6.4–8.2)
TRICHOMONAS: ABNORMAL /HPF
URINE TOTAL PROTEIN: 18.5 MG/DL
UROBILINOGEN, URINE: NEGATIVE MG/DL (ref 0.2–1)
WBC # BLD: 8.9 K/CU MM (ref 4–10.5)
WBC UA: 1 /HPF (ref 0–2)

## 2021-10-29 PROCEDURE — 82962 GLUCOSE BLOOD TEST: CPT

## 2021-10-29 PROCEDURE — 36415 COLL VENOUS BLD VENIPUNCTURE: CPT

## 2021-10-29 PROCEDURE — 2580000003 HC RX 258: Performed by: INTERNAL MEDICINE

## 2021-10-29 PROCEDURE — 85025 COMPLETE CBC W/AUTO DIFF WBC: CPT

## 2021-10-29 PROCEDURE — 99233 SBSQ HOSP IP/OBS HIGH 50: CPT | Performed by: INTERNAL MEDICINE

## 2021-10-29 PROCEDURE — 2140000000 HC CCU INTERMEDIATE R&B

## 2021-10-29 PROCEDURE — 6370000000 HC RX 637 (ALT 250 FOR IP): Performed by: INTERNAL MEDICINE

## 2021-10-29 PROCEDURE — 81001 URINALYSIS AUTO W/SCOPE: CPT

## 2021-10-29 PROCEDURE — 94761 N-INVAS EAR/PLS OXIMETRY MLT: CPT

## 2021-10-29 PROCEDURE — 80069 RENAL FUNCTION PANEL: CPT

## 2021-10-29 PROCEDURE — 80048 BASIC METABOLIC PNL TOTAL CA: CPT

## 2021-10-29 PROCEDURE — 6360000002 HC RX W HCPCS: Performed by: INTERNAL MEDICINE

## 2021-10-29 RX ORDER — POTASSIUM CHLORIDE 20 MEQ/1
40 TABLET, EXTENDED RELEASE ORAL 2 TIMES DAILY WITH MEALS
Status: DISCONTINUED | OUTPATIENT
Start: 2021-10-29 | End: 2021-10-31 | Stop reason: SDUPTHER

## 2021-10-29 RX ORDER — SILDENAFIL CITRATE 20 MG/1
10 TABLET ORAL 2 TIMES DAILY
Status: DISCONTINUED | OUTPATIENT
Start: 2021-10-29 | End: 2021-11-02 | Stop reason: HOSPADM

## 2021-10-29 RX ORDER — POTASSIUM CHLORIDE 7.45 MG/ML
10 INJECTION INTRAVENOUS
Status: COMPLETED | OUTPATIENT
Start: 2021-10-29 | End: 2021-10-29

## 2021-10-29 RX ADMIN — FUROSEMIDE 10 MG/HR: 10 INJECTION, SOLUTION INTRAMUSCULAR; INTRAVENOUS at 18:50

## 2021-10-29 RX ADMIN — POTASSIUM CHLORIDE 10 MEQ: 7.45 INJECTION INTRAVENOUS at 08:41

## 2021-10-29 RX ADMIN — RANOLAZINE 500 MG: 500 TABLET, FILM COATED, EXTENDED RELEASE ORAL at 08:40

## 2021-10-29 RX ADMIN — Medication 1 TABLET: at 08:40

## 2021-10-29 RX ADMIN — METOPROLOL TARTRATE 50 MG: 50 TABLET, FILM COATED ORAL at 08:40

## 2021-10-29 RX ADMIN — PIPERACILLIN AND TAZOBACTAM 3375 MG: 3; .375 INJECTION, POWDER, LYOPHILIZED, FOR SOLUTION INTRAVENOUS at 03:16

## 2021-10-29 RX ADMIN — POTASSIUM CHLORIDE 10 MEQ: 7.46 INJECTION, SOLUTION INTRAVENOUS at 13:20

## 2021-10-29 RX ADMIN — RIVAROXABAN 20 MG: 20 TABLET, FILM COATED ORAL at 16:22

## 2021-10-29 RX ADMIN — RANOLAZINE 500 MG: 500 TABLET, FILM COATED, EXTENDED RELEASE ORAL at 22:24

## 2021-10-29 RX ADMIN — SODIUM CHLORIDE, PRESERVATIVE FREE 10 ML: 5 INJECTION INTRAVENOUS at 08:42

## 2021-10-29 RX ADMIN — CEFTRIAXONE SODIUM 1000 MG: 1 INJECTION, POWDER, FOR SOLUTION INTRAMUSCULAR; INTRAVENOUS at 11:20

## 2021-10-29 RX ADMIN — POTASSIUM CHLORIDE 10 MEQ: 7.45 INJECTION INTRAVENOUS at 11:20

## 2021-10-29 RX ADMIN — POTASSIUM CHLORIDE 10 MEQ: 7.46 INJECTION, SOLUTION INTRAVENOUS at 13:23

## 2021-10-29 RX ADMIN — POTASSIUM CHLORIDE 10 MEQ: 7.45 INJECTION INTRAVENOUS at 10:36

## 2021-10-29 RX ADMIN — METOPROLOL TARTRATE 50 MG: 50 TABLET, FILM COATED ORAL at 22:24

## 2021-10-29 RX ADMIN — SILDENAFIL CITRATE 10 MG: 20 TABLET ORAL at 11:17

## 2021-10-29 RX ADMIN — POTASSIUM CHLORIDE 40 MEQ: 1500 TABLET, EXTENDED RELEASE ORAL at 16:22

## 2021-10-29 RX ADMIN — SILDENAFIL CITRATE 10 MG: 20 TABLET ORAL at 22:24

## 2021-10-29 RX ADMIN — ATORVASTATIN CALCIUM 40 MG: 40 TABLET, FILM COATED ORAL at 08:46

## 2021-10-29 RX ADMIN — FAMOTIDINE 20 MG: 20 TABLET ORAL at 22:25

## 2021-10-29 RX ADMIN — INSULIN GLARGINE 50 UNITS: 100 INJECTION, SOLUTION SUBCUTANEOUS at 22:29

## 2021-10-29 RX ADMIN — CLOPIDOGREL BISULFATE 75 MG: 75 TABLET, FILM COATED ORAL at 08:39

## 2021-10-29 RX ADMIN — TAMSULOSIN HYDROCHLORIDE 0.4 MG: 0.4 CAPSULE ORAL at 08:40

## 2021-10-29 RX ADMIN — FOLIC ACID 1 MG: 1 TABLET ORAL at 08:40

## 2021-10-29 RX ADMIN — FAMOTIDINE 20 MG: 20 TABLET ORAL at 08:40

## 2021-10-29 RX ADMIN — POTASSIUM CHLORIDE 10 MEQ: 7.45 INJECTION INTRAVENOUS at 11:00

## 2021-10-29 RX ADMIN — SODIUM CHLORIDE 25 ML: 9 INJECTION, SOLUTION INTRAVENOUS at 03:13

## 2021-10-29 RX ADMIN — POTASSIUM CHLORIDE 10 MEQ: 7.46 INJECTION, SOLUTION INTRAVENOUS at 06:59

## 2021-10-29 RX ADMIN — HUMAN INSULIN 30 UNITS: 100 INJECTION, SUSPENSION SUBCUTANEOUS at 08:54

## 2021-10-29 RX ADMIN — METOLAZONE 2.5 MG: 2.5 TABLET ORAL at 08:40

## 2021-10-29 RX ADMIN — POTASSIUM CHLORIDE 40 MEQ: 1500 TABLET, EXTENDED RELEASE ORAL at 08:40

## 2021-10-29 RX ADMIN — EZETIMIBE 10 MG: 10 TABLET ORAL at 22:24

## 2021-10-29 ASSESSMENT — PAIN SCALES - GENERAL
PAINLEVEL_OUTOF10: 0
PAINLEVEL_OUTOF10: 0

## 2021-10-29 NOTE — PROGRESS NOTES
Internal Medicine Hospitalist             Daily Progress  Note   Subjective:     Chief Complaint   Patient presents with    Leg Swelling     bilateral    Gait Problem     unable to ambulate     Mr. Tejas Adams reports significant improvement in his edema. Reportedly with 7.9 liters uop yesterday. Objective:    BP (!) 151/89   Pulse 79   Temp 98.2 °F (36.8 °C) (Oral)   Resp 19   Ht 6' 1\" (1.854 m)   Wt (!) 330 lb 11.2 oz (150 kg)   SpO2 91%   BMI 43.63 kg/m²      Intake/Output Summary (Last 24 hours) at 10/29/2021 0835  Last data filed at 10/29/2021 0557  Gross per 24 hour   Intake --   Output 7000 ml   Net -7000 ml      Physical Exam:  Heart: , normal S1 and S2 in all 4 auscultatory areas. No rubs  Murmurs or gallops heard. Lungs: Mostly clear to auscultation, decreased breath sounds at bases. No wheezes appreciated no crackles heard. Abdomen: Soft,  distended. Bowel sounds not appreciated. No obvious liver or spleen enlargement. Non tender, no rebound noted. Difficult exam due to morbid obesity  Extremities: Non tender, nonpitting swelling noted, move all 4. Lateral lymphedema. Has erythema. CNS: Grossly intact.      Labs:  CBC with Differential:    Lab Results   Component Value Date    WBC 8.9 10/29/2021    RBC 4.82 10/29/2021    HGB 13.7 10/29/2021    HCT 42.4 10/29/2021     10/29/2021    MCV 88.0 10/29/2021    MCH 28.4 10/29/2021    MCHC 32.3 10/29/2021    RDW 15.0 10/29/2021    SEGSPCT 72.4 10/29/2021    LYMPHOPCT 12.3 10/29/2021    MONOPCT 12.7 10/29/2021    BASOPCT 0.6 10/29/2021    MONOSABS 1.1 10/29/2021    LYMPHSABS 1.1 10/29/2021    EOSABS 0.1 10/29/2021    BASOSABS 0.1 10/29/2021    DIFFTYPE AUTOMATED DIFFERENTIAL 10/29/2021     CMP:    Lab Results   Component Value Date     10/29/2021    K 2.9 10/29/2021    CL 91 10/29/2021    CO2 33 10/29/2021    BUN 29 10/29/2021    CREATININE 1.1 10/29/2021    GFRAA >60 10/29/2021    AGRATIO 1.3 11/25/2020 LABGLOM >60 10/29/2021    GLUCOSE 153 10/29/2021    PROT 5.8 10/27/2021    LABALBU 3.5 10/29/2021    CALCIUM 8.8 10/29/2021    BILITOT 0.3 10/27/2021    ALKPHOS 66 10/27/2021    AST 34 10/27/2021    ALT 29 10/27/2021     No results for input(s): TROPONINT in the last 72 hours.   Lab Results   Component Value Date    [facility] 1.800 08/29/2019         sodium chloride      furosemide (LASIX) 1mg/ml infusion 10 mg/hr (10/28/21 4162)    sodium chloride 25 mL (10/29/21 4133)    dextrose        potassium chloride  40 mEq Oral BID WC    potassium chloride  10 mEq IntraVENous Q1H    insulin glargine  50 Units SubCUTAneous Nightly    insulin NPH  30 Units SubCUTAneous QAM    tamsulosin  0.4 mg Oral Daily    sodium chloride flush  5-40 mL IntraVENous 2 times per day    potassium chloride  20 mEq IntraVENous Once    metOLazone  2.5 mg Oral Daily    insulin lispro  0-24 Units SubCUTAneous TID WC    insulin lispro  0-24 Units SubCUTAneous 2 times per day    influenza virus vaccine  0.5 mL IntraMUSCular Prior to discharge    insulin lispro  20 Units SubCUTAneous TID WC    piperacillin-tazobactam  3,375 mg IntraVENous Q8H    ranolazine  500 mg Oral BID    rivaroxaban  20 mg Oral Dinner    clopidogrel  75 mg Oral Daily    folic acid-pyridoxine-cyancobalamin  1 tablet Oral Daily    ezetimibe  10 mg Oral Nightly    atorvastatin  40 mg Oral Daily    folic acid  1 mg Oral Daily    sodium chloride flush  5-40 mL IntraVENous 2 times per day    famotidine  20 mg Oral BID    metoprolol tartrate  50 mg Oral BID         Assessment:       Patient Active Problem List    Diagnosis Date Noted    Congestive heart failure due to cardiomyopathy ([facility] [address] 10/25/2021    Frequent falls 11/16/2020    Chronic venous stasis dermatitis of both lower extremities 11/16/2020    History of CVA (cerebrovascular accident) 11/16/2020    Obesity hypoventilation syndrome ([address] 11/16/2020    Hypertension     Hyperlipidemia     Acute [facility]) [facility] 11/20/2018    Spastic hemiparesis of right dominant side ([facility] [address] 11/20/2018    Dysphagia due to recent stroke 11/20/2018    Essential hypertension 11/20/2018    Morbid obesity with body mass index of 45.0-49.9 in adult [facility]) 11/20/2018    Weakness of right arm 11/18/2018    History of cardiac cath 10/08/2018    New onset atrial fibrillation ([facility] 09/25/2018    Type 2 diabetes mellitus with hyperglycemia, with long-term current use of insulin ([facility] 09/25/2018    Class 3 severe obesity due to excess calories with body mass index (BMI) of 45.0 to 49.9 in adult ([facility] 09/25/2018    MARINO on CPAP 10/14/2013       Plan:     Problems being addressed this admission:   Acute on chronic HFpEF  Atrial fibrillation on DOAC  Chronic lymphedema  MARINO  DM 2  Morbid obesity    Acute on chronic heart failure  Patient has been seen by cardiology to continue diuretics for now. On lasix drip. PAD: s/p angio by cardiology yesterday. No intervention required. Atrial fibrillation: Xarelto 20 mg once a day and metoprolol    chronic lymphedema unchanged from before. Has had ultrasound done both legs no evidence of DVT noted. Started on antibiotics by pulmonology. Switch to rocephin. Complete 7 days, end date 11/2    obstructive sleep apnea. We will have him on AutoPap at night    Acute urinary retention  -continue gill per urology. Voiding trial prior to dc. Hypokalemia  -replacement per nephrology    Diabetes mellitus type 2  -Blood glucose with improved control.   -Endocrinology on board. Consultants:  Cardiology    General Orders:  Repeat basic labs again in am.  I have explained to the patient and discussed with him/her the treatment plan. The above chart was generated partly using Dragon dictation system, it may contain dictation errors given the limitations of this technology.      Negrito Ross MD 8:35 AM 10/29/21

## 2021-10-29 NOTE — PROGRESS NOTES
Comprehensive Nutrition Assessment    Type and Reason for Visit:  Initial (los 5 d)    Nutrition Recommendations/Plan:   · Carb Control 5, No Added Salt, Low 2 gm Sodium Diets available as needed    Nutrition Assessment:  Pt was admitted with elevated troponin, CHF due to cardiomyopathy. H/O DM, CVA. Currently feeding self and consuming 76% to all of Regular Diet. No significant wt loss over the past yr per Epic history. No wounds. Education completed this admission. Will continue to follow as low nutrition risk. Malnutrition Assessment:  Malnutrition Status:  No malnutrition    Context:  Chronic Illness       Estimated Daily Nutrient Needs:  Energy (kcal):  7894-6825 ([place]-St Jeor); Weight Used for Energy Requirements:  Current     Protein (g):   (1-1.2 g/kg IBW); Weight Used for Protein Requirements:  Ideal        Fluid (ml/day):  1238-5518; Method Used for Fluid Requirements:  1 ml/kcal      Nutrition Related Findings:  K+ 2.9, Glu 208, A1C 7.9      Wounds:   (redness, rash)       Current Nutrition Therapies:    ADULT DIET;  Regular    Anthropometric Measures:  · Height: 6' 1\" (185.4 cm)  · Current Body Weight: 330 lb 11.2 oz (150 kg)   · Admission Body Weight: 360 lb 6.4 oz (163.5 kg)    · Usual Body Weight: 353 lb 9.6 oz (160.4 kg) (11/15/20)     · Ideal Body Weight: 184 lbs; % Ideal Body Weight 179.7 %   · BMI: 43.6  · BMI Categories: Obese Class 3 (BMI 40.0 or greater)       Nutrition Diagnosis:   No nutrition diagnosis at this time    Nutrition Interventions:   Food and/or Nutrient Delivery:  Continue Current Diet  Nutrition Education/Counseling:  Education completed   Coordination of Nutrition Care:  Continue to monitor while inpatient    Goals:  Pt will consume at least half of his meals       Nutrition Monitoring and Evaluation:   Behavioral-Environmental Outcomes:  None Identified   Food/Nutrient Intake Outcomes:  Food and Nutrient Intake  Physical Signs/Symptoms Outcomes:  Biochemical Data, Chewing or Swallowing, GI Status, Fluid Status or Edema, Skin, Weight     Discharge Planning:    No discharge needs at this time     Electronically signed by Samia Dalton RD, LD on 10/29/21 at 12:06 PM EDT    Contact: [phone]

## 2021-10-29 NOTE — PROGRESS NOTES
Daily Progress Note      Awake and alert, overall feeling well  Edema in legs continue to improve  Great UOP, Down 30 lbs  Cr. 1.1 today; Potassium 2.9 replacement per renal  Continue lasix drip  BP and HR stable  NSR on tele--he was in afib now sinus on AC  Hx of CVA/MARINO--and HTN will add low dose Revatio for pulmonary HTN  Hx of PAFIB  No hx of PE on CT in 11/2020    PAST MEDICAL HISTORY:  History of having sleep apnea, uses CPAP, history  of having stroke, history of having pulmonary hypertension with elevated  pressures present, and moderate coronary artery disease noted.  The  patient had, I think, MRI in 2018, showed infarct also present.  History  of paroxysmal atrial fibrillation, COPD, sleep apnea present, diabetic  retinopathy was noted, diabetes and obesity present.     Cellulitis of right leg, venous ulcer  Arterial angio mild PAD  Right heart failure, MARINO and corpulmonale keep on diuretics and CPAP  Correct K     Afib with RVR    NSR on tele today    Continue Xarleto and Lopressor    Left atrium severely dilated    PVD    Single vessel run with severe stenosis on Distal Postoral tibial artery.      Non visual of peroneal artery and proximal and mid anterial tibial. Likely d/t occlusion   Cellulitis of right leg    Thready pulse on right leg    Peripheral angiogram showed Mild PAD, Ulceration likely d/t venous ulcer     HFpEF    EF 50%    ProBNP 326    Edema continues to improve    Denies any SOB   Continue lasix drip    Increase activity    18.9 L of UOP     Positive Blood cultures    Cellulitis of RLE    Abx per primary    US negative for DVT    Hx of Lymphedema     Ascending aortic aneurism     4.6cm repeat study in 6 months    Incomplete bladder emptying    Urology following,    Roe in place    Flomax started     Right LE Arteries  Impression   No significant inflow stenosis suspected, though mild to moderate disease   seen within the proximal and mid aspect of the SFA.  No severe stenosis seen from the common femoral artery through the popliteal artery.       Single vessel runoff through the posterior tibial artery, with a severe   distal stenosis.       Nonvisualization of the peroneal artery, as well as the proximal and mid   anterior tibial artery likely related to occlusion.  Reconstitution of flow   at the distal anterior tibial artery.          Echo 10/25/2021 Summary   Left jin/tricular systolic function is normal.   Ejection fraction is visually estimated at 50%.   Moderate left ventricular hypertrophy.   Severely dilated left atrium.   Dilated ascending aorta measuring 4.6cm.   No evidence of any pericardial effusion.   Dilated IVC with poor inspiratory collapse.     Past medical history:    has a past medical history of Atrial fibrillation ([address] Cerebral artery occlusion with cerebral infarction ([address] History of cardiac cath, Hyperlipidemia, Hypertension, Type II or unspecified type diabetes mellitus without mention of complication, not stated as uncontrolled, and Unspecified sleep apnea. Past surgical history:   has a past surgical history that includes Tonsillectomy and Cardiac catheterization. Social History:   reports that he has never smoked. He has never used smokeless tobacco. He reports that he does not drink alcohol and does not use drugs.   Family history:  family history includes Cancer in his father and maternal grandfather; Diabetes in his father and mother.     No Known Allergies    Objective:   /78   Pulse 79   Temp 98.2 °F (36.8 °C) (Oral)   Resp 19   Ht 6' 1\" (1.854 m)   Wt (!) 330 lb 11.2 oz (150 kg)   SpO2 91%   BMI 43.63 kg/m²     Intake/Output Summary (Last 24 hours) at 10/29/2021 0910  Last data filed at 10/29/2021 0557  Gross per 24 hour   Intake --   Output 7000 ml   Net -7000 ml       Medications:   Scheduled Meds:   potassium chloride  40 mEq Oral BID WC    potassium chloride  10 mEq IntraVENous Q1H    cefTRIAXone (ROCEPHIN) IV  1,000 mg IntraVENous Q24H    insulin glargine  50 Units SubCUTAneous Nightly    insulin NPH  30 Units SubCUTAneous QAM    tamsulosin  0.4 mg Oral Daily    sodium chloride flush  5-40 mL IntraVENous 2 times per day    potassium chloride  20 mEq IntraVENous Once    metOLazone  2.5 mg Oral Daily    insulin lispro  0-24 Units SubCUTAneous TID     insulin lispro  0-24 Units SubCUTAneous 2 times per day    influenza virus vaccine  0.5 mL IntraMUSCular Prior to discharge    insulin lispro  20 Units SubCUTAneous TID     ranolazine  500 mg Oral BID    rivaroxaban  20 mg Oral Dinner    clopidogrel  75 mg Oral Daily    folic acid-pyridoxine-cyancobalamin  1 tablet Oral Daily    ezetimibe  10 mg Oral Nightly    atorvastatin  40 mg Oral Daily    folic acid  1 mg Oral Daily    sodium chloride flush  5-40 mL IntraVENous 2 times per day    famotidine  20 mg Oral BID    metoprolol tartrate  50 mg Oral BID      Infusions:   sodium chloride      furosemide (LASIX) 1mg/ml infusion 10 mg/hr (10/28/21 2064)    sodium chloride 25 mL (10/29/21 4097)    dextrose        PRN Meds:  sodium chloride flush, sodium chloride, acetaminophen, sodium chloride flush, sodium chloride, ondansetron **OR** ondansetron, polyethylene glycol, acetaminophen **OR** acetaminophen, potassium chloride **OR** potassium alternative oral replacement **OR** potassium chloride, magnesium sulfate, glucose, dextrose, glucagon (rDNA), dextrose       Physical Exam:  Vitals:    10/29/21 0836   BP: 139/78   Pulse:    Resp:    Temp: 98.2 °F (36.8 °C)   SpO2:         General: AAO, NAD  Chest: Nontender  Cardiac: First and Second Heart Sounds are Normal, No Murmurs or Gallops noted  Lungs:Clear to auscultation and percussion. Abdomen: Soft, NT, ND, +BS  Extremities: No clubbing, 2+  Vascular:  Equal 2+ peripheral pulses.         Lab Data:  CBC:   Recent Labs     10/27/21  0832 10/28/21  0613 10/29/21  0500   WBC 9.4 8.0 8.9   HGB 12.6* 13.1* 13.7   HCT 39.3* 41.6* 42.4 MCV 88.7 88.3 88.0    223 245     BMP:   Recent Labs     10/27/21  0832 10/28/21  0613 10/29/21  0500    141 139   K 3.6 3.3* 2.9*    95* 91*   CO2 24 32 33*   PHOS 2.5 3.3 4.3   BUN 29* 26* 29*   CREATININE 1.1 1.2 1.1     LIVER PROFILE:   Recent Labs     10/27/21  0832   AST 34   ALT 29   BILITOT 0.3   ALKPHOS 66     PT/INR: No results for input(s): PROTIME, INR in the last 72 hours. APTT: No results for input(s): APTT in the last 72 hours. BNP:  No results for input(s): BNP in the last 72 hours.       Assessment:  Patient Active Problem List    Diagnosis Date Noted    Congestive heart failure due to cardiomyopathy ([address] 10/25/2021    Frequent falls 11/16/2020    Chronic venous stasis dermatitis of both lower extremities 11/16/2020    History of CVA (cerebrovascular accident) 11/16/2020    Obesity hypoventilation syndrome ([address] 11/16/2020    Hypertension     Hyperlipidemia     Acute CVA (cerebrovascular accident) ([address] 11/20/2018    Spastic hemiparesis of right dominant side ([address] 11/20/2018    Dysphagia due to recent stroke 11/20/2018    Essential hypertension 11/20/2018    Morbid obesity with body mass index of 45.0-49.9 in adult [facility]) 11/20/2018    Weakness of right arm 11/18/2018    History of cardiac cath 10/08/2018    New onset atrial fibrillation ([address] 09/25/2018    Type 2 diabetes mellitus with hyperglycemia, with long-term current use of insulin ([address] 09/25/2018    Class 3 severe obesity due to excess calories with body mass index (BMI) of 45.0 to 49.9 in adult ([address] 09/25/2018    MARINO on CPAP 10/14/2013       Electronically signed by HAYLEY Milligan CNP on 10/29/2021 at 9:30 AM     Electronically signed by Veronika Soria MD on 10/29/21 at 10:12 AM EDT

## 2021-10-29 NOTE — PROGRESS NOTES
Nephrology Progress Note  10/29/2021 8:14 AM  Subjective:      Interval History: Blanca Mcdowell is a 64 y.o. male appear doing better good diuresis less edema still not at his baseline and pain from the IV potassium        Data:   Scheduled Meds:   potassium chloride  40 mEq Oral BID WC    potassium chloride  10 mEq IntraVENous Q1H    insulin glargine  50 Units SubCUTAneous Nightly    insulin NPH  30 Units SubCUTAneous QAM    tamsulosin  0.4 mg Oral Daily    sodium chloride flush  5-40 mL IntraVENous 2 times per day    potassium chloride  20 mEq IntraVENous Once    metOLazone  2.5 mg Oral Daily    insulin lispro  0-24 Units SubCUTAneous TID WC    insulin lispro  0-24 Units SubCUTAneous 2 times per day    influenza virus vaccine  0.5 mL IntraMUSCular Prior to discharge    insulin lispro  20 Units SubCUTAneous TID WC    piperacillin-tazobactam  3,375 mg IntraVENous Q8H    ranolazine  500 mg Oral BID    rivaroxaban  20 mg Oral Dinner    clopidogrel  75 mg Oral Daily    folic acid-pyridoxine-cyancobalamin  1 tablet Oral Daily    ezetimibe  10 mg Oral Nightly    atorvastatin  40 mg Oral Daily    folic acid  1 mg Oral Daily    sodium chloride flush  5-40 mL IntraVENous 2 times per day    famotidine  20 mg Oral BID    metoprolol tartrate  50 mg Oral BID     Continuous Infusions:   sodium chloride      furosemide (LASIX) 1mg/ml infusion 10 mg/hr (10/28/21 2290)    sodium chloride 25 mL (10/29/21 0313)    dextrose           CBC   Recent Labs     10/27/21  0832 10/28/21  0613 10/29/21  0500   WBC 9.4 8.0 8.9   HGB 12.6* 13.1* 13.7   HCT 39.3* 41.6* 42.4    223 245      BMP   Recent Labs     10/27/21  0832 10/28/21  0613 10/29/21  0500    141 139   K 3.6 3.3* 2.9*    95* 91*   CO2 24 32 33*   PHOS 2.5 3.3 4.3   BUN 29* 26* 29*   CREATININE 1.1 1.2 1.1     Hepatic:   Recent Labs     10/27/21  0832   AST 34   ALT 29   BILITOT 0.3   ALKPHOS 66     Troponin: No results for input(s): TROPONINI in the last 72 hours. BNP: No results for input(s): BNP in the last 72 hours. Lipids: No results for input(s): CHOL, HDL in the last 72 hours. Invalid input(s): LDLCALCU  ABGs: No results found for: PHART, PO2ART, EYT4SQD  INR: No results for input(s): INR in the last 72 hours. Renal Labs  Albumin:    Lab Results   Component Value Date    LABALBU 3.5 10/29/2021     Calcium:    Lab Results   Component Value Date    CALCIUM 8.8 10/29/2021     Phosphorus:    Lab Results   Component Value Date    PHOS 4.3 10/29/2021     U/A:    Lab Results   Component Value Date    NITRU NEGATIVE 10/29/2021    COLORU YELLOW 10/29/2021    WBCUA 1 10/29/2021    RBCUA 5 10/29/2021    MUCUS RARE 10/29/2021    TRICHOMONAS NONE SEEN 10/29/2021    BACTERIA RARE 10/29/2021    CLARITYU CLEAR 10/29/2021    SPECGRAV 1.009 10/29/2021    UROBILINOGEN NEGATIVE 10/29/2021    BILIRUBINUR NEGATIVE 10/29/2021    BLOODU MODERATE 10/29/2021    KETUA NEGATIVE 10/29/2021     ABG:  No results found for: PHART, IPQ1PVS, PO2ART, ZFI9VMS, BEART, THGBART, NYM2THP, P7VNJGAV  HgBA1c:    Lab Results   Component Value Date    LABA1C 7.9 10/25/2021     Microalbumen/Creatinine ratio:  No components found for: RUCREAT  TSH:  No results found for: TSH  IRON:  No results found for: IRON  Iron Saturation:  No components found for: PERCENTFE  TIBC:  No results found for: TIBC  FERRITIN:  No results found for: FERRITIN  RPR:  No results found for: RPR  FLORES:    Lab Results   Component Value Date    FLORES None Detected 08/29/2019    FLORES  08/29/2019     (NOTE)  If suspicion of connective tissue disease is strong and FLORES EIA is   negative, consider testing for FLORES by IFA ([id number]). INTERPRETIVE INFORMATION: Anti-Nuclear Antibodies (FLORES), IgG by   RODOLFO  Antinuclear Antibodies (FLORES), IgG by RODOLFO: FLORES specimens are   screened using enzyme-linked immunosorbent assay (RODOLFO)   methodology.  All RODOLFO results reported as Detected are further   tested by indirect fluorescent assay (IFA) using HEp-2 substrate   with an IgG-specific conjugate. The FLORES RODOLFO screen is designed   to detect antibodies against dsDNA, histones, SS-A (Ro), SS-B   (La), Sam, Sam/RNP, Scl-70, Sharron-1, centromeric proteins, other   antigens extracted from the HEp-2 cell nucleus. FLORES RODOLFO assays   have been reported to have lower sensitivities than FLORES IFA for   systemic autoimmune rheumatic diseases (SARD). Negative results do not necessarily rule out SARD. Performed by [facility] [id number], [id number] [facility] [phone]  www. Ross Abarca MD, Lab. Director       24 Hour Urine for Creatinine Clearance:  No components found for: CREAT4, UHRS10, UTV10      Objective:   I/O: 10/28 0701 - 10/29 0700  In: -   Out: 7000 [Urine:7000]  I/O last 3 completed shifts:  In: -   Out: 7000 [Urine:7000]  No intake/output data recorded. Vitals: BP (!) 151/89   Pulse 79   Temp 98.2 °F (36.8 °C) (Oral)   Resp 19   Ht 6' 1\" (1.854 m)   Wt (!) 330 lb 11.2 oz (150 kg)   SpO2 91%   BMI 43.63 kg/m²  {  General appearance: awake weak  HEENT: Head: Normal, normocephalic, atraumatic. Neck: supple, symmetrical, trachea midline  Lungs: diminished breath sounds bilaterally  Heart: S1, S2 normal  Abdomen: abnormal findings:  soft obese  Extremities: edema ++  Neurologic: Mental status: alertness: alert        Assessment and Plan:      IMP:  #1 congestive heart failure with diastolic dysfunction  #2 lymphedema with anasarca/cellulitis with beta group B strep bacteremia  #3 acute versus chronic kidney failure with hematuria and proteinuria  #4 hypertension  #5 sleep apnea  #6 type 2 diabetes  7.   Hypokalemia    Plan     #1 maintain on Lasix drip for now and convert to IV Lasix/oral 1 improved edema next #2 maintain antibiotics in the setting of possible cellulitis and edema in the above setting  #3 renal function holding stable diuresis will monitor where some hematuria but no proteinuria at this time  #4 blood pressure monitoring maintain diuresis  #5 maintain on CPAP machine  #6 try to maintain glucose control  #7 increase oral potassium supplements and give extra IV potassium today recheck labs in the evening   #8 we will need a few more days to help improve the diuresis before discharge planning  Patient down 30 pounds since admission         Rafael Heart MD, MD

## 2021-10-29 NOTE — PROGRESS NOTES
Pulmonary and Critical Care  Progress Note    Subjective: The patient has improved. Shortness of breath has improved  Chest pain none  Addressing respiratory complaints Patient is negative for  hemoptysis and cyanosis  CONSTITUTIONAL:  negative for fevers and chills      Past Medical History:     has a past medical history of Atrial fibrillation ([facility] [address] Cerebral artery occlusion with cerebral infarction [facility]), History of cardiac cath, Hyperlipidemia, Hypertension, Type II or unspecified type diabetes mellitus without mention of complication, not stated as uncontrolled, and Unspecified sleep apnea. has a past surgical history that includes Tonsillectomy and Cardiac catheterization. reports that he has never smoked. He has never used smokeless tobacco. He reports that he does not drink alcohol and does not use drugs. Family history:  family history includes Cancer in his father and maternal grandfather; Diabetes in his father and mother. No Known Allergies  Social History:    Reviewed; no changes    Objective:   PHYSICAL EXAM:        VITALS:  /78   Pulse 79   Temp 98.2 °F (36.8 °C) (Oral)   Resp (!) 31   Ht 6' 1\" (1.854 m)   Wt (!) 330 lb 11.2 oz (150 kg)   SpO2 93%   BMI 43.63 kg/m²     24HR INTAKE/OUTPUT:      Intake/Output Summary (Last 24 hours) at 10/29/2021 1716  Last data filed at 10/29/2021 1329  Gross per 24 hour   Intake --   Output 5200 ml   Net -5200 ml       CONSTITUTIONAL:  awake, alert, cooperative, no apparent distress, and appears stated age  LUNGS:  decreased breath sounds  CARDIOVASCULAR:  normal S1 and S2 and negative JVD  ABD:Abdomen soft, non-tender. BS normal. No masses,  No organomegaly  NEURO:Alert and oriented x3. Gait normal. Reflexes and motor strength normal and symmetric. Cranial nerves 2-12 and sensation grossly intact.   DATA:    CBC:  Recent Labs     10/27/21  0832 10/28/21  0613 10/29/21  0500   WBC 9.4 8.0 8.9   RBC 4.43* 4.71 4.82   HGB 12.6* 13.1* 13.7   HCT 39.3* 41.6* 42.4    223 245   MCV 88.7 88.3 88.0   MCH 28.4 27.8 28.4   MCHC 32.1 31.5* 32.3   RDW 15.6* 15.5* 15.0*   SEGSPCT 77.0* 71.9* 72.4*      BMP:  Recent Labs     10/28/21  0613 10/29/21  0500 10/29/21  1528    139 137   K 3.3* 2.9* 3.5   CL 95* 91* 89*   CO2 32 33* 35*   BUN 26* 29* 33*   CREATININE 1.2 1.1 1.3   CALCIUM 9.0 8.8 8.9   GLUCOSE 170* 153* 73      ABG:  No results for input(s): PH, PO2ART, QQN6FFF, HCO3, BEART, O2SAT in the last 72 hours. Lab Results   Component Value Date    PROBNP 326.7 (H) 10/27/2021    PROBNP 174.1 10/25/2021    PROBNP 428.7 (H) 11/15/2020     No results found for: [facility]    Radiology Review:  Pertinent images / reports were reviewed as a part of this visit. Assessment:     Patient Active Problem List   Diagnosis    MARINO on CPAP    New onset atrial fibrillation (HCC)    Type 2 diabetes mellitus with hyperglycemia, with long-term current use of insulin (HCC)    Class 3 severe obesity due to excess calories with body mass index (BMI) of 45.0 to 49.9 in [facility])    History of cardiac cath    Weakness of right arm    Acute CVA (cerebrovascular accident) ([address]    Spastic hemiparesis of right dominant side ([address]    Dysphagia due to recent stroke    Essential hypertension    Morbid obesity with body mass index of 45.0-49.9 in [facility])    Frequent falls    Chronic venous stasis dermatitis of both lower extremities    History of CVA (cerebrovascular accident)    Obesity hypoventilation syndrome ([address]    Hypertension    Hyperlipidemia    Congestive heart failure due to cardiomyopathy ([address]       Plan:   1. Overall the patient has improved. 2. Inc. activity. 3. Supp. kcl.     Rubi Cervantes MD   10/29/2021  5:16 PM

## 2021-10-29 NOTE — PROGRESS NOTES
Progress Note( Dr. Angel Leader)  10/28/2021  Subjective:   Admit Date: 10/24/2021  PCP: Reese Malloy MD    Admitted For :for : Severe leg swelling and generalized weakness and unable to ambulate    Consulted For: Better control of blood glucose    Interval History: Feels somewhat better leg swelling is less    Denies any chest pains,   Denies SOB . Denies nausea or vomiting. No new bowel or bladder symptoms. Patient has lost several pounds of her weight is mostly water weight on IV Lasix infusion drip      Intake/Output Summary (Last 24 hours) at 10/28/2021 2130  Last data filed at 10/28/2021 1729  Gross per 24 hour   Intake --   Output 7450 ml   Net -7450 ml       DATA    CBC:   Recent Labs     10/26/21  0810 10/27/21  0832 10/28/21  0613   WBC 14.4* 9.4 8.0   HGB 12.7* 12.6* 13.1*    186 223    CMP:  Recent Labs     10/26/21  0810 10/27/21  0832 10/27/21  1411 10/28/21  0613    137  --  141   K 3.6 3.6  --  3.3*   CL 99 100  --  95*   CO2 24 24  --  32   BUN 33* 29*  --  26*   CREATININE 1.2 1.1  --  1.2   CALCIUM 8.7 8.5  --  9.0   PROT 6.2* 5.9* 5.8*  --    LABALBU 3.5 3.3*  --  3.6   BILITOT 0.4 0.3  --   --    ALKPHOS 72 66  --   --    AST 41* 34  --   --    ALT 32 29  --   --      Lipids:   Lab Results   Component Value Date    CHOL 109 11/25/2020    HDL 41 11/25/2020    TRIG 114 11/25/2020     Glucose:  Recent Labs     10/28/21  0633 10/28/21  1328 10/28/21  1623   POCGLU 164* 142* 201*     [id number]:  Lab Results   Component Value Date    LABA1C 7.9 10/25/2021     High Sensitivity TSH:   Lab Results   Component Value Date    TSHHS 1.800 08/29/2019     Free T3: No results found for: FT3  Free T4:No results found for: T4FREE    XR CHEST PORTABLE   Final Result   Increased central vascular congestion. Stable cardiac silhouette enlargement.          VL DUP LOWER EXTREMITY ARTERIES RIGHT   Final Result   No significant inflow stenosis suspected, though mild to moderate disease   seen within the proximal and mid aspect of the SFA. No severe stenosis seen   from the common femoral artery through the popliteal artery. Single vessel runoff through the posterior tibial artery, with a severe   distal stenosis. Nonvisualization of the peroneal artery, as well as the proximal and mid   anterior tibial artery likely related to occlusion. Reconstitution of flow   at the distal anterior tibial artery. US RETROPERITONEAL LIMITED   Final Result   Unremarkable ultrasound of the kidneys. XR CHEST PORTABLE   Final Result   1. Cardiomegaly, mild central venous congestion         VL DUP LOWER EXTREMITY VENOUS BILATERAL   Final Result   No evidence of DVT in either lower extremity from groin to knee. Calf veins   were not visualized due to body habitus and edema.               Scheduled Medicines   Medications:    insulin glargine  50 Units SubCUTAneous Nightly    insulin NPH  30 Units SubCUTAneous QAM    tamsulosin  0.4 mg Oral Daily    sodium chloride flush  5-40 mL IntraVENous 2 times per day    potassium chloride  20 mEq IntraVENous Once    metOLazone  2.5 mg Oral Daily    potassium chloride  20 mEq Oral BID WC    insulin lispro  0-24 Units SubCUTAneous TID WC    insulin lispro  0-24 Units SubCUTAneous 2 times per day    influenza virus vaccine  0.5 mL IntraMUSCular Prior to discharge    insulin lispro  20 Units SubCUTAneous TID WC    piperacillin-tazobactam  3,375 mg IntraVENous Q8H    ranolazine  500 mg Oral BID    rivaroxaban  20 mg Oral Dinner    clopidogrel  75 mg Oral Daily    folic acid-pyridoxine-cyancobalamin  1 tablet Oral Daily    ezetimibe  10 mg Oral Nightly    atorvastatin  40 mg Oral Daily    folic acid  1 mg Oral Daily    sodium chloride flush  5-40 mL IntraVENous 2 times per day    famotidine  20 mg Oral BID    metoprolol tartrate  50 mg Oral BID      Infusions:    sodium chloride      sodium chloride      furosemide (LASIX) 1mg/ml infusion 10 mg/hr (10/28/21 0818)    sodium chloride 25 mL (10/28/21 0318)    dextrose           Objective:   Vitals: /77   Pulse 81   Temp 98.5 °F (36.9 °C) (Oral)   Resp 21   Ht 6' 1\" (1.854 m)   Wt (!) 343 lb 9.6 oz (155.9 kg)   SpO2 92%   BMI 45.33 kg/m²   General appearance: alert and cooperative with exam  Neck: no JVD or bruit  Thyroid : Normal lobes   Lungs: Has Vesicular Breath sounds   Heart:  regular rate and rhythm  Abdomen: soft, non-tender; bowel sounds normal; no masses,  no organomegaly  Musculoskeletal: Normal  Extremities: extremities normal, , yes massive edema  Neurologic:  Awake, alert, oriented to name, place and time. Cranial nerves II-XII are grossly intact. Motor is  intact. Sensory neuropathy t.,  and gait is abnormal.    Assessment:     Patient Active Problem List:     MARINO on CPAP     New onset atrial fibrillation (HCC)     Type 2 diabetes mellitus with hyperglycemia, with long-term current use of insulin ([facility])     Class 3 severe obesity due to excess calories with body mass index (BMI) of 45.0 to 49.9 in [facility])     History of cardiac cath     Weakness of right arm     Acute CVA (cerebrovascular accident) ([address]     Spastic hemiparesis of right dominant side ([address]     Dysphagia due to recent stroke     Essential hypertension     Morbid obesity with body mass index of 45.0-49.9 in [facility])     Frequent falls     Chronic venous stasis dermatitis of both lower extremities     History of CVA (cerebrovascular accident)     Obesity hypoventilation syndrome ([address]     Hypertension     Hyperlipidemia     Congestive heart failure due to cardiomyopathy ([address]      Plan:     1. Reviewed POC blood glucose . Labs and X ray results   2. Reviewed Current Medicines   3. On meal/ Correction bolus Humalog/ Basal Lantus Insulin regime / and Oral Hypoglycemic drugs   4. Monitor Blood glucose frequently   5. Modified  the dose of Insulin/ other medicines as needed   6.  Consulted nephrology Case discussed with nephrology going to start diuresing him   7. Will follow     .      Jen Broussard MD, MD

## 2021-10-30 LAB
ANION GAP SERPL CALCULATED.3IONS-SCNC: 19 MMOL/L (ref 4–16)
BUN BLDV-MCNC: 38 MG/DL (ref 6–23)
CALCIUM SERPL-MCNC: 9 MG/DL (ref 8.3–10.6)
CHLORIDE BLD-SCNC: 86 MMOL/L (ref 99–110)
CO2: 31 MMOL/L (ref 21–32)
COMPLEMENT C3: 165 MG/DL (ref 88–201)
COMPLEMENT C4: 49 MG/DL (ref 10–40)
CREAT SERPL-MCNC: 1.1 MG/DL (ref 0.9–1.3)
GFR AFRICAN AMERICAN: >60 ML/MIN/1.73M2
GFR NON-AFRICAN AMERICAN: >60 ML/MIN/1.73M2
GLUCOSE BLD-MCNC: 145 MG/DL (ref 70–99)
GLUCOSE BLD-MCNC: 153 MG/DL (ref 70–99)
GLUCOSE BLD-MCNC: 197 MG/DL (ref 70–99)
GLUCOSE BLD-MCNC: 201 MG/DL (ref 70–99)
GLUCOSE BLD-MCNC: 232 MG/DL (ref 70–99)
GLUCOSE BLD-MCNC: 239 MG/DL (ref 70–99)
POTASSIUM SERPL-SCNC: 3.7 MMOL/L (ref 3.5–5.1)
SODIUM BLD-SCNC: 136 MMOL/L (ref 135–145)

## 2021-10-30 PROCEDURE — 6370000000 HC RX 637 (ALT 250 FOR IP): Performed by: INTERNAL MEDICINE

## 2021-10-30 PROCEDURE — 6360000002 HC RX W HCPCS: Performed by: INTERNAL MEDICINE

## 2021-10-30 PROCEDURE — 82962 GLUCOSE BLOOD TEST: CPT

## 2021-10-30 PROCEDURE — 94761 N-INVAS EAR/PLS OXIMETRY MLT: CPT

## 2021-10-30 PROCEDURE — 2580000003 HC RX 258: Performed by: INTERNAL MEDICINE

## 2021-10-30 PROCEDURE — 36415 COLL VENOUS BLD VENIPUNCTURE: CPT

## 2021-10-30 PROCEDURE — 80048 BASIC METABOLIC PNL TOTAL CA: CPT

## 2021-10-30 PROCEDURE — 2140000000 HC CCU INTERMEDIATE R&B

## 2021-10-30 RX ADMIN — METOPROLOL TARTRATE 50 MG: 50 TABLET, FILM COATED ORAL at 10:26

## 2021-10-30 RX ADMIN — METOPROLOL TARTRATE 50 MG: 50 TABLET, FILM COATED ORAL at 21:39

## 2021-10-30 RX ADMIN — INSULIN GLARGINE 50 UNITS: 100 INJECTION, SOLUTION SUBCUTANEOUS at 21:42

## 2021-10-30 RX ADMIN — ATORVASTATIN CALCIUM 40 MG: 40 TABLET, FILM COATED ORAL at 09:05

## 2021-10-30 RX ADMIN — FUROSEMIDE 10 MG/HR: 10 INJECTION, SOLUTION INTRAMUSCULAR; INTRAVENOUS at 21:35

## 2021-10-30 RX ADMIN — RANOLAZINE 500 MG: 500 TABLET, FILM COATED, EXTENDED RELEASE ORAL at 21:39

## 2021-10-30 RX ADMIN — SILDENAFIL CITRATE 10 MG: 20 TABLET ORAL at 10:25

## 2021-10-30 RX ADMIN — HUMAN INSULIN 30 UNITS: 100 INJECTION, SUSPENSION SUBCUTANEOUS at 10:27

## 2021-10-30 RX ADMIN — POTASSIUM CHLORIDE 40 MEQ: 1500 TABLET, EXTENDED RELEASE ORAL at 16:44

## 2021-10-30 RX ADMIN — POTASSIUM CHLORIDE 40 MEQ: 1500 TABLET, EXTENDED RELEASE ORAL at 09:05

## 2021-10-30 RX ADMIN — CEFTRIAXONE SODIUM 1000 MG: 1 INJECTION, POWDER, FOR SOLUTION INTRAMUSCULAR; INTRAVENOUS at 10:15

## 2021-10-30 RX ADMIN — EZETIMIBE 10 MG: 10 TABLET ORAL at 21:39

## 2021-10-30 RX ADMIN — FUROSEMIDE 10 MG/HR: 10 INJECTION, SOLUTION INTRAMUSCULAR; INTRAVENOUS at 14:52

## 2021-10-30 RX ADMIN — TAMSULOSIN HYDROCHLORIDE 0.4 MG: 0.4 CAPSULE ORAL at 09:04

## 2021-10-30 RX ADMIN — FAMOTIDINE 20 MG: 20 TABLET ORAL at 21:39

## 2021-10-30 RX ADMIN — RANOLAZINE 500 MG: 500 TABLET, FILM COATED, EXTENDED RELEASE ORAL at 10:26

## 2021-10-30 RX ADMIN — FUROSEMIDE 10 MG/HR: 10 INJECTION, SOLUTION INTRAMUSCULAR; INTRAVENOUS at 04:28

## 2021-10-30 RX ADMIN — SODIUM CHLORIDE, PRESERVATIVE FREE 10 ML: 5 INJECTION INTRAVENOUS at 21:48

## 2021-10-30 RX ADMIN — FAMOTIDINE 20 MG: 20 TABLET ORAL at 10:25

## 2021-10-30 RX ADMIN — Medication 1 TABLET: at 10:26

## 2021-10-30 RX ADMIN — METOLAZONE 2.5 MG: 2.5 TABLET ORAL at 10:25

## 2021-10-30 RX ADMIN — FOLIC ACID 1 MG: 1 TABLET ORAL at 10:25

## 2021-10-30 RX ADMIN — CLOPIDOGREL BISULFATE 75 MG: 75 TABLET, FILM COATED ORAL at 10:26

## 2021-10-30 RX ADMIN — RIVAROXABAN 20 MG: 20 TABLET, FILM COATED ORAL at 16:44

## 2021-10-30 RX ADMIN — SILDENAFIL CITRATE 10 MG: 20 TABLET ORAL at 21:39

## 2021-10-30 ASSESSMENT — PAIN SCALES - GENERAL
PAINLEVEL_OUTOF10: 0

## 2021-10-30 NOTE — PROGRESS NOTES
Progress Note( Dr. Cindi Cruz)  10/30/2021  Subjective:   Admit Date: 10/24/2021  PCP: Gumaro Caicedo MD    Admitted For :for : Severe leg swelling and generalized weakness and unable to ambulate    Consulted For: Better control of blood glucose    Interval History: Feels somewhat better leg swelling is less  Patient had arteriogram of right leg mild disease that does not require any continued  Patient is continue IV Lasix infusion plus Zaroxolyn  Started on sildenafil for pulmonary hypertension    Denies any chest pains,   Yes SOB . Denies nausea or vomiting. No new bowel or bladder symptoms. Patient has lost several pounds of her weight is mostly water weight on IV Lasix infusion drip      Intake/Output Summary (Last 24 hours) at 10/30/2021 1632  Last data filed at 10/30/2021 1126  Gross per 24 hour   Intake --   Output 3825 ml   Net -3825 ml       DATA    CBC:   Recent Labs     10/28/21  0613 10/29/21  0500   WBC 8.0 8.9   HGB 13.1* 13.7    245    CMP:  Recent Labs     10/28/21  0613 10/28/21  0613 10/29/21  0500 10/29/21  1528 10/30/21  1111      < > 139 137 136   K 3.3*   < > 2.9* 3.5 3.7   CL 95*   < > 91* 89* 86*   CO2 32   < > 33* 35* 31   BUN 26*   < > 29* 33* 38*   CREATININE 1.2   < > 1.1 1.3 1.1   CALCIUM 9.0   < > 8.8 8.9 9.0   LABALBU 3.6  --  3.5  --   --     < > = values in this interval not displayed. Lipids:   Lab Results   Component Value Date    CHOL 109 11/25/2020    HDL 41 11/25/2020    TRIG 114 11/25/2020     Glucose:  Recent Labs     10/30/21  0718 10/30/21  1105 10/30/21  1454   POCGLU 145* 239* 201*     [id number]:  Lab Results   Component Value Date    LABA1C 7.9 10/25/2021     High Sensitivity TSH:   Lab Results   Component Value Date    TSHHS 1.800 08/29/2019     Free T3: No results found for: FT3  Free T4:No results found for: T4FREE    XR CHEST PORTABLE   Final Result   Increased central vascular congestion. Stable cardiac silhouette enlargement. VL DUP LOWER EXTREMITY ARTERIES RIGHT   Final Result   No significant inflow stenosis suspected, though mild to moderate disease   seen within the proximal and mid aspect of the SFA. No severe stenosis seen   from the common femoral artery through the popliteal artery. Single vessel runoff through the posterior tibial artery, with a severe   distal stenosis. Nonvisualization of the peroneal artery, as well as the proximal and mid   anterior tibial artery likely related to occlusion. Reconstitution of flow   at the distal anterior tibial artery. US RETROPERITONEAL LIMITED   Final Result   Unremarkable ultrasound of the kidneys. XR CHEST PORTABLE   Final Result   1. Cardiomegaly, mild central venous congestion         VL DUP LOWER EXTREMITY VENOUS BILATERAL   Final Result   No evidence of DVT in either lower extremity from groin to knee. Calf veins   were not visualized due to body habitus and edema.               Scheduled Medicines   Medications:    potassium chloride  40 mEq Oral BID WC    cefTRIAXone (ROCEPHIN) IV  1,000 mg IntraVENous Q24H    sildenafil  10 mg Oral BID    insulin glargine  50 Units SubCUTAneous Nightly    insulin NPH  30 Units SubCUTAneous QAM    tamsulosin  0.4 mg Oral Daily    sodium chloride flush  5-40 mL IntraVENous 2 times per day    potassium chloride  20 mEq IntraVENous Once    metOLazone  2.5 mg Oral Daily    insulin lispro  0-24 Units SubCUTAneous TID     insulin lispro  0-24 Units SubCUTAneous 2 times per day    influenza virus vaccine  0.5 mL IntraMUSCular Prior to discharge    insulin lispro  20 Units SubCUTAneous TID     ranolazine  500 mg Oral BID    rivaroxaban  20 mg Oral Dinner    clopidogrel  75 mg Oral Daily    folic acid-pyridoxine-cyancobalamin  1 tablet Oral Daily    ezetimibe  10 mg Oral Nightly    atorvastatin  40 mg Oral Daily    folic acid  1 mg Oral Daily    sodium chloride flush  5-40 mL IntraVENous 2 times per day    famotidine  20 mg Oral BID    metoprolol tartrate  50 mg Oral BID      Infusions:    sodium chloride      furosemide (LASIX) 1mg/ml infusion 10 mg/hr (10/30/21 3122)    sodium chloride 25 mL (10/29/21 0313)    dextrose           Objective:   Vitals: /71   Pulse 84   Temp 97.7 °F (36.5 °C) (Oral)   Resp 30   Ht 6' 1\" (1.854 m)   Wt (!) 330 lb 11.2 oz (150 kg)   SpO2 91%   BMI 43.63 kg/m²   General appearance: alert and cooperative with exam  Neck: no JVD or bruit  Thyroid : Normal lobes   Lungs: Has Vesicular Breath sounds   Heart:  regular rate and rhythm  Abdomen: soft, non-tender; bowel sounds normal; no masses,  no organomegaly  Musculoskeletal: Normal  Extremities: extremities normal, , yes massive edema  Neurologic:  Awake, alert, oriented to name, place and time. Cranial nerves II-XII are grossly intact. Motor is  intact. Sensory neuropathy t.,  and gait is abnormal.    Assessment:     Patient Active Problem List:     MARINO on CPAP     New onset atrial fibrillation (HCC)     Type 2 diabetes mellitus with hyperglycemia, with long-term current use of insulin ([facility])     Class 3 severe obesity due to excess calories with body mass index (BMI) of 45.0 to 49.9 in [facility])     History of cardiac cath     Weakness of right arm     Acute CVA (cerebrovascular accident) ([address]     Spastic hemiparesis of right dominant side ([address]     Dysphagia due to recent stroke     Essential hypertension     Morbid obesity with body mass index of 45.0-49.9 in [facility])     Frequent falls     Chronic venous stasis dermatitis of both lower extremities     History of CVA (cerebrovascular accident)     Obesity hypoventilation syndrome ([address]     Hypertension     Hyperlipidemia     Congestive heart failure due to cardiomyopathy ([address]      Plan:     1. Reviewed POC blood glucose . Labs and X ray results   2. Reviewed Current Medicines   3.  On meal/ Correction bolus Humalog/ Basal Lantus /NPH insulin regime 4. Monitor Blood glucose frequently   5. Modified  the dose of Insulin/ other medicines as needed   6. Consulted nephrology Case discussed with nephrology going to start diuresing him   7. Will follow     .      Vero Cabral MD, MD

## 2021-10-30 NOTE — PROGRESS NOTES
10/08/2018    New onset atrial fibrillation ([facility] [address] 2018    Type 2 diabetes mellitus with hyperglycemia, with [facility]term [facility] 2018    Class 3 severe obesity due to excess calories with body mass index (BMI) of 45.0 to 49.9 in adult [facility]) 2018    MARINO on CPAP 10/14/2013                      Subjective:     Chief Complaint   Patient presents with    Leg Swelling     bilateral    Gait Problem     unable to ambulate     Mr. Barrington Medina of a feeling much better I have low 30 pounds but breathing is back to my normal self swelling is improving. Patient denies any chest pains nausea vomiting diarrhea no palpitation no dizziness on getting up. Improved considerably and feels better. Objective:   TEMPERATURE:  Current - Temp: 97.7 °F (36.5 °C); Max - Temp  Av.7 °F (36.5 °C)  Min: 97.7 °F (36.5 °C)  Max: 97.7 °F (36.5 °C)  RESPIRATIONS RANGE: Resp  Av  Min: 14  Max: 31  PULSE RANGE: Pulse  Av.8  Min: 76  Max: 98  BLOOD PRESSURE RANGE:  Systolic (11NXI), HCF:895 , Min:126 , TSY:531   ; Diastolic (87LHZ), CLZ:14, Min:73, Max:81    PULSE OXIMETRY RANGE: SpO2  Av.7 %  Min: 91 %  Max: 94 %  24HR INTAKE/OUTPUT:      Intake/Output Summary (Last 24 hours) at 10/30/2021 0911  Last data filed at 10/30/2021 0659  Gross per 24 hour   Intake --   Output 4125 ml   Net -4125 ml       Intake/Output Summary (Last 24 hours) at 10/30/2021 0911  Last data filed at 10/30/2021 0659  Gross per 24 hour   Intake --   Output 4125 ml   Net -4125 ml              Physical Exam:  eneral appearance: Morbidly obese laying down comfortably no acute distress   HEENT PERRLA EOMI   neck: Thick neck noted supple, No jugular venous distention/bruits. Lungs: Clear to ascultation, bilaterally without Rales/Wheezes/Rhonchi with good respiratory effort.   Heart: Regular rate and rhythm with Normal S1/S2 without  murmurs, rubs or gallops, point of maximum impulse non-displaced  Abdomen: Obese nontender bowel sounds positive   extremities: Left leg edema:. Right side still some redness mild warmth positive edema skin creases evidence of chronic skin changes due to chronic edema  Skin: Chronic skin changes  Neurologic: Alert and oriented X 3,  neurovascularly intact with sensory/motor intact upper extremities/lower extremities, bilaterally. Cranial nerves:II-XII intact, grossly non-focal.  Mental status: Alert, oriented, thought content appropriate. Labs:  CBC:   Lab Results   Component Value Date    WBC 8.9 10/29/2021    RBC 4.82 10/29/2021    HGB 13.7 10/29/2021    HCT 42.4 10/29/2021    MCV 88.0 10/29/2021    MCH 28.4 10/29/2021    MCHC 32.3 10/29/2021    RDW 15.0 10/29/2021     10/29/2021    MPV 11.1 10/29/2021     CMP:    Lab Results   Component Value Date     10/29/2021    K 3.5 10/29/2021    CL 89 10/29/2021    CO2 35 10/29/2021    BUN 33 10/29/2021    CREATININE 1.3 10/29/2021    GFRAA >60 10/29/2021    AGRATIO 1.3 11/25/2020    LABGLOM 56 10/29/2021    GLUCOSE 73 10/29/2021    PROT 5.8 10/27/2021    LABALBU 3.5 10/29/2021    LABALBU 50 10/27/2021    CALCIUM 8.9 10/29/2021    BILITOT 0.3 10/27/2021    ALKPHOS 66 10/27/2021    AST 34 10/27/2021    ALT 29 10/27/2021     No results for input(s): TROPONINT in the last 72 hours.   Lab Results   Component Value Date    TSH 1.800 08/29/2019        Scheduled Med:   potassium chloride  40 mEq Oral BID WC    cefTRIAXone (ROCEPHIN) IV  1,000 mg IntraVENous Q24H    sildenafil  10 mg Oral BID    insulin glargine  50 Units SubCUTAneous Nightly    insulin NPH  30 Units SubCUTAneous QAM    tamsulosin  0.4 mg Oral Daily    sodium chloride flush  5-40 mL IntraVENous 2 times per day    potassium chloride  20 mEq IntraVENous Once    metOLazone  2.5 mg Oral Daily    insulin lispro  0-24 Units SubCUTAneous TID WC    insulin lispro  0-24 Units SubCUTAneous 2 times per day    influenza virus vaccine  0.5 mL IntraMUSCular Prior to discharge    insulin lispro  20 Units SubCUTAneous TID     ranolazine  500 mg Oral BID    rivaroxaban  20 mg Oral Dinner    clopidogrel  75 mg Oral Daily    folic acid-pyridoxine-cyancobalamin  1 tablet Oral Daily    ezetimibe  10 mg Oral Nightly    atorvastatin  40 mg Oral Daily    folic acid  1 mg Oral Daily    sodium chloride flush  5-40 mL IntraVENous 2 times per day    famotidine  20 mg Oral BID    metoprolol tartrate  50 mg Oral BID         Infusion :   sodium chloride      furosemide (LASIX) 1mg/ml infusion 10 mg/hr (10/30/21 0428)    sodium chloride 25 mL (10/29/21 0313)    dextrose                 Consultants:    IP CONSULT TO HOSPITALIST  IP CONSULT TO CARDIOLOGY  IP CONSULT TO PULMONOLOGY  IP CONSULT TO DIETITIAN  IP CONSULT TO RESPIRATORY CARE  IP CONSULT TO NEPHROLOGY  IP CONSULT TO UROLOGY    Code Status: Full Code      I have explained to the patient and discussed with him/her the treatment plan. Electronically signed by Princess Chan MD on 10/30/2021 at 9:11 AM    Time spent roughly >30 minute in interviewing patient performing medical examination, communicating with relevant medical staff and consultants including documentation .

## 2021-10-30 NOTE — PROGRESS NOTES
Patient is state they feel,\" not on the planet. \" Patient glucose was 201. Patient  blood pressure at 1430 was 120/73. blood pressure 1500 was 107/71. Patient is sitting on side of bed. HR 84.

## 2021-10-30 NOTE — PROGRESS NOTES
Pulmonary and Critical Care  Progress Note    Subjective: The patient is better. On RA. Shortness of breath better. Chest pain none  Addressing respiratory complaints Patient is negative for  hemoptysis and cyanosis  CONSTITUTIONAL:  negative for fevers and chills      Past Medical History:     has a past medical history of Atrial fibrillation ([facility] [address] Cerebral artery occlusion with cerebral infarction [facility]), History of cardiac cath, Hyperlipidemia, Hypertension, Type II or unspecified type diabetes mellitus without mention of complication, not stated as uncontrolled, and Unspecified sleep apnea. has a past surgical history that includes Tonsillectomy and Cardiac catheterization. reports that he has never smoked. He has never used smokeless tobacco. He reports that he does not drink alcohol and does not use drugs. Family history:  family history includes Cancer in his father and maternal grandfather; Diabetes in his father and mother. No Known Allergies  Social History:    Reviewed; no changes    Objective:   PHYSICAL EXAM:        VITALS:  BP (!) 154/94   Pulse 84   Temp 97.7 °F (36.5 °C) (Oral)   Resp 14   Ht 6' 1\" (1.854 m)   Wt (!) 330 lb 11.2 oz (150 kg)   SpO2 91%   BMI 43.63 kg/m²     24HR INTAKE/OUTPUT:      Intake/Output Summary (Last 24 hours) at 10/30/2021 1354  Last data filed at 10/30/2021 1126  Gross per 24 hour   Intake --   Output 3825 ml   Net -3825 ml       CONSTITUTIONAL:  awake, alert, cooperative, no apparent distress, and appears stated age  LUNGS:  decreased breath sounds  CARDIOVASCULAR:  normal S1 and S2 and negative JVD  ABD:Abdomen soft, non-tender. BS normal. No masses,  No organomegaly  NEURO:Alert and oriented x3. Gait normal. Reflexes and motor strength normal and symmetric. Cranial nerves 2-12 and sensation grossly intact.   DATA:    CBC:  Recent Labs     10/28/21  0613 10/29/21  0500   WBC 8.0 8.9   RBC 4.71 4.82   HGB 13.1* 13.7   HCT 41.6* 42.4    245   MCV 88.3 88.0   MCH 27.8 28.4   MCHC 31.5* 32.3   RDW 15.5* 15.0*   SEGSPCT 71.9* 72.4*      BMP:  Recent Labs     10/29/21  0500 10/29/21  1528 10/30/21  1111    137 136   K 2.9* 3.5 3.7   CL 91* 89* 86*   CO2 33* 35* 31   BUN 29* 33* 38*   CREATININE 1.1 1.3 1.1   CALCIUM 8.8 8.9 9.0   GLUCOSE 153* 73 232*      ABG:  No results for input(s): PH, PO2ART, CGR6MIU, HCO3, BEART, O2SAT in the last 72 hours. Lab Results   Component Value Date    PROBNP 326.7 (H) 10/27/2021    PROBNP 174.1 10/25/2021    PROBNP 428.7 (H) 11/15/2020     No results found for: [facility]    Radiology Review:  Pertinent images / reports were reviewed as a part of this visit. Assessment:     Patient Active Problem List   Diagnosis    MARINO on CPAP    New onset atrial fibrillation (HCC)    Type 2 diabetes mellitus with hyperglycemia, with long-term current use of insulin (HCC)    Class 3 severe obesity due to excess calories with body mass index (BMI) of 45.0 to 49.9 in [facility])    History of cardiac cath    Weakness of right arm    Acute CVA (cerebrovascular accident) ([address]    Spastic hemiparesis of right dominant side ([address]    Dysphagia due to recent stroke    Essential hypertension    Morbid obesity with body mass index of 45.0-49.9 in [facility])    Frequent falls    Chronic venous stasis dermatitis of both lower extremities    History of CVA (cerebrovascular accident)    Obesity hypoventilation syndrome ([address]    Hypertension    Hyperlipidemia    Congestive heart failure due to cardiomyopathy ([address]       Plan:   1. Overall the patient has improved. 2. Inc. activity.       Natalie Samuel MD   10/30/2021  1:54 PM

## 2021-10-30 NOTE — PROGRESS NOTES
Progress Note( Dr. Janes Mccall)  10/29/2021  Subjective:   Admit Date: 10/24/2021  PCP: Seth Beauchamp MD    Admitted For :for : Severe leg swelling and generalized weakness and unable to ambulate    Consulted For: Better control of blood glucose    Interval History: Feels somewhat better leg swelling is less  Patient had arteriogram of right leg mild disease that does not require any continued    Denies any chest pains,   Yes SOB . Denies nausea or vomiting. No new bowel or bladder symptoms. Patient has lost several pounds of her weight is mostly water weight on IV Lasix infusion drip      Intake/Output Summary (Last 24 hours) at 10/29/2021 2203  Last data filed at 10/29/2021 1737  Gross per 24 hour   Intake --   Output 4400 ml   Net -4400 ml       DATA    CBC:   Recent Labs     10/27/21  0832 10/28/21  0613 10/29/21  0500   WBC 9.4 8.0 8.9   HGB 12.6* 13.1* 13.7    223 245    CMP:  Recent Labs     10/27/21  0832 10/27/21  0832 10/27/21  1404 10/27/21  1411 10/28/21  0613 10/29/21  0500 10/29/21  1528      < >  --   --  141 139 137   K 3.6   < >  --   --  3.3* 2.9* 3.5      < >  --   --  95* 91* 89*   CO2 24   < >  --   --  32 33* 35*   BUN 29*   < >  --   --  26* 29* 33*   CREATININE 1.1   < >  --   --  1.2 1.1 1.3   CALCIUM 8.5   < >  --   --  9.0 8.8 8.9   PROT 5.9*  --   --  5.8*  --   --   --    LABALBU 3.3*  --  50  --  3.6 3.5  --    BILITOT 0.3  --   --   --   --   --   --    ALKPHOS 66  --   --   --   --   --   --    AST 34  --   --   --   --   --   --    ALT 29  --   --   --   --   --   --     < > = values in this interval not displayed.      Lipids:   Lab Results   Component Value Date    CHOL 109 11/25/2020    HDL 41 11/25/2020    TRIG 114 11/25/2020     Glucose:  Recent Labs     10/29/21  0625 10/29/21  1116 10/29/21  1614   POCGLU 159* 208* 102*     [id number]:  Lab Results   Component Value Date    LABA1C 7.9 10/25/2021     High Sensitivity TSH:   Lab Results   Component 75 mg Oral Daily    folic acid-pyridoxine-cyancobalamin  1 tablet Oral Daily    ezetimibe  10 mg Oral Nightly    atorvastatin  40 mg Oral Daily    folic acid  1 mg Oral Daily    sodium chloride flush  5-40 mL IntraVENous 2 times per day    famotidine  20 mg Oral BID    metoprolol tartrate  50 mg Oral BID      Infusions:    sodium chloride      furosemide (LASIX) 1mg/ml infusion 10 mg/hr (10/29/21 4000)    sodium chloride 25 mL (10/29/21 6823)    dextrose           Objective:   Vitals: /78   Pulse 79   Temp 98.2 °F (36.8 °C) (Oral)   Resp (!) 31   Ht 6' 1\" (1.854 m)   Wt (!) 330 lb 11.2 oz (150 kg)   SpO2 93%   BMI 43.63 kg/m²   General appearance: alert and cooperative with exam  Neck: no JVD or bruit  Thyroid : Normal lobes   Lungs: Has Vesicular Breath sounds   Heart:  regular rate and rhythm  Abdomen: soft, non-tender; bowel sounds normal; no masses,  no organomegaly  Musculoskeletal: Normal  Extremities: extremities normal, , yes massive edema  Neurologic:  Awake, alert, oriented to name, place and time. Cranial nerves II-XII are grossly intact. Motor is  intact.   Sensory neuropathy t.,  and gait is abnormal.    Assessment:     Patient Active Problem List:     MARINO on CPAP     New onset atrial fibrillation (HCC)     Type 2 diabetes mellitus with hyperglycemia, with long-term current use of insulin (HCC)     Class 3 severe obesity due to excess calories with body mass index (BMI) of 45.0 to 49.9 in [facility])     History of cardiac cath     Weakness of right arm     Acute CVA (cerebrovascular accident) ([address]     Spastic hemiparesis of right dominant side ([address]     Dysphagia due to recent stroke     Essential hypertension     Morbid obesity with body mass index of 45.0-49.9 in [facility])     Frequent falls     Chronic venous stasis dermatitis of both lower extremities     History of CVA (cerebrovascular accident)     Obesity hypoventilation syndrome ([address]     Hypertension Hyperlipidemia     Congestive heart failure due to cardiomyopathy ([facility] [address]      Plan:     1. Reviewed POC blood glucose . Labs and X ray results   2. Reviewed Current Medicines   3. On meal/ Correction bolus Humalog/ Basal Lantus Insulin regime   4. Monitor Blood glucose frequently   5. Modified  the dose of Insulin/ other medicines as needed   6. Consulted nephrology Case discussed with nephrology going to start diuresing him   7. Will follow     .      [place] MD Mp, MD

## 2021-10-30 NOTE — PROGRESS NOTES
Nephrology Progress Note  10/30/2021 5:00 PM        Subjective:   Admit Date: 10/24/2021  PCP: Florence Pearson MD    Interval History: Patient seen earlier today, this is a late entry    Diet: Reasonable    ROS: Edema much better  Less shortness of breath  4.1 L/day urine output  With Lasix drip      Data:     Current meds:    potassium chloride  40 mEq Oral BID WC    cefTRIAXone (ROCEPHIN) IV  1,000 mg IntraVENous Q24H    sildenafil  10 mg Oral BID    insulin glargine  50 Units SubCUTAneous Nightly    insulin NPH  30 Units SubCUTAneous QAM    tamsulosin  0.4 mg Oral Daily    sodium chloride flush  5-40 mL IntraVENous 2 times per day    potassium chloride  20 mEq IntraVENous Once    metOLazone  2.5 mg Oral Daily    insulin lispro  0-24 Units SubCUTAneous TID WC    insulin lispro  0-24 Units SubCUTAneous 2 times per day    influenza virus vaccine  0.5 mL IntraMUSCular Prior to discharge    insulin lispro  20 Units SubCUTAneous TID WC    ranolazine  500 mg Oral BID    rivaroxaban  20 mg Oral Dinner    clopidogrel  75 mg Oral Daily    folic acid-pyridoxine-cyancobalamin  1 tablet Oral Daily    ezetimibe  10 mg Oral Nightly    atorvastatin  40 mg Oral Daily    folic acid  1 mg Oral Daily    sodium chloride flush  5-40 mL IntraVENous 2 times per day    famotidine  20 mg Oral BID    metoprolol tartrate  50 mg Oral BID      sodium chloride      furosemide (LASIX) 1mg/ml infusion 10 mg/hr (10/30/21 1452)    sodium chloride 25 mL (10/29/21 0313)    dextrose           I/O last 3 completed shifts:  In: -   Out: 6595 [Urine:3825]    CBC:   Recent Labs     10/28/21  0613 10/29/21  0500   WBC 8.0 8.9   HGB 13.1* 13.7    245          Recent Labs     10/29/21  0500 10/29/21  1528 10/30/21  1111    137 136   K 2.9* 3.5 3.7   CL 91* 89* 86*   CO2 33* 35* 31   BUN 29* 33* 38*   CREATININE 1.1 1.3 1.1   GLUCOSE 153* 73 232*       Lab Results   Component Value Date    CALCIUM 9.0 10/30/2021 PHOS 4.3 10/29/2021       Objective:     Vitals: /71   Pulse 84   Temp 97.7 °F (36.5 °C) (Oral)   Resp 30   Ht 6' 1\" (1.854 m)   Wt (!) 330 lb 11.2 oz (150 kg)   SpO2 91%   BMI 43.63 kg/m²     General appearance: No acute distress-he does use CPAP  HEENT: No conjunctival pallor  Neck: Supple  Lungs: Coarse breath sound  Heart: Irregularly irregular rhythm  Abdomen: Soft, nontender  Extremities: 2+ lower extremity edema  He has Roe      Problem List :         Impression :     1. Acute kidney injury-mainly from cardiorenal syndrome-his UA was near bland recently  2. Fluid overload-more right heart failure than the left-I am surprised to see ,his right ventricular geometry has not been changed much-I would expect to have very high right ventricular pressure and pulmonary hypertension  3. Underlying diabetes, atrial fibrillation, obesity-and sleep apnea    Recommendation/Plan  :     1. Good glycemic control  2. Probably we can switch him to oral diuretics and discharge on Monday  3. He will need low-salt, DASH diet  4. Good physical activity, low calorie diet and weight loss  5.  Follow clinically and biochemically      Laura Hernandez MD MD

## 2021-10-30 NOTE — PROGRESS NOTES
Daily Progress Note  Awake and alert, Overall feeling well  Denies any CP or SOB  Edema continues to improve  NSR on tele, BP and HR stable  Lasix gtt per nephro.    Will get labs this AM to see potassium, hypokalemic yesterday    Afib with RVR    NSR on tele today    Continue Xarleto and Lopressor    Left atrium severely dilated     PVD    Single vessel run with severe stenosis on Distal Postoral tibial artery.     Non visual of peroneal artery and proximal and mid anterial tibial. Likely d/t occlusion   Cellulitis of right leg    Thready pulse on right leg    Peripheral angiogram showed Mild PAD, Ulceration likely d/t venous ulcer     HFpEF    EF 50%    ProBNP 18    Edema is greatly improved    Right sided HF, revatio added for PHTN    Denies any SOB    On lasix drip per nephro    Increase activity    23 L of UOP     Positive Blood cultures    Cellulitis of RLE    Abx per primary    US negative for DVT    Hx of Lymphedema     Ascending aortic aneurism     4.6cm repeat study in 6 months     Incomplete bladder emptying    Urology following,    Roe in place    Flomax started     Right LE Arteries  Impression   No significant inflow stenosis suspected, though mild to moderate disease   seen within the proximal and mid aspect of the SFA.  No severe stenosis seen   from the common femoral artery through the popliteal artery.       Single vessel runoff through the posterior tibial artery, with a severe   distal stenosis.       Nonvisualization of the peroneal artery, as well as the proximal and mid   anterior tibial artery likely related to occlusion.  Reconstitution of flow   at the distal anterior tibial artery.          Echo 10/25/2021 Summary   Left jin/tricular systolic function is normal.   Ejection fraction is visually estimated at 50%.   Moderate left ventricular hypertrophy.   Severely dilated left atrium.   Dilated ascending aorta measuring 4.6cm.   No evidence of any pericardial effusion.   Dilated IVC with poor inspiratory collapse.     Past medical history:    has a past medical history of Atrial fibrillation ([address] [facility]), History of cardiac cath, Hyperlipidemia, Hypertension, Type II or unspecified type diabetes mellitus without mention of complication, not stated as uncontrolled, and Unspecified sleep apnea. Past surgical history:   has a past surgical history that includes Tonsillectomy and Cardiac catheterization. Social History:   reports that he has never smoked. He has never used smokeless tobacco. He reports that he does not drink alcohol and does not use drugs.   Family history:  family history includes Cancer in his father and maternal grandfather; Diabetes in his father and mother.     No Known Allergies    Objective:   /73   Pulse 98   Temp 97.7 °F (36.5 °C) (Oral)   Resp 14   Ht 6' 1\" (1.854 m)   Wt (!) 330 lb 11.2 oz (150 kg)   SpO2 94%   BMI 43.63 kg/m²     Intake/Output Summary (Last 24 hours) at 10/30/2021 0915  Last data filed at 10/30/2021 0659  Gross per 24 hour   Intake --   Output 4125 ml   Net -4125 ml       Medications:   Scheduled Meds:   potassium chloride  40 mEq Oral BID WC    cefTRIAXone (ROCEPHIN) IV  1,000 mg IntraVENous Q24H    sildenafil  10 mg Oral BID    insulin glargine  50 Units SubCUTAneous Nightly    insulin NPH  30 Units SubCUTAneous QAM    tamsulosin  0.4 mg Oral Daily    sodium chloride flush  5-40 mL IntraVENous 2 times per day    potassium chloride  20 mEq IntraVENous Once    metOLazone  2.5 mg Oral Daily    insulin lispro  0-24 Units SubCUTAneous TID     insulin lispro  0-24 Units SubCUTAneous 2 times per day    influenza virus vaccine  0.5 mL IntraMUSCular Prior to discharge    insulin lispro  20 Units SubCUTAneous TID     ranolazine  500 mg Oral BID    rivaroxaban  20 mg Oral Dinner    clopidogrel  75 mg Oral Daily    folic acid-pyridoxine-cyancobalamin  1 tablet Oral Daily    falls 11/16/2020    Chronic venous stasis dermatitis of both lower extremities 11/16/2020    History of CVA (cerebrovascular accident) 11/16/2020    Obesity hypoventilation syndrome ([address] 11/16/2020    Hypertension     Hyperlipidemia     Acute CVA (cerebrovascular accident) ([address] 11/20/2018    Spastic hemiparesis of right dominant side ([address] 11/20/2018    Dysphagia due to recent stroke 11/20/2018    Essential hypertension 11/20/2018    Morbid obesity with body mass index of 45.0-49.9 in adult [facility]) 11/20/2018    Weakness of right arm 11/18/2018    History of cardiac cath 10/08/2018    New onset atrial fibrillation ([address] 09/25/2018    Type 2 diabetes mellitus with hyperglycemia, with long-term current use of insulin ([address] 09/25/2018    Class 3 severe obesity due to excess calories with body mass index (BMI) of 45.0 to 49.9 in adult ([address] 09/25/2018    MARINO on CPAP 10/14/2013       Electronically signed by HAYLEY Estevez CNP on 10/30/2021 at 9:15 AM  Patient seen and examined, agree with above assessment and plan

## 2021-10-31 LAB
ALBUMIN SERPL-MCNC: 3.6 GM/DL (ref 3.4–5)
ANION GAP SERPL CALCULATED.3IONS-SCNC: 16 MMOL/L (ref 4–16)
BUN BLDV-MCNC: 48 MG/DL (ref 6–23)
CALCIUM SERPL-MCNC: 9.1 MG/DL (ref 8.3–10.6)
CHLORIDE BLD-SCNC: 86 MMOL/L (ref 99–110)
CO2: 34 MMOL/L (ref 21–32)
CREAT SERPL-MCNC: 1.5 MG/DL (ref 0.9–1.3)
GFR AFRICAN AMERICAN: 58 ML/MIN/1.73M2
GFR NON-AFRICAN AMERICAN: 48 ML/MIN/1.73M2
GLUCOSE BLD-MCNC: 146 MG/DL (ref 70–99)
GLUCOSE BLD-MCNC: 156 MG/DL (ref 70–99)
GLUCOSE BLD-MCNC: 165 MG/DL (ref 70–99)
GLUCOSE BLD-MCNC: 179 MG/DL (ref 70–99)
GLUCOSE BLD-MCNC: 213 MG/DL (ref 70–99)
GLUCOSE BLD-MCNC: 253 MG/DL (ref 70–99)
MAGNESIUM: 2.2 MG/DL (ref 1.8–2.4)
PHOSPHORUS: 4.6 MG/DL (ref 2.5–4.9)
POTASSIUM SERPL-SCNC: 3 MMOL/L (ref 3.5–5.1)
SODIUM BLD-SCNC: 136 MMOL/L (ref 135–145)

## 2021-10-31 PROCEDURE — 6370000000 HC RX 637 (ALT 250 FOR IP): Performed by: INTERNAL MEDICINE

## 2021-10-31 PROCEDURE — 36415 COLL VENOUS BLD VENIPUNCTURE: CPT

## 2021-10-31 PROCEDURE — 82962 GLUCOSE BLOOD TEST: CPT

## 2021-10-31 PROCEDURE — 94761 N-INVAS EAR/PLS OXIMETRY MLT: CPT

## 2021-10-31 PROCEDURE — 6360000002 HC RX W HCPCS: Performed by: INTERNAL MEDICINE

## 2021-10-31 PROCEDURE — 2580000003 HC RX 258: Performed by: INTERNAL MEDICINE

## 2021-10-31 PROCEDURE — 2140000000 HC CCU INTERMEDIATE R&B

## 2021-10-31 PROCEDURE — 6360000002 HC RX W HCPCS: Performed by: NURSE PRACTITIONER

## 2021-10-31 PROCEDURE — 80069 RENAL FUNCTION PANEL: CPT

## 2021-10-31 PROCEDURE — 83735 ASSAY OF MAGNESIUM: CPT

## 2021-10-31 RX ORDER — POTASSIUM CHLORIDE 7.45 MG/ML
10 INJECTION INTRAVENOUS
Status: COMPLETED | OUTPATIENT
Start: 2021-10-31 | End: 2021-10-31

## 2021-10-31 RX ORDER — SPIRONOLACTONE 25 MG/1
25 TABLET ORAL DAILY
Status: DISCONTINUED | OUTPATIENT
Start: 2021-10-31 | End: 2021-11-02 | Stop reason: HOSPADM

## 2021-10-31 RX ORDER — POTASSIUM CHLORIDE 20 MEQ/1
20 TABLET, EXTENDED RELEASE ORAL
Status: COMPLETED | OUTPATIENT
Start: 2021-10-31 | End: 2021-11-01

## 2021-10-31 RX ADMIN — METOPROLOL TARTRATE 50 MG: 50 TABLET, FILM COATED ORAL at 09:12

## 2021-10-31 RX ADMIN — POTASSIUM CHLORIDE 10 MEQ: 7.46 INJECTION, SOLUTION INTRAVENOUS at 15:45

## 2021-10-31 RX ADMIN — METOLAZONE 2.5 MG: 2.5 TABLET ORAL at 09:12

## 2021-10-31 RX ADMIN — POTASSIUM CHLORIDE 40 MEQ: 1500 TABLET, EXTENDED RELEASE ORAL at 09:12

## 2021-10-31 RX ADMIN — RIVAROXABAN 20 MG: 20 TABLET, FILM COATED ORAL at 16:42

## 2021-10-31 RX ADMIN — FAMOTIDINE 20 MG: 20 TABLET ORAL at 22:20

## 2021-10-31 RX ADMIN — RANOLAZINE 500 MG: 500 TABLET, FILM COATED, EXTENDED RELEASE ORAL at 22:19

## 2021-10-31 RX ADMIN — INSULIN GLARGINE 50 UNITS: 100 INJECTION, SOLUTION SUBCUTANEOUS at 22:25

## 2021-10-31 RX ADMIN — POTASSIUM CHLORIDE 20 MEQ: 1500 TABLET, EXTENDED RELEASE ORAL at 14:06

## 2021-10-31 RX ADMIN — CEFTRIAXONE SODIUM 1000 MG: 1 INJECTION, POWDER, FOR SOLUTION INTRAMUSCULAR; INTRAVENOUS at 12:03

## 2021-10-31 RX ADMIN — FUROSEMIDE 5 MG/HR: 10 INJECTION, SOLUTION INTRAMUSCULAR; INTRAVENOUS at 16:41

## 2021-10-31 RX ADMIN — POTASSIUM CHLORIDE 10 MEQ: 7.46 INJECTION, SOLUTION INTRAVENOUS at 14:05

## 2021-10-31 RX ADMIN — METOPROLOL TARTRATE 50 MG: 50 TABLET, FILM COATED ORAL at 22:24

## 2021-10-31 RX ADMIN — SILDENAFIL CITRATE 10 MG: 20 TABLET ORAL at 09:12

## 2021-10-31 RX ADMIN — CLOPIDOGREL BISULFATE 75 MG: 75 TABLET, FILM COATED ORAL at 09:12

## 2021-10-31 RX ADMIN — SODIUM CHLORIDE, PRESERVATIVE FREE 10 ML: 5 INJECTION INTRAVENOUS at 22:20

## 2021-10-31 RX ADMIN — SODIUM CHLORIDE, PRESERVATIVE FREE 10 ML: 5 INJECTION INTRAVENOUS at 22:22

## 2021-10-31 RX ADMIN — SPIRONOLACTONE 25 MG: 25 TABLET ORAL at 14:06

## 2021-10-31 RX ADMIN — Medication 1 TABLET: at 09:12

## 2021-10-31 RX ADMIN — POTASSIUM CHLORIDE 20 MEQ: 1500 TABLET, EXTENDED RELEASE ORAL at 16:42

## 2021-10-31 RX ADMIN — RANOLAZINE 500 MG: 500 TABLET, FILM COATED, EXTENDED RELEASE ORAL at 09:12

## 2021-10-31 RX ADMIN — FAMOTIDINE 20 MG: 20 TABLET ORAL at 09:12

## 2021-10-31 RX ADMIN — POTASSIUM CHLORIDE 10 MEQ: 7.46 INJECTION, SOLUTION INTRAVENOUS at 12:04

## 2021-10-31 RX ADMIN — FUROSEMIDE 10 MG/HR: 10 INJECTION, SOLUTION INTRAMUSCULAR; INTRAVENOUS at 06:21

## 2021-10-31 RX ADMIN — TAMSULOSIN HYDROCHLORIDE 0.4 MG: 0.4 CAPSULE ORAL at 09:12

## 2021-10-31 RX ADMIN — FOLIC ACID 1 MG: 1 TABLET ORAL at 09:12

## 2021-10-31 RX ADMIN — SILDENAFIL CITRATE 10 MG: 20 TABLET ORAL at 22:20

## 2021-10-31 RX ADMIN — EZETIMIBE 10 MG: 10 TABLET ORAL at 22:19

## 2021-10-31 RX ADMIN — ATORVASTATIN CALCIUM 40 MG: 40 TABLET, FILM COATED ORAL at 22:20

## 2021-10-31 ASSESSMENT — PAIN SCALES - GENERAL: PAINLEVEL_OUTOF10: 0

## 2021-10-31 NOTE — PROGRESS NOTES
Internal Medicine Daily Progress Note @todate@                    Date of Admission: 10/24/2021  Allergies  Patient has no known allergies. Assessment/Plan    1. Acute on chronic diastolic dysfunction considerable edema peripherally which has improved patient is almost back to his normal self on IV Lasix drip. Can be switched to oral medication but the plan is per nephrology they would like to give him another day discharging on Monday. Clinically patient is stable and can be done today as well discussed with nephrology. 2.  Sleep apnea he is a cigarette home continue your present time. 3.  Lymphedema and mild cellulitis on the right leg almost back to normal self swelling and redness has improved considerably. May need few more days of oral antibiotics. 4.  Hypokalemia resolved  5. Insulin-dependent diabetes blood sugar has improved. Continue present management. No hypoglycemia.    : Patient has improved and is back to his baseline possible discharge today or tomorrow will discuss with nephrology.   Patient Active Problem List    Diagnosis Date Noted    Congestive heart failure due to cardiomyopathy ([address] 10/25/2021    Frequent falls 11/16/2020    Chronic venous stasis dermatitis of both lower extremities 11/16/2020    History of CVA (cerebrovascular accident) 11/16/2020    Obesity hypoventilation syndrome ([address] 11/16/2020    Hypertension     Hyperlipidemia     Acute CVA (cerebrovascular accident) ([address] 11/20/2018    Spastic hemiparesis of right dominant side ([address] 11/20/2018    Dysphagia due to recent stroke 11/20/2018    Essential hypertension 11/20/2018    Morbid obesity with body mass index of 45.0-49.9 in adult [facility]) 11/20/2018    Weakness of right arm 11/18/2018    History of cardiac cath 10/08/2018    New onset atrial fibrillation ([address] 09/25/2018    Type 2 diabetes mellitus with hyperglycemia, with long-term current use of insulin ([address] 09/25/2018    Class 3 severe obesity due to excess calories with body mass index (BMI) of 45.0 to 49.9 in adult [facility]) 2018    MARINO on CPAP 10/14/2013                      Subjective:     Chief Complaint   Patient presents with    Leg Swelling     bilateral    Gait Problem     unable to ambulate     Mr. Diana Casanova of him doing fine and sleeping okay. I want to urinating a lot I have lost a lot of weight. Patient denies any chest pain nausea vomiting orthopnea on room air no shortness of breath. Leg edema has improved considerably is the legs  Objective:   TEMPERATURE:  Current - Temp: 97.7 °F (36.5 °C); Max - Temp  Av.9 °F (36.6 °C)  Min: 97.7 °F (36.5 °C)  Max: 98.1 °F (36.7 °C)  RESPIRATIONS RANGE: Resp  Av.5  Min: 20  Max: 30  PULSE RANGE: Pulse  Av.8  Min: 72  Max: 85  BLOOD PRESSURE RANGE:  Systolic (34XHO), HCI:683 , Min:106 , YUB:574   ; Diastolic (93TUW), HLV:24, Min:71, Max:94    PULSE OXIMETRY RANGE: SpO2  Av.5 %  Min: 91 %  Max: 93 %  24HR INTAKE/OUTPUT:      Intake/Output Summary (Last 24 hours) at 10/31/2021 0837  Last data filed at 10/31/2021 0644  Gross per 24 hour   Intake 350 ml   Output 3650 ml   Net -3300 ml       Intake/Output Summary (Last 24 hours) at 10/31/2021 0837  Last data filed at 10/31/2021 0644  Gross per 24 hour   Intake 350 ml   Output 3650 ml   Net -3300 ml              Physical Exam:  eneral appearance: Sitting comfortably no acute distress  HEENT Normal cephalic, atraumatic without obvious deformity. Pupils equal, round, and reactive to light. Extra ocular muscles intact. Conjunctivae/corneas clear. Neck: Supple, No jugular venous distention/bruits. Trachea midline without thyromegaly or adenopathy with full range of motion. Lungs: Clear to ascultation, bilaterally without Rales/Wheezes/Rhonchi with good respiratory effort.   Heart: Regular rate and rhythm with Normal S1/S2 without  murmurs, rubs or gallops, point of maximum impulse non-displaced  Abdomen: Soft, non-tender or non-distended without rigidity or guarding and positive bowel sounds all four quadrants. Extremities: Bilateral  The right leg now almost edema: The right side has hospital yesterday   skin:  Changes due to chronic edema. Neurologic: Alert and oriented X 3,  neurovascularly intact with sensory/motor intact upper extremities/lower extremities, bilaterally. Cranial nerves:II-XII intact, grossly non-focal.  Mental status: Alert, oriented, thought content appropriate. Labs:  CBC:   Lab Results   Component Value Date    WBC 8.9 10/29/2021    RBC 4.82 10/29/2021    HGB 13.7 10/29/2021    HCT 42.4 10/29/2021    MCV 88.0 10/29/2021    MCH 28.4 10/29/2021    MCHC 32.3 10/29/2021    RDW 15.0 10/29/2021     10/29/2021    MPV 11.1 10/29/2021     CMP:    Lab Results   Component Value Date     10/30/2021    K 3.7 10/30/2021    CL 86 10/30/2021    CO2 31 10/30/2021    BUN 38 10/30/2021    CREATININE 1.1 10/30/2021    GFRAA >60 10/30/2021    AGRATIO 1.3 11/25/2020    LABGLOM >60 10/30/2021    GLUCOSE 232 10/30/2021    PROT 5.8 10/27/2021    LABALBU 3.5 10/29/2021    LABALBU 50 10/27/2021    CALCIUM 9.0 10/30/2021    BILITOT 0.3 10/27/2021    ALKPHOS 66 10/27/2021    AST 34 10/27/2021    ALT 29 10/27/2021     No results for input(s): TROPONINT in the last 72 hours.   Lab Results   Component Value Date    [facility] 1.800 08/29/2019        Scheduled Med:   potassium chloride  40 mEq Oral BID WC    cefTRIAXone (ROCEPHIN) IV  1,000 mg IntraVENous Q24H    sildenafil  10 mg Oral BID    insulin glargine  50 Units SubCUTAneous Nightly    insulin NPH  30 Units SubCUTAneous QAM    tamsulosin  0.4 mg Oral Daily    sodium chloride flush  5-40 mL IntraVENous 2 times per day    potassium chloride  20 mEq IntraVENous Once    metOLazone  2.5 mg Oral Daily    insulin lispro  0-24 Units SubCUTAneous TID WC    insulin lispro  0-24 Units SubCUTAneous 2 times per day    influenza virus vaccine  0.5 mL IntraMUSCular Prior to discharge    insulin lispro  20 Units SubCUTAneous TID     ranolazine  500 mg Oral BID    rivaroxaban  20 mg Oral Dinner    clopidogrel  75 mg Oral Daily    folic acid-pyridoxine-cyancobalamin  1 tablet Oral Daily    ezetimibe  10 mg Oral Nightly    atorvastatin  40 mg Oral Daily    folic acid  1 mg Oral Daily    sodium chloride flush  5-40 mL IntraVENous 2 times per day    famotidine  20 mg Oral BID    metoprolol tartrate  50 mg Oral BID         Infusion :   sodium chloride      furosemide (LASIX) 1mg/ml infusion 10 mg/hr (10/31/21 0621)    sodium chloride 25 mL (10/29/21 0313)    dextrose                 Consultants:    IP CONSULT TO HOSPITALIST  IP CONSULT TO CARDIOLOGY  IP CONSULT TO PULMONOLOGY  IP CONSULT TO DIETITIAN  IP CONSULT TO RESPIRATORY CARE  IP CONSULT TO NEPHROLOGY  IP CONSULT TO UROLOGY    Code Status: Full Code      I have explained to the patient and discussed with him/her the treatment plan. Electronically signed by Eddie Ibrahim MD on 10/31/2021 at 8:37 AM    Time spent roughly >30 minute in interviewing patient performing medical examination, communicating with relevant medical staff and consultants including documentation .

## 2021-10-31 NOTE — PROGRESS NOTES
Progress Note( Dr. Milana Whitlock)  10/31/2021  Subjective:   Admit Date: 10/24/2021  PCP: Rosalinda Joe MD    Admitted For :for : Severe leg swelling and generalized weakness and unable to ambulate    Consulted For: Better control of blood glucose    Interval History: Feels somewhat better leg swelling is less  Patient had arteriogram of right leg mild disease that does not require any continued  Patient is continue IV Lasix infusion plus Zaroxolyn  Started on sildenafil for pulmonary hypertension    Denies any chest pains,   Yes SOB . Denies nausea or vomiting. No new bowel or bladder symptoms. Patient has lost several pounds of her weight is mostly water weight on IV Lasix infusion drip      Intake/Output Summary (Last 24 hours) at 10/31/2021 1456  Last data filed at 10/31/2021 0644  Gross per 24 hour   Intake 350 ml   Output 2950 ml   Net -2600 ml       DATA    CBC:   Recent Labs     10/29/21  0500   WBC 8.9   HGB 13.7       CMP:  Recent Labs     10/29/21  0500 10/29/21  0500 10/29/21  1528 10/30/21  1111 10/31/21  0633      < > 137 136 136   K 2.9*   < > 3.5 3.7 3.0*   CL 91*   < > 89* 86* 86*   CO2 33*   < > 35* 31 34*   BUN 29*   < > 33* 38* 48*   CREATININE 1.1   < > 1.3 1.1 1.5*   CALCIUM 8.8   < > 8.9 9.0 9.1   LABALBU 3.5  --   --   --  3.6    < > = values in this interval not displayed. Lipids:   Lab Results   Component Value Date    CHOL 109 11/25/2020    HDL 41 11/25/2020    TRIG 114 11/25/2020     Glucose:  Recent Labs     10/31/21  0158 10/31/21  0656 10/31/21  1127   POCGLU 165* 156* 253*     [id number]:  Lab Results   Component Value Date    LABA1C 7.9 10/25/2021     High Sensitivity TSH:   Lab Results   Component Value Date    TSHHS 1.800 08/29/2019     Free T3: No results found for: FT3  Free T4:No results found for: T4FREE    XR CHEST PORTABLE   Final Result   Increased central vascular congestion. Stable cardiac silhouette enlargement.          VL DUP LOWER EXTREMITY ARTERIES RIGHT   Final Result   No significant inflow stenosis suspected, though mild to moderate disease   seen within the proximal and mid aspect of the SFA. No severe stenosis seen   from the common femoral artery through the popliteal artery. Single vessel runoff through the posterior tibial artery, with a severe   distal stenosis. Nonvisualization of the peroneal artery, as well as the proximal and mid   anterior tibial artery likely related to occlusion. Reconstitution of flow   at the distal anterior tibial artery. US RETROPERITONEAL LIMITED   Final Result   Unremarkable ultrasound of the kidneys. XR CHEST PORTABLE   Final Result   1. Cardiomegaly, mild central venous congestion         VL DUP LOWER EXTREMITY VENOUS BILATERAL   Final Result   No evidence of DVT in either lower extremity from groin to knee. Calf veins   were not visualized due to body habitus and edema.               Scheduled Medicines   Medications:    potassium chloride  10 mEq IntraVENous Q2H    spironolactone  25 mg Oral Daily    potassium chloride  20 mEq Oral TID WC    cefTRIAXone (ROCEPHIN) IV  1,000 mg IntraVENous Q24H    sildenafil  10 mg Oral BID    insulin glargine  50 Units SubCUTAneous Nightly    insulin NPH  30 Units SubCUTAneous QAM    tamsulosin  0.4 mg Oral Daily    sodium chloride flush  5-40 mL IntraVENous 2 times per day    insulin lispro  0-24 Units SubCUTAneous TID     insulin lispro  0-24 Units SubCUTAneous 2 times per day    influenza virus vaccine  0.5 mL IntraMUSCular Prior to discharge    insulin lispro  20 Units SubCUTAneous TID     ranolazine  500 mg Oral BID    rivaroxaban  20 mg Oral Dinner    clopidogrel  75 mg Oral Daily    folic acid-pyridoxine-cyancobalamin  1 tablet Oral Daily    ezetimibe  10 mg Oral Nightly    atorvastatin  40 mg Oral Daily    folic acid  1 mg Oral Daily    sodium chloride flush  5-40 mL IntraVENous 2 times per day    famotidine  20 mg Oral BID    metoprolol tartrate  50 mg Oral BID      Infusions:    sodium chloride      furosemide (LASIX) 1mg/ml infusion 5 mg/hr (10/31/21 5397)    sodium chloride 25 mL (10/29/21 0584)    dextrose           Objective:   Vitals: /81   Pulse 88   Temp 98 °F (36.7 °C) (Oral)   Resp 24   Ht 6' 1\" (1.854 m)   Wt (!) 328 lb 4.8 oz (148.9 kg)   SpO2 93%   BMI 43.31 kg/m²   General appearance: alert and cooperative with exam  Neck: no JVD or bruit  Thyroid : Normal lobes   Lungs: Has Vesicular Breath sounds   Heart:  regular rate and rhythm  Abdomen: soft, non-tender; bowel sounds normal; no masses,  no organomegaly  Musculoskeletal: Normal  Extremities: extremities normal, , yes massive edema  Neurologic:  Awake, alert, oriented to name, place and time. Cranial nerves II-XII are grossly intact. Motor is  intact. Sensory neuropathy t.,  and gait is abnormal.    Assessment:     Patient Active Problem List:     MARINO on CPAP     New onset atrial fibrillation (HCC)     Type 2 diabetes mellitus with hyperglycemia, with long-term current use of insulin (HCC)     Class 3 severe obesity due to excess calories with body mass index (BMI) of 45.0 to 49.9 in [facility])     History of cardiac cath     Weakness of right arm     Acute CVA (cerebrovascular accident) ([address]     Spastic hemiparesis of right dominant side ([address]     Dysphagia due to recent stroke     Essential hypertension     Morbid obesity with body mass index of 45.0-49.9 in [facility])     Frequent falls     Chronic venous stasis dermatitis of both lower extremities     History of CVA (cerebrovascular accident)     Obesity hypoventilation syndrome ([address]     Hypertension     Hyperlipidemia     Congestive heart failure due to cardiomyopathy ([address]      Plan:     1. Reviewed POC blood glucose . Labs and X ray results   2. Reviewed Current Medicines   3. On meal/ Correction bolus Humalog/ Basal Lantus /NPH insulin regime   4.  Monitor Blood glucose frequently   5. Modified  the dose of Insulin/ other medicines as needed   6. Still on IV Lasix infusion  7. Will follow     .      Lexus Samayoa MD, MD

## 2021-10-31 NOTE — PROGRESS NOTES
Daily Progress Note  Pt. Awake and alert feeling well  Denies any CP or SOB  Edema greatly improved  32lb.  Weight loss from admission  Great UOP  Hypokalemia replacement in ordered  NSR on tele    Afib with RVR    NSR on tele today    Continue Xarleto and Lopressor    Left atrium severely dilated     PVD    Single vessel run with severe stenosis on Distal Postoral tibial artery.     Non visual of peroneal artery and proximal and mid anterial tibial. Likely d/t occlusion   Cellulitis of right leg    Thready pulse on right leg    Peripheral angiogram showed Mild PAD, Ulceration likely d/t venous ulcer     HFpEF    EF 50%    ProBNP 18    Edema is greatly improved    Right sided HF, revatio added for PHTN    Denies any SOB    On lasix drip per nephro    Increase activity    26 L of UOP     Positive Blood cultures    Cellulitis of RLE    Abx per primary    US negative for DVT    Hx of Lymphedema     Ascending aortic aneurism     4.6cm repeat study in 6 months     Incomplete bladder emptying    Urology following,    Roe in place    Flomax started     Right LE Arteries  Impression   No significant inflow stenosis suspected, though mild to moderate disease   seen within the proximal and mid aspect of the SFA.  No severe stenosis seen   from the common femoral artery through the popliteal artery.       Single vessel runoff through the posterior tibial artery, with a severe   distal stenosis.       Nonvisualization of the peroneal artery, as well as the proximal and mid   anterior tibial artery likely related to occlusion.  Reconstitution of flow   at the distal anterior tibial artery.          Echo 10/25/2021 Summary   Left jin/tricular systolic function is normal.   Ejection fraction is visually estimated at 50%.   Moderate left ventricular hypertrophy.   Severely dilated left atrium.   Dilated ascending aorta measuring 4.6cm.   No evidence of any pericardial effusion.   Dilated IVC with poor inspiratory collapse.     Past medical history:    has a past medical history of Atrial fibrillation ([facility] [address] Cerebral artery occlusion with cerebral infarction [facility]), History of cardiac cath, Hyperlipidemia, Hypertension, Type II or unspecified type diabetes mellitus without mention of complication, not stated as uncontrolled, and Unspecified sleep apnea. Past surgical history:   has a past surgical history that includes Tonsillectomy and Cardiac catheterization. Social History:   reports that he has never smoked. He has never used smokeless tobacco. He reports that he does not drink alcohol and does not use drugs.   Family history:  family history includes Cancer in his father and maternal grandfather; Diabetes in his father and mother.     No Known Allergies    Objective:   /81   Pulse 88   Temp 98 °F (36.7 °C) (Oral)   Resp 24   Ht 6' 1\" (1.854 m)   Wt (!) 328 lb 4.8 oz (148.9 kg)   SpO2 92%   BMI 43.31 kg/m²     Intake/Output Summary (Last 24 hours) at 10/31/2021 1053  Last data filed at 10/31/2021 0644  Gross per 24 hour   Intake 350 ml   Output 3650 ml   Net -3300 ml       Medications:   Scheduled Meds:   potassium chloride  40 mEq Oral BID WC    cefTRIAXone (ROCEPHIN) IV  1,000 mg IntraVENous Q24H    sildenafil  10 mg Oral BID    insulin glargine  50 Units SubCUTAneous Nightly    insulin NPH  30 Units SubCUTAneous QAM    tamsulosin  0.4 mg Oral Daily    sodium chloride flush  5-40 mL IntraVENous 2 times per day    potassium chloride  20 mEq IntraVENous Once    metOLazone  2.5 mg Oral Daily    insulin lispro  0-24 Units SubCUTAneous TID WC    insulin lispro  0-24 Units SubCUTAneous 2 times per day    influenza virus vaccine  0.5 mL IntraMUSCular Prior to discharge    insulin lispro  20 Units SubCUTAneous TID WC    ranolazine  500 mg Oral BID    rivaroxaban  20 mg Oral Dinner    clopidogrel  75 mg Oral Daily    folic acid-pyridoxine-cyancobalamin  1 tablet Oral Daily    ezetimibe  10 mg Oral Nightly    atorvastatin  40 mg Oral Daily    folic acid  1 mg Oral Daily    sodium chloride flush  5-40 mL IntraVENous 2 times per day    famotidine  20 mg Oral BID    metoprolol tartrate  50 mg Oral BID      Infusions:   sodium chloride      furosemide (LASIX) 1mg/ml infusion 10 mg/hr (10/31/21 0031)    sodium chloride 25 mL (10/29/21 0313)    dextrose        PRN Meds:  sodium chloride flush, sodium chloride, acetaminophen, sodium chloride flush, sodium chloride, ondansetron **OR** ondansetron, polyethylene glycol, acetaminophen **OR** acetaminophen, potassium chloride **OR** potassium alternative oral replacement **OR** potassium chloride, magnesium sulfate, glucose, dextrose, glucagon (rDNA), dextrose       Physical Exam:  Vitals:    10/31/21 0900   BP: 135/81   Pulse: 88   Resp: 24   Temp: 98 °F (36.7 °C)   SpO2: 92%        General: AAO, NAD  Chest: Nontender  Cardiac: First and Second Heart Sounds are Normal, No Murmurs or Gallops noted  Lungs:Clear to auscultation and percussion. Abdomen: Soft, NT, ND, +BS  Extremities: No clubbing, 1+ edema  Vascular:  Equal 2+ peripheral pulses. Lab Data:  CBC:   Recent Labs     10/29/21  0500   WBC 8.9   HGB 13.7   HCT 42.4   MCV 88.0        BMP:   Recent Labs     10/29/21  0500 10/29/21  0500 10/29/21  1528 10/30/21  1111 10/31/21  0633      < > 137 136 136   K 2.9*   < > 3.5 3.7 3.0*   CL 91*   < > 89* 86* 86*   CO2 33*   < > 35* 31 34*   PHOS 4.3  --   --   --  4.6   BUN 29*   < > 33* 38* 48*   CREATININE 1.1   < > 1.3 1.1 1.5*    < > = values in this interval not displayed. LIVER PROFILE: No results for input(s): AST, ALT, LIPASE, BILIDIR, BILITOT, ALKPHOS in the last 72 hours. Invalid input(s): AMYLASE,  ALB  PT/INR: No results for input(s): PROTIME, INR in the last 72 hours. APTT: No results for input(s): APTT in the last 72 hours. BNP:  No results for input(s): BNP in the last 72 hours.       Assessment:  Patient Active Problem List    Diagnosis Date Noted    Congestive heart failure due to cardiomyopathy ([facility] [address] 10/25/2021    Frequent falls 11/16/2020    Chronic venous stasis dermatitis of both lower extremities 11/16/2020    History of CVA (cerebrovascular accident) 11/16/2020    Obesity hypoventilation syndrome ([address] 11/16/2020    Hypertension     Hyperlipidemia     Acute CVA (cerebrovascular accident) ([address] 11/20/2018    Spastic hemiparesis of right dominant side ([address] 11/20/2018    Dysphagia due to recent stroke 11/20/2018    Essential hypertension 11/20/2018    Morbid obesity with body mass index of 45.0-49.9 in adult [facility]) 11/20/2018    Weakness of right arm 11/18/2018    History of cardiac cath 10/08/2018    New onset atrial fibrillation ([address] 09/25/2018    Type 2 diabetes mellitus with hyperglycemia, with long-term current use of insulin ([facility] [address] 09/25/2018    Class 3 severe obesity due to excess calories with body mass index (BMI) of 45.0 to 49.9 in adult ([facility] [address] 09/25/2018    MARINO on CPAP 10/14/2013       Electronically signed by HAYLEY Strickland CNP on 10/31/2021 at 10:53 AM  Patient seen and examined agree with above assessment and plan

## 2021-10-31 NOTE — PROGRESS NOTES
Nephrology Progress Note  10/31/2021 1:56 PM        Subjective:   Admit Date: 10/24/2021  PCP: Marnie Michaels MD    Interval History: Slowly improving    Diet: Reasonable    ROS: Edema better  Had 3650 cc urine for the last 24 hours  No overt shortness of breath    Data:     Current meds:    potassium chloride  10 mEq IntraVENous Q2H    spironolactone  25 mg Oral Daily    potassium chloride  20 mEq Oral TID WC    cefTRIAXone (ROCEPHIN) IV  1,000 mg IntraVENous Q24H    sildenafil  10 mg Oral BID    insulin glargine  50 Units SubCUTAneous Nightly    insulin NPH  30 Units SubCUTAneous QAM    tamsulosin  0.4 mg Oral Daily    sodium chloride flush  5-40 mL IntraVENous 2 times per day    insulin lispro  0-24 Units SubCUTAneous TID WC    insulin lispro  0-24 Units SubCUTAneous 2 times per day    influenza virus vaccine  0.5 mL IntraMUSCular Prior to discharge    insulin lispro  20 Units SubCUTAneous TID WC    ranolazine  500 mg Oral BID    rivaroxaban  20 mg Oral Dinner    clopidogrel  75 mg Oral Daily    folic acid-pyridoxine-cyancobalamin  1 tablet Oral Daily    ezetimibe  10 mg Oral Nightly    atorvastatin  40 mg Oral Daily    folic acid  1 mg Oral Daily    sodium chloride flush  5-40 mL IntraVENous 2 times per day    famotidine  20 mg Oral BID    metoprolol tartrate  50 mg Oral BID      sodium chloride      furosemide (LASIX) 1mg/ml infusion 5 mg/hr (10/31/21 1249)    sodium chloride 25 mL (10/29/21 0313)    dextrose           I/O last 3 completed shifts:   In: 350 [P.O.:350]  Out: 3650 [Urine:3650]    CBC:   Recent Labs     10/29/21  0500   WBC 8.9   HGB 13.7             Recent Labs     10/29/21  1528 10/30/21  1111 10/31/21  0633    136 136   K 3.5 3.7 3.0*   CL 89* 86* 86*   CO2 35* 31 34*   BUN 33* 38* 48*   CREATININE 1.3 1.1 1.5*   GLUCOSE 73 232* 146*       Lab Results   Component Value Date    CALCIUM 9.1 10/31/2021    PHOS 4.6 10/31/2021       Objective:     Vitals: BP 135/81   Pulse 88   Temp 98 °F (36.7 °C) (Oral)   Resp 24   Ht 6' 1\" (1.854 m)   Wt (!) 328 lb 4.8 oz (148.9 kg)   SpO2 93%   BMI 43.31 kg/m²     General appearance: No acute distress  HEENT: No conjunctival pallor  Neck: Supple  Lungs: Positive rhonchi  Heart: Irregular  Abdomen: Soft  Extremities: Less edema-his right leg edema more than the left      Problem List :         Impression :     1. Acute kidney injury-mainly from cardiorenal syndrome-creatinine is up but not surprising  2. Severe fluid overload-more right heart failure than the left  3. Underlying dysrhythmia and diabetes and sleep apnea  4. Hypokalemia from diuretics    Recommendation/Plan  :     1. De intensify the diuretics  2. Replete potassium  3. Add spironolactone  4. Hopefully tomorrow DC Roe catheter  5. Also transition to oral diuretics and discharge  6. I again discussed with him about importance of low-salt and DASH diet  7. Low-calorie, weight loss and physical activity  8. And overall healthy lifestyle  9.  Follow clinically      Pete Leigh MD MD

## 2021-10-31 NOTE — PROGRESS NOTES
Pulmonary and Critical Care  Progress Note    Subjective: The patient has improved. Comfortable in bed. Shortness of breath has improved  Chest pain none  Addressing respiratory complaints Patient is negative for  hemoptysis and cyanosis  CONSTITUTIONAL:  negative for fevers and chills      Past Medical History:     has a past medical history of Atrial fibrillation ([address] Cerebral artery occlusion with cerebral infarction [facility]), History of cardiac cath, Hyperlipidemia, Hypertension, Type II or unspecified type diabetes mellitus without mention of complication, not stated as uncontrolled, and Unspecified sleep apnea. has a past surgical history that includes Tonsillectomy and Cardiac catheterization. reports that he has never smoked. He has never used smokeless tobacco. He reports that he does not drink alcohol and does not use drugs. Family history:  family history includes Cancer in his father and maternal grandfather; Diabetes in his father and mother. No Known Allergies  Social History:    Reviewed; no changes    Objective:   PHYSICAL EXAM:        VITALS:  /81   Pulse 88   Temp 98 °F (36.7 °C) (Oral)   Resp 24   Ht 6' 1\" (1.854 m)   Wt (!) 328 lb 4.8 oz (148.9 kg)   SpO2 93%   BMI 43.31 kg/m²     24HR INTAKE/OUTPUT:      Intake/Output Summary (Last 24 hours) at 10/31/2021 1455  Last data filed at 10/31/2021 0644  Gross per 24 hour   Intake 350 ml   Output 2950 ml   Net -2600 ml       CONSTITUTIONAL:  awake, alert, cooperative, no apparent distress, and appears stated age  LUNGS:  decreased breath sounds. CARDIOVASCULAR:  normal S1 and S2 and negative JVD  ABD:Abdomen soft, non-tender. BS normal. No masses,  No organomegaly  NEURO:Alert and oriented x3. Gait normal. Reflexes and motor strength normal and symmetric. Cranial nerves 2-12 and sensation grossly intact.   DATA:    CBC:  Recent Labs     10/29/21  0500   WBC 8.9   RBC 4.82   HGB 13.7   HCT 42.4      MCV 88.0   MCH 28.4   MCHC

## 2021-11-01 LAB
ALBUMIN SERPL-MCNC: 3.7 GM/DL (ref 3.4–5)
ALP BLD-CCNC: 83 IU/L (ref 40–128)
ALT SERPL-CCNC: 27 U/L (ref 10–40)
ANION GAP SERPL CALCULATED.3IONS-SCNC: 16 MMOL/L (ref 4–16)
ANION GAP SERPL CALCULATED.3IONS-SCNC: 17 MMOL/L (ref 4–16)
AST SERPL-CCNC: 25 IU/L (ref 15–37)
BILIRUB SERPL-MCNC: 0.4 MG/DL (ref 0–1)
BUN BLDV-MCNC: 55 MG/DL (ref 6–23)
BUN BLDV-MCNC: 56 MG/DL (ref 6–23)
CALCIUM SERPL-MCNC: 9.1 MG/DL (ref 8.3–10.6)
CALCIUM SERPL-MCNC: 9.1 MG/DL (ref 8.3–10.6)
CHLORIDE BLD-SCNC: 88 MMOL/L (ref 99–110)
CHLORIDE BLD-SCNC: 89 MMOL/L (ref 99–110)
CO2: 31 MMOL/L (ref 21–32)
CO2: 32 MMOL/L (ref 21–32)
CREAT SERPL-MCNC: 1.5 MG/DL (ref 0.9–1.3)
CREAT SERPL-MCNC: 1.6 MG/DL (ref 0.9–1.3)
GFR AFRICAN AMERICAN: 53 ML/MIN/1.73M2
GFR AFRICAN AMERICAN: 58 ML/MIN/1.73M2
GFR NON-AFRICAN AMERICAN: 44 ML/MIN/1.73M2
GFR NON-AFRICAN AMERICAN: 48 ML/MIN/1.73M2
GLUCOSE BLD-MCNC: 127 MG/DL (ref 70–99)
GLUCOSE BLD-MCNC: 147 MG/DL (ref 70–99)
GLUCOSE BLD-MCNC: 149 MG/DL (ref 70–99)
GLUCOSE BLD-MCNC: 175 MG/DL (ref 70–99)
GLUCOSE BLD-MCNC: 179 MG/DL (ref 70–99)
GLUCOSE BLD-MCNC: 182 MG/DL (ref 70–99)
GLUCOSE BLD-MCNC: 192 MG/DL (ref 70–99)
MAGNESIUM: 2.4 MG/DL (ref 1.8–2.4)
PHOSPHORUS: 3.7 MG/DL (ref 2.5–4.9)
POTASSIUM SERPL-SCNC: 3.3 MMOL/L (ref 3.5–5.1)
POTASSIUM SERPL-SCNC: 3.3 MMOL/L (ref 3.5–5.1)
SODIUM BLD-SCNC: 136 MMOL/L (ref 135–145)
SODIUM BLD-SCNC: 137 MMOL/L (ref 135–145)
TOTAL PROTEIN: 6.8 GM/DL (ref 6.4–8.2)

## 2021-11-01 PROCEDURE — 6370000000 HC RX 637 (ALT 250 FOR IP): Performed by: INTERNAL MEDICINE

## 2021-11-01 PROCEDURE — 2580000003 HC RX 258: Performed by: INTERNAL MEDICINE

## 2021-11-01 PROCEDURE — 80048 BASIC METABOLIC PNL TOTAL CA: CPT

## 2021-11-01 PROCEDURE — 6370000000 HC RX 637 (ALT 250 FOR IP): Performed by: STUDENT IN AN ORGANIZED HEALTH CARE EDUCATION/TRAINING PROGRAM

## 2021-11-01 PROCEDURE — 36415 COLL VENOUS BLD VENIPUNCTURE: CPT

## 2021-11-01 PROCEDURE — 6360000002 HC RX W HCPCS: Performed by: INTERNAL MEDICINE

## 2021-11-01 PROCEDURE — 83735 ASSAY OF MAGNESIUM: CPT

## 2021-11-01 PROCEDURE — 80053 COMPREHEN METABOLIC PANEL: CPT

## 2021-11-01 PROCEDURE — 82962 GLUCOSE BLOOD TEST: CPT

## 2021-11-01 PROCEDURE — 2140000000 HC CCU INTERMEDIATE R&B

## 2021-11-01 PROCEDURE — 94761 N-INVAS EAR/PLS OXIMETRY MLT: CPT

## 2021-11-01 PROCEDURE — 84100 ASSAY OF PHOSPHORUS: CPT

## 2021-11-01 RX ORDER — SENNA AND DOCUSATE SODIUM 50; 8.6 MG/1; MG/1
2 TABLET, FILM COATED ORAL DAILY
Status: DISCONTINUED | OUTPATIENT
Start: 2021-11-01 | End: 2021-11-02 | Stop reason: HOSPADM

## 2021-11-01 RX ORDER — CEPHALEXIN 250 MG/1
500 CAPSULE ORAL EVERY 12 HOURS SCHEDULED
Status: DISCONTINUED | OUTPATIENT
Start: 2021-11-01 | End: 2021-11-02 | Stop reason: HOSPADM

## 2021-11-01 RX ORDER — POTASSIUM CHLORIDE 20 MEQ/1
40 TABLET, EXTENDED RELEASE ORAL EVERY 4 HOURS
Status: COMPLETED | OUTPATIENT
Start: 2021-11-01 | End: 2021-11-01

## 2021-11-01 RX ORDER — TORSEMIDE 20 MG/1
20 TABLET ORAL 2 TIMES DAILY
Status: DISCONTINUED | OUTPATIENT
Start: 2021-11-01 | End: 2021-11-02 | Stop reason: HOSPADM

## 2021-11-01 RX ORDER — AMLODIPINE BESYLATE 5 MG/1
2.5 TABLET ORAL DAILY
Status: DISCONTINUED | OUTPATIENT
Start: 2021-11-01 | End: 2021-11-02

## 2021-11-01 RX ADMIN — POLYETHYLENE GLYCOL (3350) 17 G: 17 POWDER, FOR SOLUTION ORAL at 10:00

## 2021-11-01 RX ADMIN — RIVAROXABAN 20 MG: 20 TABLET, FILM COATED ORAL at 16:55

## 2021-11-01 RX ADMIN — RANOLAZINE 500 MG: 500 TABLET, FILM COATED, EXTENDED RELEASE ORAL at 08:38

## 2021-11-01 RX ADMIN — SPIRONOLACTONE 25 MG: 25 TABLET ORAL at 08:38

## 2021-11-01 RX ADMIN — EZETIMIBE 10 MG: 10 TABLET ORAL at 20:40

## 2021-11-01 RX ADMIN — FAMOTIDINE 20 MG: 20 TABLET ORAL at 20:41

## 2021-11-01 RX ADMIN — SODIUM CHLORIDE, PRESERVATIVE FREE 10 ML: 5 INJECTION INTRAVENOUS at 20:41

## 2021-11-01 RX ADMIN — ATORVASTATIN CALCIUM 40 MG: 40 TABLET, FILM COATED ORAL at 20:40

## 2021-11-01 RX ADMIN — RANOLAZINE 500 MG: 500 TABLET, FILM COATED, EXTENDED RELEASE ORAL at 20:40

## 2021-11-01 RX ADMIN — SILDENAFIL CITRATE 10 MG: 20 TABLET ORAL at 08:38

## 2021-11-01 RX ADMIN — POTASSIUM CHLORIDE 40 MEQ: 20 TABLET, EXTENDED RELEASE ORAL at 15:27

## 2021-11-01 RX ADMIN — CEFTRIAXONE SODIUM 1000 MG: 1 INJECTION, POWDER, FOR SOLUTION INTRAMUSCULAR; INTRAVENOUS at 08:44

## 2021-11-01 RX ADMIN — INSULIN GLARGINE 50 UNITS: 100 INJECTION, SOLUTION SUBCUTANEOUS at 20:49

## 2021-11-01 RX ADMIN — POTASSIUM CHLORIDE 20 MEQ: 1500 TABLET, EXTENDED RELEASE ORAL at 08:38

## 2021-11-01 RX ADMIN — Medication 1 TABLET: at 08:38

## 2021-11-01 RX ADMIN — SENNOSIDES AND DOCUSATE SODIUM 2 TABLET: 50; 8.6 TABLET ORAL at 15:27

## 2021-11-01 RX ADMIN — CEPHALEXIN 500 MG: 250 CAPSULE ORAL at 20:41

## 2021-11-01 RX ADMIN — METOPROLOL TARTRATE 50 MG: 50 TABLET, FILM COATED ORAL at 20:41

## 2021-11-01 RX ADMIN — POTASSIUM CHLORIDE 40 MEQ: 20 TABLET, EXTENDED RELEASE ORAL at 21:00

## 2021-11-01 RX ADMIN — TORSEMIDE 20 MG: 20 TABLET ORAL at 09:57

## 2021-11-01 RX ADMIN — METOPROLOL TARTRATE 50 MG: 50 TABLET, FILM COATED ORAL at 08:38

## 2021-11-01 RX ADMIN — FOLIC ACID 1 MG: 1 TABLET ORAL at 08:38

## 2021-11-01 RX ADMIN — CLOPIDOGREL BISULFATE 75 MG: 75 TABLET, FILM COATED ORAL at 08:38

## 2021-11-01 RX ADMIN — TORSEMIDE 20 MG: 20 TABLET ORAL at 20:40

## 2021-11-01 RX ADMIN — SILDENAFIL CITRATE 10 MG: 20 TABLET ORAL at 20:40

## 2021-11-01 RX ADMIN — FAMOTIDINE 20 MG: 20 TABLET ORAL at 08:38

## 2021-11-01 RX ADMIN — TAMSULOSIN HYDROCHLORIDE 0.4 MG: 0.4 CAPSULE ORAL at 08:38

## 2021-11-01 ASSESSMENT — PAIN SCALES - GENERAL
PAINLEVEL_OUTOF10: 0
PAINLEVEL_OUTOF10: 0

## 2021-11-01 NOTE — PROGRESS NOTES
Internal Medicine Daily Progress Note @todate@                    Date of Admission: 10/24/2021  Allergies  Patient has no known allergies. Assessment/Plan    Acute on chronic diastolic dysfunction   Afib with RVR  Hypertension  · Clinically improved  · Peripheral edema improved   · Off IV Lasix drip today. · Continue oral diuretic  · Continue Xarleto and Lopressor  · Left atrium severely dilated on echo  · Continue antihypertensives  · Cardiology following  · PTOT    Beta group B strep bacteremia  · Cellulitis of RLE, resolved  · Discontinue IV ceftriaxone  · Switch to oral cephalexin 500 mg twice daily for 5 days  · US negative for DVT     Ascending aortic aneurism   ·  4.6cm repeat study in 6 months    Acute versus chronic kidney failure with hematuria and proteinuria  Hypokalemia  · Renal function worsened today  · Crt 1.5; K3.3  · Renal on board  · Continue Lasix drip and hold diuretic for now  · Supplements oral potassium   · Remove Roe; bladder scan every 8 hours  · Plan to discharge in the morning if cleared by renal  · Follow-up with urology as an outpatient  ·     Insulin-dependent diabetes   · blood sugar improved. · Continue present management. · No hypoglycemia.     Patient Active Problem List    Diagnosis Date Noted    Congestive heart failure due to cardiomyopathy ([address] 10/25/2021    Frequent falls 11/16/2020    Chronic venous stasis dermatitis of both lower extremities 11/16/2020    History of CVA (cerebrovascular accident) 11/16/2020    Obesity hypoventilation syndrome ([address] 11/16/2020    Hypertension     Hyperlipidemia     Acute CVA (cerebrovascular accident) ([address] 11/20/2018    Spastic hemiparesis of right dominant side ([address] 11/20/2018    Dysphagia due to recent stroke 11/20/2018    Essential hypertension 11/20/2018    Morbid obesity with body mass index of 45.0-49.9 in adult [facility]) 11/20/2018    Weakness of right arm 11/18/2018    History of cardiac cath 10/08/2018    New onset atrial fibrillation ([facility] 2018    Type 2 diabetes mellitus with hyperglycemia, with long-term current use of insulin ([facility] 2018    Class 3 severe obesity due to excess calories with body mass index (BMI) of 45.0 to 49.9 in adult ([facility] 2018    MARINO on CPAP 10/14/2013         Subjective:   Patient seen and examined at bedside. Doing better than yesterday. Roe in place. Feels fatigued  Chief Complaint   Patient presents with    Leg Swelling     bilateral    Gait Problem     unable to ambulate       Objective:   TEMPERATURE:  Current - Temp: 98 °F (36.7 °C); Max - Temp  Av.2 °F (36.8 °C)  Min: 98 °F (36.7 °C)  Max: 98.3 °F (36.8 °C)  RESPIRATIONS RANGE: Resp  Av  Min: 21  Max: 25  PULSE RANGE: Pulse  Av.5  Min: 66  Max: 83  BLOOD PRESSURE RANGE:  Systolic (54EFS), BNU:233 , Min:131 , HLL:103   ; Diastolic (93SEO), BEC:16, Min:77, Max:86    PULSE OXIMETRY RANGE: SpO2  Av.3 %  Min: 92 %  Max: 96 %  24HR INTAKE/OUTPUT:      Intake/Output Summary (Last 24 hours) at 2021 1422  Last data filed at 2021 0541  Gross per 24 hour   Intake 400 ml   Output 1450 ml   Net -1050 ml       Intake/Output Summary (Last 24 hours) at 2021 1422  Last data filed at 2021 0541  Gross per 24 hour   Intake 400 ml   Output 1450 ml   Net -1050 ml        Physical Exam:  General appearance: Sitting comfortably; no acute distress  HEENT Conjunctivae/corneas clear. Neck: No jugular venous distention/bruits. Lungs: Clear to ascultation, bilaterally without Rales/Wheezes/Rhonchi with good respiratory effort. Heart: Regular rate and rhythm with Normal S1/S2 without  murmurs, rubs or gallops  Abdomen: Soft, non-tender or non-distended without rigidity or guarding   Extremities: Bilateral mild edema  Skin: Changes due to chronic edema.     Neurologic: Alert and oriented X 3  Mental status: Alert, oriented    Labs:  CBC:   Lab Results   Component Value Date    WBC 8.9 10/29/2021    RBC 4.82 10/29/2021    HGB 13.7 10/29/2021    HCT 42.4 10/29/2021    MCV 88.0 10/29/2021    MCH 28.4 10/29/2021    MCHC 32.3 10/29/2021    RDW 15.0 10/29/2021     10/29/2021    MPV 11.1 10/29/2021     CMP:    Lab Results   Component Value Date     11/01/2021     11/01/2021    K 3.3 11/01/2021    K 3.3 11/01/2021    CL 88 11/01/2021    CL 89 11/01/2021    CO2 32 11/01/2021    CO2 31 11/01/2021    BUN 55 11/01/2021    BUN 56 11/01/2021    CREATININE 1.5 11/01/2021    CREATININE 1.6 11/01/2021    GFRAA 58 11/01/2021    GFRAA 53 11/01/2021    AGRATIO 1.3 11/25/2020    LABGLOM 48 11/01/2021    LABGLOM 44 11/01/2021    GLUCOSE 179 11/01/2021    GLUCOSE 182 11/01/2021    PROT 6.8 11/01/2021    LABALBU 3.7 11/01/2021    LABALBU 50 10/27/2021    CALCIUM 9.1 11/01/2021    CALCIUM 9.1 11/01/2021    BILITOT 0.4 11/01/2021    ALKPHOS 83 11/01/2021    AST 25 11/01/2021    ALT 27 11/01/2021     No results for input(s): TROPONINT in the last 72 hours.   Lab Results   Component Value Date    TSHHS 1.800 08/29/2019        Scheduled Med:   torsemide  20 mg Oral BID    amLODIPine  2.5 mg Oral Daily    spironolactone  25 mg Oral Daily    cefTRIAXone (ROCEPHIN) IV  1,000 mg IntraVENous Q24H    sildenafil  10 mg Oral BID    insulin glargine  50 Units SubCUTAneous Nightly    insulin NPH  30 Units SubCUTAneous QAM    tamsulosin  0.4 mg Oral Daily    sodium chloride flush  5-40 mL IntraVENous 2 times per day    insulin lispro  0-24 Units SubCUTAneous TID WC    insulin lispro  0-24 Units SubCUTAneous 2 times per day    influenza virus vaccine  0.5 mL IntraMUSCular Prior to discharge    insulin lispro  20 Units SubCUTAneous TID     ranolazine  500 mg Oral BID    rivaroxaban  20 mg Oral Dinner    clopidogrel  75 mg Oral Daily    folic acid-pyridoxine-cyancobalamin  1 tablet Oral Daily    ezetimibe  10 mg Oral Nightly    atorvastatin  40 mg Oral Daily    folic acid  1 mg Oral Daily    sodium chloride flush  5-40 mL IntraVENous 2 times per day    famotidine  20 mg Oral BID    metoprolol tartrate  50 mg Oral BID     Infusion :   sodium chloride      sodium chloride 25 mL (10/29/21 0313)    dextrose         Consultants:    IP CONSULT TO HOSPITALIST  IP CONSULT TO CARDIOLOGY  IP CONSULT TO PULMONOLOGY  IP CONSULT TO DIETITIAN  IP CONSULT TO RESPIRATORY CARE  IP CONSULT TO NEPHROLOGY  IP CONSULT TO UROLOGY    Code Status: Full Code      Electronically signed by Amarilys Davis MD on 11/1/2021 at 2:22 PM

## 2021-11-01 NOTE — PROGRESS NOTES
hours.   BMP:  Recent Labs     10/30/21  1111 10/31/21  0633 11/01/21  1027    136 137  136   K 3.7 3.0* 3.3*  3.3*   CL 86* 86* 89*  88*   CO2 31 34* 31  32   BUN 38* 48* 56*  55*   CREATININE 1.1 1.5* 1.6*  1.5*   CALCIUM 9.0 9.1 9.1  9.1   GLUCOSE 232* 146* 182*  179*      ABG:  No results for input(s): PH, PO2ART, IGO4KGL, HCO3, BEART, O2SAT in the last 72 hours. Lab Results   Component Value Date    PROBNP 326.7 (H) 10/27/2021    PROBNP 174.1 10/25/2021    PROBNP 428.7 (H) 11/15/2020     No results found for: [facility]    Radiology Review:  Pertinent images / reports were reviewed as a part of this visit. Assessment:     Patient Active Problem List   Diagnosis    MARINO on CPAP    New onset atrial fibrillation (HCC)    Type 2 diabetes mellitus with hyperglycemia, with long-term current use of insulin (HCC)    Class 3 severe obesity due to excess calories with body mass index (BMI) of 45.0 to 49.9 in [facility])    History of cardiac cath    Weakness of right arm    Acute CVA (cerebrovascular accident) ([address]    Spastic hemiparesis of right dominant side ([address]    Dysphagia due to recent stroke    Essential hypertension    Morbid obesity with body mass index of 45.0-49.9 in [facility])    Frequent falls    Chronic venous stasis dermatitis of both lower extremities    History of CVA (cerebrovascular accident)    Obesity hypoventilation syndrome ([address]    Hypertension    Hyperlipidemia    Congestive heart failure due to cardiomyopathy ([address]       Plan:   1. Overall the patient has improved. 2. Inc. activity.       Ashlyn Owen MD   11/1/2021  5:22 PM

## 2021-11-01 NOTE — PROGRESS NOTES
PO2ART, SGM9HQR  INR: No results for input(s): INR in the last 72 hours. Renal Labs  Albumin:    Lab Results   Component Value Date    LABALBU 3.6 10/31/2021    LABALBU 50 10/27/2021     Calcium:    Lab Results   Component Value Date    CALCIUM 9.1 10/31/2021     Phosphorus:    Lab Results   Component Value Date    PHOS 4.6 10/31/2021     U/A:    Lab Results   Component Value Date    NITRU NEGATIVE 10/29/2021    COLORU YELLOW 10/29/2021    WBCUA 1 10/29/2021    RBCUA 5 10/29/2021    MUCUS RARE 10/29/2021    TRICHOMONAS NONE SEEN 10/29/2021    BACTERIA RARE 10/29/2021    CLARITYU CLEAR 10/29/2021    SPECGRAV 1.009 10/29/2021    UROBILINOGEN NEGATIVE 10/29/2021    BILIRUBINUR NEGATIVE 10/29/2021    BLOODU MODERATE 10/29/2021    KETUA NEGATIVE 10/29/2021     ABG:  No results found for: PHART, KVB6YLX, PO2ART, LTE2CIJ, BEART, THGBART, LJK5KPD, A3RGDVTR  HgBA1c:    Lab Results   Component Value Date    LABA1C 7.9 10/25/2021     Microalbumen/Creatinine ratio:  No components found for: RUCREAT  TSH:  No results found for: TSH  IRON:  No results found for: IRON  Iron Saturation:  No components found for: PERCENTFE  TIBC:  No results found for: TIBC  FERRITIN:  No results found for: FERRITIN  RPR:  No results found for: RPR  FLORES:    Lab Results   Component Value Date    FLORES None Detected 08/29/2019    FLORES  08/29/2019     (NOTE)  If suspicion of connective tissue disease is strong and FLORES EIA is   negative, consider testing for FLORES by IFA ([id number]). INTERPRETIVE INFORMATION: Anti-Nuclear Antibodies (FLORES), IgG by   RODOLFO  Antinuclear Antibodies (FLORES), IgG by RODOLFO: FLORES specimens are   screened using enzyme-linked immunosorbent assay (RODOLFO)   methodology. All RODOLFO results reported as Detected are further   tested by indirect fluorescent assay (IFA) using HEp-2 substrate   with an IgG-specific conjugate.  The FLORES RODOLFO screen is designed   to detect antibodies against dsDNA, histones, SS-A (Ro), SS-B   (La), Roshan Ricci, Sam/RNP, Scl-70, Sharron-1, centromeric proteins, other   antigens extracted from the HEp-2 cell nucleus. FLORES RODOLFO assays   have been reported to have lower sensitivities than FLORES IFA for   systemic autoimmune rheumatic diseases (SARD). Negative results do not necessarily rule out SARD. Performed by [address], [address] [facility] [phone]  www. Esther Farrar MD, Lab. Director       24 Hour Urine for Creatinine Clearance:  No components found for: CREAT4, UHRS10, UTV10      Objective:   I/O: 10/31 0701 - 11/01 0700  In: 400 [P.O.:400]  Out: 1450 [Urine:1450]  I/O last 3 completed shifts: In: 400 [P.O.:400]  Out: 1450 [Urine:1450]  No intake/output data recorded. Vitals: BP (!) 181/86   Pulse 83   Temp 98 °F (36.7 °C) (Oral)   Resp 25   Ht 6' 1\" (1.854 m)   Wt (!) 325 lb 4.8 oz (147.6 kg)   SpO2 92%   BMI 42.92 kg/m²  {  General appearance: awake weak  HEENT: Head: Normal, normocephalic, atraumatic. Neck: supple, symmetrical, trachea midline  Lungs: diminished breath sounds bilaterally  Heart: S1, S2 normal  Abdomen: abnormal findings:  soft obese  Extremities: edema ++  Neurologic: Mental status: alertness: alert        Assessment and Plan:      IMP:  #1 congestive heart failure with diastolic dysfunction  #2 lymphedema with anasarca/cellulitis with beta group B strep bacteremia  #3 acute versus chronic kidney failure with hematuria and proteinuria  #4 hypertension  #5 sleep apnea  #6 type 2 diabetes  7. Hypokalemia    Plan     #1 overall volume status improved and patient's weight is down to 325 pounds from 360.   With the loss of about 35 pounds we will try to switch from IV Lasix drip to oral torsemide today plan   #2 treat with IV antibiotics can switch to oral from renal standpoint  #3 renal function slightly increased in setting of diuresis will keep negative balance and monitor renal function  #4 blood pressure elevated try to adjust medications but also somewhat agitated and anxious to get up and move around  #5 maintain with oxygen as needed  6 sliding-scale insulin  #7 replete potassium  #8 DC Roe check bladder scan every shift stop IV diuretics and put on oral torsemide with oral potassium if tolerate this regimen and not significant urine retention can discharge in the morning and will need to follow urology as an outpatient         Carmen Min MD, MD

## 2021-11-01 NOTE — PROGRESS NOTES
Progress Note( Dr. Crane Senior)  11/1/2021  Subjective:   Admit Date: 10/24/2021  PCP: Kobi Hmuphreys MD    Admitted For :for : Severe leg swelling and generalized weakness and unable to ambulate    Consulted For: Better control of blood glucose    Interval History: Feels somewhat better leg swelling is less  Patient had arteriogram of right leg mild disease that does not require any continued  Discontinue IV Lasix infusion  Started on sildenafil for pulmonary hypertension    Denies any chest pains,   Yes SOB . Denies nausea or vomiting. No new bowel or bladder symptoms. Patient has lost several pounds of her weight is mostly water weight on IV Lasix infusion drip      Intake/Output Summary (Last 24 hours) at 11/1/2021 0644  Last data filed at 11/1/2021 0541  Gross per 24 hour   Intake 400 ml   Output 1450 ml   Net -1050 ml       DATA    CBC:   No results for input(s): WBC, HGB, PLT in the last 72 hours. CMP:  Recent Labs     10/29/21  1528 10/30/21  1111 10/31/21  0633    136 136   K 3.5 3.7 3.0*   CL 89* 86* 86*   CO2 35* 31 34*   BUN 33* 38* 48*   CREATININE 1.3 1.1 1.5*   CALCIUM 8.9 9.0 9.1   LABALBU  --   --  3.6     Lipids:   Lab Results   Component Value Date    CHOL 109 11/25/2020    HDL 41 11/25/2020    TRIG 114 11/25/2020     Glucose:  Recent Labs     10/31/21  1611 10/31/21  2217 11/01/21  0223   POCGLU 213* 179* 192*     [id number]:  Lab Results   Component Value Date    LABA1C 7.9 10/25/2021     High Sensitivity TSH:   Lab Results   Component Value Date    TSHHS 1.800 08/29/2019     Free T3: No results found for: FT3  Free T4:No results found for: T4FREE    XR CHEST PORTABLE   Final Result   Increased central vascular congestion. Stable cardiac silhouette enlargement. VL DUP LOWER EXTREMITY ARTERIES RIGHT   Final Result   No significant inflow stenosis suspected, though mild to moderate disease   seen within the proximal and mid aspect of the SFA.   No severe stenosis seen from the common femoral artery through the popliteal artery. Single vessel runoff through the posterior tibial artery, with a severe   distal stenosis. Nonvisualization of the peroneal artery, as well as the proximal and mid   anterior tibial artery likely related to occlusion. Reconstitution of flow   at the distal anterior tibial artery. US RETROPERITONEAL LIMITED   Final Result   Unremarkable ultrasound of the kidneys. XR CHEST PORTABLE   Final Result   1. Cardiomegaly, mild central venous congestion         VL DUP LOWER EXTREMITY VENOUS BILATERAL   Final Result   No evidence of DVT in either lower extremity from groin to knee. Calf veins   were not visualized due to body habitus and edema.               Scheduled Medicines   Medications:    spironolactone  25 mg Oral Daily    potassium chloride  20 mEq Oral TID WC    cefTRIAXone (ROCEPHIN) IV  1,000 mg IntraVENous Q24H    sildenafil  10 mg Oral BID    insulin glargine  50 Units SubCUTAneous Nightly    insulin NPH  30 Units SubCUTAneous QAM    tamsulosin  0.4 mg Oral Daily    sodium chloride flush  5-40 mL IntraVENous 2 times per day    insulin lispro  0-24 Units SubCUTAneous TID     insulin lispro  0-24 Units SubCUTAneous 2 times per day    influenza virus vaccine  0.5 mL IntraMUSCular Prior to discharge    insulin lispro  20 Units SubCUTAneous TID     ranolazine  500 mg Oral BID    rivaroxaban  20 mg Oral Dinner    clopidogrel  75 mg Oral Daily    folic acid-pyridoxine-cyancobalamin  1 tablet Oral Daily    ezetimibe  10 mg Oral Nightly    atorvastatin  40 mg Oral Daily    folic acid  1 mg Oral Daily    sodium chloride flush  5-40 mL IntraVENous 2 times per day    famotidine  20 mg Oral BID    metoprolol tartrate  50 mg Oral BID      Infusions:    sodium chloride      furosemide (LASIX) 1mg/ml infusion 5 mg/hr (10/31/21 6361)    sodium chloride 25 mL (10/29/21 5213)    dextrose           Objective: Vitals: /78   Pulse 71   Temp 98.2 °F (36.8 °C) (Oral)   Resp 21   Ht 6' 1\" (1.854 m)   Wt (!) 325 lb 4.8 oz (147.6 kg)   SpO2 96%   BMI 42.92 kg/m²   General appearance: alert and cooperative with exam  Neck: no JVD or bruit  Thyroid : Normal lobes   Lungs: Has Vesicular Breath sounds   Heart:  regular rate and rhythm  Abdomen: soft, non-tender; bowel sounds normal; no masses,  no organomegaly  Musculoskeletal: Normal  Extremities: extremities normal, , yes massive edema  Neurologic:  Awake, alert, oriented to name, place and time. Cranial nerves II-XII are grossly intact. Motor is  intact. Sensory neuropathy t.,  and gait is abnormal.    Assessment:     Patient Active Problem List:     MARINO on CPAP     New onset atrial fibrillation (HCC)     Type 2 diabetes mellitus with hyperglycemia, with long-term current use of insulin ([facility])     Class 3 severe obesity due to excess calories with body mass index (BMI) of 45.0 to 49.9 in [facility])     History of cardiac cath     Weakness of right arm     Acute CVA (cerebrovascular accident) ([address]     Spastic hemiparesis of right dominant side ([address]     Dysphagia due to recent stroke     Essential hypertension     Morbid obesity with body mass index of 45.0-49.9 in [facility])     Frequent falls     Chronic venous stasis dermatitis of both lower extremities     History of CVA (cerebrovascular accident)     Obesity hypoventilation syndrome ([address]     Hypertension     Hyperlipidemia     Congestive heart failure due to cardiomyopathy ([address]      Plan:     1. Reviewed POC blood glucose . Labs and X ray results   2. Reviewed Current Medicines   3. On meal/ Correction bolus Humalog/ Basal Lantus /NPH insulin regime   4. Monitor Blood glucose frequently   5. Modified  the dose of Insulin/ other medicines as needed   6. Discontinued IV Lasix usual placed on p.o. Demadex and Aldactone  7. Will follow     .      Milana Wilks MD, MD

## 2021-11-01 NOTE — PROGRESS NOTES
Daily Progress Note      Awake and alert Overall feeling well  Denies any CP or SOB  NSR on tele  Weight is down 35lbs  Increase activity  Will Check K levels today  Feeling better, significant venous dx-cellulitis of right leg-  corpulmonale and right heart failure -change to oral diuretics  HTN and MARINO  Pulmonary HTN on meds  Ok for home when ok with primary team    Afib with RVR    NSR on tele today    Continue Xarleto and Lopressor    Left atrium severely dilated     PVD    Single vessel run with severe stenosis on Distal Postoral tibial artery.     Non visual of peroneal artery and proximal and mid anterial tibial. Likely d/t occlusion   Cellulitis of right leg    Thready pulse on right leg    Peripheral angiogram showed Mild PAD, Ulceration likely d/t venous ulcer     HFpEF    EF 50%    ProBNP 326    Edema is greatly improved    Right sided HF, revatio added for PHTN    Denies any SOB    Oral Diuretics     Increase activity    27.4 L of UOP     Positive Blood cultures    Cellulitis of RLE    Abx per primary    US negative for DVT    Hx of Lymphedema     Ascending aortic aneurism     4.6cm repeat study in 6 months     Incomplete bladder emptying    Urology following,    Roe in place    Flomax started     Right LE Arteries  Impression   No significant inflow stenosis suspected, though mild to moderate disease   seen within the proximal and mid aspect of the SFA.  No severe stenosis seen   from the common femoral artery through the popliteal artery.       Single vessel runoff through the posterior tibial artery, with a severe   distal stenosis.       Nonvisualization of the peroneal artery, as well as the proximal and mid   anterior tibial artery likely related to occlusion.  Reconstitution of flow   at the distal anterior tibial artery.          Echo 10/25/2021 Summary   Left jin/tricular systolic function is normal.   Ejection fraction is visually estimated at 50%.   Moderate left ventricular hypertrophy.   Severely dilated left atrium.   Dilated ascending aorta measuring 4.6cm.   No evidence of any pericardial effusion.   Dilated IVC with poor inspiratory collapse.     Past medical history:    has a past medical history of Atrial fibrillation ([address] Cerebral artery occlusion with cerebral infarction [facility]), History of cardiac cath, Hyperlipidemia, Hypertension, Type II or unspecified type diabetes mellitus without mention of complication, not stated as uncontrolled, and Unspecified sleep apnea. Past surgical history:   has a past surgical history that includes Tonsillectomy and Cardiac catheterization. Social History:   reports that he has never smoked. He has never used smokeless tobacco. He reports that he does not drink alcohol and does not use drugs.   Family history:  family history includes Cancer in his father and maternal grandfather; Diabetes in his father and mother.     No Known Allergies    Objective:   BP (!) 181/86   Pulse 83   Temp 98 °F (36.7 °C) (Oral)   Resp 25   Ht 6' 1\" (1.854 m)   Wt (!) 325 lb 4.8 oz (147.6 kg)   SpO2 92%   BMI 42.92 kg/m²     Intake/Output Summary (Last 24 hours) at 11/1/2021 0917  Last data filed at 11/1/2021 0541  Gross per 24 hour   Intake 400 ml   Output 1450 ml   Net -1050 ml       Medications:   Scheduled Meds:   torsemide  20 mg Oral BID    spironolactone  25 mg Oral Daily    cefTRIAXone (ROCEPHIN) IV  1,000 mg IntraVENous Q24H    sildenafil  10 mg Oral BID    insulin glargine  50 Units SubCUTAneous Nightly    insulin NPH  30 Units SubCUTAneous QAM    tamsulosin  0.4 mg Oral Daily    sodium chloride flush  5-40 mL IntraVENous 2 times per day    insulin lispro  0-24 Units SubCUTAneous TID WC    insulin lispro  0-24 Units SubCUTAneous 2 times per day    influenza virus vaccine  0.5 mL IntraMUSCular Prior to discharge    insulin lispro  20 Units SubCUTAneous TID WC    ranolazine  500 mg Oral BID    rivaroxaban  20 mg Oral Dinner   Eller clopidogrel  75 mg Oral Daily    folic acid-pyridoxine-cyancobalamin  1 tablet Oral Daily    ezetimibe  10 mg Oral Nightly    atorvastatin  40 mg Oral Daily    folic acid  1 mg Oral Daily    sodium chloride flush  5-40 mL IntraVENous 2 times per day    famotidine  20 mg Oral BID    metoprolol tartrate  50 mg Oral BID      Infusions:   sodium chloride      sodium chloride 25 mL (10/29/21 0313)    dextrose        PRN Meds:  sodium chloride flush, sodium chloride, acetaminophen, sodium chloride flush, sodium chloride, ondansetron **OR** ondansetron, polyethylene glycol, acetaminophen **OR** acetaminophen, potassium chloride **OR** potassium alternative oral replacement **OR** potassium chloride, magnesium sulfate, glucose, dextrose, glucagon (rDNA), dextrose       Physical Exam:  Vitals:    11/01/21 0834   BP: (!) 181/86   Pulse: 83   Resp: 25   Temp: 98 °F (36.7 °C)   SpO2: 92%        General: AAO, NAD  Chest: Nontender  Cardiac: First and Second Heart Sounds are Normal, No Murmurs or Gallops noted  Lungs:Clear to auscultation and percussion. Abdomen: Soft, NT, ND, +BS  Extremities: No clubbing, no edema  Vascular:  Equal 2+ peripheral pulses. Lab Data:  CBC: No results for input(s): WBC, HGB, HCT, MCV, PLT in the last 72 hours. BMP:   Recent Labs     10/29/21  1528 10/30/21  1111 10/31/21  0633    136 136   K 3.5 3.7 3.0*   CL 89* 86* 86*   CO2 35* 31 34*   PHOS  --   --  4.6   BUN 33* 38* 48*   CREATININE 1.3 1.1 1.5*     LIVER PROFILE: No results for input(s): AST, ALT, LIPASE, BILIDIR, BILITOT, ALKPHOS in the last 72 hours. Invalid input(s): AMYLASE,  ALB  PT/INR: No results for input(s): PROTIME, INR in the last 72 hours. APTT: No results for input(s): APTT in the last 72 hours. BNP:  No results for input(s): BNP in the last 72 hours.       Assessment:  Patient Active Problem List    Diagnosis Date Noted    Congestive heart failure due to cardiomyopathy ([address] 10/25/2021    Frequent falls 11/16/2020    Chronic venous stasis dermatitis of both lower extremities 11/16/2020    History of CVA (cerebrovascular accident) 11/16/2020    Obesity hypoventilation syndrome ([facility] [address] 11/16/2020    Hypertension     Hyperlipidemia     Acute CVA (cerebrovascular accident) ([facility] [address] 11/20/2018    Spastic hemiparesis of right dominant side ([address] 11/20/2018    Dysphagia due to recent stroke 11/20/2018    Essential hypertension 11/20/2018    Morbid obesity with body mass index of 45.0-49.9 in [facility]) 11/20/2018    Weakness of right arm 11/18/2018    History of cardiac cath 10/08/2018    New onset atrial fibrillation ([facility] [address] 09/25/2018    Type 2 diabetes mellitus with hyperglycemia, with long-term current use of insulin ([facility] [address] 09/25/2018    Class 3 severe obesity due to excess calories with body mass index (BMI) of 45.0 to 49.9 in [facility]) 09/25/2018    MARINO on CPAP 10/14/2013       Electronically signed by HAYLEY Leija CNP on 11/1/2021 at 9:17 AM    Electronically signed by Paty Tracy MD on 11/1/21 at 10:08 AM EDT

## 2021-11-02 VITALS
TEMPERATURE: 97.5 F | WEIGHT: 315 LBS | SYSTOLIC BLOOD PRESSURE: 154 MMHG | RESPIRATION RATE: 20 BRPM | HEART RATE: 69 BPM | BODY MASS INDEX: 41.75 KG/M2 | DIASTOLIC BLOOD PRESSURE: 82 MMHG | OXYGEN SATURATION: 91 % | HEIGHT: 73 IN

## 2021-11-02 LAB
ALBUMIN SERPL-MCNC: 3.7 GM/DL (ref 3.4–5)
ANION GAP SERPL CALCULATED.3IONS-SCNC: 18 MMOL/L (ref 4–16)
BUN BLDV-MCNC: 60 MG/DL (ref 6–23)
CALCIUM SERPL-MCNC: 9 MG/DL (ref 8.3–10.6)
CHLORIDE BLD-SCNC: 87 MMOL/L (ref 99–110)
CO2: 28 MMOL/L (ref 21–32)
CREAT SERPL-MCNC: 1.9 MG/DL (ref 0.9–1.3)
GFR AFRICAN AMERICAN: 44 ML/MIN/1.73M2
GFR NON-AFRICAN AMERICAN: 36 ML/MIN/1.73M2
GLUCOSE BLD-MCNC: 162 MG/DL (ref 70–99)
GLUCOSE BLD-MCNC: 166 MG/DL (ref 70–99)
GLUCOSE BLD-MCNC: 167 MG/DL (ref 70–99)
GLUCOSE BLD-MCNC: 218 MG/DL (ref 70–99)
PHOSPHORUS: 4.4 MG/DL (ref 2.5–4.9)
POTASSIUM SERPL-SCNC: 3.7 MMOL/L (ref 3.5–5.1)
SODIUM BLD-SCNC: 133 MMOL/L (ref 135–145)

## 2021-11-02 PROCEDURE — 6370000000 HC RX 637 (ALT 250 FOR IP): Performed by: INTERNAL MEDICINE

## 2021-11-02 PROCEDURE — 80048 BASIC METABOLIC PNL TOTAL CA: CPT

## 2021-11-02 PROCEDURE — 2580000003 HC RX 258: Performed by: INTERNAL MEDICINE

## 2021-11-02 PROCEDURE — G0008 ADMIN INFLUENZA VIRUS VAC: HCPCS | Performed by: INTERNAL MEDICINE

## 2021-11-02 PROCEDURE — 51798 US URINE CAPACITY MEASURE: CPT

## 2021-11-02 PROCEDURE — 6360000002 HC RX W HCPCS: Performed by: INTERNAL MEDICINE

## 2021-11-02 PROCEDURE — 90686 IIV4 VACC NO PRSV 0.5 ML IM: CPT | Performed by: INTERNAL MEDICINE

## 2021-11-02 PROCEDURE — 36415 COLL VENOUS BLD VENIPUNCTURE: CPT

## 2021-11-02 PROCEDURE — 94761 N-INVAS EAR/PLS OXIMETRY MLT: CPT

## 2021-11-02 PROCEDURE — 80069 RENAL FUNCTION PANEL: CPT

## 2021-11-02 PROCEDURE — 82962 GLUCOSE BLOOD TEST: CPT

## 2021-11-02 PROCEDURE — 6370000000 HC RX 637 (ALT 250 FOR IP): Performed by: STUDENT IN AN ORGANIZED HEALTH CARE EDUCATION/TRAINING PROGRAM

## 2021-11-02 RX ORDER — AMLODIPINE BESYLATE 5 MG/1
2.5 TABLET ORAL ONCE
Status: COMPLETED | OUTPATIENT
Start: 2021-11-02 | End: 2021-11-02

## 2021-11-02 RX ORDER — TAMSULOSIN HYDROCHLORIDE 0.4 MG/1
0.4 CAPSULE ORAL DAILY
Qty: 30 CAPSULE | Refills: 3 | Status: SHIPPED | OUTPATIENT
Start: 2021-11-03

## 2021-11-02 RX ORDER — SILDENAFIL CITRATE 20 MG/1
10 TABLET ORAL 2 TIMES DAILY
Qty: 30 TABLET | Refills: 3 | Status: SHIPPED | OUTPATIENT
Start: 2021-11-02 | End: 2021-11-18 | Stop reason: CLARIF

## 2021-11-02 RX ORDER — AMLODIPINE BESYLATE 5 MG/1
5 TABLET ORAL DAILY
Status: DISCONTINUED | OUTPATIENT
Start: 2021-11-03 | End: 2021-11-02 | Stop reason: HOSPADM

## 2021-11-02 RX ORDER — TORSEMIDE 20 MG/1
20 TABLET ORAL 2 TIMES DAILY
Qty: 30 TABLET | Refills: 3 | Status: SHIPPED | OUTPATIENT
Start: 2021-11-02 | End: 2021-11-10 | Stop reason: ALTCHOICE

## 2021-11-02 RX ORDER — SPIRONOLACTONE 25 MG/1
25 TABLET ORAL DAILY
Qty: 30 TABLET | Refills: 3 | Status: SHIPPED | OUTPATIENT
Start: 2021-11-03 | End: 2021-11-10 | Stop reason: ALTCHOICE

## 2021-11-02 RX ORDER — GLIPIZIDE 5 MG/1
2.5 TABLET ORAL 2 TIMES DAILY
Qty: 60 TABLET | Refills: 3 | Status: ON HOLD | OUTPATIENT
Start: 2021-11-02 | End: 2022-09-13 | Stop reason: HOSPADM

## 2021-11-02 RX ADMIN — METOPROLOL TARTRATE 50 MG: 50 TABLET, FILM COATED ORAL at 08:35

## 2021-11-02 RX ADMIN — RANOLAZINE 500 MG: 500 TABLET, FILM COATED, EXTENDED RELEASE ORAL at 08:36

## 2021-11-02 RX ADMIN — SODIUM CHLORIDE, PRESERVATIVE FREE 10 ML: 5 INJECTION INTRAVENOUS at 08:38

## 2021-11-02 RX ADMIN — INFLUENZA A VIRUS A/VICTORIA/2570/2019 IVR-215 (H1N1) ANTIGEN (PROPIOLACTONE INACTIVATED), INFLUENZA A VIRUS A/CAMBODIA/E0826360/2020 IVR-224 (H3N2) ANTIGEN (PROPIOLACTONE INACTIVATED), INFLUENZA B VIRUS B/VICTORIA/705/2018 BVR-11 ANTIGEN (PROPIOLACTONE INACTIVATED), INFLUENZA B VIRUS B/PHUKET/3073/2013 BVR-1B ANTIGEN (PROPIOLACTONE INACTIVATED) 0.5 ML: 15; 15; 15; 15 INJECTION, SUSPENSION INTRAMUSCULAR at 14:48

## 2021-11-02 RX ADMIN — TORSEMIDE 20 MG: 20 TABLET ORAL at 08:35

## 2021-11-02 RX ADMIN — FOLIC ACID 1 MG: 1 TABLET ORAL at 08:36

## 2021-11-02 RX ADMIN — AMLODIPINE BESYLATE 2.5 MG: 5 TABLET ORAL at 10:30

## 2021-11-02 RX ADMIN — FAMOTIDINE 20 MG: 20 TABLET ORAL at 08:36

## 2021-11-02 RX ADMIN — Medication 1 TABLET: at 08:35

## 2021-11-02 RX ADMIN — SENNOSIDES AND DOCUSATE SODIUM 2 TABLET: 50; 8.6 TABLET ORAL at 08:36

## 2021-11-02 RX ADMIN — AMLODIPINE BESYLATE 2.5 MG: 5 TABLET ORAL at 08:35

## 2021-11-02 RX ADMIN — TAMSULOSIN HYDROCHLORIDE 0.4 MG: 0.4 CAPSULE ORAL at 08:36

## 2021-11-02 RX ADMIN — CLOPIDOGREL BISULFATE 75 MG: 75 TABLET, FILM COATED ORAL at 08:36

## 2021-11-02 RX ADMIN — CEPHALEXIN 500 MG: 250 CAPSULE ORAL at 08:35

## 2021-11-02 RX ADMIN — SILDENAFIL CITRATE 10 MG: 20 TABLET ORAL at 08:35

## 2021-11-02 RX ADMIN — SPIRONOLACTONE 25 MG: 25 TABLET ORAL at 08:35

## 2021-11-02 ASSESSMENT — PAIN SCALES - GENERAL
PAINLEVEL_OUTOF10: 0

## 2021-11-02 NOTE — PROGRESS NOTES
Daily Progress Note    Pt.  Awake and alert Overall feeling well  HR stable, NSR, BP stable  Denies any CP or SOB    Overall doing better,.feeling better  D/c home when ok with primary team   Overall doing better  Cellulitis  improved, and need fluid and salt restriction  Antibiotic per primary team   Cr is 1.9-diuretics per renal   Pulmonary HTN and cor pulmonale       Afib with RVR    NSR on tele today    Continue Xarleto and Lopressor    Left atrium severely dilated     PVD    Single vessel run with severe stenosis on Distal Postoral tibial artery.     Non visual of peroneal artery and proximal and mid anterial tibial. Likely d/t occlusion   Cellulitis of right leg    Thready pulse on right leg    Peripheral angiogram showed Mild PAD     HFpEF    EF 50%    ProBNP 326    Edema is greatly improved    Right sided HF, revatio added for PHTN    Denies any SOB    Oral Diuretics     Increase activity     Positive Blood cultures    Cellulitis of RLE    Abx per primary    US negative for DVT    Hx of Lymphedema     Ascending aortic aneurism     4.6cm repeat study in 6 months     Stable from cardiac standpoint-ok to D/C when [id number] [address] with primary team  F/u at office in one week     Right LE Arteries  Impression   No significant inflow stenosis suspected, though mild to moderate disease   seen within the proximal and mid aspect of the SFA.  No severe stenosis seen   from the common femoral artery through the popliteal artery.       Single vessel runoff through the posterior tibial artery, with a severe   distal stenosis.       Nonvisualization of the peroneal artery, as well as the proximal and mid   anterior tibial artery likely related to occlusion.  Reconstitution of flow   at the distal anterior tibial artery.          Echo 10/25/2021 Summary   Left jin/tricular systolic function is normal.   Ejection fraction is visually estimated at 50%.   Moderate left ventricular hypertrophy.   Severely dilated left atrium.   Dilated ascending aorta measuring 4.6cm.   No evidence of any pericardial effusion.   Dilated IVC with poor inspiratory collapse.     Past medical history:    has a past medical history of Atrial fibrillation ([address] Cerebral artery occlusion with cerebral infarction [facility]), History of cardiac cath, Hyperlipidemia, Hypertension, Type II or unspecified type diabetes mellitus without mention of complication, not stated as uncontrolled, and Unspecified sleep apnea. Past surgical history:   has a past surgical history that includes Tonsillectomy and Cardiac catheterization. Social History:   reports that he has never smoked. He has never used smokeless tobacco. He reports that he does not drink alcohol and does not use drugs.   Family history:  family history includes Cancer in his father and maternal grandfather; Diabetes in his father and mother.     No Known Allergies    Objective:   /74   Pulse 79   Temp 97.7 °F (36.5 °C) (Oral)   Resp 15   Ht 6' 1\" (1.854 m)   Wt (!) 326 lb 6 oz (148 kg)   SpO2 96%   BMI 43.06 kg/m²     Intake/Output Summary (Last 24 hours) at 11/2/2021 1031  Last data filed at 11/2/2021 1024  Gross per 24 hour   Intake --   Output 2050 ml   Net -2050 ml       Medications:   Scheduled Meds:   [START ON 11/3/2021] amLODIPine  5 mg Oral Daily    torsemide  20 mg Oral BID    cephALEXin  500 mg Oral 2 times per day    sennosides-docusate sodium  2 tablet Oral Daily    spironolactone  25 mg Oral Daily    sildenafil  10 mg Oral BID    insulin glargine  50 Units SubCUTAneous Nightly    insulin NPH  30 Units SubCUTAneous QAM    tamsulosin  0.4 mg Oral Daily    sodium chloride flush  5-40 mL IntraVENous 2 times per day    insulin lispro  0-24 Units SubCUTAneous TID WC    insulin lispro  0-24 Units SubCUTAneous 2 times per day    influenza virus vaccine  0.5 mL IntraMUSCular Prior to discharge    insulin lispro  20 Units SubCUTAneous TID WC    ranolazine  500 mg Oral BID    rivaroxaban 11/16/2020    Chronic venous stasis dermatitis of both lower extremities 11/16/2020    History of CVA (cerebrovascular accident) 11/16/2020    Obesity hypoventilation syndrome ([facility] [address] 11/16/2020    Hypertension     Hyperlipidemia     Acute CVA (cerebrovascular accident) ([address] 11/20/2018    Spastic hemiparesis of right dominant side ([address] 11/20/2018    Dysphagia due to recent stroke 11/20/2018    Essential hypertension 11/20/2018    Morbid obesity with body mass index of 45.0-49.9 in [facility]) 11/20/2018    Weakness of right arm 11/18/2018    History of cardiac cath 10/08/2018    New onset atrial fibrillation ([facility] [address] 09/25/2018    Type 2 diabetes mellitus with hyperglycemia, with long-term current use of insulin ([facility] [address] 09/25/2018    Class 3 severe obesity due to excess calories with body mass index (BMI) of 45.0 to 49.9 in [facility]) 09/25/2018    MARINO on CPAP 10/14/2013       Electronically signed by Hiro Clement PA-C on 11/2/2021 at 10:31 AM  Electronically signed by Sg Hester MD on 11/2/21 at 11:26 AM EDT

## 2021-11-02 NOTE — DISCHARGE SUMMARY
[facility]    [facility] Demographics    Patient. Cecily Willson  Date of Birth. 1960  MRN. [id number]     Primary care provider. Kyler Al MD  (Tel: [phone])    Admit date: 10/24/2021    Discharge date (blank if same as Note Date): Note Date: 11/2/2021     Reason for Hospitalization. Chief Complaint   Patient presents with    Leg Swelling     bilateral    Gait Problem     unable to ambulate         Significant Findings. Active Problems:    Congestive heart failure due to cardiomyopathy [facility])  Resolved Problems:    * No resolved hospital problems. [facility] Course:  patient is a 31-year-old male with history of chronic lymphedema, chronic congestive heart failure, coronary artery disease and diabetes who was admitted with leg swelling on 10/25/1931. On admission troponin mildly elevated, proBNP 174, WBC 19.1 vancomycin and cefepime started for lower extremity cellulitis, DVT ruled out,  Cardiology consulted, patient was switched to Lasix infusion,  10/28 angiogram of lower extremities showed single-vessel 1 with severe stenosis in the distal posterior tibial artery, mild peripheral arterial disease seen  10/29 switched to Rocephin  Roe placed for urinary retention  11/1 Lasix infusion switch to Lasix IV, Roe removed    Creatinine 1.9 on the day of discharge, nephrology will follow up  Recommended continuing torsemide  Sildenafil added per cardiology  Outpatient follow-up with cardiology nephrology  Repeat BMP in 1 week  Patient's creatinine was 1.9, Metformin stopped, low-dose glipizide added, was advised to monitor blood glucose at home      Invasive procedures and treatments. 1. Peripheral angiogram    Consults.   IP CONSULT TO HOSPITALIST  IP CONSULT TO CARDIOLOGY  IP CONSULT TO PULMONOLOGY  IP CONSULT TO DIETITIAN  IP CONSULT TO RESPIRATORY CARE  IP CONSULT TO rivaroxaban 20 MG Tabs tablet  Commonly known as: XARELTO  Take 1 tablet by mouth Daily with supper     simvastatin 40 MG tablet  Commonly known as: ZOCOR            Discharge recommendations given to patient. Follow Up. PCP in 1 week   Disposition. home  Activity. As tolerated  Diet: ADULT DIET; Regular      Spent 29 minutes in discharge process.     Signed:  Lorena Flores MD     11/2/2021 2:20 PM

## 2021-11-02 NOTE — CARE COORDINATION
Chart reviewed, Bryce met with the patient at bedside. He hopes to be discharged home today and he denies needs. His wife will pick him up.

## 2021-11-02 NOTE — PROGRESS NOTES
Pulmonary and Critical Care  Progress Note    Subjective: The patient has improved. Shortness of breath none. Chest pain none  Addressing respiratory complaints Patient is negative for  hemoptysis and cyanosis  CONSTITUTIONAL:  negative for fevers and chills      Past Medical History:     has a past medical history of Atrial fibrillation ([facility] [address] Cerebral artery occlusion with cerebral infarction [facility]), History of cardiac cath, Hyperlipidemia, Hypertension, Type II or unspecified type diabetes mellitus without mention of complication, not stated as uncontrolled, and Unspecified sleep apnea. has a past surgical history that includes Tonsillectomy and Cardiac catheterization. reports that he has never smoked. He has never used smokeless tobacco. He reports that he does not drink alcohol and does not use drugs. Family history:  family history includes Cancer in his father and maternal grandfather; Diabetes in his father and mother. No Known Allergies  Social History:    Reviewed; no changes    Objective:   PHYSICAL EXAM:        VITALS:  BP (!) 154/82   Pulse 67   Temp 97.5 °F (36.4 °C) (Oral)   Resp 25   Ht 6' 1\" (1.854 m)   Wt (!) 326 lb 6 oz (148 kg)   SpO2 91%   BMI 43.06 kg/m²     24HR INTAKE/OUTPUT:      Intake/Output Summary (Last 24 hours) at 11/2/2021 1153  Last data filed at 11/2/2021 1126  Gross per 24 hour   Intake --   Output 2500 ml   Net -2500 ml       CONSTITUTIONAL:  awake, alert, cooperative, no apparent distress, and appears stated age  LUNGS:  decreased breath sounds. CARDIOVASCULAR:  normal S1 and S2 and negative JVD  ABD:Abdomen soft, non-tender. BS normal. No masses,  No organomegaly  NEURO:Alert and oriented x3. Gait normal. Reflexes and motor strength normal and symmetric. Cranial nerves 2-12 and sensation grossly intact. DATA:    CBC:  No results for input(s): WBC, RBC, HGB, HCT, PLT, MCV, MCH, MCHC, RDW, NRBC, SEGSPCT, BANDSPCT in the last 72 hours.    BMP:  Recent Labs 10/31/21  0633 11/01/21  1027 11/02/21  0852    137  136 133*   K 3.0* 3.3*  3.3* 3.7   CL 86* 89*  88* 87*   CO2 34* 31  32 28   BUN 48* 56*  55* 60*   CREATININE 1.5* 1.6*  1.5* 1.9*   CALCIUM 9.1 9.1  9.1 9.0   GLUCOSE 146* 182*  179* 218*      ABG:  No results for input(s): PH, PO2ART, FYB0BZD, HCO3, BEART, O2SAT in the last 72 hours. Lab Results   Component Value Date    PROBNP 326.7 (H) 10/27/2021    PROBNP 174.1 10/25/2021    PROBNP 428.7 (H) 11/15/2020     No results found for: [address] [facility]    Radiology Review:  Pertinent images / reports were reviewed as a part of this visit. Assessment:     Patient Active Problem List   Diagnosis    MARINO on CPAP    New onset atrial fibrillation (HCC)    Type 2 diabetes mellitus with hyperglycemia, with long-term current use of insulin (HCC)    Class 3 severe obesity due to excess calories with body mass index (BMI) of 45.0 to 49.9 in [facility])    History of cardiac cath    Weakness of right arm    Acute CVA (cerebrovascular accident) ([address]    Spastic hemiparesis of right dominant side ([address]    Dysphagia due to recent stroke    Essential hypertension    Morbid obesity with body mass index of 45.0-49.9 in [facility])    Frequent falls    Chronic venous stasis dermatitis of both lower extremities    History of CVA (cerebrovascular accident)    Obesity hypoventilation syndrome ([address]    Hypertension    Hyperlipidemia    Congestive heart failure due to cardiomyopathy ([address]       Plan:   1. Overall the patient has improved. 2. Inc. activity. 3. D/C soon.     [address], MD   11/2/2021  11:53 AM

## 2021-11-02 NOTE — PROGRESS NOTES
Nephrology Progress Note  11/2/2021 9:26 AM  Subjective: Interval History: Varsha Qureshi is a 64 y.o. male status appears better overall making over 1 L urine without catheter        Data:   Scheduled Meds:   torsemide  20 mg Oral BID    amLODIPine  2.5 mg Oral Daily    cephALEXin  500 mg Oral 2 times per day    sennosides-docusate sodium  2 tablet Oral Daily    spironolactone  25 mg Oral Daily    sildenafil  10 mg Oral BID    insulin glargine  50 Units SubCUTAneous Nightly    insulin NPH  30 Units SubCUTAneous QAM    tamsulosin  0.4 mg Oral Daily    sodium chloride flush  5-40 mL IntraVENous 2 times per day    insulin lispro  0-24 Units SubCUTAneous TID WC    insulin lispro  0-24 Units SubCUTAneous 2 times per day    influenza virus vaccine  0.5 mL IntraMUSCular Prior to discharge    insulin lispro  20 Units SubCUTAneous TID WC    ranolazine  500 mg Oral BID    rivaroxaban  20 mg Oral Dinner    clopidogrel  75 mg Oral Daily    folic acid-pyridoxine-cyancobalamin  1 tablet Oral Daily    ezetimibe  10 mg Oral Nightly    atorvastatin  40 mg Oral Daily    folic acid  1 mg Oral Daily    sodium chloride flush  5-40 mL IntraVENous 2 times per day    famotidine  20 mg Oral BID    metoprolol tartrate  50 mg Oral BID     Continuous Infusions:   sodium chloride      sodium chloride 25 mL (10/29/21 0313)    dextrose           CBC   No results for input(s): WBC, HGB, HCT, PLT in the last 72 hours. BMP   Recent Labs     10/30/21  1111 10/31/21  0633 11/01/21  1027    136 137  136   K 3.7 3.0* 3.3*  3.3*   CL 86* 86* 89*  88*   CO2 31 34* 31  32   PHOS  --  4.6 3.7   BUN 38* 48* 56*  55*   CREATININE 1.1 1.5* 1.6*  1.5*     Hepatic:   Recent Labs     11/01/21  1027   AST 25   ALT 27   BILITOT 0.4   ALKPHOS 83     Troponin: No results for input(s): TROPONINI in the last 72 hours. BNP: No results for input(s): BNP in the last 72 hours.   Lipids: No results for input(s): CHOL, HDL in the last 72 hours. Invalid input(s): LDLCALCU  ABGs: No results found for: PHART, PO2ART, YCI7KLA  INR: No results for input(s): INR in the last 72 hours. Renal Labs  Albumin:    Lab Results   Component Value Date    LABALBU 3.7 11/01/2021    LABALBU 50 10/27/2021     Calcium:    Lab Results   Component Value Date    CALCIUM 9.1 11/01/2021    CALCIUM 9.1 11/01/2021     Phosphorus:    Lab Results   Component Value Date    PHOS 3.7 11/01/2021     U/A:    Lab Results   Component Value Date    NITRU NEGATIVE 10/29/2021    COLORU YELLOW 10/29/2021    WBCUA 1 10/29/2021    RBCUA 5 10/29/2021    MUCUS RARE 10/29/2021    TRICHOMONAS NONE SEEN 10/29/2021    BACTERIA RARE 10/29/2021    CLARITYU CLEAR 10/29/2021    SPECGRAV 1.009 10/29/2021    UROBILINOGEN NEGATIVE 10/29/2021    BILIRUBINUR NEGATIVE 10/29/2021    BLOODU MODERATE 10/29/2021    KETUA NEGATIVE 10/29/2021     ABG:  No results found for: PHART, RJB9ACE, PO2ART, LLJ0FBL, BEART, THGBART, TWO8NJM, Y7WUKDCM  HgBA1c:    Lab Results   Component Value Date    LABA1C 7.9 10/25/2021     Microalbumen/Creatinine ratio:  No components found for: RUCREAT  TSH:  No results found for: TSH  IRON:  No results found for: IRON  Iron Saturation:  No components found for: PERCENTFE  TIBC:  No results found for: TIBC  FERRITIN:  No results found for: FERRITIN  RPR:  No results found for: RPR  FLORES:    Lab Results   Component Value Date    FLORES None Detected 08/29/2019    FLORES  08/29/2019     (NOTE)  If suspicion of connective tissue disease is strong and FLORES EIA is   negative, consider testing for FLORES by IFA ([id number]). INTERPRETIVE INFORMATION: Anti-Nuclear Antibodies (FLORES), IgG by   ROODLFO  Antinuclear Antibodies (FLORES), IgG by RODOLFO: FLORES specimens are   screened using enzyme-linked immunosorbent assay (RODOLFO)   methodology. All RODOLFO results reported as Detected are further   tested by indirect fluorescent assay (IFA) using HEp-2 substrate   with an IgG-specific conjugate.  The FLORES RODLOFO screen is designed   to detect antibodies against dsDNA, histones, SS-A (Ro), SS-B   (La), Sam, Sam/RNP, Scl-70, Sharron-1, centromeric proteins, other   antigens extracted from the HEp-2 cell nucleus. FLORES RODOLFO assays   have been reported to have lower sensitivities than FLORES IFA for   systemic autoimmune rheumatic diseases (SARD). Negative results do not necessarily rule out SARD. Performed by [facility] , [id number] [facility] [phone]  www. Lazaro Riley MD, Lab. Director       24 Hour Urine for Creatinine Clearance:  No components found for: CREAT4, UHRS10, UTV10      Objective:   I/O: 11/01 0701 - 11/02 0700  In: -   Out: 1700 [Urine:1700]  I/O last 3 completed shifts:  In: -   Out: 1700 [Urine:1700]  No intake/output data recorded. Vitals: BP (!) 154/77   Pulse 79   Temp 97.7 °F (36.5 °C) (Oral)   Resp 15   Ht 6' 1\" (1.854 m)   Wt (!) 326 lb 6 oz (148 kg)   SpO2 96%   BMI 43.06 kg/m²  {  General appearance: awake weak  HEENT: Head: Normal, normocephalic, atraumatic. Neck: supple, symmetrical, trachea midline  Lungs: diminished breath sounds bilaterally  Heart: S1, S2 normal  Abdomen: abnormal findings:  soft obese  Extremities: edema ++ but improved  Neurologic: Mental status: alertness: alert        Assessment and Plan:      IMP:  #1 congestive heart failure with diastolic dysfunction  #2 lymphedema with anasarca/cellulitis with beta group B strep bacteremia  #3 acute versus chronic kidney failure with hematuria and proteinuria  #4 hypertension  #5 sleep apnea  #6 type 2 diabetes  7.   Hypokalemia    Plan     #1 follow-up repeat lab work today  #2 Roe catheter is removed doing a bladder scan monitor urine output and hopefully will not need a Roe catheter reinserted and follow-up with urology as an outpatient  #3 cardiac status appears improved will monitor medical therapy and diuretics  #4 maintain antibiotic therapy in the above setting of bacteremia  #5 renal function resolved overall back to baseline CKD 3 creatinine 1.5-1.6  #6 blood pressure overall improved will try to adjust medicines a little bit more  #7 monitor glucose control stable  #8 follow-up repeat potassium today    #9 if bladder scan shows less than 200 cc urine and creatinine less than 1.8 with stable potassium systolic blood pressure less than 150 can discharge to home today and follow-up with me as an outpatient as well as urology as an outpatient         Gavino Coffey MD, MD

## 2021-11-02 NOTE — PROGRESS NOTES
Patient voided 800ml so far this shift and 1000ml night shift last night. Bladder scan 131ml after voiding.

## 2021-11-03 NOTE — PROGRESS NOTES
Progress Note( Dr. Crane Senior)  11/2/2021  Subjective:   Admit Date: 10/24/2021  PCP: Kobi Humphreys MD    Admitted For :for : Severe leg swelling and generalized weakness and unable to ambulate    Consulted For: Better control of blood glucose    Interval History: Feels somewhat better leg swelling is less  Patient had arteriogram of right leg mild disease that does not require any continued  Discontinue IV Lasix infusion  Started on sildenafil for pulmonary hypertension    Denies any chest pains,   Yes SOB . Denies nausea or vomiting. No new bowel or bladder symptoms. Patient has lost several pounds of her weight is mostly water weight on IV Lasix infusion drip      Intake/Output Summary (Last 24 hours) at 11/2/2021 2241  Last data filed at 11/2/2021 1126  Gross per 24 hour   Intake --   Output 1900 ml   Net -1900 ml       DATA    CBC:   No results for input(s): WBC, HGB, PLT in the last 72 hours. CMP:  Recent Labs     10/31/21  0633 11/01/21  1027 11/02/21  0852    137  136 133*   K 3.0* 3.3*  3.3* 3.7   CL 86* 89*  88* 87*   CO2 34* 31  32 28   BUN 48* 56*  55* 60*   CREATININE 1.5* 1.6*  1.5* 1.9*   CALCIUM 9.1 9.1  9.1 9.0   PROT  --  6.8  --    LABALBU 3.6 3.7 3.7   BILITOT  --  0.4  --    ALKPHOS  --  83  --    AST  --  25  --    ALT  --  27  --      Lipids:   Lab Results   Component Value Date    CHOL 109 11/25/2020    HDL 41 11/25/2020    TRIG 114 11/25/2020     Glucose:  Recent Labs     11/02/21  0208 11/02/21  0624 11/02/21  1120   POCGLU 166* 162* 167*     [id number]:  Lab Results   Component Value Date    LABA1C 7.9 10/25/2021     High Sensitivity TSH:   Lab Results   Component Value Date    TSHHS 1.800 08/29/2019     Free T3: No results found for: FT3  Free T4:No results found for: T4FREE    XR CHEST PORTABLE   Final Result   Increased central vascular congestion. Stable cardiac silhouette enlargement.          VL DUP LOWER EXTREMITY ARTERIES RIGHT   Final Result   No significant inflow stenosis suspected, though mild to moderate disease   seen within the proximal and mid aspect of the SFA. No severe stenosis seen   from the common femoral artery through the popliteal artery. Single vessel runoff through the posterior tibial artery, with a severe   distal stenosis. Nonvisualization of the peroneal artery, as well as the proximal and mid   anterior tibial artery likely related to occlusion. Reconstitution of flow   at the distal anterior tibial artery. US RETROPERITONEAL LIMITED   Final Result   Unremarkable ultrasound of the kidneys. XR CHEST PORTABLE   Final Result   1. Cardiomegaly, mild central venous congestion         VL DUP LOWER EXTREMITY VENOUS BILATERAL   Final Result   No evidence of DVT in either lower extremity from groin to knee. Calf veins   were not visualized due to body habitus and edema. Scheduled Medicines   Medications:      Infusions:         Objective:   Vitals: BP (!) 154/82   Pulse 69   Temp 97.5 °F (36.4 °C) (Oral)   Resp 20   Ht 6' 1\" (1.854 m)   Wt (!) 326 lb 6 oz (148 kg)   SpO2 91%   BMI 43.06 kg/m²   General appearance: alert and cooperative with exam  Neck: no JVD or bruit  Thyroid : Normal lobes   Lungs: Has Vesicular Breath sounds   Heart:  regular rate and rhythm  Abdomen: soft, non-tender; bowel sounds normal; no masses,  no organomegaly  Musculoskeletal: Normal  Extremities: extremities normal, , yes massive edema  Neurologic:  Awake, alert, oriented to name, place and time. Cranial nerves II-XII are grossly intact. Motor is  intact.   Sensory neuropathy t.,  and gait is abnormal.    Assessment:     Patient Active Problem List:     MARINO on CPAP     New onset atrial fibrillation (HCC)     Type 2 diabetes mellitus with hyperglycemia, with long-term current use of insulin (HCC)     Class 3 severe obesity due to excess calories with body mass index (BMI) of 45.0 to 49.9 in adult [facility])     History of cardiac cath     Weakness of right arm     Acute CVA (cerebrovascular accident) ([facility] [address]     Spastic hemiparesis of right dominant side ([facility] [address]     Dysphagia due to recent stroke     Essential hypertension     Morbid obesity with body mass index of 45.0-49.9 in [facility])     Frequent falls     Chronic venous stasis dermatitis of both lower extremities     History of CVA (cerebrovascular accident)     Obesity hypoventilation syndrome ([facility] [address]     Hypertension     Hyperlipidemia     Congestive heart failure due to cardiomyopathy ([facility] [address]      Plan:     1. Reviewed POC blood glucose . Labs and X ray results   2. Reviewed Current Medicines   3. On meal/ Correction bolus Humalog/ Basal Lantus /NPH insulin regime   4. Monitor Blood glucose frequently   5. Modified  the dose of Insulin/ other medicines as needed   6. Discontinued IV Lasix usual placed on p.o. Demadex and Aldactone  7. Possible discharge home today  8. Will follow in the office    .      Adolfo Cazares MD, MD

## 2021-11-10 PROBLEM — N18.32 STAGE 3B CHRONIC KIDNEY DISEASE (HCC): Status: ACTIVE | Noted: 2021-11-10

## 2022-05-12 PROBLEM — R31.1 BENIGN ESSENTIAL MICROSCOPIC HEMATURIA: Status: ACTIVE | Noted: 2022-05-12

## 2022-05-12 PROBLEM — N18.2 CHRONIC KIDNEY DISEASE, STAGE II (MILD): Status: ACTIVE | Noted: 2022-05-12

## 2022-05-20 LAB
A/G RATIO: 1.4 (ref 1.1–2.2)
ALBUMIN SERPL-MCNC: 4.3 G/DL (ref 3.4–5)
ALP BLD-CCNC: 79 U/L (ref 40–129)
ALT SERPL-CCNC: 23 U/L (ref 10–40)
ANION GAP SERPL CALCULATED.3IONS-SCNC: 15 MMOL/L (ref 3–16)
AST SERPL-CCNC: 19 U/L (ref 15–37)
BILIRUB SERPL-MCNC: 0.3 MG/DL (ref 0–1)
BUN BLDV-MCNC: 34 MG/DL (ref 7–20)
CALCIUM SERPL-MCNC: 9.6 MG/DL (ref 8.3–10.6)
CHLORIDE BLD-SCNC: 97 MMOL/L (ref 99–110)
CHOLESTEROL, TOTAL: 171 MG/DL (ref 0–199)
CO2: 31 MMOL/L (ref 21–32)
CREAT SERPL-MCNC: 1.4 MG/DL (ref 0.8–1.3)
GFR AFRICAN AMERICAN: >60
GFR NON-AFRICAN AMERICAN: 51
GLUCOSE BLD-MCNC: 173 MG/DL (ref 70–99)
HDLC SERPL-MCNC: 49 MG/DL (ref 40–60)
LDL CHOLESTEROL CALCULATED: 95 MG/DL
POTASSIUM SERPL-SCNC: 3.6 MMOL/L (ref 3.5–5.1)
SODIUM BLD-SCNC: 143 MMOL/L (ref 136–145)
TOTAL PROTEIN: 7.3 G/DL (ref 6.4–8.2)
TRIGL SERPL-MCNC: 137 MG/DL (ref 0–150)
VLDLC SERPL CALC-MCNC: 27 MG/DL

## 2022-05-21 LAB
ESTIMATED AVERAGE GLUCOSE: 226 MG/DL
HBA1C MFR BLD: 9.5 %

## 2022-08-18 PROBLEM — N18.31 STAGE 3A CHRONIC KIDNEY DISEASE (HCC): Status: ACTIVE | Noted: 2022-08-18

## 2022-08-18 PROBLEM — R60.1 GENERALIZED EDEMA: Status: ACTIVE | Noted: 2022-08-18

## 2022-09-10 ENCOUNTER — APPOINTMENT (OUTPATIENT)
Dept: GENERAL RADIOLOGY | Age: 62
DRG: 682 | End: 2022-09-10
Payer: MEDICARE

## 2022-09-10 ENCOUNTER — APPOINTMENT (OUTPATIENT)
Dept: CT IMAGING | Age: 62
DRG: 682 | End: 2022-09-10
Payer: MEDICARE

## 2022-09-10 ENCOUNTER — HOSPITAL ENCOUNTER (INPATIENT)
Age: 62
LOS: 3 days | Discharge: HOME | DRG: 682 | End: 2022-09-13
Attending: STUDENT IN AN ORGANIZED HEALTH CARE EDUCATION/TRAINING PROGRAM | Admitting: INTERNAL MEDICINE
Payer: MEDICARE

## 2022-09-10 DIAGNOSIS — N18.9 ACUTE KIDNEY INJURY SUPERIMPOSED ON CKD (HCC): ICD-10-CM
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
## 2022-09-10 DIAGNOSIS — R77.8 ELEVATED TROPONIN: ICD-10-CM

## 2022-09-10 DIAGNOSIS — E83.41 HYPERMAGNESEMIA: ICD-10-CM

## 2022-09-10 DIAGNOSIS — R73.9 HYPERGLYCEMIA: ICD-10-CM

## 2022-09-10 DIAGNOSIS — N17.9 AKI (ACUTE KIDNEY INJURY) (HCC): ICD-10-CM

## 2022-09-10 DIAGNOSIS — N17.9 ACUTE KIDNEY INJURY SUPERIMPOSED ON CKD (HCC): ICD-10-CM

## 2022-09-10 DIAGNOSIS — E87.6 HYPOKALEMIA: ICD-10-CM

## 2022-09-10 LAB
ALBUMIN SERPL-MCNC: 4.3 GM/DL (ref 3.4–5)
ALP BLD-CCNC: 87 IU/L (ref 40–129)
ALT SERPL-CCNC: 21 U/L (ref 10–40)
AMPHETAMINES: NEGATIVE
ANION GAP SERPL CALCULATED.3IONS-SCNC: 18 MMOL/L (ref 4–16)
AST SERPL-CCNC: 22 IU/L (ref 15–37)
BACTERIA: NEGATIVE /HPF
BARBITURATE SCREEN URINE: NEGATIVE
BASE EXCESS MIXED: 20 (ref 0–1.2)
BASOPHILS ABSOLUTE: 0.1 K/CU MM
BASOPHILS RELATIVE PERCENT: 0.4 % (ref 0–1)
BENZODIAZEPINE SCREEN, URINE: NEGATIVE
BETA-HYDROXYBUTYRATE: 3.5 MG/DL (ref 0–3)
BILIRUB SERPL-MCNC: 0.5 MG/DL (ref 0–1)
BILIRUBIN URINE: NEGATIVE MG/DL
BLOOD, URINE: ABNORMAL
BUN BLDV-MCNC: 72 MG/DL (ref 6–23)
CALCIUM SERPL-MCNC: 9.2 MG/DL (ref 8.3–10.6)
CANNABINOID SCREEN URINE: NEGATIVE
CHLORIDE BLD-SCNC: 82 MMOL/L (ref 99–110)
CLARITY: CLEAR
CO2: 35 MMOL/L (ref 21–32)
COCAINE METABOLITE: NEGATIVE
COLOR: YELLOW
CREAT SERPL-MCNC: 2.2 MG/DL (ref 0.9–1.3)
DIFFERENTIAL TYPE: ABNORMAL
EOSINOPHILS ABSOLUTE: 0 K/CU MM
EOSINOPHILS RELATIVE PERCENT: 0.2 % (ref 0–3)
GFR AFRICAN AMERICAN: 37 ML/MIN/1.73M2
GFR NON-AFRICAN AMERICAN: 31 ML/MIN/1.73M2
GLUCOSE BLD-MCNC: 301 MG/DL (ref 70–99)
GLUCOSE BLD-MCNC: 318 MG/DL (ref 70–99)
GLUCOSE, URINE: >1000 MG/DL
HCO3 VENOUS: 46 MMOL/L (ref 19–25)
HCT VFR BLD CALC: 53.3 % (ref 42–52)
HEMOGLOBIN: 17.8 GM/DL (ref 13.5–18)
IMMATURE NEUTROPHIL %: 0.3 % (ref 0–0.43)
KETONES, URINE: NEGATIVE MG/DL
LEUKOCYTE ESTERASE, URINE: NEGATIVE
LYMPHOCYTES ABSOLUTE: 2 K/CU MM
LYMPHOCYTES RELATIVE PERCENT: 16.3 % (ref 24–44)
MAGNESIUM: 3.3 MG/DL (ref 1.8–2.4)
MCH RBC QN AUTO: 28.1 PG (ref 27–31)
MCHC RBC AUTO-ENTMCNC: 33.4 % (ref 32–36)
MCV RBC AUTO: 84.2 FL (ref 78–100)
MONOCYTES ABSOLUTE: 1.3 K/CU MM
MONOCYTES RELATIVE PERCENT: 11.2 % (ref 0–4)
MUCUS: ABNORMAL HPF
NITRITE URINE, QUANTITATIVE: NEGATIVE
NUCLEATED RBC %: 0 %
O2 SAT, VEN: 84.4 % (ref 50–70)
OPIATES, URINE: NEGATIVE
OXYCODONE: NEGATIVE
PCO2, VEN: 55 MMHG (ref 38–52)
PDW BLD-RTO: 16.2 % (ref 11.7–14.9)
PH VENOUS: 7.53 (ref 7.32–7.42)
PH, URINE: 6 (ref 5–8)
PHENCYCLIDINE, URINE: NEGATIVE
PLATELET # BLD: 219 K/CU MM (ref 140–440)
PMV BLD AUTO: 11.3 FL (ref 7.5–11.1)
PO2, VEN: 50 MMHG (ref 28–48)
POTASSIUM SERPL-SCNC: 2.4 MMOL/L (ref 3.5–5.1)
PROTEIN UA: NEGATIVE MG/DL
RBC # BLD: 6.33 M/CU MM (ref 4.6–6.2)
RBC URINE: 2 /HPF (ref 0–3)
SEGMENTED NEUTROPHILS ABSOLUTE COUNT: 8.6 K/CU MM
SEGMENTED NEUTROPHILS RELATIVE PERCENT: 71.6 % (ref 36–66)
SODIUM BLD-SCNC: 135 MMOL/L (ref 135–145)
SPECIFIC GRAVITY UA: <1.005 (ref 1–1.03)
SQUAMOUS EPITHELIAL: <1 /HPF
TOTAL IMMATURE NEUTOROPHIL: 0.03 K/CU MM
TOTAL NUCLEATED RBC: 0 K/CU MM
TOTAL PROTEIN: 7.4 GM/DL (ref 6.4–8.2)
TRICHOMONAS: ABNORMAL /HPF
TROPONIN T: 0.03 NG/ML
UROBILINOGEN, URINE: 0.2 MG/DL (ref 0.2–1)
WBC # BLD: 12 K/CU MM (ref 4–10.5)
WBC UA: <1 /HPF (ref 0–2)

## 2022-09-10 PROCEDURE — 80053 COMPREHEN METABOLIC PANEL: CPT

## 2022-09-10 PROCEDURE — 85025 COMPLETE CBC W/AUTO DIFF WBC: CPT

## 2022-09-10 PROCEDURE — 70450 CT HEAD/BRAIN W/O DYE: CPT

## 2022-09-10 PROCEDURE — 6370000000 HC RX 637 (ALT 250 FOR IP): Performed by: STUDENT IN AN ORGANIZED HEALTH CARE EDUCATION/TRAINING PROGRAM

## 2022-09-10 PROCEDURE — 99285 EMERGENCY DEPT VISIT HI MDM: CPT

## 2022-09-10 PROCEDURE — 82962 GLUCOSE BLOOD TEST: CPT

## 2022-09-10 PROCEDURE — 80307 DRUG TEST PRSMV CHEM ANLYZR: CPT

## 2022-09-10 PROCEDURE — 82010 KETONE BODYS QUAN: CPT

## 2022-09-10 PROCEDURE — 74176 CT ABD & PELVIS W/O CONTRAST: CPT

## 2022-09-10 PROCEDURE — 81001 URINALYSIS AUTO W/SCOPE: CPT

## 2022-09-10 PROCEDURE — 84484 ASSAY OF TROPONIN QUANT: CPT

## 2022-09-10 PROCEDURE — 72125 CT NECK SPINE W/O DYE: CPT

## 2022-09-10 PROCEDURE — 96365 THER/PROPH/DIAG IV INF INIT: CPT

## 2022-09-10 PROCEDURE — 6360000002 HC RX W HCPCS: Performed by: STUDENT IN AN ORGANIZED HEALTH CARE EDUCATION/TRAINING PROGRAM

## 2022-09-10 PROCEDURE — 82805 BLOOD GASES W/O2 SATURATION: CPT

## 2022-09-10 PROCEDURE — 83735 ASSAY OF MAGNESIUM: CPT

## 2022-09-10 PROCEDURE — 2580000003 HC RX 258: Performed by: STUDENT IN AN ORGANIZED HEALTH CARE EDUCATION/TRAINING PROGRAM

## 2022-09-10 PROCEDURE — 93005 ELECTROCARDIOGRAM TRACING: CPT | Performed by: NURSE PRACTITIONER

## 2022-09-10 PROCEDURE — 1200000000 HC SEMI PRIVATE

## 2022-09-10 PROCEDURE — 96366 THER/PROPH/DIAG IV INF ADDON: CPT

## 2022-09-10 PROCEDURE — 71045 X-RAY EXAM CHEST 1 VIEW: CPT

## 2022-09-10 RX ORDER — ONDANSETRON 4 MG/1
4 TABLET, ORALLY DISINTEGRATING ORAL EVERY 8 HOURS PRN
Status: DISCONTINUED | OUTPATIENT
Start: 2022-09-10 | End: 2022-09-13 | Stop reason: HOSPADM

## 2022-09-10 RX ORDER — ACETAMINOPHEN 325 MG/1
650 TABLET ORAL EVERY 6 HOURS PRN
Status: DISCONTINUED | OUTPATIENT
Start: 2022-09-10 | End: 2022-09-13 | Stop reason: HOSPADM

## 2022-09-10 RX ORDER — POTASSIUM CHLORIDE 7.45 MG/ML
10 INJECTION INTRAVENOUS ONCE
Status: COMPLETED | OUTPATIENT
Start: 2022-09-10 | End: 2022-09-10

## 2022-09-10 RX ORDER — POLYETHYLENE GLYCOL 3350 17 G/17G
17 POWDER, FOR SOLUTION ORAL DAILY PRN
Status: DISCONTINUED | OUTPATIENT
Start: 2022-09-10 | End: 2022-09-13 | Stop reason: HOSPADM

## 2022-09-10 RX ORDER — VITAMIN B COMPLEX
1 CAPSULE ORAL DAILY
Status: DISCONTINUED | OUTPATIENT
Start: 2022-09-11 | End: 2022-09-13 | Stop reason: HOSPADM

## 2022-09-10 RX ORDER — ATORVASTATIN CALCIUM 10 MG/1
20 TABLET, FILM COATED ORAL NIGHTLY
Status: DISCONTINUED | OUTPATIENT
Start: 2022-09-11 | End: 2022-09-13 | Stop reason: HOSPADM

## 2022-09-10 RX ORDER — 0.9 % SODIUM CHLORIDE 0.9 %
500 INTRAVENOUS SOLUTION INTRAVENOUS ONCE
Status: COMPLETED | OUTPATIENT
Start: 2022-09-10 | End: 2022-09-10

## 2022-09-10 RX ORDER — FINASTERIDE 5 MG/1
5 TABLET, FILM COATED ORAL DAILY
Status: DISCONTINUED | OUTPATIENT
Start: 2022-09-11 | End: 2022-09-13 | Stop reason: HOSPADM

## 2022-09-10 RX ORDER — TAMSULOSIN HYDROCHLORIDE 0.4 MG/1
0.4 CAPSULE ORAL DAILY
Status: DISCONTINUED | OUTPATIENT
Start: 2022-09-11 | End: 2022-09-13 | Stop reason: HOSPADM

## 2022-09-10 RX ORDER — POTASSIUM CHLORIDE 7.45 MG/ML
10 INJECTION INTRAVENOUS
Status: DISCONTINUED | OUTPATIENT
Start: 2022-09-10 | End: 2022-09-10 | Stop reason: CLARIF

## 2022-09-10 RX ORDER — EZETIMIBE 10 MG/1
10 TABLET ORAL DAILY
Status: DISCONTINUED | OUTPATIENT
Start: 2022-09-11 | End: 2022-09-13 | Stop reason: HOSPADM

## 2022-09-10 RX ORDER — POTASSIUM CHLORIDE 20 MEQ/1
40 TABLET, EXTENDED RELEASE ORAL ONCE
Status: COMPLETED | OUTPATIENT
Start: 2022-09-10 | End: 2022-09-10

## 2022-09-10 RX ORDER — DEXTROSE MONOHYDRATE 100 MG/ML
INJECTION, SOLUTION INTRAVENOUS CONTINUOUS PRN
Status: DISCONTINUED | OUTPATIENT
Start: 2022-09-10 | End: 2022-09-13 | Stop reason: HOSPADM

## 2022-09-10 RX ORDER — CLOPIDOGREL BISULFATE 75 MG/1
75 TABLET ORAL DAILY
Status: DISCONTINUED | OUTPATIENT
Start: 2022-09-11 | End: 2022-09-13 | Stop reason: HOSPADM

## 2022-09-10 RX ORDER — METOPROLOL SUCCINATE 50 MG/1
50 TABLET, EXTENDED RELEASE ORAL DAILY
Status: DISCONTINUED | OUTPATIENT
Start: 2022-09-11 | End: 2022-09-13 | Stop reason: HOSPADM

## 2022-09-10 RX ORDER — SODIUM CHLORIDE 9 MG/ML
INJECTION, SOLUTION INTRAVENOUS PRN
Status: DISCONTINUED | OUTPATIENT
Start: 2022-09-10 | End: 2022-09-13 | Stop reason: HOSPADM

## 2022-09-10 RX ORDER — RANOLAZINE 500 MG/1
500 TABLET, EXTENDED RELEASE ORAL 2 TIMES DAILY
Status: DISCONTINUED | OUTPATIENT
Start: 2022-09-11 | End: 2022-09-13 | Stop reason: HOSPADM

## 2022-09-10 RX ORDER — SODIUM CHLORIDE 0.9 % (FLUSH) 0.9 %
5-40 SYRINGE (ML) INJECTION PRN
Status: DISCONTINUED | OUTPATIENT
Start: 2022-09-10 | End: 2022-09-13 | Stop reason: HOSPADM

## 2022-09-10 RX ORDER — SODIUM CHLORIDE 0.9 % (FLUSH) 0.9 %
5-40 SYRINGE (ML) INJECTION 2 TIMES DAILY
Status: DISCONTINUED | OUTPATIENT
Start: 2022-09-11 | End: 2022-09-13 | Stop reason: HOSPADM

## 2022-09-10 RX ORDER — ACETAMINOPHEN 650 MG/1
650 SUPPOSITORY RECTAL EVERY 6 HOURS PRN
Status: DISCONTINUED | OUTPATIENT
Start: 2022-09-10 | End: 2022-09-13 | Stop reason: HOSPADM

## 2022-09-10 RX ORDER — ONDANSETRON 2 MG/ML
4 INJECTION INTRAMUSCULAR; INTRAVENOUS EVERY 6 HOURS PRN
Status: DISCONTINUED | OUTPATIENT
Start: 2022-09-10 | End: 2022-09-13 | Stop reason: HOSPADM

## 2022-09-10 RX ADMIN — POTASSIUM CHLORIDE 10 MEQ: 7.45 INJECTION INTRAVENOUS at 20:38

## 2022-09-10 RX ADMIN — POTASSIUM CHLORIDE 40 MEQ: 1500 TABLET, EXTENDED RELEASE ORAL at 19:36

## 2022-09-10 RX ADMIN — POTASSIUM CHLORIDE 10 MEQ: 7.45 INJECTION INTRAVENOUS at 21:46

## 2022-09-10 RX ADMIN — POTASSIUM CHLORIDE 10 MEQ: 7.45 INJECTION INTRAVENOUS at 19:43

## 2022-09-10 RX ADMIN — SODIUM CHLORIDE 500 ML: 9 INJECTION, SOLUTION INTRAVENOUS at 19:40

## 2022-09-10 ASSESSMENT — LIFESTYLE VARIABLES
HOW OFTEN DO YOU HAVE A DRINK CONTAINING ALCOHOL: NEVER
HOW MANY STANDARD DRINKS CONTAINING ALCOHOL DO YOU HAVE ON A TYPICAL DAY: PATIENT DOES NOT DRINK

## 2022-09-10 ASSESSMENT — ENCOUNTER SYMPTOMS
SHORTNESS OF BREATH: 0
ABDOMINAL PAIN: 0
VOMITING: 0
COUGH: 0
DIARRHEA: 0

## 2022-09-10 ASSESSMENT — PAIN - FUNCTIONAL ASSESSMENT: PAIN_FUNCTIONAL_ASSESSMENT: NONE - DENIES PAIN

## 2022-09-10 NOTE — ED PROVIDER NOTES
Emergency Department Encounter    Patient: Bárbara Torrez  MRN: [id number]  : 1960  Date of Evaluation: 2022  ED Provider:  Ivette Pantoja DO    Triage Chief Complaint:   Extremity Weakness, Fall, and Altered Mental Status    [place]:  Bárbara Torrez is a 64 y.o. male with a past medical history of atrial fibrillation, stroke, hypertension, hyperlipidemia, diabetes presenting to the ED with a history of fatigue and frequent falls patient fell again today in the bathroom. Patient states his legs giving out and he fell to the ground. Patient denies any loss of consciousness. No history of visual changes, nausea/vomiting, headache, slurring of speech, or focal neurologic deficits. Patient denies any chest pain or shortness of breath. No history of lightheadedness. Of note patient also complains of constipation, saying that he has not had a bowel movement in 4 days. Patient has umbilical hernia but denies any abdominal pain. ROS - see HPI, below listed is current ROS at time of my eval:  General:  No fevers, no chills, fatigue  Eyes:  No recent vison changes, no discharge  ENT:  No sore throat, no nasal congestion, no hearing changes  Cardiovascular:  No chest pain, no palpitations  Respiratory:  No shortness of breath, no cough, no wheezing  Gastrointestinal:  constipation  Musculoskeletal:  No muscle pain, no joint pain  Skin:  No rash, no pruritis, no easy bruising  Neurologic:  No speech problems, no headache, no extremity numbness, no extremity tingling, no extremity weakness  Psychiatric:  No anxiety  Genitourinary:  No dysuria, no hematuria  Endocrine:  No unexpected weight gain, no unexpected weight loss  Extremities:  no edema, no pain    Past Medical History:   Diagnosis Date    Atrial fibrillation (HCC)     Cerebral artery occlusion with cerebral infarction [facility])     History of cardiac cath 2017    --RCA has mid 50% stenosis and PLB branch has 70% stenosis noted. LVEDP very high    Hyperlipidemia     Hypertension     Type II or unspecified type diabetes mellitus without mention of complication, not stated as uncontrolled     Diagnsed about 1998    Unspecified sleep apnea      Past Surgical History:   Procedure Laterality Date    CARDIAC CATHETERIZATION      TONSILLECTOMY      AT 13YEARS OLD     Family History   Problem Relation Age of Onset    Diabetes Mother     Diabetes Father     Cancer Father     Cancer Maternal Grandfather         PROSTATE CANCER     Social History     Socioeconomic History    Marital status:      Spouse name: Not on file    Number of children: Not on file    Years of education: Not on file    Highest education level: Not on file   Occupational History    Not on file   Tobacco Use    Smoking status: Never    Smokeless tobacco: Never   Vaping Use    Vaping Use: Never used   Substance and Sexual Activity    Alcohol use: No    Drug use: No    Sexual activity: Yes   Other Topics Concern    Not on file   Social History Narrative    Not on file     Social Determinants of Health     Financial Resource Strain: Not on file   Food Insecurity: Not on file   Transportation Needs: Not on file   Physical Activity: Not on file   Stress: Not on file   Social Connections: Not on file   Intimate Partner Violence: Not on file   Housing Stability: Not on file     Current Facility-Administered Medications   Medication Dose Route Frequency Provider Last Rate Last Admin    clopidogrel (PLAVIX) tablet 75 mg  75 mg Oral Daily Natasha Rim, APRN - CNP        ezetimibe (ZETIA) tablet 10 mg  10 mg Oral Daily Natasha Rim, APRN - CNP        finasteride (PROSCAR) tablet 5 mg  5 mg Oral Daily Natahsa Rim, APRN - CNP        b complex vitamins capsule 1 capsule  1 capsule Oral Daily Natasha Rim, APRN - CNP        insulin regular human (humuLIN R U-500) 500 UNIT/ML concentrated injection vial 0-30 Units (Patient Supplied)  0-30 Units SubCUTAneous TID AC Natasha Rim, APRN - CNP        metoprolol succinate (TOPROL XL) extended release tablet 50 mg  50 mg Oral Daily Arley Cushing, APRN - CNP        ranolazine [facility]) extended release tablet 500 mg  500 mg Oral BID Arley Cushing, APRN - CNP   500 mg at 09/11/22 0053    atorvastatin (LIPITOR) tablet 20 mg  20 mg Oral Nightly Arley Cushing, APRN - CNP   20 mg at 09/11/22 0053    tamsulosin (FLOMAX) capsule 0.4 mg  0.4 mg Oral Daily Arley Cushing, APRN - CNP        glucose chewable tablet 16 g  4 tablet Oral PRN Arley Cushing, APRN - CNP        dextrose bolus 10% 125 mL  125 mL IntraVENous PRN Arley Cushing, APRN - CNP        Or    dextrose bolus 10% 250 mL  250 mL IntraVENous PRN Arley Cushing, APRN - CNP        glucagon (rDNA) injection 1 mg  1 mg SubCUTAneous PRN Arley Cushing, APRN - CNP        dextrose 10 % infusion   IntraVENous Continuous PRN Arley Cushing, APRN - CNP        sodium chloride flush 0.9 % injection 5-40 mL  5-40 mL IntraVENous BID Arley Cushing, APRN - CNP   10 mL at 09/11/22 0102    sodium chloride flush 0.9 % injection 5-40 mL  5-40 mL IntraVENous PRN Arley Cushing, APRN - CNP        0.9 % sodium chloride infusion   IntraVENous PRN Arley Cushing, APRN - CNP        ondansetron (ZOFRAN-ODT) disintegrating tablet 4 mg  4 mg Oral Q8H PRN Arley Cushing, APRN - CNP        Or    ondansetron [facility]) injection 4 mg  4 mg IntraVENous Q6H PRN Arley Cushing, APRN - CNP        acetaminophen (TYLENOL) tablet 650 mg  650 mg Oral Q6H PRN Arley Cushing, APRN - CNP        Or    acetaminophen (TYLENOL) suppository 650 mg  650 mg Rectal Q6H PRN Arley Cushing, APRN - CNP        polyethylene glycol (GLYCOLAX) packet 17 g  17 g Oral Daily PRN Arley Cushing, APRN - CNP        rivaroxaban (XARELTO) tablet 20 mg  20 mg Oral Dinner Arley Cushing, APRN - CNP   20 mg at 09/11/22 0102     No Known Allergies    Nursing Notes Reviewed    Physical Exam:  Triage VS:    ED Triage Vitals [09/10/22 1710]   Enc Vitals Group /72      Heart Rate 78      Resp 12      Temp 97.9 °F (36.6 °C)      Temp Source Oral      SpO2 93 %      Weight (!) 347 lb (157.4 kg)      Height 6' (1.829 m)      Head Circumference       Peak Flow       Pain Score       Pain Loc       Pain Edu? Excl. in [address]? My pulse ox interpretation is - normal    General appearance:  No acute distress. Skin:  Warm. Dry. Eye:  Extraocular movements intact. Ears, nose, mouth and throat:  Oral mucosa moist   Neck:  Trachea midline. Extremity:  No swelling. Normal ROM     Heart:  Regular rate and rhythm, normal S1 & S2, no extra heart sounds. Perfusion:  intact  Respiratory:  Lungs clear to auscultation bilaterally. Respirations nonlabored. Abdominal: soft, mildly distended, umbilical hernia, no tenderness. no organomegaly, or pulsations. Negative mendoza, mcburney or rovsing. Back:  No CVA tenderness to palpation     Neurological:  Alert and oriented times 3. No focal neuro deficits. No facial droop, arm drift or slurring of speech.  Negative finger-to-nose, and heel-to-shin          Psychiatric:  Appropriate    I have reviewed and interpreted all of the currently available lab results from this visit (if applicable):  Results for orders placed or performed during the hospital encounter of 09/10/22   CBC with Auto Differential   Result Value Ref Range    WBC 12.0 (H) 4.0 - 10.5 K/CU MM    RBC 6.33 (H) 4.6 - 6.2 M/CU MM    Hemoglobin 17.8 13.5 - 18.0 GM/DL    Hematocrit 53.3 (H) 42 - 52 %    MCV 84.2 78 - 100 FL    MCH 28.1 27 - 31 PG    MCHC 33.4 32.0 - 36.0 %    RDW 16.2 (H) 11.7 - 14.9 %    Platelets 107 316 - 199 K/CU MM    MPV 11.3 (H) 7.5 - 11.1 FL    Differential Type AUTOMATED DIFFERENTIAL     Segs Relative 71.6 (H) 36 - 66 %    Lymphocytes % 16.3 (L) 24 - 44 %    Monocytes % 11.2 (H) 0 - 4 %    Eosinophils % 0.2 0 - 3 %    Basophils % 0.4 0 - 1 %    Segs Absolute 8.6 K/CU MM    Lymphocytes Absolute 2.0 K/CU MM    Monocytes Absolute 1.3 K/CU MM    Eosinophils Absolute 0.0 K/CU MM    Basophils Absolute 0.1 K/CU MM    Nucleated RBC % 0.0 %    Total Nucleated RBC 0.0 K/CU MM    Total Immature Neutrophil 0.03 K/CU MM    Immature Neutrophil % 0.3 0 - 0.43 %   CMP   Result Value Ref Range    Sodium 135 135 - 145 MMOL/L    Potassium 2.4 (LL) 3.5 - 5.1 MMOL/L    Chloride 82 (L) 99 - 110 mMol/L    CO2 35 (H) 21 - 32 MMOL/L    BUN 72 (H) 6 - 23 MG/DL    Creatinine 2.2 (H) 0.9 - 1.3 MG/DL    Glucose 318 (H) 70 - 99 MG/DL    Calcium 9.2 8.3 - 10.6 MG/DL    Albumin 4.3 3.4 - 5.0 GM/DL    Total Protein 7.4 6.4 - 8.2 GM/DL    Total Bilirubin 0.5 0.0 - 1.0 MG/DL    ALT 21 10 - 40 U/L    AST 22 15 - 37 IU/L    Alkaline Phosphatase 87 40 - 129 IU/L    GFR Non- 31 (L) >60 mL/min/1.73m2    GFR  37 (L) >60 mL/min/1.73m2    Anion Gap 18 (H) 4 - 16   Magnesium   Result Value Ref Range    Magnesium 3.3 (H) 1.8 - 2.4 mg/dl   Troponin   Result Value Ref Range    Troponin T 0.033 (H) <0.01 NG/ML   Urinalysis with Reflex to Culture    Specimen: Urine   Result Value Ref Range    Color, UA YELLOW YELLOW    Clarity, UA CLEAR CLEAR    Glucose, Urine >1,000 (A) NEGATIVE MG/DL    Bilirubin Urine NEGATIVE NEGATIVE MG/DL    Ketones, Urine NEGATIVE NEGATIVE MG/DL    Specific Gravity, UA <1.005 1.001 - 1.035    Blood, Urine SMALL (A) NEGATIVE    pH, Urine 6.0 5.0 - 8.0    Protein, UA NEGATIVE NEGATIVE MG/DL    Urobilinogen, Urine 0.2 0.2 - 1.0 MG/DL    Nitrite Urine, Quantitative NEGATIVE NEGATIVE    Leukocyte Esterase, Urine NEGATIVE NEGATIVE    RBC, UA 2 0 - 3 /HPF    WBC, UA <1 0 - 2 /HPF    Bacteria, UA NEGATIVE NEGATIVE /HPF    Squam Epithel, UA <1 /HPF    Mucus, UA RARE (A) NEGATIVE HPF    Trichomonas, UA NONE SEEN NONE SEEN /HPF   Drug screen multi urine   Result Value Ref Range    Cannabinoid Scrn, Ur NEGATIVE NEGATIVE    Amphetamines NEGATIVE NEGATIVE    Cocaine Metabolite NEGATIVE NEGATIVE    Benzodiazepine Screen, Urine NEGATIVE NEGATIVE Barbiturate Screen, Ur NEGATIVE NEGATIVE    Opiates, Urine NEGATIVE NEGATIVE    Phencyclidine, Urine NEGATIVE NEGATIVE    Oxycodone NEGATIVE NEGATIVE   Blood Gas, Venous   Result Value Ref Range    pH, John 7.53 (H) 7.32 - 7.42    pCO2, John 55 (H) 38 - 52 mmHG    pO2, John 50 (H) 28 - 48 mmHG    Base Exc, Mixed 20 (H) 0 - 1.2    HCO3, Venous 46.0 (H) 19 - 25 MMOL/L    O2 Sat, John 84.4 (H) 50 - 70 %   Beta-Hydroxybutyrate   Result Value Ref Range    Beta-Hydroxybutyrate 3.5 (H) 0.0 - 3.0 MG/DL   POCT Glucose   Result Value Ref Range    POC Glucose 301 (H) 70 - 99 MG/DL   POCT Glucose   Result Value Ref Range    POC Glucose 170 (H) 70 - 99 MG/DL   POCT Glucose   Result Value Ref Range    POC Glucose 132 (H) 70 - 99 MG/DL   EKG 12 Lead   Result Value Ref Range    Ventricular Rate 78 BPM    Atrial Rate 78 BPM    P-R Interval 158 ms    QRS Duration 116 ms    Q-T Interval 520 ms    QTc Calculation (Bazett) 592 ms    P Axis 22 degrees    R Axis -32 degrees    T Axis 205 degrees    Diagnosis       Normal sinus rhythm  Possible Left atrial enlargement  Left axis deviation  Inferior infarct , age undetermined  ST & T wave abnormality, consider anterolateral ischemia  Prolonged QT  Abnormal ECG  When compared with ECG of 24-OCT-2021 23:57,  Significant changes have occurred        Radiographs (if obtained):  Radiologist's Report Reviewed:  No results found. EKG (if obtained): (All EKG's are interpreted by myself in the absence of a cardiologist)      MDM:  77-year-old male with a past medical history of atrial fibrillation, stroke, hypertension, hyperlipidemia, diabetes presenting to the ED with a history of fatigue and frequent falls patient fell again today in the bathroom. Patient denies any loss of consciousness. On examination, fast examination for stroke is negative. Neuro exam is unremarkable. Patient has negative finger-to-nose and negative heel-to-shin evaluations.   Patient has a moderately distended abdomen with an umbilical hernia. Patient says he has not had a bowel movement in the past couple of days which is unusual.    Laboratory evaluation significant for elevated white count at 12.0, potassium is low at 2.4, creatinine is elevated at 2.2 with creatinine BUN ratio greater than 20 at 2.2/72. Glucose is 318 but CO2 is 35. Patient also has elevated magnesium at 3.3. Troponin is elevated at 0.33, EKG does not show any ST elevation. CT scan of the head and cervical spine are unremarkable. CT abdomen significant for paraumbilical hernia without any obvious signs of obstruction. Chest x-ray is unremarkable as well. Patient is given potassium replacements and IV fluid. Patient already had insulin at home. Patient admitted to the hospitalist.    Clinical Impression:  1. Fall, subsequent encounter    2. Hypokalemia    3. JESUS (acute kidney injury) ([address]    4. Hyperglycemia    5. Hypermagnesemia    6. Elevated troponin      Disposition referral (if applicable):  No follow-up provider specified. Disposition medications (if applicable):  Current Discharge Medication List        ED Provider Disposition Time  DISPOSITION Admitted 09/10/2022 09:36:23 PM      Comment: Please note this report has been produced using speech recognition software and may contain errors related to that system including errors in grammar, punctuation, and spelling, as well as words and phrases that may be inappropriate. Efforts were made to edit the dictations.         Rickey Sumner DO  09/11/22 [id number]

## 2022-09-11 LAB
ANION GAP SERPL CALCULATED.3IONS-SCNC: 11 MMOL/L (ref 4–16)
BASOPHILS ABSOLUTE: 0 K/CU MM
BASOPHILS RELATIVE PERCENT: 0.5 % (ref 0–1)
BUN BLDV-MCNC: 64 MG/DL (ref 6–23)
CALCIUM SERPL-MCNC: 8.8 MG/DL (ref 8.3–10.6)
CHLORIDE BLD-SCNC: 87 MMOL/L (ref 99–110)
CHLORIDE URINE RANDOM: 10 MMOL/L (ref 43–210)
CO2: 39 MMOL/L (ref 21–32)
CREAT SERPL-MCNC: 1.7 MG/DL (ref 0.9–1.3)
CREATININE URINE: 86.6 MG/DL
DIFFERENTIAL TYPE: ABNORMAL
EKG ATRIAL RATE: 78 BPM
EKG DIAGNOSIS: NORMAL
EKG P AXIS: 22 DEGREES
EKG P-R INTERVAL: 158 MS
EKG Q-T INTERVAL: 520 MS
EKG QRS DURATION: 116 MS
EKG QTC CALCULATION (BAZETT): 592 MS
EKG R AXIS: -32 DEGREES
EKG T AXIS: 205 DEGREES
EKG VENTRICULAR RATE: 78 BPM
EOSINOPHILS ABSOLUTE: 0.1 K/CU MM
EOSINOPHILS RELATIVE PERCENT: 0.7 % (ref 0–3)
ESTIMATED AVERAGE GLUCOSE: 214 MG/DL
GFR AFRICAN AMERICAN: 50 ML/MIN/1.73M2
GFR NON-AFRICAN AMERICAN: 41 ML/MIN/1.73M2
GLUCOSE BLD-MCNC: 132 MG/DL (ref 70–99)
GLUCOSE BLD-MCNC: 157 MG/DL (ref 70–99)
GLUCOSE BLD-MCNC: 170 MG/DL (ref 70–99)
GLUCOSE BLD-MCNC: 311 MG/DL (ref 70–99)
GLUCOSE BLD-MCNC: 330 MG/DL (ref 70–99)
GLUCOSE BLD-MCNC: 345 MG/DL (ref 70–99)
GLUCOSE BLD-MCNC: 51 MG/DL (ref 70–99)
GLUCOSE BLD-MCNC: 64 MG/DL (ref 70–99)
GLUCOSE BLD-MCNC: 72 MG/DL (ref 70–99)
GLUCOSE BLD-MCNC: 76 MG/DL (ref 70–99)
HBA1C MFR BLD: 9.1 % (ref 4.2–6.3)
HCT VFR BLD CALC: 51.8 % (ref 42–52)
HEMOGLOBIN: 16.8 GM/DL (ref 13.5–18)
IMMATURE NEUTROPHIL %: 0.2 % (ref 0–0.43)
LYMPHOCYTES ABSOLUTE: 1.4 K/CU MM
LYMPHOCYTES RELATIVE PERCENT: 16.5 % (ref 24–44)
MAGNESIUM: 3.1 MG/DL (ref 1.8–2.4)
MCH RBC QN AUTO: 27.6 PG (ref 27–31)
MCHC RBC AUTO-ENTMCNC: 32.4 % (ref 32–36)
MCV RBC AUTO: 85.1 FL (ref 78–100)
MONOCYTES ABSOLUTE: 1.1 K/CU MM
MONOCYTES RELATIVE PERCENT: 13.7 % (ref 0–4)
NUCLEATED RBC %: 0 %
OSMOLALITY URINE: 539 MOS/L (ref 292–1090)
PDW BLD-RTO: 15.7 % (ref 11.7–14.9)
PLATELET # BLD: 205 K/CU MM (ref 140–440)
PMV BLD AUTO: 11.5 FL (ref 7.5–11.1)
POTASSIUM SERPL-SCNC: 2.5 MMOL/L (ref 3.5–5.1)
POTASSIUM, UR: 27.8 MMOL/L (ref 22–119)
RBC # BLD: 6.09 M/CU MM (ref 4.6–6.2)
SEGMENTED NEUTROPHILS ABSOLUTE COUNT: 5.7 K/CU MM
SEGMENTED NEUTROPHILS RELATIVE PERCENT: 68.4 % (ref 36–66)
SODIUM BLD-SCNC: 137 MMOL/L (ref 135–145)
SODIUM URINE: 14 MMOL/L (ref 35–167)
TOTAL IMMATURE NEUTOROPHIL: 0.02 K/CU MM
TOTAL NUCLEATED RBC: 0 K/CU MM
TROPONIN T: 0.03 NG/ML
WBC # BLD: 8.3 K/CU MM (ref 4–10.5)

## 2022-09-11 PROCEDURE — 93010 ELECTROCARDIOGRAM REPORT: CPT | Performed by: INTERNAL MEDICINE

## 2022-09-11 PROCEDURE — 6360000002 HC RX W HCPCS: Performed by: INTERNAL MEDICINE

## 2022-09-11 PROCEDURE — 83935 ASSAY OF URINE OSMOLALITY: CPT

## 2022-09-11 PROCEDURE — 84540 ASSAY OF URINE/UREA-N: CPT

## 2022-09-11 PROCEDURE — 82436 ASSAY OF URINE CHLORIDE: CPT

## 2022-09-11 PROCEDURE — 84133 ASSAY OF URINE POTASSIUM: CPT

## 2022-09-11 PROCEDURE — 83036 HEMOGLOBIN GLYCOSYLATED A1C: CPT

## 2022-09-11 PROCEDURE — 6370000000 HC RX 637 (ALT 250 FOR IP): Performed by: INTERNAL MEDICINE

## 2022-09-11 PROCEDURE — 1200000000 HC SEMI PRIVATE

## 2022-09-11 PROCEDURE — 85025 COMPLETE CBC W/AUTO DIFF WBC: CPT

## 2022-09-11 PROCEDURE — 83735 ASSAY OF MAGNESIUM: CPT

## 2022-09-11 PROCEDURE — 84300 ASSAY OF URINE SODIUM: CPT

## 2022-09-11 PROCEDURE — 82962 GLUCOSE BLOOD TEST: CPT

## 2022-09-11 PROCEDURE — 84484 ASSAY OF TROPONIN QUANT: CPT

## 2022-09-11 PROCEDURE — 2580000003 HC RX 258: Performed by: NURSE PRACTITIONER

## 2022-09-11 PROCEDURE — 80048 BASIC METABOLIC PNL TOTAL CA: CPT

## 2022-09-11 PROCEDURE — 6370000000 HC RX 637 (ALT 250 FOR IP): Performed by: NURSE PRACTITIONER

## 2022-09-11 PROCEDURE — 82570 ASSAY OF URINE CREATININE: CPT

## 2022-09-11 PROCEDURE — 36415 COLL VENOUS BLD VENIPUNCTURE: CPT

## 2022-09-11 PROCEDURE — 94761 N-INVAS EAR/PLS OXIMETRY MLT: CPT

## 2022-09-11 RX ORDER — POTASSIUM CHLORIDE 20 MEQ/1
40 TABLET, EXTENDED RELEASE ORAL ONCE
Status: COMPLETED | OUTPATIENT
Start: 2022-09-11 | End: 2022-09-11

## 2022-09-11 RX ORDER — POTASSIUM CHLORIDE 7.45 MG/ML
10 INJECTION INTRAVENOUS
Status: COMPLETED | OUTPATIENT
Start: 2022-09-11 | End: 2022-09-11

## 2022-09-11 RX ADMIN — POTASSIUM CHLORIDE 10 MEQ: 7.45 INJECTION INTRAVENOUS at 12:44

## 2022-09-11 RX ADMIN — POTASSIUM CHLORIDE 40 MEQ: 1500 TABLET, EXTENDED RELEASE ORAL at 10:39

## 2022-09-11 RX ADMIN — POTASSIUM CHLORIDE 10 MEQ: 7.45 INJECTION INTRAVENOUS at 17:12

## 2022-09-11 RX ADMIN — RANOLAZINE 500 MG: 500 TABLET, EXTENDED RELEASE ORAL at 00:53

## 2022-09-11 RX ADMIN — ATORVASTATIN CALCIUM 20 MG: 10 TABLET, FILM COATED ORAL at 00:53

## 2022-09-11 RX ADMIN — SODIUM CHLORIDE, PRESERVATIVE FREE 10 ML: 5 INJECTION INTRAVENOUS at 22:48

## 2022-09-11 RX ADMIN — ATORVASTATIN CALCIUM 20 MG: 10 TABLET, FILM COATED ORAL at 22:48

## 2022-09-11 RX ADMIN — RANOLAZINE 500 MG: 500 TABLET, EXTENDED RELEASE ORAL at 22:48

## 2022-09-11 RX ADMIN — SODIUM CHLORIDE, PRESERVATIVE FREE 10 ML: 5 INJECTION INTRAVENOUS at 01:02

## 2022-09-11 RX ADMIN — METOPROLOL SUCCINATE 50 MG: 50 TABLET, EXTENDED RELEASE ORAL at 09:44

## 2022-09-11 RX ADMIN — SODIUM CHLORIDE 25 ML/HR: 9 INJECTION, SOLUTION INTRAVENOUS at 10:44

## 2022-09-11 RX ADMIN — Medication 16 G: at 22:47

## 2022-09-11 RX ADMIN — TAMSULOSIN HYDROCHLORIDE 0.4 MG: 0.4 CAPSULE ORAL at 09:43

## 2022-09-11 RX ADMIN — POTASSIUM CHLORIDE 10 MEQ: 7.45 INJECTION INTRAVENOUS at 10:47

## 2022-09-11 RX ADMIN — RANOLAZINE 500 MG: 500 TABLET, EXTENDED RELEASE ORAL at 09:43

## 2022-09-11 RX ADMIN — FINASTERIDE 5 MG: 5 TABLET, FILM COATED ORAL at 09:43

## 2022-09-11 RX ADMIN — RIVAROXABAN 20 MG: 20 TABLET, FILM COATED ORAL at 01:02

## 2022-09-11 RX ADMIN — SODIUM CHLORIDE, PRESERVATIVE FREE 10 ML: 5 INJECTION INTRAVENOUS at 09:48

## 2022-09-11 RX ADMIN — Medication 1 CAPSULE: at 09:44

## 2022-09-11 RX ADMIN — EZETIMIBE 10 MG: 10 TABLET ORAL at 09:43

## 2022-09-11 RX ADMIN — Medication 16 G: at 23:06

## 2022-09-11 RX ADMIN — CLOPIDOGREL BISULFATE 75 MG: 75 TABLET ORAL at 09:43

## 2022-09-11 RX ADMIN — POTASSIUM CHLORIDE 10 MEQ: 7.45 INJECTION INTRAVENOUS at 18:27

## 2022-09-11 RX ADMIN — RIVAROXABAN 20 MG: 20 TABLET, FILM COATED ORAL at 17:15

## 2022-09-11 RX ADMIN — POTASSIUM CHLORIDE 10 MEQ: 7.45 INJECTION INTRAVENOUS at 14:00

## 2022-09-11 ASSESSMENT — ENCOUNTER SYMPTOMS
ABDOMINAL PAIN: 0
NAUSEA: 0
VOMITING: 0
CONSTIPATION: 0
SINUS PRESSURE: 0
WHEEZING: 0
SINUS PAIN: 0
BACK PAIN: 0
SORE THROAT: 0
DIARRHEA: 0
SHORTNESS OF BREATH: 0
CHEST TIGHTNESS: 0
EYE ITCHING: 0
COUGH: 0
EYE PAIN: 0
EYE REDNESS: 0

## 2022-09-11 NOTE — H&P
History and Physical      Name:  Salima Roach /Age/Sex: 1960  (64 y.o. male)   MRN & CSN:  [id number] & [id number] Admission Date/Time: 9/10/2022  4:58 PM   Location:  [id number]-03 PCP: Milana Wilks MD       Hospital Day: 1     Attending physician: Dr. Sabrina Truong and Plan:   Salima Roach is a 64 y.o.  male  who presents with frequent falls, lightheadedness symptoms started after changing diuretics    Assessment and plan:     JESUS superimposed on CKD stage III: Patient follows Dr. Dominic Moulton, nephrology outpatient. Patient has been having increased swelling and anasarca up to abdomen wife states recently diuretics was increased initiate quadruple dose patient was not feeling well today changed to double dose states patient has been lightheaded and fall since. Due to history of cardiomyopathy will defer fluid management to nephrology as patient can easily go into heart failure. Patient does endorse increased urine output with increased diuretic. Patient also on alpha-blocker and SGLT2.   -Inpatient  -Consult to nephrology  -Normal saline 500 mL bolus given in ER we will defer further fluid management to nephrology appreciate assistance  -BMP reflex to mag daily  -Avoid nephrotoxic agents  -Intake and output  -Adjust medications for GFR  -Elevate troponin 0.033 follow with serial cardiac enzymes  -Hold diuretics on admission pending further evaluation    Electrolyte balance likely secondary to diuretic use  Hypokalemia 2.4  Hypomagnesemia 3.3  Hypochloremia 95  -Potassium replaced in the emergency room  -Monitor BMP replete as needed  -Monitor magnesium daily    Chronic diastolic heart failure : Patient managed on torsemide and Zaroxolyn. Last echo 10/25/2021-EF 50%, mild LVH, follows Dr. Christopher Crow cardiology  Held on admission till further evaluation by nephrology. Patient does not appear exacerbation.   No increased swelling.  -Intake and output  -Fluid restriction  -Nephrology following  -Diet restriction low salt, 2 L fluid  -Daily weights  -Continue PTA regimen,  hold diuretics    Frequent falls: possibly secondary to hypovolemia. Wife states patient has fallen 3 times since last week. CT head negative, CT cervical spine negative  -Fall precautions  -Orthostatic blood pressure pending  -PT OT eval and treat    Diabetes mellitus type 2: Uncontrolled on insulin. Patient is on U5 100. Glipizide and dapagliflozin , follows endocrinology outpatient. Beta hydroxybutyrate 3.5, glucose 318. Hemoglobin A1c 9.5 (5/20)  -SSI per home regimen  -U500 insulin wife to bring in patient's  -ADA diet  -Hypoglycemic pathway  -Endocrinology consulted in morning    Paroxysmal atrial fibrillation: Anticoagulant Xarelto, rate controlled with metoprolol  -Continue PTA regimen  -Telemetry monitoring  -EKG sinus rhythm on admission    Paraumbilical hernia-measuring 10.3 cm noted on CT and pelvis. Increased in size when compared to previous exam.  Most contains fat. Short knuckle of small bowel extends into that no evidence of obstruction  -Recommend follow-up with surgery outpatient    MARINO: Continue home CPAP regimen    BPH: Continue tamsulosin and finasteride outpatient regimen    Hyperlipidemia: Continue statin     History of CVA: Continue Plavix, aspirin and statin    PVD    Chronic Conditions: Continue all home medications except as stated above or contraindicated. Obesity class III BMI 47.06: kg/m2 Life style [id number] PCP for longitudinal care    Disposition: Inpatient. Patient was accepted for continue evaluation and treatment and improvement of clinical symptoms. Discussed assessment and treatment plan with the admitting and supervising physician who agrees with the plan.        Diet ADA heart healthy low-salt fluid restriction   DVT Prophylaxis [] Lovenox, []  Heparin, [] SCDs, [] Warfarin  [x] NOAC     GI Prophylaxis [x] PPI,  [] H2 Blocker,  [] Carafate,  [] Diet/Tube Feeds   Code Status Full  ADM - spouse Kristy Lopez      History of Present Illness:     Chief Complaint: Frequent falls,  Piyush Gao is a 64 y.o.  male, with past medical history significant for CHF, CKD, diabetes mellitus, hypertension, hyperlipidemia history of CVA, BPH, PAF who presented to the emergency room with complaint of lightheadedness and frequent falls. The present condition started over the past week and a half, wife is at bedside who helps with history. Patient and wife states he was having increased edema going up to his waist nephrologist was concerned about too much fluid and increased his diuretics wife states initially they were quadrupled and patient did not feel well when they call back to nephrology states he just doubled the diuretic patient does endorse increased urine output he does state from lying to sitting he will feel lightheaded. He denies any chest pain or palpitations, no numbness tingling or focal weaknesses in extremities no headache or vision changes denies any slurred speech. Patient wife at bedside states she did not notice any change with his speech or strokelike symptoms as he had a stroke in the past.  Patient denies any new swelling in his extremities. Denies any nausea, vomiting abdominal pain, fever, chills or body aches.,  Chest pain, cough or shortness of breath. On Examination, patient sitting up in bed no acute distress noted. Patient states when he first wakes up in the morning he feels pretty good but as the day goes on he has not been feeling well. Wife states he has had 3 falls in the past week. Patient denies any head injury he is anticoagulant Xarelto. .At the ED, vital signs;T97.9,HR 78, RR 12, /72 mm/hg,SPO2 93% RA. Significant laboratories were the following: Potassium 2.4, chloride 82, CO2 35, BUN 72, creatinine 2.2, anion gap 18, GFR 31, magnesium 3.3, glucose 218, troponin 0.033, WBC 12.   VBG pH 7.53, PCO2 55, PO2 50, HCO3 40  EKG-sinus rhythm, heart rate 78, QTC prolonged at 592. Reviewed at bedside  Imaging reviewed. The patient was brought to the ED and was subsequently admitted for  further evaluation. and management          Review of Systems   Constitutional:  Negative for chills, fatigue and fever. HENT:  Negative for congestion. Respiratory:  Negative for cough and shortness of breath. Cardiovascular:  Negative for chest pain, palpitations and leg swelling. Gastrointestinal:  Negative for abdominal pain, diarrhea and vomiting. Genitourinary:  Positive for frequency and urgency. Negative for dysuria. Musculoskeletal: Negative. Skin: Negative. Neurological:  Positive for dizziness, weakness and light-headedness. Negative for syncope, facial asymmetry, speech difficulty and headaches. Hematological:  Bruises/bleeds easily. Psychiatric/Behavioral: Negative. Objective: Intake/Output Summary (Last 24 hours) at 9/10/2022 2136  Last data filed at 9/10/2022 2040  Gross per 24 hour   Intake 90.04 ml   Output --   Net 90.04 ml      Vitals:   Vitals:    09/10/22 2217   BP: 118/71   Pulse: 66   Resp: 10   Temp:    SpO2: 94%     Physical Exam:   GEN- Awake male, NAD, sitting up in bed, appears to be stated age. EYES- Pupils are equally round. No scleral erythema, discharge, or conjunctivitis. HENT- Mucous membranes are dry,  NECK- Supple, no apparent thyromegaly or masses. RESP-Clear to auscultation, no wheezes, rales or rhonchi. Symmetric chest movement while on room air. CARDIO/VASC-  Regular rate and rhythm,  Peripheral pulses equal bilaterally and palpable. GI- Abdomen is soft, no tenderness, periumbilical hernia noted reducible,  MSK- No gross joint deformities. EXTREMITIES- pulses intact, no swelling, no calf tenderness  SKIN- Normal coloration, warm, dry and flaky.   Venous stasis changes bilateral lower extremities  NEURO-Cranial nerves appear grossly intact, normal speech, no lateralizing weakness. PSYCH-Awake, alert, oriented x 4. Past Medical History:      Past Medical History:   Diagnosis Date    Atrial fibrillation ([facility] [address]     Cerebral artery occlusion with cerebral infarction [facility])     History of cardiac cath 2017    --RCA has mid 50% stenosis and PLB branch has 70% stenosis noted. LVEDP very high    Hyperlipidemia     Hypertension     Type II or unspecified type diabetes mellitus without mention of complication, not stated as uncontrolled     Diagnsed about 1998    Unspecified sleep apnea      PSHX:  has a past surgical history that includes Tonsillectomy and Cardiac catheterization. Allergies: No Known Allergies    FAM HX: family history includes Cancer in his father and maternal grandfather; Diabetes in his father and mother. Soc HX:   Social History     Socioeconomic History    Marital status:      Spouse name: None    Number of children: None    Years of education: None    Highest education level: None   Tobacco Use    Smoking status: Never    Smokeless tobacco: Never   Vaping Use    Vaping Use: Never used   Substance and Sexual Activity    Alcohol use: No    Drug use: No    Sexual activity: Yes       Social and family history reviewed with patient/family. Medications:     Home Medication   Prior to Admission medications    Medication Sig Start Date End Date Taking?  Authorizing Provider   Torsemide 40 MG TABS Take 40 mg by mouth 2 times daily 8/25/22  Yes Antonino Emery MD   metOLazone (ZAROXOLYN) 2.5 MG tablet Take 1 tablet by mouth 2 times daily 8/25/22  Yes Seamus Cartagena MD   dapagliflozin (FARXIGA) 5 MG tablet Take 5 mg by mouth every morning   Yes Historical Provider, MD   finasteride (PROSCAR) 5 MG tablet Take 5 mg by mouth daily  12/3/21  Yes Historical Provider, MD   tamsulosin (FLOMAX) 0.4 MG capsule Take 1 capsule by mouth daily 11/3/21  Yes Charles Murray MD   glipiZIDE (GLUCOTROL) 5 MG tablet Take 0.5 tablets by mouth 2 times daily 11/2/21  Yes Charles Paniagua MD   ezetimibe (ZETIA) 10 MG tablet Take 10 mg by mouth daily  5/24/21  Yes Historical Provider, MD   simvastatin (ZOCOR) 40 MG tablet Take 40 mg by mouth daily  5/24/21  Yes Historical Provider, MD   insulin regular human (HUMULIN R U-500 KWIKPEN) 500 UNIT/ML SOPN concentrated injection pen Inject 0-30 Units into the skin 3 times daily (before meals) This is a highly concentrated insulin. After attaching the pen needle, prime with 5 units to ensure proper dosing. If 140-199 5 Units  200-249 10 Units  250-299 15 Units  300-349 20 Units  350-400 25 Units  Above 400      30 Units 11/17/20  Yes Alfredo Davis MD   rivaroxaban (XARELTO) 20 MG TABS tablet Take 1 tablet by mouth Daily with supper 12/4/18  Yes ARCHIE Wilcox MD   metoprolol succinate (TOPROL XL) 50 MG extended release tablet Take 1 tablet by mouth daily 12/4/18  Yes ARCHIE Wilcox MD   clopidogrel (PLAVIX) 75 MG tablet Take 1 tablet by mouth daily 12/4/18  Yes Latasha Knight MD   folic acid-pyridoxine-cyancobalamin (FOLTX) 1.13-25-2 MG TABS Take 1 tablet by mouth daily 12/4/18  Yes ARCHIE Wilcox MD   ranolazine (RANEXA) 500 MG extended release tablet Take 1 tablet by mouth 2 times daily 12/3/18  Yes Latasha Knight MD     Medications:    potassium chloride  10 mEq IntraVENous Once      Infusions:   PRN Meds:      Recent Labs     09/10/22  1710   WBC 12.0*   HGB 17.8   HCT 53.3*         Recent Labs     09/10/22  1710      K 2.4*   CL 82*   CO2 35*   BUN 72*   CREATININE 2.2*     Recent Labs     09/10/22  1710   AST 22   ALT 21   BILITOT 0.5   ALKPHOS 87     No results for input(s): INR in the last 72 hours.   Recent Labs     09/10/22  1710   TROPONINT 0.033*        Imaging reviewed  CT ABDOMEN PELVIS WO CONTRAST Additional Contrast? None    Result Date: 9/10/2022  EXAMINATION: CT OF THE ABDOMEN AND PELVIS WITHOUT CONTRAST 9/10/2022 3:39 pm TECHNIQUE: CT of the abdomen and pelvis was performed without the administration of intravenous contrast. Multiplanar reformatted images are provided for review. Automated exposure control, iterative reconstruction, and/or weight based adjustment of the mA/kV was utilized to reduce the radiation dose to as low as reasonably achievable. COMPARISON: 05/04/2018 HISTORY: ORDERING SYSTEM PROVIDED HISTORY: abdominal distension and discomfort TECHNOLOGIST PROVIDED HISTORY: Reason for exam:->abdominal distension and discomfort Additional Contrast?->None Reason for Exam: abdominal distension and discomfort FINDINGS: Lower Chest: Mild dependent atelectasis. Visualized lungs otherwise clear. Noncontrast imaging of the base of the heart is unremarkable. Lack of intravenous contrast limits evaluation of the solid abdominal viscera, the hollow abdominal viscera, and the vascular structures. Organs: With that said, no acute or suspicious hepatic abnormality identified. Gallbladder is distended. Gallbladder contents are mildly hyperdense at 25 Hounsfield units. Noncontrast imaging of the spleen unremarkable. Adrenals unremarkable. Pancreas unremarkable. Nonobstructing nephrolithiasis noted in the left kidney. No ureteral calculi. No hydronephrosis or hydroureter. GI/Bowel: Distal esophagus and stomach unremarkable. A paraumbilical hernia is present measuring approximately 7.3 cm maximally, mildly increased in size when compared to the previous exam from 2018. A short knuckle of small bowel extends into that, but there is no evidence of obstruction. Small bowel is otherwise unremarkable. Large bowel is unremarkable. Appendix normal. Pelvis: Urinary bladder and prostate unremarkable. No free pelvic fluid. Small bilateral inguinal hernias contain fat only. Peritoneum/Retroperitoneum: Abdominal aorta is normal in caliber. No retroperitoneal lymphadenopathy. Bones/Soft Tissues: Again, the paraumbilical hernia is noted, measuring maximally 7.3 cm.   There is edema is seen within the subcutaneous fat of the pannus in the right mid to lower abdomen. The extra-abdominal and extra pelvic soft tissues are otherwise unremarkable. No acute or suspicious bony abnormalities are identified. RECOMMENDATIONS: Paraumbilical hernia measuring 7.3 cm, increased in size when compared to the previous exam.  This mostly contains fat. However, a short knuckle of small bowel extends into that, but without evidence of obstruction. Edema within the subcutaneous fat of the pannus in the right mid to lower abdomen. Please correlate with any clinical evidence of cellulitis. Nonobstructing left nephrolithiasis. Hyperdense material within the gallbladder, which may reflect gallbladder sludge. CT Head W/O Contrast    Result Date: 9/10/2022  EXAMINATION: CT OF THE HEAD WITHOUT CONTRAST  9/10/2022 6:38 pm TECHNIQUE: CT of the head was performed without the administration of intravenous contrast. Automated exposure control, iterative reconstruction, and/or weight based adjustment of the mA/kV was utilized to reduce the radiation dose to as low as reasonably achievable. COMPARISON: CT head November 15, 2020 HISTORY: ORDERING SYSTEM PROVIDED HISTORY: fall TECHNOLOGIST PROVIDED HISTORY: Reason for exam:->fall Has a \"code stroke\" or \"stroke alert\" been called? ->No Decision Support Exception - unselect if not a suspected or confirmed emergency medical condition->Emergency Medical Condition (MA) Reason for Exam: fall FINDINGS: BRAIN/VENTRICLES: There is no acute intracranial hemorrhage, mass effect or midline shift. No abnormal extra-axial fluid collection. The gray-white differentiation is maintained without evidence of an acute infarct. There is no evidence of hydrocephalus. Atherosclerotic changes of the intracranial vasculature. Age-appropriate global cerebral atrophy. ORBITS: The visualized portion of the orbits demonstrate no acute abnormality.  SINUSES: The visualized paranasal sinuses and mastoid air cells demonstrate no acute abnormality. SOFT TISSUES/SKULL:  No acute abnormality of the visualized skull or soft tissues. No acute intracranial abnormality. CT CSpine W/O Contrast    Result Date: 9/10/2022  EXAMINATION: CT OF THE CERVICAL SPINE WITHOUT CONTRAST 9/10/2022 6:38 pm TECHNIQUE: CT of the cervical spine was performed without the administration of intravenous contrast. Multiplanar reformatted images are provided for review. Automated exposure control, iterative reconstruction, and/or weight based adjustment of the mA/kV was utilized to reduce the radiation dose to as low as reasonably achievable. COMPARISON: None. HISTORY: ORDERING SYSTEM PROVIDED HISTORY: fall TECHNOLOGIST PROVIDED HISTORY: Reason for exam:->fall Decision Support Exception - unselect if not a suspected or confirmed emergency medical condition->Emergency Medical Condition (MA) Reason for Exam: fall FINDINGS: BONES/ALIGNMENT: There is no acute fracture or traumatic malalignment. DEGENERATIVE CHANGES: Moderate degenerative disc disease of the C5-C6 level. SOFT TISSUES: There is no prevertebral soft tissue swelling. Atherosclerotic changes of the intracranial vasculature. No acute abnormality of the cervical spine. XR CHEST PORTABLE    Result Date: 9/10/2022  EXAMINATION: ONE XRAY VIEW OF THE CHEST 9/10/2022 5:30 pm COMPARISON: Chest radiograph 10/28/2021. HISTORY: ORDERING SYSTEM PROVIDED HISTORY: ams TECHNOLOGIST PROVIDED HISTORY: Reason for exam:->ams Reason for Exam: AMS FINDINGS: Single view provided. Stable mediastinal and cardiac silhouettes with borderline enlarged heart size. Normal lung volumes with no diffuse interstitial edema or acute consolidation. Mild lingular atelectasis. No pneumothorax or pulmonary mass. No free subdiaphragmatic air. No acute abnormality.           Electronically signed by HAYLEY Hull CNP on 9/10/2022 at 9:36 PM

## 2022-09-11 NOTE — PROGRESS NOTES
In-Patient Progress Note    Patient:  Cecily Willson 64 y.o. male MRN: [id number]     Date of Service: 9/11/2022    Hospital Day: 2      Chief complaint: had concerns including Extremity Weakness, Fall, and Altered Mental Status. Subjective   Patient seen and examined in the AM. Wife at bedside. Pt states he feels much better. No longer has lightheadedness. States he does not feel as \"wobbly\" as he was when he came. See ROS. Assessment and Plan   Cecily Willson, a 64 y.o. male, with a history of CHF, CKD, diabetes mellitus, hypertension, hyperlipidemia history of CVA, BPH, PAF  was admitted on 9/10/2022 with complaints of had concerns including Extremity Weakness, Fall, and Altered Mental Status. Assessment  Dehydration 2/2 likely diuretics and SGLT-2 Inhibitor - Improving  Recent increase in swelling   Recent increase in diuretics and on SGLT2 inhibitor   Currently both on hold    JESUS on CKD III 2/2 #1  Nephrology on board   Baseline Cr 1.1-1.4  Presented with 2.2, down to 1.7  Urine urea pending to calculate FEUrea  No hydronephrosis on CT abdomen    Recurrent Falls 2/2 likely weakness 2/2 #1, #4, #5  3 falls in the past week   Most recent fall with Rt. Lower abdomen contusion with seepage    Hypokalemia 2/2 likely Iatrogenic 2/2 diuretics and SGLT-2  K 2.4 on presentation.  2.5 today     Hypermagnesemia  Mg 3.3 initially, 3.1 now    Chronic HFpEF  On torsemide, metolazone and dapagliflozin at home - currently on hold     Type II NSTEMI 2/2 #2 - Stable  Troponin 0.033 -> 0.028  No EKG changes   No chest pain    Uncontrolled IDDM  On U500, glipizide and dapagliflozin   Endocrinology on board   A1c 9.1%    High Anion Gap 2/2 possibly #2, Starvation ketosis, ?euglycemic DKA with Metabolic Alkalosis 2/2 likely contraction alkalosis- Improving  Initial AG 18 with Betahydroxybutyrate 3.5  BUN 72 initially, trending down to 64  AG 11 today  HCO3- 39 today and pH 7.53 yesterday    PAF  On and headaches. Psychiatric/Behavioral:  Negative for agitation and confusion. Physical Exam   VITAL SIGNS:  /76   Pulse 73   Temp 97.9 °F (36.6 °C) (Oral)   Resp 18   Ht 6' (1.829 m)   Wt (!) 348 lb (157.9 kg)   SpO2 90%   BMI 47.20 kg/m²   Tmax over 24 hours:  Temp (24hrs), Av.8 °F (36.6 °C), Min:97.5 °F (36.4 °C), Max:98.1 °F (36.7 °C)      No data found. Intake/Output Summary (Last 24 hours) at 2022 1446  Last data filed at 2022 1012  Gross per 24 hour   Intake 177.19 ml   Output 1850 ml   Net -1672.81 ml     Wt Readings from Last 2 Encounters:   22 (!) 348 lb (157.9 kg)   22 (!) 347 lb 9.6 oz (157.7 kg)     Body mass index is 47.2 kg/m². Physical Exam  Vitals and nursing note reviewed. Constitutional:       General: He is not in acute distress. Appearance: He is obese. He is not ill-appearing, toxic-appearing or diaphoretic. HENT:      Head: Normocephalic and atraumatic. Right Ear: External ear normal.      Left Ear: External ear normal.      Nose: Nose normal.      Mouth/Throat:      Mouth: Mucous membranes are moist.      Pharynx: Oropharynx is clear. Eyes:      General: No scleral icterus. Right eye: No discharge. Left eye: No discharge. Conjunctiva/sclera: Conjunctivae normal.      Pupils: Pupils are equal, round, and reactive to light. Cardiovascular:      Rate and Rhythm: Normal rate and regular rhythm. Pulses: Normal pulses. Heart sounds: Normal heart sounds. No murmur heard. No friction rub. No gallop. Pulmonary:      Effort: Pulmonary effort is normal. No respiratory distress. Breath sounds: Normal breath sounds. No stridor. No wheezing, rhonchi or rales. Abdominal:      General: Bowel sounds are normal. There is no distension. Palpations: Abdomen is soft. There is no mass. Tenderness: There is no abdominal tenderness. There is no guarding or rebound.       Hernia: A hernia is present. Hernia is present in the ventral area (reducible). Musculoskeletal:      Right lower leg: No edema. Left lower leg: No edema. Skin:     Capillary Refill: Capillary refill takes less than 2 seconds. Findings: Lesion (Chronic venous stasis appearing dark skin with wrinkles) present. Comments: Dry skin B/L LE with wrinkles present. Edema + B/L    Neurological:      Mental Status: He is alert. Current Medications      insulin regular human  5 Units SubCUTAneous TID WC    potassium chloride  10 mEq IntraVENous Q2H    mineral oil-hydrophilic petrolatum   Topical BID    clopidogrel  75 mg Oral Daily    ezetimibe  10 mg Oral Daily    finasteride  5 mg Oral Daily    b complex vitamins  1 capsule Oral Daily    insulin regular human  0-30 Units SubCUTAneous TID AC    metoprolol succinate  50 mg Oral Daily    ranolazine  500 mg Oral BID    atorvastatin  20 mg Oral Nightly    tamsulosin  0.4 mg Oral Daily    sodium chloride flush  5-40 mL IntraVENous BID    rivaroxaban  20 mg Oral Dinner         Labs and Imaging Studies   Laboratory findings:  CT ABDOMEN PELVIS WO CONTRAST Additional Contrast? None    Result Date: 9/10/2022  EXAMINATION: CT OF THE ABDOMEN AND PELVIS WITHOUT CONTRAST 9/10/2022 3:39 pm TECHNIQUE: CT of the abdomen and pelvis was performed without the administration of intravenous contrast. Multiplanar reformatted images are provided for review. Automated exposure control, iterative reconstruction, and/or weight based adjustment of the mA/kV was utilized to reduce the radiation dose to as low as reasonably achievable. COMPARISON: 05/04/2018 HISTORY: ORDERING SYSTEM PROVIDED HISTORY: abdominal distension and discomfort TECHNOLOGIST PROVIDED HISTORY: Reason for exam:->abdominal distension and discomfort Additional Contrast?->None Reason for Exam: abdominal distension and discomfort FINDINGS: Lower Chest: Mild dependent atelectasis. Visualized lungs otherwise clear.  Noncontrast imaging of the base of the heart is unremarkable. Lack of intravenous contrast limits evaluation of the solid abdominal viscera, the hollow abdominal viscera, and the vascular structures. Organs: With that said, no acute or suspicious hepatic abnormality identified. Gallbladder is distended. Gallbladder contents are mildly hyperdense at 25 Hounsfield units. Noncontrast imaging of the spleen unremarkable. Adrenals unremarkable. Pancreas unremarkable. Nonobstructing nephrolithiasis noted in the left kidney. No ureteral calculi. No hydronephrosis or hydroureter. GI/Bowel: Distal esophagus and stomach unremarkable. A paraumbilical hernia is present measuring approximately 7.3 cm maximally, mildly increased in size when compared to the previous exam from 2018. A short knuckle of small bowel extends into that, but there is no evidence of obstruction. Small bowel is otherwise unremarkable. Large bowel is unremarkable. Appendix normal. Pelvis: Urinary bladder and prostate unremarkable. No free pelvic fluid. Small bilateral inguinal hernias contain fat only. Peritoneum/Retroperitoneum: Abdominal aorta is normal in caliber. No retroperitoneal lymphadenopathy. Bones/Soft Tissues: Again, the paraumbilical hernia is noted, measuring maximally 7.3 cm. There is edema is seen within the subcutaneous fat of the pannus in the right mid to lower abdomen. The extra-abdominal and extra pelvic soft tissues are otherwise unremarkable. No acute or suspicious bony abnormalities are identified. RECOMMENDATIONS: Paraumbilical hernia measuring 7.3 cm, increased in size when compared to the previous exam.  This mostly contains fat. However, a short knuckle of small bowel extends into that, but without evidence of obstruction. Edema within the subcutaneous fat of the pannus in the right mid to lower abdomen. Please correlate with any clinical evidence of cellulitis. Nonobstructing left nephrolithiasis.  Hyperdense material within the gallbladder, which may reflect gallbladder sludge. CT Head W/O Contrast    Result Date: 9/10/2022  EXAMINATION: CT OF THE HEAD WITHOUT CONTRAST  9/10/2022 6:38 pm TECHNIQUE: CT of the head was performed without the administration of intravenous contrast. Automated exposure control, iterative reconstruction, and/or weight based adjustment of the mA/kV was utilized to reduce the radiation dose to as low as reasonably achievable. COMPARISON: CT head November 15, 2020 HISTORY: ORDERING SYSTEM PROVIDED HISTORY: fall TECHNOLOGIST PROVIDED HISTORY: Reason for exam:->fall Has a \"code stroke\" or \"stroke alert\" been called? ->No Decision Support Exception - unselect if not a suspected or confirmed emergency medical condition->Emergency Medical Condition (MA) Reason for Exam: fall FINDINGS: BRAIN/VENTRICLES: There is no acute intracranial hemorrhage, mass effect or midline shift. No abnormal extra-axial fluid collection. The gray-white differentiation is maintained without evidence of an acute infarct. There is no evidence of hydrocephalus. Atherosclerotic changes of the intracranial vasculature. Age-appropriate global cerebral atrophy. ORBITS: The visualized portion of the orbits demonstrate no acute abnormality. SINUSES: The visualized paranasal sinuses and mastoid air cells demonstrate no acute abnormality. SOFT TISSUES/SKULL:  No acute abnormality of the visualized skull or soft tissues. No acute intracranial abnormality. CT CSpine W/O Contrast    Result Date: 9/10/2022  EXAMINATION: CT OF THE CERVICAL SPINE WITHOUT CONTRAST 9/10/2022 6:38 pm TECHNIQUE: CT of the cervical spine was performed without the administration of intravenous contrast. Multiplanar reformatted images are provided for review. Automated exposure control, iterative reconstruction, and/or weight based adjustment of the mA/kV was utilized to reduce the radiation dose to as low as reasonably achievable. COMPARISON: None.  HISTORY: ORDERING SYSTEM PROVIDED HISTORY: fall TECHNOLOGIST PROVIDED HISTORY: Reason for exam:->fall Decision Support Exception - unselect if not a suspected or confirmed emergency medical condition->Emergency Medical Condition (MA) Reason for Exam: fall FINDINGS: BONES/ALIGNMENT: There is no acute fracture or traumatic malalignment. DEGENERATIVE CHANGES: Moderate degenerative disc disease of the C5-C6 level. SOFT TISSUES: There is no prevertebral soft tissue swelling. Atherosclerotic changes of the intracranial vasculature. No acute abnormality of the cervical spine. XR CHEST PORTABLE    Result Date: 9/10/2022  EXAMINATION: ONE XRAY VIEW OF THE CHEST 9/10/2022 5:30 pm COMPARISON: Chest radiograph 10/28/2021. HISTORY: ORDERING SYSTEM PROVIDED HISTORY: ams TECHNOLOGIST PROVIDED HISTORY: Reason for exam:->ams Reason for Exam: AMS FINDINGS: Single view provided. Stable mediastinal and cardiac silhouettes with borderline enlarged heart size. Normal lung volumes with no diffuse interstitial edema or acute consolidation. Mild lingular atelectasis. No pneumothorax or pulmonary mass. No free subdiaphragmatic air. No acute abnormality.        Recent Results (from the past 24 hour(s))   POCT Glucose    Collection Time: 09/10/22  5:02 PM   Result Value Ref Range    POC Glucose 301 (H) 70 - 99 MG/DL   CBC with Auto Differential    Collection Time: 09/10/22  5:10 PM   Result Value Ref Range    WBC 12.0 (H) 4.0 - 10.5 K/CU MM    RBC 6.33 (H) 4.6 - 6.2 M/CU MM    Hemoglobin 17.8 13.5 - 18.0 GM/DL    Hematocrit 53.3 (H) 42 - 52 %    MCV 84.2 78 - 100 FL    MCH 28.1 27 - 31 PG    MCHC 33.4 32.0 - 36.0 %    RDW 16.2 (H) 11.7 - 14.9 %    Platelets 329 692 - 743 K/CU MM    MPV 11.3 (H) 7.5 - 11.1 FL    Differential Type AUTOMATED DIFFERENTIAL     Segs Relative 71.6 (H) 36 - 66 %    Lymphocytes % 16.3 (L) 24 - 44 %    Monocytes % 11.2 (H) 0 - 4 %    Eosinophils % 0.2 0 - 3 %    Basophils % 0.4 0 - 1 %    Segs Absolute 8.6 K/CU MM    Lymphocytes Absolute 2.0 K/CU MM    Monocytes Absolute 1.3 K/CU MM    Eosinophils Absolute 0.0 K/CU MM    Basophils Absolute 0.1 K/CU MM    Nucleated RBC % 0.0 %    Total Nucleated RBC 0.0 K/CU MM    Total Immature Neutrophil 0.03 K/CU MM    Immature Neutrophil % 0.3 0 - 0.43 %   CMP    Collection Time: 09/10/22  5:10 PM   Result Value Ref Range    Sodium 135 135 - 145 MMOL/L    Potassium 2.4 (LL) 3.5 - 5.1 MMOL/L    Chloride 82 (L) 99 - 110 mMol/L    CO2 35 (H) 21 - 32 MMOL/L    BUN 72 (H) 6 - 23 MG/DL    Creatinine 2.2 (H) 0.9 - 1.3 MG/DL    Glucose 318 (H) 70 - 99 MG/DL    Calcium 9.2 8.3 - 10.6 MG/DL    Albumin 4.3 3.4 - 5.0 GM/DL    Total Protein 7.4 6.4 - 8.2 GM/DL    Total Bilirubin 0.5 0.0 - 1.0 MG/DL    ALT 21 10 - 40 U/L    AST 22 15 - 37 IU/L    Alkaline Phosphatase 87 40 - 129 IU/L    GFR Non- 31 (L) >60 mL/min/1.73m2    GFR  37 (L) >60 mL/min/1.73m2    Anion Gap 18 (H) 4 - 16   Magnesium    Collection Time: 09/10/22  5:10 PM   Result Value Ref Range    Magnesium 3.3 (H) 1.8 - 2.4 mg/dl   Troponin    Collection Time: 09/10/22  5:10 PM   Result Value Ref Range    Troponin T 0.033 (H) <0.01 NG/ML   Beta-Hydroxybutyrate    Collection Time: 09/10/22  5:10 PM   Result Value Ref Range    Beta-Hydroxybutyrate 3.5 (H) 0.0 - 3.0 MG/DL   EKG 12 Lead    Collection Time: 09/10/22  5:11 PM   Result Value Ref Range    Ventricular Rate 78 BPM    Atrial Rate 78 BPM    P-R Interval 158 ms    QRS Duration 116 ms    Q-T Interval 520 ms    QTc Calculation (Bazett) 592 ms    P Axis 22 degrees    R Axis -32 degrees    T Axis 205 degrees    Diagnosis       Normal sinus rhythm  Possible Left atrial enlargement  Left axis deviation  Inferior infarct , age undetermined  ST & T wave abnormality, consider anterolateral ischemia  Prolonged QT  Abnormal ECG  When compared with ECG of 24-OCT-2021 23:57,  Significant changes have occurred  Confirmed by NATHANIEL Rodriguez ([id number]) on 9/11/2022 2:14:32 PM     Urinalysis with Reflex to Culture    Collection Time: 09/10/22  6:30 PM    Specimen: Urine   Result Value Ref Range    Color, UA YELLOW YELLOW    Clarity, UA CLEAR CLEAR    Glucose, Urine >1,000 (A) NEGATIVE MG/DL    Bilirubin Urine NEGATIVE NEGATIVE MG/DL    Ketones, Urine NEGATIVE NEGATIVE MG/DL    Specific Gravity, UA <1.005 1.001 - 1.035    Blood, Urine SMALL (A) NEGATIVE    pH, Urine 6.0 5.0 - 8.0    Protein, UA NEGATIVE NEGATIVE MG/DL    Urobilinogen, Urine 0.2 0.2 - 1.0 MG/DL    Nitrite Urine, Quantitative NEGATIVE NEGATIVE    Leukocyte Esterase, Urine NEGATIVE NEGATIVE    RBC, UA 2 0 - 3 /HPF    WBC, UA <1 0 - 2 /HPF    Bacteria, UA NEGATIVE NEGATIVE /HPF    Squam Epithel, UA <1 /HPF    Mucus, UA RARE (A) NEGATIVE HPF    Trichomonas, UA NONE SEEN NONE SEEN /HPF   Drug screen multi urine    Collection Time: 09/10/22  6:30 PM   Result Value Ref Range    Cannabinoid Scrn, Ur NEGATIVE NEGATIVE    Amphetamines NEGATIVE NEGATIVE    Cocaine Metabolite NEGATIVE NEGATIVE    Benzodiazepine Screen, Urine NEGATIVE NEGATIVE    Barbiturate Screen, Ur NEGATIVE NEGATIVE    Opiates, Urine NEGATIVE NEGATIVE    Phencyclidine, Urine NEGATIVE NEGATIVE    Oxycodone NEGATIVE NEGATIVE   Blood Gas, Venous    Collection Time: 09/10/22  9:00 PM   Result Value Ref Range    pH, John 7.53 (H) 7.32 - 7.42    pCO2, John 55 (H) 38 - 52 mmHG    pO2, John 50 (H) 28 - 48 mmHG    Base Exc, Mixed 20 (H) 0 - 1.2    HCO3, Venous 46.0 (H) 19 - 25 MMOL/L    O2 Sat, John 84.4 (H) 50 - 70 %   POCT Glucose    Collection Time: 09/11/22  1:01 AM   Result Value Ref Range    POC Glucose 170 (H) 70 - 99 MG/DL   POCT Glucose    Collection Time: 09/11/22  6:41 AM   Result Value Ref Range    POC Glucose 132 (H) 70 - 99 MG/DL   Hemoglobin A1c    Collection Time: 09/11/22  9:20 AM   Result Value Ref Range    Hemoglobin A1C 9.1 (H) 4.2 - 6.3 %    eAG 214 mg/dL   Basic Metabolic Panel w/ Reflex to MG    Collection Time: 09/11/22  9:20 AM   Result Value Ref Range    Sodium 137 135 - 145 MMOL/L    Potassium 2.5 (LL) 3.5 - 5.1 MMOL/L    Chloride 87 (L) 99 - 110 mMol/L    CO2 39 (H) 21 - 32 MMOL/L    Anion Gap 11 4 - 16    BUN 64 (H) 6 - 23 MG/DL    Creatinine 1.7 (H) 0.9 - 1.3 MG/DL    Glucose 345 (H) 70 - 99 MG/DL    Calcium 8.8 8.3 - 10.6 MG/DL    GFR Non- 41 (L) >60 mL/min/1.73m2    GFR  50 (L) >60 mL/min/1.73m2   CBC with Auto Differential    Collection Time: 09/11/22  9:20 AM   Result Value Ref Range    WBC 8.3 4.0 - 10.5 K/CU MM    RBC 6.09 4.6 - 6.2 M/CU MM    Hemoglobin 16.8 13.5 - 18.0 GM/DL    Hematocrit 51.8 42 - 52 %    MCV 85.1 78 - 100 FL    MCH 27.6 27 - 31 PG    MCHC 32.4 32.0 - 36.0 %    RDW 15.7 (H) 11.7 - 14.9 %    Platelets 249 581 - 310 K/CU MM    MPV 11.5 (H) 7.5 - 11.1 FL    Differential Type AUTOMATED DIFFERENTIAL     Segs Relative 68.4 (H) 36 - 66 %    Lymphocytes % 16.5 (L) 24 - 44 %    Monocytes % 13.7 (H) 0 - 4 %    Eosinophils % 0.7 0 - 3 %    Basophils % 0.5 0 - 1 %    Segs Absolute 5.7 K/CU MM    Lymphocytes Absolute 1.4 K/CU MM    Monocytes Absolute 1.1 K/CU MM    Eosinophils Absolute 0.1 K/CU MM    Basophils Absolute 0.0 K/CU MM    Nucleated RBC % 0.0 %    Total Nucleated RBC 0.0 K/CU MM    Total Immature Neutrophil 0.02 K/CU MM    Immature Neutrophil % 0.2 0 - 0.43 %   Troponin    Collection Time: 09/11/22  9:20 AM   Result Value Ref Range    Troponin T 0.028 (H) <0.01 NG/ML   Magnesium    Collection Time: 09/11/22  9:20 AM   Result Value Ref Range    Magnesium 3.1 (H) 1.8 - 2.4 mg/dl   Electrolytes urine random    Collection Time: 09/11/22 10:15 AM   Result Value Ref Range    Sodium, Ur 14 (L) 35 - 167 MMOL/L    Potassium, Ur 27.8 22 - 119 MMOL/L    Chloride 10 (L) 43 - 210 MMOL/L   Creatinine, urine, random    Collection Time: 09/11/22 10:15 AM   Result Value Ref Range    Creatinine, Ur 86.6 MG/DL   Osmolality, urine    Collection Time: 09/11/22 10:15 AM   Result Value Ref Range    Osmolality, Ur 539 292 - 1090 MOS/L   POCT Glucose    Collection Time: 09/11/22 10:55 AM   Result Value Ref Range    POC Glucose 330 (H) 70 - 99 MG/DL           Electronically signed by Kath Day MD on 9/11/2022 at 2:46 PM

## 2022-09-11 NOTE — CONSULTS
Endocrinology   Consult Note  9/10/2022  4:58 PM     Primary Care provider: Janice tSein MD     Referring physician:  Dimitry Huddleston MD     Dear Doctor   Gerard Gillis    Thank You for the Consult     Pt. Was Admitted for : Frequent falls lightheadedness and leg weakness    Reason for Consult: Better control of glucose      History Obtained From:  Patient/ EMR       HISTORY OF PRESENT ILLNESS:                The patient is a 64 y.o. male with significant past medical history of defibrillation, CVA, hyper lipid hypertension, hyperlipidemia, type 2 diabetes mellitus on U 100 insulin, apnea comes in complaining of frequent fall due to his legs gave up stable gait couple of weeks. Also he felt dizzy. His blood glucose are not very low either. I was  consulted for better control of blood glucose. ROS:   Pt's ROS done in detail. Abnormal ROS are noted in Medical and Surgical History Section below: Other Medical History:        Diagnosis Date    Atrial fibrillation [facility])     Cerebral artery occlusion with cerebral infarction [facility])     History of cardiac cath 2017    --RCA has mid 50% stenosis and PLB branch has 70% stenosis noted. LVEDP very high    Hyperlipidemia     Hypertension     Type II or unspecified type diabetes mellitus without mention of complication, not stated as uncontrolled     Diagnsed about 1998    Unspecified sleep apnea      Surgical History:        Procedure Laterality Date    CARDIAC CATHETERIZATION      TONSILLECTOMY      AT 13YEARS OLD       Allergies:  Patient has no known allergies.     Family History:       Problem Relation Age of Onset    Diabetes Mother     Diabetes Father     Cancer Father     Cancer Maternal Grandfather         PROSTATE CANCER     REVIEW OF SYSTEMS:  Review of System Done as noted above     PHYSICAL EXAM:      Vitals:    /76   Pulse 73   Temp 97.9 °F (36.6 °C) (Oral)   Resp 18   Ht 6' (1.829 m)   Wt (!) 348 lb (157.9 kg)   SpO2 90%   BMI 47.20 kg/m²     CONSTITUTIONAL:  awake, alert, cooperative, appears stated age   EYES:  vision intact Fundoscopic Exam not performed   ENT:Normal  NECK:  Supple, No JVD. Thyroid Exam:Normal   LUNGS:  Has Vesicular Breath Sounds,   CARDIOVASCULAR: Atrial fibrillation  ABDOMEN:  No scars, normal bowel sounds, soft, non-distended, non-tender, no masses palpated, no hepatolienomegaly  Musculoskeletal: Normal  Extremities: Normal, peripheral pulses normal, , has edema //has venous stasis and lymphedema  NEUROLOGIC:  Awake, alert, oriented to name, place and time. Crannial nerves II-XII are grossly intact. Jossue weakness. Sensory neuropathy and gait is abnormal.  And unstable    DATA:    CBC:   Recent Labs     09/10/22  1710   WBC 12.0*   HGB 17.8       CMP:  Recent Labs     09/10/22  1710      K 2.4*   CL 82*   CO2 35*   BUN 72*   CREATININE 2.2*   CALCIUM 9.2   PROT 7.4   LABALBU 4.3   BILITOT 0.5   ALKPHOS 87   AST 22   ALT 21     Lipids:   Lab Results   Component Value Date/Time    CHOL 171 05/20/2022 09:16 AM    HDL 49 05/20/2022 09:16 AM    TRIG 137 05/20/2022 09:16 AM     Glucose:   Recent Labs     09/10/22  1702 09/11/22  0101 09/11/22  0641   POCGLU 301* 170* 132*     Hemoglobin A1C:   Lab Results   Component Value Date/Time    LABA1C 9.5 05/20/2022 09:16 AM     Free T4: No results found for: T4FREE  Free T3: No results found for: FT3  TSH High Sensitivity:   Lab Results   Component Value Date/Time    TSHHS 1.800 08/29/2019 10:06 AM       CT ABDOMEN PELVIS WO CONTRAST Additional Contrast? None   Final Result   No acute or suspicious bony abnormalities are identified. RECOMMENDATIONS:   Paraumbilical hernia measuring 7.3 cm, increased in size when compared to the   previous exam.  This mostly contains fat. However, a short knuckle of small   bowel extends into that, but without evidence of obstruction. Edema within the subcutaneous fat of the pannus in the right mid to lower   abdomen. Please correlate with any clinical evidence of cellulitis. Nonobstructing left nephrolithiasis. Hyperdense material within the gallbladder, which may reflect gallbladder   sludge. CT CSpine W/O Contrast   Final Result   No acute abnormality of the cervical spine. CT Head W/O Contrast   Final Result   No acute intracranial abnormality. XR CHEST PORTABLE   Final Result   No acute abnormality.                 Scheduled Medicines   Medications:    insulin regular human  5 Units SubCUTAneous TID WC    clopidogrel  75 mg Oral Daily    ezetimibe  10 mg Oral Daily    finasteride  5 mg Oral Daily    b complex vitamins  1 capsule Oral Daily    insulin regular human  0-30 Units SubCUTAneous TID AC    metoprolol succinate  50 mg Oral Daily    ranolazine  500 mg Oral BID    atorvastatin  20 mg Oral Nightly    tamsulosin  0.4 mg Oral Daily    sodium chloride flush  5-40 mL IntraVENous BID    rivaroxaban  20 mg Oral Dinner      Infusions:    dextrose      sodium chloride           IMPRESSION    Patient Active Problem List   Diagnosis    MARINO on CPAP    New onset atrial fibrillation (HCC)    Type 2 diabetes mellitus with hyperglycemia, with long-term current use of insulin (HCC)    Class 3 severe obesity due to excess calories with body mass index (BMI) of 45.0 to 49.9 in [facility])    History of cardiac cath    Weakness of right arm    Acute CVA (cerebrovascular accident) ([address]    Spastic hemiparesis of right dominant side ([address]    Dysphagia due to recent stroke    Essential hypertension    Morbid obesity with body mass index of 45.0-49.9 in [facility])    Frequent falls    Chronic venous stasis dermatitis of both lower extremities    History of CVA (cerebrovascular accident)    Obesity hypoventilation syndrome ([address]    Hypertension    Hyperlipidemia    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 Labs and X ray results   Reviewed Home and Current Medicines   Will Start On meal/ Correction bolus U 500 s Insulin regime   Monitor Blood glucose frequently   Modify  the dose of Insulin/ OHGD  as needed        Will follow with you  Again thank you for sharing pt's care with me.      Truly yours,       Merline Creighton MD

## 2022-09-11 NOTE — CONSULTS
Patient:   Angi Fallon    Date:  22  :  1960, 64 y.o. Nephrologist: Lauar Hernandez MD  Provider: Tomás Thakur MD    Reason for Consult: Acute kidney injury with underlying CKD stage G3 and electrolyte imbalance    Consult requested by : Dr Edy Juarez, APRN - CNP    Chief Complaint:   Extreme weakness, with unsteady gait and fall and unable to ambulate    HISTORY OF PRESENT ILLNESS:     Mr. Viola Marcos, is a 79-year-old obese male, was brought to the emergency department with extreme weakness, fall and unsteady gait. In the emergency department, he was afebrile and hemodynamically stable, and his oxygen saturation was 93%, while breathing on ambient air. He underwent several diagnostic test, which include, imaging, biochemical and electrographic. EKG shows left axis deviation, QT prolongation and nonspecific T wave abnormality. Imaging study, which include CT of the cervical spine without contrast, CT of the abdomen and pelvis without contrast, CT of the head and chest x-ray, did not reveal any clear-cut etiology, although he did have left kidney stone which is nonobstructive. Biochemical testing showed profound hypokalemia 2.4 mmol/L, increased BUN and creatinine 72 and 2.2 mg/dL respectively, also increased serum bicarbonate of 35, and serum glucose of 318. Other pertinent abnormal lab include slightly elevated white count of 12,000, slightly elevated beta hydroxybutyrate of 3.5 mg/dL (could have been from acute kidney injury). Urinalysis showed glycosuria due to SGLT2 inhibitor therapy at home but otherwise 100% bland urine. He was given 500 cc of crystalloid solution mainly normal saline, potassium was repleted, and subsequently admitted to medical floor, for further evaluation and management. When I saw him in the morning, he is alert, awake and oriented. He is eating and drinking.   No apparent distress    Patient sees my partner Dr. Nasreen Draper, last appointment was in August 18, 2022. He has 2-3+ edema at that time with weight gain, and loop and thiazide were  adjusted, he was also on SGLT2 therapy for underlying diabetes mellitus. Otherwise his blood pressure was in good control. I was able to review his creatinine in the past.  We have data since 2017. His creatinine was 0.8 to 1.0 mg/dL, although he had 1 episode of acute kidney injury back in 2018, also in 2021, but in general her creatinine runs about 1.1 to 1.3 mg/dL range. He did not have proteinuria before    PMH :   CKD stage G3 A1-with several episode of acute kidney injury before by creatinine criteria  Diabetes mellitus type 2, diagnosed roughly 30 years ago, he is on insulin therapy for the last 20 years along with SGLT2 inhibitors  Obstructive sleep apnea requiring CPAP machine  Several episode of kidney stone, still has nonobstructive left kidney stone  Hypertension and dyslipidemia  Probable cor pulmonale with chronic leg edema  Nonobstructive coronary artery disease  Exogenous obesity        PSH :  Heart cath in the distant past          Habits :   No history of smoking, alcohol or illicit drug abuse    Soc Hx:  Patient was born in [place], grew up in [place] [facility] [place] [address], but has been residing in a smoke-free for long time. He has been  for more than 30 years. Has 2 children. Used to be a  but retired now. [facility]   His father had prostate cancer and diabetes. Mother still alive but has had acute kidney injury and CKD.   Rest of the family is healthy        REVIEW OF SYSTEMS:     All pertinent ROS neg except   Unstable gait, weakness, difficulty ambulating, fatigue and tiredness and fall    Medication :     Reviewed, see in epic    /76   Pulse 73   Temp 97.9 °F (36.6 °C) (Oral)   Resp 18   Ht 6' (1.829 m)   Wt (!) 348 lb (157.9 kg)   SpO2 90%   BMI 47.20 kg/m²     PHYSICAL EXAM:  General appearance: Obese, without any acute distress  HEENT: No gross conjunctival pallor  Neck: Supple  Heart: Seemed regular rate and rhythm  LUNGS: Coarse breath sound  Abdomen: Obese, soft  Extremities: No overt edema, but evidence of stasis dermatitis urine recent diuresis    LABS:  Reviewed, see in epic            IMPRESSION:  Acute kidney injury on top of CKD stage G3 A1-likely mainly from hemodynamic changes-with combination of diuretics and SGLT2 inhibitors, he may or may not have structural tubular damage-creatinine should go down  His weakness likely from hypokalemia, probably from diuretics  Exogenous obesity, diabetes mellitus and may have cor pulmonale,    PLAN:    I would replete potassium  I expect his kidney function to improve-his urine was 100% bland.   I would not add any IV fluid, good sugar control but hold the SGLT2 inhibitor for now  I do not see any other overt evidence of infection or other acute process, we will keep an eye on that  Recheck kidney function tomorrow morning-if creatinine is downgoing he can be discharged without diuretics with close outpatient follow-up-and will readjust the diuretics and restart SGLT2 inhibitors as an outpatient as clinically deemed appropriate

## 2022-09-11 NOTE — PROGRESS NOTES
Isac May ASSESSMENT:  Noted open wounds at right lower abdomen from trauma/injury; excoriations at right & left groin/femoral area. Bruised on lower back. See LDA.

## 2022-09-12 LAB
ALBUMIN SERPL-MCNC: 4.1 GM/DL (ref 3.4–5)
ALP BLD-CCNC: 77 IU/L (ref 40–128)
ALT SERPL-CCNC: 19 U/L (ref 10–40)
ANION GAP SERPL CALCULATED.3IONS-SCNC: 10 MMOL/L (ref 4–16)
ANION GAP SERPL CALCULATED.3IONS-SCNC: 7 MMOL/L (ref 4–16)
ANION GAP SERPL CALCULATED.3IONS-SCNC: 9 MMOL/L (ref 4–16)
AST SERPL-CCNC: 24 IU/L (ref 15–37)
BASOPHILS ABSOLUTE: 0 K/CU MM
BASOPHILS RELATIVE PERCENT: 0.3 % (ref 0–1)
BILIRUB SERPL-MCNC: 0.6 MG/DL (ref 0–1)
BUN BLDV-MCNC: 41 MG/DL (ref 6–23)
BUN BLDV-MCNC: 46 MG/DL (ref 6–23)
BUN BLDV-MCNC: 52 MG/DL (ref 6–23)
CALCIUM SERPL-MCNC: 8.8 MG/DL (ref 8.3–10.6)
CALCIUM SERPL-MCNC: 8.9 MG/DL (ref 8.3–10.6)
CALCIUM SERPL-MCNC: 9.3 MG/DL (ref 8.3–10.6)
CHLORIDE BLD-SCNC: 90 MMOL/L (ref 99–110)
CHLORIDE BLD-SCNC: 92 MMOL/L (ref 99–110)
CHLORIDE BLD-SCNC: 92 MMOL/L (ref 99–110)
CO2: 37 MMOL/L (ref 21–32)
CO2: 40 MMOL/L (ref 21–32)
CO2: 40 MMOL/L (ref 21–32)
CREAT SERPL-MCNC: 1.3 MG/DL (ref 0.9–1.3)
CREAT SERPL-MCNC: 1.3 MG/DL (ref 0.9–1.3)
CREAT SERPL-MCNC: 1.4 MG/DL (ref 0.9–1.3)
DIFFERENTIAL TYPE: ABNORMAL
EOSINOPHILS ABSOLUTE: 0 K/CU MM
EOSINOPHILS RELATIVE PERCENT: 0.3 % (ref 0–3)
GFR AFRICAN AMERICAN: >60 ML/MIN/1.73M2
GFR NON-AFRICAN AMERICAN: 52 ML/MIN/1.73M2
GFR NON-AFRICAN AMERICAN: 56 ML/MIN/1.73M2
GFR NON-AFRICAN AMERICAN: 56 ML/MIN/1.73M2
GLUCOSE BLD-MCNC: 115 MG/DL (ref 70–99)
GLUCOSE BLD-MCNC: 173 MG/DL (ref 70–99)
GLUCOSE BLD-MCNC: 178 MG/DL (ref 70–99)
GLUCOSE BLD-MCNC: 235 MG/DL (ref 70–99)
GLUCOSE BLD-MCNC: 239 MG/DL (ref 70–99)
GLUCOSE BLD-MCNC: 241 MG/DL (ref 70–99)
GLUCOSE BLD-MCNC: 50 MG/DL (ref 70–99)
GLUCOSE BLD-MCNC: 51 MG/DL (ref 70–99)
GLUCOSE BLD-MCNC: 75 MG/DL (ref 70–99)
GLUCOSE BLD-MCNC: 93 MG/DL (ref 70–99)
HCT VFR BLD CALC: 49.8 % (ref 42–52)
HEMOGLOBIN: 16.3 GM/DL (ref 13.5–18)
IMMATURE NEUTROPHIL %: 0.4 % (ref 0–0.43)
LYMPHOCYTES ABSOLUTE: 1.2 K/CU MM
LYMPHOCYTES RELATIVE PERCENT: 11.5 % (ref 24–44)
MAGNESIUM: 3.3 MG/DL (ref 1.8–2.4)
MCH RBC QN AUTO: 28.2 PG (ref 27–31)
MCHC RBC AUTO-ENTMCNC: 32.7 % (ref 32–36)
MCV RBC AUTO: 86 FL (ref 78–100)
MONOCYTES ABSOLUTE: 1.3 K/CU MM
MONOCYTES RELATIVE PERCENT: 12.6 % (ref 0–4)
NUCLEATED RBC %: 0 %
PDW BLD-RTO: 15.6 % (ref 11.7–14.9)
PHOSPHORUS: 3.6 MG/DL (ref 2.5–4.9)
PLATELET # BLD: 195 K/CU MM (ref 140–440)
PMV BLD AUTO: 11.5 FL (ref 7.5–11.1)
POTASSIUM SERPL-SCNC: 2.4 MMOL/L (ref 3.5–5.1)
POTASSIUM SERPL-SCNC: 2.9 MMOL/L (ref 3.5–5.1)
POTASSIUM SERPL-SCNC: 3.8 MMOL/L (ref 3.5–5.1)
RBC # BLD: 5.79 M/CU MM (ref 4.6–6.2)
SEGMENTED NEUTROPHILS ABSOLUTE COUNT: 7.8 K/CU MM
SEGMENTED NEUTROPHILS RELATIVE PERCENT: 74.9 % (ref 36–66)
SODIUM BLD-SCNC: 137 MMOL/L (ref 135–145)
SODIUM BLD-SCNC: 139 MMOL/L (ref 135–145)
SODIUM BLD-SCNC: 141 MMOL/L (ref 135–145)
TOTAL IMMATURE NEUTOROPHIL: 0.04 K/CU MM
TOTAL NUCLEATED RBC: 0 K/CU MM
TOTAL PROTEIN: 6.4 GM/DL (ref 6.4–8.2)
WBC # BLD: 10.5 K/CU MM (ref 4–10.5)

## 2022-09-12 PROCEDURE — 6370000000 HC RX 637 (ALT 250 FOR IP): Performed by: NURSE PRACTITIONER

## 2022-09-12 PROCEDURE — 6360000002 HC RX W HCPCS: Performed by: INTERNAL MEDICINE

## 2022-09-12 PROCEDURE — 82962 GLUCOSE BLOOD TEST: CPT

## 2022-09-12 PROCEDURE — 94761 N-INVAS EAR/PLS OXIMETRY MLT: CPT

## 2022-09-12 PROCEDURE — 2580000003 HC RX 258: Performed by: NURSE PRACTITIONER

## 2022-09-12 PROCEDURE — 84100 ASSAY OF PHOSPHORUS: CPT

## 2022-09-12 PROCEDURE — 97165 OT EVAL LOW COMPLEX 30 MIN: CPT

## 2022-09-12 PROCEDURE — 85025 COMPLETE CBC W/AUTO DIFF WBC: CPT

## 2022-09-12 PROCEDURE — 6360000002 HC RX W HCPCS

## 2022-09-12 PROCEDURE — 36415 COLL VENOUS BLD VENIPUNCTURE: CPT

## 2022-09-12 PROCEDURE — 80053 COMPREHEN METABOLIC PANEL: CPT

## 2022-09-12 PROCEDURE — 83735 ASSAY OF MAGNESIUM: CPT

## 2022-09-12 PROCEDURE — 80048 BASIC METABOLIC PNL TOTAL CA: CPT

## 2022-09-12 PROCEDURE — 6370000000 HC RX 637 (ALT 250 FOR IP)

## 2022-09-12 PROCEDURE — 6370000000 HC RX 637 (ALT 250 FOR IP): Performed by: INTERNAL MEDICINE

## 2022-09-12 PROCEDURE — 1200000000 HC SEMI PRIVATE

## 2022-09-12 PROCEDURE — 97535 SELF CARE MNGMENT TRAINING: CPT

## 2022-09-12 RX ORDER — POTASSIUM CHLORIDE 20 MEQ/1
40 TABLET, EXTENDED RELEASE ORAL ONCE
Status: COMPLETED | OUTPATIENT
Start: 2022-09-12 | End: 2022-09-12

## 2022-09-12 RX ORDER — POTASSIUM CHLORIDE 7.45 MG/ML
10 INJECTION INTRAVENOUS
Status: DISPENSED | OUTPATIENT
Start: 2022-09-12 | End: 2022-09-12

## 2022-09-12 RX ORDER — POTASSIUM CHLORIDE 20 MEQ/1
40 TABLET, EXTENDED RELEASE ORAL 4 TIMES DAILY
Status: DISCONTINUED | OUTPATIENT
Start: 2022-09-12 | End: 2022-09-13 | Stop reason: HOSPADM

## 2022-09-12 RX ORDER — POTASSIUM CHLORIDE 7.45 MG/ML
10 INJECTION INTRAVENOUS
Status: COMPLETED | OUTPATIENT
Start: 2022-09-12 | End: 2022-09-13

## 2022-09-12 RX ADMIN — SODIUM CHLORIDE, PRESERVATIVE FREE 10 ML: 5 INJECTION INTRAVENOUS at 08:44

## 2022-09-12 RX ADMIN — RANOLAZINE 500 MG: 500 TABLET, EXTENDED RELEASE ORAL at 22:51

## 2022-09-12 RX ADMIN — POTASSIUM CHLORIDE 10 MEQ: 7.46 INJECTION, SOLUTION INTRAVENOUS at 03:31

## 2022-09-12 RX ADMIN — RANOLAZINE 500 MG: 500 TABLET, EXTENDED RELEASE ORAL at 08:36

## 2022-09-12 RX ADMIN — RIVAROXABAN 20 MG: 20 TABLET, FILM COATED ORAL at 16:31

## 2022-09-12 RX ADMIN — CLOPIDOGREL BISULFATE 75 MG: 75 TABLET ORAL at 08:36

## 2022-09-12 RX ADMIN — POTASSIUM CHLORIDE 40 MEQ: 1500 TABLET, EXTENDED RELEASE ORAL at 03:25

## 2022-09-12 RX ADMIN — POTASSIUM CHLORIDE 10 MEQ: 7.46 INJECTION, SOLUTION INTRAVENOUS at 09:52

## 2022-09-12 RX ADMIN — POTASSIUM CHLORIDE 10 MEQ: 7.46 INJECTION, SOLUTION INTRAVENOUS at 08:37

## 2022-09-12 RX ADMIN — Medication 1 CAPSULE: at 08:44

## 2022-09-12 RX ADMIN — POTASSIUM CHLORIDE 40 MEQ: 1500 TABLET, EXTENDED RELEASE ORAL at 22:51

## 2022-09-12 RX ADMIN — SODIUM CHLORIDE, PRESERVATIVE FREE 10 ML: 5 INJECTION INTRAVENOUS at 22:51

## 2022-09-12 RX ADMIN — TAMSULOSIN HYDROCHLORIDE 0.4 MG: 0.4 CAPSULE ORAL at 08:36

## 2022-09-12 RX ADMIN — ATORVASTATIN CALCIUM 20 MG: 10 TABLET, FILM COATED ORAL at 22:51

## 2022-09-12 RX ADMIN — EZETIMIBE 10 MG: 10 TABLET ORAL at 08:36

## 2022-09-12 RX ADMIN — Medication 16 G: at 03:25

## 2022-09-12 RX ADMIN — FINASTERIDE 5 MG: 5 TABLET, FILM COATED ORAL at 08:36

## 2022-09-12 RX ADMIN — POTASSIUM CHLORIDE 10 MEQ: 7.46 INJECTION, SOLUTION INTRAVENOUS at 11:45

## 2022-09-12 RX ADMIN — SODIUM CHLORIDE: 9 INJECTION, SOLUTION INTRAVENOUS at 03:34

## 2022-09-12 RX ADMIN — POTASSIUM CHLORIDE 40 MEQ: 1500 TABLET, EXTENDED RELEASE ORAL at 09:51

## 2022-09-12 RX ADMIN — POTASSIUM CHLORIDE 40 MEQ: 1500 TABLET, EXTENDED RELEASE ORAL at 16:31

## 2022-09-12 RX ADMIN — POTASSIUM CHLORIDE 10 MEQ: 7.46 INJECTION, SOLUTION INTRAVENOUS at 13:07

## 2022-09-12 RX ADMIN — POTASSIUM CHLORIDE 40 MEQ: 1500 TABLET, EXTENDED RELEASE ORAL at 11:45

## 2022-09-12 RX ADMIN — POTASSIUM CHLORIDE 10 MEQ: 7.46 INJECTION, SOLUTION INTRAVENOUS at 05:28

## 2022-09-12 RX ADMIN — METOPROLOL SUCCINATE 50 MG: 50 TABLET, EXTENDED RELEASE ORAL at 08:36

## 2022-09-12 ASSESSMENT — ENCOUNTER SYMPTOMS
SINUS PRESSURE: 0
ABDOMINAL PAIN: 0
COUGH: 0
EYE PAIN: 0
WHEEZING: 0
NAUSEA: 0
CONSTIPATION: 0
SORE THROAT: 0
BACK PAIN: 0
VOMITING: 0
CHEST TIGHTNESS: 0
SHORTNESS OF BREATH: 0
EYE REDNESS: 0
SINUS PAIN: 0
EYE ITCHING: 0
DIARRHEA: 0

## 2022-09-12 NOTE — PROGRESS NOTES
Progress Note( Dr. Youssef Artist)  9/12/2022  Subjective:   Admit Date: 9/10/2022  PCP: Jen Broussard MD    Admitted For :Frequent falls lightheadedness and leg weakness    Consulted For:  Better control of glucose    Interval History: Had hypoglycemic episode yesterday afternoon    Denies any chest pains,   Denies SOB . Denies nausea or vomiting. No new bowel or bladder symptoms. Intake/Output Summary (Last 24 hours) at 9/12/2022 0753  Last data filed at 9/11/2022 1012  Gross per 24 hour   Intake --   Output 750 ml   Net -750 ml       DATA    CBC:   Recent Labs     09/10/22  1710 09/11/22  0920 09/12/22  0632   WBC 12.0* 8.3 10.5   HGB 17.8 16.8 16.3    205 195    CMP:  Recent Labs     09/10/22  1710 09/11/22  0920 09/11/22  2350 09/12/22  0632    137 141 137   K 2.4* 2.5* 2.4* 2.9*   CL 82* 87* 92* 90*   CO2 35* 39* 40* 37*   BUN 72* 64* 52* 46*   CREATININE 2.2* 1.7* 1.3 1.3   CALCIUM 9.2 8.8 8.9 9.3   PROT 7.4  --   --  6.4   LABALBU 4.3  --   --  4.1   BILITOT 0.5  --   --  0.6   ALKPHOS 87  --   --  77   AST 22  --   --  24   ALT 21  --   --  19     Lipids:   Lab Results   Component Value Date/Time    CHOL 171 05/20/2022 09:16 AM    HDL 49 05/20/2022 09:16 AM    TRIG 137 05/20/2022 09:16 AM     Glucose:  Recent Labs     09/12/22  0320 09/12/22  0340 09/12/22  0640   POCGLU 50* 93 115*     [id number]:  Lab Results   Component Value Date/Time    LABA1C 9.1 09/11/2022 09:20 AM     High Sensitivity TSH:   Lab Results   Component Value Date/Time    TSHHS 1.800 08/29/2019 10:06 AM     Free T3: No results found for: FT3  Free T4:No results found for: T4FREE    CT ABDOMEN PELVIS WO CONTRAST Additional Contrast? None   Final Result   No acute or suspicious bony abnormalities are identified. RECOMMENDATIONS:   Paraumbilical hernia measuring 7.3 cm, increased in size when compared to the   previous exam.  This mostly contains fat.   However, a short knuckle of small   bowel extends into that, but without evidence of obstruction. Edema within the subcutaneous fat of the pannus in the right mid to lower   abdomen. Please correlate with any clinical evidence of cellulitis. Nonobstructing left nephrolithiasis. Hyperdense material within the gallbladder, which may reflect gallbladder   sludge. CT CSpine W/O Contrast   Final Result   No acute abnormality of the cervical spine. CT Head W/O Contrast   Final Result   No acute intracranial abnormality. XR CHEST PORTABLE   Final Result   No acute abnormality. Scheduled Medicines   Medications:    potassium chloride  10 mEq IntraVENous Q2H    insulin regular human  0-25 Units SubCUTAneous TID AC    potassium chloride  10 mEq IntraVENous Q2H    [Held by provider] insulin regular human  5 Units SubCUTAneous TID WC    mineral oil-hydrophilic petrolatum   Topical BID    clopidogrel  75 mg Oral Daily    ezetimibe  10 mg Oral Daily    finasteride  5 mg Oral Daily    b complex vitamins  1 capsule Oral Daily    metoprolol succinate  50 mg Oral Daily    ranolazine  500 mg Oral BID    atorvastatin  20 mg Oral Nightly    tamsulosin  0.4 mg Oral Daily    sodium chloride flush  5-40 mL IntraVENous BID    rivaroxaban  20 mg Oral Dinner      Infusions:    dextrose      sodium chloride 100 mL/hr at 09/12/22 0334         Objective:   Vitals: /61   Pulse 54   Temp 97.1 °F (36.2 °C) (Axillary)   Resp 14   Ht 6' (1.829 m)   Wt (!) 348 lb (157.9 kg)   SpO2 94%   BMI 47.20 kg/m²   General appearance: alert and cooperative with exam  Neck: no JVD or bruit  Thyroid : Normal lobes   Lungs: Has Vesicular Breath sounds   Heart:  regular rate and rhythm  Abdomen: soft, non-tender; bowel sounds normal; no masses,  no organomegaly  Musculoskeletal: Normal  Extremities: extremities normal, , bilateral leg edema  Neurologic:  Awake, alert, oriented to name, place and time. Cranial nerves II-XII are grossly intact.   Motor weakness post CVA sensory neuropathy,  and gait is abnormal.  VA before    Assessment:     Patient Active Problem List:     MARINO on CPAP     New onset atrial fibrillation (HCC)     Type 2 diabetes mellitus with hyperglycemia, with long-term current use of insulin (HCC)     Class 3 severe obesity due to excess calories with body mass index (BMI) of 45.0 to 49.9 in [facility])     History of cardiac cath     Weakness of right arm     Acute CVA (cerebrovascular accident) ([address]     Spastic hemiparesis of right dominant side ([address]     Dysphagia due to recent stroke     Essential hypertension     Morbid obesity with body mass index of 45.0-49.9 in [facility])     Frequent falls     Chronic venous stasis dermatitis of both lower extremities     History of CVA (cerebrovascular accident)     Obesity hypoventilation syndrome ([address]     Hypertension     Hyperlipidemia     Congestive heart failure due to cardiomyopathy ([address]     Stage 3b chronic kidney disease ([address]     Chronic kidney disease, stage II (mild)     Benign essential microscopic hematuria     Stage 3a chronic kidney disease (HCC)     Generalized edema     Acute kidney injury superimposed on CKD ([address]      Plan:     Reviewed POC blood glucose . Labs and X ray results   Reviewed Current Medicines   On meal  ( On Hold )  /    Correction bolus U 500 Insulin   Monitor Blood glucose frequently   Modified  the dose of Insulin/ other medicines as needed   Will follow     .      Jesús Mcclendon MD, MD

## 2022-09-12 NOTE — PROGRESS NOTES
Nephrology Progress Note  9/12/2022 9:00 AM        Subjective:   Admit Date: 9/10/2022  PCP: Gumaro Caicedo MD    Interval History:  patient not very happy, as he has hypoglycemia      Diet:  good oral intake    ROS:   symptomatic hypoglycemia   reported making good urine   no fever, acceptable blood pressure      Data:     Current meds:    potassium chloride  10 mEq IntraVENous Q2H    insulin regular human  0-25 Units SubCUTAneous TID AC    potassium chloride  10 mEq IntraVENous Q2H    potassium chloride  40 mEq Oral Once    [Held by provider] insulin regular human  5 Units SubCUTAneous TID WC    mineral oil-hydrophilic petrolatum   Topical BID    clopidogrel  75 mg Oral Daily    ezetimibe  10 mg Oral Daily    finasteride  5 mg Oral Daily    b complex vitamins  1 capsule Oral Daily    metoprolol succinate  50 mg Oral Daily    ranolazine  500 mg Oral BID    atorvastatin  20 mg Oral Nightly    tamsulosin  0.4 mg Oral Daily    sodium chloride flush  5-40 mL IntraVENous BID    rivaroxaban  20 mg Oral Dinner      dextrose      sodium chloride 100 mL/hr at 09/12/22 0334         I/O last 3 completed shifts:   In: 177.2 [IV Piggyback:177.2]  Out: 1850 [PFYME:9258]    CBC:   Recent Labs     09/10/22  1710 09/11/22  0920 09/12/22  0632   WBC 12.0* 8.3 10.5   HGB 17.8 16.8 16.3    205 195          Recent Labs     09/11/22  0920 09/11/22  2350 09/12/22  0632    141 137   K 2.5* 2.4* 2.9*   CL 87* 92* 90*   CO2 39* 40* 37*   BUN 64* 52* 46*   CREATININE 1.7* 1.3 1.3   GLUCOSE 345* 75 51*       Lab Results   Component Value Date    CALCIUM 9.3 09/12/2022    PHOS 3.6 09/12/2022       Objective:     Vitals: /67   Pulse 66   Temp 97.3 °F (36.3 °C) (Oral)   Resp 16   Ht 6' (1.829 m)   Wt (!) 348 lb (157.9 kg)   SpO2 98%   BMI 47.20 kg/m² ,    General appearance:   alert, awake and oriented, eating breakfast  HEENT:   no gross conjunctival pallor  Neck:   supple  Lungs:   rhonchi and mild expiratory wheeze  Heart:   seemed regular rate and rhythm  Abdomen:  obese, soft  Extremities:   stasis dermatitis, evidence of recent diuresis, minimal edema      Problem List :         Impression :      acute kidney injury with underlying CKD stage III- mainly hemodynamic changes, he may not have structural damage- creatinine almost at baseline   hypokalemia likely from poor total body stores, as he had several diuretics for leg edema- we will replete orally   diabetes mellitus with dysglycemia, probably cor pulmonale related exogenous obesity    Recommendation/Plan  :      replete potassium orally    keep normoglycemia   hopefully discharge soon without diuretics   but need weekly outpatient follow-up, as I suspect his edema will worsen- and probably have to reintroduce the diuretics judiciously   will be okay to start SGLT2 inhibitors probably from tomorrow-   follow clinically      Jenniffer Jeter MD MD

## 2022-09-12 NOTE — PROGRESS NOTES
Occupational Therapy    [facility] ACUTE CARE OCCUPATIONAL THERAPY EVALUATION  Blanca Mcdowell, 1960, 1110/1110-A, 9/12/2022    History  [place]:  The primary encounter diagnosis was Fall, subsequent encounter. Diagnoses of Hypokalemia, JESUS (acute kidney injury) ([address] Hyperglycemia, Hypermagnesemia, and Elevated troponin were also pertinent to this visit. Patient  has a past medical history of Atrial fibrillation [facility]), Cerebral artery occlusion with cerebral infarction [facility]), History of cardiac cath, Hyperlipidemia, Hypertension, Type II or unspecified type diabetes mellitus without mention of complication, not stated as uncontrolled, and Unspecified sleep apnea. Patient  has a past surgical history that includes Tonsillectomy and Cardiac catheterization. Subjective:  Patient states:  \"I sat down so I wouldn't fall down\" Pt stated after initial stand at EOB. Pain:  No.    Communication with other providers:  Handoff to RN  Restrictions: General Precautions, Fall Risk    Home Setup/Prior level of function  Social/Functional History  Lives With: Spouse  Type of Home: House  Home Layout: Multi-level  Home Access: Stairs to enter with rails  Entrance Stairs - Number of Steps: 7  Bathroom Shower/Tub: Tub/Shower unit  Bathroom Toilet: Standard  Home Equipment: jonh Wall  Has the patient had two or more falls in the past year or any fall with injury in the past year?: Yes  Receives Help From: Family  ADL Assistance: Independent (w/ AD for LB ADLs)  Homemaking Assistance: Needs assistance  Ambulation Assistance: Independent  Transfer Assistance: Independent  Active : No    Examination of body systems (includes body structures/functions, activity/participation limitations):  Observation:  Supine in bed upon arrival, agreeable to therapy   Vision:  WFL  [facility]  [facility]  Cardiopulmonary:  No 02 needs      Body Systems and functions:  ROM R/L:  WFL.     Strength R/L:  4+/5,   Sensation: Numbness in bilateral feet  Tone: Normal  Coordination: WFL  Perception: WNL    Activities of Daily Living (ADLs):  Feeding: Mary  Grooming: SBA (washing hands/face w/ warm washcloth)  UB bathing: Supervision  LB bathing: Madi  UB dressing: Supervision  LB dressing: modA (Donning socks/shoes sitting EOB)  Toileting: SBA    Cognitive and Psychosocial Functioning:  Overall cognitive status: AxO x 4, decreased problem solving, decreased safety awareness  Affect: Normal        Mobility:  Supine to sit:  SBA  Transfers: SBA from EOB up to RW  Sitting balance:  Supervision. Standing balance:  SBA w/ RW  Functional Mobility SBA w/ RW to/from room  Toilet/Shower Transfers: DNT             [facility] Daily Activity Inpatient   How much help for putting on and taking off regular lower body clothing?: A Lot  How much help for Bathing?: A Little  How much help for Toileting?: A Little  How much help for putting on and taking off regular upper body clothing?: None  How much help for taking care of personal grooming?: A Little  How much help for eating meals?: None  [facility] Inpatient Daily Activity Raw Score: 19  AM-PAC Inpatient ADL T-Scale Score : 40.22  ADL Inpatient CMS 0-100% Score: 42.8  ADL Inpatient CMS G-Code Modifier : CK    Treatment:  Self Care Training:   Cues were given for safety, sequence, UE/LE placement, visual cues, and balance. Activities performed today included grooming, LB dressing   Safety: patient left in chair with chair alarm, call light within reach, RN notified, gait belt used. Assessment:  Pt is a 65 yo male admitted from home for JESUS. Pt at baseline is Independent for ADLs needs assistance for high level IADLs and isIndependent for functional transfers/mobility w/ AD. Pt currently presents w/ deficits in ADL and high level IADL independence, functional activity tolerance, dynamic sitting and standing balance and tolerance and functional transfers, BUE strength.  Pt would benefit from continued

## 2022-09-12 NOTE — CARE COORDINATION
Dr Guevara Para agreeable to follow for Deo Moreno . Referral made to [facility]. She stated to fax information to her. CM faxed face sheet and H/P . Will fax orders when available.

## 2022-09-12 NOTE — PROGRESS NOTES
ketosis, ?euglycemic DKA with Metabolic Alkalosis 2/2 likely contraction alkalosis- Improving  Initial AG 18 with Betahydroxybutyrate 3.5  BUN 72 initially, trending down to 46  AG 10 today  HCO3- 37 today    PAF  On xarelto  On metoprolol     Paraumbilical Hernia  17.3CO on CT abdomena nd pelvis. No incarceration     Leukocytosis 2/2 likely Reactionary 2/2 #1 - Resolved   No signs of infection   WBC 12 nitially, stable. Prolonged Qtc  Qtc 592    H/O MARINO  On CPAP    H/O BPH  On alpha blocker and finasteride     Stasis Dermatitis   B/L LE    Class III Obesity   BMI 47.20    H/O HTN, HLD, CVA, PVD  On aspirin, plavix and statin      Plan  Keep diuretics and SGLT2 on hold  F/U nephro and endocrine recs   Aggressively replace potassium  Repeat BMP at 6PM today as well and daily  Avoid Qtc prolonging meds  Resume home meds, see orders      # Peptic ulcer prophylaxis: On diet  # DVT Prophylaxis: On Rivaroxaban   #CODE STATUS: Full      Current living situation: Home  Expected Disposition: Home  Estimated discharge date: 24-48 hours. Requires continuous admission due to depleted electrolytes requiring IV replacement      Review of System     Review of Systems   Constitutional:  Negative for appetite change, chills, fatigue, fever and unexpected weight change. Fall   HENT:  Negative for sinus pressure, sinus pain and sore throat. Eyes:  Negative for pain, redness and itching. Respiratory:  Negative for cough, chest tightness, shortness of breath and wheezing. Cardiovascular:  Positive for leg swelling (improving). Negative for chest pain and palpitations. Gastrointestinal:  Negative for abdominal pain, constipation, diarrhea, nausea and vomiting. Endocrine: Negative for polydipsia, polyphagia and polyuria. Genitourinary:  Negative for decreased urine volume, difficulty urinating, dysuria, frequency, hematuria and urgency. Musculoskeletal:  Positive for gait problem (wobbly - improving).  Negative for arthralgias, back pain and myalgias. Skin:  Negative for pallor and rash. Neurological:  Negative for dizziness, tremors, seizures, syncope, weakness, light-headedness, numbness and headaches. Psychiatric/Behavioral:  Negative for agitation and confusion. Physical Exam   VITAL SIGNS:  /67   Pulse 66   Temp 97.3 °F (36.3 °C) (Oral)   Resp 16   Ht 6' (1.829 m)   Wt (!) 348 lb (157.9 kg)   SpO2 98%   BMI 47.20 kg/m²   Tmax over 24 hours:  Temp (24hrs), Av.2 °F (36.2 °C), Min:97.1 °F (36.2 °C), Max:97.3 °F (36.3 °C)      No data found. Intake/Output Summary (Last 24 hours) at 2022 1444  Last data filed at 2022 0831  Gross per 24 hour   Intake --   Output 600 ml   Net -600 ml       Wt Readings from Last 2 Encounters:   22 (!) 348 lb (157.9 kg)   22 (!) 347 lb 9.6 oz (157.7 kg)     Body mass index is 47.2 kg/m². Physical Exam  Vitals and nursing note reviewed. Constitutional:       General: He is not in acute distress. Appearance: He is obese. He is not ill-appearing, toxic-appearing or diaphoretic. HENT:      Head: Normocephalic and atraumatic. Right Ear: External ear normal.      Left Ear: External ear normal.      Nose: Nose normal.      Mouth/Throat:      Mouth: Mucous membranes are moist.      Pharynx: Oropharynx is clear. Eyes:      General: No scleral icterus. Right eye: No discharge. Left eye: No discharge. Conjunctiva/sclera: Conjunctivae normal.      Pupils: Pupils are equal, round, and reactive to light. Cardiovascular:      Rate and Rhythm: Normal rate and regular rhythm. Pulses: Normal pulses. Heart sounds: Normal heart sounds. No murmur heard. No friction rub. No gallop. Pulmonary:      Effort: Pulmonary effort is normal. No respiratory distress. Breath sounds: Normal breath sounds. No stridor. No wheezing, rhonchi or rales.    Abdominal:      General: Bowel sounds are normal. There is no distension. Palpations: Abdomen is soft. There is no mass. Tenderness: There is no abdominal tenderness. There is no guarding or rebound. Hernia: A hernia is present. Hernia is present in the ventral area (reducible). Musculoskeletal:      Right lower leg: No edema. Left lower leg: No edema. Skin:     Capillary Refill: Capillary refill takes less than 2 seconds. Findings: Lesion (Chronic venous stasis appearing dark skin with wrinkles) present. Comments: Dry skin B/L LE with wrinkles present. Edema + B/L    Neurological:      Mental Status: He is alert. Current Medications      insulin regular human  0-25 Units SubCUTAneous TID AC    potassium chloride  40 mEq Oral 4x Daily    [Held by provider] insulin regular human  5 Units SubCUTAneous TID WC    mineral oil-hydrophilic petrolatum   Topical BID    clopidogrel  75 mg Oral Daily    ezetimibe  10 mg Oral Daily    finasteride  5 mg Oral Daily    b complex vitamins  1 capsule Oral Daily    metoprolol succinate  50 mg Oral Daily    ranolazine  500 mg Oral BID    atorvastatin  20 mg Oral Nightly    tamsulosin  0.4 mg Oral Daily    sodium chloride flush  5-40 mL IntraVENous BID    rivaroxaban  20 mg Oral Dinner         Labs and Imaging Studies   Laboratory findings:  CT ABDOMEN PELVIS WO CONTRAST Additional Contrast? None    Result Date: 9/10/2022  EXAMINATION: CT OF THE ABDOMEN AND PELVIS WITHOUT CONTRAST 9/10/2022 3:39 pm TECHNIQUE: CT of the abdomen and pelvis was performed without the administration of intravenous contrast. Multiplanar reformatted images are provided for review. Automated exposure control, iterative reconstruction, and/or weight based adjustment of the mA/kV was utilized to reduce the radiation dose to as low as reasonably achievable.  COMPARISON: 05/04/2018 HISTORY: ORDERING SYSTEM PROVIDED HISTORY: abdominal distension and discomfort TECHNOLOGIST PROVIDED HISTORY: Reason for exam:->abdominal distension and discomfort Additional Contrast?->None Reason for Exam: abdominal distension and discomfort FINDINGS: Lower Chest: Mild dependent atelectasis. Visualized lungs otherwise clear. Noncontrast imaging of the base of the heart is unremarkable. Lack of intravenous contrast limits evaluation of the solid abdominal viscera, the hollow abdominal viscera, and the vascular structures. Organs: With that said, no acute or suspicious hepatic abnormality identified. Gallbladder is distended. Gallbladder contents are mildly hyperdense at 25 Hounsfield units. Noncontrast imaging of the spleen unremarkable. Adrenals unremarkable. Pancreas unremarkable. Nonobstructing nephrolithiasis noted in the left kidney. No ureteral calculi. No hydronephrosis or hydroureter. GI/Bowel: Distal esophagus and stomach unremarkable. A paraumbilical hernia is present measuring approximately 7.3 cm maximally, mildly increased in size when compared to the previous exam from 2018. A short knuckle of small bowel extends into that, but there is no evidence of obstruction. Small bowel is otherwise unremarkable. Large bowel is unremarkable. Appendix normal. Pelvis: Urinary bladder and prostate unremarkable. No free pelvic fluid. Small bilateral inguinal hernias contain fat only. Peritoneum/Retroperitoneum: Abdominal aorta is normal in caliber. No retroperitoneal lymphadenopathy. Bones/Soft Tissues: Again, the paraumbilical hernia is noted, measuring maximally 7.3 cm. There is edema is seen within the subcutaneous fat of the pannus in the right mid to lower abdomen. The extra-abdominal and extra pelvic soft tissues are otherwise unremarkable. No acute or suspicious bony abnormalities are identified. RECOMMENDATIONS: Paraumbilical hernia measuring 7.3 cm, increased in size when compared to the previous exam.  This mostly contains fat. However, a short knuckle of small bowel extends into that, but without evidence of obstruction.  Edema within the subcutaneous fat of the pannus in the right mid to lower abdomen. Please correlate with any clinical evidence of cellulitis. Nonobstructing left nephrolithiasis. Hyperdense material within the gallbladder, which may reflect gallbladder sludge. CT Head W/O Contrast    Result Date: 9/10/2022  EXAMINATION: CT OF THE HEAD WITHOUT CONTRAST  9/10/2022 6:38 pm TECHNIQUE: CT of the head was performed without the administration of intravenous contrast. Automated exposure control, iterative reconstruction, and/or weight based adjustment of the mA/kV was utilized to reduce the radiation dose to as low as reasonably achievable. COMPARISON: CT head November 15, 2020 HISTORY: ORDERING SYSTEM PROVIDED HISTORY: fall TECHNOLOGIST PROVIDED HISTORY: Reason for exam:->fall Has a \"code stroke\" or \"stroke alert\" been called? ->No Decision Support Exception - unselect if not a suspected or confirmed emergency medical condition->Emergency Medical Condition (MA) Reason for Exam: fall FINDINGS: BRAIN/VENTRICLES: There is no acute intracranial hemorrhage, mass effect or midline shift. No abnormal extra-axial fluid collection. The gray-white differentiation is maintained without evidence of an acute infarct. There is no evidence of hydrocephalus. Atherosclerotic changes of the intracranial vasculature. Age-appropriate global cerebral atrophy. ORBITS: The visualized portion of the orbits demonstrate no acute abnormality. SINUSES: The visualized paranasal sinuses and mastoid air cells demonstrate no acute abnormality. SOFT TISSUES/SKULL:  No acute abnormality of the visualized skull or soft tissues. No acute intracranial abnormality. CT CSpine W/O Contrast    Result Date: 9/10/2022  EXAMINATION: CT OF THE CERVICAL SPINE WITHOUT CONTRAST 9/10/2022 6:38 pm TECHNIQUE: CT of the cervical spine was performed without the administration of intravenous contrast. Multiplanar reformatted images are provided for review.  Automated exposure control, iterative reconstruction, and/or weight based adjustment of the mA/kV was utilized to reduce the radiation dose to as low as reasonably achievable. COMPARISON: None. HISTORY: ORDERING SYSTEM PROVIDED HISTORY: fall TECHNOLOGIST PROVIDED HISTORY: Reason for exam:->fall Decision Support Exception - unselect if not a suspected or confirmed emergency medical condition->Emergency Medical Condition (MA) Reason for Exam: fall FINDINGS: BONES/ALIGNMENT: There is no acute fracture or traumatic malalignment. DEGENERATIVE CHANGES: Moderate degenerative disc disease of the C5-C6 level. SOFT TISSUES: There is no prevertebral soft tissue swelling. Atherosclerotic changes of the intracranial vasculature. No acute abnormality of the cervical spine. XR CHEST PORTABLE    Result Date: 9/10/2022  EXAMINATION: ONE XRAY VIEW OF THE CHEST 9/10/2022 5:30 pm COMPARISON: Chest radiograph 10/28/2021. HISTORY: ORDERING SYSTEM PROVIDED HISTORY: ams TECHNOLOGIST PROVIDED HISTORY: Reason for exam:->ams Reason for Exam: AMS FINDINGS: Single view provided. Stable mediastinal and cardiac silhouettes with borderline enlarged heart size. Normal lung volumes with no diffuse interstitial edema or acute consolidation. Mild lingular atelectasis. No pneumothorax or pulmonary mass. No free subdiaphragmatic air. No acute abnormality.        Recent Results (from the past 24 hour(s))   POCT Glucose    Collection Time: 09/11/22  3:58 PM   Result Value Ref Range    POC Glucose 311 (H) 70 - 99 MG/DL   POCT Glucose    Collection Time: 09/11/22  7:55 PM   Result Value Ref Range    POC Glucose 157 (H) 70 - 99 MG/DL   POCT Glucose    Collection Time: 09/11/22 10:45 PM   Result Value Ref Range    POC Glucose 51 (L) 70 - 99 MG/DL   POCT Glucose    Collection Time: 09/11/22 11:04 PM   Result Value Ref Range    POC Glucose 64 (L) 70 - 99 MG/DL   POCT Glucose    Collection Time: 09/11/22 11:19 PM   Result Value Ref Range    POC Glucose 72 BUN 46 (H) 6 - 23 MG/DL    Creatinine 1.3 0.9 - 1.3 MG/DL    Glucose 51 (L) 70 - 99 MG/DL    Calcium 9.3 8.3 - 10.6 MG/DL    Albumin 4.1 3.4 - 5.0 GM/DL    Total Protein 6.4 6.4 - 8.2 GM/DL    Total Bilirubin 0.6 0.0 - 1.0 MG/DL    ALT 19 10 - 40 U/L    AST 24 15 - 37 IU/L    Alkaline Phosphatase 77 40 - 128 IU/L    GFR Non- 56 (L) >60 mL/min/1.73m2    GFR African American >60 >60 mL/min/1.73m2    Anion Gap 10 4 - 16   Magnesium    Collection Time: 09/12/22  6:32 AM   Result Value Ref Range    Magnesium 3.3 (H) 1.8 - 2.4 mg/dl   Phosphorus    Collection Time: 09/12/22  6:32 AM   Result Value Ref Range    Phosphorus 3.6 2.5 - 4.9 MG/DL   POCT Glucose    Collection Time: 09/12/22  6:40 AM   Result Value Ref Range    POC Glucose 115 (H) 70 - 99 MG/DL   POCT Glucose    Collection Time: 09/12/22 10:52 AM   Result Value Ref Range    POC Glucose 178 (H) 70 - 99 MG/DL           Electronically signed by Silvano Pittman MD on 9/12/2022 at 2:44 PM

## 2022-09-12 NOTE — CARE COORDINATION
Spoke with patient regarding discharge planning. Patient is from home with his wife and uses a walker for mobility prn and is independent with ADL's. Patient follows with Dr Treasure Lombardi for PCP and has insurance to assist with RX coverage. CM discussed OT eval recommending C . Patient is agreeable and has no preference on the agency. CM explained that CM will have to check with Dr Treasure Lombardi to see if he will follow patient for [place]  and he voiced understanding. PS to Dr Treasure Lombardi to inquire.

## 2022-09-13 VITALS
BODY MASS INDEX: 42.66 KG/M2 | SYSTOLIC BLOOD PRESSURE: 123 MMHG | HEIGHT: 72 IN | OXYGEN SATURATION: 96 % | DIASTOLIC BLOOD PRESSURE: 61 MMHG | HEART RATE: 75 BPM | RESPIRATION RATE: 15 BRPM | WEIGHT: 315 LBS | TEMPERATURE: 97.8 F

## 2022-09-13 LAB
ALBUMIN SERPL-MCNC: 4 GM/DL (ref 3.4–5)
ANION GAP SERPL CALCULATED.3IONS-SCNC: 12 MMOL/L (ref 4–16)
BASOPHILS ABSOLUTE: 0.1 K/CU MM
BASOPHILS RELATIVE PERCENT: 0.8 % (ref 0–1)
BUN BLDV-MCNC: 34 MG/DL (ref 6–23)
CALCIUM SERPL-MCNC: 8.7 MG/DL (ref 8.3–10.6)
CHLORIDE BLD-SCNC: 91 MMOL/L (ref 99–110)
CO2: 34 MMOL/L (ref 21–32)
CREAT SERPL-MCNC: 1.4 MG/DL (ref 0.9–1.3)
DIFFERENTIAL TYPE: ABNORMAL
EOSINOPHILS ABSOLUTE: 0.1 K/CU MM
EOSINOPHILS RELATIVE PERCENT: 1.6 % (ref 0–3)
GFR AFRICAN AMERICAN: >60 ML/MIN/1.73M2
GFR NON-AFRICAN AMERICAN: 52 ML/MIN/1.73M2
GLUCOSE BLD-MCNC: 191 MG/DL (ref 70–99)
GLUCOSE BLD-MCNC: 202 MG/DL (ref 70–99)
GLUCOSE BLD-MCNC: 262 MG/DL (ref 70–99)
GLUCOSE BLD-MCNC: 339 MG/DL (ref 70–99)
HCT VFR BLD CALC: 54.1 % (ref 42–52)
HEMOGLOBIN: 17.3 GM/DL (ref 13.5–18)
IMMATURE NEUTROPHIL %: 0.5 % (ref 0–0.43)
LYMPHOCYTES ABSOLUTE: 1.4 K/CU MM
LYMPHOCYTES RELATIVE PERCENT: 22.4 % (ref 24–44)
MAGNESIUM: 3.1 MG/DL (ref 1.8–2.4)
MCH RBC QN AUTO: 27.8 PG (ref 27–31)
MCHC RBC AUTO-ENTMCNC: 32 % (ref 32–36)
MCV RBC AUTO: 87 FL (ref 78–100)
MONOCYTES ABSOLUTE: 0.7 K/CU MM
MONOCYTES RELATIVE PERCENT: 11.6 % (ref 0–4)
NUCLEATED RBC %: 0 %
PDW BLD-RTO: 16.9 % (ref 11.7–14.9)
PHOSPHORUS: 2.9 MG/DL (ref 2.5–4.9)
PLATELET # BLD: 198 K/CU MM (ref 140–440)
PMV BLD AUTO: 11.3 FL (ref 7.5–11.1)
POTASSIUM SERPL-SCNC: 4 MMOL/L (ref 3.5–5.1)
RBC # BLD: 6.22 M/CU MM (ref 4.6–6.2)
SEGMENTED NEUTROPHILS ABSOLUTE COUNT: 4 K/CU MM
SEGMENTED NEUTROPHILS RELATIVE PERCENT: 63.1 % (ref 36–66)
SODIUM BLD-SCNC: 137 MMOL/L (ref 135–145)
TOTAL IMMATURE NEUTOROPHIL: 0.03 K/CU MM
TOTAL NUCLEATED RBC: 0 K/CU MM
WBC # BLD: 6.4 K/CU MM (ref 4–10.5)

## 2022-09-13 PROCEDURE — 85025 COMPLETE CBC W/AUTO DIFF WBC: CPT

## 2022-09-13 PROCEDURE — 82962 GLUCOSE BLOOD TEST: CPT

## 2022-09-13 PROCEDURE — 36415 COLL VENOUS BLD VENIPUNCTURE: CPT

## 2022-09-13 PROCEDURE — 99211 OFF/OP EST MAY X REQ PHY/QHP: CPT

## 2022-09-13 PROCEDURE — 83735 ASSAY OF MAGNESIUM: CPT

## 2022-09-13 PROCEDURE — 6370000000 HC RX 637 (ALT 250 FOR IP): Performed by: INTERNAL MEDICINE

## 2022-09-13 PROCEDURE — 6370000000 HC RX 637 (ALT 250 FOR IP): Performed by: NURSE PRACTITIONER

## 2022-09-13 PROCEDURE — 2580000003 HC RX 258: Performed by: NURSE PRACTITIONER

## 2022-09-13 PROCEDURE — 80069 RENAL FUNCTION PANEL: CPT

## 2022-09-13 RX ORDER — POTASSIUM CHLORIDE 20 MEQ/1
40 TABLET, EXTENDED RELEASE ORAL 2 TIMES DAILY
Qty: 12 TABLET | Refills: 0 | Status: SHIPPED | OUTPATIENT
Start: 2022-09-14 | End: 2022-09-29

## 2022-09-13 RX ADMIN — SODIUM CHLORIDE, PRESERVATIVE FREE 10 ML: 5 INJECTION INTRAVENOUS at 10:02

## 2022-09-13 RX ADMIN — FINASTERIDE 5 MG: 5 TABLET, FILM COATED ORAL at 09:48

## 2022-09-13 RX ADMIN — RANOLAZINE 500 MG: 500 TABLET, EXTENDED RELEASE ORAL at 09:49

## 2022-09-13 RX ADMIN — METOPROLOL SUCCINATE 50 MG: 50 TABLET, EXTENDED RELEASE ORAL at 09:55

## 2022-09-13 RX ADMIN — Medication 1 CAPSULE: at 09:56

## 2022-09-13 RX ADMIN — EZETIMIBE 10 MG: 10 TABLET ORAL at 09:49

## 2022-09-13 RX ADMIN — CLOPIDOGREL BISULFATE 75 MG: 75 TABLET ORAL at 09:48

## 2022-09-13 RX ADMIN — TAMSULOSIN HYDROCHLORIDE 0.4 MG: 0.4 CAPSULE ORAL at 09:48

## 2022-09-13 RX ADMIN — POTASSIUM CHLORIDE 40 MEQ: 1500 TABLET, EXTENDED RELEASE ORAL at 11:59

## 2022-09-13 RX ADMIN — POTASSIUM CHLORIDE 40 MEQ: 1500 TABLET, EXTENDED RELEASE ORAL at 09:48

## 2022-09-13 NOTE — PROGRESS NOTES
Called pt and spoke with POA. Pt has enough insulin R at home and follows up with Dr Taylor Garner in a week or so and so I was asked to shred this script.

## 2022-09-13 NOTE — PROGRESS NOTES
Outpatient Pharmacy Progress Note for Meds-to-Beds    Total number of Prescriptions Filled: 1  The following medications were dispensed to the patient during the discharge process:  Potassium tablets    Additional Documentation:  Medication(s) were delivered to the patient's room prior to discharge      Thank you for letting us serve your patients.   [facility]    [address], [address] [facility]    Phone: [phone]    Fax: [phone]

## 2022-09-13 NOTE — CONSULTS
[facility] Continence Nurse  Consult Note       Gustavo Menjivar  AGE: 64 y.o. GENDER: male  : 1960  TODAY'S DATE:  2022    Subjective:     Reason for  Evaluation and Assessment: wound care eval.      Gustavo Menjivar is a 64 y.o. male referred by:   [x] Physician  [] Nursing  [] Other:     Wound Identification:  Wound Type: diabetic and traumatic  Contributing Factors: diabetes and obesity        PAST MEDICAL HISTORY        Diagnosis Date    Atrial fibrillation ([facility] [address]     Cerebral artery occlusion with cerebral infarction [facility])     History of cardiac cath     --RCA has mid 50% stenosis and PLB branch has 70% stenosis noted. LVEDP very high    Hyperlipidemia     Hypertension     Type II or unspecified type diabetes mellitus without mention of complication, not stated as uncontrolled     Diagnsed about     Unspecified sleep apnea        PAST SURGICAL HISTORY    Past Surgical History:   Procedure Laterality Date    CARDIAC CATHETERIZATION      TONSILLECTOMY      AT 13YEARS OLD       FAMILY HISTORY    Family History   Problem Relation Age of Onset    Diabetes Mother     Diabetes Father     Cancer Father     Cancer Maternal Grandfather         PROSTATE CANCER       SOCIAL HISTORY    Social History     Tobacco Use    Smoking status: Never    Smokeless tobacco: Never   Vaping Use    Vaping Use: Never used   Substance Use Topics    Alcohol use: No    Drug use: No       ALLERGIES    No Known Allergies    MEDICATIONS    No current facility-administered medications on file prior to encounter.      Current Outpatient Medications on File Prior to Encounter   Medication Sig Dispense Refill    finasteride (PROSCAR) 5 MG tablet Take 5 mg by mouth daily       tamsulosin (FLOMAX) 0.4 MG capsule Take 1 capsule by mouth daily 30 capsule 3    ezetimibe (ZETIA) 10 MG tablet Take 10 mg by mouth daily       simvastatin (ZOCOR) 40 MG tablet Take 40 mg by mouth daily       rivaroxaban (XARELTO) 20 MG TABS tablet Take 1 tablet by mouth Daily with supper 30 tablet 0    metoprolol succinate (TOPROL XL) 50 MG extended release tablet Take 1 tablet by mouth daily 30 tablet 0    clopidogrel (PLAVIX) 75 MG tablet Take 1 tablet by mouth daily 30 tablet 0    folic acid-pyridoxine-cyancobalamin (FOLTX) 1.13-25-2 MG TABS Take 1 tablet by mouth daily 30 tablet 0    ranolazine (RANEXA) 500 MG extended release tablet Take 1 tablet by mouth 2 times daily 60 tablet 0    [DISCONTINUED] Torsemide 40 MG TABS Take 40 mg by mouth 2 times daily (Patient taking differently: Take 20 mg by mouth 2 times daily) 180 tablet 3    [DISCONTINUED] metOLazone (ZAROXOLYN) 2.5 MG tablet Take 1 tablet by mouth 2 times daily 180 tablet 3    [DISCONTINUED] dapagliflozin (FARXIGA) 5 MG tablet Take 10 mg by mouth every morning      [DISCONTINUED] glipiZIDE (GLUCOTROL) 5 MG tablet Take 0.5 tablets by mouth 2 times daily (Patient taking differently: Take 5 mg by mouth 2 times daily) 60 tablet 3         Objective:      /61   Pulse 75   Temp 97.8 °F (36.6 °C) (Oral)   Resp 15   Ht 6' (1.829 m)   Wt (!) 329 lb 9.4 oz (149.5 kg)   SpO2 96%   BMI 44.70 kg/m²   David Risk Score: David Scale Score: 17    LABS    CBC:   Lab Results   Component Value Date/Time    WBC 6.4 09/13/2022 05:57 AM    RBC 6.22 09/13/2022 05:57 AM    HGB 17.3 09/13/2022 05:57 AM    HCT 54.1 09/13/2022 05:57 AM    MCV 87.0 09/13/2022 05:57 AM    MCH 27.8 09/13/2022 05:57 AM    MCHC 32.0 09/13/2022 05:57 AM    RDW 16.9 09/13/2022 05:57 AM     09/13/2022 05:57 AM    MPV 11.3 09/13/2022 05:57 AM     CMP:    Lab Results   Component Value Date/Time     09/13/2022 05:57 AM    K 4.0 09/13/2022 05:57 AM    CL 91 09/13/2022 05:57 AM    CO2 34 09/13/2022 05:57 AM    BUN 34 09/13/2022 05:57 AM    CREATININE 1.4 09/13/2022 05:57 AM    GFRAA >60 09/13/2022 05:57 AM    AGRATIO 1.4 05/20/2022 09:16 AM    LABGLOM 52 09/13/2022 05:57 AM    GLUCOSE 191 09/13/2022 05:57 AM    PROT 6.4 09/12/2022 06:32 AM    LABALBU 4.0 09/13/2022 05:57 AM    LABALBU 4.5 08/12/2022 08:50 AM    CALCIUM 8.7 09/13/2022 05:57 AM    BILITOT 0.6 09/12/2022 06:32 AM    ALKPHOS 77 09/12/2022 06:32 AM    AST 24 09/12/2022 06:32 AM    ALT 19 09/12/2022 06:32 AM     Albumin:    Lab Results   Component Value Date/Time    LABALBU 4.0 09/13/2022 05:57 AM    LABALBU 4.5 08/12/2022 08:50 AM     PT/INR:    Lab Results   Component Value Date/Time    PROTIME 14.2 11/20/2018 03:27 AM    INR 1.25 11/20/2018 03:27 AM     HgBA1c:    Lab Results   Component Value Date/Time    LABA1C 9.1 09/11/2022 09:20 AM         Assessment:     Patient Active Problem List   Diagnosis    MARINO on CPAP    New onset atrial fibrillation (HCC)    Type 2 diabetes mellitus with hyperglycemia, with long-term current use of insulin (HCC)    Class 3 severe obesity due to excess calories with body mass index (BMI) of 45.0 to 49.9 in [facility])    History of cardiac cath    Weakness of right arm    Acute CVA (cerebrovascular accident) ([address]    Spastic hemiparesis of right dominant side ([address]    Dysphagia due to recent stroke    Essential hypertension    Morbid obesity with body mass index of 45.0-49.9 in [facility])    Frequent falls    Chronic venous stasis dermatitis of both lower extremities    History of CVA (cerebrovascular accident)    Obesity hypoventilation syndrome ([address]    Hypertension    Hyperlipidemia    Congestive heart failure due to cardiomyopathy ([address]    Stage 3b chronic kidney disease (HCC)    Chronic kidney disease, stage II (mild)    Benign essential microscopic hematuria    Stage 3a chronic kidney disease ([address]    Generalized edema    Acute kidney injury superimposed on CKD ([address]       Measurements:  Wound 09/10/22 Abdomen Lower;Right ABRASION(S) lateral (Active)   Wound Image   09/13/22 0845   Wound Etiology Traumatic 09/13/22 0845   Dressing Status New dressing applied 09/13/22 0845   Wound Cleansed Cleansed with saline 09/13/22 0999 Dressing/Treatment Hydrofiber Ag;Silicone border 06/79/47 0845   Wound Length (cm) 8 cm 09/13/22 0845   Wound Width (cm) 4 cm 09/13/22 0845   Wound Depth (cm) 0.1 cm 09/13/22 0845   Wound Surface Area (cm^2) 32 cm^2 09/13/22 0845   Wound Volume (cm^3) 3.2 cm^3 09/13/22 0845   Distance Tunneling (cm) 0 cm 09/13/22 0845   Tunneling Position ___ O'Clock 0 09/13/22 0845   Undermining Starts ___ O'Clock 0 09/13/22 0845   Undermining Ends___ O'Clock 0 09/13/22 0845   Undermining Maxium Distance (cm) 0 09/13/22 0845   Wound Assessment Pink/red 09/13/22 0845   Drainage Amount Moderate 09/13/22 0845   Drainage Description Serosanguinous; Yellow 09/13/22 0845   Odor None 09/13/22 0845   Berenice-wound Assessment Ecchymosis 09/13/22 0845   Margins Defined edges 09/13/22 0845   Wound Thickness Description not for Pressure Injury Full thickness 09/13/22 0845   Number of days: 2       Wound 09/10/22 Femoral Anterior;Proximal;Right EXCORIATION (Active)   Wound Etiology Other 09/13/22 0836   Dressing Status Other (Comment) 09/13/22 0836   Wound Cleansed Soap and water 09/13/22 0836   Dressing/Treatment Other (comment) 09/13/22 0836   Wound Length (cm) 0 cm 09/13/22 0845   Wound Width (cm) 0 cm 09/13/22 0845   Wound Depth (cm) 0 cm 09/13/22 0845   Wound Surface Area (cm^2) 0 cm^2 09/13/22 0845   Wound Volume (cm^3) 0 cm^3 09/13/22 0845   Drainage Amount Scant 09/12/22 2227   Drainage Description Serosanguinous 09/12/22 2227   Odor None 09/12/22 2227   Number of days: 2       Wound 09/10/22 Femoral Anterior;Left;Proximal EXCORIATION (Active)   Wound Etiology Other 09/12/22 2227   Dressing Status Other (Comment) 09/12/22 2227   Wound Cleansed Soap and water 09/12/22 0836   Dressing/Treatment Other (comment) 09/12/22 2227   Wound Length (cm) 0 cm 09/13/22 0845   Wound Width (cm) 0 cm 09/13/22 0845   Wound Depth (cm) 0 cm 09/13/22 0845   Wound Surface Area (cm^2) 0 cm^2 09/13/22 0845   Wound Volume (cm^3) 0 cm^3 09/13/22 0845   Wound Assessment Other (Comment) 09/12/22 0836   Drainage Amount Scant 09/12/22 2227   Drainage Description Serosanguinous 09/12/22 2227   Odor None 09/12/22 2227   Number of days: 2       Wound 09/13/22 Abdomen Lower;Mid (Active)   Wound Image   09/13/22 0845   Wound Etiology Traumatic 09/13/22 0845   Dressing Status New dressing applied 09/13/22 0845   Wound Cleansed Cleansed with saline 09/13/22 0845   Dressing/Treatment Collagen;Silicone border 74/03/71 0845   Wound Length (cm) 0.5 cm 09/13/22 0845   Wound Width (cm) 0.5 cm 09/13/22 0845   Wound Depth (cm) 0.1 cm 09/13/22 0845   Wound Surface Area (cm^2) 0.25 cm^2 09/13/22 0845   Wound Volume (cm^3) 0.025 cm^3 09/13/22 0845   Distance Tunneling (cm) 0 cm 09/13/22 0845   Tunneling Position ___ O'Clock 0 09/13/22 0845   Undermining Starts ___ O'Clock 0 09/13/22 0845   Undermining Ends___ O'Clock 0 09/13/22 0845   Undermining Maxium Distance (cm) 0 09/13/22 0845   Wound Assessment Pink/red 09/13/22 0845   Drainage Amount Small 09/13/22 0845   Drainage Description Serosanguinous 09/13/22 0845   Odor None 09/13/22 0845   Berenice-wound Assessment Ecchymosis 09/13/22 0845   Margins Defined edges 09/13/22 0845   Wound Thickness Description not for Pressure Injury Partial thickness 09/13/22 0845   Number of days: 0       Response to treatment:  Well tolerated by patient. Pain Assessment:  Severity:  none  Quality of pain:   Wound Pain Timing/Severity:   Premedicated: no    Plan:     Plan of Care: Wound 09/10/22 Abdomen Lower;Right ABRASION(S) lateral-Dressing/Treatment: Hydrofiber Ag, Silicone border  Wound 09/10/22 Femoral Anterior;Proximal;Right EXCORIATION-Dressing/Treatment: Other (comment) (inner dry)  Wound 09/10/22 Femoral Anterior;Left;Proximal EXCORIATION-Dressing/Treatment: Other (comment) (inner dry)  Wound 09/13/22 Abdomen Lower;Mid-Dressing/Treatment: Collagen, Silicone border    Patient in bed agreeable to wound care eval for abdomen wounds.  Pt said he fell in the bathroom hitting his abdomen has open area/aprt eschar with ecchymosis around. Pt is diabetic. Cleansed with NS. Measured and pictured. Applied a new dressing with Aquacel Ag and foam border. Mid abdomen wound applied collagen and border. Recommend santyl ointment to rt lateral abdomen. Buttocks is intact. Heels intact. Legs with dry/flaky skin. Pt is at mild risk for skin breakdown AEB emmanuel. Follow emmanuel orders. Atmos air pump placed to the bed. Specialty Bed Required : yes  [] Low Air Loss   [x] Pressure Redistribution  [] Fluid Immersion  [] Bariatric  [] Total Pressure Relief  [] Other:     Discharge Plan:  Placement for patient upon discharge: tbd  Hospice Care: no  Patient appropriate for Outpatient [address]: yes    Patient/Caregiver Teaching:  Level of patient/caregiver understanding able to: pt voiced understanding. Electronically signed by Maxine Gerber RN,  on 9/13/2022 at 11:58 AM

## 2022-09-13 NOTE — DISCHARGE SUMMARY
Discharge Summary    Name:  Lisa Freeman /Age/Sex: 1960  (64 y.o. male)   MRN & CSN:  [id number] & [id number] Admission Date/Time: 9/10/2022  4:58 PM   Attending:  No att. providers found Discharging Physician: Javon Rosenberg MD     Hospital Course:   Lisa Freeman is a 64 y.o.  male  who presents with Acute kidney injury superimposed on CKD ([address]      The following problems have been addressed during this hospitalization. #Dehydration w hypokalemia 2/2 likely diuretics and SGLT-2 Inhibitor  #JESUS on CKD III  Pt had recent increase in diuretics due to worsening swelling. Received gentle IVF this hospitalization with improvement in kidney function. Potassium aggressivelly replaced. Per nephro, ok to discharge, hold diuretics on discharge, potassium chloride 40 twice daily for 3 days. #Recurrent falls likely 2/2 weakness from dehydration, 3 falls past week. Has lower abd wound seen by wound care. No sign of infection    #Chronic HFpEF. No sign of volume overload. Holding diuresis as above    #Type II NSTEMI. Presented w troponin 0.033 trended down to 0.028, no chest pain, no ischemic changes on ECG. #Uncontrolled Type II DM with episodes of hypoglycemia  Pt on home U500, glipizide and dapagliflozin. Glipizide and dapagliflozin held given dehydration, JESUS  - Endocrinology was consulted  - discharged on sliding scale and 5U three times daily with meals  - pt was advised to monitor glucose and to follow up with endo    Patient was seen and examined at bedside on day of discharge. This note can be used as the progress note for today's visit. Pt has no new complaints or concerns. Denies lightheadedness, dizziness, fever, night sweats, chills, chest pain, cough, dyspnea, palpitations, abd pain, nausea, vomiting, diarrhea, dysuria. The patient expressed appropriate understanding of and agreement with the discharge recommendations, medications, and plan.      Consults this admission:  IP CONSULT TO HOSPITALIST  IP CONSULT TO NEPHROLOGY  IP CONSULT TO ENDOCRINOLOGY  IP CONSULT TO HOME CARE NEEDS      Discharge Instruction:   Handoff to PCP:   Monitor glucose  Lab work in one week    Follow up appointments: PCP, endo, nephro    Primary care physician: Melinda Jain MD      Diet:  diabetic diet   Activity: activity as tolerated  Disposition: Discharged to:   [x]Home, [][facility], []SNF, []Acute Rehab, []Hospice   Condition on discharge: Stable    Discharge Medications:        Medication List        START taking these medications      insulin regular human 500 UNIT/ML concentrated injection vial  Commonly known as: humuLIN R U-500  Inject 0-15 Units into the skin 3 times daily (before meals) Take insulin 5units  three time with meals.  Hold if glucose <150 or if meal < 50%    Take insulin sliding scale as below  If 140-199 0 Units   200-249  0 Units   250-299 5 Units   300-349  10  Units   > 350   15 Units  Replaces: HumuLIN R U-500 KwikPen 500 UNIT/ML Sopn concentrated injection pen     potassium chloride 20 MEQ extended release tablet  Commonly known as: KLOR-CON M  Take 2 tablets by mouth 2 times daily for 3 days  Start taking on: September 14, 2022            CONTINUE taking these medications      clopidogrel 75 MG tablet  Commonly known as: PLAVIX  Take 1 tablet by mouth daily     ezetimibe 10 MG tablet  Commonly known as: ZETIA     finasteride 5 MG tablet  Commonly known as: PROSCAR     folic acid-pyridoxine-cyancobalamin 1.13-25-2 MG Tabs  Take 1 tablet by mouth daily     metoprolol succinate 50 MG extended release tablet  Commonly known as: TOPROL XL  Take 1 tablet by mouth daily     ranolazine 500 MG extended release tablet  Commonly known as: RANEXA  Take 1 tablet by mouth 2 times daily     rivaroxaban 20 MG Tabs tablet  Commonly known as: XARELTO  Take 1 tablet by mouth Daily with supper     simvastatin 40 MG tablet  Commonly known as: ZOCOR     tamsulosin 0.4 MG capsule  Commonly known as: FLOMAX  Take 1 capsule by mouth daily            STOP taking these medications      dapagliflozin 5 MG tablet  Commonly known as: FARXIGA     glipiZIDE 5 MG tablet  Commonly known as: GLUCOTROL     HumuLIN R U-500 KwikPen 500 UNIT/ML Sopn concentrated injection pen  Generic drug: insulin regular human  Replaced by: insulin regular human 500 UNIT/ML concentrated injection vial     metOLazone 2.5 MG tablet  Commonly known as: ZAROXOLYN     Torsemide 40 MG Tabs               Where to Get Your Medications        These medications were sent to [address] [place] [address] 25, [address] [id number]      Phone: [phone]   potassium chloride 20 MEQ extended release tablet       You can get these medications from any pharmacy    Bring a paper prescription for each of these medications  insulin regular human 500 UNIT/ML concentrated injection vial         Objective Findings at Discharge:       BMP/CBC  Recent Labs     09/11/22  0920 09/11/22  2350 09/12/22  0632 09/12/22 2001 09/13/22  0557      < > 137 139 137   K 2.5*   < > 2.9* 3.8 4.0   CL 87*   < > 90* 92* 91*   CO2 39*   < > 37* 40* 34*   BUN 64*   < > 46* 41* 34*   CREATININE 1.7*   < > 1.3 1.4* 1.4*   WBC 8.3  --  10.5  --  6.4   HCT 51.8  --  49.8  --  54.1*     --  195  --  198    < > = values in this interval not displayed. IMAGING:      Additional Information: Patient seen and examined day of discharge.  For more information regarding patient's care please contact [facility] records [phone]    Discharge Time of 35 minutes    Electronically signed by Evan Rodriguez MD on 9/13/2022 at 5:03 PM

## 2022-09-13 NOTE — CARE COORDINATION
[facility] Liaison spoke with pt and is aware of discharge & will initiate [facility]. Address and number verified. Spoke with wife and she will learn w.care to abd. Also asked nurse to give extra supplies.

## 2022-09-13 NOTE — PROGRESS NOTES
Nephrology Progress Note  9/13/2022 9:40 AM        Subjective:   Admit Date: 9/10/2022  PCP: Andrés Bond MD    Interval History:  doing and feeling better    Diet:  good oral intake    ROS:   no shortness of breath   no fever, acceptable blood pressure    Data:     Current meds:    insulin regular human  0-15 Units SubCUTAneous TID AC    potassium chloride  40 mEq Oral 4x Daily    insulin regular human  5 Units SubCUTAneous TID WC    mineral oil-hydrophilic petrolatum   Topical BID    clopidogrel  75 mg Oral Daily    ezetimibe  10 mg Oral Daily    finasteride  5 mg Oral Daily    b complex vitamins  1 capsule Oral Daily    metoprolol succinate  50 mg Oral Daily    ranolazine  500 mg Oral BID    atorvastatin  20 mg Oral Nightly    tamsulosin  0.4 mg Oral Daily    sodium chloride flush  5-40 mL IntraVENous BID    rivaroxaban  20 mg Oral Dinner      dextrose      sodium chloride 100 mL/hr at 09/12/22 0334         I/O last 3 completed shifts:   In: 10 [I.V.:10]  Out: 2675 [Urine:2675]    CBC:   Recent Labs     09/11/22  0920 09/12/22  0632 09/13/22  0557   WBC 8.3 10.5 6.4   HGB 16.8 16.3 17.3    195 198          Recent Labs     09/11/22  2350 09/12/22  0632 09/12/22 2001    137 139   K 2.4* 2.9* 3.8   CL 92* 90* 92*   CO2 40* 37* 40*   BUN 52* 46* 41*   CREATININE 1.3 1.3 1.4*   GLUCOSE 75 51* 239*       Lab Results   Component Value Date    CALCIUM 8.8 09/12/2022    PHOS 3.6 09/12/2022       Objective:     Vitals: /67   Pulse 63   Temp 97.8 °F (36.6 °C) (Oral)   Resp 15   Ht 6' (1.829 m)   Wt (!) 329 lb 9.4 oz (149.5 kg)   SpO2 96%   BMI 44.70 kg/m² ,    General appearance:   alert, awake and oriented  HEENT:   no conjunctival pallor or scleral icterus  Neck:   supple no crackles,  Lungs:   no crackles  Heart:   seemed regular rate and rhythm  Abdomen:  obese  Extremities:   chronic edema and stasis dermatitis      Problem List :         Impression :      acute kidney injury on underlying CKD stage III- mainly from hemodynamic changes with diuretics- creatinine back to baseline   low potassium also repleted   underlying exogenous obesity, likely cor pulmonale and diabetes mellitus    Recommendation/Plan  :      okay to discharge from my standpoint   good blood sugar control at home   low-salt, DASH diet   discharged without diuretics   CMP, magnesium, phosphorus in 1 week   follow-up with Dr. Jama Patel in 2 weeks   have patient call us if he has more than 3 pound weight gain, increasing edema or shortness of breath   will probably consider SGLT2 inhibitors as an outpatient      Mathew Miramontes MD MD

## 2022-09-13 NOTE — PROGRESS NOTES
Progress Note( Dr. Keyana Trevizo)  9/13/2022  Subjective:   Admit Date: 9/10/2022  PCP: Duncan James MD    Admitted For :Frequent falls lightheadedness and leg weakness    Consulted For:  Better control of glucose    Interval History: Glucose somewhat better/// no more episodes of hypoglycemia noted  Denies any chest pains,   Denies SOB . Denies nausea or vomiting. No new bowel or bladder symptoms. Intake/Output Summary (Last 24 hours) at 9/13/2022 0804  Last data filed at 9/13/2022 0545  Gross per 24 hour   Intake 10 ml   Output 2675 ml   Net -2665 ml         DATA    CBC:   Recent Labs     09/10/22  1710 09/11/22  0920 09/12/22  0632   WBC 12.0* 8.3 10.5   HGB 17.8 16.8 16.3    205 195      CMP:  Recent Labs     09/10/22  1710 09/11/22  0920 09/11/22  2350 09/12/22  0632 09/12/22 2001      < > 141 137 139   K 2.4*   < > 2.4* 2.9* 3.8   CL 82*   < > 92* 90* 92*   CO2 35*   < > 40* 37* 40*   BUN 72*   < > 52* 46* 41*   CREATININE 2.2*   < > 1.3 1.3 1.4*   CALCIUM 9.2   < > 8.9 9.3 8.8   PROT 7.4  --   --  6.4  --    LABALBU 4.3  --   --  4.1  --    BILITOT 0.5  --   --  0.6  --    ALKPHOS 87  --   --  77  --    AST 22  --   --  24  --    ALT 21  --   --  19  --     < > = values in this interval not displayed. Lipids:   Lab Results   Component Value Date/Time    CHOL 171 05/20/2022 09:16 AM    HDL 49 05/20/2022 09:16 AM    TRIG 137 05/20/2022 09:16 AM     Glucose:  Recent Labs     09/12/22  2004 09/12/22  2231 09/13/22  0641   POCGLU 241* 173* 202*       [id number]:  Lab Results   Component Value Date/Time    LABA1C 9.1 09/11/2022 09:20 AM     High Sensitivity TSH:   Lab Results   Component Value Date/Time    TSHHS 1.800 08/29/2019 10:06 AM     Free T3: No results found for: FT3  Free T4:No results found for: T4FREE    CT ABDOMEN PELVIS WO CONTRAST Additional Contrast? None   Final Result   No acute or suspicious bony abnormalities are identified.       RECOMMENDATIONS:   Paraumbilical hernia measuring 7.3 cm, increased in size when compared to the   previous exam.  This mostly contains fat. However, a short knuckle of small   bowel extends into that, but without evidence of obstruction. Edema within the subcutaneous fat of the pannus in the right mid to lower   abdomen. Please correlate with any clinical evidence of cellulitis. Nonobstructing left nephrolithiasis. Hyperdense material within the gallbladder, which may reflect gallbladder   sludge. CT CSpine W/O Contrast   Final Result   No acute abnormality of the cervical spine. CT Head W/O Contrast   Final Result   No acute intracranial abnormality. XR CHEST PORTABLE   Final Result   No acute abnormality.               Scheduled Medicines   Medications:    insulin regular human  0-15 Units SubCUTAneous TID AC    potassium chloride  40 mEq Oral 4x Daily    insulin regular human  5 Units SubCUTAneous TID WC    mineral oil-hydrophilic petrolatum   Topical BID    clopidogrel  75 mg Oral Daily    ezetimibe  10 mg Oral Daily    finasteride  5 mg Oral Daily    b complex vitamins  1 capsule Oral Daily    metoprolol succinate  50 mg Oral Daily    ranolazine  500 mg Oral BID    atorvastatin  20 mg Oral Nightly    tamsulosin  0.4 mg Oral Daily    sodium chloride flush  5-40 mL IntraVENous BID    rivaroxaban  20 mg Oral Dinner      Infusions:    dextrose      sodium chloride 100 mL/hr at 09/12/22 0334         Objective:   Vitals: /61   Pulse 63   Temp 97.6 °F (36.4 °C) (Oral)   Resp 15   Ht 6' (1.829 m)   Wt (!) 329 lb 9.4 oz (149.5 kg)   SpO2 98%   BMI 44.70 kg/m²   General appearance: alert and cooperative with exam  Neck: no JVD or bruit  Thyroid : Normal lobes   Lungs: Has Vesicular Breath sounds   Heart:  regular rate and rhythm  Abdomen: soft, non-tender; bowel sounds normal; no masses,  no organomegaly  Musculoskeletal: Normal  Extremities: extremities normal, , bilateral leg edema  Neurologic:

## 2022-09-13 NOTE — PLAN OF CARE
Problem: Discharge Planning  Goal: Discharge to home or other facility with appropriate resources  Outcome: Progressing     Problem: Skin/Tissue Integrity  Goal: Absence of new skin breakdown  Description: 1. Monitor for areas of redness and/or skin breakdown  2. Assess vascular access sites hourly  3. Every 4-6 hours minimum:  Change oxygen saturation probe site  4. Every 4-6 hours:  If on nasal continuous positive airway pressure, respiratory therapy assess nares and determine need for appliance change or resting period.   Outcome: Progressing     Problem: Safety - Adult  Goal: Free from fall injury  Outcome: Progressing     Problem: Chronic Conditions and Co-morbidities  Goal: Patient's chronic conditions and co-morbidity symptoms are monitored and maintained or improved  Outcome: Progressing

## 2022-09-13 NOTE — DISCHARGE INSTRUCTIONS
- please get labs done in one week  - please follow up with your nephrologist and Primary Care Physician  - please call your nephrologist if you have more than 3 pound weight gain, increasing edema or shortness of breath  - please take the potassium chloride for 3 days only  - please monitor your glucose closely; check your glucose few times a day  - please follow DASH diet low in carbs, salt

## 2022-09-14 LAB — UREA NITROGEN, UR: 812 MG/DL

## 2022-11-17 PROBLEM — E87.6 HYPOKALEMIA: Status: ACTIVE | Noted: 2022-11-17

## 2022-12-07 NOTE — PLAN OF CARE
Thank you for the referral.  Following the physical rehabilitation triage process, based upon available information, the PT referral will be held, but not removed. Patient modified independent last evening with nurse for in-room mobility. Encourage continued nursing mobility as status allows. (Blood glucose range this admission from 301 -- 51.   Also with low potassium.)  Sarthak Riggs, PT,   9/12/2022, 11:15 AM Yes...

## 2023-02-22 LAB
ANION GAP SERPL CALCULATED.3IONS-SCNC: 22 MMOL/L (ref 3–16)
BUN BLDV-MCNC: 47 MG/DL (ref 7–20)
CALCIUM SERPL-MCNC: 9.7 MG/DL (ref 8.3–10.6)
CHLORIDE BLD-SCNC: 95 MMOL/L (ref 99–110)
CO2: 29 MMOL/L (ref 21–32)
CREAT SERPL-MCNC: 1.5 MG/DL (ref 0.8–1.3)
GFR SERPL CREATININE-BSD FRML MDRD: 52 ML/MIN/{1.73_M2}
GLUCOSE BLD-MCNC: 212 MG/DL (ref 70–99)
POTASSIUM SERPL-SCNC: 4 MMOL/L (ref 3.5–5.1)
SODIUM BLD-SCNC: 146 MMOL/L (ref 136–145)

## 2023-08-31 ENCOUNTER — HOSPITAL ENCOUNTER (EMERGENCY)
Age: 63
Discharge: HOME | End: 2023-08-31
Payer: MEDICARE

## 2023-08-31 VITALS
RESPIRATION RATE: 18 BRPM | SYSTOLIC BLOOD PRESSURE: 130 MMHG | OXYGEN SATURATION: 93 % | HEART RATE: 83 BPM | DIASTOLIC BLOOD PRESSURE: 72 MMHG | TEMPERATURE: 97.9 F

## 2023-08-31 DIAGNOSIS — S81.811A LACERATION OF RIGHT LOWER EXTREMITY, INITIAL ENCOUNTER: Primary | ICD-10-CM

## 2023-08-31 LAB
BUN BLDV-MCNC: 35 MG/DL (ref 3–29)
BUN/CREAT BLD: 27 (ref 7–25)
CALCIUM SERPL-MCNC: 9.4 MG/DL (ref 8.5–10.5)
CHLORIDE BLD-SCNC: 97 MEQ/L (ref 96–110)
CO2: 30 MEQ/L (ref 19–32)
CREAT SERPL-MCNC: 1.3 MG/DL (ref 0.5–1.4)
FASTING STATUS: ABNORMAL
GLOMERULAR FILTRATION RATE: 62 MLS/MIN/1.73M2
GLUCOSE BLD-MCNC: 194 MG/DL (ref 70–99)
POTASSIUM SERPL-SCNC: 3.9 MEQ/L (ref 3.4–5.3)
SODIUM BLD-SCNC: 141 MEQ/L (ref 135–148)

## 2023-08-31 PROCEDURE — 99282 EMERGENCY DEPT VISIT SF MDM: CPT

## 2023-08-31 ASSESSMENT — ENCOUNTER SYMPTOMS
COLOR CHANGE: 0
SHORTNESS OF BREATH: 0
CHEST TIGHTNESS: 0
ABDOMINAL PAIN: 0
COUGH: 0

## 2023-08-31 NOTE — ED TRIAGE NOTES
PT states he \"scraped\" his leg on something approx. 1 week ago and the bleeding has not stopped. PT is diabetic ad taking xarelto.  They have tried applying bandages with no success for stopping the bleeding

## 2023-08-31 NOTE — DISCHARGE INSTRUCTIONS
Please leave the pressure dressing on for the next 48 to 72 hours then remove it. Follow-up with your primary care provider. If you have any worsening symptoms or return of bleeding return to the emergency department as needed.

## 2023-08-31 NOTE — ED PROVIDER NOTES
[address] ENCOUNTER      Pt Name: Alexander Holt  MRN: [id number]  [facility] 1960  Date of evaluation: 8/31/2023  Provider: HAYLEY Banerjee CNP  PCP: Nemiah Alpers, MD  Note Started: 1:36 PM EDT 8/31/23    I am the Primary Clinician of Record. BREN. I have evaluated this patient. CHIEF COMPLAINT       Chief Complaint   Patient presents with    Laceration     Right leg, has been bleeding for 1 week. NKI       HISTORY OF PRESENT ILLNESS: 1 or more Elements   Alexander Holt is a 58 y.o. male who presents to the ER with chief complaint of bleeding of his right lower leg. He states its been bleeding for a week intermittently. He denies any known injury. He has used bandages on the area but the bleeding will not stop. He takes Xarelto. I have reviewed the nursing triage documentation and agree unless otherwise noted. REVIEW OF SYSTEMS :    Review of Systems   Constitutional:  Negative for chills, fatigue and fever. HENT:  Negative for congestion. Respiratory:  Negative for cough, chest tightness and shortness of breath. Cardiovascular:  Negative for chest pain and leg swelling. Gastrointestinal:  Negative for abdominal pain. Genitourinary:  Negative for difficulty urinating and dysuria. Musculoskeletal:  Negative for myalgias. Skin:  Positive for wound. Negative for color change and rash. Neurological:  Negative for dizziness, weakness and headaches. Psychiatric/Behavioral:  Negative for confusion. Positives and Pertinent negatives as per HPI.    SURGICAL HISTORY     Past Surgical History:   Procedure Laterality Date    CARDIAC CATHETERIZATION      TONSILLECTOMY      AT 13YEARS OLD       CURRENTMEDICATIONS       Discharge Medication List as of 8/31/2023  2:29 PM        CONTINUE these medications which have NOT CHANGED    Details   meclizine (ANTIVERT) 12.5 MG tablet as

## 2024-08-16 LAB
A/G RATIO: 1.7 RATIO (ref 0.8–2.6)
ALBUMIN: 4 G/DL (ref 3.5–5.2)
ALP BLD-CCNC: 79 U/L (ref 23–144)
ALT SERPL-CCNC: 16 U/L (ref 0–60)
AST SERPL-CCNC: 14 U/L (ref 0–55)
BILIRUB SERPL-MCNC: 0.4 MG/DL (ref 0–1.2)
BUN / CREAT RATIO: 31 (ref 7–25)
BUN BLDV-MCNC: 43 MG/DL (ref 3–29)
CALCIUM SERPL-MCNC: 9.2 MG/DL (ref 8.5–10.5)
CHLORIDE BLD-SCNC: 97 MEQ/L (ref 96–110)
CHOLESTEROL, TOTAL: 110 MG/DL
CO2: 30 MEQ/L (ref 19–32)
CREAT SERPL-MCNC: 1.4 MG/DL (ref 0.5–1.2)
ESTIMATED GLOMERULAR FILTRATION RATE CREATININE EQUATION: 56 MLS/MIN/1.73M2
FASTING STATUS: ABNORMAL
GLOBULIN: 2.4 G/DL (ref 1.9–3.6)
GLUCOSE BLD-MCNC: 120 MG/DL (ref 70–99)
HBA1C MFR BLD: 7.5 %
HDLC SERPL-MCNC: 38 MG/DL
LDL CHOLESTEROL: 47 MG/DL
POTASSIUM SERPL-SCNC: 3.2 MEQ/L (ref 3.4–5.3)
SODIUM BLD-SCNC: 141 MEQ/L (ref 135–148)
TOTAL PROTEIN: 6.4 G/DL (ref 6–8.3)
TRIGL SERPL-MCNC: 127 MG/DL
VLDLC SERPL CALC-MCNC: 25 MG/DL (ref 4–38)

## 2024-11-14 LAB
A/G RATIO: 1.4 RATIO (ref 0.8–2.6)
ALBUMIN: 3.9 G/DL (ref 3.5–5.2)
ALP BLD-CCNC: 86 U/L (ref 23–144)
ALT SERPL-CCNC: 19 U/L (ref 0–60)
AST SERPL-CCNC: 14 U/L (ref 0–55)
BILIRUB SERPL-MCNC: 0.5 MG/DL (ref 0–1.2)
BUN / CREAT RATIO: 29 (ref 7–25)
BUN BLDV-MCNC: 49 MG/DL (ref 3–29)
CALCIUM SERPL-MCNC: 9.3 MG/DL (ref 8.5–10.5)
CHLORIDE BLD-SCNC: 94 MEQ/L (ref 96–110)
CHOLESTEROL, TOTAL: 159 MG/DL
CO2: 29 MEQ/L (ref 19–32)
CREAT SERPL-MCNC: 1.7 MG/DL (ref 0.5–1.2)
ESTIMATED GLOMERULAR FILTRATION RATE CREATININE EQUATION: 44 MLS/MIN/1.73M2
FASTING STATUS: ABNORMAL
GLOBULIN: 2.7 G/DL (ref 1.9–3.6)
GLUCOSE BLD-MCNC: 216 MG/DL (ref 70–99)
HBA1C MFR BLD: 7.5 %
HDLC SERPL-MCNC: 39 MG/DL
LDL CHOLESTEROL: 90 MG/DL
POTASSIUM SERPL-SCNC: 3.2 MEQ/L (ref 3.4–5.3)
SODIUM BLD-SCNC: 141 MEQ/L (ref 135–148)
TOTAL PROTEIN: 6.6 G/DL (ref 6–8.3)
TRIGL SERPL-MCNC: 152 MG/DL
VLDLC SERPL CALC-MCNC: 30 MG/DL (ref 4–38)

## 2025-01-14 ENCOUNTER — HOSPITAL ENCOUNTER (EMERGENCY)
Age: 65
Discharge: HOME | End: 2025-01-14
Attending: EMERGENCY MEDICINE
Payer: MEDICARE

## 2025-01-14 ENCOUNTER — APPOINTMENT (OUTPATIENT)
Dept: GENERAL RADIOLOGY | Age: 65
End: 2025-01-14
Payer: MEDICARE

## 2025-01-14 VITALS
HEART RATE: 81 BPM | BODY MASS INDEX: 42.66 KG/M2 | DIASTOLIC BLOOD PRESSURE: 62 MMHG | HEIGHT: 72 IN | TEMPERATURE: 99.7 F | RESPIRATION RATE: 27 BRPM | SYSTOLIC BLOOD PRESSURE: 140 MMHG | WEIGHT: 315 LBS | OXYGEN SATURATION: 97 %
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
## 2025-01-14 LAB
ALBUMIN SERPL-MCNC: 4.3 G/DL (ref 3.4–5)
ALBUMIN/GLOB SERPL: 1.5 {RATIO} (ref 1.1–2.2)
ALP SERPL-CCNC: 95 U/L (ref 40–129)
ALT SERPL-CCNC: 23 U/L (ref 10–40)
ANION GAP SERPL CALCULATED.3IONS-SCNC: 19 MMOL/L (ref 9–17)
AST SERPL-CCNC: 23 U/L (ref 15–37)
BASOPHILS # BLD: 0.04 K/UL
BASOPHILS NFR BLD: 0 % (ref 0–1)
BILIRUB SERPL-MCNC: 0.4 MG/DL (ref 0–1)
BILIRUB UR QL STRIP: NEGATIVE
BUN SERPL-MCNC: 61 MG/DL (ref 7–20)
CALCIUM SERPL-MCNC: 10.1 MG/DL (ref 8.3–10.6)
CASTS #/AREA URNS LPF: ABNORMAL /LPF
CHLORIDE SERPL-SCNC: 89 MMOL/L (ref 99–110)
CLARITY UR: CLEAR
CO2 SERPL-SCNC: 29 MMOL/L (ref 21–32)
COLOR UR: YELLOW
CREAT SERPL-MCNC: 2 MG/DL (ref 0.8–1.3)
EOSINOPHIL # BLD: 0 K/UL
EOSINOPHILS RELATIVE PERCENT: 0 % (ref 0–3)
EPI CELLS #/AREA URNS HPF: <1 /HPF
ERYTHROCYTE [DISTWIDTH] IN BLOOD BY AUTOMATED COUNT: 17.6 % (ref 11.7–14.9)
GFR, ESTIMATED: 34 ML/MIN/1.73M2
GLUCOSE SERPL-MCNC: 205 MG/DL (ref 74–99)
GLUCOSE UR STRIP-MCNC: 250 MG/DL
HCT VFR BLD AUTO: 51.1 % (ref 42–52)
HGB BLD-MCNC: 16.7 G/DL (ref 13.5–18)
HGB UR QL STRIP.AUTO: ABNORMAL
IMM GRANULOCYTES # BLD AUTO: 0.06 K/UL
IMM GRANULOCYTES NFR BLD: 0 %
KETONES UR STRIP-MCNC: NEGATIVE MG/DL
LEUKOCYTE ESTERASE UR QL STRIP: NEGATIVE
LYMPHOCYTES NFR BLD: 0.31 K/UL
LYMPHOCYTES RELATIVE PERCENT: 2 % (ref 24–44)
MCH RBC QN AUTO: 27.3 PG (ref 27–31)
MCHC RBC AUTO-ENTMCNC: 32.7 G/DL (ref 32–36)
MCV RBC AUTO: 83.6 FL (ref 78–100)
MONOCYTES NFR BLD: 0.86 K/UL
MONOCYTES NFR BLD: 6 % (ref 0–4)
MUCOUS THREADS URNS QL MICRO: ABNORMAL
NEUTROPHILS NFR BLD: 91 % (ref 36–66)
NEUTS SEG NFR BLD: 12.56 K/UL
NITRITE UR QL STRIP: NEGATIVE
PH UR STRIP: 6 [PH] (ref 5–8)
PLATELET # BLD AUTO: 254 K/UL (ref 140–440)
PMV BLD AUTO: 11.1 FL (ref 7.5–11.1)
POTASSIUM SERPL-SCNC: 3.4 MMOL/L (ref 3.5–5.1)
PROT SERPL-MCNC: 7.2 G/DL (ref 6.4–8.2)
PROT UR STRIP-MCNC: ABNORMAL MG/DL
RBC # BLD AUTO: 6.11 M/UL (ref 4.6–6.2)
RBC #/AREA URNS HPF: 3 /HPF (ref 0–2)
SODIUM SERPL-SCNC: 137 MMOL/L (ref 136–145)
SP GR UR STRIP: 1.01 (ref 1–1.03)
TROPONIN I SERPL HS-MCNC: 143 NG/L (ref 0–22)
TROPONIN I SERPL HS-MCNC: 156 NG/L (ref 0–22)
UROBILINOGEN UR STRIP-ACNC: 0.2 EU/DL (ref 0–1)
WBC #/AREA URNS HPF: 3 /HPF (ref 0–5)
WBC OTHER # BLD: 13.8 K/UL (ref 4–10.5)

## 2025-01-14 PROCEDURE — 36415 COLL VENOUS BLD VENIPUNCTURE: CPT

## 2025-01-14 PROCEDURE — 99284 EMERGENCY DEPT VISIT MOD MDM: CPT

## 2025-01-14 PROCEDURE — 81001 URINALYSIS AUTO W/SCOPE: CPT

## 2025-01-14 PROCEDURE — 85025 COMPLETE CBC W/AUTO DIFF WBC: CPT

## 2025-01-14 PROCEDURE — 80053 COMPREHEN METABOLIC PANEL: CPT

## 2025-01-14 PROCEDURE — 84484 ASSAY OF TROPONIN QUANT: CPT

## 2025-01-14 PROCEDURE — 71045 X-RAY EXAM CHEST 1 VIEW: CPT

## 2025-01-14 ASSESSMENT — PAIN - FUNCTIONAL ASSESSMENT
PAIN_FUNCTIONAL_ASSESSMENT: 0-10
PAIN_FUNCTIONAL_ASSESSMENT: 0-10

## 2025-01-14 ASSESSMENT — PAIN SCALES - GENERAL
PAINLEVEL_OUTOF10: 0
PAINLEVEL_OUTOF10: 0

## 2025-01-15 NOTE — ED PROVIDER NOTES
CONDITION Stable           Comment: Please note this report has been produced using speech recognition software and may contain errors related to that system including errors in grammar, punctuation, and spelling, as well as words and phrases that may be inappropriate.  Efforts were made to edit the dictations.       Bart Santo MD  01/14/25 [id number]       Bart Santo MD  01/14/25 [id number]

## 2025-01-15 NOTE — DISCHARGE INSTRUCTIONS
Please consult with your primary care physician for further care recommendations in the next 1 to 2 days  If having worsening symptoms or if you feel persistent chest pain or shortness of breath or leg pain or leg swelling return to ER

## 2025-01-15 NOTE — ED TRIAGE NOTES
Pt presents to the ed with medics complaining of a fall earlier today. Pt endorses that he takes blood thinners but did not hit his head . Pt is aaox4. No resp distress noted skin warm and dry. Pt does state that he grew weak when he stood up but his legs grew weak beneath him and he fell. Pt notes that he had an elevated blood sugar at about 200 prior to medic arrival for which he was going to take his home insulin.

## 2025-02-13 ENCOUNTER — APPOINTMENT (OUTPATIENT)
Dept: GENERAL RADIOLOGY | Age: 65
DRG: 854 | End: 2025-02-13
Payer: MEDICARE

## 2025-02-13 ENCOUNTER — HOSPITAL ENCOUNTER (INPATIENT)
Age: 65
LOS: 9 days | Discharge: NURSING, REHAB OR LONG-TERM CARE | DRG: 854 | End: 2025-02-22
Attending: STUDENT IN AN ORGANIZED HEALTH CARE EDUCATION/TRAINING PROGRAM | Admitting: STUDENT IN AN ORGANIZED HEALTH CARE EDUCATION/TRAINING PROGRAM
Payer: MEDICARE

## 2025-02-13 DIAGNOSIS — A41.9 SEPSIS, DUE TO UNSPECIFIED ORGANISM, UNSPECIFIED WHETHER ACUTE ORGAN DYSFUNCTION PRESENT (HCC): Primary | ICD-10-CM

## 2025-02-13 DIAGNOSIS — R78.81 BACTEREMIA DUE TO ENTEROCOCCUS: ICD-10-CM

## 2025-02-13 DIAGNOSIS — B95.2 BACTEREMIA DUE TO ENTEROCOCCUS: ICD-10-CM

## 2025-02-13 DIAGNOSIS — M86.171 OTHER ACUTE OSTEOMYELITIS OF RIGHT FOOT (HCC): ICD-10-CM

## 2025-02-13 DIAGNOSIS — R73.9 HYPERGLYCEMIA: ICD-10-CM

## 2025-02-13 DIAGNOSIS — I42.9 CARDIOMYOPATHY, UNSPECIFIED TYPE (HCC): ICD-10-CM

## 2025-02-13 DIAGNOSIS — E11.628 DIABETIC FOOT INFECTION (HCC): ICD-10-CM

## 2025-02-13 DIAGNOSIS — L97.501 ULCER OF FOOT, LIMITED TO BREAKDOWN OF SKIN, UNSPECIFIED LATERALITY (HCC): ICD-10-CM

## 2025-02-13 DIAGNOSIS — L08.9 DIABETIC FOOT INFECTION (HCC): ICD-10-CM

## 2025-02-13 DIAGNOSIS — E87.6 HYPOKALEMIA: ICD-10-CM

## 2025-02-13 DIAGNOSIS — M86.9 OSTEOMYELITIS OF TOE (HCC): ICD-10-CM

## 2025-02-13 PROBLEM — L03.119 CELLULITIS OF FOOT: Status: ACTIVE | Noted: 2025-02-13

## 2025-02-13 LAB
ALBUMIN SERPL-MCNC: 3.6 G/DL (ref 3.4–5)
ALBUMIN/GLOB SERPL: 1.1 {RATIO} (ref 1.1–2.2)
ALP SERPL-CCNC: 97 U/L (ref 40–129)
ALT SERPL-CCNC: 26 U/L (ref 10–40)
ANION GAP SERPL CALCULATED.3IONS-SCNC: 16 MMOL/L (ref 9–17)
ARTERIAL PATENCY WRIST A: ABNORMAL
AST SERPL-CCNC: 21 U/L (ref 15–37)
B-OH-BUTYR SERPL-MCNC: 0.53 MMOL/L (ref 0–0.27)
BASOPHILS # BLD: 0.03 K/UL
BASOPHILS NFR BLD: 0 % (ref 0–1)
BILIRUB SERPL-MCNC: 0.6 MG/DL (ref 0–1)
BNP SERPL-MCNC: 1074 PG/ML (ref 0–125)
BODY TEMPERATURE: 37
BUN SERPL-MCNC: 56 MG/DL (ref 7–20)
CALCIUM SERPL-MCNC: 9.7 MG/DL (ref 8.3–10.6)
CHLORIDE SERPL-SCNC: 87 MMOL/L (ref 99–110)
CO2 SERPL-SCNC: 32 MMOL/L (ref 21–32)
COHGB MFR BLD: 0.7 % (ref 0.5–1.5)
CREAT SERPL-MCNC: 1.5 MG/DL (ref 0.8–1.3)
CRP SERPL HS-MCNC: 113 MG/L (ref 0–5)
EKG DIAGNOSIS: NORMAL
EKG Q-T INTERVAL: 532 MS
EKG QRS DURATION: 164 MS
EKG QTC CALCULATION (BAZETT): 628 MS
EKG R AXIS: 112 DEGREES
EKG T AXIS: 125 DEGREES
EKG VENTRICULAR RATE: 84 BPM
EOSINOPHIL # BLD: 0.01 K/UL
EOSINOPHILS RELATIVE PERCENT: 0 % (ref 0–3)
ERYTHROCYTE [DISTWIDTH] IN BLOOD BY AUTOMATED COUNT: 15.9 % (ref 11.7–14.9)
ERYTHROCYTE [SEDIMENTATION RATE] IN BLOOD BY WESTERGREN METHOD: 26 MM/HR (ref 0–20)
GFR, ESTIMATED: 48 ML/MIN/1.73M2
GLUCOSE BLD-MCNC: 258 MG/DL (ref 74–99)
GLUCOSE BLD-MCNC: 279 MG/DL (ref 74–99)
GLUCOSE BLD-MCNC: 311 MG/DL (ref 74–99)
GLUCOSE BLD-MCNC: 333 MG/DL (ref 74–99)
GLUCOSE BLD-MCNC: 339 MG/DL (ref 74–99)
GLUCOSE BLD-MCNC: 349 MG/DL (ref 74–99)
GLUCOSE SERPL-MCNC: 359 MG/DL (ref 74–99)
HCO3 VENOUS: 34 MMOL/L (ref 22–29)
HCT VFR BLD AUTO: 46.4 % (ref 42–52)
HGB BLD-MCNC: 14.9 G/DL (ref 13.5–18)
IMM GRANULOCYTES # BLD AUTO: 0.06 K/UL
IMM GRANULOCYTES NFR BLD: 1 %
LACTATE BLDV-SCNC: 2 MMOL/L (ref 0.4–2)
LACTATE BLDV-SCNC: 2.7 MMOL/L (ref 0.4–2)
LYMPHOCYTES NFR BLD: 0.85 K/UL
LYMPHOCYTES RELATIVE PERCENT: 7 % (ref 24–44)
MCH RBC QN AUTO: 26.7 PG (ref 27–31)
MCHC RBC AUTO-ENTMCNC: 32.1 G/DL (ref 32–36)
MCV RBC AUTO: 83 FL (ref 78–100)
METHEMOGLOBIN: 0.5 % (ref 0.5–1.5)
MONOCYTES NFR BLD: 1 K/UL
MONOCYTES NFR BLD: 8 % (ref 0–4)
NEUTROPHILS NFR BLD: 84 % (ref 36–66)
NEUTS SEG NFR BLD: 9.94 K/UL
OXYHGB MFR BLD: 75.8 %
PCO2 VENOUS: 44.5 MM HG (ref 38–54)
PH VENOUS: 7.5 (ref 7.32–7.43)
PLATELET # BLD AUTO: 300 K/UL (ref 140–440)
PMV BLD AUTO: 10.2 FL (ref 7.5–11.1)
PO2 VENOUS: 41.8 MM HG (ref 23–48)
POSITIVE BASE EXCESS, VEN: 9.5 MMOL/L (ref 0–3)
POTASSIUM SERPL-SCNC: 2.9 MMOL/L (ref 3.5–5.1)
POTASSIUM SERPL-SCNC: 3.3 MMOL/L (ref 3.5–5.1)
PROT SERPL-MCNC: 6.7 G/DL (ref 6.4–8.2)
RBC # BLD AUTO: 5.59 M/UL (ref 4.6–6.2)
SODIUM SERPL-SCNC: 135 MMOL/L (ref 136–145)
WBC OTHER # BLD: 11.9 K/UL (ref 4–10.5)

## 2025-02-13 PROCEDURE — 86140 C-REACTIVE PROTEIN: CPT

## 2025-02-13 PROCEDURE — 80053 COMPREHEN METABOLIC PANEL: CPT

## 2025-02-13 PROCEDURE — 96374 THER/PROPH/DIAG INJ IV PUSH: CPT

## 2025-02-13 PROCEDURE — 83605 ASSAY OF LACTIC ACID: CPT

## 2025-02-13 PROCEDURE — 87070 CULTURE OTHR SPECIMN AEROBIC: CPT

## 2025-02-13 PROCEDURE — 6360000002 HC RX W HCPCS: Performed by: STUDENT IN AN ORGANIZED HEALTH CARE EDUCATION/TRAINING PROGRAM

## 2025-02-13 PROCEDURE — 85025 COMPLETE CBC W/AUTO DIFF WBC: CPT

## 2025-02-13 PROCEDURE — 83880 ASSAY OF NATRIURETIC PEPTIDE: CPT

## 2025-02-13 PROCEDURE — 87205 SMEAR GRAM STAIN: CPT

## 2025-02-13 PROCEDURE — 6370000000 HC RX 637 (ALT 250 FOR IP): Performed by: STUDENT IN AN ORGANIZED HEALTH CARE EDUCATION/TRAINING PROGRAM

## 2025-02-13 PROCEDURE — 36415 COLL VENOUS BLD VENIPUNCTURE: CPT

## 2025-02-13 PROCEDURE — 2580000003 HC RX 258

## 2025-02-13 PROCEDURE — 99222 1ST HOSP IP/OBS MODERATE 55: CPT | Performed by: SURGERY

## 2025-02-13 PROCEDURE — 87040 BLOOD CULTURE FOR BACTERIA: CPT

## 2025-02-13 PROCEDURE — 87077 CULTURE AEROBIC IDENTIFY: CPT

## 2025-02-13 PROCEDURE — 82805 BLOOD GASES W/O2 SATURATION: CPT

## 2025-02-13 PROCEDURE — 6370000000 HC RX 637 (ALT 250 FOR IP): Performed by: INTERNAL MEDICINE

## 2025-02-13 PROCEDURE — 99285 EMERGENCY DEPT VISIT HI MDM: CPT

## 2025-02-13 PROCEDURE — 82962 GLUCOSE BLOOD TEST: CPT

## 2025-02-13 PROCEDURE — 87186 SC STD MICRODIL/AGAR DIL: CPT

## 2025-02-13 PROCEDURE — 1200000000 HC SEMI PRIVATE

## 2025-02-13 PROCEDURE — 73110 X-RAY EXAM OF WRIST: CPT

## 2025-02-13 PROCEDURE — 6360000002 HC RX W HCPCS

## 2025-02-13 PROCEDURE — 2500000003 HC RX 250 WO HCPCS: Performed by: STUDENT IN AN ORGANIZED HEALTH CARE EDUCATION/TRAINING PROGRAM

## 2025-02-13 PROCEDURE — 93005 ELECTROCARDIOGRAM TRACING: CPT

## 2025-02-13 PROCEDURE — 82010 KETONE BODYS QUAN: CPT

## 2025-02-13 PROCEDURE — 96375 TX/PRO/DX INJ NEW DRUG ADDON: CPT

## 2025-02-13 PROCEDURE — 71045 X-RAY EXAM CHEST 1 VIEW: CPT

## 2025-02-13 PROCEDURE — 94761 N-INVAS EAR/PLS OXIMETRY MLT: CPT

## 2025-02-13 PROCEDURE — 85652 RBC SED RATE AUTOMATED: CPT

## 2025-02-13 PROCEDURE — 6370000000 HC RX 637 (ALT 250 FOR IP)

## 2025-02-13 PROCEDURE — 84132 ASSAY OF SERUM POTASSIUM: CPT

## 2025-02-13 PROCEDURE — 2580000003 HC RX 258: Performed by: STUDENT IN AN ORGANIZED HEALTH CARE EDUCATION/TRAINING PROGRAM

## 2025-02-13 PROCEDURE — 93010 ELECTROCARDIOGRAM REPORT: CPT | Performed by: INTERNAL MEDICINE

## 2025-02-13 PROCEDURE — 73630 X-RAY EXAM OF FOOT: CPT

## 2025-02-13 RX ORDER — TORSEMIDE 20 MG/1
40 TABLET ORAL 2 TIMES DAILY
Status: DISCONTINUED | OUTPATIENT
Start: 2025-02-14 | End: 2025-02-21

## 2025-02-13 RX ORDER — POTASSIUM CHLORIDE 7.45 MG/ML
10 INJECTION INTRAVENOUS ONCE
Status: COMPLETED | OUTPATIENT
Start: 2025-02-13 | End: 2025-02-13

## 2025-02-13 RX ORDER — ACETAMINOPHEN 650 MG/1
650 SUPPOSITORY RECTAL EVERY 6 HOURS PRN
Status: DISCONTINUED | OUTPATIENT
Start: 2025-02-13 | End: 2025-02-22 | Stop reason: HOSPADM

## 2025-02-13 RX ORDER — INSULIN LISPRO 100 [IU]/ML
0-16 INJECTION, SOLUTION INTRAVENOUS; SUBCUTANEOUS
Status: DISCONTINUED | OUTPATIENT
Start: 2025-02-13 | End: 2025-02-14

## 2025-02-13 RX ORDER — RANOLAZINE 500 MG/1
500 TABLET, EXTENDED RELEASE ORAL 2 TIMES DAILY
Status: DISCONTINUED | OUTPATIENT
Start: 2025-02-13 | End: 2025-02-22 | Stop reason: HOSPADM

## 2025-02-13 RX ORDER — DEXTROSE MONOHYDRATE 100 MG/ML
INJECTION, SOLUTION INTRAVENOUS CONTINUOUS PRN
Status: DISCONTINUED | OUTPATIENT
Start: 2025-02-13 | End: 2025-02-22 | Stop reason: HOSPADM

## 2025-02-13 RX ORDER — SPIRONOLACTONE 50 MG/1
25 TABLET, FILM COATED ORAL 2 TIMES DAILY
Status: DISCONTINUED | OUTPATIENT
Start: 2025-02-13 | End: 2025-02-22 | Stop reason: HOSPADM

## 2025-02-13 RX ORDER — POTASSIUM CHLORIDE 1500 MG/1
40 TABLET, EXTENDED RELEASE ORAL PRN
Status: DISCONTINUED | OUTPATIENT
Start: 2025-02-13 | End: 2025-02-22 | Stop reason: HOSPADM

## 2025-02-13 RX ORDER — TORSEMIDE 20 MG/1
40 TABLET ORAL 2 TIMES DAILY
Status: DISCONTINUED | OUTPATIENT
Start: 2025-02-13 | End: 2025-02-13

## 2025-02-13 RX ORDER — GLUCAGON 1 MG/ML
1 KIT INJECTION PRN
Status: DISCONTINUED | OUTPATIENT
Start: 2025-02-13 | End: 2025-02-22 | Stop reason: HOSPADM

## 2025-02-13 RX ORDER — SODIUM CHLORIDE 0.9 % (FLUSH) 0.9 %
5-40 SYRINGE (ML) INJECTION PRN
Status: DISCONTINUED | OUTPATIENT
Start: 2025-02-13 | End: 2025-02-22 | Stop reason: HOSPADM

## 2025-02-13 RX ORDER — SODIUM CHLORIDE 9 MG/ML
INJECTION, SOLUTION INTRAVENOUS PRN
Status: DISCONTINUED | OUTPATIENT
Start: 2025-02-13 | End: 2025-02-22 | Stop reason: HOSPADM

## 2025-02-13 RX ORDER — MAGNESIUM SULFATE IN WATER 40 MG/ML
2000 INJECTION, SOLUTION INTRAVENOUS PRN
Status: DISCONTINUED | OUTPATIENT
Start: 2025-02-13 | End: 2025-02-22 | Stop reason: HOSPADM

## 2025-02-13 RX ORDER — ONDANSETRON 4 MG/1
4 TABLET, ORALLY DISINTEGRATING ORAL EVERY 8 HOURS PRN
Status: DISCONTINUED | OUTPATIENT
Start: 2025-02-13 | End: 2025-02-22 | Stop reason: HOSPADM

## 2025-02-13 RX ORDER — ACETAMINOPHEN 325 MG/1
650 TABLET ORAL EVERY 6 HOURS PRN
Status: DISCONTINUED | OUTPATIENT
Start: 2025-02-13 | End: 2025-02-22 | Stop reason: HOSPADM

## 2025-02-13 RX ORDER — METOLAZONE 2.5 MG/1
2.5 TABLET ORAL DAILY
Status: DISCONTINUED | OUTPATIENT
Start: 2025-02-13 | End: 2025-02-21

## 2025-02-13 RX ORDER — POTASSIUM CHLORIDE 7.45 MG/ML
10 INJECTION INTRAVENOUS PRN
Status: DISCONTINUED | OUTPATIENT
Start: 2025-02-13 | End: 2025-02-22 | Stop reason: HOSPADM

## 2025-02-13 RX ORDER — INSULIN GLARGINE 100 [IU]/ML
10 INJECTION, SOLUTION SUBCUTANEOUS NIGHTLY
Status: DISCONTINUED | OUTPATIENT
Start: 2025-02-13 | End: 2025-02-14

## 2025-02-13 RX ORDER — POLYETHYLENE GLYCOL 3350 17 G/17G
17 POWDER, FOR SOLUTION ORAL DAILY PRN
Status: DISCONTINUED | OUTPATIENT
Start: 2025-02-13 | End: 2025-02-22 | Stop reason: HOSPADM

## 2025-02-13 RX ORDER — CLOPIDOGREL BISULFATE 75 MG/1
75 TABLET ORAL DAILY
Status: DISCONTINUED | OUTPATIENT
Start: 2025-02-13 | End: 2025-02-19

## 2025-02-13 RX ORDER — 0.9 % SODIUM CHLORIDE 0.9 %
1000 INTRAVENOUS SOLUTION INTRAVENOUS ONCE
Status: COMPLETED | OUTPATIENT
Start: 2025-02-13 | End: 2025-02-13

## 2025-02-13 RX ORDER — ONDANSETRON 2 MG/ML
4 INJECTION INTRAMUSCULAR; INTRAVENOUS EVERY 6 HOURS PRN
Status: DISCONTINUED | OUTPATIENT
Start: 2025-02-13 | End: 2025-02-22 | Stop reason: HOSPADM

## 2025-02-13 RX ORDER — SODIUM CHLORIDE 0.9 % (FLUSH) 0.9 %
5-40 SYRINGE (ML) INJECTION EVERY 12 HOURS SCHEDULED
Status: DISCONTINUED | OUTPATIENT
Start: 2025-02-13 | End: 2025-02-22 | Stop reason: HOSPADM

## 2025-02-13 RX ORDER — FINASTERIDE 5 MG/1
5 TABLET, FILM COATED ORAL DAILY
Status: DISCONTINUED | OUTPATIENT
Start: 2025-02-14 | End: 2025-02-22 | Stop reason: HOSPADM

## 2025-02-13 RX ORDER — TAMSULOSIN HYDROCHLORIDE 0.4 MG/1
0.4 CAPSULE ORAL DAILY
Status: DISCONTINUED | OUTPATIENT
Start: 2025-02-14 | End: 2025-02-22 | Stop reason: HOSPADM

## 2025-02-13 RX ORDER — METRONIDAZOLE 500 MG/100ML
500 INJECTION, SOLUTION INTRAVENOUS ONCE
Status: COMPLETED | OUTPATIENT
Start: 2025-02-13 | End: 2025-02-13

## 2025-02-13 RX ORDER — POTASSIUM CHLORIDE 1500 MG/1
40 TABLET, EXTENDED RELEASE ORAL ONCE
Status: COMPLETED | OUTPATIENT
Start: 2025-02-13 | End: 2025-02-13

## 2025-02-13 RX ADMIN — INSULIN HUMAN 10 UNITS: 500 INJECTION, SOLUTION SUBCUTANEOUS at 20:56

## 2025-02-13 RX ADMIN — INSULIN LISPRO 8 UNITS: 100 INJECTION, SOLUTION INTRAVENOUS; SUBCUTANEOUS at 18:55

## 2025-02-13 RX ADMIN — VANCOMYCIN HYDROCHLORIDE 2500 MG: 5 INJECTION, POWDER, LYOPHILIZED, FOR SOLUTION INTRAVENOUS at 14:12

## 2025-02-13 RX ADMIN — SPIRONOLACTONE 25 MG: 50 TABLET ORAL at 20:56

## 2025-02-13 RX ADMIN — INSULIN HUMAN 10 UNITS: 100 INJECTION, SOLUTION PARENTERAL at 14:20

## 2025-02-13 RX ADMIN — POTASSIUM CHLORIDE 40 MEQ: 1500 TABLET, EXTENDED RELEASE ORAL at 14:20

## 2025-02-13 RX ADMIN — CEFEPIME 2000 MG: 2 INJECTION, POWDER, FOR SOLUTION INTRAVENOUS at 23:00

## 2025-02-13 RX ADMIN — POTASSIUM BICARBONATE 40 MEQ: 782 TABLET, EFFERVESCENT ORAL at 23:46

## 2025-02-13 RX ADMIN — METRONIDAZOLE 500 MG: 500 INJECTION, SOLUTION INTRAVENOUS at 14:15

## 2025-02-13 RX ADMIN — RANOLAZINE 500 MG: 500 TABLET, EXTENDED RELEASE ORAL at 20:56

## 2025-02-13 RX ADMIN — CEFEPIME 2000 MG: 2 INJECTION, POWDER, FOR SOLUTION INTRAVENOUS at 15:20

## 2025-02-13 RX ADMIN — POTASSIUM CHLORIDE 10 MEQ: 7.46 INJECTION, SOLUTION INTRAVENOUS at 14:08

## 2025-02-13 RX ADMIN — SODIUM CHLORIDE 1000 ML: 9 INJECTION, SOLUTION INTRAVENOUS at 13:56

## 2025-02-13 RX ADMIN — SODIUM CHLORIDE, PRESERVATIVE FREE 10 ML: 5 INJECTION INTRAVENOUS at 21:03

## 2025-02-13 ASSESSMENT — LIFESTYLE VARIABLES
HOW MANY STANDARD DRINKS CONTAINING ALCOHOL DO YOU HAVE ON A TYPICAL DAY: PATIENT DOES NOT DRINK
HOW OFTEN DO YOU HAVE A DRINK CONTAINING ALCOHOL: NEVER

## 2025-02-13 ASSESSMENT — PAIN SCALES - GENERAL: PAINLEVEL_OUTOF10: 0

## 2025-02-13 ASSESSMENT — PAIN - FUNCTIONAL ASSESSMENT: PAIN_FUNCTIONAL_ASSESSMENT: 0-10

## 2025-02-13 NOTE — ED TRIAGE NOTES
Pt from dr richardson office for a wound check of the right foot . Pt reports runningit into the door earlier this week. Denies pain.

## 2025-02-13 NOTE — CONSULTS
Department of General Surgery   Surgical Service Dr. Avalos   Consult Note    Date of Consult: 2/13/25    Reason for Consult: Right foot diabetic wound  Requesting Physician: Enriqueta Tran CNP    CHIEF COMPLAINT: Hyperglycemia, worsening right foot wound    History Obtained From:  patient    HISTORY OF PRESENT ILLNESS:    The patient is a 64 y.o. male who presented with worsening right foot wound for the past couple weeks.  He reports difficult to control blood glucose.  He went to an appointment with Dr. Diehl today where his foot was examined and he was sent to the ER.. He does report long standing numbness in his feet. Denies prior wounds. He has scrubbed his feet with a brush while in a shower chair but denies other foot cares in particular.  Last hgbA1c 7.5.  He is a non-smoker.      Past Medical History:    Past Medical History:   Diagnosis Date    Atrial fibrillation (HCC)     Cerebral artery occlusion with cerebral infarction (HCC)     History of cardiac cath 2017    --RCA has mid 50% stenosis and PLB branch has 70% stenosis noted. LVEDP very high    Hyperlipidemia     Hypertension     Type II or unspecified type diabetes mellitus without mention of complication, not stated as uncontrolled     Diagnsed about 1998    Unspecified sleep apnea        Past Surgical History:    Past Surgical History:   Procedure Laterality Date    CARDIAC CATHETERIZATION      TONSILLECTOMY      AT 15 YEARS OLD       Current Medications:   Current Facility-Administered Medications   Medication Dose Route Frequency Provider Last Rate Last Admin    vancomycin (VANCOCIN) 2,500 mg in sodium chloride 0.9 % 500 mL IVPB  2,500 mg IntraVENous Once Enriqueta Decker APRN - .7 mL/hr at 02/13/25 1412 2,500 mg at 02/13/25 1412    [Held by provider] clopidogrel (PLAVIX) tablet 75 mg  75 mg Oral Daily Geremias Chiang MD        [START ON 2/14/2025] finasteride (PROSCAR) tablet 5 mg  5 mg Oral Daily Geremias Chiang MD        glucose chewable  [facility]    PRAPARE - Transportation     Lack of Transportation (Medical): No     Lack of Transportation (Non-Medical): No   Housing Stability: Low Risk  (12/19/2024)    Received from [facility]    Housing Stability Vital Sign     Unable to Pay for Housing in the Last Year: No     Number of Times Moved in the Last Year: 0     Homeless in the Last Year: No       Family History:   Family History   Problem Relation Age of Onset    Diabetes Mother     Diabetes Father     Cancer Father     Cancer Maternal Grandfather         PROSTATE CANCER       REVIEW OF SYSTEMS:    Constitutional: Negative for chills. Negative for fever.   HENT: Negative for congestion. Negative for rhinorrhea.    Respiratory: Negative for cough. Negative for shortness of breath.  Negative for wheezing.    Cardiovascular: Negative for chest pain.   Gastrointestinal:  Negative for constipation. Negative for diarrhea. Negative for nausea and vomiting.  Genitourinary: Negative for difficulty urinating.   Neurological: Negative for dizziness, syncope and numbness.   Hematological: on xarelto/plavix      PHYSICAL EXAM:  Vitals:    02/13/25 1158 02/13/25 1426 02/13/25 1452 02/13/25 1551   BP: 116/71 (!) 119/58 123/61 117/66   Pulse: 82 84 80 80   Resp: 18 18 18 18   Temp: 98.1 °F (36.7 °C) 97.6 °F (36.4 °C) 97.6 °F (36.4 °C) 97.6 °F (36.4 °C)   TempSrc: Oral Oral Oral Oral   SpO2: 98% 100% 100%    Weight: (!) 141.1 kg (311 lb)      Height: 1.829 m (6')          Physical Exam  General: awake, alert, in no acute distress  HEENT: mucous membranes moist  Respiratory: normal effort, no wheezes appreciated  CV: appears well perfused    Skin: Right foot with large plantar ulcer underneath the first MTP.  Moderate callus buildup.  There is also a plantar ulcer on the pad of the first phalange he.  Multiple toes with long curling nails that are cutting into the foot.  Moderate odor, mild necrotic tissue.  Bilateral legs with edema and chronic skin

## 2025-02-13 NOTE — ED NOTES
Medication History  [facility]    Patient Name: Bandar De La Torre 1960     Medication history has been completed by: Wendy Soto CP    Source(s) of information: patient/wife and insurance claims     Primary Care Physician: MEETA Diehl MD     Pharmacy: [facility]    Allergies as of 02/13/2025    (No Known Allergies)        Prior to Admission medications    Medication Sig Start Date End Date Taking? Authorizing Provider   Continuous Glucose Sensor (DEXCOM G7 SENSOR) MISC USE AS DIRECTED AS NEEDED FOR 90 DAYS 12/11/24  Yes April Díaz MD   spironolactone (ALDACTONE) 25 MG tablet Take 1 tablet by mouth 2 times daily  02/13/25 frequency increased today per Dr. Bower. 2/13/25  Yes Antonino Bower MD   insulin regular human (HUMULIN R U-500 KWIKPEN) 500 UNIT/ML SOPN concentrated injection pen Inject 10 Units into the skin nightly   Yes April Díaz MD   metOLazone (ZAROXOLYN) 2.5 MG tablet Take 1 tablet by mouth once daily 2/12/25  Yes Antonino Bower MD   KLOR-CON M20 20 MEQ extended release tablet TAKE 4 TABLETS BY MOUTH TWICE DAILY 1/27/25  Yes Antonino Bower MD   torsemide (DEMADEX) 20 MG tablet Take 2 tablets by mouth in the morning and at bedtime 4/25/24  Yes Antonino Bower MD   ONETOUCH VERIO strip USE 1 STRIP TO CHECK GLUCOSE THREE TIMES DAILY TO 4 TIMES DAILY AS DIRECTED 6/15/23  Yes April Díaz MD   RELION INSULIN SYRINGE 31G X 15/64\" 1 ML MISC USE AS DIRECTED THREE TIMES DAILY 11/22/22  Yes April Díaz MD   dapagliflozin (FARXIGA) 5 MG tablet Take 1 tablet by mouth every morning 9/29/22  Yes Antonino Bower MD   insulin regular human (HUMULIN R U-500) 500 UNIT/ML concentrated injection vial  Inject 50 Units into the skin 3 times daily (before meals)  02/13/25 per Dr. Diehl today dose increased to 50 units TID plus sliding scale.  Take insulin sliding scale as below  If 140-199 0 Units   200-249  0 Units   250-299 5

## 2025-02-13 NOTE — ED NOTES
Per patient. \"Everything looks foggy\". Provider antwan np at bedside. Verbal order for poc bg and to feed patient.

## 2025-02-13 NOTE — ED NOTES
ED TO INPATIENT SBAR HANDOFF    Patient Name: Bandar De La Torre   :  1960  64 y.o.   Preferred Name    Family/Caregiver Present yes   Restraints no   C-SSRS: Risk of Suicide: No Risk  Sitter no   Sepsis Risk Score        Situation  Chief Complaint   Patient presents with    Wound Check     Right foot      Brief Description of Patient's Condition: Pt sent to ed from dr nunez's office for wound check. Pt right foot was injured approx. One week ago and just went to get it checked today. Pt has impaired ambulation d/t wound on foot but normally doesn't need any assistance. Pt is aox4. Pt did have a K+ of 2.9 and is started on oral and iv K+ replacement.   Mental Status: oriented and alert  Arrived from: home    Imaging:   XR WRIST LEFT (MIN 3 VIEWS)   Final Result      XR FOOT RIGHT (MIN 3 VIEWS)   Final Result      XR CHEST PORTABLE   Final Result      MRI FOOT RIGHT W CONTRAST    (Results Pending)     Abnormal labs:   Abnormal Labs Reviewed   CBC WITH AUTO DIFFERENTIAL - Abnormal; Notable for the following components:       Result Value    WBC 11.9 (*)     MCH 26.7 (*)     RDW 15.9 (*)     Neutrophils % 84 (*)     Lymphocytes % 7 (*)     Monocytes % 8 (*)     Immature Granulocytes % 1 (*)     All other components within normal limits   COMPREHENSIVE METABOLIC PANEL - Abnormal; Notable for the following components:    Sodium 135 (*)     Potassium 2.9 (*)     Chloride 87 (*)     Glucose 359 (*)     BUN 56 (*)     Creatinine 1.5 (*)     Est, Glom Filt Rate 48 (*)     All other components within normal limits   LACTATE, SEPSIS - Abnormal; Notable for the following components:    Lactic Acid, Sepsis 2.7 (*)     All other components within normal limits   BETA-HYDROXYBUTYRATE - Abnormal; Notable for the following components:    Beta-Hydroxybutyrate 0.53 (*)     All other components within normal limits   BRAIN NATRIURETIC PEPTIDE - Abnormal; Notable for the following components:    NT Pro-BNP 1,074 (*)     All

## 2025-02-13 NOTE — ED PROVIDER NOTES
Pertinent negatives as per HPI.     SURGICAL HISTORY     Past Surgical History:   Procedure Laterality Date    CARDIAC CATHETERIZATION      TONSILLECTOMY      AT 15 YEARS OLD       CURRENTMEDICATIONS       Previous Medications    CLOPIDOGREL (PLAVIX) 75 MG TABLET    Take 1 tablet by mouth daily    CONTINUOUS GLUCOSE SENSOR (DEXCOM G7 SENSOR) MISC    USE AS DIRECTED AS NEEDED FOR 90 DAYS    DAPAGLIFLOZIN (FARXIGA) 5 MG TABLET    Take 1 tablet by mouth every morning    EZETIMIBE (ZETIA) 10 MG TABLET    Take 1 tablet by mouth daily    FINASTERIDE (PROSCAR) 5 MG TABLET    Take 1 tablet by mouth daily    FOLIC ACID-PYRIDOXINE-CYANCOBALAMIN (FOLTX) 1.13-25-2 MG TABS    Take 1 tablet by mouth daily    INSULIN REGULAR HUMAN (HUMULIN R U-500 KWIKPEN) 500 UNIT/ML SOPN CONCENTRATED INJECTION PEN    Inject 10 Units into the skin nightly    INSULIN REGULAR HUMAN (HUMULIN R U-500) 500 UNIT/ML CONCENTRATED INJECTION VIAL    Inject 0-15 Units into the skin 3 times daily (before meals) Take insulin 5units  three time with meals. Hold if glucose <150 or if meal < 50%    Take insulin sliding scale as below  If 140-199 0 Units   200-249  0 Units   250-299 5 Units   300-349  10  Units   > 350   15 Units    KLOR-CON M20 20 MEQ EXTENDED RELEASE TABLET    TAKE 4 TABLETS BY MOUTH TWICE DAILY    METOLAZONE (ZAROXOLYN) 2.5 MG TABLET    Take 1 tablet by mouth once daily    ONETOUCH VERIO STRIP    USE 1 STRIP TO CHECK GLUCOSE THREE TIMES DAILY TO 4 TIMES DAILY AS DIRECTED    RANOLAZINE (RANEXA) 500 MG EXTENDED RELEASE TABLET    Take 1 tablet by mouth 2 times daily    RELION INSULIN SYRINGE 31G X 15/64\" 1 ML MISC    USE AS DIRECTED THREE TIMES DAILY    RIVAROXABAN (XARELTO) 20 MG TABS TABLET    Take 1 tablet by mouth Daily with supper    SIMVASTATIN (ZOCOR) 40 MG TABLET    Take 1 tablet by mouth daily    SPIRONOLACTONE (ALDACTONE) 25 MG TABLET    Take 1 tablet by mouth 2 times daily    TAMSULOSIN (FLOMAX) 0.4 MG CAPSULE    Take 1 capsule by  Radiologist below, if available at the time of this note:    XR WRIST LEFT (MIN 3 VIEWS)   Final Result      XR FOOT RIGHT (MIN 3 VIEWS)   Final Result      XR CHEST PORTABLE   Final Result        No results found.      ED Provider US Interpretation.    No results found.        CRITICAL CARE TIME (.cctime)   There was a high probability of life-threatening clinical deterioration in the patient's condition requiring my urgent intervention.  I personally saw the patient and independently provided 35 minutes of non-concurrent critical care out of the total shared critical care time provided, excluding separately reportable procedures.        See interventions in ED course.     EMERGENCY DEPARTMENT COURSE and DIFFERENTIAL DIAGNOSIS/MDM:   Vitals:    Vitals:    02/13/25 1158 02/13/25 1426   BP: 116/71 (!) 119/58   Pulse: 82 84   Resp: 18 18   Temp: 98.1 °F (36.7 °C) 97.6 °F (36.4 °C)   TempSrc: Oral Oral   SpO2: 98% 100%   Weight: (!) 141.1 kg (311 lb)    Height: 1.829 m (6')        Is this patient to be included in the SEP-1 Core Measure due to severe sepsis or septic shock?   Yes   SEP-1 CORE MEASURE DATA      Sepsis Criteria   Severe Sepsis Criteria   Septic Shock Criteria     Must be confirmed or suspected to move forward with diagnosis of sepsis.    Must meet 2:    [] Temperature > 100.9 F (38.3 C)        or < 96.8 F (36 C)  [] HR > 90  [] RR > 20  [x] WBC > 12 or < 4 or 10% bands      AND:      [x] Infection Confirmed or        Suspected.     Must meet 1:    [x] Lactate > 2       or   [] Signs of Organ Dysfunction:    - SBP < 90 or MAP < 65  - Altered mental status  - Creatinine > 2 or increased from      baseline  - Urine Output < 0.5 ml/kg/hr  - Bilirubin > 2  - INR > 1.5 (not anticoagulated)  - Platelets < 100,000  - Acute Respiratory Failure as     evidenced by new need for NIPPV     or mechanical ventilation      [x] No criteria met for Severe Sepsis.   Must meet 1:    [] Lactate > 4        or   [] SBP < 90 or

## 2025-02-13 NOTE — CONSULTS
PHARMACY VANCOMYCIN MONITORING & DOSING SERVICE    Bandar De La Torre is a 64 y.o. male started on vancomycin for Osteomyelitis . Pharmacy consulted by ED provider Enriqueta LOZANO to order a dose of vancomycin in the emergency department.    Other antimicrobials: Cefepime, Flagyl     Ht Readings from Last 1 Encounters:   02/13/25 1.829 m (6')     Wt Readings from Last 3 Encounters:   02/13/25 (!) 141.1 kg (311 lb)   02/13/25 (!) 141.3 kg (311 lb 9.6 oz)   02/12/25 (!) 149.7 kg (330 lb)        Pertinent Laboratory Values:   Temp Readings from Last 3 Encounters:   02/13/25 98.1 °F (36.7 °C) (Oral)   02/12/25 96.9 °F (36.1 °C)   01/14/25 99.7 °F (37.6 °C) (Oral)     Recent Labs     02/13/25  1215   WBC 11.9*     Recent Labs     02/13/25  1215   BUN 56*   CREATININE 1.5*     Estimated Creatinine Clearance: 72 mL/min (A) (based on SCr of 1.5 mg/dL (H)).  No intake or output data in the 24 hours ending 02/13/25 1325    Assessment/Plan:  Pharmacy will order vancomycin 2500 mg (25 mg/kg).  Please note, pharmacy will order a one-time dose of vancomycin for the Emergency Department. The consult will need to be re-ordered if vancomycin is to continue upon admission.a    Thank you for the consult.  Serge Palm RP, [facility]  2/13/2025 1:25 PM

## 2025-02-13 NOTE — ED NOTES
Called lab and spoke w/ Rose to check on labs that had not resulted. Lab stated they did not have any further specimens sent despite it being collected @ 1606. Triage nurse  RN called as well and was informed orders had been changed and that is why specimens were not ran. Called [facility] and spoke w/ Ny about issues to promote continued continuity of care as on ED end it appears collected but no changes in orders for timing of labs as it should have resulted. Voiced understanding

## 2025-02-13 NOTE — CONSULTS
PHARMACY VANCOMYCIN MONITORING SERVICE  Pharmacy consulted by Dr. Chiang for monitoring and adjustment.    Indication for treatment: Vancomycin indication: Bone/Joint infection   Goal trough: Trough Goal: 15-20 mcg/mL  AUC/ROSE: 400-600    Risk Factors for MRSA Identified:   Purulent and/or complicated SSTI    Pertinent Laboratory Values:   Temp Readings from Last 3 Encounters:   02/13/25 97.6 °F (36.4 °C) (Oral)   02/12/25 96.9 °F (36.1 °C)   01/14/25 99.7 °F (37.6 °C) (Oral)     Recent Labs     02/13/25  1215   WBC 11.9*     Recent Labs     02/13/25  1215   BUN 56*   CREATININE 1.5*     Estimated Creatinine Clearance: 72 mL/min (A) (based on SCr of 1.5 mg/dL (H)).  No intake or output data in the 24 hours ending 02/13/25 1622  Last Encounter Weight:  Wt Readings from Last 3 Encounters:   02/13/25 (!) 141.1 kg (311 lb)   02/13/25 (!) 141.3 kg (311 lb 9.6 oz)   02/12/25 (!) 149.7 kg (330 lb)       Pertinent Cultures:   Date    Source    Results  2/13   Blood    In process  2/13   Wound  (Foot)   Ordered    Vancomycin level:   TROUGH:  No results for input(s): \"VANCOTROUGH\" in the last 72 hours.  RANDOM:  No results for input(s): \"VANCORANDOM\" in the last 72 hours.    Assessment:  HPI: Acute osteomyelitis of the foot, diabetic foot infection.  SCr, BUN, and urine output:   CKD3, SCR above baseline @1.5 (baseline 1.2)  BUN elevated @56  No UOP data  Day(s) of therapy: 1 of 7  Vancomycin concentration:   2/15 - Trough timed @01:00    Plan:  Loading dose of vancomycin administered: 2500mg ivpb. Followed by a scheduled dose of vancomycin 1500mg ivpb every 12 hours.   Check the vanco trough level in 2 days, expect accumulation 2/2 BMI and CKD3  Pharmacy will continue to monitor patient and adjust therapy as indicated    VANCOMYCIN CONCENTRATION SCHEDULED FOR 2/15 @01:00    Thank you for the consult.  Kota Moran [facility]  2/13/2025 4:22 PM

## 2025-02-13 NOTE — H&P
V2.0  History and Physical      Name:  Bandar De La Torre /Age/Sex: 1960  (64 y.o. male)   MRN & CSN:  [id number] & [id number] Encounter Date/Time: 2025 2:43 PM EST   [facility]  [facility] PCP: MEETA Diehl MD       Hospital Day: 1      History of Present Illness:     Chief Complaint: wound check    Bandar De La Torre is a 64 y.o. male with PMH of DM II, HTN, CKD who presents with worsening wound in his right foot.  Patient has been having difficulty with glucose control.  He went to see Dr. Diehl who sent him to the ER for concerns of DKA.  Patient has chronic numbness in his bilateral feet.  He denied having any pain.  He denied any drainage from his wounds.  Patient could not remember the exact insulin regimen he takes on a daily basis.  Complains of pain in his right wrist . patient denies any other symptoms    At ED, pt was afebrile, hemodynamically stable; on room air. Labs Na 135, K 2.9, Cr 1.5, lactic acid 2.7-> 2.0, glucose 300's, BNP 1074, WBC 11.9, beta hydroxybutyrate 0.53.  Had right foot x-ray no osteomyelitis but showed acute displaced fracture of the second and third distal phalanx.  General surgery consulted by ED. Discussed with ED, will admit pt to [facility] for wound infection  workup and management.     Assessment and Plan:   Diabetic foot wound concern for infection.   Right foot phalanx fracture  No evidence of osteomyelitis on x-ray  -Continue vanc+cefepime  - MRI foot with/without contrast  -Follow-up wound cultures  -Check CRP, sed rate  - wound care      Type II diabetes. On home insulin unclear exact dose, plus Farxiga plus glipizide. Last HA1C 7.5 2024  - hypoglycemia protocol  - MDSSI  -Will do Lantus 10 units nightly  - endo consulted  - Check HA1C    Hypokalemia  K 2.9 on admission.  Likely from diuretics  -Monitor and replace as needed    CKD III.  Cr around baseline  Resume home spironolactone, torsemide and metalazone      Atrial fibrillation.   On home  phalanx. 2.  Acute fracture of the third distal phalanx. 3.  Additional findings as above. This dictation was created with voice recognition software.  While attempts have  been made to review the dictation as it is transcribed, on occasion the spoken word can be misinterpreted by the technology leading to omissions or inappropriate words, phrases or sentences.  Dictated and Electronically Signed By: Edy Lockwood MD 2/13/2025 13:40        XR WRIST LEFT (MIN 3 VIEWS)    Result Date: 2/13/2025  PROCEDURE: XR WRIST LEFT (MIN 3 VIEWS) DATE OF EXAM:  2/13/2025 14:13 DEMOGRAPHICS: 64 years old Male INDICATION: pain in wrist COMPARISON: No existing relevant imaging study corresponding to the same anatomical region is available. TECHNIQUE: 3 views of the left wrist. FINDINGS: No acute fracture or traumatic malalignment. The joint spaces are relatively maintained. Mild soft tissue swelling about the wrist. Vascular calcifications. IMPRESSION:  1.  No radiographic evidence of acute osseous abnormality. 2.  Additional findings as above. This dictation was created with voice recognition software.  While attempts have  been made to review the dictation as it is transcribed, on occasion the spoken word can be misinterpreted by the technology leading to omissions or inappropriate words, phrases or sentences.  Dictated and Electronically Signed By: Edy Lockwood MD 2/13/2025 13:26        XR CHEST PORTABLE    Result Date: 2/13/2025  EXAM:  XR CHEST PORTABLE DATE OF EXAM:  2/13/2025 14:10 DEMOGRAPHICS: 64 years old Male CLINICAL STATEMENT: screening COMPARISON: 1/14/2025 FINDINGS: Stable cardiomegaly. A left pacing device with its leads projecting over the right attachment right ventricle. Clear lungs. Bones unremarkable. IMPRESSION: 1. No acute cardiopulmonary process. 2. Stable cardiomegaly.  Dictated and Electronically Signed By: Kyaw Evans MD 2/13/2025 13:16          Personally reviewed Lab Studies, Imaging, and discussed case

## 2025-02-13 NOTE — PROGRESS NOTES
Patient has an implanted pacemaker. MRI on hold until implant can be researched and coordinated with appropriate care team.

## 2025-02-13 NOTE — ED NOTES
Per Dr Avalos do not repeat wound culture at this time since he will see him again tomorrow to check on wounds.

## 2025-02-13 NOTE — ED NOTES
The following labs were labeled with appropriate pt sticker and tubed to lab:     [x] Blue     [x] Lavender   [] on ice  [x] Green/yellow  [] Green/black [] on ice  [x] Grey  [x] on ice  [] Yellow  [x] Red  [] Pink  [] Type/ Screen  [] ABG  [x] VBG    [] COVID-19 swab    [] Rapid  [] PCR  [] Flu swab  [] Peds Viral Panel     [] Urine Sample  [] Fecal Sample  [] Pelvic Cultures  [] Blood Cultures  [] X 2  [] STREP Cultures  [x] Wound Cultures

## 2025-02-14 PROBLEM — R78.81 BACTEREMIA DUE TO ENTEROCOCCUS: Status: ACTIVE | Noted: 2025-02-14

## 2025-02-14 PROBLEM — B95.2 BACTEREMIA DUE TO ENTEROCOCCUS: Status: ACTIVE | Noted: 2025-02-14

## 2025-02-14 PROBLEM — E11.628 DIABETIC FOOT INFECTION (HCC): Status: ACTIVE | Noted: 2025-02-14

## 2025-02-14 PROBLEM — A41.9 SEPSIS (HCC): Status: ACTIVE | Noted: 2025-02-14

## 2025-02-14 PROBLEM — L08.9 DIABETIC FOOT INFECTION (HCC): Status: ACTIVE | Noted: 2025-02-14

## 2025-02-14 LAB
ALBUMIN: 3 G/DL (ref 3.4–5)
ANION GAP SERPL CALCULATED.3IONS-SCNC: 13 MMOL/L (ref 9–17)
BASOPHILS # BLD: 0.04 K/UL
BASOPHILS NFR BLD: 0 % (ref 0–1)
BUN SERPL-MCNC: 43 MG/DL (ref 7–20)
CALCIUM SERPL-MCNC: 9.5 MG/DL (ref 8.3–10.6)
CHLORIDE SERPL-SCNC: 92 MMOL/L (ref 99–110)
CO2 SERPL-SCNC: 30 MMOL/L (ref 21–32)
CREAT SERPL-MCNC: 1.2 MG/DL (ref 0.8–1.3)
EOSINOPHIL # BLD: 0.01 K/UL
EOSINOPHILS RELATIVE PERCENT: 0 % (ref 0–3)
ERYTHROCYTE [DISTWIDTH] IN BLOOD BY AUTOMATED COUNT: 16.1 % (ref 11.7–14.9)
GFR, ESTIMATED: 63 ML/MIN/1.73M2
GLUCOSE BLD-MCNC: 234 MG/DL (ref 74–99)
GLUCOSE BLD-MCNC: 272 MG/DL (ref 74–99)
GLUCOSE BLD-MCNC: 293 MG/DL (ref 74–99)
GLUCOSE BLD-MCNC: 403 MG/DL (ref 74–99)
GLUCOSE BLD-MCNC: 412 MG/DL (ref 74–99)
GLUCOSE SERPL-MCNC: 306 MG/DL (ref 74–99)
HCT VFR BLD AUTO: 45.6 % (ref 42–52)
HGB BLD-MCNC: 14.3 G/DL (ref 13.5–18)
IMM GRANULOCYTES # BLD AUTO: 0.06 K/UL
IMM GRANULOCYTES NFR BLD: 1 %
LYMPHOCYTES NFR BLD: 0.46 K/UL
LYMPHOCYTES RELATIVE PERCENT: 4 % (ref 24–44)
MCH RBC QN AUTO: 26.4 PG (ref 27–31)
MCHC RBC AUTO-ENTMCNC: 31.4 G/DL (ref 32–36)
MCV RBC AUTO: 84.1 FL (ref 78–100)
MONOCYTES NFR BLD: 0.96 K/UL
MONOCYTES NFR BLD: 8 % (ref 0–4)
NEUTROPHILS NFR BLD: 87 % (ref 36–66)
NEUTS SEG NFR BLD: 10.67 K/UL
PHOSPHATE SERPL-MCNC: 1.9 MG/DL (ref 2.5–4.9)
PLATELET # BLD AUTO: 248 K/UL (ref 140–440)
PMV BLD AUTO: 9.7 FL (ref 7.5–11.1)
POTASSIUM SERPL-SCNC: 3.2 MMOL/L (ref 3.5–5.1)
RBC # BLD AUTO: 5.42 M/UL (ref 4.6–6.2)
SODIUM SERPL-SCNC: 134 MMOL/L (ref 136–145)
WBC OTHER # BLD: 12.2 K/UL (ref 4–10.5)

## 2025-02-14 PROCEDURE — 6370000000 HC RX 637 (ALT 250 FOR IP): Performed by: INTERNAL MEDICINE

## 2025-02-14 PROCEDURE — 82962 GLUCOSE BLOOD TEST: CPT

## 2025-02-14 PROCEDURE — 99223 1ST HOSP IP/OBS HIGH 75: CPT | Performed by: INTERNAL MEDICINE

## 2025-02-14 PROCEDURE — 80069 RENAL FUNCTION PANEL: CPT

## 2025-02-14 PROCEDURE — 94761 N-INVAS EAR/PLS OXIMETRY MLT: CPT

## 2025-02-14 PROCEDURE — 2500000003 HC RX 250 WO HCPCS

## 2025-02-14 PROCEDURE — 87186 SC STD MICRODIL/AGAR DIL: CPT

## 2025-02-14 PROCEDURE — 11043 DBRDMT MUSC&/FSCA 1ST 20/<: CPT | Performed by: SURGERY

## 2025-02-14 PROCEDURE — 87205 SMEAR GRAM STAIN: CPT

## 2025-02-14 PROCEDURE — 2500000003 HC RX 250 WO HCPCS: Performed by: STUDENT IN AN ORGANIZED HEALTH CARE EDUCATION/TRAINING PROGRAM

## 2025-02-14 PROCEDURE — APPSS60 APP SPLIT SHARED TIME 46-60 MINUTES: Performed by: NURSE PRACTITIONER

## 2025-02-14 PROCEDURE — 36415 COLL VENOUS BLD VENIPUNCTURE: CPT

## 2025-02-14 PROCEDURE — 6370000000 HC RX 637 (ALT 250 FOR IP): Performed by: STUDENT IN AN ORGANIZED HEALTH CARE EDUCATION/TRAINING PROGRAM

## 2025-02-14 PROCEDURE — 87075 CULTR BACTERIA EXCEPT BLOOD: CPT

## 2025-02-14 PROCEDURE — 6360000002 HC RX W HCPCS: Performed by: STUDENT IN AN ORGANIZED HEALTH CARE EDUCATION/TRAINING PROGRAM

## 2025-02-14 PROCEDURE — 1200000000 HC SEMI PRIVATE

## 2025-02-14 PROCEDURE — 85025 COMPLETE CBC W/AUTO DIFF WBC: CPT

## 2025-02-14 PROCEDURE — 87077 CULTURE AEROBIC IDENTIFY: CPT

## 2025-02-14 PROCEDURE — 11719 TRIM NAIL(S) ANY NUMBER: CPT | Performed by: SURGERY

## 2025-02-14 PROCEDURE — 87070 CULTURE OTHR SPECIMN AEROBIC: CPT

## 2025-02-14 PROCEDURE — 2580000003 HC RX 258

## 2025-02-14 PROCEDURE — 2580000003 HC RX 258: Performed by: STUDENT IN AN ORGANIZED HEALTH CARE EDUCATION/TRAINING PROGRAM

## 2025-02-14 PROCEDURE — 0JBQ0ZZ EXCISION OF RIGHT FOOT SUBCUTANEOUS TISSUE AND FASCIA, OPEN APPROACH: ICD-10-PCS | Performed by: SURGERY

## 2025-02-14 RX ADMIN — TORSEMIDE 40 MG: 20 TABLET ORAL at 21:07

## 2025-02-14 RX ADMIN — SODIUM CHLORIDE 1500 MG: 9 INJECTION, SOLUTION INTRAVENOUS at 02:05

## 2025-02-14 RX ADMIN — METOLAZONE 2.5 MG: 2.5 TABLET ORAL at 09:07

## 2025-02-14 RX ADMIN — INSULIN HUMAN 10 UNITS: 500 INJECTION, SOLUTION SUBCUTANEOUS at 18:17

## 2025-02-14 RX ADMIN — INSULIN HUMAN 10 UNITS: 500 INJECTION, SOLUTION SUBCUTANEOUS at 09:09

## 2025-02-14 RX ADMIN — CEFEPIME 2000 MG: 2 INJECTION, POWDER, FOR SOLUTION INTRAVENOUS at 15:19

## 2025-02-14 RX ADMIN — FINASTERIDE 5 MG: 5 TABLET, FILM COATED ORAL at 09:06

## 2025-02-14 RX ADMIN — TORSEMIDE 40 MG: 20 TABLET ORAL at 09:06

## 2025-02-14 RX ADMIN — POTASSIUM CHLORIDE 40 MEQ: 1500 TABLET, EXTENDED RELEASE ORAL at 05:56

## 2025-02-14 RX ADMIN — SODIUM CHLORIDE, PRESERVATIVE FREE 10 ML: 5 INJECTION INTRAVENOUS at 21:08

## 2025-02-14 RX ADMIN — INSULIN HUMAN 15 UNITS: 500 INJECTION, SOLUTION SUBCUTANEOUS at 14:14

## 2025-02-14 RX ADMIN — SODIUM CHLORIDE: 900 INJECTION INTRAVENOUS at 09:29

## 2025-02-14 RX ADMIN — SODIUM PHOSPHATE, MONOBASIC, MONOHYDRATE AND SODIUM PHOSPHATE, DIBASIC, ANHYDROUS 20 MMOL: 142; 276 INJECTION, SOLUTION INTRAVENOUS at 05:56

## 2025-02-14 RX ADMIN — SODIUM CHLORIDE, PRESERVATIVE FREE 10 ML: 5 INJECTION INTRAVENOUS at 09:12

## 2025-02-14 RX ADMIN — CEFEPIME 2000 MG: 2 INJECTION, POWDER, FOR SOLUTION INTRAVENOUS at 09:30

## 2025-02-14 RX ADMIN — TAMSULOSIN HYDROCHLORIDE 0.4 MG: 0.4 CAPSULE ORAL at 09:07

## 2025-02-14 RX ADMIN — INSULIN HUMAN 30 UNITS: 500 INJECTION, SOLUTION SUBCUTANEOUS at 18:19

## 2025-02-14 RX ADMIN — INSULIN HUMAN 10 UNITS: 500 INJECTION, SOLUTION SUBCUTANEOUS at 14:09

## 2025-02-14 RX ADMIN — RANOLAZINE 500 MG: 500 TABLET, EXTENDED RELEASE ORAL at 09:07

## 2025-02-14 RX ADMIN — CEFEPIME 2000 MG: 2 INJECTION, POWDER, FOR SOLUTION INTRAVENOUS at 23:19

## 2025-02-14 RX ADMIN — RANOLAZINE 500 MG: 500 TABLET, EXTENDED RELEASE ORAL at 21:07

## 2025-02-14 RX ADMIN — SODIUM CHLORIDE 1500 MG: 9 INJECTION, SOLUTION INTRAVENOUS at 15:40

## 2025-02-14 RX ADMIN — SPIRONOLACTONE 25 MG: 50 TABLET ORAL at 09:07

## 2025-02-14 RX ADMIN — SPIRONOLACTONE 25 MG: 50 TABLET ORAL at 21:07

## 2025-02-14 RX ADMIN — INSULIN HUMAN 30 UNITS: 500 INJECTION, SOLUTION SUBCUTANEOUS at 21:46

## 2025-02-14 NOTE — PROGRESS NOTES
RRT checked on pt.    Pt is wearing his own Cpap machine.  SaO2 93% on room air.  Cpap +10, HR 80    RR 12    Pt is tolerating well.

## 2025-02-14 NOTE — CONSULTS
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 General surgery evaluated s/p bedside excisional debridement of necrotic tissue from wound at the plantar surface . MRI ordered. Blood cx x1 +enterococcus faecalis therefore ID consulted.   Imaging: Imaging reviewed. CXR  non acute. Plain films of right foot show acute displaced fracture of 2nd and 3rd distal phalanx.  Micro data: Wound cx  + Proteus mirabilis. Blood cx x 1  +enterococcus faecalis, vanc resistant gene not detected-susceptibilities pending  WBC 12.2<--11.9 ( adm), , ESR 26. Afebrile sice adm  Antimicrobial summary: IV Cefepime -; IV Vanc -    Right second and third toe fracture  Imaging as noted.  Per primary team  DM II A1C 7.5  On insulin, Endo following  SSS s/p pacemaker  Severe obesity, BMI 41.6  Allergy: no abx allergy  GFR 63  Comorbid conditions: SSS s/p pacemaker (), CVA, MARINO, HTN, DM II, CKD III, HLD, Obesity,    Plan  Therapeutic: Continue IV Cefepime and IV Vancomycin for now  Diagnostic:   Trend Pro-Brad, ESR, CRP, CBC  Repeat blood cultures every 48 hours until negative (ordered 2/15)  Recommend TEJ (informed HM)  F/u: TEJ, MRI of right foot, pending blood cx susceptibility , repeat blood cultures, wound cultures  Other:     Thank you for allowing me to consult in the care of this patient.  ------------------------  REASON FOR CONSULT: \"Infective syndrome\"\" Bacteremia\"  Requested by: Dr. Friedman  HPI:Patient is a 64 y.o. male with history SSS s/p pacemaker (), CVA, MARINO, HTN, DM II, CKD III, HLD, Obesity, who was admitted 2025 for further evaluation and management of of right foot wound.  Patient was seen by Endocrinology outpatient and was sent to ED for evaluation of his right foot wound.  The patient     Spastic hemiparesis of right dominant side (HCC) 11/20/2018    Dysphagia due to recent stroke 11/20/2018    Essential hypertension 11/20/2018    Morbid obesity with body mass index of 45.0-49.9 in adult 11/20/2018    Weakness of right arm 11/18/2018    History of cardiac cath 10/08/2018    New onset atrial fibrillation (HCC) 09/25/2018    Type 2 diabetes mellitus with hyperglycemia, with long-term current use of insulin (HCC) 09/25/2018    Class 3 severe obesity due to excess calories with body mass index (BMI) of 45.0 to 49.9 in adult 09/25/2018    MARINO on CPAP 10/14/2013     Past Medical History:   Diagnosis Date    Atrial fibrillation (HCC)     Cerebral artery occlusion with cerebral infarction (HCC)     History of cardiac cath 2017    --RCA has mid 50% stenosis and PLB branch has 70% stenosis noted. LVEDP very high    Hyperlipidemia     Hypertension     Type II or unspecified type diabetes mellitus without mention of complication, not stated as uncontrolled     Diagnsed about 1998    Unspecified sleep apnea       Past Surgical History:   Procedure Laterality Date    CARDIAC CATHETERIZATION      TONSILLECTOMY      AT 15 YEARS OLD      Family History   Problem Relation Age of Onset    Diabetes Mother     Diabetes Father     Cancer Father     Cancer Maternal Grandfather         PROSTATE CANCER      Infectious disease related family history - not contibutory.   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  No recent unusual exposures.  Pets- 5 dogs, 1 cat  ?  ALLERGIES  No Known Allergies   MEDICATIONS  Reviewed and are per the chart/EMR.   IMMUNIZATION HISTORY  Immunization History   Administered Date(s) Administered    Influenza Virus

## 2025-02-14 NOTE — CONSULTS
Endocrinology   Consult Note  2/13/2025 12:37 PM     Primary Care provider: MEETA Diehl MD     Referring physician:  Geremias Chiang MD     Dear Doctor Mariia    Thank You for the Consult     Pt. Was Admitted for : Right foot infection and high blood glucose    Reason for Consult: Better control of blood glucose      History Obtained From:  Patient/ EMR       HISTORY OF PRESENT ILLNESS:                The patient is a 64 y.o. male with significant past medical history of type 1 diabetes mellitus, CVA, atrial fibrillation, hypertension, hyperlipidemia, obesity, chronic renal failure, was seen in the office yesterday because of right foot infection and blood glucose being high.  Patient has been taking insulin U-500 small dose -5 to 10 units he supposed to take 15 to 20 units of U-500 but some other there was confusion is taking less insulin and also afraid of hypoglycemia rightfully so.  Elevate his blood sugar has been running fairly high and also get infection in the right foot dressed looking awful the bad bad odor he has his images and the patient to the hospital to be reviewed in the ER and possible admission..  That was done and patient is in the hospital now.  I was  consulted for better control of blood glucose.       ROS:   Pt's ROS done in detail.  Abnormal ROS are noted in Medical and Surgical History Section below:     Other Medical History:        Diagnosis Date    Atrial fibrillation (HCC)     Cerebral artery occlusion with cerebral infarction (HCC)     History of cardiac cath 2017    --RCA has mid 50% stenosis and PLB branch has 70% stenosis noted. LVEDP very high    Hyperlipidemia     Hypertension     Type II or unspecified type diabetes mellitus without mention of complication, not stated as uncontrolled     Diagnsed about 1998    Unspecified sleep apnea      Surgical History:        Procedure Laterality Date    CARDIAC CATHETERIZATION      TONSILLECTOMY      AT 15 YEARS OLD       Allergies:  Patient

## 2025-02-14 NOTE — PROGRESS NOTES
V2.0    [facility] Progress Note      Name:  Bandar De La Torre /Age/Sex: 1960  (64 y.o. male)   MRN & CSN:  [id number] & [id number] Encounter Date/Time: 2025 11:50 AM EST   [facility] PCP: MEETA Diehl MD     Attending:Maikel Friedman MD       Hospital Day: 2    Assessment and Recommendations   Bandar De La Torre is a 64 y.o. male  who presents with Cellulitis of foot      Diabetic foot wound concern for infection.   Right foot phalanx fracture  No evidence of osteomyelitis on x-ray  -Continue vanc+cefepime  -MRI foot with/without contrast  -Follow-up wound cultures  -Check CRP, sed rate  -Wound care  -General Surgery consulted and patient underwent bedside debridement, planning right third toe amputation  -Follow-up on cultures    Enterococcal bacteremia   Severe sepsis  -Blood cultures growing Enterococcus  -ID consulted  -Continue broad-spectrum antibiotics  -Adequate hydration  -TTE/TEJ        Type II diabetes  On home insulin unclear exact dose, plus Farxiga plus glipizide. Last HA1C 7.5 2024  - hypoglycemia protocol  - MDSSI  -Will do Lantus 10 units nightly  - endo consulted  - Check HA1C     Hypokalemia  K 2.9 on admission  -Replete and monitor with repeat labs     CKD III.    -Cr around baseline  -Resume home spironolactone, torsemide and metalazone        Atrial fibrillation.   -On home Xarelo.   -Holding until no surgeries planned     PVD.  -On home Plavix and Xarelto  -Resume as appropriate     Chronic HFpEF  Last  ECHO  EF 50%, severely dilated left atrium  Has bilateral leg edema, no SOB, BNP 1000  - home diuretics resumed      Diet ADULT DIET; Regular; 4 carb choices (60 gm/meal); Low Fat/Low Chol/High Fiber/SANDOVAL   DVT Prophylaxis [] Lovenox, []  Heparin, [] SCDs, [] Ambulation,  [] Eliquis, [] Xarelto  [] Coumadin   Code Status Full Code   Disposition From: Home  Expected Disposition: TBD  Estimated Date of Discharge: TBD  Patient requires continued admission due

## 2025-02-14 NOTE — PROCEDURES
PROCEDURE NOTE  Date: 2/14/2025   Name: Bandar De La Torre  YOB: 1960    Procedure Note    Indications:  Based on my examination of this patient's wound(s) today, sharp excision into necrotic epidermis, dermis, subcutaneous tissue, and muscle/fascia is required to promote healing and evaluate the extent of previous healing.    Performed by: Jakob Avalos MD    Consent obtained: Yes    Time out taken:  Yes    Pain Control: n/a      Debridement:Excisional Debridement    Using forceps, tissue nippers, and #11 blade scalpel the wound(s) was/were sharply debrided down through and including the removal of epidermis, dermis, subcutaneous tissue, and muscle/fascia.        Devitalized Tissue Debrided:  slough, necrotic/eschar, and callus    Pre Debridement Measurements:  Are located in the Wound Documentation Flow Sheet    All active wounds listed below with today's date are evaluated  Wound(s)    debrided this date include:  Right plantar foot, right 3rd toe and trimming of 10 dystrophic nails.    Percent of Wound(s) Debrided: approximately 100%    Total  Area  Debrided:  10 sq cm     Bleeding:  Minimal    Hemostasis Achieved:  by pressure    Procedural Pain:  2  / 10     Post Procedural Pain:  0 / 10     Response to treatment:  Well tolerated by patient.       A/P:  Status of wound progress and description from last visit:   Right foot plantar wound is  and appears superficial but difficult to tell.  Less foul odor.  The right 3rd toe tip wound probes to bone and there was purulence present.  Repeat culture taken by Wound care this morning.    - will need OR for right 3rd toe amputation  - MRI pending for operative planning(delayed do to needing to find out if pacemaker is MRI compatible.)  - diet ok today, NPO at midnight in case OR is needed tomorrow    Electronically signed by Jakob Avalos MD on 2/14/2025 at 11:11 AM'

## 2025-02-14 NOTE — CONSULTS
[facility] Nurse  Consult Note       Bandar De La Torre  AGE: 64 y.o.   GENDER: male  : 1960  TODAY'S DATE:  2025    Subjective:     Reason for Evaluation and Assessment: wound assessment       Bandar De La Torre is a 64 y.o. male referred by:   [x] Physician  [] Nursing  [] Other:     Wound Identification:  Wound Type: venous and diabetic  Contributing Factors: edema, venous stasis, diabetes, shear force, and decreased tissue oxygenation        PAST MEDICAL HISTORY        Diagnosis Date    Atrial fibrillation (HCC)     Cerebral artery occlusion with cerebral infarction (HCC)     History of cardiac cath     --RCA has mid 50% stenosis and PLB branch has 70% stenosis noted. LVEDP very high    Hyperlipidemia     Hypertension     Type II or unspecified type diabetes mellitus without mention of complication, not stated as uncontrolled     Diagnsed about     Unspecified sleep apnea        PAST SURGICAL HISTORY    Past Surgical History:   Procedure Laterality Date    CARDIAC CATHETERIZATION      TONSILLECTOMY      AT 15 YEARS OLD       FAMILY HISTORY    Family History   Problem Relation Age of Onset    Diabetes Mother     Diabetes Father     Cancer Father     Cancer Maternal Grandfather         PROSTATE CANCER       SOCIAL HISTORY    Social History     Tobacco Use    Smoking status: Never    Smokeless tobacco: Never   Vaping Use    Vaping status: Never Used   Substance Use Topics    Alcohol use: No    Drug use: No       ALLERGIES    No Known Allergies    MEDICATIONS    No current facility-administered medications on file prior to encounter.     Current Outpatient Medications on File Prior to Encounter   Medication Sig Dispense Refill    Continuous Glucose Sensor (DEXCOM G7 SENSOR) MISC USE AS DIRECTED AS NEEDED FOR 90 DAYS      spironolactone (ALDACTONE) 25 MG tablet Take 1 tablet by mouth 2 times daily (Patient taking differently: Take 1 tablet by mouth 2 times daily 25  02/14/25 0830   Undermining Maxium Distance (cm) 0 02/14/25 0830   Wound Assessment Eschar dry 02/14/25 0830   Drainage Amount None (dry) 02/14/25 0830   Odor None 02/14/25 0830   Berenice-wound Assessment Hyperkeratosis (callous) 02/14/25 0830   Margins Attached edges 02/14/25 0830   Wound Thickness Description not for Pressure Injury Full thickness 02/14/25 0830   Number of days: 0       Response to treatment:  Well tolerated by patient.     Pain Assessment:  Severity:  none  Quality of pain: none  Wound Pain Timing/Severity: none  Premedicated: none    Plan:     Plan of Care: Wound 02/13/25 Foot Right;Plantar;Distal-Dressing/Treatment: Betadine swabs/povidone iodine, Gauze dressing/dressing sponge, ABD, Roll gauze  Wound 02/14/25 Toe (Comment  which one) Right;Anterior 2nd toe-Dressing/Treatment: Betadine swabs/povidone iodine, Gauze dressing/dressing sponge, ABD, Roll gauze  Wound 02/14/25 Toe (Comment  which one) Right;Anterior right third toe-Dressing/Treatment: Betadine swabs/povidone iodine, Gauze dressing/dressing sponge, ABD, Roll gauze  Wound 02/14/25 Tibial Posterior;Right-Dressing/Treatment: Betadine swabs/povidone iodine, Gauze dressing/dressing sponge, ABD, Roll gauze  Wound 02/14/25 Left;Plantar great toe plantar-Dressing/Treatment: Betadine swabs/povidone iodine, Gauze dressing/dressing sponge, ABD, Roll gauze    Alert and agreeable to wound assessment. Wife at bedside.  Stated that foot wounds have been present for approximately 4 weeks. Right planter foot with diabetic wound noted. Cleaned with NS and covered with Betadine damp gauze and ABD,wrapped with kerlix. Right 2nd toe covered with betadine damp gauze and wrapped with kerlix. Right third toe with eschar covered wound noted,cleaned with NS, purulent drainage noted with depth of 0.6 cm. Wound culture collected from right plantar and right 3rd toe, sent to lab. Right lateral leg dry, left open to air. Right posterior leg covered with puracol and

## 2025-02-14 NOTE — CARE COORDINATION
02/14/25 1518   Service Assessment   Patient Orientation Alert and Oriented   Cognition Alert   History Provided By Patient   Primary Caregiver Self   Support Systems Spouse/Significant Other;Family Members   Patient's Healthcare Decision Maker is: Legal Next of Kin   PCP Verified by CM Yes   Last Visit to PCP Within last 6 months   Prior Functional Level Independent in ADLs/IADLs   Current Functional Level Assistance with the following:;Mobility   Can patient return to prior living arrangement Yes   Ability to make needs known: Good   Family able to assist with home care needs: Yes   Would you like for me to discuss the discharge plan with any other family members/significant others, and if so, who? Yes   Financial Resources Medicare   Community Resources None     Discharge planning initiated. From home with wife. Independent PTA. PCP and insurance. Has walker and cane at home. No HHC. Patient denies any DC needs at this time. CM following for any post surgical needs.

## 2025-02-14 NOTE — CONSULTS
PHARMACY VANCOMYCIN MONITORING SERVICE  Pharmacy consulted by Dr. Chiang for monitoring and adjustment.    Indication for treatment: Vancomycin indication: Bone/Joint infection   Goal trough: Trough Goal: 15-20 mcg/mL  AUC/ROSE: 400-600    Risk Factors for MRSA Identified:   Purulent and/or complicated SSTI    Pertinent Laboratory Values:   Temp Readings from Last 3 Encounters:   02/14/25 97.5 °F (36.4 °C) (Oral)   02/12/25 96.9 °F (36.1 °C)   01/14/25 99.7 °F (37.6 °C) (Oral)     Recent Labs     02/13/25  1215 02/14/25  0144   WBC 11.9* 12.2*     Recent Labs     02/13/25  1215 02/14/25  0144   BUN 56* 43*   CREATININE 1.5* 1.2     Estimated Creatinine Clearance: 90 mL/min (based on SCr of 1.2 mg/dL).    Intake/Output Summary (Last 24 hours) at 2/14/2025 1554  Last data filed at 2/14/2025 1414  Gross per 24 hour   Intake 504.83 ml   Output 2350 ml   Net -1845.17 ml     Last Encounter Weight:  Wt Readings from Last 3 Encounters:   02/14/25 (!) 139.3 kg (307 lb)   02/13/25 (!) 141.3 kg (311 lb 9.6 oz)   02/12/25 (!) 149.7 kg (330 lb)       Pertinent Cultures:   Date    Source    Results  2/13   Blood - Enterococcus (neg vanco resistant gene)   2/13   Wound  (Foot)   Proteus    Vancomycin level:   TROUGH:  No results for input(s): \"VANCOTROUGH\" in the last 72 hours.  RANDOM:  No results for input(s): \"VANCORANDOM\" in the last 72 hours.    Assessment:  HPI: Acute osteomyelitis of the foot, diabetic foot infection.  2/14 - ID notes to continue vancomycin.  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 Followed by a scheduled dose of vancomycin 1500mg ivpb every 12 hours.   Check the vanco trough level

## 2025-02-14 NOTE — PLAN OF CARE
Problem: Safety - Adult  Goal: Free from fall injury  Outcome: Progressing  Flowsheets (Taken 2/13/2025 2354)  Free From Fall Injury:   Instruct family/caregiver on patient safety   Based on caregiver fall risk screen, instruct family/caregiver to ask for assistance with transferring infant if caregiver noted to have fall risk factors     Problem: Chronic Conditions and Co-morbidities  Goal: Patient's chronic conditions and co-morbidity symptoms are monitored and maintained or improved  Outcome: Progressing  Flowsheets (Taken 2/13/2025 2354)  Care Plan - Patient's Chronic Conditions and Co-Morbidity Symptoms are Monitored and Maintained or Improved:   Monitor and assess patient's chronic conditions and comorbid symptoms for stability, deterioration, or improvement   Collaborate with multidisciplinary team to address chronic and comorbid conditions and prevent exacerbation or deterioration   Update acute care plan with appropriate goals if chronic or comorbid symptoms are exacerbated and prevent overall improvement and discharge     Problem: Discharge Planning  Goal: Discharge to home or other facility with appropriate resources  Outcome: Progressing  Flowsheets (Taken 2/13/2025 2354)  Discharge to home or other facility with appropriate resources:   Identify barriers to discharge with patient and caregiver   Arrange for needed discharge resources and transportation as appropriate   Identify discharge learning needs (meds, wound care, etc)   Arrange for interpreters to assist at discharge as needed     Problem: Pain  Goal: Verbalizes/displays adequate comfort level or baseline comfort level  Outcome: Progressing  Flowsheets (Taken 2/13/2025 2354)  Verbalizes/displays adequate comfort level or baseline comfort level:   Encourage patient to monitor pain and request assistance   Assess pain using appropriate pain scale   Administer analgesics based on type and severity of pain and evaluate response   Implement

## 2025-02-14 NOTE — PROGRESS NOTES
4 Eyes Skin Assessment     NAME:  Bandar De La Torre  YOB: 1960  MEDICAL RECORD NUMBER:  [id number]    The patient is being assessed for  Admission    I agree that at least one RN has performed a thorough Head to Toe Skin Assessment on the patient. ALL assessment sites listed below have been assessed.      Areas assessed by both nurses:    Head, Face, Ears, Shoulders, Back, Chest, Arms, Elbows, Hands, Sacrum. Buttock, Coccyx, Ischium, and Legs. Feet and Heels        Does the Patient have a Wound? No noted wound(s)       David Prevention initiated by RN: Yes  Wound Care Orders initiated by RN: Yes    Pressure Injury (Stage 3,4, Unstageable, DTI, NWPT, and Complex wounds) if present, place Wound referral order by RN under : No    New Ostomies, if present place, Ostomy referral order under : No     Nurse 1 eSignature: Electronically signed by Sabrina Milian RN on 2/14/25 at 12:06 AM EST    **SHARE this note so that the co-signing nurse can place an eSignature**    Nurse 2 eSignature: Electronically signed by Robbie Brandt RN on 2/14/25 at 12:49 AM EST

## 2025-02-15 LAB
ALBUMIN: 2.7 G/DL (ref 3.4–5)
ANION GAP SERPL CALCULATED.3IONS-SCNC: 13 MMOL/L (ref 9–17)
ANION GAP SERPL CALCULATED.3IONS-SCNC: 17 MMOL/L (ref 9–17)
BASOPHILS # BLD: 0.04 K/UL
BASOPHILS NFR BLD: 0 % (ref 0–1)
BUN SERPL-MCNC: 47 MG/DL (ref 7–20)
BUN SERPL-MCNC: 48 MG/DL (ref 7–20)
CALCIUM SERPL-MCNC: 9.1 MG/DL (ref 8.3–10.6)
CALCIUM SERPL-MCNC: 9.3 MG/DL (ref 8.3–10.6)
CHLORIDE SERPL-SCNC: 86 MMOL/L (ref 99–110)
CHLORIDE SERPL-SCNC: 95 MMOL/L (ref 99–110)
CO2 SERPL-SCNC: 31 MMOL/L (ref 21–32)
CO2 SERPL-SCNC: 32 MMOL/L (ref 21–32)
CREAT SERPL-MCNC: 1.4 MG/DL (ref 0.8–1.3)
CREAT SERPL-MCNC: 1.5 MG/DL (ref 0.8–1.3)
CRP SERPL HS-MCNC: 159 MG/L (ref 0–5)
DATE LAST DOSE: ABNORMAL
EOSINOPHIL # BLD: 0.31 K/UL
EOSINOPHILS RELATIVE PERCENT: 3 % (ref 0–3)
ERYTHROCYTE [DISTWIDTH] IN BLOOD BY AUTOMATED COUNT: 16.1 % (ref 11.7–14.9)
EST. AVERAGE GLUCOSE BLD GHB EST-MCNC: 206 MG/DL
GFR, ESTIMATED: 49 ML/MIN/1.73M2
GFR, ESTIMATED: 52 ML/MIN/1.73M2
GLUCOSE BLD-MCNC: 106 MG/DL (ref 74–99)
GLUCOSE BLD-MCNC: 107 MG/DL (ref 74–99)
GLUCOSE BLD-MCNC: 218 MG/DL (ref 74–99)
GLUCOSE BLD-MCNC: 233 MG/DL (ref 74–99)
GLUCOSE BLD-MCNC: 78 MG/DL (ref 74–99)
GLUCOSE BLD-MCNC: 85 MG/DL (ref 74–99)
GLUCOSE SERPL-MCNC: 103 MG/DL (ref 74–99)
GLUCOSE SERPL-MCNC: 228 MG/DL (ref 74–99)
HBA1C MFR BLD: 8.8 % (ref 4.2–6.3)
HCT VFR BLD AUTO: 40.2 % (ref 42–52)
HGB BLD-MCNC: 12.8 G/DL (ref 13.5–18)
IMM GRANULOCYTES # BLD AUTO: 0.06 K/UL
IMM GRANULOCYTES NFR BLD: 1 %
LYMPHOCYTES NFR BLD: 0.7 K/UL
LYMPHOCYTES RELATIVE PERCENT: 6 % (ref 24–44)
MCH RBC QN AUTO: 26.9 PG (ref 27–31)
MCHC RBC AUTO-ENTMCNC: 31.8 G/DL (ref 32–36)
MCV RBC AUTO: 84.6 FL (ref 78–100)
MICROORGANISM SPEC CULT: ABNORMAL
MICROORGANISM SPEC CULT: ABNORMAL
MICROORGANISM/AGENT SPEC: ABNORMAL
MONOCYTES NFR BLD: 1 K/UL
MONOCYTES NFR BLD: 9 % (ref 0–4)
NEUTROPHILS NFR BLD: 81 % (ref 36–66)
NEUTS SEG NFR BLD: 8.76 K/UL
PHOSPHATE SERPL-MCNC: 2.5 MG/DL (ref 2.5–4.9)
PLATELET # BLD AUTO: 238 K/UL (ref 140–440)
PMV BLD AUTO: 10 FL (ref 7.5–11.1)
POTASSIUM SERPL-SCNC: 2.5 MMOL/L (ref 3.5–5.1)
POTASSIUM SERPL-SCNC: 2.9 MMOL/L (ref 3.5–5.1)
PROCALCITONIN SERPL-MCNC: 0.36 NG/ML
RBC # BLD AUTO: 4.75 M/UL (ref 4.6–6.2)
SODIUM SERPL-SCNC: 135 MMOL/L (ref 136–145)
SODIUM SERPL-SCNC: 139 MMOL/L (ref 136–145)
SPECIMEN DESCRIPTION: ABNORMAL
TME LAST DOSE: ABNORMAL H
VANCOMYCIN DOSE: ABNORMAL MG
VANCOMYCIN SERPL-MCNC: 27.6 UG/ML (ref 10–20)
WBC OTHER # BLD: 10.9 K/UL (ref 4–10.5)

## 2025-02-15 PROCEDURE — 86140 C-REACTIVE PROTEIN: CPT

## 2025-02-15 PROCEDURE — 82962 GLUCOSE BLOOD TEST: CPT

## 2025-02-15 PROCEDURE — 87040 BLOOD CULTURE FOR BACTERIA: CPT

## 2025-02-15 PROCEDURE — 36415 COLL VENOUS BLD VENIPUNCTURE: CPT

## 2025-02-15 PROCEDURE — 80069 RENAL FUNCTION PANEL: CPT

## 2025-02-15 PROCEDURE — 6370000000 HC RX 637 (ALT 250 FOR IP): Performed by: INTERNAL MEDICINE

## 2025-02-15 PROCEDURE — 2500000003 HC RX 250 WO HCPCS: Performed by: STUDENT IN AN ORGANIZED HEALTH CARE EDUCATION/TRAINING PROGRAM

## 2025-02-15 PROCEDURE — 85025 COMPLETE CBC W/AUTO DIFF WBC: CPT

## 2025-02-15 PROCEDURE — 99223 1ST HOSP IP/OBS HIGH 75: CPT | Performed by: INTERNAL MEDICINE

## 2025-02-15 PROCEDURE — APPNB15 APP NON BILLABLE TIME 0-15 MINS: Performed by: NURSE PRACTITIONER

## 2025-02-15 PROCEDURE — 80048 BASIC METABOLIC PNL TOTAL CA: CPT

## 2025-02-15 PROCEDURE — 84145 PROCALCITONIN (PCT): CPT

## 2025-02-15 PROCEDURE — 99231 SBSQ HOSP IP/OBS SF/LOW 25: CPT | Performed by: NURSE PRACTITIONER

## 2025-02-15 PROCEDURE — 1200000000 HC SEMI PRIVATE

## 2025-02-15 PROCEDURE — 94761 N-INVAS EAR/PLS OXIMETRY MLT: CPT

## 2025-02-15 PROCEDURE — 2580000003 HC RX 258: Performed by: STUDENT IN AN ORGANIZED HEALTH CARE EDUCATION/TRAINING PROGRAM

## 2025-02-15 PROCEDURE — 6360000002 HC RX W HCPCS: Performed by: STUDENT IN AN ORGANIZED HEALTH CARE EDUCATION/TRAINING PROGRAM

## 2025-02-15 PROCEDURE — 83036 HEMOGLOBIN GLYCOSYLATED A1C: CPT

## 2025-02-15 PROCEDURE — 6370000000 HC RX 637 (ALT 250 FOR IP): Performed by: STUDENT IN AN ORGANIZED HEALTH CARE EDUCATION/TRAINING PROGRAM

## 2025-02-15 PROCEDURE — 80202 ASSAY OF VANCOMYCIN: CPT

## 2025-02-15 RX ADMIN — POTASSIUM CHLORIDE 10 MEQ: 7.46 INJECTION, SOLUTION INTRAVENOUS at 06:49

## 2025-02-15 RX ADMIN — POTASSIUM CHLORIDE 10 MEQ: 7.46 INJECTION, SOLUTION INTRAVENOUS at 20:03

## 2025-02-15 RX ADMIN — CEFEPIME 2000 MG: 2 INJECTION, POWDER, FOR SOLUTION INTRAVENOUS at 17:41

## 2025-02-15 RX ADMIN — POTASSIUM CHLORIDE 10 MEQ: 7.46 INJECTION, SOLUTION INTRAVENOUS at 10:12

## 2025-02-15 RX ADMIN — POTASSIUM CHLORIDE 10 MEQ: 7.46 INJECTION, SOLUTION INTRAVENOUS at 03:27

## 2025-02-15 RX ADMIN — POTASSIUM CHLORIDE 10 MEQ: 7.46 INJECTION, SOLUTION INTRAVENOUS at 18:59

## 2025-02-15 RX ADMIN — POTASSIUM CHLORIDE 10 MEQ: 7.46 INJECTION, SOLUTION INTRAVENOUS at 16:32

## 2025-02-15 RX ADMIN — SPIRONOLACTONE 25 MG: 50 TABLET ORAL at 21:14

## 2025-02-15 RX ADMIN — TORSEMIDE 40 MG: 20 TABLET ORAL at 21:14

## 2025-02-15 RX ADMIN — POTASSIUM CHLORIDE 10 MEQ: 7.46 INJECTION, SOLUTION INTRAVENOUS at 05:50

## 2025-02-15 RX ADMIN — INSULIN HUMAN 10 UNITS: 500 INJECTION, SOLUTION SUBCUTANEOUS at 17:36

## 2025-02-15 RX ADMIN — INSULIN HUMAN 10 UNITS: 500 INJECTION, SOLUTION SUBCUTANEOUS at 17:31

## 2025-02-15 RX ADMIN — INSULIN HUMAN 10 UNITS: 500 INJECTION, SOLUTION SUBCUTANEOUS at 23:14

## 2025-02-15 RX ADMIN — POTASSIUM CHLORIDE 10 MEQ: 7.46 INJECTION, SOLUTION INTRAVENOUS at 17:54

## 2025-02-15 RX ADMIN — POTASSIUM CHLORIDE 10 MEQ: 7.46 INJECTION, SOLUTION INTRAVENOUS at 23:46

## 2025-02-15 RX ADMIN — CEFEPIME 2000 MG: 2 INJECTION, POWDER, FOR SOLUTION INTRAVENOUS at 23:47

## 2025-02-15 RX ADMIN — RANOLAZINE 500 MG: 500 TABLET, EXTENDED RELEASE ORAL at 21:14

## 2025-02-15 RX ADMIN — CEFEPIME 2000 MG: 2 INJECTION, POWDER, FOR SOLUTION INTRAVENOUS at 08:11

## 2025-02-15 RX ADMIN — SODIUM CHLORIDE: 900 INJECTION INTRAVENOUS at 16:31

## 2025-02-15 RX ADMIN — POTASSIUM CHLORIDE 10 MEQ: 7.46 INJECTION, SOLUTION INTRAVENOUS at 22:28

## 2025-02-15 RX ADMIN — POTASSIUM CHLORIDE 10 MEQ: 7.46 INJECTION, SOLUTION INTRAVENOUS at 08:19

## 2025-02-15 RX ADMIN — POTASSIUM CHLORIDE 10 MEQ: 7.46 INJECTION, SOLUTION INTRAVENOUS at 04:30

## 2025-02-15 RX ADMIN — SODIUM CHLORIDE 1500 MG: 9 INJECTION, SOLUTION INTRAVENOUS at 02:12

## 2025-02-15 RX ADMIN — SODIUM CHLORIDE, PRESERVATIVE FREE 10 ML: 5 INJECTION INTRAVENOUS at 21:38

## 2025-02-15 RX ADMIN — VANCOMYCIN HYDROCHLORIDE 1000 MG: 1 INJECTION, POWDER, LYOPHILIZED, FOR SOLUTION INTRAVENOUS at 21:11

## 2025-02-15 ASSESSMENT — PAIN SCALES - GENERAL: PAINLEVEL_OUTOF10: 0

## 2025-02-15 NOTE — PROGRESS NOTES
PHARMACY VANCOMYCIN MONITORING SERVICE  Pharmacy consulted by Dr. Chiang for monitoring and adjustment.    Indication for treatment: Vancomycin indication: Bone/Joint infection   Goal trough: Trough Goal: 15-20 mcg/mL  AUC/ROSE: 400-600    Risk Factors for MRSA Identified:   Purulent and/or complicated SSTI    Pertinent Laboratory Values:   Temp Readings from Last 3 Encounters:   02/15/25 97.7 °F (36.5 °C) (Oral)   02/12/25 96.9 °F (36.1 °C)   01/14/25 99.7 °F (37.6 °C) (Oral)     Recent Labs     02/13/25  1215 02/14/25  0144 02/15/25  0147   WBC 11.9* 12.2* 10.9*     Recent Labs     02/13/25  1215 02/14/25  0144 02/15/25  0147   BUN 56* 43* 47*   CREATININE 1.5* 1.2 1.5*     Estimated Creatinine Clearance: 72 mL/min (A) (based on SCr of 1.5 mg/dL (H)).    Intake/Output Summary (Last 24 hours) at 2/15/2025 1144  Last data filed at 2/15/2025 0215  Gross per 24 hour   Intake 0 ml   Output 2800 ml   Net -2800 ml     Last Encounter Weight:  Wt Readings from Last 3 Encounters:   02/14/25 (!) 139.3 kg (307 lb)   02/13/25 (!) 141.3 kg (311 lb 9.6 oz)   02/12/25 (!) 149.7 kg (330 lb)       Pertinent Cultures:   Date    Source    Results  2/13   Blood    Enterococcus faecalis  2/13   Wound  (Foot)   Proteus mirabilis (pan sensitive)    Vancomycin level:   TROUGH:  No results for input(s): \"VANCOTROUGH\" in the last 72 hours.  RANDOM:    Recent Labs     02/15/25  0147   VANCORANDOM 27.6*       Assessment:  HPI: The patient is a 64 y.o. male who presents with a diabetic foot infection and osteomyelitis. Found to have positive blood culture in 1 set growing Enterococcus faecalis (no resistance markers to vancomycin detected). 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 Patient is s/p bedside I&D with GS, will likely need amputation of 3rd toe as well as TEJ/TTE.  SCr, BUN, and urine output:   CKD3, Scr 1.5 mg/dL, up from 1.2 yesterday  BUN 47, up from 43 yesterday  UOP 0.8 ml/kg/hr  Day(s) of

## 2025-02-15 NOTE — PROGRESS NOTES
Progress Note( Dr. Diehl)  2/15/2025  Subjective:   Admit Date: 2/13/2025  PCP: MEETA Diehl MD    Admitted For : Right foot infection and high blood glucose     Consulted For:  Right foot infection and high blood glucose     Interval History: After initial hiatus patient was restarted back on his home insulin regime his blood glucose more seem to be more stable even though there was 1 time yesterday was very high   Patient has hypokalemia and potassium being replaced    reviewed notes from surgery.  MRI is not done yet due to an issue about the pacemaker  It appears like patient might need amputation of third toe according to his surgery note    Denies any chest pains,   Denies SOB .   Denies nausea or vomiting.   No new bowel or bladder symptoms.       Intake/Output Summary (Last 24 hours) at 2/15/2025 1055  Last data filed at 2/15/2025 0215  Gross per 24 hour   Intake 0 ml   Output 2800 ml   Net -2800 ml       DATA    CBC:   Recent Labs     02/13/25  1215 02/14/25  0144 02/15/25  0147   WBC 11.9* 12.2* 10.9*   HGB 14.9 14.3 12.8*    248 238    CMP:  Recent Labs     02/13/25  1215 02/13/25  2010 02/14/25  0144 02/15/25  0147   *  --  134* 135*   K 2.9* 3.3* 3.2* 2.5*   CL 87*  --  92* 86*   CO2 32  --  30 32   BUN 56*  --  43* 47*   CREATININE 1.5*  --  1.2 1.5*   CALCIUM 9.7  --  9.5 9.1   BILITOT 0.6  --   --   --    ALKPHOS 97  --   --   --    AST 21  --   --   --    ALT 26  --   --   --      Lipids:   Lab Results   Component Value Date/Time    CHOL 159 11/14/2024 08:23 AM    HDL 39 11/14/2024 08:23 AM    TRIG 152 11/14/2024 08:23 AM     Glucose:  Recent Labs     02/15/25  0557 02/15/25  0755 02/15/25  0846   POCGLU 107* 78 85     [id number]:  Lab Results   Component Value Date/Time    LABA1C 8.8 02/15/2025 01:47 AM     High Sensitivity TSH:   Lab Results   Component Value Date/Time    TSHHS 1.800 08/29/2019 10:06 AM     Free T3: No results found for: \"FT3\"  Free T4:No results found for:

## 2025-02-15 NOTE — PROGRESS NOTES
V2.0    [facility] Progress Note      Name:  Bandar De La Torre /Age/Sex: 1960  (64 y.o. male)   MRN & CSN:  [id number] & [id number] Encounter Date/Time: 2/15/2025 11:50 AM EST   [facility]: MEETA Diehl MD     Attending:Maikel Friedman MD       Hospital Day: 3    Assessment and Recommendations   Bandar De La Torre is a 64 y.o. male  who presents with Cellulitis of foot      Diabetic foot wound concern for infection.   Right foot phalanx fracture  No evidence of osteomyelitis on x-ray  -Continue vanc+cefepime  -MRI foot with/without contrast  -Follow-up wound cultures  -Check CRP, sed rate  -Wound care  -General Surgery consulted and patient underwent bedside debridement, planning right third toe amputation  -Follow-up on cultures    Enterococcal bacteremia   Severe sepsis  -Blood cultures growing Enterococcus  -ID consulted  -Continue broad-spectrum antibiotics  -Adequate hydration  -Cardiology consulted for TEJ        Type II diabetes  On home insulin unclear exact dose, plus Farxiga plus glipizide. Last HA1C 7.5 2024  - hypoglycemia protocol  - MDSSI  -Will do Lantus 10 units nightly  - endo consulted  - Check HA1C     Hypokalemia  K 2.9 on admission  -Replete and monitor with repeat labs     CKD III.    -Cr around baseline  -Resume home spironolactone, torsemide and metalazone        Atrial fibrillation.   -On home Xarelo.   -Holding until no surgeries planned     PVD.  -On home Plavix and Xarelto  -Resume as appropriate     Chronic HFpEF  Last  ECHO  EF 50%, severely dilated left atrium  Has bilateral leg edema, no SOB, BNP 1000  - home diuretics resumed      Diet ADULT DIET; Regular; 4 carb choices (60 gm/meal); Low Fat/Low Chol/High Fiber/SANDOVAL   DVT Prophylaxis [] Lovenox, []  Heparin, [] SCDs, [] Ambulation,  [] Eliquis, [] Xarelto  [] Coumadin   Code Status Full Code   Disposition From: Home  Expected Disposition: TBD  Estimated Date of Discharge: TBD  Patient requires

## 2025-02-15 NOTE — PROGRESS NOTES
GENERAL SURGERY INPATIENT PROGRESS NOTE  [facility]    PATIENT: Bandar De La Torre, 64 y.o., male, MRN: [id number]    Hospital Day:  LOS: 2 days        Subjective:    Diet: ADULT DIET; Regular; 4 carb choices (60 gm/meal); Low Fat/Low Chol/High Fiber/SANDOVAL    Patient is s/p bedside debridement of right plantar foot and 3rd toe.  Still waiting on MRI    Objective:    Vitals: /64   Pulse 81   Temp 97.7 °F (36.5 °C) (Oral)   Resp 16   Ht 1.829 m (6' 0.01\")   Wt (!) 139.3 kg (307 lb)   SpO2 95%   BMI 41.63 kg/m²     I/O: 02/13 1901 - 02/15 0700  In: 10 [I.V.:10]  Out: 4350 [Urine:4350]    Physical Exam:  General Appearance:   Alert, cooperative, no distress    Head:   Normocephalic, atraumatic    Lungs:    Equal chest rise, respirations unlabored   Heart:   Regular rate and rhythm    Abdomen:    Soft, non-tender, no rebound or guarding    Extremities:  Wrapped with Kerlix and ACE   Neurologic:  Nonfocal, grossly intact        Labs/Imaging Results:   Recent Results (from the past 24 hour(s))   POCT Glucose    Collection Time: 02/14/25 12:12 PM   Result Value Ref Range    POC Glucose 293 (H) 74 - 99 mg/dL   POCT Glucose    Collection Time: 02/14/25  5:07 PM   Result Value Ref Range    POC Glucose 403 (H) 74 - 99 mg/dL   POCT Glucose    Collection Time: 02/14/25  8:47 PM   Result Value Ref Range    POC Glucose 412 (H) 74 - 99 mg/dL   Vancomycin,Random    Collection Time: 02/15/25  1:47 AM   Result Value Ref Range    Vancomycin Rm 27.6 (HH) 10 - 20 ug/mL    Vancomycin Random Dose amount Unknown     Vancomycin Random Date last dose UNK^Unknown^L     Vancomycin Random Time last dose UNK^Unknown^L    Renal Function Panel    Collection Time: 02/15/25  1:47 AM   Result Value Ref Range    Glucose 228 (H) 74 - 99 mg/dL    BUN 47 (H) 7 - 20 mg/dL    Creatinine 1.5 (H) 0.8 - 1.3 mg/dL    Est, Glom Filt Rate 49 (L) >60 mL/min/1.73m2    Calcium 9.1 8.3 - 10.6 mg/dL    Albumin 2.7 (L) 3.4 - 5.0 g/dL    Phosphorus  2.5 2.5 - 4.9 mg/dL    Sodium 135 (L) 136 - 145 mmol/L    Potassium 2.5 (LL) 3.5 - 5.1 mmol/L    Chloride 86 (L) 99 - 110 mmol/L    CO2 32 21 - 32 mmol/L    Anion Gap 17 9 - 17 mmol/L   CBC with Auto Differential    Collection Time: 02/15/25  1:47 AM   Result Value Ref Range    WBC 10.9 (H) 4.0 - 10.5 k/uL    RBC 4.75 4.60 - 6.20 m/uL    Hemoglobin 12.8 (L) 13.5 - 18.0 g/dL    Hematocrit 40.2 (L) 42.0 - 52.0 %    MCV 84.6 78.0 - 100.0 fL    MCH 26.9 (L) 27.0 - 31.0 pg    MCHC 31.8 (L) 32.0 - 36.0 g/dL    RDW 16.1 (H) 11.7 - 14.9 %    Platelets 238 140 - 440 k/uL    MPV 10.0 7.5 - 11.1 fL    Neutrophils % 81 (H) 36 - 66 %    Lymphocytes % 6 (L) 24 - 44 %    Monocytes % 9 (H) 0 - 4 %    Eosinophils % 3 0.0 - 3.0 %    Basophils % 0 0 - 1 %    Immature Granulocytes % 1 (H) 0 %    Neutrophils Absolute 8.76 k/uL    Lymphocytes Absolute 0.70 k/uL    Monocytes Absolute 1.00 k/uL    Eosinophils Absolute 0.31 k/uL    Basophils Absolute 0.04 k/uL    Immature Granulocytes Absolute 0.06 k/uL   C-Reactive Protein    Collection Time: 02/15/25  1:47 AM   Result Value Ref Range    .0 (H) 0.0 - 5.0 mg/L   Procalcitonin    Collection Time: 02/15/25  1:47 AM   Result Value Ref Range    Procalcitonin 0.36 ng/mL   Hemoglobin A1C    Collection Time: 02/15/25  1:47 AM   Result Value Ref Range    Hemoglobin A1C 8.8 (H) 4.2 - 6.3 %    Estimated Avg Glucose 206 mg/dL   POCT Glucose    Collection Time: 02/15/25  5:57 AM   Result Value Ref Range    POC Glucose 107 (H) 74 - 99 mg/dL         Assessment:  Bandar De La Torre is a 64 y.o. male who is s/p debridement of right plantar foot and right 3rd toe    MRI pending for operative plans, will need at the least 3rd toe amputation  Diet: regular  Wound: Change dressing daily.  Abx: continue  Labs/imaging: reviewed, waiting on MRI (delay is due to waiting to see if pacemaker is MRI compatible - wife states she will try to find his card and bring in today)    Maureen Polanco,

## 2025-02-15 NOTE — CONSULTS
INPATIENT CARDIOLOGY CONSULT NOTE         Reason for consultation:  TEJ    History of present illness:Bandar is a 64 y.o.year old who is admitted with   Chief Complaint   Patient presents with    Wound Check     Right foot      Prior cardiologist Dr. Mirza Crabtree     Patient is a 64 old gentleman with prior medical history significant for heart failure preserved LV ejection fraction, history of ascending aortic aneurysm, paroxysmal atrial fibrillation, chronic lymphedema, nonobstructive CAD, obstructive sleep apnea, history of stroke, pulmonary hypertension, diabetes mellitus, permanent pacemaker placement    Patient is admitted to the hospital with diabetic foot cellulitis/infection with right foot flank fracture and Enterococcus bacteremia.  Cardiology is consulted to evaluate patient for transesophageal echocardiogram      Pertinent Lab Personally Review     Recent Labs     02/15/25  0147   WBC 10.9*   HGB 12.8*   HCT 40.2*         Recent Labs     02/15/25  0147 02/15/25  1221   * 139   K 2.5* 2.9*   CL 86* 95*   CO2 32 31   PHOS 2.5  --    BUN 47* 48*   CREATININE 1.5* 1.4*     Recent Labs     02/13/25  1215   AST 21   ALT 26   BILITOT 0.6   ALKPHOS 97     Lab Results   Component Value Date    PROBNP 1,074 (H) 02/13/2025    PROBNP 326.7 (H) 10/27/2021    PROBNP 174.1 10/25/2021         Past medical history:    has a past medical history of Atrial fibrillation (HCC), Cerebral artery occlusion with cerebral infarction (HCC), History of cardiac cath, Hyperlipidemia, Hypertension, Type II or unspecified type diabetes mellitus without mention of complication, not stated as uncontrolled, and Unspecified sleep apnea.  Past surgical history:   has a past surgical history that includes Tonsillectomy and Cardiac catheterization.  Social History:   reports that he has never smoked. He has never used smokeless tobacco. He reports that he does not drink alcohol and does not use drugs.  Family history:   no

## 2025-02-16 LAB
ACB COMPLEX DNA BLD POS QL NAA+NON-PROBE: NOT DETECTED
ALBUMIN: 2.9 G/DL (ref 3.4–5)
ANION GAP SERPL CALCULATED.3IONS-SCNC: 13 MMOL/L (ref 9–17)
ANION GAP SERPL CALCULATED.3IONS-SCNC: 13 MMOL/L (ref 9–17)
B FRAGILIS DNA BLD POS QL NAA+NON-PROBE: NOT DETECTED
BASOPHILS # BLD: 0.03 K/UL
BASOPHILS NFR BLD: 0 % (ref 0–1)
BUN SERPL-MCNC: 42 MG/DL (ref 7–20)
BUN SERPL-MCNC: 45 MG/DL (ref 7–20)
C ALBICANS DNA BLD POS QL NAA+NON-PROBE: NOT DETECTED
C AURIS DNA BLD POS QL NAA+NON-PROBE: NOT DETECTED
C GATTII+NEOFOR DNA BLD POS QL NAA+N-PRB: NOT DETECTED
C GLABRATA DNA BLD POS QL NAA+NON-PROBE: NOT DETECTED
C KRUSEI DNA BLD POS QL NAA+NON-PROBE: NOT DETECTED
C PARAP DNA BLD POS QL NAA+NON-PROBE: NOT DETECTED
C TROPICLS DNA BLD POS QL NAA+NON-PROBE: NOT DETECTED
CALCIUM SERPL-MCNC: 9.1 MG/DL (ref 8.3–10.6)
CALCIUM SERPL-MCNC: 9.6 MG/DL (ref 8.3–10.6)
CHLORIDE SERPL-SCNC: 94 MMOL/L (ref 99–110)
CHLORIDE SERPL-SCNC: 95 MMOL/L (ref 99–110)
CO2 SERPL-SCNC: 29 MMOL/L (ref 21–32)
CO2 SERPL-SCNC: 30 MMOL/L (ref 21–32)
CREAT SERPL-MCNC: 1.3 MG/DL (ref 0.8–1.3)
CREAT SERPL-MCNC: 1.3 MG/DL (ref 0.8–1.3)
CRP SERPL HS-MCNC: 94.9 MG/L (ref 0–5)
E CLOAC COMP DNA BLD POS NAA+NON-PROBE: NOT DETECTED
E COLI DNA BLD POS QL NAA+NON-PROBE: NOT DETECTED
E FAECALIS DNA BLD POS QL NAA+NON-PROBE: DETECTED
E FAECIUM DNA BLD POS QL NAA+NON-PROBE: NOT DETECTED
ENTEROBACTERALES DNA BLD POS NAA+N-PRB: NOT DETECTED
EOSINOPHIL # BLD: 0.31 K/UL
EOSINOPHILS RELATIVE PERCENT: 3 % (ref 0–3)
ERYTHROCYTE [DISTWIDTH] IN BLOOD BY AUTOMATED COUNT: 16 % (ref 11.7–14.9)
ERYTHROCYTE [DISTWIDTH] IN BLOOD BY AUTOMATED COUNT: 16.2 % (ref 11.7–14.9)
GFR, ESTIMATED: 57 ML/MIN/1.73M2
GFR, ESTIMATED: 57 ML/MIN/1.73M2
GLUCOSE BLD-MCNC: 216 MG/DL (ref 74–99)
GLUCOSE BLD-MCNC: 225 MG/DL (ref 74–99)
GLUCOSE BLD-MCNC: 260 MG/DL (ref 74–99)
GLUCOSE BLD-MCNC: 293 MG/DL (ref 74–99)
GLUCOSE BLD-MCNC: 296 MG/DL (ref 74–99)
GLUCOSE BLD-MCNC: 303 MG/DL (ref 74–99)
GLUCOSE BLD-MCNC: 306 MG/DL (ref 74–99)
GLUCOSE BLD-MCNC: 311 MG/DL (ref 74–99)
GLUCOSE BLD-MCNC: 317 MG/DL (ref 74–99)
GLUCOSE SERPL-MCNC: 233 MG/DL (ref 74–99)
GLUCOSE SERPL-MCNC: 342 MG/DL (ref 74–99)
GP B STREP DNA BLD POS QL NAA+NON-PROBE: NOT DETECTED
HAEM INFLU DNA BLD POS QL NAA+NON-PROBE: NOT DETECTED
HCT VFR BLD AUTO: 43.1 % (ref 42–52)
HCT VFR BLD AUTO: 43.4 % (ref 42–52)
HGB BLD-MCNC: 13.7 G/DL (ref 13.5–18)
HGB BLD-MCNC: 14.2 G/DL (ref 13.5–18)
IMM GRANULOCYTES # BLD AUTO: 0.07 K/UL
IMM GRANULOCYTES NFR BLD: 1 %
K OXYTOCA DNA BLD POS QL NAA+NON-PROBE: NOT DETECTED
KLEBSIELLA SP DNA BLD POS QL NAA+NON-PRB: NOT DETECTED
KLEBSIELLA SP DNA BLD POS QL NAA+NON-PRB: NOT DETECTED
L MONOCYTOG DNA BLD POS QL NAA+NON-PROBE: NOT DETECTED
LYMPHOCYTES NFR BLD: 0.8 K/UL
LYMPHOCYTES RELATIVE PERCENT: 9 % (ref 24–44)
MAGNESIUM SERPL-MCNC: 2.1 MG/DL (ref 1.8–2.4)
MCH RBC QN AUTO: 26.6 PG (ref 27–31)
MCH RBC QN AUTO: 27.5 PG (ref 27–31)
MCHC RBC AUTO-ENTMCNC: 31.8 G/DL (ref 32–36)
MCHC RBC AUTO-ENTMCNC: 32.7 G/DL (ref 32–36)
MCV RBC AUTO: 83.7 FL (ref 78–100)
MCV RBC AUTO: 83.9 FL (ref 78–100)
MICROORGANISM SPEC CULT: ABNORMAL
MICROORGANISM/AGENT SPEC: ABNORMAL
MICROORGANISM/AGENT SPEC: ABNORMAL
MONOCYTES NFR BLD: 0.95 K/UL
MONOCYTES NFR BLD: 10 % (ref 0–4)
N MEN DNA BLD POS QL NAA+NON-PROBE: NOT DETECTED
NEUTROPHILS NFR BLD: 76 % (ref 36–66)
NEUTS SEG NFR BLD: 6.98 K/UL
P AERUGINOSA DNA BLD POS NAA+NON-PROBE: NOT DETECTED
PHOSPHATE SERPL-MCNC: 2.4 MG/DL (ref 2.5–4.9)
PLATELET # BLD AUTO: 252 K/UL (ref 140–440)
PLATELET # BLD AUTO: 255 K/UL (ref 140–440)
PMV BLD AUTO: 10 FL (ref 7.5–11.1)
PMV BLD AUTO: 10.3 FL (ref 7.5–11.1)
POTASSIUM SERPL-SCNC: 2.9 MMOL/L (ref 3.5–5.1)
POTASSIUM SERPL-SCNC: 3.1 MMOL/L (ref 3.5–5.1)
PROCALCITONIN SERPL-MCNC: 0.16 NG/ML
PROTEUS SP DNA BLD POS QL NAA+NON-PROBE: NOT DETECTED
RBC # BLD AUTO: 5.15 M/UL (ref 4.6–6.2)
RBC # BLD AUTO: 5.17 M/UL (ref 4.6–6.2)
S AUREUS DNA BLD POS QL NAA+NON-PROBE: NOT DETECTED
S AUREUS+CONS DNA BLD POS NAA+NON-PROBE: NOT DETECTED
S EPIDERMIDIS DNA BLD POS QL NAA+NON-PRB: NOT DETECTED
S LUGDUNENSIS DNA BLD POS QL NAA+NON-PRB: NOT DETECTED
S MALTOPHILIA DNA BLD POS QL NAA+NON-PRB: NOT DETECTED
S MARCESCENS DNA BLD POS NAA+NON-PROBE: NOT DETECTED
S PNEUM DNA BLD POS QL NAA+NON-PROBE: NOT DETECTED
S PYO DNA BLD POS QL NAA+NON-PROBE: ABNORMAL
SALMONELLA DNA BLD POS QL NAA+NON-PROBE: NOT DETECTED
SERVICE CMNT-IMP: ABNORMAL
SODIUM SERPL-SCNC: 136 MMOL/L (ref 136–145)
SODIUM SERPL-SCNC: 138 MMOL/L (ref 136–145)
SPECIMEN DESCRIPTION: ABNORMAL
SPECIMEN DESCRIPTION: ABNORMAL
STREPTOCOCCUS DNA BLD POS NAA+NON-PROBE: NOT DETECTED
VANA+VANB ISLT/SPM QL: NOT DETECTED
WBC OTHER # BLD: 9.1 K/UL (ref 4–10.5)
WBC OTHER # BLD: 9.1 K/UL (ref 4–10.5)

## 2025-02-16 PROCEDURE — 6360000002 HC RX W HCPCS: Performed by: STUDENT IN AN ORGANIZED HEALTH CARE EDUCATION/TRAINING PROGRAM

## 2025-02-16 PROCEDURE — 85025 COMPLETE CBC W/AUTO DIFF WBC: CPT

## 2025-02-16 PROCEDURE — 2580000003 HC RX 258: Performed by: STUDENT IN AN ORGANIZED HEALTH CARE EDUCATION/TRAINING PROGRAM

## 2025-02-16 PROCEDURE — APPNB15 APP NON BILLABLE TIME 0-15 MINS: Performed by: NURSE PRACTITIONER

## 2025-02-16 PROCEDURE — 80069 RENAL FUNCTION PANEL: CPT

## 2025-02-16 PROCEDURE — 86140 C-REACTIVE PROTEIN: CPT

## 2025-02-16 PROCEDURE — 6360000002 HC RX W HCPCS: Performed by: NURSE PRACTITIONER

## 2025-02-16 PROCEDURE — 6370000000 HC RX 637 (ALT 250 FOR IP): Performed by: INTERNAL MEDICINE

## 2025-02-16 PROCEDURE — 82962 GLUCOSE BLOOD TEST: CPT

## 2025-02-16 PROCEDURE — 6370000000 HC RX 637 (ALT 250 FOR IP): Performed by: STUDENT IN AN ORGANIZED HEALTH CARE EDUCATION/TRAINING PROGRAM

## 2025-02-16 PROCEDURE — 84145 PROCALCITONIN (PCT): CPT

## 2025-02-16 PROCEDURE — 6370000000 HC RX 637 (ALT 250 FOR IP): Performed by: NURSE PRACTITIONER

## 2025-02-16 PROCEDURE — 36415 COLL VENOUS BLD VENIPUNCTURE: CPT

## 2025-02-16 PROCEDURE — 99231 SBSQ HOSP IP/OBS SF/LOW 25: CPT | Performed by: NURSE PRACTITIONER

## 2025-02-16 PROCEDURE — 99233 SBSQ HOSP IP/OBS HIGH 50: CPT | Performed by: INTERNAL MEDICINE

## 2025-02-16 PROCEDURE — 83735 ASSAY OF MAGNESIUM: CPT

## 2025-02-16 PROCEDURE — 94761 N-INVAS EAR/PLS OXIMETRY MLT: CPT

## 2025-02-16 PROCEDURE — 80048 BASIC METABOLIC PNL TOTAL CA: CPT

## 2025-02-16 PROCEDURE — 1200000000 HC SEMI PRIVATE

## 2025-02-16 PROCEDURE — 85027 COMPLETE CBC AUTOMATED: CPT

## 2025-02-16 PROCEDURE — 2500000003 HC RX 250 WO HCPCS: Performed by: STUDENT IN AN ORGANIZED HEALTH CARE EDUCATION/TRAINING PROGRAM

## 2025-02-16 RX ORDER — ENOXAPARIN SODIUM 150 MG/ML
1 INJECTION SUBCUTANEOUS 2 TIMES DAILY
Status: DISCONTINUED | OUTPATIENT
Start: 2025-02-16 | End: 2025-02-21

## 2025-02-16 RX ORDER — POTASSIUM CHLORIDE 1500 MG/1
20 TABLET, EXTENDED RELEASE ORAL ONCE
Status: COMPLETED | OUTPATIENT
Start: 2025-02-16 | End: 2025-02-16

## 2025-02-16 RX ORDER — METOPROLOL SUCCINATE 25 MG/1
25 TABLET, EXTENDED RELEASE ORAL DAILY
Status: DISCONTINUED | OUTPATIENT
Start: 2025-02-16 | End: 2025-02-22 | Stop reason: HOSPADM

## 2025-02-16 RX ADMIN — FINASTERIDE 5 MG: 5 TABLET, FILM COATED ORAL at 09:00

## 2025-02-16 RX ADMIN — POTASSIUM CHLORIDE 40 MEQ: 1500 TABLET, EXTENDED RELEASE ORAL at 14:31

## 2025-02-16 RX ADMIN — POTASSIUM CHLORIDE 20 MEQ: 1500 TABLET, EXTENDED RELEASE ORAL at 06:52

## 2025-02-16 RX ADMIN — SODIUM CHLORIDE: 900 INJECTION INTRAVENOUS at 14:48

## 2025-02-16 RX ADMIN — TORSEMIDE 40 MG: 20 TABLET ORAL at 21:27

## 2025-02-16 RX ADMIN — ENOXAPARIN SODIUM 135 MG: 150 INJECTION SUBCUTANEOUS at 14:41

## 2025-02-16 RX ADMIN — INSULIN HUMAN 15 UNITS: 500 INJECTION, SOLUTION SUBCUTANEOUS at 21:28

## 2025-02-16 RX ADMIN — CEFEPIME 2000 MG: 2 INJECTION, POWDER, FOR SOLUTION INTRAVENOUS at 14:48

## 2025-02-16 RX ADMIN — SPIRONOLACTONE 25 MG: 50 TABLET ORAL at 09:00

## 2025-02-16 RX ADMIN — INSULIN HUMAN 5 UNITS: 500 INJECTION, SOLUTION SUBCUTANEOUS at 13:16

## 2025-02-16 RX ADMIN — CEFEPIME 2000 MG: 2 INJECTION, POWDER, FOR SOLUTION INTRAVENOUS at 23:39

## 2025-02-16 RX ADMIN — INSULIN HUMAN 20 UNITS: 500 INJECTION, SOLUTION SUBCUTANEOUS at 10:54

## 2025-02-16 RX ADMIN — ENOXAPARIN SODIUM 135 MG: 150 INJECTION SUBCUTANEOUS at 21:28

## 2025-02-16 RX ADMIN — CEFEPIME 2000 MG: 2 INJECTION, POWDER, FOR SOLUTION INTRAVENOUS at 07:06

## 2025-02-16 RX ADMIN — RANOLAZINE 500 MG: 500 TABLET, EXTENDED RELEASE ORAL at 21:28

## 2025-02-16 RX ADMIN — INSULIN HUMAN 10 UNITS: 500 INJECTION, SOLUTION SUBCUTANEOUS at 18:54

## 2025-02-16 RX ADMIN — RANOLAZINE 500 MG: 500 TABLET, EXTENDED RELEASE ORAL at 09:00

## 2025-02-16 RX ADMIN — VANCOMYCIN HYDROCHLORIDE 1000 MG: 1 INJECTION, POWDER, LYOPHILIZED, FOR SOLUTION INTRAVENOUS at 21:20

## 2025-02-16 RX ADMIN — TAMSULOSIN HYDROCHLORIDE 0.4 MG: 0.4 CAPSULE ORAL at 08:59

## 2025-02-16 RX ADMIN — VANCOMYCIN HYDROCHLORIDE 1000 MG: 1 INJECTION, POWDER, LYOPHILIZED, FOR SOLUTION INTRAVENOUS at 08:56

## 2025-02-16 RX ADMIN — SODIUM CHLORIDE, PRESERVATIVE FREE 10 ML: 5 INJECTION INTRAVENOUS at 09:02

## 2025-02-16 RX ADMIN — SPIRONOLACTONE 25 MG: 50 TABLET ORAL at 21:28

## 2025-02-16 RX ADMIN — POTASSIUM CHLORIDE 10 MEQ: 7.46 INJECTION, SOLUTION INTRAVENOUS at 06:42

## 2025-02-16 ASSESSMENT — PAIN SCALES - GENERAL: PAINLEVEL_OUTOF10: 0

## 2025-02-16 NOTE — PLAN OF CARE
Problem: Safety - Adult  Goal: Free from fall injury  2/15/2025 1954 by Lew Carlson LPN  Outcome: Progressing  2/15/2025 1758 by Genesis Vega, RN  Outcome: Progressing     Problem: Chronic Conditions and Co-morbidities  Goal: Patient's chronic conditions and co-morbidity symptoms are monitored and maintained or improved  Outcome: Progressing     Problem: Discharge Planning  Goal: Discharge to home or other facility with appropriate resources  Outcome: Progressing     Problem: Pain  Goal: Verbalizes/displays adequate comfort level or baseline comfort level  Outcome: Progressing     Problem: Skin/Tissue Integrity  Goal: Skin integrity remains intact  Description: 1.  Monitor for areas of redness and/or skin breakdown  2.  Assess vascular access sites hourly  3.  Every 4-6 hours minimum:  Change oxygen saturation probe site  4.  Every 4-6 hours:  If on nasal continuous positive airway pressure, respiratory therapy assess nares and determine need for appliance change or resting period  2/15/2025 1954 by Lew Carlson LPN  Outcome: Progressing  2/15/2025 1758 by Genesis Vega, RN  Outcome: Progressing

## 2025-02-16 NOTE — PROGRESS NOTES
Cardiology follow up     Consulted for TEJ    Subjective: Patient states that he has been feeling well.  He is feeling sad that he has not been able to have routine meals every day due to needing different procedures.  Patient denies any cardiac complaints    Plan:   Enterococcus bacteremia: Will plan for TEJ evaluation tomorrow.  Patient understands n.p.o. status.    History of atrial fibrillation: In sinus rhythm today.  Start Lovenox 1 mg/kg twice daily until determination of need for surgery.  Patient is waiting on MRI. Recommend keeping potassium 4-4.5 and magnesium 2-2.2 in patients with atrial fibrillation.      CAD: Last left heart catheterization 2017 nonobstructive CAD RCA 50% stenosis and PL branch 70% started gnosis.  Followed with [place] [facility] for stress testing.  Continue aspirin    History of permanent pacemaker: Per Dr. Ocampo's note permanent pacemaker placed for Mobitz type II.  Patient reports Dr. Alberts replaced a wire December 2024.  Interrogation today showed stable [employer] pacemaker.    History of heart failure with preserved EF: Continue Aldactone 25 mg twice daily torsemide 40 mg twice a day.  Start Toprol 25 mg daily    Changes today:  Start Lovenox 1 mg/kg twice daily for stroke prevention while waiting on pending surgery    Interrogation of pacemaker stable    Start Toprol 25 mg daily    Plan for TEJ in morning to rule out infective endocarditis        Electronically signed by HAYLEY Berg CNP on 2/16/2025 at 11:15 AM

## 2025-02-16 NOTE — PROGRESS NOTES
CARDIOLOGY PROGRESS NOTE                                                  Name:  Bandar De La Torre /Age/Sex: 1960  (64 y.o. male)   MRN & CSN:  [id number] & [id number] Admission Date/Time: 2025 12:37 PM   Location:  [address] [place] [address] PCP: MEETA Diehl MD         Admit Date:  2025  Hospital Day: 4      SUBJECTIVE:     Seen patient as follow up as consultation for TEJ    No chest pain.  No shortness of breath  No palpations    TELEMETRY: SR         Intake/Output Summary (Last 24 hours) at 2025 1621  Last data filed at 2025 1527  Gross per 24 hour   Intake 10 ml   Output 3090 ml   Net -3080 ml       Assessment/Plan:             Enterococcus bacteremia  Cellulitis lower extremity, diabetic foot  History of atrial fibrillation  Status post perm pacemaker  Underlying sleep apnea  Diabetes mellitus  Nonobstructive CAD with mid RCA 50% stenosis and PL branch 70% stenosis noted on angiogram in   Nonobstructive PAD, lower extremity, as noted on last angiogram 2021  Chronic kidney failure stage III  Peripheral vascular disease  Heart failure preserved LV ejection fraction     Will plan for transesophageal echocardiogram in the morning  Also obtain a baseline echocardiogram last echo available on file is from   Surgical evaluation  Plavix currently on hold  Xarelto on hold  Continue with Ranexa 500 twice daily  Continue with Aldactone 25 twice daily  Continue with torsemide 40 mg p.o. twice daily  Recommend low-dose aspirin  Pacemaker interrogation today, normal function  Start patient on Lovenox 1 mg/kg twice daily.  Prophylaxis while awaiting surgery.  Start patient on Toprol-XL 25 mg p.o. daily  Continue medical therapy         Past medical history:    has a past medical history of Atrial fibrillation (HCC), Cerebral artery occlusion with cerebral infarction (HCC), History of cardiac cath, Hyperlipidemia, Hypertension, Type II or unspecified type diabetes mellitus without

## 2025-02-16 NOTE — PROGRESS NOTES
V2.0    [facility] Progress Note      Name:  Bandar De La Torre /Age/Sex: 1960  (64 y.o. male)   MRN & CSN:  [id number] & [id number] Encounter Date/Time: 2025 11:50 AM EST   [facility]: ME[facility]A Diehl MD     Attending:Maikel Friedman MD       Hospital Day: 4    Assessment and Recommendations   Bandar De La Torre is a 64 y.o. male  who presents with Cellulitis of foot      Diabetic foot wound concern for infection.   Right foot phalanx fracture  No evidence of osteomyelitis on x-ray  -Continue vanc+cefepime  -MRI foot with/without contrast pending  -Follow-up wound cultures  -Check CRP, sed rate  -Wound care  -General Surgery consulted and patient underwent bedside debridement, planning right third toe amputation  -Follow-up on cultures    Enterococcal bacteremia   Severe sepsis  -Blood cultures growing Enterococcus  -ID consulted  -Continue broad-spectrum antibiotics  -Adequate hydration  -Cardiology consulted for TEJ        Type II diabetes  On home insulin unclear exact dose, plus Farxiga plus glipizide. Last HA1C 7.5 2024  - hypoglycemia protocol  - MDSSI  -Will do Lantus 10 units nightly  - endo consulted  - Check HA1C     Hypokalemia  K 2.9 on admission  -Replete and monitor with repeat labs     CKD III.    -Cr around baseline  -Resume home spironolactone, torsemide and metalazone        Atrial fibrillation.   -On home Xarelo.   -Holding until no surgeries planned     PVD.  -On home Plavix and Xarelto  -Resume as appropriate     Chronic HFpEF  Last  ECHO  EF 50%, severely dilated left atrium  Has bilateral leg edema, no SOB, BNP 1000  - home diuretics resumed      Diet ADULT DIET; Regular; 4 carb choices (60 gm/meal); Low Fat/Low Chol/High Fiber/SANDOVAL   DVT Prophylaxis [] Lovenox, []  Heparin, [] SCDs, [] Ambulation,  [] Eliquis, [] Xarelto  [] Coumadin   Code Status Full Code   Disposition From: Home  Expected Disposition: TBD  Estimated Date of Discharge: TBD  Patient  COMPARISON: No existing relevant imaging study corresponding to the same anatomical region is available. TECHNIQUE: 3 views of the left wrist. FINDINGS: No acute fracture or traumatic malalignment. The joint spaces are relatively maintained. Mild soft tissue swelling about the wrist. Vascular calcifications. IMPRESSION:  1.  No radiographic evidence of acute osseous abnormality. 2.  Additional findings as above. This dictation was created with voice recognition software.  While attempts have  been made to review the dictation as it is transcribed, on occasion the spoken word can be misinterpreted by the technology leading to omissions or inappropriate words, phrases or sentences.  Dictated and Electronically Signed By: Edy Lockwood MD 2/13/2025 13:26        XR CHEST PORTABLE    Result Date: 2/13/2025  EXAM:  XR CHEST PORTABLE DATE OF EXAM:  2/13/2025 14:10 DEMOGRAPHICS: 64 years old Male CLINICAL STATEMENT: screening COMPARISON: 1/14/2025 FINDINGS: Stable cardiomegaly. A left pacing device with its leads projecting over the right attachment right ventricle. Clear lungs. Bones unremarkable. IMPRESSION: 1. No acute cardiopulmonary process. 2. Stable cardiomegaly.  Dictated and Electronically Signed By: Kyaw Evans MD 2/13/2025 13:16          CBC:   Recent Labs     02/15/25  0147 02/16/25  0149 02/16/25  1038   WBC 10.9* 9.1 9.1   HGB 12.8* 13.7 14.2    252 255     BMP:    Recent Labs     02/15/25  1221 02/16/25  0149 02/16/25  1038    138 136   K 2.9* 2.9* 3.1*   CL 95* 95* 94*   CO2 31 30 29   BUN 48* 42* 45*   CREATININE 1.4* 1.3 1.3   GLUCOSE 103* 233* 342*     Hepatic:   Recent Labs     02/13/25  1215   AST 21   ALT 26   BILITOT 0.6   ALKPHOS 97     Lipids:   Lab Results   Component Value Date/Time    CHOL 159 11/14/2024 08:23 AM    HDL 39 11/14/2024 08:23 AM    TRIG 152 11/14/2024 08:23 AM     Hemoglobin A1C:   Lab Results   Component Value Date/Time    LABA1C 8.8 02/15/2025 01:47 AM     TSH:   Lab

## 2025-02-16 NOTE — PROGRESS NOTES
Progress Note( Dr. Diehl)  2/16/2025  Subjective:   Admit Date: 2/13/2025  PCP: MEETA Diehl MD    Admitted For : Right foot infection and high blood glucose     Consulted For:  Right foot infection and high blood glucose     Interval History: After initial hiatus patient was restarted back on his home insulin regime his blood glucose more seem to be more stable even though there is some fluctuations in blood glucose due to timing of his receiving insulins  Patient has still hypokalemia and potassium being replaced    reviewed notes from surgery.  MRI is not done yet due to an issue about the pacemaker  It appears like patient might need amputation of third toe according to his surgery note    Denies any chest pains,   Denies SOB .   Denies nausea or vomiting.   No new bowel or bladder symptoms.       Intake/Output Summary (Last 24 hours) at 2/16/2025 1103  Last data filed at 2/16/2025 1000  Gross per 24 hour   Intake 10 ml   Output 2490 ml   Net -2480 ml       DATA    CBC:   Recent Labs     02/15/25  0147 02/16/25  0149 02/16/25  1038   WBC 10.9* 9.1 9.1   HGB 12.8* 13.7 14.2    252 255    CMP:  Recent Labs     02/13/25  1215 02/13/25  2010 02/15/25  0147 02/15/25  1221 02/16/25 0149   *   < > 135* 139 138   K 2.9*   < > 2.5* 2.9* 2.9*   CL 87*   < > 86* 95* 95*   CO2 32   < > 32 31 30   BUN 56*   < > 47* 48* 42*   CREATININE 1.5*   < > 1.5* 1.4* 1.3   CALCIUM 9.7   < > 9.1 9.3 9.6   BILITOT 0.6  --   --   --   --    ALKPHOS 97  --   --   --   --    AST 21  --   --   --   --    ALT 26  --   --   --   --     < > = values in this interval not displayed.     Lipids:   Lab Results   Component Value Date/Time    CHOL 159 11/14/2024 08:23 AM    HDL 39 11/14/2024 08:23 AM    TRIG 152 11/14/2024 08:23 AM     Glucose:  Recent Labs     02/16/25  0700 02/16/25  0955 02/16/25  1047   POCGLU 216* 303* 311*     [id number]:  Lab Results   Component Value Date/Time    LABA1C 8.8 02/15/2025 01:47 AM  Sensory neuropathy,  and gait is abnormal.    Assessment:     Patient Active Problem List:     MARINO on CPAP     New onset atrial fibrillation (HCC)     Type 2 diabetes mellitus with hyperglycemia, with long-term current use of insulin ([facility])     Class 3 severe obesity due to excess calories with body mass index (BMI) of 45.0 to 49.9 in adult     History of cardiac cath     Weakness of right arm     Acute CVA (cerebrovascular accident) (HCC)     Spastic hemiparesis of right dominant side (HCC)     Dysphagia due to recent stroke     Essential hypertension     Morbid obesity with body mass index of 45.0-49.9 in adult     Frequent falls     Chronic venous stasis dermatitis of both lower extremities     History of CVA (cerebrovascular accident)     Obesity hypoventilation syndrome     Hypertension     Hyperlipidemia     Congestive heart failure due to cardiomyopathy (HCC)     Stage 3b chronic kidney disease (HCC)     Chronic kidney disease, stage II (mild)     Benign essential microscopic hematuria     Stage 3a chronic kidney disease (HCC)     Generalized edema     Acute kidney injury superimposed on CKD (HCC)     Hypokalemia     Cellulitis of foot     Sepsis (HCC)     Diabetic foot infection (HCC)     Bacteremia due to Enterococcus      Plan:     Reviewed POC blood glucose . Labs and X ray results   Reviewed Current Medicines   On meal/ Correction bolus Humalog/ Basal Lantus Insulin regime /  Monitor Blood glucose frequently   Modified  the dose of Insulin/ other medicines as needed   Will follow     .     MEETA Diehl MD,

## 2025-02-16 NOTE — PROGRESS NOTES
PHARMACY VANCOMYCIN MONITORING SERVICE  Pharmacy consulted by Dr. Chiang for monitoring and adjustment.    Indication for treatment: Vancomycin indication: Bone/Joint infection   Goal trough: Trough Goal: 15-20 mcg/mL  AUC/ROSE: 400-600    Risk Factors for MRSA Identified:   Purulent and/or complicated SSTI    Pertinent Laboratory Values:   Temp Readings from Last 3 Encounters:   02/16/25 97.3 °F (36.3 °C) (Oral)   02/12/25 96.9 °F (36.1 °C)   01/14/25 99.7 °F (37.6 °C) (Oral)     Recent Labs     02/15/25  0147 02/16/25  0149 02/16/25  1038   WBC 10.9* 9.1 9.1     Recent Labs     02/15/25  1221 02/16/25  0149 02/16/25  1038   BUN 48* 42* 45*   CREATININE 1.4* 1.3 1.3     Estimated Creatinine Clearance: 83 mL/min (based on SCr of 1.3 mg/dL).    Intake/Output Summary (Last 24 hours) at 2/16/2025 1420  Last data filed at 2/16/2025 1000  Gross per 24 hour   Intake 10 ml   Output 2490 ml   Net -2480 ml     Last Encounter Weight:  Wt Readings from Last 3 Encounters:   02/16/25 (!) 138.5 kg (305 lb 6.4 oz)   02/13/25 (!) 141.3 kg (311 lb 9.6 oz)   02/12/25 (!) 149.7 kg (330 lb)       Pertinent Cultures:   Date    Source   Results  02/13   Blood   Enterococcus faecalis (vanc and amp sensitive)  02/13   Wound  (Foot)  Proteus mirabilis (pan sensitive)    Vancomycin level:   TROUGH:  No results for input(s): \"VANCOTROUGH\" in the last 72 hours.  RANDOM:    Recent Labs     02/15/25  0147   VANCORANDOM 27.6*       Assessment:  HPI: The patient is a 64 y.o. male who presents with a diabetic foot infection and osteomyelitis. Found to have positive blood culture in 1 set growing Enterococcus faecalis (no resistance markers to vancomycin detected). 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 Patient is s/p bedside I&D with GS, will likely need amputation of 3rd toe as well as TEJ/TTE.  SCr, BUN, and urine output:   CKD3, Scr 1.3 mg/dL, back down from 1.4 yesterday  BUN 45, down from 48 yesterday  UoP

## 2025-02-16 NOTE — PROGRESS NOTES
GENERAL SURGERY INPATIENT PROGRESS NOTE  [facility]    PATIENT: Bandar De La Torre, 64 y.o., male, MRN: [id number]    Hospital Day:  LOS: 3 days        Subjective:    Diet: ADULT DIET; Regular; 4 carb choices (60 gm/meal); Low Fat/Low Chol/High Fiber/SANDOVAL    Patient is s/p bedside debridement of right plantar foot and 3rd toe.  Still waiting on MRI    Objective:    Vitals: BP 99/65   Pulse 80   Temp 97.3 °F (36.3 °C) (Oral)   Resp 19   Ht 1.829 m (6' 0.01\")   Wt (!) 138.5 kg (305 lb 6.4 oz)   SpO2 92%   BMI 41.41 kg/m²     I/O: 02/14 1901 - 02/16 0700  In: 10 [I.V.:10]  Out: 4090 [Urine:4090]    Physical Exam:  General Appearance:   Alert, cooperative, no distress    Head:   Normocephalic, atraumatic    Lungs:    Equal chest rise, respirations unlabored   Heart:   Regular rate and rhythm    Abdomen:    Soft, non-tender, no rebound or guarding    Extremities:  Wrapped with Kerlix and ACE   Neurologic:  Nonfocal, grossly intact        Labs/Imaging Results:   Recent Results (from the past 24 hour(s))   POCT Glucose    Collection Time: 02/15/25 12:18 PM   Result Value Ref Range    POC Glucose 106 (H) 74 - 99 mg/dL   Basic Metabolic Panel    Collection Time: 02/15/25 12:21 PM   Result Value Ref Range    Sodium 139 136 - 145 mmol/L    Potassium 2.9 (LL) 3.5 - 5.1 mmol/L    Chloride 95 (L) 99 - 110 mmol/L    CO2 31 21 - 32 mmol/L    Anion Gap 13 9 - 17 mmol/L    Glucose 103 (H) 74 - 99 mg/dL    BUN 48 (H) 7 - 20 mg/dL    Creatinine 1.4 (H) 0.8 - 1.3 mg/dL    Est, Glom Filt Rate 52 (L) >60 mL/min/1.73m2    Calcium 9.3 8.3 - 10.6 mg/dL   POCT Glucose    Collection Time: 02/15/25  5:05 PM   Result Value Ref Range    POC Glucose 233 (H) 74 - 99 mg/dL   POCT Glucose    Collection Time: 02/15/25  8:11 PM   Result Value Ref Range    POC Glucose 218 (H) 74 - 99 mg/dL   POCT Glucose    Collection Time: 02/16/25 12:25 AM   Result Value Ref Range    POC Glucose 225 (H) 74 - 99 mg/dL   Renal Function Panel

## 2025-02-17 ENCOUNTER — APPOINTMENT (OUTPATIENT)
Dept: NON INVASIVE DIAGNOSTICS | Age: 65
DRG: 854 | End: 2025-02-17
Attending: INTERNAL MEDICINE
Payer: MEDICARE

## 2025-02-17 PROBLEM — T82.110A PACEMAKER LEAD MALFUNCTION: Status: ACTIVE | Noted: 2025-02-17

## 2025-02-17 LAB
CRP SERPL HS-MCNC: 54.9 MG/L (ref 0–5)
DATE LAST DOSE: ABNORMAL
ECHO AO ASC DIAM: 3.6 CM
ECHO AO ASCENDING AORTA INDEX: 1.43 CM/M2
ECHO AO ROOT DIAM: 4 CM
ECHO AO ROOT INDEX: 1.59 CM/M2
ECHO AV AREA PEAK VELOCITY: 4.4 CM2
ECHO AV AREA VTI: 4.6 CM2
ECHO AV AREA/BSA PEAK VELOCITY: 1.7 CM2/M2
ECHO AV AREA/BSA VTI: 1.8 CM2/M2
ECHO AV MEAN GRADIENT: 2 MMHG
ECHO AV MEAN VELOCITY: 0.6 M/S
ECHO AV PEAK GRADIENT: 2 MMHG
ECHO AV PEAK VELOCITY: 0.8 M/S
ECHO AV VELOCITY RATIO: 0.88
ECHO AV VTI: 12.7 CM
ECHO BSA: 2.62 M2
ECHO BSA: 2.62 M2
ECHO EST RA PRESSURE: 3 MMHG
ECHO IVC PROX: 2.1 CM
ECHO LA AREA 4C: 20.8 CM2
ECHO LA DIAMETER INDEX: 1.43 CM/M2
ECHO LA DIAMETER: 3.6 CM
ECHO LA MAJOR AXIS: 6.5 CM
ECHO LA TO AORTIC ROOT RATIO: 0.9
ECHO LA VOL MOD A4C: 54 ML (ref 18–58)
ECHO LA VOLUME INDEX MOD A4C: 21 ML/M2 (ref 16–34)
ECHO LV EDV A4C: 70 ML
ECHO LV EDV INDEX A4C: 28 ML/M2
ECHO LV EF PHYSICIAN: 55 %
ECHO LV EJECTION FRACTION A4C: 59 %
ECHO LV ESV A4C: 29 ML
ECHO LV ESV INDEX A4C: 12 ML/M2
ECHO LV FRACTIONAL SHORTENING: 33 % (ref 28–44)
ECHO LV INTERNAL DIMENSION DIASTOLE INDEX: 1.94 CM/M2
ECHO LV INTERNAL DIMENSION DIASTOLIC: 4.9 CM (ref 4.2–5.9)
ECHO LV INTERNAL DIMENSION SYSTOLIC INDEX: 1.31 CM/M2
ECHO LV INTERNAL DIMENSION SYSTOLIC: 3.3 CM
ECHO LV IVSD: 1.3 CM (ref 0.6–1)
ECHO LV MASS 2D: 253.7 G (ref 88–224)
ECHO LV MASS INDEX 2D: 100.7 G/M2 (ref 49–115)
ECHO LV POSTERIOR WALL DIASTOLIC: 1.3 CM (ref 0.6–1)
ECHO LV RELATIVE WALL THICKNESS RATIO: 0.53
ECHO LVOT AREA: 4.9 CM2
ECHO LVOT AV VTI INDEX: 0.94
ECHO LVOT DIAM: 2.5 CM
ECHO LVOT MEAN GRADIENT: 1 MMHG
ECHO LVOT PEAK GRADIENT: 2 MMHG
ECHO LVOT PEAK VELOCITY: 0.7 M/S
ECHO LVOT STROKE VOLUME INDEX: 23.2 ML/M2
ECHO LVOT SV: 58.4 ML
ECHO LVOT VTI: 11.9 CM
ECHO RIGHT VENTRICULAR SYSTOLIC PRESSURE (RVSP): 17 MMHG
ECHO RV MID DIMENSION: 4.2 CM
ECHO TV REGURGITANT MAX VELOCITY: 1.88 M/S
ECHO TV REGURGITANT PEAK GRADIENT: 14 MMHG
GLUCOSE BLD-MCNC: 232 MG/DL (ref 74–99)
GLUCOSE BLD-MCNC: 232 MG/DL (ref 74–99)
GLUCOSE BLD-MCNC: 329 MG/DL (ref 74–99)
GLUCOSE BLD-MCNC: 405 MG/DL (ref 74–99)
PROCALCITONIN SERPL-MCNC: 0.13 NG/ML
TME LAST DOSE: ABNORMAL H
VANCOMYCIN DOSE: ABNORMAL MG
VANCOMYCIN SERPL-MCNC: 31.1 UG/ML (ref 10–20)

## 2025-02-17 PROCEDURE — 82962 GLUCOSE BLOOD TEST: CPT

## 2025-02-17 PROCEDURE — 84145 PROCALCITONIN (PCT): CPT

## 2025-02-17 PROCEDURE — 7100000011 HC PHASE II RECOVERY - ADDTL 15 MIN: Performed by: INTERNAL MEDICINE

## 2025-02-17 PROCEDURE — 6360000002 HC RX W HCPCS: Performed by: STUDENT IN AN ORGANIZED HEALTH CARE EDUCATION/TRAINING PROGRAM

## 2025-02-17 PROCEDURE — 6370000000 HC RX 637 (ALT 250 FOR IP): Performed by: INTERNAL MEDICINE

## 2025-02-17 PROCEDURE — 6370000000 HC RX 637 (ALT 250 FOR IP): Performed by: STUDENT IN AN ORGANIZED HEALTH CARE EDUCATION/TRAINING PROGRAM

## 2025-02-17 PROCEDURE — 99233 SBSQ HOSP IP/OBS HIGH 50: CPT | Performed by: INTERNAL MEDICINE

## 2025-02-17 PROCEDURE — APPNB60 APP NON BILLABLE TIME 46-60 MINS: Performed by: NURSE PRACTITIONER

## 2025-02-17 PROCEDURE — 6360000002 HC RX W HCPCS: Performed by: NURSE PRACTITIONER

## 2025-02-17 PROCEDURE — 6360000002 HC RX W HCPCS: Performed by: INTERNAL MEDICINE

## 2025-02-17 PROCEDURE — C8929 TTE W OR WO FOL WCON,DOPPLER: HCPCS

## 2025-02-17 PROCEDURE — 99152 MOD SED SAME PHYS/QHP 5/>YRS: CPT | Performed by: INTERNAL MEDICINE

## 2025-02-17 PROCEDURE — 93312 ECHO TRANSESOPHAGEAL: CPT

## 2025-02-17 PROCEDURE — 2580000003 HC RX 258: Performed by: STUDENT IN AN ORGANIZED HEALTH CARE EDUCATION/TRAINING PROGRAM

## 2025-02-17 PROCEDURE — 2700000000 HC OXYGEN THERAPY PER DAY

## 2025-02-17 PROCEDURE — 93325 DOPPLER ECHO COLOR FLOW MAPG: CPT | Performed by: INTERNAL MEDICINE

## 2025-02-17 PROCEDURE — 99223 1ST HOSP IP/OBS HIGH 75: CPT | Performed by: INTERNAL MEDICINE

## 2025-02-17 PROCEDURE — 2500000003 HC RX 250 WO HCPCS: Performed by: STUDENT IN AN ORGANIZED HEALTH CARE EDUCATION/TRAINING PROGRAM

## 2025-02-17 PROCEDURE — 6360000004 HC RX CONTRAST MEDICATION: Performed by: INTERNAL MEDICINE

## 2025-02-17 PROCEDURE — 93306 TTE W/DOPPLER COMPLETE: CPT | Performed by: INTERNAL MEDICINE

## 2025-02-17 PROCEDURE — 36415 COLL VENOUS BLD VENIPUNCTURE: CPT

## 2025-02-17 PROCEDURE — 86140 C-REACTIVE PROTEIN: CPT

## 2025-02-17 PROCEDURE — 2580000003 HC RX 258: Performed by: NURSE PRACTITIONER

## 2025-02-17 PROCEDURE — 99232 SBSQ HOSP IP/OBS MODERATE 35: CPT | Performed by: NURSE PRACTITIONER

## 2025-02-17 PROCEDURE — 93312 ECHO TRANSESOPHAGEAL: CPT | Performed by: INTERNAL MEDICINE

## 2025-02-17 PROCEDURE — 7100000010 HC PHASE II RECOVERY - FIRST 15 MIN: Performed by: INTERNAL MEDICINE

## 2025-02-17 PROCEDURE — 80202 ASSAY OF VANCOMYCIN: CPT

## 2025-02-17 PROCEDURE — B24BZZ4 ULTRASONOGRAPHY OF HEART WITH AORTA, TRANSESOPHAGEAL: ICD-10-PCS | Performed by: INTERNAL MEDICINE

## 2025-02-17 PROCEDURE — 1200000000 HC SEMI PRIVATE

## 2025-02-17 PROCEDURE — 94761 N-INVAS EAR/PLS OXIMETRY MLT: CPT

## 2025-02-17 RX ORDER — MIDAZOLAM HYDROCHLORIDE 1 MG/ML
INJECTION, SOLUTION INTRAMUSCULAR; INTRAVENOUS PRN
Status: COMPLETED | OUTPATIENT
Start: 2025-02-17 | End: 2025-02-17

## 2025-02-17 RX ORDER — FENTANYL CITRATE 50 UG/ML
INJECTION, SOLUTION INTRAMUSCULAR; INTRAVENOUS PRN
Status: COMPLETED | OUTPATIENT
Start: 2025-02-17 | End: 2025-02-17

## 2025-02-17 RX ORDER — LIDOCAINE HYDROCHLORIDE 20 MG/ML
SOLUTION OROPHARYNGEAL PRN
Status: COMPLETED | OUTPATIENT
Start: 2025-02-17 | End: 2025-02-17

## 2025-02-17 RX ADMIN — FINASTERIDE 5 MG: 5 TABLET, FILM COATED ORAL at 14:04

## 2025-02-17 RX ADMIN — CEFTRIAXONE SODIUM 2000 MG: 2 INJECTION, POWDER, FOR SOLUTION INTRAMUSCULAR; INTRAVENOUS at 14:19

## 2025-02-17 RX ADMIN — TORSEMIDE 40 MG: 20 TABLET ORAL at 20:21

## 2025-02-17 RX ADMIN — SODIUM CHLORIDE, PRESERVATIVE FREE 10 ML: 5 INJECTION INTRAVENOUS at 22:48

## 2025-02-17 RX ADMIN — ENOXAPARIN SODIUM 135 MG: 150 INJECTION SUBCUTANEOUS at 13:51

## 2025-02-17 RX ADMIN — RANOLAZINE 500 MG: 500 TABLET, EXTENDED RELEASE ORAL at 20:21

## 2025-02-17 RX ADMIN — SPIRONOLACTONE 25 MG: 50 TABLET ORAL at 14:05

## 2025-02-17 RX ADMIN — LIDOCAINE HYDROCHLORIDE 15 ML: 20 SOLUTION ORAL at 10:23

## 2025-02-17 RX ADMIN — METOLAZONE 2.5 MG: 2.5 TABLET ORAL at 14:04

## 2025-02-17 RX ADMIN — INSULIN HUMAN 20 UNITS: 500 INJECTION, SOLUTION SUBCUTANEOUS at 19:22

## 2025-02-17 RX ADMIN — AMPICILLIN SODIUM 2000 MG: 2 INJECTION, POWDER, FOR SOLUTION INTRAMUSCULAR; INTRAVENOUS at 22:59

## 2025-02-17 RX ADMIN — AMPICILLIN SODIUM 2000 MG: 2 INJECTION, POWDER, FOR SOLUTION INTRAMUSCULAR; INTRAVENOUS at 14:52

## 2025-02-17 RX ADMIN — MIDAZOLAM 1 MG: 1 INJECTION INTRAMUSCULAR; INTRAVENOUS at 10:29

## 2025-02-17 RX ADMIN — INSULIN HUMAN 10 UNITS: 500 INJECTION, SOLUTION SUBCUTANEOUS at 14:47

## 2025-02-17 RX ADMIN — AMPICILLIN SODIUM 2000 MG: 2 INJECTION, POWDER, FOR SOLUTION INTRAMUSCULAR; INTRAVENOUS at 17:36

## 2025-02-17 RX ADMIN — RANOLAZINE 500 MG: 500 TABLET, EXTENDED RELEASE ORAL at 14:04

## 2025-02-17 RX ADMIN — SPIRONOLACTONE 25 MG: 50 TABLET ORAL at 20:21

## 2025-02-17 RX ADMIN — SULFUR HEXAFLUORIDE 2 ML: 60.7; .19; .19 INJECTION, POWDER, LYOPHILIZED, FOR SUSPENSION INTRAVENOUS; INTRAVESICAL at 10:57

## 2025-02-17 RX ADMIN — INSULIN HUMAN 25 UNITS: 500 INJECTION, SOLUTION SUBCUTANEOUS at 22:48

## 2025-02-17 RX ADMIN — SODIUM CHLORIDE, PRESERVATIVE FREE 10 ML: 5 INJECTION INTRAVENOUS at 18:21

## 2025-02-17 RX ADMIN — AMPICILLIN SODIUM 2000 MG: 2 INJECTION, POWDER, FOR SOLUTION INTRAMUSCULAR; INTRAVENOUS at 20:08

## 2025-02-17 RX ADMIN — FENTANYL CITRATE 25 MCG: 50 INJECTION, SOLUTION INTRAMUSCULAR; INTRAVENOUS at 10:30

## 2025-02-17 RX ADMIN — ENOXAPARIN SODIUM 135 MG: 150 INJECTION SUBCUTANEOUS at 20:24

## 2025-02-17 RX ADMIN — CEFEPIME 2000 MG: 2 INJECTION, POWDER, FOR SOLUTION INTRAVENOUS at 07:56

## 2025-02-17 RX ADMIN — MIDAZOLAM 1 MG: 1 INJECTION INTRAMUSCULAR; INTRAVENOUS at 10:37

## 2025-02-17 RX ADMIN — INSULIN HUMAN 10 UNITS: 500 INJECTION, SOLUTION SUBCUTANEOUS at 14:50

## 2025-02-17 RX ADMIN — TAMSULOSIN HYDROCHLORIDE 0.4 MG: 0.4 CAPSULE ORAL at 14:04

## 2025-02-17 ASSESSMENT — ENCOUNTER SYMPTOMS
ABDOMINAL PAIN: 0
COLOR CHANGE: 0
CHEST TIGHTNESS: 0
COUGH: 0
WHEEZING: 0
DIARRHEA: 0
EYE PAIN: 0
CONSTIPATION: 0
BLOOD IN STOOL: 0
SHORTNESS OF BREATH: 0
BACK PAIN: 0
PHOTOPHOBIA: 0
VOMITING: 0
NAUSEA: 0

## 2025-02-17 ASSESSMENT — PAIN - FUNCTIONAL ASSESSMENT: PAIN_FUNCTIONAL_ASSESSMENT: NONE - DENIES PAIN

## 2025-02-17 NOTE — PROGRESS NOTES
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 General surgery evaluated s/p bedside excisional debridement of necrotic tissue from wound at the plantar surface . MRI ordered. Blood cx x1 +enterococcus faecalis therefore ID consulted.   Imaging: Imaging reviewed. CXR  non acute. Plain films of right foot show acute displaced fracture of 2nd and 3rd distal phalanx. MRI right foot pending.  TEJ pending today  Micro data: Repeat blood cx 2/15 NGTD. Wound cx & + pansensitive Proteus mirabilis. Blood cx x 1  +enterococcus faecalis(s-vanc/amp)  WBC 9.1<--11.9 ( adm), CRP 54.9<-- 113, ESR 26. Afebrile sice adm  Antimicrobial summary: IV Cefepime -; IV Vanc -     Right second and third toe fracture  Imaging as noted.  Per primary team  DM II A1C 7.5  On insulin, Endocrine following  SSS s/p pacemaker  Severe obesity, BMI 41.6  Allergy: no abx allergy  GFR 63  Comorbid conditions: SSS s/p pacemaker (), CVA, MARINO, HTN, DM II, CKD III, HLD, Obesity,     Plan  Therapeutic: Start IV Ampicillin 12g and IV Ceftriaxone 2g q12hr pending TEJ. DC IV Cefepime and IV Vancomycin   Diagnostic:   Trend Pro-Brad, ESR, CRP, CBC  F/u: TEJ, MRI of right foot  Other:       Reason for visit: F/u Enterococcus faecalis bacteremiaHistory:?Interval history noted  Denies n/v/d/f or untoward effects of antimicrobials  Physical Exam:  Vital Signs: /75   Pulse 80   Temp 97.8 °F (36.6 °C) (Oral)   Resp 20   Ht 1.829 m (6' 0.01\")   Wt 135.5 kg (298 lb 11.2 oz)   SpO2 94%   BMI 40.50 kg/m²     VS: noted; wt 139.3 kg  Gen: alert and oriented X3, no distress  Skin: no stigmata of endocarditis  Wounds: C/D/I-foot wounds as per media pics  HEMT: AT/NC Oropharynx pink, moist, and without lesions or exudates; dentition in fair   state of repair  Eyes: EOMI, conjunctiva pink, sclera anicteric.   Neck: Supple. Trachea midline. No LAD.  Chest: no distress and CTA. Good air movement.  Heart: RRR and no MRG.   Abd: soft, non-distended, no tenderness,. Normoactive bowel sounds.  Ext: Bilateral lower extremity edema   Catheter Site: without erythema or tenderness  LDA: CVC: PIV,  Neuro: Mental status intact. CN 2-12 intact and no focal sensory or motor deficits  Radiologic / Imaging / TESTING  No results found.     Labs:    Recent Results (from the past 24 hour(s))   POCT Glucose    Collection Time: 02/16/25  7:00 AM   Result Value Ref Range    POC Glucose 216 (H) 74 - 99 mg/dL   POCT Glucose    Collection Time: 02/16/25  9:55 AM   Result Value Ref Range    POC Glucose 303 (H) 74 - 99 mg/dL   CBC    Collection Time: 02/16/25 10:38 AM   Result Value Ref Range    WBC 9.1 4.0 - 10.5 k/uL    RBC 5.17 4.60 - 6.20 m/uL    Hemoglobin 14.2 13.5 - 18.0 g/dL    Hematocrit 43.4 42.0 - 52.0 %    MCV 83.9 78.0 - 100.0 fL    MCH 27.5 27.0 - 31.0 pg    MCHC 32.7 32.0 - 36.0 g/dL    RDW 16.2 (H) 11.7 - 14.9 %    Platelets 255 140 - 440 k/uL    MPV 10.3 7.5 - 11.1 fL   Basic Metabolic Panel    Collection Time: 02/16/25 10:38 AM   Result Value Ref Range    Sodium 136 136 - 145 mmol/L    Potassium 3.1 (L) 3.5 - 5.1 mmol/L    Chloride 94 (L) 99 - 110 mmol/L    CO2 29 21 - 32 mmol/L    Anion Gap 13 9 - 17 mmol/L    Glucose 342 (H) 74 - 99 mg/dL    BUN 45 (H) 7 - 20 mg/dL    Creatinine 1.3 0.8 - 1.3 mg/dL    Est, Glom Filt Rate 57 (L) >60 mL/min/1.73m2    Calcium 9.1 8.3 - 10.6 mg/dL   POCT Glucose    Collection Time: 02/16/25 10:47 AM   Result Value Ref Range    POC Glucose 311 (H) 74 - 99 mg/dL   POCT Glucose    Collection Time: 02/16/25 11:29 AM   Result Value Ref Range    POC Glucose 306 (H) 74 - 99 mg/dL   POCT Glucose    Collection Time: 02/16/25  1:14 PM   Result Value Ref Range    POC Glucose 296 (H) 74 - 99 mg/dL   POCT Glucose    Collection Time: 02/16/25

## 2025-02-17 NOTE — PROGRESS NOTES
Attempted to send for pt to come to MRI as scheduled, pt is out of department for different testing. Will try to coordinate a later time with [facility] team.

## 2025-02-17 NOTE — PROGRESS NOTES
Oral BID    sodium chloride flush  5-40 mL IntraVENous 2 times per day    torsemide  40 mg Oral BID    insulin regular human  0-30 Units SubCUTAneous 4x Daily AC & HS      dextrose      sodium chloride 25 mL/hr at 02/16/25 1448     lidocaine viscous hcl, midazolam, fentaNYL, glucose, dextrose bolus **OR** dextrose bolus, glucagon (rDNA), dextrose, sodium chloride flush, sodium chloride, potassium chloride **OR** potassium alternative oral replacement **OR** potassium chloride, magnesium sulfate, ondansetron **OR** ondansetron, polyethylene glycol, acetaminophen **OR** acetaminophen  No Known Allergies    Lab Data:  CBC:   Recent Labs     02/15/25  0147 02/16/25  0149 02/16/25  1038   WBC 10.9* 9.1 9.1   HGB 12.8* 13.7 14.2   HCT 40.2* 43.1 43.4   MCV 84.6 83.7 83.9    252 255     BMP:   Recent Labs     02/15/25  0147 02/15/25  1221 02/16/25  0149 02/16/25  1038   * 139 138 136   K 2.5* 2.9* 2.9* 3.1*   CL 86* 95* 95* 94*   CO2 32 31 30 29   PHOS 2.5  --  2.4*  --    BUN 47* 48* 42* 45*   CREATININE 1.5* 1.4* 1.3 1.3     LIVER PROFILE: No results for input(s): \"AST\", \"ALT\", \"LIPASE\", \"AMYLASE\", \"BILIDIR\", \"BILITOT\", \"ALKPHOS\" in the last 72 hours.    Invalid input(s): \"ALB\"  PT/INR: No results for input(s): \"PROTIME\", \"INR\" in the last 72 hours.  APTT: No results for input(s): \"APTT\" in the last 72 hours.  BNP:  No results for input(s): \"BNP\" in the last 72 hours.         Bairon De Leon MD, MD 2/17/2025 10:32 AM

## 2025-02-17 NOTE — PROGRESS NOTES
Pt out of room when I stopped by. I spoke with his wife.  MRI is postponed until tomorrow because of TEJ today.      - will put on the OR schedule for Wed AM for toe amputation and foot debridement.    Electronically signed by Jakob Avalos MD on 2/17/2025 at 1:44 PM

## 2025-02-17 NOTE — PROGRESS NOTES
RN notified at 730am that we would send for pt at 1030 for MRI that had been coordinated with [facility]. Stated she would notify dayshift during handoff.     Dr. Avalos was notified of scan time as well.

## 2025-02-17 NOTE — PLAN OF CARE
Problem: Safety - Adult  Goal: Free from fall injury  2/16/2025 2359 by Adriana Aguillon RN  Outcome: Progressing  2/16/2025 1836 by Genesis Vega, RN  Outcome: Progressing     Problem: Chronic Conditions and Co-morbidities  Goal: Patient's chronic conditions and co-morbidity symptoms are monitored and maintained or improved  Outcome: Progressing     Problem: Discharge Planning  Goal: Discharge to home or other facility with appropriate resources  Outcome: Progressing     Problem: Pain  Goal: Verbalizes/displays adequate comfort level or baseline comfort level  Outcome: Progressing     Problem: Skin/Tissue Integrity  Goal: Skin integrity remains intact  Description: 1.  Monitor for areas of redness and/or skin breakdown  2.  Assess vascular access sites hourly  3.  Every 4-6 hours minimum:  Change oxygen saturation probe site  4.  Every 4-6 hours:  If on nasal continuous positive airway pressure, respiratory therapy assess nares and determine need for appliance change or resting period  2/16/2025 2359 by Adriana Aguillon, RN  Outcome: Progressing  2/16/2025 1836 by Genesis Vega, RN  Outcome: Progressing

## 2025-02-17 NOTE — CONSULTS
pacemaker  Pacemaker lead malfunction: Malfunctioning of RV pacemaker lead (placed by  in 10/2024) which is connected to RA lead port by  when attempted to revise the RV lead.  History of PAF  Sp pacemaker in 10/2024 and RV lead revision 12/2024  Sespis - enterococcus bacteremia  Diabetic foot with infection  Right foot phalanx fracture  DM-2  CKD  Chronic HFPEF  PVD    Patient device interrogated and also chest x-ray reviewed.  Patient has 2 RV leads.  I had to do a lot of research on what exactly was done to the patient because there was no op note in the chart in Care Everywhere.  Looks like patient had initial pacemaker placed on October 2024 and had very high RV thresholds.  Then he was referred to Dr. Tolbert who then plan for revision of the RV lead to place.  But during the procedure Dr. Tolbert was worried that RV lead which had high thresholds and high impedance could have been through and through the RV apex and trying to take the RV lead out to revise it could cause pericardial effusion so he did not attempt to revise the RV lead and instead unscrewed the RA lead and then placed it in the RV septum.  Then he connected the old RV malfunctioning lead which had no capture to the atrial port and left RA lead which is now on the RV septum into the ventricular port.  Interestingly patient has been left had pacing at VVIr 80 since the implant.    Not exactly sure why it is set at VVIR 80.  Could be to override the PVCs but patient also has PVCs.  I will decrease the the rate to 70 for now and reassess.    Also I discussed with the patient about reassessing and extracting the old RV lead and placing the RV lead but patient is extremely apprehensive about it because he was told that he could have puncture of the RV if it is attempted to taken out.  As per the reps I was told that Dr. Tolbert does not do lead extractions at this time but given that this is a recent implant - EP physicians  would have revised it but  might have seen some thing which made him apprehensive about removing the old RV lead as RV lead may have been through the RV and if he unscrewed it may be it could cause pericardial effusion. RA lead was then moved to RV to keep the MRI compatibility - atleast that was what patient was told. I am not sure if that works like that given RV lead which is abandoned is placed in RA port now.    I had a detailed discussion of the patient about why the EP consult was placed at this time to us.  He had Enterococcus bacteremia and source is unclear.  Discussed with the patient that there is high risk of device infection even though there is no infection at this time especially with Enterococcus.  If the device or leads becomes a seeder then it needs to be removed otherwise there is high risk of systemic infection repeatedly.    Repeat blood cultures at this time are negative so after discussion we decided to wait for now.  TEJ shows no endocarditis or no vegetations.    If patient after the antibiotic course have recurrent blood cultures positive then the leads and the device needs to be removed.    This will be a challenge because patient appears to be dependent on the device mostly but does have junctional escape at 40    So if he truly needs system extraction (if he has recurrent blood cultures positive) then he may end up needing a Micra and then removal of the pacemaker system as he is mostly dependent.  Also patient is worried about the old lead being removed from the RV and causing complications so all this have to be weighed prior to performing any procedure on the patient    Extensive time has been spent in explaining the patient about the pacemaker and also about the extraction process and the risk associated with the procedure if needed    For now continue with antibiotics    Will follow along    Thanks again for allowing me to participate in care of this patient. Please call

## 2025-02-17 NOTE — PROGRESS NOTES
[facility] sed[facility] for procedure in [facility]. Unable to answer questions/sign screening form for at least 4 hours per Emilie RN.     Notified floor RN at this time. Scan will have to be re-scheduled with [facility] for tomorrow, as they will not be available after 130p today. This tech will update floor RN when I have a time scheduled.

## 2025-02-17 NOTE — PROGRESS NOTES
VIEWS) DATE OF EXAM:  2/13/2025 14:13 DEMOGRAPHICS: 64 years old Male INDICATION: pain in wrist COMPARISON: No existing relevant imaging study corresponding to the same anatomical region is available. TECHNIQUE: 3 views of the left wrist. FINDINGS: No acute fracture or traumatic malalignment. The joint spaces are relatively maintained. Mild soft tissue swelling about the wrist. Vascular calcifications. IMPRESSION:  1.  No radiographic evidence of acute osseous abnormality. 2.  Additional findings as above. This dictation was created with voice recognition software.  While attempts have  been made to review the dictation as it is transcribed, on occasion the spoken word can be misinterpreted by the technology leading to omissions or inappropriate words, phrases or sentences.  Dictated and Electronically Signed By: Edy Lockwood MD 2/13/2025 13:26        XR CHEST PORTABLE    Result Date: 2/13/2025  EXAM:  XR CHEST PORTABLE DATE OF EXAM:  2/13/2025 14:10 DEMOGRAPHICS: 64 years old Male CLINICAL STATEMENT: screening COMPARISON: 1/14/2025 FINDINGS: Stable cardiomegaly. A left pacing device with its leads projecting over the right attachment right ventricle. Clear lungs. Bones unremarkable. IMPRESSION: 1. No acute cardiopulmonary process. 2. Stable cardiomegaly.  Dictated and Electronically Signed By: Kyaw Evans MD 2/13/2025 13:16          CBC:   Recent Labs     02/15/25  0147 02/16/25  0149 02/16/25  1038   WBC 10.9* 9.1 9.1   HGB 12.8* 13.7 14.2    252 255     BMP:    Recent Labs     02/15/25  1221 02/16/25  0149 02/16/25  1038    138 136   K 2.9* 2.9* 3.1*   CL 95* 95* 94*   CO2 31 30 29   BUN 48* 42* 45*   CREATININE 1.4* 1.3 1.3   GLUCOSE 103* 233* 342*     Hepatic:   No results for input(s): \"AST\", \"ALT\", \"BILITOT\", \"ALKPHOS\" in the last 72 hours.    Invalid input(s): \"ALB\"    Lipids:   Lab Results   Component Value Date/Time    CHOL 159 11/14/2024 08:23 AM    HDL 39 11/14/2024 08:23 AM    TRIG 152  11/14/2024 08:23 AM     Hemoglobin A1C:   Lab Results   Component Value Date/Time    LABA1C 8.8 02/15/2025 01:47 AM     TSH:   Lab Results   Component Value Date/Time    TSH 0.759 01/11/2022 07:34 AM     Troponin:   Lab Results   Component Value Date/Time    TROPONINT 0.028 09/11/2022 09:20 AM    TROPONINT 0.033 09/10/2022 05:10 PM    TROPONINT 0.026 10/25/2021 12:02 AM     Lactic Acid: No results for input(s): \"LACTA\" in the last 72 hours.  BNP:   No results for input(s): \"PROBNP\" in the last 72 hours.    UA:  Lab Results   Component Value Date/Time    NITRU Negative 02/06/2025 11:50 AM    COLORU YELLOW 02/06/2025 11:50 AM    PHUR 6.5 02/06/2025 11:50 AM    WBCUA 3 01/14/2025 08:29 PM    WBCUA 0-5 05/09/2022 09:32 AM    RBCUA 3 01/14/2025 08:29 PM    RBCUA 2 09/10/2022 06:30 PM    MUCUS RARE 01/14/2025 08:29 PM    TRICHOMONAS NONE SEEN 09/10/2022 06:30 PM    BACTERIA NEGATIVE 09/10/2022 06:30 PM    CLARITYU CLEAR 02/06/2025 11:50 AM    LEUKOCYTESUR Negative 02/06/2025 11:50 AM    UROBILINOGEN <2 02/06/2025 11:50 AM    BILIRUBINUR Negative 02/06/2025 11:50 AM    BLOODU SMALL 09/10/2022 06:30 PM    GLUCOSEU >1000 02/06/2025 11:50 AM    KETUA Negative 02/06/2025 11:50 AM    AMORPHOUS PRESENT 12/07/2021 08:00 AM     Urine Cultures:   Lab Results   Component Value Date/Time    LABURIN SEE RESULTS BELOW 09/23/2022 08:48 AM     Blood Cultures: No results found for: \"BC\"  No results found for: \"BLOODCULT2\"  Organism: No results found for: \"ORG\"      Electronically signed by Maikel Friedman MD on 2/17/2025 at 1:51 PM

## 2025-02-17 NOTE — CARE COORDINATION
Reviewed medical record and discussed pt in [facility]DR. Pt from home with wife. Independent PTA. Plan is to return home. Has a cane and walker if needed. ID following pt. Toe amputation planned during admission. CM following.

## 2025-02-17 NOTE — PROGRESS NOTES
Physician Progress Note      PATIENT:               ARELI SWANN  CSN #:                  [id number]  :                       1960  ADMIT DATE:       2025 12:37 PM  DISCH DATE:  RESPONDING  PROVIDER #:        Jakob Avalos MD          QUERY TEXT:    Patient admitted with R foot diabetic foot ulcer. . Per consult note dated    documentation of debridement. To accurately reflect the procedure   performed please document if debridement was excisional and deepest depth of   tissue removed as down to and including:  The medical record reflects the following:  Risk Factors: uncontrolled DM, R foot diabetic foot ulcer, CKD3, morbid   obesity, venous insufficiency  Clinical Indicators:   gen surg consult-- \"64-year-old male with right foot diabetic foot wound.    A moderate amount of callus and necrotic tissue was trimmed from the plantar   foot wound sharply with scalpel today. -Will get MRI, will need further   bedside debridement versus debridement in the OR depending on MRI results.\"  Treatment: IV abx's, debridement, xray, SSI, essential home meds, supportive   care    Thank you,  Robert Rodriguez RN,BSB  Options provided:  -- Excisional debridement of skin  -- Excisional debridement of subcutaneous tissue  -- Excisional debridement of fascia  -- Excisional debridement of muscle  -- Excisional debridement of bone  -- Other - I will add my own diagnosis  -- Disagree - Not applicable / Not valid  -- Disagree - Clinically unable to determine / Unknown  -- Refer to Clinical Documentation Reviewer    PROVIDER RESPONSE TEXT:    Excisional debridement of fascia of R foot was performed during procedure on       Query created by: Robert Rodriguez on 2025 10:32 AM      Electronically signed by:  Jakob Avalos MD 2025 7:18 AM

## 2025-02-18 ENCOUNTER — APPOINTMENT (OUTPATIENT)
Dept: MRI IMAGING | Age: 65
DRG: 854 | End: 2025-02-18
Payer: MEDICARE

## 2025-02-18 LAB
ANION GAP SERPL CALCULATED.3IONS-SCNC: 12 MMOL/L (ref 9–17)
BUN SERPL-MCNC: 42 MG/DL (ref 7–20)
CALCIUM SERPL-MCNC: 9.7 MG/DL (ref 8.3–10.6)
CHLORIDE SERPL-SCNC: 93 MMOL/L (ref 99–110)
CO2 SERPL-SCNC: 30 MMOL/L (ref 21–32)
CREAT SERPL-MCNC: 1.4 MG/DL (ref 0.8–1.3)
ERYTHROCYTE [DISTWIDTH] IN BLOOD BY AUTOMATED COUNT: 16 % (ref 11.7–14.9)
GFR, ESTIMATED: 50 ML/MIN/1.73M2
GLUCOSE BLD-MCNC: 166 MG/DL (ref 74–99)
GLUCOSE BLD-MCNC: 168 MG/DL (ref 74–99)
GLUCOSE BLD-MCNC: 269 MG/DL (ref 74–99)
GLUCOSE BLD-MCNC: 281 MG/DL (ref 74–99)
GLUCOSE BLD-MCNC: 289 MG/DL (ref 74–99)
GLUCOSE SERPL-MCNC: 326 MG/DL (ref 74–99)
HCT VFR BLD AUTO: 44.9 % (ref 42–52)
HGB BLD-MCNC: 14.3 G/DL (ref 13.5–18)
MCH RBC QN AUTO: 26.5 PG (ref 27–31)
MCHC RBC AUTO-ENTMCNC: 31.8 G/DL (ref 32–36)
MCV RBC AUTO: 83.1 FL (ref 78–100)
PLATELET # BLD AUTO: 235 K/UL (ref 140–440)
PMV BLD AUTO: 10.3 FL (ref 7.5–11.1)
POTASSIUM SERPL-SCNC: 3.3 MMOL/L (ref 3.5–5.1)
RBC # BLD AUTO: 5.4 M/UL (ref 4.6–6.2)
SODIUM SERPL-SCNC: 135 MMOL/L (ref 136–145)
WBC OTHER # BLD: 8.4 K/UL (ref 4–10.5)

## 2025-02-18 PROCEDURE — 6360000002 HC RX W HCPCS: Performed by: NURSE PRACTITIONER

## 2025-02-18 PROCEDURE — 6370000000 HC RX 637 (ALT 250 FOR IP): Performed by: STUDENT IN AN ORGANIZED HEALTH CARE EDUCATION/TRAINING PROGRAM

## 2025-02-18 PROCEDURE — 73720 MRI LWR EXTREMITY W/O&W/DYE: CPT

## 2025-02-18 PROCEDURE — 80048 BASIC METABOLIC PNL TOTAL CA: CPT

## 2025-02-18 PROCEDURE — 6370000000 HC RX 637 (ALT 250 FOR IP): Performed by: INTERNAL MEDICINE

## 2025-02-18 PROCEDURE — 82962 GLUCOSE BLOOD TEST: CPT

## 2025-02-18 PROCEDURE — 85027 COMPLETE CBC AUTOMATED: CPT

## 2025-02-18 PROCEDURE — 99231 SBSQ HOSP IP/OBS SF/LOW 25: CPT | Performed by: INTERNAL MEDICINE

## 2025-02-18 PROCEDURE — 1200000000 HC SEMI PRIVATE

## 2025-02-18 PROCEDURE — 2580000003 HC RX 258: Performed by: NURSE PRACTITIONER

## 2025-02-18 PROCEDURE — 2500000003 HC RX 250 WO HCPCS: Performed by: STUDENT IN AN ORGANIZED HEALTH CARE EDUCATION/TRAINING PROGRAM

## 2025-02-18 PROCEDURE — 99232 SBSQ HOSP IP/OBS MODERATE 35: CPT | Performed by: NURSE PRACTITIONER

## 2025-02-18 PROCEDURE — 94761 N-INVAS EAR/PLS OXIMETRY MLT: CPT

## 2025-02-18 PROCEDURE — 99231 SBSQ HOSP IP/OBS SF/LOW 25: CPT | Performed by: SURGERY

## 2025-02-18 PROCEDURE — A9579 GAD-BASE MR CONTRAST NOS,1ML: HCPCS | Performed by: STUDENT IN AN ORGANIZED HEALTH CARE EDUCATION/TRAINING PROGRAM

## 2025-02-18 PROCEDURE — 36415 COLL VENOUS BLD VENIPUNCTURE: CPT

## 2025-02-18 PROCEDURE — 6370000000 HC RX 637 (ALT 250 FOR IP): Performed by: NURSE PRACTITIONER

## 2025-02-18 PROCEDURE — 6360000004 HC RX CONTRAST MEDICATION: Performed by: STUDENT IN AN ORGANIZED HEALTH CARE EDUCATION/TRAINING PROGRAM

## 2025-02-18 PROCEDURE — 2580000003 HC RX 258: Performed by: STUDENT IN AN ORGANIZED HEALTH CARE EDUCATION/TRAINING PROGRAM

## 2025-02-18 RX ADMIN — INSULIN HUMAN 5 UNITS: 500 INJECTION, SOLUTION SUBCUTANEOUS at 17:54

## 2025-02-18 RX ADMIN — ENOXAPARIN SODIUM 135 MG: 150 INJECTION SUBCUTANEOUS at 12:01

## 2025-02-18 RX ADMIN — METOPROLOL SUCCINATE 25 MG: 25 TABLET, EXTENDED RELEASE ORAL at 12:02

## 2025-02-18 RX ADMIN — CEFTRIAXONE SODIUM 2000 MG: 2 INJECTION, POWDER, FOR SOLUTION INTRAMUSCULAR; INTRAVENOUS at 02:29

## 2025-02-18 RX ADMIN — AMPICILLIN SODIUM 2000 MG: 2 INJECTION, POWDER, FOR SOLUTION INTRAMUSCULAR; INTRAVENOUS at 16:04

## 2025-02-18 RX ADMIN — METOLAZONE 2.5 MG: 2.5 TABLET ORAL at 12:04

## 2025-02-18 RX ADMIN — AMPICILLIN SODIUM 2000 MG: 2 INJECTION, POWDER, FOR SOLUTION INTRAMUSCULAR; INTRAVENOUS at 21:53

## 2025-02-18 RX ADMIN — GADOTERIDOL 20 ML: 279.3 INJECTION, SOLUTION INTRAVENOUS at 08:46

## 2025-02-18 RX ADMIN — SPIRONOLACTONE 25 MG: 50 TABLET ORAL at 21:39

## 2025-02-18 RX ADMIN — RANOLAZINE 500 MG: 500 TABLET, EXTENDED RELEASE ORAL at 12:16

## 2025-02-18 RX ADMIN — FINASTERIDE 5 MG: 5 TABLET, FILM COATED ORAL at 12:03

## 2025-02-18 RX ADMIN — AMPICILLIN SODIUM 2000 MG: 2 INJECTION, POWDER, FOR SOLUTION INTRAMUSCULAR; INTRAVENOUS at 11:58

## 2025-02-18 RX ADMIN — INSULIN HUMAN 5 UNITS: 500 INJECTION, SOLUTION SUBCUTANEOUS at 12:34

## 2025-02-18 RX ADMIN — CEFTRIAXONE SODIUM 2000 MG: 2 INJECTION, POWDER, FOR SOLUTION INTRAMUSCULAR; INTRAVENOUS at 14:49

## 2025-02-18 RX ADMIN — ENOXAPARIN SODIUM 135 MG: 150 INJECTION SUBCUTANEOUS at 21:39

## 2025-02-18 RX ADMIN — RANOLAZINE 500 MG: 500 TABLET, EXTENDED RELEASE ORAL at 21:39

## 2025-02-18 RX ADMIN — TAMSULOSIN HYDROCHLORIDE 0.4 MG: 0.4 CAPSULE ORAL at 12:01

## 2025-02-18 RX ADMIN — SODIUM CHLORIDE, PRESERVATIVE FREE 10 ML: 5 INJECTION INTRAVENOUS at 12:16

## 2025-02-18 RX ADMIN — AMPICILLIN SODIUM 2000 MG: 2 INJECTION, POWDER, FOR SOLUTION INTRAMUSCULAR; INTRAVENOUS at 03:50

## 2025-02-18 RX ADMIN — INSULIN HUMAN 5 UNITS: 500 INJECTION, SOLUTION SUBCUTANEOUS at 13:29

## 2025-02-18 RX ADMIN — INSULIN HUMAN 15 UNITS: 500 INJECTION, SOLUTION SUBCUTANEOUS at 23:34

## 2025-02-18 RX ADMIN — POLYETHYLENE GLYCOL (3350) 17 G: 17 POWDER, FOR SOLUTION ORAL at 14:50

## 2025-02-18 RX ADMIN — TORSEMIDE 40 MG: 20 TABLET ORAL at 12:01

## 2025-02-18 RX ADMIN — POTASSIUM CHLORIDE 40 MEQ: 1500 TABLET, EXTENDED RELEASE ORAL at 06:22

## 2025-02-18 RX ADMIN — SPIRONOLACTONE 25 MG: 50 TABLET ORAL at 12:03

## 2025-02-18 RX ADMIN — TORSEMIDE 40 MG: 20 TABLET ORAL at 21:39

## 2025-02-18 RX ADMIN — INSULIN HUMAN 15 UNITS: 500 INJECTION, SOLUTION SUBCUTANEOUS at 17:56

## 2025-02-18 RX ADMIN — SODIUM CHLORIDE 25 ML: 900 INJECTION INTRAVENOUS at 21:47

## 2025-02-18 NOTE — PROGRESS NOTES
existing relevant imaging study corresponding to the same anatomical region is available. TECHNIQUE: 3 views of the left wrist. FINDINGS: No acute fracture or traumatic malalignment. The joint spaces are relatively maintained. Mild soft tissue swelling about the wrist. Vascular calcifications. IMPRESSION:  1.  No radiographic evidence of acute osseous abnormality. 2.  Additional findings as above. This dictation was created with voice recognition software.  While attempts have  been made to review the dictation as it is transcribed, on occasion the spoken word can be misinterpreted by the technology leading to omissions or inappropriate words, phrases or sentences.  Dictated and Electronically Signed By: Edy Lockwood MD 2/13/2025 13:26        XR CHEST PORTABLE    Result Date: 2/13/2025  EXAM:  XR CHEST PORTABLE DATE OF EXAM:  2/13/2025 14:10 DEMOGRAPHICS: 64 years old Male CLINICAL STATEMENT: screening COMPARISON: 1/14/2025 FINDINGS: Stable cardiomegaly. A left pacing device with its leads projecting over the right attachment right ventricle. Clear lungs. Bones unremarkable. IMPRESSION: 1. No acute cardiopulmonary process. 2. Stable cardiomegaly.  Dictated and Electronically Signed By: Kyaw Evans MD 2/13/2025 13:16          CBC:   Recent Labs     02/16/25  0149 02/16/25  1038 02/18/25  0133   WBC 9.1 9.1 8.4   HGB 13.7 14.2 14.3    255 235     BMP:    Recent Labs     02/16/25  0149 02/16/25  1038 02/18/25  0133    136 135*   K 2.9* 3.1* 3.3*   CL 95* 94* 93*   CO2 30 29 30   BUN 42* 45* 42*   CREATININE 1.3 1.3 1.4*   GLUCOSE 233* 342* 326*     Hepatic:   No results for input(s): \"AST\", \"ALT\", \"BILITOT\", \"ALKPHOS\" in the last 72 hours.    Invalid input(s): \"ALB\"    Lipids:   Lab Results   Component Value Date/Time    CHOL 159 11/14/2024 08:23 AM    HDL 39 11/14/2024 08:23 AM    TRIG 152 11/14/2024 08:23 AM     Hemoglobin A1C:   Lab Results   Component Value Date/Time    LABA1C 8.8 02/15/2025 01:47 AM      TSH:   Lab Results   Component Value Date/Time    TSH 0.759 01/11/2022 07:34 AM     Troponin:   Lab Results   Component Value Date/Time    TROPONINT 0.028 09/11/2022 09:20 AM    TROPONINT 0.033 09/10/2022 05:10 PM    TROPONINT 0.026 10/25/2021 12:02 AM     Lactic Acid: No results for input(s): \"LACTA\" in the last 72 hours.  BNP:   No results for input(s): \"PROBNP\" in the last 72 hours.    UA:  Lab Results   Component Value Date/Time    NITRU Negative 02/06/2025 11:50 AM    COLORU YELLOW 02/06/2025 11:50 AM    PHUR 6.5 02/06/2025 11:50 AM    WBCUA 3 01/14/2025 08:29 PM    WBCUA 0-5 05/09/2022 09:32 AM    RBCUA 3 01/14/2025 08:29 PM    RBCUA 2 09/10/2022 06:30 PM    MUCUS RARE 01/14/2025 08:29 PM    TRICHOMONAS NONE SEEN 09/10/2022 06:30 PM    BACTERIA NEGATIVE 09/10/2022 06:30 PM    CLARITYU CLEAR 02/06/2025 11:50 AM    LEUKOCYTESUR Negative 02/06/2025 11:50 AM    UROBILINOGEN <2 02/06/2025 11:50 AM    BILIRUBINUR Negative 02/06/2025 11:50 AM    BLOODU SMALL 09/10/2022 06:30 PM    GLUCOSEU >1000 02/06/2025 11:50 AM    KETUA Negative 02/06/2025 11:50 AM    AMORPHOUS PRESENT 12/07/2021 08:00 AM     Urine Cultures:   Lab Results   Component Value Date/Time    LABURIN SEE RESULTS BELOW 09/23/2022 08:48 AM     Blood Cultures: No results found for: \"BC\"  No results found for: \"BLOODCULT2\"  Organism: No results found for: \"ORG\"      Electronically signed by SEAN IRVIN MD on 2/18/2025 at 9:27 AM

## 2025-02-18 NOTE — PROGRESS NOTES
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 General surgery evaluated s/p bedside excisional debridement of necrotic tissue from wound at the plantar surface . MRI ordered. Blood cx x1 +enterococcus faecalis therefore ID consulted.   GS plans noted for toe amputation and foot debridement   Imaging: Imaging reviewed. CXR  non acute. Plain films of right foot show acute displaced fracture of 2nd and 3rd distal phalanx. MRI right foot pending.  TTE findings noted. TEJ  with no evidence of endocarditis, mild MR.   EP evaluation and recommendations noted  Micro data: Repeat blood cx 2/15 NGTD. Wound cx & + pansensitive Proteus mirabilis. Blood cx x 1  +enterococcus faecalis(s-vanc/amp)  WBC 8.4<--11.9 ( adm), CRP 54.9<-- 159<--113, ESR 26. Procal 0.13. Afebrile sice adm  Antimicrobial summary: IV Cefepime --; IV Vanc -; IV Ampicillin -; IV Ceftriaxone -     Right second and third toe fracture  Imaging as noted.  Per primary team  DM II A1C 7.5  On insulin, Endocrine following  SSS s/p pacemaker  Severe obesity, BMI 41.6  Allergy: no abx allergy  GFR 63  Comorbid conditions: SSS s/p pacemaker (), CVA, MARINO, HTN, DM II, CKD III, HLD, Obesity,     Plan  Therapeutic: Continue IV Ampicillin and IV Ceftriaxone pending surgical cx  Diagnostic:   Trend Pro-Brad, ESR, CRP, CBC  Recommend surgical cx and pathology  F/u: MRI of right foot, surgical cx and pathology  Other:       Reason for visit: F/u Enterococcus faecalis bacteremia  History:?Interval history noted  Denies n/v/d/f or untoward effects of antimicrobials  Reporting no BM  Addressed pt concerns with clinical course  Physical Exam:  Vital Signs: /73   Pulse 60   Temp 97.5 °F (36.4 °C) (Oral)   Resp 18   Ht 1.829 m

## 2025-02-18 NOTE — PROGRESS NOTES
02/18/25 1459   Encounter Summary   Encounter Overview/Reason Spiritual/Emotional Needs   Encounter Code  Assessment by  services   Service Provided For Patient   Referral/Consult From Other    Support System Family members   Last Encounter  02/18/25  (Visit by Slava Bryan, charted by  David)   Complexity of Encounter Low   Begin Time 1000   End Time  1010   Total Time Calculated 10 min   Spiritual/Emotional needs   Type Spiritual Support   Assessment/Intervention/Outcome   Assessment Calm   Intervention Active listening;Sustaining Presence/Ministry of presence   Outcome Coping   Plan and Referrals   Plan/Referrals Continue Support (comment)

## 2025-02-18 NOTE — PROGRESS NOTES
GENERAL SURGERY PROGRESS NOTE    Bandar De La Torre is a 64 y.o. male with right foot and toe wounds.                 Subjective:  Doing ok today. MRI completed and shows osteo in toes 2-3 but not any osteo associated with the platar foot ulcer.    Objective:    Vitals: VITALS:  /72   Pulse 61   Temp 97.5 °F (36.4 °C) (Oral)   Resp 20   Ht 1.829 m (6')   Wt 134.8 kg (297 lb 3.2 oz)   SpO2 98%   BMI 40.31 kg/m²     I/O: 02/17 0701 - 02/18 0700  In: 10 [I.V.:10]  Out: 3350 [Urine:3350]    Labs/Imaging Results:   Lab Results   Component Value Date/Time     02/18/2025 01:33 AM    K 3.3 02/18/2025 01:33 AM    CL 93 02/18/2025 01:33 AM    CO2 30 02/18/2025 01:33 AM    BUN 42 02/18/2025 01:33 AM    CREATININE 1.4 02/18/2025 01:33 AM    GLUCOSE 326 02/18/2025 01:33 AM    CALCIUM 9.7 02/18/2025 01:33 AM      Lab Results   Component Value Date    WBC 8.4 02/18/2025    HGB 14.3 02/18/2025    HCT 44.9 02/18/2025    MCV 83.1 02/18/2025     02/18/2025         IV Fluids:   dextrose    sodium chloride Last Rate: 25 mL/hr at 02/16/25 1448    Scheduled Meds:   ampicillin IV, 2,000 mg, IntraVENous, 6 times per day    cefTRIAXone (ROCEPHIN) IV, 2,000 mg, IntraVENous, Q12H    insulin regular human, 5 Units, SubCUTAneous, Dinner    insulin regular human, 5 Units, SubCUTAneous, Lunch    insulin regular human, 5 Units, SubCUTAneous, QAM    enoxaparin, 1 mg/kg, SubCUTAneous, BID    metoprolol succinate, 25 mg, Oral, Daily    [Held by provider] clopidogrel, 75 mg, Oral, Daily    finasteride, 5 mg, Oral, Daily    ranolazine, 500 mg, Oral, BID    [Held by provider] rivaroxaban, 20 mg, Oral, Dinner    tamsulosin, 0.4 mg, Oral, Daily    metOLazone, 2.5 mg, Oral, Daily    spironolactone, 25 mg, Oral, BID    sodium chloride flush, 5-40 mL, IntraVENous, 2 times per day    torsemide, 40 mg, Oral, BID    insulin regular human, 0-30 Units, SubCUTAneous, 4x Daily AC & HS    Physical Exam:  General: A&O x 3, no distress.

## 2025-02-18 NOTE — PROGRESS NOTES
Admit Date:  2/13/2025    Admission diagnosis / Complaint :       Subjective:  Mr. De La Torre is feeling little better. He had questions regarding the pacemaker    Objective:   /72   Pulse 61   Temp 97.5 °F (36.4 °C) (Oral)   Resp 20   Ht 1.829 m (6')   Wt 134.8 kg (297 lb 3.2 oz)   SpO2 98%   BMI 40.31 kg/m²     Intake/Output Summary (Last 24 hours) at 2/18/2025 1227  Last data filed at 2/18/2025 0354  Gross per 24 hour   Intake 10 ml   Output 2850 ml   Net -2840 ml       TELEMETRY: ventricular paced    has a past medical history of Atrial fibrillation (HCC), Cerebral artery occlusion with cerebral infarction (HCC), History of cardiac cath, Hyperlipidemia, Hypertension, Type II or unspecified type diabetes mellitus without mention of complication, not stated as uncontrolled, and Unspecified sleep apnea.   has a past surgical history that includes Tonsillectomy and Cardiac catheterization.  Physical Exam:  General:  Awake, alert, NAD  Skin:  Warm and dry  Neck:  JVD none  Chest:  Clear to auscultation, respiration easy  Cardiovascular:  RRR S1S2, grade 2/6 systolic murmur  Abdomen:  Soft non tender  Extremities:  legs have edema and chronic venous changes and also covered with dressing for the wounds    Medications:    ampicillin IV  2,000 mg IntraVENous 6 times per day    cefTRIAXone (ROCEPHIN) IV  2,000 mg IntraVENous Q12H    insulin regular human  5 Units SubCUTAneous Dinner    insulin regular human  5 Units SubCUTAneous Lunch    insulin regular human  5 Units SubCUTAneous QAM    enoxaparin  1 mg/kg SubCUTAneous BID    metoprolol succinate  25 mg Oral Daily    [Held by provider] clopidogrel  75 mg Oral Daily    finasteride  5 mg Oral Daily    ranolazine  500 mg Oral BID    [Held by provider] rivaroxaban  20 mg Oral Dinner    tamsulosin  0.4 mg Oral Daily    metOLazone  2.5 mg Oral Daily    spironolactone  25 mg Oral BID    sodium chloride flush  5-40 mL IntraVENous 2 times per day    torsemide  40 mg

## 2025-02-18 NOTE — PLAN OF CARE
Problem: Safety - Adult  Goal: Free from fall injury  2/18/2025 0144 by Adriana Aguillon RN  Outcome: Progressing  2/17/2025 1429 by Marci Rose LPN  Outcome: Progressing     Problem: Chronic Conditions and Co-morbidities  Goal: Patient's chronic conditions and co-morbidity symptoms are monitored and maintained or improved  2/18/2025 0144 by Adriana Aguillon RN  Outcome: Progressing  2/17/2025 1429 by Marci Rose LPN  Outcome: Progressing     Problem: Discharge Planning  Goal: Discharge to home or other facility with appropriate resources  2/18/2025 0144 by Adriana Aguillon RN  Outcome: Progressing  2/17/2025 1429 by Marci Rose LPN  Outcome: Progressing     Problem: Pain  Goal: Verbalizes/displays adequate comfort level or baseline comfort level  2/18/2025 0144 by Adriana Aguillon RN  Outcome: Progressing  2/17/2025 1429 by Marci Rose LPN  Outcome: Progressing     Problem: Skin/Tissue Integrity  Goal: Skin integrity remains intact  Description: 1.  Monitor for areas of redness and/or skin breakdown  2.  Assess vascular access sites hourly  3.  Every 4-6 hours minimum:  Change oxygen saturation probe site  4.  Every 4-6 hours:  If on nasal continuous positive airway pressure, respiratory therapy assess nares and determine need for appliance change or resting period  2/18/2025 0144 by Adriana Aguillon RN  Outcome: Progressing  2/17/2025 1429 by Marci Rose LPN  Outcome: Progressing

## 2025-02-18 NOTE — PROGRESS NOTES
Progress Note( Dr. Diehl)  2/17/2025  Subjective:   Admit Date: 2/13/2025  PCP: MEETA Diehl MD    Admitted For : Right foot infection and high blood glucose     Consulted For:  Right foot infection and high blood glucose     Interval History: Patient had TEJ this morning and consulted with cardiology as patient has earlier positive enterococcal bacteremia however subsequent blood cultures came back negative.    Patient has still hypokalemia and potassium being replaced    reviewed notes from surgery.  MRI is not done yet due to an issue about the pacemaker.  Apparently this issue resolved patient going to have MRI tomorrow    It appears like patient might need amputation of third toe according to his surgery note    Denies any chest pains,   Denies SOB .   Denies nausea or vomiting.   No new bowel or bladder symptoms.       Intake/Output Summary (Last 24 hours) at 2/17/2025 1943  Last data filed at 2/17/2025 1811  Gross per 24 hour   Intake --   Output 3000 ml   Net -3000 ml       DATA    CBC:   Recent Labs     02/15/25  0147 02/16/25  0149 02/16/25  1038   WBC 10.9* 9.1 9.1   HGB 12.8* 13.7 14.2    252 255    CMP:  Recent Labs     02/15/25  1221 02/16/25  0149 02/16/25  1038    138 136   K 2.9* 2.9* 3.1*   CL 95* 95* 94*   CO2 31 30 29   BUN 48* 42* 45*   CREATININE 1.4* 1.3 1.3   CALCIUM 9.3 9.6 9.1     Lipids:   Lab Results   Component Value Date/Time    CHOL 159 11/14/2024 08:23 AM    HDL 39 11/14/2024 08:23 AM    TRIG 152 11/14/2024 08:23 AM     Glucose:  Recent Labs     02/17/25  0824 02/17/25  1345 02/17/25  1728   POCGLU 232* 232* 329*     [id number]:  Lab Results   Component Value Date/Time    LABA1C 8.8 02/15/2025 01:47 AM     High Sensitivity TSH:   Lab Results   Component Value Date/Time    TSHHS 1.800 08/29/2019 10:06 AM     Free T3: No results found for: \"FT3\"  Free T4:No results found for: \"T4FREE\"    XR WRIST LEFT (MIN 3 VIEWS)   Final Result      XR FOOT RIGHT (MIN 3 VIEWS)

## 2025-02-19 ENCOUNTER — ANESTHESIA (OUTPATIENT)
Dept: OPERATING ROOM | Age: 65
End: 2025-02-19
Payer: MEDICARE

## 2025-02-19 ENCOUNTER — ANESTHESIA EVENT (OUTPATIENT)
Dept: OPERATING ROOM | Age: 65
End: 2025-02-19
Payer: MEDICARE

## 2025-02-19 LAB
CRP SERPL HS-MCNC: 40.8 MG/L (ref 0–5)
ERYTHROCYTE [DISTWIDTH] IN BLOOD BY AUTOMATED COUNT: 16 % (ref 11.7–14.9)
GLUCOSE BLD-MCNC: 192 MG/DL (ref 74–99)
GLUCOSE BLD-MCNC: 206 MG/DL (ref 74–99)
GLUCOSE BLD-MCNC: 215 MG/DL (ref 74–99)
GLUCOSE BLD-MCNC: 249 MG/DL (ref 74–99)
GLUCOSE BLD-MCNC: 268 MG/DL (ref 74–99)
HCT VFR BLD AUTO: 43.5 % (ref 42–52)
HGB BLD-MCNC: 14 G/DL (ref 13.5–18)
MCH RBC QN AUTO: 27 PG (ref 27–31)
MCHC RBC AUTO-ENTMCNC: 32.2 G/DL (ref 32–36)
MCV RBC AUTO: 83.8 FL (ref 78–100)
MICROORGANISM SPEC CULT: NORMAL
PLATELET # BLD AUTO: 217 K/UL (ref 140–440)
PMV BLD AUTO: 10.7 FL (ref 7.5–11.1)
PROCALCITONIN SERPL-MCNC: 0.28 NG/ML
RBC # BLD AUTO: 5.19 M/UL (ref 4.6–6.2)
SPECIMEN DESCRIPTION: NORMAL
WBC OTHER # BLD: 9.2 K/UL (ref 4–10.5)

## 2025-02-19 PROCEDURE — 6360000002 HC RX W HCPCS: Performed by: SURGERY

## 2025-02-19 PROCEDURE — 86140 C-REACTIVE PROTEIN: CPT

## 2025-02-19 PROCEDURE — 28820 AMPUTATION OF TOE: CPT | Performed by: SURGERY

## 2025-02-19 PROCEDURE — 6370000000 HC RX 637 (ALT 250 FOR IP): Performed by: NURSE PRACTITIONER

## 2025-02-19 PROCEDURE — 87205 SMEAR GRAM STAIN: CPT

## 2025-02-19 PROCEDURE — 6360000002 HC RX W HCPCS: Performed by: NURSE PRACTITIONER

## 2025-02-19 PROCEDURE — 84145 PROCALCITONIN (PCT): CPT

## 2025-02-19 PROCEDURE — 88311 DECALCIFY TISSUE: CPT | Performed by: PATHOLOGY

## 2025-02-19 PROCEDURE — 2580000003 HC RX 258: Performed by: STUDENT IN AN ORGANIZED HEALTH CARE EDUCATION/TRAINING PROGRAM

## 2025-02-19 PROCEDURE — 3600000014 HC SURGERY LEVEL 4 ADDTL 15MIN: Performed by: SURGERY

## 2025-02-19 PROCEDURE — 6370000000 HC RX 637 (ALT 250 FOR IP): Performed by: SURGERY

## 2025-02-19 PROCEDURE — 0Y6T0Z0 DETACHMENT AT RIGHT 3RD TOE, COMPLETE, OPEN APPROACH: ICD-10-PCS | Performed by: SURGERY

## 2025-02-19 PROCEDURE — 87070 CULTURE OTHR SPECIMN AEROBIC: CPT

## 2025-02-19 PROCEDURE — 99232 SBSQ HOSP IP/OBS MODERATE 35: CPT | Performed by: NURSE PRACTITIONER

## 2025-02-19 PROCEDURE — 2500000003 HC RX 250 WO HCPCS: Performed by: SURGERY

## 2025-02-19 PROCEDURE — 85027 COMPLETE CBC AUTOMATED: CPT

## 2025-02-19 PROCEDURE — 87075 CULTR BACTERIA EXCEPT BLOOD: CPT

## 2025-02-19 PROCEDURE — 2580000003 HC RX 258: Performed by: SURGERY

## 2025-02-19 PROCEDURE — 80048 BASIC METABOLIC PNL TOTAL CA: CPT

## 2025-02-19 PROCEDURE — 6370000000 HC RX 637 (ALT 250 FOR IP): Performed by: INTERNAL MEDICINE

## 2025-02-19 PROCEDURE — 3700000000 HC ANESTHESIA ATTENDED CARE: Performed by: SURGERY

## 2025-02-19 PROCEDURE — 3600000004 HC SURGERY LEVEL 4 BASE: Performed by: SURGERY

## 2025-02-19 PROCEDURE — 2709999900 HC NON-CHARGEABLE SUPPLY: Performed by: SURGERY

## 2025-02-19 PROCEDURE — 11042 DBRDMT SUBQ TIS 1ST 20SQCM/<: CPT | Performed by: SURGERY

## 2025-02-19 PROCEDURE — 88305 TISSUE EXAM BY PATHOLOGIST: CPT | Performed by: PATHOLOGY

## 2025-02-19 PROCEDURE — 1200000000 HC SEMI PRIVATE

## 2025-02-19 PROCEDURE — 0Y6R0Z0 DETACHMENT AT RIGHT 2ND TOE, COMPLETE, OPEN APPROACH: ICD-10-PCS | Performed by: SURGERY

## 2025-02-19 PROCEDURE — 82962 GLUCOSE BLOOD TEST: CPT

## 2025-02-19 PROCEDURE — 2580000003 HC RX 258: Performed by: NURSE PRACTITIONER

## 2025-02-19 PROCEDURE — 6370000000 HC RX 637 (ALT 250 FOR IP): Performed by: STUDENT IN AN ORGANIZED HEALTH CARE EDUCATION/TRAINING PROGRAM

## 2025-02-19 PROCEDURE — 11045 DBRDMT SUBQ TISS EACH ADDL: CPT | Performed by: SURGERY

## 2025-02-19 PROCEDURE — 0JBQ0ZZ EXCISION OF RIGHT FOOT SUBCUTANEOUS TISSUE AND FASCIA, OPEN APPROACH: ICD-10-PCS | Performed by: SURGERY

## 2025-02-19 PROCEDURE — 0QBQ0ZZ EXCISION OF RIGHT TOE PHALANX, OPEN APPROACH: ICD-10-PCS | Performed by: SURGERY

## 2025-02-19 PROCEDURE — 36415 COLL VENOUS BLD VENIPUNCTURE: CPT

## 2025-02-19 PROCEDURE — 6360000002 HC RX W HCPCS: Performed by: NURSE ANESTHETIST, CERTIFIED REGISTERED

## 2025-02-19 PROCEDURE — 3700000001 HC ADD 15 MINUTES (ANESTHESIA): Performed by: SURGERY

## 2025-02-19 RX ORDER — NALOXONE HYDROCHLORIDE 0.4 MG/ML
INJECTION, SOLUTION INTRAMUSCULAR; INTRAVENOUS; SUBCUTANEOUS PRN
Status: CANCELLED | OUTPATIENT
Start: 2025-02-19

## 2025-02-19 RX ORDER — FENTANYL CITRATE 50 UG/ML
INJECTION, SOLUTION INTRAMUSCULAR; INTRAVENOUS
Status: DISCONTINUED | OUTPATIENT
Start: 2025-02-19 | End: 2025-02-19 | Stop reason: SDUPTHER

## 2025-02-19 RX ORDER — SODIUM CHLORIDE 9 MG/ML
INJECTION, SOLUTION INTRAVENOUS PRN
Status: CANCELLED | OUTPATIENT
Start: 2025-02-19

## 2025-02-19 RX ORDER — ONDANSETRON 2 MG/ML
4 INJECTION INTRAMUSCULAR; INTRAVENOUS
Status: CANCELLED | OUTPATIENT
Start: 2025-02-19 | End: 2025-02-20

## 2025-02-19 RX ORDER — PROCHLORPERAZINE EDISYLATE 5 MG/ML
5 INJECTION INTRAMUSCULAR; INTRAVENOUS
Status: CANCELLED | OUTPATIENT
Start: 2025-02-19 | End: 2025-02-20

## 2025-02-19 RX ORDER — SODIUM CHLORIDE 0.9 % (FLUSH) 0.9 %
5-40 SYRINGE (ML) INJECTION PRN
Status: CANCELLED | OUTPATIENT
Start: 2025-02-19

## 2025-02-19 RX ORDER — LIDOCAINE HYDROCHLORIDE 10 MG/ML
INJECTION, SOLUTION EPIDURAL; INFILTRATION; INTRACAUDAL; PERINEURAL
Status: COMPLETED | OUTPATIENT
Start: 2025-02-19 | End: 2025-02-19

## 2025-02-19 RX ORDER — FENTANYL CITRATE 50 UG/ML
25 INJECTION, SOLUTION INTRAMUSCULAR; INTRAVENOUS EVERY 5 MIN PRN
Status: CANCELLED | OUTPATIENT
Start: 2025-02-19

## 2025-02-19 RX ORDER — GINSENG 100 MG
CAPSULE ORAL
Status: COMPLETED | OUTPATIENT
Start: 2025-02-19 | End: 2025-02-19

## 2025-02-19 RX ORDER — OXYCODONE AND ACETAMINOPHEN 5; 325 MG/1; MG/1
2 TABLET ORAL EVERY 4 HOURS PRN
Status: DISCONTINUED | OUTPATIENT
Start: 2025-02-19 | End: 2025-02-22 | Stop reason: HOSPADM

## 2025-02-19 RX ORDER — HYDRALAZINE HYDROCHLORIDE 20 MG/ML
10 INJECTION INTRAMUSCULAR; INTRAVENOUS
Status: CANCELLED | OUTPATIENT
Start: 2025-02-19

## 2025-02-19 RX ORDER — SODIUM CHLORIDE 0.9 % (FLUSH) 0.9 %
5-40 SYRINGE (ML) INJECTION EVERY 12 HOURS SCHEDULED
Status: CANCELLED | OUTPATIENT
Start: 2025-02-19

## 2025-02-19 RX ORDER — ACETAMINOPHEN 325 MG/1
650 TABLET ORAL
Status: CANCELLED | OUTPATIENT
Start: 2025-02-19 | End: 2025-02-20

## 2025-02-19 RX ORDER — CLOPIDOGREL BISULFATE 75 MG/1
75 TABLET ORAL DAILY
Status: DISCONTINUED | OUTPATIENT
Start: 2025-02-20 | End: 2025-02-22 | Stop reason: HOSPADM

## 2025-02-19 RX ORDER — OXYCODONE AND ACETAMINOPHEN 5; 325 MG/1; MG/1
1 TABLET ORAL EVERY 4 HOURS PRN
Status: DISCONTINUED | OUTPATIENT
Start: 2025-02-19 | End: 2025-02-22 | Stop reason: HOSPADM

## 2025-02-19 RX ORDER — PROPOFOL 10 MG/ML
INJECTION, EMULSION INTRAVENOUS
Status: DISCONTINUED | OUTPATIENT
Start: 2025-02-19 | End: 2025-02-19 | Stop reason: SDUPTHER

## 2025-02-19 RX ADMIN — SODIUM CHLORIDE 25 ML: 900 INJECTION INTRAVENOUS at 04:40

## 2025-02-19 RX ADMIN — AMPICILLIN SODIUM 2000 MG: 2 INJECTION, POWDER, FOR SOLUTION INTRAMUSCULAR; INTRAVENOUS at 19:04

## 2025-02-19 RX ADMIN — ENOXAPARIN SODIUM 135 MG: 150 INJECTION SUBCUTANEOUS at 21:51

## 2025-02-19 RX ADMIN — FENTANYL CITRATE 25 MCG: 50 INJECTION, SOLUTION INTRAMUSCULAR; INTRAVENOUS at 12:46

## 2025-02-19 RX ADMIN — RANOLAZINE 500 MG: 500 TABLET, EXTENDED RELEASE ORAL at 09:27

## 2025-02-19 RX ADMIN — CEFTRIAXONE SODIUM 2000 MG: 2 INJECTION, POWDER, FOR SOLUTION INTRAMUSCULAR; INTRAVENOUS at 02:19

## 2025-02-19 RX ADMIN — PHENYLEPHRINE HYDROCHLORIDE 100 MCG: 10 INJECTION INTRAVENOUS at 12:47

## 2025-02-19 RX ADMIN — TAMSULOSIN HYDROCHLORIDE 0.4 MG: 0.4 CAPSULE ORAL at 09:27

## 2025-02-19 RX ADMIN — INSULIN HUMAN 5 UNITS: 500 INJECTION, SOLUTION SUBCUTANEOUS at 09:32

## 2025-02-19 RX ADMIN — INSULIN HUMAN 15 UNITS: 500 INJECTION, SOLUTION SUBCUTANEOUS at 22:56

## 2025-02-19 RX ADMIN — INSULIN HUMAN 10 UNITS: 500 INJECTION, SOLUTION SUBCUTANEOUS at 18:12

## 2025-02-19 RX ADMIN — SODIUM CHLORIDE 25 ML: 900 INJECTION INTRAVENOUS at 01:41

## 2025-02-19 RX ADMIN — RANOLAZINE 500 MG: 500 TABLET, EXTENDED RELEASE ORAL at 21:51

## 2025-02-19 RX ADMIN — METOPROLOL SUCCINATE 25 MG: 25 TABLET, EXTENDED RELEASE ORAL at 09:28

## 2025-02-19 RX ADMIN — SODIUM CHLORIDE 25 ML: 900 INJECTION INTRAVENOUS at 02:18

## 2025-02-19 RX ADMIN — CEFAZOLIN 2000 MG: 2 INJECTION, POWDER, FOR SOLUTION INTRAMUSCULAR; INTRAVENOUS at 12:25

## 2025-02-19 RX ADMIN — SODIUM CHLORIDE 25 ML: 900 INJECTION INTRAVENOUS at 21:54

## 2025-02-19 RX ADMIN — PHENYLEPHRINE HYDROCHLORIDE 200 MCG: 10 INJECTION INTRAVENOUS at 12:37

## 2025-02-19 RX ADMIN — CEFTRIAXONE SODIUM 2000 MG: 2 INJECTION, POWDER, FOR SOLUTION INTRAMUSCULAR; INTRAVENOUS at 15:50

## 2025-02-19 RX ADMIN — FINASTERIDE 5 MG: 5 TABLET, FILM COATED ORAL at 09:27

## 2025-02-19 RX ADMIN — AMPICILLIN SODIUM 2000 MG: 2 INJECTION, POWDER, FOR SOLUTION INTRAMUSCULAR; INTRAVENOUS at 21:55

## 2025-02-19 RX ADMIN — SPIRONOLACTONE 25 MG: 50 TABLET ORAL at 21:51

## 2025-02-19 RX ADMIN — AMPICILLIN SODIUM 2000 MG: 2 INJECTION, POWDER, FOR SOLUTION INTRAMUSCULAR; INTRAVENOUS at 09:40

## 2025-02-19 RX ADMIN — AMPICILLIN SODIUM 2000 MG: 2 INJECTION, POWDER, FOR SOLUTION INTRAMUSCULAR; INTRAVENOUS at 04:40

## 2025-02-19 RX ADMIN — INSULIN HUMAN 5 UNITS: 500 INJECTION, SOLUTION SUBCUTANEOUS at 18:12

## 2025-02-19 RX ADMIN — AMPICILLIN SODIUM 2000 MG: 2 INJECTION, POWDER, FOR SOLUTION INTRAMUSCULAR; INTRAVENOUS at 14:57

## 2025-02-19 RX ADMIN — SPIRONOLACTONE 25 MG: 50 TABLET ORAL at 09:27

## 2025-02-19 RX ADMIN — TORSEMIDE 40 MG: 20 TABLET ORAL at 09:28

## 2025-02-19 RX ADMIN — TORSEMIDE 40 MG: 20 TABLET ORAL at 21:50

## 2025-02-19 RX ADMIN — SODIUM CHLORIDE 25 ML/HR: 900 INJECTION INTRAVENOUS at 09:39

## 2025-02-19 RX ADMIN — METOLAZONE 2.5 MG: 2.5 TABLET ORAL at 09:29

## 2025-02-19 RX ADMIN — PROPOFOL 100 MCG/KG/MIN: 10 INJECTION, EMULSION INTRAVENOUS at 12:30

## 2025-02-19 RX ADMIN — AMPICILLIN SODIUM 2000 MG: 2 INJECTION, POWDER, FOR SOLUTION INTRAMUSCULAR; INTRAVENOUS at 01:41

## 2025-02-19 ASSESSMENT — PAIN - FUNCTIONAL ASSESSMENT: PAIN_FUNCTIONAL_ASSESSMENT: 0-10

## 2025-02-19 NOTE — PROGRESS NOTES
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 General surgery evaluated s/p bedside excisional debridement of necrotic tissue from wound at the plantar surface . MRI ordered. Blood cx x1 +enterococcus faecalis therefore ID consulted.   GS plans noted for second and third toe  amputation and foot debridement   Imaging: Imaging reviewed. CXR  non acute. Plain films of right foot show acute displaced fracture of 2nd and 3rd distal phalanx. MRI right foot + OM second distal phalanx and the third middle and distal phalanges.  Overlying ulcers in both locations are noted.  Additional soft tissue ulceration at the plantar surface of the forefoot overlying the first MTP joint with likely adjacent surrounding cellulitis .  .  TTE findings noted. TEJ  with no evidence of endocarditis, mild MR.   EP evaluation and recommendations noted  Micro data: Repeat blood cx 2/15 NGTD. Wound cx & + pansensitive Proteus mirabilis. Blood cx x 1  +enterococcus faecalis(s-vanc/amp)  WBC 9.2<--11.9 ( adm), CRP 40.8<-- 159<--113, ESR 26. Procal 0.28<--0.13. Afebrile sice adm  Antimicrobial summary: IV Cefepime --; IV Vanc -; IV Ampicillin -; IV Ceftriaxone -     Right second and third toe fracture  Imaging as noted.  Per primary team  DM II A1C 7.5  On insulin, Endocrine following  SSS s/p pacemaker  Severe obesity, BMI 41.6  Allergy: no abx allergy  GFR 63  Comorbid conditions: SSS s/p pacemaker (), CVA, MARINO, HTN, DM II, CKD III, HLD, Obesity,     Plan  Therapeutic: Continue IV Ampicillin and IV Ceftriaxone pending surgical cx  Diagnostic:   Trend Pro-Brad, ESR, CRP, CBC  Recommend surgical cx and pathology  F/u: surgical cx and pathology  Other:       Reason for visit: F/u Enterococcus

## 2025-02-19 NOTE — CARE COORDINATION
Reviewed medical record and discussed pt in IDR. Pt has infected pacemaker but will f/u with his EP provider outpatient. Pt will be having toe amputation today at noon. Per Dr. Gonzales, pt may need IV atb's at discharge. ID's notes state continue IV Ampicillin and IV Ceftriaxone pending surgical cultures. CM following.

## 2025-02-19 NOTE — ANESTHESIA POSTPROCEDURE EVALUATION
Department of Anesthesiology  Postprocedure Note    Patient: Bandar De La Torre  MRN: [id number]  YOB: 1960  Date of evaluation: 2/19/2025    Procedure Summary       Date: 02/19/25 Room / Location: [address]    Anesthesia Start: 1224 Anesthesia Stop: 1337    Procedure: RIGHT FOOT 2ND AND 3RD TOE AMPUTATION WITH FOOT DEBRIDEMENT (Right) Diagnosis:       Osteomyelitis of toe (HCC)      Ulcer of foot, limited to breakdown of skin, unspecified laterality (HCC)      (Osteomyelitis of toe [M86.9])      (Ulcer of foot, limited to breakdown of skin, unspecified laterality (HCC) [L97.501])    Surgeons: Jakob Avalos MD Responsible Provider: Travis Zeng MD    Anesthesia Type: MAC, general ASA Status: 4            Anesthesia Type: No value filed.    Savanna Phase I: Savanna Score: 10    Savanna Phase II:      Anesthesia Post Evaluation    Patient location during evaluation: floor  Patient participation: complete - patient participated  Level of consciousness: awake and alert  Airway patency: patent  Nausea & Vomiting: no nausea and no vomiting  Cardiovascular status: blood pressure returned to baseline  Respiratory status: spontaneous ventilation, room air and acceptable  Hydration status: stable  Pain management: adequate    No notable events documented.

## 2025-02-19 NOTE — PROGRESS NOTES
GENERAL SURGERY PROGRESS NOTE    Bandar De La Torre is a 64 y.o. male with right foot and toe wounds.                 Subjective:  Doing ok today. Questions answered regarding planned surgery    Objective:    Vitals: VITALS:  /66   Pulse 60   Temp 96.8 °F (36 °C) (Temporal)   Resp 15   Ht 1.829 m (6')   Wt 134.8 kg (297 lb 3.2 oz)   SpO2 97%   BMI 40.31 kg/m²     I/O: 02/18 0701 - 02/19 0700  In: -   Out: 1900 [Urine:1900]    Labs/Imaging Results:   Lab Results   Component Value Date/Time     02/18/2025 01:33 AM    K 3.3 02/18/2025 01:33 AM    CL 93 02/18/2025 01:33 AM    CO2 30 02/18/2025 01:33 AM    BUN 42 02/18/2025 01:33 AM    CREATININE 1.4 02/18/2025 01:33 AM    GLUCOSE 326 02/18/2025 01:33 AM    CALCIUM 9.7 02/18/2025 01:33 AM      Lab Results   Component Value Date    WBC 9.2 02/19/2025    HGB 14.0 02/19/2025    HCT 43.5 02/19/2025    MCV 83.8 02/19/2025     02/19/2025         IV Fluids:   dextrose    sodium chloride Last Rate: 25 mL/hr (02/19/25 0939)    Scheduled Meds:   ampicillin IV, 2,000 mg, IntraVENous, 6 times per day    cefTRIAXone (ROCEPHIN) IV, 2,000 mg, IntraVENous, Q12H    insulin regular human, 5 Units, SubCUTAneous, Dinner    insulin regular human, 5 Units, SubCUTAneous, Lunch    insulin regular human, 5 Units, SubCUTAneous, QAM    enoxaparin, 1 mg/kg, SubCUTAneous, BID    metoprolol succinate, 25 mg, Oral, Daily    [Held by provider] clopidogrel, 75 mg, Oral, Daily    finasteride, 5 mg, Oral, Daily    ranolazine, 500 mg, Oral, BID    [Held by provider] rivaroxaban, 20 mg, Oral, Dinner    tamsulosin, 0.4 mg, Oral, Daily    metOLazone, 2.5 mg, Oral, Daily    spironolactone, 25 mg, Oral, BID    sodium chloride flush, 5-40 mL, IntraVENous, 2 times per day    torsemide, 40 mg, Oral, BID    insulin regular human, 0-30 Units, SubCUTAneous, 4x Daily AC & HS    Physical Exam:  General: A&O x 3, no distress.   HEENT: Anicteric sclerae, MMM.    Extremities: right foot with

## 2025-02-19 NOTE — OP NOTE
Operative Note      Patient: Bandar De La Torre  YOB: 1960  MRN: [id number]    Date of Procedure: 2/19/2025    Pre-Op Diagnosis Codes:      * Osteomyelitis of toe ([facility]) [M86.9]     * Ulcer of foot, limited to breakdown of skin, unspecified laterality ([facility]) [L97.501]    Post-Op Diagnosis:   Osteomyelitis of toes 2 and 3  Foot ulcer x2       Procedure(s):  RIGHT FOOT 2ND AND 3RD TOE AMPUTATION WITH FOOT DEBRIDEMENT    Surgeon(s):  Jakob Avalos MD    Assistant:   * No surgical staff found *    Anesthesia: Choice    Estimated Blood Loss (mL): 20cc    Complications: None    Specimens:   ID Type Source Tests Collected by Time Destination   1 : 3RD TOE RIGHT FOOT BONE CULTURE Bone Bone CULTURE, SURGICAL Jakob Avalos MD 2/19/2025 1309    A : 2nd Toe Right Foot Specimen Toe SURGICAL PATHOLOGY Jakob Avalos MD 2/19/2025 1254    B : 3rd Toe Right Foot Specimen Toe SURGICAL PATHOLOGY Jakob Avalos MD 2/19/2025 1255        Implants:  * No implants in log *      Drains:   [REMOVED] External Urinary Catheter (Removed)   Site Assessment Clean,dry & intact 02/17/25 2259   Placement Replaced 02/17/25 2259   Securement Method Securing device (Describe) 02/17/25 2259   Catheter Care Catheter/Wick replaced 02/17/25 2259   Perineal Care No 02/17/25 2259   Suction 40 mmgHg continuous 02/17/25 2259   Urine Color Yellow 02/18/25 0354   Urine Appearance Clear 02/18/25 0354   Urine Odor Other (Comment) 02/18/25 0354   Output (mL) 550 mL 02/18/25 0354       Findings:  Infection Present At Time Of Surgery (PATOS) (choose all levels that have infection present):  - Organ Space infection (below fascia) present as evidenced by pus  Other Findings: pus and spongy exposed bone at the tip of the right 3rd toe    Detailed Description of Procedure:   Procedure Details:    The patient was seen again in the Holding Room. The risks, benefits, complications, treatment options, and expected outcomes were discussed

## 2025-02-19 NOTE — PROGRESS NOTES
V2.0    [facility] Progress Note      Name:  Bandar De La Torre /Age/Sex: 1960  (64 y.o. male)   MRN & CSN:  [id number] & [id number] Encounter Date/Time: 2025 11:50 AM EST   Location:  [facility]8-A PCP: MEETA Diehl MD     Attending:Too Gonzales MD       Hospital Day: 7    Assessment and Recommendations   Bandar De La Torre is a 64 y.o. male  who presents with Cellulitis of foot    -discussed with his wife-she questions if he needs to go to a SNF as she reports that he had a fall at home and she had to drag him, ordered PT/OT     - amputation planned tomorrow  -continue abx      Diabetic foot wound concern for infection.   Right foot phalanx fracture  No evidence of osteomyelitis on x-ray  -Continue vanc+cefepime  -MRI foot with/without contrast pending  -Follow-up wound cultures  -Check CRP, sed rate  -Wound care  -General Surgery consulted and patient underwent bedside debridement, planning right  toe amputation  -Follow-up on cultures    Enterococcal bacteremia   Severe sepsis  -Blood cultures growing Enterococcus  -Continue broad-spectrum antibiotics  -Adequate hydration  -TTE /TEJ completed.  No evidence of IE.  EF 55 to 60%  -ID consulted and following        Type II diabetes  On home insulin unclear exact dose, plus Farxiga plus glipizide. Last HA1C 7.5 2024  - hypoglycemia protocol  - MDSSI  -Will do Lantus 10 units nightly  - endo consulted  - Check HA1C     Hypokalemia  K 2.9 on admission  -Replete and monitor with repeat labs     CKD III.    -Cr around baseline  -Resume home spironolactone, torsemide and metalazone        Atrial fibrillation.   -On home Xarelo.   -Holding until no surgeries planned     PVD.  -On home Plavix and Xarelto  -Resume as appropriate     Chronic HFpEF  Last  ECHO  EF 50%, severely dilated left atrium  Has bilateral leg edema, no SOB, BNP 1000  - home diuretics resumed      Diet Diet NPO Exceptions are: Sips of Water with Meds   DVT  BID    insulin regular human  0-30 Units SubCUTAneous 4x Daily AC & HS      Infusions:    dextrose      sodium chloride 25 mL/hr (02/19/25 0939)     PRN Meds: glucose, 4 tablet, PRN  dextrose bolus, 125 mL, PRN   Or  dextrose bolus, 250 mL, PRN  glucagon (rDNA), 1 mg, PRN  dextrose, , Continuous PRN  sodium chloride flush, 5-40 mL, PRN  sodium chloride, , PRN  potassium chloride, 40 mEq, PRN   Or  potassium alternative oral replacement, 40 mEq, PRN   Or  potassium chloride, 10 mEq, PRN  magnesium sulfate, 2,000 mg, PRN  ondansetron, 4 mg, Q8H PRN   Or  ondansetron, 4 mg, Q6H PRN  polyethylene glycol, 17 g, Daily PRN  acetaminophen, 650 mg, Q6H PRN   Or  acetaminophen, 650 mg, Q6H PRN        Labs and Imaging   XR FOOT RIGHT (MIN 3 VIEWS)    Result Date: 2/13/2025  PROCEDURE: XR FOOT RIGHT (MIN 3 VIEWS) DATE OF EXAM:  2/13/2025 14:11 DEMOGRAPHICS: 64 years old Male INDICATION: wound COMPARISON: No existing relevant imaging study corresponding to the same anatomical region is available. TECHNIQUE: 3 Nonweightbearing views of right foot. FINDINGS: Displaced comminuted appearing fracture of the second distal phalanx. Nondisplaced to minimally displaced fracture of the third distal phalanx. No significant widening at the metatarsal bases. Mild multifocal osteoarthrosis throughout the foot. Tiny plantar calcaneal enthesophyte. Soft tissue swelling of the second and third digits. Vascular calcifications. IMPRESSION:  1.  Acute displaced fracture of the second distal phalanx. 2.  Acute fracture of the third distal phalanx. 3.  Additional findings as above. This dictation was created with voice recognition software.  While attempts have  been made to review the dictation as it is transcribed, on occasion the spoken word can be misinterpreted by the technology leading to omissions or inappropriate words, phrases or sentences.  Dictated and Electronically Signed By: Edy Lockwood MD 2/13/2025 13:40        XR WRIST LEFT (MIN 3

## 2025-02-19 NOTE — ANESTHESIA PRE PROCEDURE
Department of Anesthesiology  Preprocedure Note       Name:  Bandar De La Torre   Age:  64 y.o.  :  1960                                          MRN:  [id number]         Date:  2025      Surgeon: Surgeon(s):  Jakob Avalos MD    Procedure: Procedure(s):  RIGHT FOOT 3RD TOE AMPUTATION    Medications prior to admission:   Prior to Admission medications    Medication Sig Start Date End Date Taking? Authorizing Provider   Continuous Glucose Sensor (DEXCOM G7 SENSOR) MISC USE AS DIRECTED AS NEEDED FOR 90 DAYS 24  Yes April Díaz MD   spironolactone (ALDACTONE) 25 MG tablet Take 1 tablet by mouth 2 times daily  Patient taking differently: Take 1 tablet by mouth 2 times daily 25 frequency increased today per Dr. Bower. 25  Yes Antonino Bower MD   insulin regular human (HUMULIN R U-500 KWIKPEN) 500 UNIT/ML SOPN concentrated injection pen Inject 10 Units into the skin nightly   Yes April Díaz MD   metOLazone (ZAROXOLYN) 2.5 MG tablet Take 1 tablet by mouth once daily 25  Yes Antonino Bower MD   KLOR-CON M20 20 MEQ extended release tablet TAKE 4 TABLETS BY MOUTH TWICE DAILY 25  Yes Antonino Bower MD   torsemide (DEMADEX) 20 MG tablet Take 2 tablets by mouth in the morning and at bedtime 24  Yes Antonino Bower MD   ONETOUCH VERIO strip USE 1 STRIP TO CHECK GLUCOSE THREE TIMES DAILY TO 4 TIMES DAILY AS DIRECTED 6/15/23  Yes April Díaz MD   RELION INSULIN SYRINGE 31G X 15/64\" 1 ML MISC USE AS DIRECTED THREE TIMES DAILY 22  Yes April Díaz MD   dapagliflozin (FARXIGA) 5 MG tablet Take 1 tablet by mouth every morning 22  Yes Antonino Bower MD   insulin regular human (HUMULIN R U-500) 500 UNIT/ML concentrated injection vial Inject 0-15 Units into the skin 3 times daily (before meals) Take insulin 5units  three time with meals. Hold if glucose <150 or if meal < 50%    Take insulin sliding scale as below  If

## 2025-02-20 LAB
ANION GAP SERPL CALCULATED.3IONS-SCNC: 14 MMOL/L (ref 9–17)
ANION GAP SERPL CALCULATED.3IONS-SCNC: 20 MMOL/L (ref 9–17)
BUN SERPL-MCNC: 50 MG/DL (ref 7–20)
BUN SERPL-MCNC: 53 MG/DL (ref 7–20)
CALCIUM SERPL-MCNC: 9.4 MG/DL (ref 8.3–10.6)
CALCIUM SERPL-MCNC: 9.8 MG/DL (ref 8.3–10.6)
CHLORIDE SERPL-SCNC: 92 MMOL/L (ref 99–110)
CHLORIDE SERPL-SCNC: 93 MMOL/L (ref 99–110)
CO2 SERPL-SCNC: 26 MMOL/L (ref 21–32)
CO2 SERPL-SCNC: 30 MMOL/L (ref 21–32)
CREAT SERPL-MCNC: 1.8 MG/DL (ref 0.8–1.3)
CREAT SERPL-MCNC: 1.8 MG/DL (ref 0.8–1.3)
CRP SERPL HS-MCNC: 35.5 MG/L (ref 0–5)
ERYTHROCYTE [DISTWIDTH] IN BLOOD BY AUTOMATED COUNT: 16.7 % (ref 11.7–14.9)
ERYTHROCYTE [SEDIMENTATION RATE] IN BLOOD BY WESTERGREN METHOD: 52 MM/HR (ref 0–20)
GFR, ESTIMATED: 38 ML/MIN/1.73M2
GFR, ESTIMATED: 38 ML/MIN/1.73M2
GLUCOSE BLD-MCNC: 135 MG/DL (ref 74–99)
GLUCOSE BLD-MCNC: 199 MG/DL (ref 74–99)
GLUCOSE BLD-MCNC: 274 MG/DL (ref 74–99)
GLUCOSE BLD-MCNC: 275 MG/DL (ref 74–99)
GLUCOSE BLD-MCNC: 276 MG/DL (ref 74–99)
GLUCOSE BLD-MCNC: 309 MG/DL (ref 74–99)
GLUCOSE BLD-MCNC: 317 MG/DL (ref 74–99)
GLUCOSE SERPL-MCNC: 228 MG/DL (ref 74–99)
GLUCOSE SERPL-MCNC: 267 MG/DL (ref 74–99)
HCT VFR BLD AUTO: 46.2 % (ref 42–52)
HGB BLD-MCNC: 14.6 G/DL (ref 13.5–18)
MCH RBC QN AUTO: 27.1 PG (ref 27–31)
MCHC RBC AUTO-ENTMCNC: 31.6 G/DL (ref 32–36)
MCV RBC AUTO: 85.9 FL (ref 78–100)
MICROORGANISM SPEC CULT: NORMAL
MICROORGANISM SPEC CULT: NORMAL
PLATELET # BLD AUTO: 227 K/UL (ref 140–440)
PMV BLD AUTO: 11.1 FL (ref 7.5–11.1)
POTASSIUM SERPL-SCNC: 3.1 MMOL/L (ref 3.5–5.1)
POTASSIUM SERPL-SCNC: 3.5 MMOL/L (ref 3.5–5.1)
RBC # BLD AUTO: 5.38 M/UL (ref 4.6–6.2)
SERVICE CMNT-IMP: NORMAL
SERVICE CMNT-IMP: NORMAL
SODIUM SERPL-SCNC: 138 MMOL/L (ref 136–145)
SODIUM SERPL-SCNC: 138 MMOL/L (ref 136–145)
SPECIMEN DESCRIPTION: NORMAL
SPECIMEN DESCRIPTION: NORMAL
WBC OTHER # BLD: 8.1 K/UL (ref 4–10.5)

## 2025-02-20 PROCEDURE — 6370000000 HC RX 637 (ALT 250 FOR IP): Performed by: SURGERY

## 2025-02-20 PROCEDURE — 6370000000 HC RX 637 (ALT 250 FOR IP): Performed by: INTERNAL MEDICINE

## 2025-02-20 PROCEDURE — 2580000003 HC RX 258: Performed by: SURGERY

## 2025-02-20 PROCEDURE — 97166 OT EVAL MOD COMPLEX 45 MIN: CPT

## 2025-02-20 PROCEDURE — 80048 BASIC METABOLIC PNL TOTAL CA: CPT

## 2025-02-20 PROCEDURE — 99232 SBSQ HOSP IP/OBS MODERATE 35: CPT | Performed by: NURSE PRACTITIONER

## 2025-02-20 PROCEDURE — 6360000002 HC RX W HCPCS: Performed by: SURGERY

## 2025-02-20 PROCEDURE — 36415 COLL VENOUS BLD VENIPUNCTURE: CPT

## 2025-02-20 PROCEDURE — 97162 PT EVAL MOD COMPLEX 30 MIN: CPT

## 2025-02-20 PROCEDURE — 97530 THERAPEUTIC ACTIVITIES: CPT

## 2025-02-20 PROCEDURE — 94761 N-INVAS EAR/PLS OXIMETRY MLT: CPT

## 2025-02-20 PROCEDURE — 85652 RBC SED RATE AUTOMATED: CPT

## 2025-02-20 PROCEDURE — 82962 GLUCOSE BLOOD TEST: CPT

## 2025-02-20 PROCEDURE — 85027 COMPLETE CBC AUTOMATED: CPT

## 2025-02-20 PROCEDURE — 2500000003 HC RX 250 WO HCPCS: Performed by: SURGERY

## 2025-02-20 PROCEDURE — 99024 POSTOP FOLLOW-UP VISIT: CPT | Performed by: SURGERY

## 2025-02-20 PROCEDURE — 1200000000 HC SEMI PRIVATE

## 2025-02-20 PROCEDURE — 86140 C-REACTIVE PROTEIN: CPT

## 2025-02-20 RX ADMIN — AMPICILLIN SODIUM 2000 MG: 2 INJECTION, POWDER, FOR SOLUTION INTRAMUSCULAR; INTRAVENOUS at 18:19

## 2025-02-20 RX ADMIN — INSULIN HUMAN 10 UNITS: 500 INJECTION, SOLUTION SUBCUTANEOUS at 13:08

## 2025-02-20 RX ADMIN — CEFTRIAXONE SODIUM 2000 MG: 2 INJECTION, POWDER, FOR SOLUTION INTRAMUSCULAR; INTRAVENOUS at 02:36

## 2025-02-20 RX ADMIN — SPIRONOLACTONE 25 MG: 50 TABLET ORAL at 10:47

## 2025-02-20 RX ADMIN — TORSEMIDE 40 MG: 20 TABLET ORAL at 10:47

## 2025-02-20 RX ADMIN — FINASTERIDE 5 MG: 5 TABLET, FILM COATED ORAL at 10:47

## 2025-02-20 RX ADMIN — INSULIN HUMAN 15 UNITS: 500 INJECTION, SOLUTION SUBCUTANEOUS at 13:12

## 2025-02-20 RX ADMIN — ENOXAPARIN SODIUM 135 MG: 150 INJECTION SUBCUTANEOUS at 10:47

## 2025-02-20 RX ADMIN — SODIUM CHLORIDE 25 ML: 900 INJECTION INTRAVENOUS at 06:54

## 2025-02-20 RX ADMIN — AMPICILLIN SODIUM 2000 MG: 2 INJECTION, POWDER, FOR SOLUTION INTRAMUSCULAR; INTRAVENOUS at 22:13

## 2025-02-20 RX ADMIN — INSULIN HUMAN 20 UNITS: 500 INJECTION, SOLUTION SUBCUTANEOUS at 17:38

## 2025-02-20 RX ADMIN — ENOXAPARIN SODIUM 135 MG: 150 INJECTION SUBCUTANEOUS at 22:06

## 2025-02-20 RX ADMIN — SODIUM CHLORIDE, PRESERVATIVE FREE 10 ML: 5 INJECTION INTRAVENOUS at 22:13

## 2025-02-20 RX ADMIN — TORSEMIDE 40 MG: 20 TABLET ORAL at 22:07

## 2025-02-20 RX ADMIN — AMPICILLIN SODIUM 2000 MG: 2 INJECTION, POWDER, FOR SOLUTION INTRAMUSCULAR; INTRAVENOUS at 06:55

## 2025-02-20 RX ADMIN — TAMSULOSIN HYDROCHLORIDE 0.4 MG: 0.4 CAPSULE ORAL at 10:47

## 2025-02-20 RX ADMIN — SODIUM CHLORIDE 25 ML: 900 INJECTION INTRAVENOUS at 01:58

## 2025-02-20 RX ADMIN — AMPICILLIN SODIUM 2000 MG: 2 INJECTION, POWDER, FOR SOLUTION INTRAMUSCULAR; INTRAVENOUS at 01:58

## 2025-02-20 RX ADMIN — INSULIN HUMAN 15 UNITS: 500 INJECTION, SOLUTION SUBCUTANEOUS at 07:49

## 2025-02-20 RX ADMIN — RANOLAZINE 500 MG: 500 TABLET, EXTENDED RELEASE ORAL at 22:07

## 2025-02-20 RX ADMIN — RANOLAZINE 500 MG: 500 TABLET, EXTENDED RELEASE ORAL at 10:47

## 2025-02-20 RX ADMIN — AMPICILLIN SODIUM 2000 MG: 2 INJECTION, POWDER, FOR SOLUTION INTRAMUSCULAR; INTRAVENOUS at 14:09

## 2025-02-20 RX ADMIN — METOLAZONE 2.5 MG: 2.5 TABLET ORAL at 10:47

## 2025-02-20 RX ADMIN — SODIUM CHLORIDE 25 ML: 900 INJECTION INTRAVENOUS at 02:35

## 2025-02-20 RX ADMIN — SPIRONOLACTONE 25 MG: 50 TABLET ORAL at 22:07

## 2025-02-20 RX ADMIN — INSULIN HUMAN 10 UNITS: 500 INJECTION, SOLUTION SUBCUTANEOUS at 17:38

## 2025-02-20 RX ADMIN — AMPICILLIN SODIUM 2000 MG: 2 INJECTION, POWDER, FOR SOLUTION INTRAMUSCULAR; INTRAVENOUS at 10:52

## 2025-02-20 ASSESSMENT — PAIN SCALES - GENERAL: PAINLEVEL_OUTOF10: 0

## 2025-02-20 NOTE — PROGRESS NOTES
Occupational Therapy  [facility] ACUTE CARE OCCUPATIONAL THERAPY EVALUATION  Bandar De La Torre, 1960, 1118/1118-A, 2/20/2025    Discharge Recommendation: Facility for intensive rehabilitation, anticipate 3 hours per day and 5 days per week.      History  [place]:  The primary encounter diagnosis was Sepsis, due to unspecified organism, unspecified whether acute organ dysfunction present (HCC). Diagnoses of Hyperglycemia, Hypokalemia, Other acute osteomyelitis of right foot (HCC), Cardiomyopathy, unspecified type (HCC), Bacteremia due to Enterococcus, Osteomyelitis of toe (HCC), and Ulcer of foot, limited to breakdown of skin, unspecified laterality (HCC) were also pertinent to this visit.  Patient  has a past medical history of Atrial fibrillation (HCC), Cerebral artery occlusion with cerebral infarction (HCC), History of cardiac cath, Hyperlipidemia, Hypertension, Type II or unspecified type diabetes mellitus without mention of complication, not stated as uncontrolled, and Unspecified sleep apnea.  Patient  has a past surgical history that includes Tonsillectomy; Cardiac catheterization; and Toe amputation (Right, 2/19/2025).    Subjective:  Patient comments:  \"I am just so frustrated with the hospital for canceling my procedure twice\".    Pain:  Did not rate pain but reported his affected foot was \"fine\".    Communication with other providers: Nurse juan romero, co-eval with PT DELFINO Senior.   Restrictions: General Precautions, Fall Risk, HTWB, postop shoe    Home Setup/Prior level of function  Social/Functional History  Lives With: Spouse  Type of Home: House  Home Layout: Multi-level, Able to Live on Main level with bedroom/bathroom  Home Access: Stairs to enter with rails  Entrance Stairs - Number of Steps: 7  Entrance Stairs - Rails: Left  Bathroom Shower/Tub: Walk-in shower  Bathroom Equipment: Shower chair  Bathroom Accessibility: Accessible  Home Equipment: Cane, Walker - Rolling  Has

## 2025-02-20 NOTE — CONSULTS
[facility] ACUTE CARE PHYSICAL THERAPY EVALUATION  Bandar De La Torre, 1960, 1118/1118-A, 2/20/2025    History  [place]:  The primary encounter diagnosis was Sepsis, due to unspecified organism, unspecified whether acute organ dysfunction present (HCC). Diagnoses of Hyperglycemia, Hypokalemia, Other acute osteomyelitis of right foot (HCC), Cardiomyopathy, unspecified type (HCC), Bacteremia due to Enterococcus, Osteomyelitis of toe (HCC), and Ulcer of foot, limited to breakdown of skin, unspecified laterality (HCC) were also pertinent to this visit.  Patient  has a past medical history of Atrial fibrillation (HCC), Cerebral artery occlusion with cerebral infarction (HCC), History of cardiac cath, Hyperlipidemia, Hypertension, Type II or unspecified type diabetes mellitus without mention of complication, not stated as uncontrolled, and Unspecified sleep apnea.  Patient  has a past surgical history that includes Tonsillectomy; Cardiac catheterization; and Toe amputation (Right, 2/19/2025).    Recommendation: Facility for intensive rehabilitation, anticipate 3 hours per day and 5 days per week.    Subjective:  Patient states: \"I'm ready to get 'rehabbed'!.    Pain:  denies at rest, endorses some tightness near incision site.    Communication with other providers:  RN approval for therapy session, co-eval with OT Leonora and OT nupur Johansen  Restrictions: general precautions, falls, heel touch weightbearing RLE, post op shoe    Home Setup/Prior level of function  Social/Functional History  Lives With: Spouse  Type of Home: House  Home Layout: Multi-level, Able to Live on Main level with bedroom/bathroom  Home Access: Stairs to enter with rails  Entrance Stairs - Number of Steps: 7  Entrance Stairs - Rails: Left  Bathroom Shower/Tub: Walk-in shower  Bathroom Equipment: Shower chair  Bathroom Accessibility: Accessible  Home Equipment: Cane, Walker - Rolling  Has the patient had two or more falls in the past  with good tolerance, no overt LOB observed. Verbal cues for RW management and stepping direction  Educated pt on PT POC, role of PT, DME, heel touch WB RLE, post op shoe, importance of OOB mobility, discharge    [facility] 6 Clicks Inpatient Mobility:  AM-PAC Inpatient Mobility Raw Score : 17    Safety: patient left in recliner with alarm, call light within reach, all needs met, gait belt used.    Assessment:  Patient is a 64 year old male who presents with cellulitis of foot, and is s/p RIGHT FOOT 2ND AND 3RD TOE AMPUTATION WITH FOOT DEBRIDEMENT performed 2/19. Upon discharge, recommend Facility for intensive rehabilitation, anticipate 3 hours per day and 5 days per week. At baseline, patient is independent with all ADLs, IADLs, and gross mobility with no AD. They performed below their baseline with impaired gait, strength, balance, and activity tolerance. They would benefit from continued therapy to address their deficits, reduce fall risk, decrease burden of care, and restore PLOF.    Complexity: Moderate  Prognosis: Good, no significant barriers to participation at this time.    General Plan: 3-5 times per week  Equipment: continue to assess    Goals:  Short Term Goals  Time Frame for Short Term Goals: 2 weeks  Short Term Goal 1: Pt will complete supine <> sit Mary  Short Term Goal 2: Pt will complete sit <> stand supervision  Short Term Goal 3: Pt will ambulate 50ft with LRAD supervision  Short Term Goal 4: Pt will complete light dynamic standing actvity x3 minutes with single UE support, supervision  Short Term Goal 5: Pt will complete 7 steps with use of single handrail, SBA       Treatment plan:  Bed mobility, transfers, balance, gait, TA, TX, stairs    Recommendations for NURSING mobility: RW CGA ~5ft, gait belt, post op shoe RLE    Time:   Time in: 0902  Time out: 0929  Timed treatment minutes: 17  Total time: 27    Electronically signed by:    Nadeen Grissom, PT  2/20/2025, 11:56 AM

## 2025-02-20 NOTE — PROGRESS NOTES
Creatinine 1.8 (H) 0.8 - 1.3 mg/dL    Est, Glom Filt Rate 38 (L) >60 mL/min/1.73m2    Calcium 9.8 8.3 - 10.6 mg/dL   CBC    Collection Time: 02/20/25  1:29 AM   Result Value Ref Range    WBC 8.1 4.0 - 10.5 k/uL    RBC 5.38 4.60 - 6.20 m/uL    Hemoglobin 14.6 13.5 - 18.0 g/dL    Hematocrit 46.2 42.0 - 52.0 %    MCV 85.9 78.0 - 100.0 fL    MCH 27.1 27.0 - 31.0 pg    MCHC 31.6 (L) 32.0 - 36.0 g/dL    RDW 16.7 (H) 11.7 - 14.9 %    Platelets 227 140 - 440 k/uL    MPV 11.1 7.5 - 11.1 fL   C-Reactive Protein    Collection Time: 02/20/25  1:29 AM   Result Value Ref Range    CRP 35.5 (H) 0.0 - 5.0 mg/L   Sedimentation Rate    Collection Time: 02/20/25  1:29 AM   Result Value Ref Range    Sed Rate, Automated 52 (H) 0 - 20 mm/Hr   POCT Glucose    Collection Time: 02/20/25  6:51 AM   Result Value Ref Range    POC Glucose 276 (H) 74 - 99 mg/dL     CULTURE results: Invalid input(s): \"BLOOD CULTURE\", \"URINE CULTURE\", \"SURGICAL CULTURE\"    Diagnosis:  Patient Active Problem List   Diagnosis    MARINO on CPAP    New onset atrial fibrillation (HCC)    Type 2 diabetes mellitus with hyperglycemia, with long-term current use of insulin ([facility])    Class 3 severe obesity due to excess calories with body mass index (BMI) of 45.0 to 49.9 in adult    History of cardiac cath    Weakness of right arm    Acute CVA (cerebrovascular accident) ([facility])    Spastic hemiparesis of right dominant side ([facility])    Dysphagia due to recent stroke    Essential hypertension    Morbid obesity with body mass index of 45.0-49.9 in adult    Frequent falls    Chronic venous stasis dermatitis of both lower extremities    History of CVA (cerebrovascular accident)    Obesity hypoventilation syndrome    Hypertension    Hyperlipidemia    Congestive heart failure due to cardiomyopathy (HCC)    Stage 3b chronic kidney disease (HCC)    Chronic kidney disease, stage II (mild)    Benign essential microscopic hematuria    Stage 3a chronic kidney disease (HCC)    Generalized edema

## 2025-02-20 NOTE — PROGRESS NOTES
Progress Note( Dr. Diehl)  2/19/2025  Subjective:   Admit Date: 2/13/2025  PCP: MEETA Diehl MD    Admitted For : Right foot infection and high blood glucose     Consulted For:  Right foot infection and high blood glucose     Interval History: Patient had MRI of the right foot done which shows osteomyelitis of the second and third toes  Patient is scheduled for amputation of second and second and third toe of the right foot dated today    Patient has still hypokalemia and potassium being replaced      It appears like patient might need amputation of third toe according to his surgery note    Denies any chest pains,   Denies SOB .   Denies nausea or vomiting.   No new bowel or bladder symptoms.       Intake/Output Summary (Last 24 hours) at 2/19/2025 2042  Last data filed at 2/19/2025 0819  Gross per 24 hour   Intake --   Output 2300 ml   Net -2300 ml       DATA    CBC:   Recent Labs     02/18/25  0133 02/19/25  0117   WBC 8.4 9.2   HGB 14.3 14.0    217    CMP:  Recent Labs     02/18/25  0133   *   K 3.3*   CL 93*   CO2 30   BUN 42*   CREATININE 1.4*   CALCIUM 9.7     Lipids:   Lab Results   Component Value Date/Time    CHOL 159 11/14/2024 08:23 AM    HDL 39 11/14/2024 08:23 AM    TRIG 152 11/14/2024 08:23 AM     Glucose:  Recent Labs     02/19/25  1154 02/19/25  1351 02/19/25  1659   POCGLU 206* 215* 249*     [id number]:  Lab Results   Component Value Date/Time    LABA1C 8.8 02/15/2025 01:47 AM     High Sensitivity TSH:   Lab Results   Component Value Date/Time    TSHHS 1.800 08/29/2019 10:06 AM     Free T3: No results found for: \"FT3\"  Free T4:No results found for: \"T4FREE\"    MRI FOOT RIGHT W WO CONTRAST   Final Result      XR WRIST LEFT (MIN 3 VIEWS)   Final Result      XR FOOT RIGHT (MIN 3 VIEWS)   Final Result      XR CHEST PORTABLE   Final Result           Scheduled Medicines   Medications:    [Held by provider] rivaroxaban  20 mg Oral Dinner    [Held by provider] clopidogrel  75 mg

## 2025-02-20 NOTE — PLAN OF CARE
Problem: Safety - Adult  Goal: Free from fall injury  Outcome: Progressing     Problem: Chronic Conditions and Co-morbidities  Goal: Patient's chronic conditions and co-morbidity symptoms are monitored and maintained or improved  Outcome: Progressing     Problem: Discharge Planning  Goal: Discharge to home or other facility with appropriate resources  Outcome: Progressing     Problem: Pain  Goal: Verbalizes/displays adequate comfort level or baseline comfort level  Outcome: Progressing     Problem: Skin/Tissue Integrity  Goal: Skin integrity remains intact  Description: 1.  Monitor for areas of redness and/or skin breakdown  2.  Assess vascular access sites hourly  3.  Every 4-6 hours minimum:  Change oxygen saturation probe site  4.  Every 4-6 hours:  If on nasal continuous positive airway pressure, respiratory therapy assess nares and determine need for appliance change or resting period  Outcome: Progressing     Problem: ABCDS Injury Assessment  Goal: Absence of physical injury  Outcome: Progressing

## 2025-02-20 NOTE — PROGRESS NOTES
V2.0    [facility] [place]      Name:  Bandar De La Torre /Age/Sex: 1960  (64 y.o. male)   MRN & CSN:  [id number] & [id number] Encounter Date/Time: 2025 11:50 AM EST   Location:  [facility]-A PCP: MEETA Diehl MD     Attending:Too Gonzales MD       Hospital Day: 8    Assessment and Recommendations   Bandar De La Torre is a 64 y.o. male  who presents with Cellulitis of foot      -Referred to the ARU today  -On ampicillin 2000 mg IV every 6 hours-continue  -Follow-up surgical cultures    -discussed with his wife-she questions if he needs to go to a SNF as she reports that he had a fall at home and she had to drag him, ordered PT/OT     - amputation planned tomorrow  -continue abx      Diabetic foot wound concern for infection.   Right foot phalanx fracture  No evidence of osteomyelitis on x-ray  -Continue vanc+cefepime  -MRI foot with/without contrast pending  -Follow-up wound cultures  -Check CRP, sed rate  -Wound care  -General Surgery consulted and patient underwent bedside debridement, planning right  toe amputation  -Follow-up on cultures    Enterococcal bacteremia   Severe sepsis  -Blood cultures growing Enterococcus  -Continue broad-spectrum antibiotics  -Adequate hydration  -TTE /TEJ completed.  No evidence of IE.  EF 55 to 60%  -ID consulted and following        Type II diabetes  On home insulin unclear exact dose, plus Farxiga plus glipizide. Last HA1C 7.5 2024  - hypoglycemia protocol  - MDSSI  -Will do Lantus 10 units nightly  - endo consulted  - Check HA1C     Hypokalemia  K 2.9 on admission  -Replete and monitor with repeat labs     CKD III.    -Cr around baseline  -Resume home spironolactone, torsemide and metalazone        Atrial fibrillation.   -On home Xarelo.   -Holding until no surgeries planned     PVD.  -On home Plavix and Xarelto  -Resume as appropriate     Chronic HFpEF  Last  ECHO  EF 50%, severely dilated left atrium  Has

## 2025-02-20 NOTE — PROGRESS NOTES
GENERAL SURGERY PROGRESS NOTE    Bandar De La Torre is a 64 y.o. male with right foot and toe wounds.                 Subjective:  Doing ok today. Denies pain.  Frustrated about not being able to get out of bed due to both multiple lines and weakness.    Objective:    Vitals: VITALS:  /69   Pulse 62   Temp 97.7 °F (36.5 °C) (Oral)   Resp 21   Ht 1.829 m (6')   Wt 134.8 kg (297 lb 3.2 oz)   SpO2 96%   BMI 40.31 kg/m²     I/O: 02/19 0701 - 02/20 0700  In: -   Out: 2500 [Urine:2500]    Labs/Imaging Results:   Lab Results   Component Value Date/Time     02/20/2025 01:29 AM    K 3.5 02/20/2025 01:29 AM    CL 93 02/20/2025 01:29 AM    CO2 30 02/20/2025 01:29 AM    BUN 53 02/20/2025 01:29 AM    CREATININE 1.8 02/20/2025 01:29 AM    GLUCOSE 267 02/20/2025 01:29 AM    CALCIUM 9.8 02/20/2025 01:29 AM      Lab Results   Component Value Date    WBC 8.1 02/20/2025    HGB 14.6 02/20/2025    HCT 46.2 02/20/2025    MCV 85.9 02/20/2025     02/20/2025         IV Fluids:   dextrose    sodium chloride Last Rate: 25 mL (02/20/25 0654)    Scheduled Meds:   insulin regular human, 10 Units, SubCUTAneous, Dinner    insulin regular human, 10 Units, SubCUTAneous, Lunch    insulin regular human, 10 Units, SubCUTAneous, QAM    [Held by provider] rivaroxaban, 20 mg, Oral, Dinner    [Held by provider] clopidogrel, 75 mg, Oral, Daily    ampicillin IV, 2,000 mg, IntraVENous, 6 times per day    cefTRIAXone (ROCEPHIN) IV, 2,000 mg, IntraVENous, Q12H    enoxaparin, 1 mg/kg, SubCUTAneous, BID    metoprolol succinate, 25 mg, Oral, Daily    finasteride, 5 mg, Oral, Daily    ranolazine, 500 mg, Oral, BID    tamsulosin, 0.4 mg, Oral, Daily    metOLazone, 2.5 mg, Oral, Daily    spironolactone, 25 mg, Oral, BID    sodium chloride flush, 5-40 mL, IntraVENous, 2 times per day    torsemide, 40 mg, Oral, BID    insulin regular human, 0-30 Units, SubCUTAneous, 4x Daily AC & HS    Physical Exam:  General: A&O x 3, no distress.   HEENT:

## 2025-02-20 NOTE — PROGRESS NOTES
Progress Note( Dr. Diehl)    Subjective:   Admit Date: 2/13/2025  PCP: MEETA Diehl MD    Admitted For : Right foot infection and high blood glucose     Consulted For:  Right foot infection and high blood glucose     Interval History: Patient had TEJ this morning and consulted with cardiology as patient has earlier positive enterococcal bacteremia however subsequent blood cultures came back negative.    Patient has still hypokalemia and potassium being replaced  Patient had MRI of the right foot done this afternoon which shows osteomyelitis of the right second and third toes    reviewed notes from surgery   It appears like patient might need amputation of third toe according to his surgery note    Denies any chest pains,   Denies SOB .   Denies nausea or vomiting.   No new bowel or bladder symptoms.       Intake/Output Summary (Last 24 hours) at 2/19/2025 2045  Last data filed at 2/19/2025 0819  Gross per 24 hour   Intake --   Output 2300 ml   Net -2300 ml       DATA    CBC:   Recent Labs     02/18/25  0133 02/19/25  0117   WBC 8.4 9.2   HGB 14.3 14.0    217    CMP:  Recent Labs     02/18/25  0133   *   K 3.3*   CL 93*   CO2 30   BUN 42*   CREATININE 1.4*   CALCIUM 9.7     Lipids:   Lab Results   Component Value Date/Time    CHOL 159 11/14/2024 08:23 AM    HDL 39 11/14/2024 08:23 AM    TRIG 152 11/14/2024 08:23 AM     Glucose:  Recent Labs     02/19/25  1154 02/19/25  1351 02/19/25  1659   POCGLU 206* 215* 249*     [id number]:  Lab Results   Component Value Date/Time    LABA1C 8.8 02/15/2025 01:47 AM     High Sensitivity TSH:   Lab Results   Component Value Date/Time    TSHHS 1.800 08/29/2019 10:06 AM     Free T3: No results found for: \"FT3\"  Free T4:No results found for: \"T4FREE\"    MRI FOOT RIGHT W WO CONTRAST   Final Result      XR WRIST LEFT (MIN 3 VIEWS)   Final Result      XR FOOT RIGHT (MIN 3 VIEWS)   Final Result      XR CHEST PORTABLE   Final Result           Scheduled Medicines

## 2025-02-20 NOTE — CARE COORDINATION
Received referral for ARU.  Will review patients clinicals and PT/OT notes.  Thank you for the referral.      1245:  Dr. French approved patient for ARU admission.  Patients primary insurance is [employer] Medicare, pre-cert  needed.  Will initiate pre-cert once OT eval is complete in chart.     1500:  Completed OT eval in chart.  Pre-cert initiated.     Will follow for determination.

## 2025-02-20 NOTE — CARE COORDINATION
Reviewed medical record and discussed pt in IDR. Pt has toe amputation yesterday and surgical cultures are pending. It is anticipated that pt will need IV atb's at discharge. CM notified by Dr. Gonzales that pt is weak and pt's wife reported that pt fell recently and couldn't get up and wife had to \"drag\" pt across the floor. They are interested in SNF. WB placed for PT/OT evals. CM following.    1200: Met with pt at bedside and also spoke with his wife on speakerphone at bedside per pt request. Discussed PT/OT recommendations for ARU. Pt has been to ARU before in the past following a stroke and is interested in ARU again. Told pt that CM would proceed with having ARU review his case to see if they will accept him and then next step would be to try to get insurance approval. Pt is aware that it is possible that insurance could deny him. Provided pt with list of covered SNF's for him and his spouse to review so that they can have a plan B.      Marilu notified that pt is agreeable to ARU and asked for her to review pt.     1326: Received notification from Mariam/FAIZAN that pt has been accepted to ARU and that pre cert will be started once OT notes are completed.

## 2025-02-20 NOTE — PROGRESS NOTES
Progress Note( Dr. Diehl)  2/20/2025  Subjective:   Admit Date: 2/13/2025  PCP: MEETA Diehl MD    Admitted For : Right foot infection and high blood glucose     Consulted For:  Right foot infection and high blood glucose     Interval History: Patient had  amputation of second and second and third toe of the right foot and debridement of the wound of the right foot done 2/19/2025 patient feeling a lot better    Patient has still hypokalemia and potassium being replaced    Blood glucose seem to be somewhat higher    Denies any chest pains,   Denies SOB .   Denies nausea or vomiting.   No new bowel or bladder symptoms.       Intake/Output Summary (Last 24 hours) at 2/20/2025 0837  Last data filed at 2/20/2025 0745  Gross per 24 hour   Intake --   Output 2475 ml   Net -2475 ml       DATA    CBC:   Recent Labs     02/18/25  0133 02/19/25  0117 02/20/25  0129   WBC 8.4 9.2 8.1   HGB 14.3 14.0 14.6    217 227    CMP:  Recent Labs     02/18/25  0133 02/20/25  0129   * 138   K 3.3* 3.5   CL 93* 93*   CO2 30 30   BUN 42* 53*   CREATININE 1.4* 1.8*   CALCIUM 9.7 9.8     Lipids:   Lab Results   Component Value Date/Time    CHOL 159 11/14/2024 08:23 AM    HDL 39 11/14/2024 08:23 AM    TRIG 152 11/14/2024 08:23 AM     Glucose:  Recent Labs     02/19/25  1659 02/19/25  2124 02/20/25  0651   POCGLU 249* 268* 276*     AtaegqbxnfV3G:  Lab Results   Component Value Date/Time    LABA1C 8.8 02/15/2025 01:47 AM     High Sensitivity TSH:   Lab Results   Component Value Date/Time    TSHHS 1.800 08/29/2019 10:06 AM     Free T3: No results found for: \"FT3\"  Free T4:No results found for: \"T4FREE\"    MRI FOOT RIGHT W WO CONTRAST   Final Result      XR WRIST LEFT (MIN 3 VIEWS)   Final Result      XR FOOT RIGHT (MIN 3 VIEWS)   Final Result      XR CHEST PORTABLE   Final Result           Scheduled Medicines   Medications:    insulin regular human  10 Units SubCUTAneous Dinner    insulin regular human  10 Units SubCUTAneous

## 2025-02-21 LAB
ANION GAP SERPL CALCULATED.3IONS-SCNC: 13 MMOL/L (ref 9–17)
BUN SERPL-MCNC: 56 MG/DL (ref 7–20)
CALCIUM SERPL-MCNC: 9.7 MG/DL (ref 8.3–10.6)
CHLORIDE SERPL-SCNC: 91 MMOL/L (ref 99–110)
CO2 SERPL-SCNC: 32 MMOL/L (ref 21–32)
CREAT SERPL-MCNC: 1.9 MG/DL (ref 0.8–1.3)
CRP SERPL HS-MCNC: 37.1 MG/L (ref 0–5)
ERYTHROCYTE [DISTWIDTH] IN BLOOD BY AUTOMATED COUNT: 16 % (ref 11.7–14.9)
GFR, ESTIMATED: 36 ML/MIN/1.73M2
GLUCOSE BLD-MCNC: 163 MG/DL (ref 74–99)
GLUCOSE BLD-MCNC: 224 MG/DL (ref 74–99)
GLUCOSE BLD-MCNC: 284 MG/DL (ref 74–99)
GLUCOSE BLD-MCNC: 335 MG/DL (ref 74–99)
GLUCOSE BLD-MCNC: 343 MG/DL (ref 74–99)
GLUCOSE BLD-MCNC: 371 MG/DL (ref 74–99)
GLUCOSE BLD-MCNC: 432 MG/DL (ref 74–99)
GLUCOSE SERPL-MCNC: 156 MG/DL (ref 74–99)
HCT VFR BLD AUTO: 43.8 % (ref 42–52)
HGB BLD-MCNC: 14 G/DL (ref 13.5–18)
MCH RBC QN AUTO: 26.8 PG (ref 27–31)
MCHC RBC AUTO-ENTMCNC: 32 G/DL (ref 32–36)
MCV RBC AUTO: 83.9 FL (ref 78–100)
PLATELET # BLD AUTO: 219 K/UL (ref 140–440)
PMV BLD AUTO: 11 FL (ref 7.5–11.1)
POTASSIUM SERPL-SCNC: 2.8 MMOL/L (ref 3.5–5.1)
RBC # BLD AUTO: 5.22 M/UL (ref 4.6–6.2)
SODIUM SERPL-SCNC: 136 MMOL/L (ref 136–145)
WBC OTHER # BLD: 8.9 K/UL (ref 4–10.5)

## 2025-02-21 PROCEDURE — 86140 C-REACTIVE PROTEIN: CPT

## 2025-02-21 PROCEDURE — 80048 BASIC METABOLIC PNL TOTAL CA: CPT

## 2025-02-21 PROCEDURE — 6370000000 HC RX 637 (ALT 250 FOR IP): Performed by: SURGERY

## 2025-02-21 PROCEDURE — 6360000002 HC RX W HCPCS: Performed by: SURGERY

## 2025-02-21 PROCEDURE — 6370000000 HC RX 637 (ALT 250 FOR IP): Performed by: INTERNAL MEDICINE

## 2025-02-21 PROCEDURE — 99232 SBSQ HOSP IP/OBS MODERATE 35: CPT | Performed by: NURSE PRACTITIONER

## 2025-02-21 PROCEDURE — 2580000003 HC RX 258: Performed by: SURGERY

## 2025-02-21 PROCEDURE — 82962 GLUCOSE BLOOD TEST: CPT

## 2025-02-21 PROCEDURE — 1200000000 HC SEMI PRIVATE

## 2025-02-21 PROCEDURE — 36415 COLL VENOUS BLD VENIPUNCTURE: CPT

## 2025-02-21 PROCEDURE — 2500000003 HC RX 250 WO HCPCS: Performed by: SURGERY

## 2025-02-21 PROCEDURE — 94761 N-INVAS EAR/PLS OXIMETRY MLT: CPT

## 2025-02-21 PROCEDURE — 97530 THERAPEUTIC ACTIVITIES: CPT

## 2025-02-21 PROCEDURE — 85027 COMPLETE CBC AUTOMATED: CPT

## 2025-02-21 PROCEDURE — 99024 POSTOP FOLLOW-UP VISIT: CPT | Performed by: SURGERY

## 2025-02-21 PROCEDURE — 97535 SELF CARE MNGMENT TRAINING: CPT

## 2025-02-21 PROCEDURE — 6370000000 HC RX 637 (ALT 250 FOR IP): Performed by: NURSE PRACTITIONER

## 2025-02-21 RX ORDER — TORSEMIDE 20 MG/1
20 TABLET ORAL 2 TIMES DAILY
Status: DISCONTINUED | OUTPATIENT
Start: 2025-02-21 | End: 2025-02-22

## 2025-02-21 RX ORDER — POTASSIUM CHLORIDE 1500 MG/1
40 TABLET, EXTENDED RELEASE ORAL
Status: DISCONTINUED | OUTPATIENT
Start: 2025-02-21 | End: 2025-02-22 | Stop reason: HOSPADM

## 2025-02-21 RX ORDER — INSULIN GLARGINE 100 [IU]/ML
10 INJECTION, SOLUTION SUBCUTANEOUS NIGHTLY
Status: DISCONTINUED | OUTPATIENT
Start: 2025-02-21 | End: 2025-02-22 | Stop reason: HOSPADM

## 2025-02-21 RX ORDER — POTASSIUM CHLORIDE 1500 MG/1
40 TABLET, EXTENDED RELEASE ORAL 2 TIMES DAILY WITH MEALS
Status: DISCONTINUED | OUTPATIENT
Start: 2025-02-21 | End: 2025-02-21

## 2025-02-21 RX ADMIN — INSULIN HUMAN 10 UNITS: 500 INJECTION, SOLUTION SUBCUTANEOUS at 09:17

## 2025-02-21 RX ADMIN — SODIUM CHLORIDE, PRESERVATIVE FREE 10 ML: 5 INJECTION INTRAVENOUS at 20:44

## 2025-02-21 RX ADMIN — POTASSIUM CHLORIDE 10 MEQ: 7.46 INJECTION, SOLUTION INTRAVENOUS at 09:48

## 2025-02-21 RX ADMIN — POTASSIUM CHLORIDE 40 MEQ: 1500 TABLET, EXTENDED RELEASE ORAL at 18:29

## 2025-02-21 RX ADMIN — AMPICILLIN SODIUM 2000 MG: 2 INJECTION, POWDER, FOR SOLUTION INTRAMUSCULAR; INTRAVENOUS at 18:34

## 2025-02-21 RX ADMIN — AMPICILLIN SODIUM 2000 MG: 2 INJECTION, POWDER, FOR SOLUTION INTRAMUSCULAR; INTRAVENOUS at 12:44

## 2025-02-21 RX ADMIN — FINASTERIDE 5 MG: 5 TABLET, FILM COATED ORAL at 08:36

## 2025-02-21 RX ADMIN — INSULIN GLARGINE 10 UNITS: 100 INJECTION, SOLUTION SUBCUTANEOUS at 20:53

## 2025-02-21 RX ADMIN — INSULIN HUMAN 20 UNITS: 500 INJECTION, SOLUTION SUBCUTANEOUS at 12:38

## 2025-02-21 RX ADMIN — TORSEMIDE 40 MG: 20 TABLET ORAL at 08:36

## 2025-02-21 RX ADMIN — POTASSIUM CHLORIDE 10 MEQ: 7.46 INJECTION, SOLUTION INTRAVENOUS at 07:41

## 2025-02-21 RX ADMIN — AMPICILLIN SODIUM 2000 MG: 2 INJECTION, POWDER, FOR SOLUTION INTRAMUSCULAR; INTRAVENOUS at 15:12

## 2025-02-21 RX ADMIN — POTASSIUM CHLORIDE 10 MEQ: 7.46 INJECTION, SOLUTION INTRAVENOUS at 08:34

## 2025-02-21 RX ADMIN — POTASSIUM CHLORIDE 10 MEQ: 7.46 INJECTION, SOLUTION INTRAVENOUS at 11:04

## 2025-02-21 RX ADMIN — INSULIN HUMAN 10 UNITS: 500 INJECTION, SOLUTION SUBCUTANEOUS at 18:29

## 2025-02-21 RX ADMIN — INSULIN HUMAN 10 UNITS: 500 INJECTION, SOLUTION SUBCUTANEOUS at 12:38

## 2025-02-21 RX ADMIN — POTASSIUM CHLORIDE 10 MEQ: 7.46 INJECTION, SOLUTION INTRAVENOUS at 05:21

## 2025-02-21 RX ADMIN — SODIUM CHLORIDE, PRESERVATIVE FREE 10 ML: 5 INJECTION INTRAVENOUS at 09:02

## 2025-02-21 RX ADMIN — TAMSULOSIN HYDROCHLORIDE 0.4 MG: 0.4 CAPSULE ORAL at 08:36

## 2025-02-21 RX ADMIN — AMPICILLIN SODIUM 2000 MG: 2 INJECTION, POWDER, FOR SOLUTION INTRAMUSCULAR; INTRAVENOUS at 21:01

## 2025-02-21 RX ADMIN — RIVAROXABAN 20 MG: 20 TABLET, FILM COATED ORAL at 18:29

## 2025-02-21 RX ADMIN — AMPICILLIN SODIUM 2000 MG: 2 INJECTION, POWDER, FOR SOLUTION INTRAMUSCULAR; INTRAVENOUS at 03:06

## 2025-02-21 RX ADMIN — SPIRONOLACTONE 25 MG: 50 TABLET ORAL at 20:44

## 2025-02-21 RX ADMIN — ENOXAPARIN SODIUM 135 MG: 150 INJECTION SUBCUTANEOUS at 08:50

## 2025-02-21 RX ADMIN — SPIRONOLACTONE 25 MG: 50 TABLET ORAL at 08:36

## 2025-02-21 RX ADMIN — RANOLAZINE 500 MG: 500 TABLET, EXTENDED RELEASE ORAL at 20:43

## 2025-02-21 RX ADMIN — POTASSIUM CHLORIDE 10 MEQ: 7.46 INJECTION, SOLUTION INTRAVENOUS at 04:01

## 2025-02-21 RX ADMIN — RANOLAZINE 500 MG: 500 TABLET, EXTENDED RELEASE ORAL at 08:36

## 2025-02-21 RX ADMIN — TORSEMIDE 20 MG: 20 TABLET ORAL at 20:44

## 2025-02-21 RX ADMIN — METOLAZONE 2.5 MG: 2.5 TABLET ORAL at 08:51

## 2025-02-21 RX ADMIN — INSULIN HUMAN 25 UNITS: 500 INJECTION, SOLUTION SUBCUTANEOUS at 18:59

## 2025-02-21 NOTE — PROGRESS NOTES
Patient seen and examined full note to follow presents with infected toe status post amputation x 2 waiting on rehab placement has acute on chronic kidney failure creatinine holding about 1.8 with low potassium will make adjustments in meds for improved potassium agree with current treatment plan try to make patient stable as able prior to going to rehab will monitor closely full consult to follow

## 2025-02-21 NOTE — PROGRESS NOTES
dry    Abdomen: Soft, nontender, nondistended.       Assessment and Plan:  64 y.o. male with right foot osteomyelitis of toes 2-3. S/p toe amputation and foot debridement    Patient Active Problem List:     MARINO on CPAP     New onset atrial fibrillation (HCC)     Type 2 diabetes mellitus with hyperglycemia, with long-term current use of insulin (HCC)     Class 3 severe obesity due to excess calories with body mass index (BMI) of 45.0 to 49.9 in adult     History of cardiac cath     Weakness of right arm     Acute CVA (cerebrovascular accident) (HCC)     Spastic hemiparesis of right dominant side (HCC)     Dysphagia due to recent stroke     Essential hypertension     Morbid obesity with body mass index of 45.0-49.9 in adult     Frequent falls     Chronic venous stasis dermatitis of both lower extremities     History of CVA (cerebrovascular accident)     Obesity hypoventilation syndrome     Hypertension     Hyperlipidemia     Congestive heart failure due to cardiomyopathy (HCC)     Stage 3b chronic kidney disease (HCC)     Chronic kidney disease, stage II (mild)     Benign essential microscopic hematuria     Stage 3a chronic kidney disease (HCC)     Generalized edema     Acute kidney injury superimposed on CKD (HCC)     Hypokalemia     Cellulitis of foot     Sepsis (HCC)     Diabetic foot infection (HCC)     Bacteremia due to Enterococcus     Pacemaker lead malfunction        - heal touch only with right foot while in post op shoe  - PT/OT  - ok for discharge from a surgery standpoint  - follow up with me and in wound care clinic    Jakob Avalos MD

## 2025-02-21 NOTE — PLAN OF CARE
Problem: Safety - Adult  Goal: Free from fall injury  2/21/2025 0418 by Jasmin Weinstein RN  Outcome: Progressing  2/20/2025 1547 by Christopher Stanton LPN  Outcome: Progressing     Problem: Chronic Conditions and Co-morbidities  Goal: Patient's chronic conditions and co-morbidity symptoms are monitored and maintained or improved  2/21/2025 0418 by Jasmin Weinstein RN  Outcome: Progressing  2/20/2025 1547 by Christopher Stanton LPN  Outcome: Progressing     Problem: Discharge Planning  Goal: Discharge to home or other facility with appropriate resources  2/21/2025 0418 by Jasmin Weinstein RN  Outcome: Progressing  2/20/2025 1547 by Christopher Stanton LPN  Outcome: Progressing     Problem: Pain  Goal: Verbalizes/displays adequate comfort level or baseline comfort level  2/21/2025 0418 by Jasmin Weinstein RN  Outcome: Progressing  2/20/2025 1547 by Christopher Stanton LPN  Outcome: Progressing     Problem: Skin/Tissue Integrity  Goal: Skin integrity remains intact  Description: 1.  Monitor for areas of redness and/or skin breakdown  2.  Assess vascular access sites hourly  3.  Every 4-6 hours minimum:  Change oxygen saturation probe site  4.  Every 4-6 hours:  If on nasal continuous positive airway pressure, respiratory therapy assess nares and determine need for appliance change or resting period  2/21/2025 0418 by Jasmin Weinstein RN  Outcome: Progressing  2/20/2025 1547 by Christopher Stanton LPN  Outcome: Progressing     Problem: ABCDS Injury Assessment  Goal: Absence of physical injury  2/21/2025 0418 by Jasmin eWinstein RN  Outcome: Progressing  2/20/2025 1547 by Christopher Stanton LPN  Outcome: Progressing

## 2025-02-21 NOTE — CARE COORDINATION
Received denial for admission to ARU.  There is a P2P option if MD wishes to pursue.  It would need to be completed by Monday at noon by calling [phone].  Ref#  [id number].    Notified MD of denial and the optional P2P.   Tried to call CM, no answer.   Notified MD that even after a P2P doubtful it will be approved.     If P2P not completed defer to discharge plan B.     1650:  Received PS from MD that patient has been approved.  Notified MD that we will have to receive notification of approval form [employer].  Will plan for admission to ARU tomorrow 2/22.    Called and notified patients spouse who was in patients room of approval and that patient can admit tomorrow (2/22) to ARU.  Patients wife expressed her understanding.  Per patient and wife  goal is still for patient to admit to ARU with plan to discharge home with wife from ARU.

## 2025-02-21 NOTE — CONSULTS
ADVANCED NEPHROLOGY CONSULT NOTE  Dr. Antonino Bower and Dr Rey Armstrong                Assessment    1.  Diabetic foot ulcer status post infection with surgery  #2 enterococcal bacteremia  #3 type 2 diabetes  #4 hypokalemia  #5 CKD 3 with acute renal failure  #6 congestive heart failure EF 50% on diuretic therapy  #7 atrial fibrillation on Xarelto        Plan   1.  Status post surgical intervention x 2 plan on rehab ARU  #2 finish antibiotic course per infectious disease  #3 monitor and control glucose with insulin  #4 adjust and decrease the diuretic dose maintain Aldactone increase potassium to 40 3 times daily  #5 creatinine 1.9 with baseline creatinine 1.4-1.5 adjust diuretics and monitor renal function  #6 not appear fluid overloaded not short of breath will adjust and decrease diuretics and monitor for now  #7 monitor A-fib with above setting resume back on anticoagulation    Date:2/21/2025        Patient Name:Bandar De La Torre     YOB: 1960     Age:64 y.o.        Chief Complaint     Reason for Consult: Acute renal failure and CKD    History Obtained From   patient, electronic medical record    History of Present Illness   The patient is a 64 y.o. male who presents with weakness fatigue was seen by me in the office about a week ago after that went to see endocrinologist sent to the hospital with foot infection status post surgical intervention x 2 patient with prior CVA sleep apnea somewhat noncompliant with his treatment and care underlying diabetes presents to the hospital with bacteremia and above setting started on antibiotic therapy status postsurgical intervention x 2 now working on rehab care ARU on diuretic therapy without low potassium and noted acute renal failure wanted renal function stabilized and improved potassium and asked nephrology to evaluate before discharge to ARU patient well-known to me as well.        Patient Active Problem List   Diagnosis Code    MARINO on CPAP G47.33

## 2025-02-21 NOTE — PROGRESS NOTES
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 General surgery evaluated s/p bedside excisional debridement of necrotic tissue from wound at the plantar surface . MRI ordered. Blood cx x1 +enterococcus faecalis therefore ID consulted.   S/p 2nd&3rd toe amp with foot debridement per GS, Dr. Avalos   Per op note bones cut at MTP joint, 3rd toe sent for cx/pathology  Imaging: Plain films of right foot show acute displaced fracture of 2nd and 3rd distal phalanx. MRI right foot + OM second distal phalanx and the third middle and distal phalanges.  Overlying ulcers in both locations are noted.  Additional soft tissue ulceration at the plantar surface of the forefoot overlying the first MTP joint with likely adjacent surrounding cellulitis .  .  TTE findings noted. TEJ  with no evidence of endocarditis, mild MR.   EP evaluation and recommendations noted  Micro data: Surgical cx  NGTD.  Repeat blood cx 2/15 NGTD. Wound cx & + pansensitive Proteus mirabilis. Blood cx x 1  +enterococcus faecalis(s-vanc/amp)  WBC 8.9<--11.9 ( adm), CRP 33.1<-- 159<--113, ESR 52<--26. Procal 0.28<--0.13. Afebrile sice adm  Antimicrobial summary: IV Cefepime --; IV Vanc -; IV Ampicillin -; IV Ceftriaxone -     Right second and third toe fracture  Imaging as noted.  Per primary team  DM II A1C 7.5  On insulin, Endocrine following  SSS s/p pacemaker  Severe obesity, BMI 41.6  Allergy: no abx allergy  GFR 36  Comorbid conditions: SSS s/p pacemaker (), CVA, MARINO, HTN, DM II, CKD III, HLD, Obesity,     Plan  Therapeutic: Continue IV Ampicillin while inpt  Transition to Augmentin 875mg/125mg po q 12h for cumulative 2 weeks, EOT 3/4/25  Diagnostic:

## 2025-02-21 NOTE — CARE COORDINATION
Reviewed medical record and discussed pt in IDR. Pt has been accepted by ARU and pre cert is still currently pending. Met with pt at bedside and notified him that pre cert is still pending.

## 2025-02-21 NOTE — PROGRESS NOTES
V2.0    [facility] Progress Note      Name:  Bandar De La Torre /Age/Sex: 1960  (64 y.o. male)   MRN & CSN:  [id number] & [id number] Encounter Date/Time: 2025 11:50 AM EST   Location:  [facility]-A PCP: MEETA Diehl MD     Attending:Too Gonzales MD       Hospital Day: 9    Assessment and Recommendations   Bandar De La Torre is a 64 y.o. male  who presents with Cellulitis of foot      -Approved to go to ARU after P2P done today  -Anticipate transfer to ARU tomorrow when bed available  -Continue IV ampicillin      -Referred to the ARU today  -On ampicillin 2000 mg IV every 6 hours-continue  -Follow-up surgical cultures    -discussed with his wife-she questions if he needs to go to a SNF as she reports that he had a fall at home and she had to drag him, ordered PT/OT     - amputation planned tomorrow  -continue abx      Diabetic foot wound concern for infection.   Right foot phalanx fracture  No evidence of osteomyelitis on x-ray  -Continue vanc+cefepime  -MRI foot with/without contrast pending  -Follow-up wound cultures  -Check CRP, sed rate  -Wound care  -General Surgery consulted and patient underwent bedside debridement, planning right  toe amputation  -Follow-up on cultures    Enterococcal bacteremia   Severe sepsis  -Blood cultures growing Enterococcus  -Continue broad-spectrum antibiotics  -Adequate hydration  -TTE /TEJ completed.  No evidence of IE.  EF 55 to 60%  -ID consulted and following        Type II diabetes  On home insulin unclear exact dose, plus Farxiga plus glipizide. Last HA1C 7.5 2024  - hypoglycemia protocol  - MDSSI  -Will do Lantus 10 units nightly  - endo consulted  - Check HA1C     Hypokalemia  K 2.9 on admission  -Replete and monitor with repeat labs     CKD III.    -Cr around baseline  -Resume home spironolactone, torsemide and metalazone        Atrial fibrillation.   -On home Xarelo.   -Holding until no surgeries planned    phalanx. 3.  Additional findings as above. This dictation was created with voice recognition software.  While attempts have  been made to review the dictation as it is transcribed, on occasion the spoken word can be misinterpreted by the technology leading to omissions or inappropriate words, phrases or sentences.  Dictated and Electronically Signed By: Edy Lockwood MD 2/13/2025 13:40        XR WRIST LEFT (MIN 3 VIEWS)    Result Date: 2/13/2025  PROCEDURE: XR WRIST LEFT (MIN 3 VIEWS) DATE OF EXAM:  2/13/2025 14:13 DEMOGRAPHICS: 64 years old Male INDICATION: pain in wrist COMPARISON: No existing relevant imaging study corresponding to the same anatomical region is available. TECHNIQUE: 3 views of the left wrist. FINDINGS: No acute fracture or traumatic malalignment. The joint spaces are relatively maintained. Mild soft tissue swelling about the wrist. Vascular calcifications. IMPRESSION:  1.  No radiographic evidence of acute osseous abnormality. 2.  Additional findings as above. This dictation was created with voice recognition software.  While attempts have  been made to review the dictation as it is transcribed, on occasion the spoken word can be misinterpreted by the technology leading to omissions or inappropriate words, phrases or sentences.  Dictated and Electronically Signed By: Edy Lockwood MD 2/13/2025 13:26        XR CHEST PORTABLE    Result Date: 2/13/2025  EXAM:  XR CHEST PORTABLE DATE OF EXAM:  2/13/2025 14:10 DEMOGRAPHICS: 64 years old Male CLINICAL STATEMENT: screening COMPARISON: 1/14/2025 FINDINGS: Stable cardiomegaly. A left pacing device with its leads projecting over the right attachment right ventricle. Clear lungs. Bones unremarkable. IMPRESSION: 1. No acute cardiopulmonary process. 2. Stable cardiomegaly.  Dictated and Electronically Signed By: Kyaw Evans MD 2/13/2025 13:16          CBC:   Recent Labs     02/19/25  0117 02/20/25  0129 02/21/25  0214   WBC 9.2 8.1 8.9   HGB 14.0 14.6 14.0

## 2025-02-21 NOTE — CONSULTS
[facility] Nurse  Consult Note       Bandar De La Torre  AGE: 64 y.o.   GENDER: male  : 1960  TODAY'S DATE:  2025    Subjective:     Reason for  Evaluation and Assessment: wound care eval.      Bandar De La Torre is a 64 y.o. male referred by:   [x] Physician  [] Nursing  [] Other:     Wound Identification:  Wound Type: venous, diabetic, and surgical  Contributing Factors: edema, venous stasis, diabetes, chronic pressure, decreased mobility, and obesity        PAST MEDICAL HISTORY        Diagnosis Date    Atrial fibrillation (HCC)     Cerebral artery occlusion with cerebral infarction (HCC)     History of cardiac cath     --RCA has mid 50% stenosis and PLB branch has 70% stenosis noted. LVEDP very high    Hyperlipidemia     Hypertension     Type II or unspecified type diabetes mellitus without mention of complication, not stated as uncontrolled     Diagnsed about     Unspecified sleep apnea        PAST SURGICAL HISTORY    Past Surgical History:   Procedure Laterality Date    CARDIAC CATHETERIZATION      TOE AMPUTATION Right 2025    RIGHT FOOT 2ND AND 3RD TOE AMPUTATION WITH FOOT DEBRIDEMENT performed by Jakob Avalos MD at [facility] OR    TONSILLECTOMY      AT 15 YEARS OLD       FAMILY HISTORY    Family History   Problem Relation Age of Onset    Diabetes Mother     Diabetes Father     Cancer Father     Cancer Maternal Grandfather         PROSTATE CANCER       SOCIAL HISTORY    Social History     Tobacco Use    Smoking status: Never    Smokeless tobacco: Never   Vaping Use    Vaping status: Never Used   Substance Use Topics    Alcohol use: No    Drug use: No       ALLERGIES    No Known Allergies    MEDICATIONS    No current facility-administered medications on file prior to encounter.     Current Outpatient Medications on File Prior to Encounter   Medication Sig Dispense Refill    insulin regular human (HUMULIN R U-500 KWIKPEN) 500 UNIT/ML SOPN concentrated injection pen    Wound Width (cm) 1.3 cm 02/21/25 0930   Wound Depth (cm) 0.1 cm 02/21/25 0930   Wound Surface Area (cm^2) 2.6 cm^2 02/21/25 0930   Change in Wound Size % (l*w) 27.78 02/21/25 0930   Wound Volume (cm^3) 0.26 cm^3 02/21/25 0930   Wound Healing % 82 02/21/25 0930   Distance Tunneling (cm) 0 cm 02/21/25 0930   Tunneling Position ___ O'Clock 0 02/21/25 0930   Undermining Starts ___ O'Clock 0 02/21/25 0930   Undermining Ends___ O'Clock 0 02/21/25 0930   Undermining Maxium Distance (cm) 0 02/21/25 0930   Wound Assessment Pink/red 02/21/25 0930   Drainage Amount Moderate (25-50%) 02/21/25 0930   Drainage Description Serosanguinous 02/21/25 0930   Odor None 02/21/25 0930   Berenice-wound Assessment Intact 02/21/25 0930   Margins Defined edges 02/21/25 0930   Wound Thickness Description not for Pressure Injury Full thickness 02/21/25 0930   Number of days: 7       Wound 02/14/25 Pretibial Right lateral leg (Active)   Wound Image   02/21/25 0930   Wound Etiology Venous 02/21/25 0930   Dressing Status New dressing applied 02/19/25 0800   Wound Cleansed Cleansed with saline 02/21/25 0930   Dressing/Treatment Open to air 02/21/25 0930   Wound Length (cm) 1.9 cm 02/21/25 0930   Wound Width (cm) 1 cm 02/21/25 0930   Wound Depth (cm) 0.1 cm 02/21/25 0930   Wound Surface Area (cm^2) 1.9 cm^2 02/21/25 0930   Change in Wound Size % (l*w) 26.92 02/21/25 0930   Wound Volume (cm^3) 0.19 cm^3 02/21/25 0930   Wound Healing % 27 02/21/25 0930   Distance Tunneling (cm) 0 cm 02/21/25 0930   Tunneling Position ___ O'Clock 0 02/21/25 0930   Undermining Starts ___ O'Clock 0 02/21/25 0930   Undermining Ends___ O'Clock 0 02/21/25 0930   Undermining Maxium Distance (cm) 0\ 02/21/25 0930   Wound Assessment Eschar dry 02/21/25 0930   Drainage Amount None (dry) 02/21/25 0930   Odor None 02/21/25 0930   Berenice-wound Assessment Intact 02/21/25 0930   Margins Attached edges 02/21/25 0930   Wound Thickness Description not for Pressure Injury Full thickness

## 2025-02-21 NOTE — PROGRESS NOTES
noted    Safety  Patient educated on role of OT , benefits of OT and rationale for therapeutic intervention. Patient safely in chair + alarm set at end of session, with call light/phone in reach, and nursing aware. Gait belt was used for func transfers / mobility.    Plan for Next Session:    Continue OT POC    Time in:  800  Time out:  827  Timed treatment minutes:  27  Total treatment time:  27      Electronically signed by:      SUNSHINE Dorantes

## 2025-02-21 NOTE — PROGRESS NOTES
Comprehensive Nutrition Assessment    Type and Reason for Visit:  Reassess, LOS, Wound    Nutrition Recommendations/Plan:   Offer fortified gelatein oral nutrition supplement BID with wound healing oral nutrition supplement BID   Continue diabetic/carb controlled diet   Continue providing adequate insulin coverage for wound healing   Replete K+ as able      Malnutrition Assessment:  Malnutrition Status:  Insufficient data (02/21/25 0186)    Context:  Acute Illness     Findings of the 6 clinical characteristics of malnutrition:  Energy Intake:  Unable to assess  Weight Loss:  Greater than 2% over 1 week     Body Fat Loss:  Unable to assess     Muscle Mass Loss:  Unable to assess    Fluid Accumulation:  Unable to assess     Strength:  Not Performed    Nutrition Assessment:    Admitted with cellulitis of foot. Hx DMII with wounds. Pt underwent toe amputation, plans for rehab. Pt remains on diabetic diet, eating well with 1 meal intake of %. Pt would benefit from additional protein post op healing and to aid with BS mgt. Significant wt loss in 1 week. Monitor as moderate nutrition risk.    Nutrition Related Findings:    K 2.8, started on aldactone. POCT levels greater than 300, GFR 36 Wound Type: Multiple, Diabetic Ulcer, Surgical Incision (arterial)       Current Nutrition Intake & Therapies:    Average Meal Intake: % (x 1 meal out of 8 days)  Average Supplements Intake: None Ordered  ADULT DIET; Regular; 5 carb choices (75 gm/meal)    Anthropometric Measures:  Height: 182.9 cm (6')  Ideal Body Weight (IBW): 178 lbs (81 kg)       Current Body Weight: 134.9 kg (297 lb 6.4 oz), 167.1 % IBW. Weight Source: Bed scale  Current BMI (kg/m2): 40.3  Usual Body Weight: 141.3 kg (311 lb 9.6 oz) (2/13/25 Dr. sherman office visit)     % Weight Change (Calculated): -4.6  Weight Adjustment For: No Adjustment                 BMI Categories: Obese Class 3 (BMI 40.0 or greater)    Estimated Daily Nutrient Needs:  Energy

## 2025-02-21 NOTE — PROGRESS NOTES
Progress Note( Dr. Diehl)  2/21/2025  Subjective:   Admit Date: 2/13/2025  PCP: MEETA Diehl MD    Admitted For : Right foot infection and high blood glucose     Consulted For:  Right foot infection and high blood glucose     Interval History: Patient had  amputation of second and second and third toe of the right foot and debridement of the wound of the right foot done 2/19/2025 patient feeling a lot better    Patient has still hypokalemia and potassium being replaced    Blood glucose seem to be somewhat higher    Denies any chest pains,   Denies SOB .   Denies nausea or vomiting.   No new bowel or bladder symptoms.       Intake/Output Summary (Last 24 hours) at 2/21/2025 1628  Last data filed at 2/21/2025 1225  Gross per 24 hour   Intake 480 ml   Output 2425 ml   Net -1945 ml       DATA    CBC:   Recent Labs     02/19/25  0117 02/20/25  0129 02/21/25  0214   WBC 9.2 8.1 8.9   HGB 14.0 14.6 14.0    227 219    CMP:  Recent Labs     02/19/25  0117 02/20/25  0129 02/21/25  0214    138 136   K 3.1* 3.5 2.8*   CL 92* 93* 91*   CO2 26 30 32   BUN 50* 53* 56*   CREATININE 1.8* 1.8* 1.9*   CALCIUM 9.4 9.8 9.7     Lipids:   Lab Results   Component Value Date/Time    CHOL 159 11/14/2024 08:23 AM    HDL 39 11/14/2024 08:23 AM    TRIG 152 11/14/2024 08:23 AM     Glucose:  Recent Labs     02/21/25  0707 02/21/25  0915 02/21/25  1109   POCGLU 224* 343* 335*     [id number]:  Lab Results   Component Value Date/Time    LABA1C 8.8 02/15/2025 01:47 AM     High Sensitivity TSH:   Lab Results   Component Value Date/Time    TSHHS 1.800 08/29/2019 10:06 AM     Free T3: No results found for: \"FT3\"  Free T4:No results found for: \"T4FREE\"    MRI FOOT RIGHT W WO CONTRAST   Final Result      XR WRIST LEFT (MIN 3 VIEWS)   Final Result      XR FOOT RIGHT (MIN 3 VIEWS)   Final Result      XR CHEST PORTABLE   Final Result           Scheduled Medicines   Medications:    potassium chloride  40 mEq Oral TID WC    torsemide

## 2025-02-22 ENCOUNTER — HOSPITAL ENCOUNTER (INPATIENT)
Age: 65
DRG: 560 | End: 2025-02-22
Attending: PHYSICAL MEDICINE & REHABILITATION | Admitting: PHYSICAL MEDICINE & REHABILITATION
Payer: MEDICARE

## 2025-02-22 VITALS
HEART RATE: 61 BPM | HEIGHT: 72 IN | WEIGHT: 297.4 LBS | TEMPERATURE: 97.7 F | OXYGEN SATURATION: 97 % | RESPIRATION RATE: 19 BRPM | SYSTOLIC BLOOD PRESSURE: 115 MMHG | BODY MASS INDEX: 40.28 KG/M2 | DIASTOLIC BLOOD PRESSURE: 64 MMHG

## 2025-02-22 DIAGNOSIS — L97.411 DIABETIC ULCER OF RIGHT MIDFOOT ASSOCIATED WITH TYPE 2 DIABETES MELLITUS, LIMITED TO BREAKDOWN OF SKIN (HCC): Primary | ICD-10-CM

## 2025-02-22 DIAGNOSIS — E11.621 DIABETIC ULCER OF RIGHT MIDFOOT ASSOCIATED WITH TYPE 2 DIABETES MELLITUS, LIMITED TO BREAKDOWN OF SKIN (HCC): Primary | ICD-10-CM

## 2025-02-22 PROBLEM — M86.9 OSTEOMYELITIS OF TOE OF RIGHT FOOT (HCC): Status: ACTIVE | Noted: 2025-02-22

## 2025-02-22 LAB
ANION GAP SERPL CALCULATED.3IONS-SCNC: 14 MMOL/L (ref 9–17)
ANION GAP SERPL CALCULATED.3IONS-SCNC: 14 MMOL/L (ref 9–17)
BUN SERPL-MCNC: 61 MG/DL (ref 7–20)
BUN SERPL-MCNC: 68 MG/DL (ref 7–20)
CALCIUM SERPL-MCNC: 9.8 MG/DL (ref 8.3–10.6)
CALCIUM SERPL-MCNC: 9.9 MG/DL (ref 8.3–10.6)
CHLORIDE SERPL-SCNC: 88 MMOL/L (ref 99–110)
CHLORIDE SERPL-SCNC: 91 MMOL/L (ref 99–110)
CO2 SERPL-SCNC: 29 MMOL/L (ref 21–32)
CO2 SERPL-SCNC: 30 MMOL/L (ref 21–32)
CREAT SERPL-MCNC: 2.1 MG/DL (ref 0.8–1.3)
CREAT SERPL-MCNC: 2.2 MG/DL (ref 0.8–1.3)
CRP SERPL HS-MCNC: 38.1 MG/L (ref 0–5)
ERYTHROCYTE [DISTWIDTH] IN BLOOD BY AUTOMATED COUNT: 16.1 % (ref 11.7–14.9)
GFR, ESTIMATED: 30 ML/MIN/1.73M2
GFR, ESTIMATED: 32 ML/MIN/1.73M2
GLUCOSE BLD-MCNC: 178 MG/DL (ref 74–99)
GLUCOSE BLD-MCNC: 310 MG/DL (ref 74–99)
GLUCOSE BLD-MCNC: 339 MG/DL (ref 74–99)
GLUCOSE BLD-MCNC: 352 MG/DL (ref 74–99)
GLUCOSE BLD-MCNC: 393 MG/DL (ref 74–99)
GLUCOSE SERPL-MCNC: 216 MG/DL (ref 74–99)
GLUCOSE SERPL-MCNC: 427 MG/DL (ref 74–99)
HCT VFR BLD AUTO: 44.3 % (ref 42–52)
HGB BLD-MCNC: 14.3 G/DL (ref 13.5–18)
MCH RBC QN AUTO: 26.8 PG (ref 27–31)
MCHC RBC AUTO-ENTMCNC: 32.3 G/DL (ref 32–36)
MCV RBC AUTO: 83.1 FL (ref 78–100)
MICROORGANISM SPEC CULT: NORMAL
MICROORGANISM/AGENT SPEC: NORMAL
PLATELET # BLD AUTO: 215 K/UL (ref 140–440)
PMV BLD AUTO: 11.3 FL (ref 7.5–11.1)
POTASSIUM SERPL-SCNC: 3.3 MMOL/L (ref 3.5–5.1)
POTASSIUM SERPL-SCNC: 3.8 MMOL/L (ref 3.5–5.1)
RBC # BLD AUTO: 5.33 M/UL (ref 4.6–6.2)
SERVICE CMNT-IMP: NORMAL
SODIUM SERPL-SCNC: 131 MMOL/L (ref 136–145)
SODIUM SERPL-SCNC: 134 MMOL/L (ref 136–145)
SPECIMEN DESCRIPTION: NORMAL
WBC OTHER # BLD: 6.5 K/UL (ref 4–10.5)

## 2025-02-22 PROCEDURE — 36415 COLL VENOUS BLD VENIPUNCTURE: CPT

## 2025-02-22 PROCEDURE — 94761 N-INVAS EAR/PLS OXIMETRY MLT: CPT

## 2025-02-22 PROCEDURE — 82962 GLUCOSE BLOOD TEST: CPT

## 2025-02-22 PROCEDURE — 6370000000 HC RX 637 (ALT 250 FOR IP): Performed by: INTERNAL MEDICINE

## 2025-02-22 PROCEDURE — 85027 COMPLETE CBC AUTOMATED: CPT

## 2025-02-22 PROCEDURE — 86140 C-REACTIVE PROTEIN: CPT

## 2025-02-22 PROCEDURE — 2500000003 HC RX 250 WO HCPCS: Performed by: INTERNAL MEDICINE

## 2025-02-22 PROCEDURE — 6370000000 HC RX 637 (ALT 250 FOR IP): Performed by: SURGERY

## 2025-02-22 PROCEDURE — 2500000003 HC RX 250 WO HCPCS: Performed by: SURGERY

## 2025-02-22 PROCEDURE — 2580000003 HC RX 258: Performed by: SURGERY

## 2025-02-22 PROCEDURE — 6360000002 HC RX W HCPCS: Performed by: SURGERY

## 2025-02-22 PROCEDURE — 80048 BASIC METABOLIC PNL TOTAL CA: CPT

## 2025-02-22 PROCEDURE — 1280000000 HC REHAB R&B

## 2025-02-22 PROCEDURE — 99024 POSTOP FOLLOW-UP VISIT: CPT | Performed by: SURGERY

## 2025-02-22 PROCEDURE — 2580000003 HC RX 258: Performed by: PHYSICAL MEDICINE & REHABILITATION

## 2025-02-22 PROCEDURE — 6360000002 HC RX W HCPCS: Performed by: PHYSICAL MEDICINE & REHABILITATION

## 2025-02-22 RX ORDER — DEXTROSE MONOHYDRATE 100 MG/ML
INJECTION, SOLUTION INTRAVENOUS CONTINUOUS PRN
Status: CANCELLED | OUTPATIENT
Start: 2025-02-22

## 2025-02-22 RX ORDER — TORSEMIDE 20 MG/1
10 TABLET ORAL 2 TIMES DAILY
Status: DISCONTINUED | OUTPATIENT
Start: 2025-02-22 | End: 2025-02-24

## 2025-02-22 RX ORDER — POTASSIUM CHLORIDE 1500 MG/1
40 TABLET, EXTENDED RELEASE ORAL
Status: DISPENSED | OUTPATIENT
Start: 2025-02-22

## 2025-02-22 RX ORDER — SODIUM CHLORIDE 9 MG/ML
INJECTION, SOLUTION INTRAVENOUS PRN
Status: ACTIVE | OUTPATIENT
Start: 2025-02-22

## 2025-02-22 RX ORDER — POTASSIUM CHLORIDE 7.45 MG/ML
10 INJECTION INTRAVENOUS PRN
Status: CANCELLED | OUTPATIENT
Start: 2025-02-22

## 2025-02-22 RX ORDER — SODIUM CHLORIDE 9 MG/ML
INJECTION, SOLUTION INTRAVENOUS PRN
Status: CANCELLED | OUTPATIENT
Start: 2025-02-22

## 2025-02-22 RX ORDER — ONDANSETRON 4 MG/1
4 TABLET, ORALLY DISINTEGRATING ORAL EVERY 8 HOURS PRN
Status: CANCELLED | OUTPATIENT
Start: 2025-02-22

## 2025-02-22 RX ORDER — SODIUM CHLORIDE 0.9 % (FLUSH) 0.9 %
5-40 SYRINGE (ML) INJECTION EVERY 12 HOURS SCHEDULED
Status: DISCONTINUED | OUTPATIENT
Start: 2025-02-22 | End: 2025-03-02

## 2025-02-22 RX ORDER — METOPROLOL SUCCINATE 25 MG/1
25 TABLET, EXTENDED RELEASE ORAL DAILY
Status: CANCELLED | OUTPATIENT
Start: 2025-02-23

## 2025-02-22 RX ORDER — MAGNESIUM SULFATE IN WATER 40 MG/ML
2000 INJECTION, SOLUTION INTRAVENOUS PRN
Status: CANCELLED | OUTPATIENT
Start: 2025-02-22

## 2025-02-22 RX ORDER — TAMSULOSIN HYDROCHLORIDE 0.4 MG/1
0.4 CAPSULE ORAL DAILY
Status: CANCELLED | OUTPATIENT
Start: 2025-02-23

## 2025-02-22 RX ORDER — ACETAMINOPHEN 325 MG/1
650 TABLET ORAL EVERY 6 HOURS PRN
Status: CANCELLED | OUTPATIENT
Start: 2025-02-22

## 2025-02-22 RX ORDER — ACETAMINOPHEN 650 MG/1
650 SUPPOSITORY RECTAL EVERY 6 HOURS PRN
Status: CANCELLED | OUTPATIENT
Start: 2025-02-22

## 2025-02-22 RX ORDER — GLUCAGON 1 MG/ML
1 KIT INJECTION PRN
Status: ACTIVE | OUTPATIENT
Start: 2025-02-22

## 2025-02-22 RX ORDER — CLOPIDOGREL BISULFATE 75 MG/1
75 TABLET ORAL DAILY
Status: CANCELLED | OUTPATIENT
Start: 2025-02-23

## 2025-02-22 RX ORDER — DOCUSATE SODIUM 100 MG/1
100 CAPSULE, LIQUID FILLED ORAL 2 TIMES DAILY
Status: DISCONTINUED | OUTPATIENT
Start: 2025-02-22 | End: 2025-02-22 | Stop reason: HOSPADM

## 2025-02-22 RX ORDER — POLYETHYLENE GLYCOL 3350 17 G/17G
17 POWDER, FOR SOLUTION ORAL DAILY PRN
Status: CANCELLED | OUTPATIENT
Start: 2025-02-22

## 2025-02-22 RX ORDER — TAMSULOSIN HYDROCHLORIDE 0.4 MG/1
0.4 CAPSULE ORAL DAILY
Status: DISPENSED | OUTPATIENT
Start: 2025-02-23

## 2025-02-22 RX ORDER — POTASSIUM CHLORIDE 1500 MG/1
40 TABLET, EXTENDED RELEASE ORAL
Status: CANCELLED | OUTPATIENT
Start: 2025-02-22

## 2025-02-22 RX ORDER — SODIUM CHLORIDE 0.9 % (FLUSH) 0.9 %
5-40 SYRINGE (ML) INJECTION EVERY 12 HOURS SCHEDULED
Status: CANCELLED | OUTPATIENT
Start: 2025-02-22

## 2025-02-22 RX ORDER — SPIRONOLACTONE 50 MG/1
25 TABLET, FILM COATED ORAL 2 TIMES DAILY
Status: DISPENSED | OUTPATIENT
Start: 2025-02-22

## 2025-02-22 RX ORDER — SODIUM CHLORIDE 0.9 % (FLUSH) 0.9 %
5-40 SYRINGE (ML) INJECTION PRN
Status: ACTIVE | OUTPATIENT
Start: 2025-02-22

## 2025-02-22 RX ORDER — POTASSIUM CHLORIDE 1500 MG/1
40 TABLET, EXTENDED RELEASE ORAL PRN
Status: CANCELLED | OUTPATIENT
Start: 2025-02-22

## 2025-02-22 RX ORDER — OXYCODONE AND ACETAMINOPHEN 5; 325 MG/1; MG/1
2 TABLET ORAL EVERY 4 HOURS PRN
Status: CANCELLED | OUTPATIENT
Start: 2025-02-22

## 2025-02-22 RX ORDER — CLOPIDOGREL BISULFATE 75 MG/1
75 TABLET ORAL DAILY
Status: DISPENSED | OUTPATIENT
Start: 2025-02-23

## 2025-02-22 RX ORDER — SODIUM CHLORIDE 0.9 % (FLUSH) 0.9 %
5-40 SYRINGE (ML) INJECTION PRN
Status: CANCELLED | OUTPATIENT
Start: 2025-02-22

## 2025-02-22 RX ORDER — ONDANSETRON 2 MG/ML
4 INJECTION INTRAMUSCULAR; INTRAVENOUS EVERY 6 HOURS PRN
Status: ACTIVE | OUTPATIENT
Start: 2025-02-22

## 2025-02-22 RX ORDER — GLUCAGON 1 MG/ML
1 KIT INJECTION PRN
Status: CANCELLED | OUTPATIENT
Start: 2025-02-22

## 2025-02-22 RX ORDER — BISACODYL 10 MG
10 SUPPOSITORY, RECTAL RECTAL DAILY PRN
Status: CANCELLED | OUTPATIENT
Start: 2025-02-22

## 2025-02-22 RX ORDER — OXYCODONE AND ACETAMINOPHEN 5; 325 MG/1; MG/1
1 TABLET ORAL EVERY 4 HOURS PRN
Status: ACTIVE | OUTPATIENT
Start: 2025-02-22

## 2025-02-22 RX ORDER — MAGNESIUM SULFATE IN WATER 40 MG/ML
2000 INJECTION, SOLUTION INTRAVENOUS PRN
Status: DISCONTINUED | OUTPATIENT
Start: 2025-02-22 | End: 2025-02-23

## 2025-02-22 RX ORDER — METOPROLOL SUCCINATE 25 MG/1
25 TABLET, EXTENDED RELEASE ORAL DAILY
Status: DISPENSED | OUTPATIENT
Start: 2025-02-23

## 2025-02-22 RX ORDER — POTASSIUM CHLORIDE 7.45 MG/ML
10 INJECTION INTRAVENOUS PRN
Status: DISCONTINUED | OUTPATIENT
Start: 2025-02-22 | End: 2025-02-23

## 2025-02-22 RX ORDER — OXYCODONE AND ACETAMINOPHEN 5; 325 MG/1; MG/1
2 TABLET ORAL EVERY 4 HOURS PRN
Status: ACTIVE | OUTPATIENT
Start: 2025-02-22

## 2025-02-22 RX ORDER — TORSEMIDE 20 MG/1
10 TABLET ORAL 2 TIMES DAILY
Status: DISCONTINUED | OUTPATIENT
Start: 2025-02-22 | End: 2025-02-22 | Stop reason: HOSPADM

## 2025-02-22 RX ORDER — ONDANSETRON 2 MG/ML
4 INJECTION INTRAMUSCULAR; INTRAVENOUS EVERY 6 HOURS PRN
Status: CANCELLED | OUTPATIENT
Start: 2025-02-22

## 2025-02-22 RX ORDER — ACETAMINOPHEN 650 MG/1
650 SUPPOSITORY RECTAL EVERY 6 HOURS PRN
Status: DISCONTINUED | OUTPATIENT
Start: 2025-02-22 | End: 2025-02-22

## 2025-02-22 RX ORDER — DEXTROSE MONOHYDRATE 100 MG/ML
INJECTION, SOLUTION INTRAVENOUS CONTINUOUS PRN
Status: ACTIVE | OUTPATIENT
Start: 2025-02-22

## 2025-02-22 RX ORDER — ONDANSETRON 4 MG/1
4 TABLET, ORALLY DISINTEGRATING ORAL EVERY 8 HOURS PRN
Status: ACTIVE | OUTPATIENT
Start: 2025-02-22

## 2025-02-22 RX ORDER — POLYETHYLENE GLYCOL 3350 17 G/17G
17 POWDER, FOR SOLUTION ORAL DAILY PRN
Status: ACTIVE | OUTPATIENT
Start: 2025-02-22

## 2025-02-22 RX ORDER — SPIRONOLACTONE 50 MG/1
25 TABLET, FILM COATED ORAL 2 TIMES DAILY
Status: CANCELLED | OUTPATIENT
Start: 2025-02-22

## 2025-02-22 RX ORDER — DOCUSATE SODIUM 100 MG/1
100 CAPSULE, LIQUID FILLED ORAL 2 TIMES DAILY
Status: DISPENSED | OUTPATIENT
Start: 2025-02-22

## 2025-02-22 RX ORDER — FINASTERIDE 5 MG/1
5 TABLET, FILM COATED ORAL DAILY
Status: DISPENSED | OUTPATIENT
Start: 2025-02-23

## 2025-02-22 RX ORDER — TORSEMIDE 20 MG/1
10 TABLET ORAL 2 TIMES DAILY
Status: CANCELLED | OUTPATIENT
Start: 2025-02-22

## 2025-02-22 RX ORDER — POTASSIUM CHLORIDE 1500 MG/1
40 TABLET, EXTENDED RELEASE ORAL PRN
Status: DISCONTINUED | OUTPATIENT
Start: 2025-02-22 | End: 2025-02-23

## 2025-02-22 RX ORDER — ACETAMINOPHEN 325 MG/1
650 TABLET ORAL EVERY 6 HOURS PRN
Status: ACTIVE | OUTPATIENT
Start: 2025-02-22

## 2025-02-22 RX ORDER — BISACODYL 10 MG
10 SUPPOSITORY, RECTAL RECTAL DAILY PRN
Status: ACTIVE | OUTPATIENT
Start: 2025-02-22

## 2025-02-22 RX ORDER — OXYCODONE AND ACETAMINOPHEN 5; 325 MG/1; MG/1
1 TABLET ORAL EVERY 4 HOURS PRN
Status: CANCELLED | OUTPATIENT
Start: 2025-02-22

## 2025-02-22 RX ORDER — DOCUSATE SODIUM 100 MG/1
100 CAPSULE, LIQUID FILLED ORAL 2 TIMES DAILY
Status: CANCELLED | OUTPATIENT
Start: 2025-02-22

## 2025-02-22 RX ORDER — FINASTERIDE 5 MG/1
5 TABLET, FILM COATED ORAL DAILY
Status: CANCELLED | OUTPATIENT
Start: 2025-02-23

## 2025-02-22 RX ADMIN — DOCUSATE SODIUM 100 MG: 100 CAPSULE, LIQUID FILLED ORAL at 10:10

## 2025-02-22 RX ADMIN — AMPICILLIN SODIUM 2000 MG: 2 INJECTION, POWDER, FOR SOLUTION INTRAVENOUS at 15:54

## 2025-02-22 RX ADMIN — SODIUM CHLORIDE, PRESERVATIVE FREE 10 ML: 5 INJECTION INTRAVENOUS at 20:39

## 2025-02-22 RX ADMIN — INSULIN HUMAN 10 UNITS: 500 INJECTION, SOLUTION SUBCUTANEOUS at 10:55

## 2025-02-22 RX ADMIN — INSULIN HUMAN 25 UNITS: 500 INJECTION, SOLUTION SUBCUTANEOUS at 12:50

## 2025-02-22 RX ADMIN — METOPROLOL SUCCINATE 25 MG: 25 TABLET, EXTENDED RELEASE ORAL at 10:11

## 2025-02-22 RX ADMIN — INSULIN HUMAN 10 UNITS: 500 INJECTION, SOLUTION SUBCUTANEOUS at 12:31

## 2025-02-22 RX ADMIN — TORSEMIDE 10 MG: 20 TABLET ORAL at 20:38

## 2025-02-22 RX ADMIN — INSULIN HUMAN 5 UNITS: 500 INJECTION, SOLUTION SUBCUTANEOUS at 10:58

## 2025-02-22 RX ADMIN — AMPICILLIN SODIUM 2000 MG: 2 INJECTION, POWDER, FOR SOLUTION INTRAMUSCULAR; INTRAVENOUS at 06:00

## 2025-02-22 RX ADMIN — AMPICILLIN SODIUM 2000 MG: 2 INJECTION, POWDER, FOR SOLUTION INTRAVENOUS at 23:45

## 2025-02-22 RX ADMIN — SODIUM CHLORIDE, PRESERVATIVE FREE 10 ML: 5 INJECTION INTRAVENOUS at 10:11

## 2025-02-22 RX ADMIN — POTASSIUM CHLORIDE 40 MEQ: 1500 TABLET, EXTENDED RELEASE ORAL at 12:30

## 2025-02-22 RX ADMIN — INSULIN HUMAN 10 UNITS: 500 INJECTION, SOLUTION SUBCUTANEOUS at 17:42

## 2025-02-22 RX ADMIN — INSULIN HUMAN 20 UNITS: 500 INJECTION, SOLUTION SUBCUTANEOUS at 19:53

## 2025-02-22 RX ADMIN — POTASSIUM CHLORIDE 40 MEQ: 1500 TABLET, EXTENDED RELEASE ORAL at 17:22

## 2025-02-22 RX ADMIN — DOCUSATE SODIUM 100 MG: 100 CAPSULE, LIQUID FILLED ORAL at 20:38

## 2025-02-22 RX ADMIN — POTASSIUM CHLORIDE 40 MEQ: 1500 TABLET, EXTENDED RELEASE ORAL at 10:09

## 2025-02-22 RX ADMIN — AMPICILLIN SODIUM 2000 MG: 2 INJECTION, POWDER, FOR SOLUTION INTRAMUSCULAR; INTRAVENOUS at 10:19

## 2025-02-22 RX ADMIN — AMPICILLIN SODIUM 2000 MG: 2 INJECTION, POWDER, FOR SOLUTION INTRAVENOUS at 20:37

## 2025-02-22 RX ADMIN — FINASTERIDE 5 MG: 5 TABLET, FILM COATED ORAL at 10:11

## 2025-02-22 RX ADMIN — RANOLAZINE 500 MG: 500 TABLET, EXTENDED RELEASE ORAL at 10:10

## 2025-02-22 RX ADMIN — SPIRONOLACTONE 25 MG: 50 TABLET ORAL at 10:10

## 2025-02-22 RX ADMIN — AMPICILLIN SODIUM 2000 MG: 2 INJECTION, POWDER, FOR SOLUTION INTRAMUSCULAR; INTRAVENOUS at 02:32

## 2025-02-22 RX ADMIN — TAMSULOSIN HYDROCHLORIDE 0.4 MG: 0.4 CAPSULE ORAL at 10:10

## 2025-02-22 RX ADMIN — INSULIN HUMAN 25 UNITS: 500 INJECTION, SOLUTION SUBCUTANEOUS at 17:42

## 2025-02-22 RX ADMIN — RIVAROXABAN 20 MG: 20 TABLET, FILM COATED ORAL at 17:23

## 2025-02-22 RX ADMIN — SPIRONOLACTONE 25 MG: 50 TABLET ORAL at 20:38

## 2025-02-22 RX ADMIN — CLOPIDOGREL BISULFATE 75 MG: 75 TABLET ORAL at 10:10

## 2025-02-22 RX ADMIN — TORSEMIDE 10 MG: 20 TABLET ORAL at 10:10

## 2025-02-22 ASSESSMENT — PAIN SCALES - GENERAL
PAINLEVEL_OUTOF10: 0

## 2025-02-22 NOTE — PLAN OF CARE
5: Pt will propel w/c 150' including turns with mod I               These goals were reviewed with this patient at the time of assessment and Bandar De La Torre is in agreement.     Plan of Care: Pt to be seen 5 days per week for a minimum of 60 minutes for 7 days.                Current Treatment Recommendations: Strengthening, Balance training, Functional mobility training, Transfer training, IADL training, Endurance training, Wheelchair mobility training, Gait training, Stair training, Patient/Caregiver education & training, Safety education & training, Home exercise program, Equipment evaluation, education, & procurement, Neuromuscular re-education, Positioning, Group Therapy, Therapeutic activities, Pain management community reintegration,animal assisted therapy, and co-treatment, concurrent and/or group therapy based on patient's individual needs.    PT IRF-JAVIER scores and goals for initial assessment:   Bed Mobility:   Sit to Lying  Assistance Needed: Independent  CARE Score: 6  Discharge Goal: Independent  Roll Left and Right  Assistance Needed: Independent  CARE Score: 6  Discharge Goal: Independent  Lying to Sitting on Side of Bed  Assistance Needed: Independent  CARE Score: 6  Discharge Goal: Independent    Transfers:    Sit to Stand  Assistance Needed: Partial/moderate assistance  Comment: Varied CG to min assist throughout session but with max cues for keeping weight on heel  CARE Score: 3  Discharge Goal: Independent  Chair/Bed-to-Chair Transfer  Assistance Needed: Partial/moderate assistance  Comment: Varied CG to min assist throughout session but with max cues for keeping weight on heel, uses 2ww  CARE Score: 3  Discharge Goal: Independent     Car Transfer  Assistance Needed: Supervision or touching assistance  Comment: max cues, CGA  CARE Score: 4  Discharge Goal: Independent    Ambulation:    Walking Ability  Does the Patient Walk?: Yes     Walk 10 Feet  Assistance Needed: Supervision or touching  assistance  Comment: CGA with 2ww. Pt keeps weight on heel only because toes are hanging off edge of post-op shoe, cues for sequence and short steps to maximize keeping precautions  CARE Score: 4  Discharge Goal: Independent     Walk 50 Feet with Two Turns  Comment: pt not keeping precautions well enough to press the distance today  Reason if not Attempted: Not attempted due to medical condition or safety concerns  CARE Score: 88  Discharge Goal: Independent     Walk 150 Feet  Reason if not Attempted: Not attempted due to medical condition or safety concerns  CARE Score: 88  Discharge Goal: Not Attempted     Walking 10 Feet on Uneven Surfaces  Comment: after am session, foot was bleeding and needed rewrapped. did not pursue this activity in pm out of caution  Reason if not Attempted: Not attempted due to medical condition or safety concerns  CARE Score: 88  Discharge Goal: Independent     1 Step (Curb)  Comment: did not attempt due to poor ability to keep WB  Reason if not Attempted: Not attempted due to medical condition or safety concerns  CARE Score: 88  Discharge Goal: Independent     4 Steps  Reason if not Attempted: Not attempted due to medical condition or safety concerns  CARE Score: 88  Discharge Goal: Supervision or touching assistance     12 Steps  Reason if not Attempted: Not attempted due to medical condition or safety concerns  CARE Score: 88  Discharge Goal: Supervision or touching assistance    Gait Deviations: []None []Slow fortunato  [] Increased MICHELLE  [] Staggers []Deviated Path  [] Decreased step length  [] Decreased step height  []Decreased arm swing  [] Shuffles  [] Decreased head and trunk rotation  []other:        Wheelchair:  w/c Ability: Wheelchair Ability  Uses a Wheelchair and/or Scooter?: Yes  Wheel 50 Feet with Two Turns  Assistance Needed: Partial/moderate assistance  Comment: min assist for keeping straight line in halls, uses BUE and occassionally LLE  CARE Score: 3  Discharge Goal:

## 2025-02-22 NOTE — PROGRESS NOTES
GENERAL SURGERY PROGRESS NOTE    Bandar De La Torre is a 64 y.o. male with right foot and toe wounds.                 Subjective:  Doing ok today. Denies pain.  Feels less dizzy when up.    Objective:    Vitals: VITALS:  /73   Pulse 60   Temp 98 °F (36.7 °C) (Oral)   Resp 19   Ht 1.829 m (6')   Wt 134.9 kg (297 lb 6.4 oz)   SpO2 94%   BMI 40.33 kg/m²     I/O: 02/21 0701 - 02/22 0700  In: -   Out: 1100 [Urine:1100]    Labs/Imaging Results:   Lab Results   Component Value Date/Time     02/22/2025 01:22 AM    K 3.3 02/22/2025 01:22 AM    CL 91 02/22/2025 01:22 AM    CO2 29 02/22/2025 01:22 AM    BUN 61 02/22/2025 01:22 AM    CREATININE 2.1 02/22/2025 01:22 AM    GLUCOSE 216 02/22/2025 01:22 AM    CALCIUM 9.8 02/22/2025 01:22 AM      Lab Results   Component Value Date    WBC 6.5 02/22/2025    HGB 14.3 02/22/2025    HCT 44.3 02/22/2025    MCV 83.1 02/22/2025     02/22/2025         IV Fluids:   dextrose    sodium chloride Last Rate: 25 mL (02/20/25 0654)    Scheduled Meds:   torsemide, 10 mg, Oral, BID    potassium chloride, 40 mEq, Oral, TID WC    insulin glargine, 10 Units, SubCUTAneous, Nightly    insulin regular human, 10 Units, SubCUTAneous, Dinner    insulin regular human, 10 Units, SubCUTAneous, Lunch    insulin regular human, 10 Units, SubCUTAneous, QAM    rivaroxaban, 20 mg, Oral, Dinner    clopidogrel, 75 mg, Oral, Daily    ampicillin IV, 2,000 mg, IntraVENous, 6 times per day    metoprolol succinate, 25 mg, Oral, Daily    finasteride, 5 mg, Oral, Daily    ranolazine, 500 mg, Oral, BID    tamsulosin, 0.4 mg, Oral, Daily    spironolactone, 25 mg, Oral, BID    sodium chloride flush, 5-40 mL, IntraVENous, 2 times per day    insulin regular human, 0-30 Units, SubCUTAneous, 4x Daily AC & HS    Physical Exam:  General: A&O x 3, no distress.   HEENT: Anicteric sclerae, MMM.    Extremities: right foot with dressing in place, clean and dry    Abdomen: Soft, nontender, nondistended.

## 2025-02-22 NOTE — PROGRESS NOTES
AR[facility] Admission Assessment - [facility]      A Complete drug regimen review was completed for this patient this date.   [x]  No clinically significant medication issue was identified   []  Yes, a clinically significant medication issue was identified     []  Adverse Drug Event:    []  Allergy:    []  Side Effect:    []  Ineffective Therapy:    []  Drug interaction:     []  Duplicate Therapy:    []  Untreated Indication:    []  Non-adherence:    []  Other:  Nursing contacted the physician:       Date:                Time:    Actions recommended by physician were completed:   Date:                 Time:  Action(s) Taken:             []  New Physician Order Received    []  Issue Noted by Physician; However No Action Required    []  Other:      *NEW QUESTION EFFECTIVE OCTOBER 1, 2024 as required by Medicare    COVID Vaccination  Is the patient up-to-date with their COVID vaccination?  *See CDC Guidelines Below      https://www.cdc.gov/covid/vaccines/stay-up-to-date.html    [x] A.  No, patient is NOT up to date.  Includes if they have not received the vaccine for ANY reason, including medical or Hoahaoism reason.   Encourage patient to receive this after discharge.     [] B.  Yes, patient is up to date.       People ages 12 years and older Guidelines for Up to Date  *You are up to date when you have received:  1 dose of the 3596-4861 Moderna COVID-19 vaccine OR  1 dose of the 2214-5223 Pfizer-BioNTech COVID-19 vaccine OR  1 dose of the 1608-4202 Novavax vaccine unless you are receiving a COVID-19 vaccine for the very first time. If you have never received any COVID-19 vaccine and you choose to get Novavax, you need 2 doses of 1784-3597 Novavax COVID-19 vaccine to be up to date.    Information may be determined from medical record, interviews with the patient and/or family members or other healthcare providers but must meet criteria above.      Ethnicity  \"Are you of , /a, or Ecu[place]orean origin?\"  Check all  family down   [] 0.  No  [] 1.  Yes  [] 9. No Response [] 0.  Never or one day  [] 1.  2-6 days (several days)  [] 2.  7-11 days (half or more of days)  [] 3.  12-14 days (nearly every day   Trouble concentrating on things, such as reading the newspaper or watching television   [] 0.  No  [] 1.  Yes  [] 9. No Response [] 0.  Never or one day  [] 1.  2-6 days (several days)  [] 2.  7-11 days (half or more of days)  [] 3.  12-14 days (nearly every day   Moving or speaking so slowly that other people could have noticed.  Or the opposite- being so fidgety or restless that you have been moving around a lot more than usual.   [] 0.  No  [] 1.  Yes  [] 9. No Response [] 0.  Never or one day  [] 1.  2-6 days (several days)  [] 2.  7-11 days (half or more of days)  [] 3.  12-14 days (nearly every day   Thoughts that you would be better off dead, or of hurting yourself in some way.   [] 0.  No  [] 1.  Yes  [] 9. No Response [] 0.  Never or one day  [] 1.  2-6 days (several days)  [] 2.  7-11 days (half or more of days)  [] 3.  12-14 days (nearly every day     Social Isolation  \"How often do you feel lonely or isolated from those around you?\"  [x] 0.  Never  [] 1.  Rarely  [] 2.  Sometimes  [] 3.  Often  [] 4.  Always  [] 8.  Patient unable to respond    Pain Effect on Sleep  \"Over the past 5 days, how much of the time has pain made it hard for you to sleep at night?\"  [x]  0.  Does not apply - I have not had any pain or hurting in the past 5 days  []  1.  Rarely or not at all  []  2.  Occasionally  []  3.  Frequently  []  4.  Almost constantly  []  8.  Unable to answer  **If the patient answers \"0.  Does not apply\" to this question, skip the next two \"Pain\" questions**      Pain Interference with Therapy Activities  \"Over the past 5 days, how often have you limited your participation in rehabilitation therapy sessions due to pain?\"  []  0.  Does not apply - I have not received rehabilitation therapy in the past 5 days  []  1.

## 2025-02-22 NOTE — PROGRESS NOTES
Report received by TRACY Miranda and confirmed there were no questions at this time. Let nurse know we are ready for the patient at this time in 1025.

## 2025-02-22 NOTE — DISCHARGE SUMMARY
V2.0  Discharge Summary    Name:  Bandar De La Torre /Age/Sex: 1960 (64 y.o. male)   Admit Date: 2025  Discharge Date: 25    MRN & CSN:  [id number] & [id number] Encounter Date and Time 25 12:24 PM EST    Attending:  Sean Irvin MD Discharging Provider: SEAN IRVIN MD       Hospital Course:     Brief HPI: Bandar De La Torre is a 64 y.o. male who presented to ED with a wound on his right foot  He was admitted.     Brief Problem Based Course:     For this admission Bandar was here for 9 days.  He presented from home.  He is being discharged to the acute rehab unit today.    Right diabetic foot infection with osteomyelitis status post amputation of right second and third toes and foot debridement on 2025  -He has additional foot wounds for which wound care is being done  -Infectious disease consult appreciated  -Currently on IV ampicillin with recommendation to switch to oral Augmentin for 2 weeks upon discharge with end of treatment date 2025 on the Augmentin  -Since he had bacteremia and since there is concern for CIED endocarditis we will keep him on IV ampicillin over the weekend and ask infectious disease to reevaluate him on the acute rehab unit    Enterococcus faecalis bacteremia  -As above    Concern for CIED endocarditis  -Due to presence of a pacemaker there was some concern for CIED endocarditis  -TTE and TEJ were done while here and there was no evidence of endocarditis  -Continue follow-up with infectious disease as outpatient    Chronic kidney disease stage III  -Stable at this time, he follows with Dr. 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 in 2024 by Dr. Ocampo    Pacemaker lead malfunction  -EP consult appreciated  -The RV lead placed in 2024 is  articulations appear to remain in alignment. Lisfranc's ligament is intact. Moderate narrowing of the first MTP joint is identified consistent with osteoarthritis. Flexor and extensor tendons appear to remain intact. Suspected ulceration at the plantar surface of the first MTP joint with mild edema in this  region possibly indicating cellulitis. No definite underlying marrow signal abnormality including the medial and lateral sesamoids IMPRESSION: 1. Findings consistent with osteomyelitis involving the second distal phalanx and the third middle and distal phalanges. Overlying ulcers in both locations are noted 2. Additional soft tissue ulceration at the plantar surface of the forefoot overlying the first MTP joint with likely adjacent surrounding cellulitis.  Dictated and Electronically Signed By: Bhanu Diaz MD 2/18/2025 10:39        Echo (TTE) complete (PRN contrast/bubble/strain/3D)    Result Date: 2/17/2025    Image quality is technically difficult due to patient body habitus and lung interface. Contrast used: Lumason.   Left Ventricle: Normal left ventricular systolic function with a visually estimated EF of 55 - 60%. Left ventricle size is normal. Mildly increased wall thickness. Prominent septal bounce. Indeterminate diastolic function.   Left Atrium: Left atrium is dilated.   Right Ventricle: Right ventricle is dilated measuring 4.2 cm. Suboptimal visualization but systolic function appears normal.   Pacer wire noted in right heart with mild tricuspid regurgitation; RVSP: 17 mmHg.   Mitral Valve: Mild annular calcification. Trace regurgitation.   Pericardium: There is evidence of epicardial fat. No pericardial effusion.     TEJ (with contrast/ 3D PRN)    Result Date: 2/17/2025    Pacer lead noted in right heart with mild tricuspid regurgitation.   Interatrial Septum: Hypermobile interatrial septum.   Mitral Valve: Mild regurgitation.   Prominent coumadin ridge.   No evidence of endocarditis.

## 2025-02-22 NOTE — PROGRESS NOTES
Progress Note( Dr. Diehl)  2/22/2025  Subjective:   Admit Date: 2/13/2025  PCP: MEETA Diehl MD    Admitted For : Right foot infection and high blood glucose     Consulted For:  Right foot infection and high blood glucose     Interval History: Patient had  amputation of second and second and third toe of the right foot and debridement of the wound of the right foot done 2/19/2025 patient feeling a lot better    Patient has still hypokalemia and potassium being replaced    Blood glucose seem to be somewhat higher yesterday    Denies any chest pains,   Denies SOB .   Denies nausea or vomiting.   No new bowel or bladder symptoms.       Intake/Output Summary (Last 24 hours) at 2/22/2025 0927  Last data filed at 2/22/2025 0421  Gross per 24 hour   Intake --   Output 1100 ml   Net -1100 ml       DATA    CBC:   Recent Labs     02/20/25 0129 02/21/25 0214 02/22/25  0122   WBC 8.1 8.9 6.5   HGB 14.6 14.0 14.3    219 215    CMP:  Recent Labs     02/20/25  0129 02/21/25  0214 02/22/25  0122    136 134*   K 3.5 2.8* 3.3*   CL 93* 91* 91*   CO2 30 32 29   BUN 53* 56* 61*   CREATININE 1.8* 1.9* 2.1*   CALCIUM 9.8 9.7 9.8     Lipids:   Lab Results   Component Value Date/Time    CHOL 159 11/14/2024 08:23 AM    HDL 39 11/14/2024 08:23 AM    TRIG 152 11/14/2024 08:23 AM     Glucose:  Recent Labs     02/21/25 1953 02/21/25 2227 02/22/25  0751   POCGLU 432* 284* 178*     [id number]:  Lab Results   Component Value Date/Time    LABA1C 8.8 02/15/2025 01:47 AM     High Sensitivity TSH:   Lab Results   Component Value Date/Time    TSHHS 1.800 08/29/2019 10:06 AM     Free T3: No results found for: \"FT3\"  Free T4:No results found for: \"T4FREE\"    MRI FOOT RIGHT W WO CONTRAST   Final Result      XR WRIST LEFT (MIN 3 VIEWS)   Final Result      XR FOOT RIGHT (MIN 3 VIEWS)   Final Result      XR CHEST PORTABLE   Final Result           Scheduled Medicines   Medications:    torsemide  10 mg Oral BID    docusate sodium   100 mg Oral BID    insulin regular human  10 Units SubCUTAneous QAM    insulin regular human  10 Units SubCUTAneous Lunch    potassium chloride  40 mEq Oral TID WC    [Held by provider] insulin glargine  10 Units SubCUTAneous Nightly    insulin regular human  10 Units SubCUTAneous Dinner    rivaroxaban  20 mg Oral Dinner    clopidogrel  75 mg Oral Daily    ampicillin IV  2,000 mg IntraVENous 6 times per day    metoprolol succinate  25 mg Oral Daily    finasteride  5 mg Oral Daily    ranolazine  500 mg Oral BID    tamsulosin  0.4 mg Oral Daily    spironolactone  25 mg Oral BID    sodium chloride flush  5-40 mL IntraVENous 2 times per day    insulin regular human  0-30 Units SubCUTAneous 4x Daily AC & HS      Infusions:    dextrose      sodium chloride 25 mL (02/20/25 0654)         Objective:   Vitals: /73   Pulse 60   Temp 98 °F (36.7 °C) (Oral)   Resp 19   Ht 1.829 m (6')   Wt 134.9 kg (297 lb 6.4 oz)   SpO2 94%   BMI 40.33 kg/m²   General appearance: alert and cooperative with exam  Neck: no JVD or bruit  Thyroid : Normal lobes   Lungs: Has Vesicular Breath sounds has some somewhat diminished breath sounds  Heart:  ??  A-fib//cardiac pacemaker  Abdomen: soft, non-tender; bowel sounds normal; no masses,  no organomegaly  Musculoskeletal: Normal  Extremities: extremities normal, , bilateral leg edema has ulceration on the Right  foot  the debridement was done yesterday and again today  Neurologic:  Awake, alert, oriented to name, place and time.  Cranial nerves II-XII are grossly intact.  Motor is  intact.  Sensory neuropathy,  and gait is abnormal.    Assessment:     Patient Active Problem List:     MARINO on CPAP     New onset atrial fibrillation (HCC)     Type 2 diabetes mellitus with hyperglycemia, with long-term current use of insulin ([facility])     Class 3 severe obesity due to excess calories with body mass index (BMI) of 45.0 to 49.9 in adult     History of cardiac cath     Weakness of right arm

## 2025-02-22 NOTE — PROGRESS NOTES
4 Eyes Skin Assessment     NAME:  Bandar De La Torre  YOB: 1960  MEDICAL RECORD NUMBER:  [id number]    The patient is being assessed for  Admission    I agree that at least one RN has performed a thorough Head to Toe Skin Assessment on the patient. ALL assessment sites listed below have been assessed.      Areas assessed by both nurses:    Head, Face, Ears, Shoulders, Back, Chest, Arms, Elbows, Hands, Sacrum. Buttock, Coccyx, Ischium, and Legs. Feet and Heels        Does the Patient have a Wound? Yes wound(s) were present on assessment. LDA wound assessment was Initiated and completed by RN see admission assessment flowsheet.         David Prevention initiated by RN: Yes  Wound Care Orders initiated by RN: Yes    Pressure Injury (Stage 3,4, Unstageable, DTI, NWPT, and Complex wounds) if present, place Wound referral order by RN under : No    New Ostomies, if present place, Ostomy referral order under : No     Nurse 1 eSignature: Electronically signed by Tiki Moran RN on 2/22/25 at 4:22 PM EST    **SHARE this note so that the co-signing nurse can place an eSignature**    Nurse 2 eSignature: Electronically signed by Ronnie Jamil RN on 2/22/25 at 4:22 PM EST

## 2025-02-22 NOTE — PROGRESS NOTES
Nephrology Progress Note  2/22/2025 9:24 AM  Subjective:     Interval History: Bandar De La Torre is a 64 y.o. male with doing about the same weak tired no distress        Data:   Scheduled Meds:   torsemide  10 mg Oral BID    docusate sodium  100 mg Oral BID    insulin regular human  10 Units SubCUTAneous QAM    insulin regular human  10 Units SubCUTAneous Lunch    potassium chloride  40 mEq Oral TID WC    insulin glargine  10 Units SubCUTAneous Nightly    insulin regular human  10 Units SubCUTAneous Dinner    rivaroxaban  20 mg Oral Dinner    clopidogrel  75 mg Oral Daily    ampicillin IV  2,000 mg IntraVENous 6 times per day    metoprolol succinate  25 mg Oral Daily    finasteride  5 mg Oral Daily    ranolazine  500 mg Oral BID    tamsulosin  0.4 mg Oral Daily    spironolactone  25 mg Oral BID    sodium chloride flush  5-40 mL IntraVENous 2 times per day    insulin regular human  0-30 Units SubCUTAneous 4x Daily AC & HS     Continuous Infusions:   dextrose      sodium chloride 25 mL (02/20/25 0654)         CBC   Recent Labs     02/20/25  0129 02/21/25  0214 02/22/25  0122   WBC 8.1 8.9 6.5   HGB 14.6 14.0 14.3   HCT 46.2 43.8 44.3    219 215      BMP   Recent Labs     02/20/25  0129 02/21/25  0214 02/22/25  0122    136 134*   K 3.5 2.8* 3.3*   CL 93* 91* 91*   CO2 30 32 29   BUN 53* 56* 61*   CREATININE 1.8* 1.9* 2.1*     Hepatic: No results for input(s): \"AST\", \"ALT\", \"BILITOT\", \"ALKPHOS\" in the last 72 hours.    Invalid input(s): \"ALB\"  Troponin: No results for input(s): \"TROPONINI\" in the last 72 hours.  BNP: No results for input(s): \"BNP\" in the last 72 hours.  Lipids: No results for input(s): \"CHOL\", \"HDL\" in the last 72 hours.    Invalid input(s): \"LDLCALCU\"  ABGs: No results found for: \"PHART\", \"PO2ART\", \"YWD5OSB\"  INR: No results for input(s): \"INR\" in the last 72 hours.  Renal Labs  Albumin:    Lab Results   Component Value Date/Time    [facility] 10/27/2021 02:04 PM     Calcium:    Lab

## 2025-02-23 PROBLEM — E11.65 POORLY CONTROLLED TYPE 2 DIABETES MELLITUS WITH PERIPHERAL NEUROPATHY (HCC): Status: ACTIVE | Noted: 2025-02-23

## 2025-02-23 PROBLEM — Z89.429 STATUS POST AMPUTATION OF TOE: Status: ACTIVE | Noted: 2025-02-23

## 2025-02-23 PROBLEM — I48.21 PERMANENT ATRIAL FIBRILLATION (HCC): Status: ACTIVE | Noted: 2018-09-25

## 2025-02-23 PROBLEM — R52 UNCONTROLLED PAIN: Status: ACTIVE | Noted: 2025-02-23

## 2025-02-23 PROBLEM — L97.411 DIABETIC ULCER OF RIGHT MIDFOOT ASSOCIATED WITH TYPE 2 DIABETES MELLITUS, LIMITED TO BREAKDOWN OF SKIN (HCC): Status: ACTIVE | Noted: 2025-02-23

## 2025-02-23 PROBLEM — E11.42 POORLY CONTROLLED TYPE 2 DIABETES MELLITUS WITH PERIPHERAL NEUROPATHY (HCC): Status: ACTIVE | Noted: 2025-02-23

## 2025-02-23 PROBLEM — E11.621 DIABETIC ULCER OF RIGHT MIDFOOT ASSOCIATED WITH TYPE 2 DIABETES MELLITUS, LIMITED TO BREAKDOWN OF SKIN (HCC): Status: ACTIVE | Noted: 2025-02-23

## 2025-02-23 PROBLEM — R53.1 GENERALIZED WEAKNESS: Status: ACTIVE | Noted: 2025-02-23

## 2025-02-23 LAB
GLUCOSE BLD-MCNC: 120 MG/DL (ref 74–99)
GLUCOSE BLD-MCNC: 173 MG/DL (ref 74–99)
GLUCOSE BLD-MCNC: 316 MG/DL (ref 74–99)
GLUCOSE BLD-MCNC: 344 MG/DL (ref 74–99)
GLUCOSE BLD-MCNC: 86 MG/DL (ref 74–99)

## 2025-02-23 PROCEDURE — 2580000003 HC RX 258: Performed by: PHYSICAL MEDICINE & REHABILITATION

## 2025-02-23 PROCEDURE — 6360000002 HC RX W HCPCS: Performed by: PHYSICAL MEDICINE & REHABILITATION

## 2025-02-23 PROCEDURE — 2580000003 HC RX 258: Performed by: INTERNAL MEDICINE

## 2025-02-23 PROCEDURE — 6370000000 HC RX 637 (ALT 250 FOR IP): Performed by: INTERNAL MEDICINE

## 2025-02-23 PROCEDURE — 94761 N-INVAS EAR/PLS OXIMETRY MLT: CPT

## 2025-02-23 PROCEDURE — 2500000003 HC RX 250 WO HCPCS: Performed by: INTERNAL MEDICINE

## 2025-02-23 PROCEDURE — 82962 GLUCOSE BLOOD TEST: CPT

## 2025-02-23 PROCEDURE — 94150 VITAL CAPACITY TEST: CPT

## 2025-02-23 PROCEDURE — 99223 1ST HOSP IP/OBS HIGH 75: CPT | Performed by: PHYSICAL MEDICINE & REHABILITATION

## 2025-02-23 PROCEDURE — 1280000000 HC REHAB R&B

## 2025-02-23 RX ADMIN — INSULIN HUMAN 5 UNITS: 500 INJECTION, SOLUTION SUBCUTANEOUS at 11:53

## 2025-02-23 RX ADMIN — SPIRONOLACTONE 25 MG: 50 TABLET ORAL at 21:18

## 2025-02-23 RX ADMIN — AMPICILLIN SODIUM 2000 MG: 2 INJECTION, POWDER, FOR SOLUTION INTRAVENOUS at 08:59

## 2025-02-23 RX ADMIN — TORSEMIDE 10 MG: 20 TABLET ORAL at 21:18

## 2025-02-23 RX ADMIN — AMPICILLIN SODIUM 2000 MG: 2 INJECTION, POWDER, FOR SOLUTION INTRAVENOUS at 03:39

## 2025-02-23 RX ADMIN — INSULIN HUMAN 20 UNITS: 500 INJECTION, SOLUTION SUBCUTANEOUS at 17:24

## 2025-02-23 RX ADMIN — DOCUSATE SODIUM 100 MG: 100 CAPSULE, LIQUID FILLED ORAL at 21:18

## 2025-02-23 RX ADMIN — INSULIN HUMAN 20 UNITS: 500 INJECTION, SOLUTION SUBCUTANEOUS at 21:24

## 2025-02-23 RX ADMIN — AMPICILLIN SODIUM 2000 MG: 2 INJECTION, POWDER, FOR SOLUTION INTRAVENOUS at 21:17

## 2025-02-23 RX ADMIN — AMPICILLIN SODIUM 2000 MG: 2 INJECTION, POWDER, FOR SOLUTION INTRAVENOUS at 11:59

## 2025-02-23 RX ADMIN — SODIUM CHLORIDE, PRESERVATIVE FREE 10 ML: 5 INJECTION INTRAVENOUS at 08:54

## 2025-02-23 RX ADMIN — RIVAROXABAN 20 MG: 20 TABLET, FILM COATED ORAL at 17:24

## 2025-02-23 RX ADMIN — POTASSIUM CHLORIDE 40 MEQ: 1500 TABLET, EXTENDED RELEASE ORAL at 17:24

## 2025-02-23 RX ADMIN — TAMSULOSIN HYDROCHLORIDE 0.4 MG: 0.4 CAPSULE ORAL at 08:39

## 2025-02-23 RX ADMIN — AMPICILLIN SODIUM 2000 MG: 2 INJECTION, POWDER, FOR SOLUTION INTRAVENOUS at 16:03

## 2025-02-23 RX ADMIN — SODIUM CHLORIDE, PRESERVATIVE FREE 10 ML: 5 INJECTION INTRAVENOUS at 21:36

## 2025-02-23 RX ADMIN — INSULIN HUMAN 5 UNITS: 500 INJECTION, SOLUTION SUBCUTANEOUS at 17:27

## 2025-02-23 RX ADMIN — CLOPIDOGREL BISULFATE 75 MG: 75 TABLET ORAL at 08:38

## 2025-02-23 RX ADMIN — SODIUM CHLORIDE: 900 INJECTION INTRAVENOUS at 21:15

## 2025-02-23 RX ADMIN — DOCUSATE SODIUM 100 MG: 100 CAPSULE, LIQUID FILLED ORAL at 08:38

## 2025-02-23 RX ADMIN — POTASSIUM CHLORIDE 40 MEQ: 1500 TABLET, EXTENDED RELEASE ORAL at 11:44

## 2025-02-23 RX ADMIN — FINASTERIDE 5 MG: 5 TABLET, FILM COATED ORAL at 08:39

## 2025-02-23 RX ADMIN — POTASSIUM CHLORIDE 40 MEQ: 1500 TABLET, EXTENDED RELEASE ORAL at 08:39

## 2025-02-23 ASSESSMENT — PAIN SCALES - GENERAL: PAINLEVEL_OUTOF10: 0

## 2025-02-23 NOTE — H&P
MD Edy    sodium chloride flush 0.9 % injection 5-40 mL, 5-40 mL, IntraVENous, 2 times per day, Too Gonzales MD, 10 mL at 02/22/25 2039    sodium chloride flush 0.9 % injection 5-40 mL, 5-40 mL, IntraVENous, PRN, Too Gonzales MD    clopidogrel (PLAVIX) tablet 75 mg, 75 mg, Oral, Daily, Too Gonzales MD    dextrose 10 % infusion, , IntraVENous, Continuous PRN, Too Gonzales MD    dextrose bolus 10% 125 mL, 125 mL, IntraVENous, PRN **OR** dextrose bolus 10% 250 mL, 250 mL, IntraVENous, PRN, Too Gonzales MD    docusate sodium (COLACE) capsule 100 mg, 100 mg, Oral, BID, Too Gonzales MD, 100 mg at 02/22/25 2038    finasteride (PROSCAR) tablet 5 mg, 5 mg, Oral, Daily, Too Gonzales MD    glucagon injection 1 mg, 1 mg, SubCUTAneous, PRN, Too Gonzales MD    glucose chewable tablet 16 g, 4 tablet, Oral, PRN, Too Gonzales MD    insulin regular human (humuLIN R U-500) 500 UNIT/ML concentrated injection vial 10 Units, 10 Units, SubCUTAneous, QAM, Too Gonzales MD    insulin regular human (humuLIN R U-500) 500 UNIT/ML concentrated injection vial 10 Units, 10 Units, SubCUTAneous, Lunch, Too Gonzales MD    metoprolol succinate (TOPROL XL) extended release tablet 25 mg, 25 mg, Oral, Daily, Too Gonzales MD    oxyCODONE-acetaminophen (PERCOCET) 5-325 MG per tablet 1 tablet, 1 tablet, Oral, Q4H PRN, Too Gonzales MD    oxyCODONE-acetaminophen (PERCOCET) 5-325 MG per tablet 2 tablet, 2 tablet, Oral, Q4H PRN, Too Gonzales MD    potassium chloride (KLOR-CON M) extended release tablet 40 mEq, 40 mEq, Oral, TID WC, Too Gonzales MD, 40 mEq at 02/22/25 1722    rivaroxaban (XARELTO) tablet 20 mg, 20 mg, Oral, Dinner, Too Gonzales MD, 20 mg at 02/22/25 1723    spironolactone (ALDACTONE) tablet 25 mg, 25 mg, Oral, BID, Too Gonzales MD, 25 mg at 02/22/25 2038    tamsulosin (FLOMAX) capsule 0.4 mg, 0.4 mg, Oral,  diabetic management.  Monitoring the impact of the endocrinologist plan for U-500 regular insulin scheduled 4 times daily.  Weightbearing activities to help moderate his neuropathy symptoms.  Essential hypertension: Continuing Toprol, Aldactone and Demadex to control systolic blood pressure.  Target systolic blood pressure is 120-140.  Vital signs are checked at rest with activities.  Chronic kidney disease stage IIIb with hypokalemia: Monitoring the impact of the recent addition of Aldactone.  Continuing regular checks of his chemistries with supplementation of potassium ordered 3 times daily now.  Avoiding nephrotoxic medications when possible with cautious use of the Demadex diuretic.  Minimizing fluctuations of blood pressure.  Paroxysmal atrial fibrillation with chronic diastolic congestive heart failure: Continuing Toprol for rate control and Xarelto as his DOAC.  Monitoring his weights daily to detect any decompensation of CHF.  Providing antiplatelet therapy with Plavix for general heart health measures and secondary stroke prevention (prior CVA 2018).    I personally performed a history and physical on this patient within 24 hours of admission to the rehab unit. I have reviewed the preadmission screening and concur with its findings without change. A detailed plan of care will be established by hospital day 4 and I attest the patient is appropriate for inpatient rehabilitation at this time.  I have compared the patient's current functional status noted during my history and physical with that of the preadmission screen and I have found no significant differences.

## 2025-02-23 NOTE — PROGRESS NOTES
Progress Note( Dr. Diehl)  2/23/2025  Subjective:   Admit Date: 2/22/2025  PCP: MEETA Diehl MD    Admitted For : Right foot infection and high blood glucose     Consulted For:  Right foot infection and high blood glucose     Interval History: Patient had  amputation of second and second and third toe of the right foot and debridement of the wound of the right foot done 2/19/2025 patient feeling a lot better    Patient was transferred to ARU as of 2/22/2025 evening    Patient has still hypokalemia and potassium being replaced    Blood glucose seem to be somewhat lower yesterday evening and this morning so he did not get his morning insulin as he does not meet the parameters    Denies any chest pains,   Denies SOB .   Denies nausea or vomiting.   No new bowel or bladder symptoms.       Intake/Output Summary (Last 24 hours) at 2/23/2025 1036  Last data filed at 2/23/2025 0905  Gross per 24 hour   Intake 1220 ml   Output 1200 ml   Net 20 ml       DATA    CBC:   Recent Labs     02/21/25  0214 02/22/25  0122   WBC 8.9 6.5   HGB 14.0 14.3    215    CMP:  Recent Labs     02/21/25  0214 02/22/25  0122 02/22/25  1438    134* 131*   K 2.8* 3.3* 3.8   CL 91* 91* 88*   CO2 32 29 30   BUN 56* 61* 68*   CREATININE 1.9* 2.1* 2.2*   CALCIUM 9.7 9.8 9.9     Lipids:   Lab Results   Component Value Date/Time    CHOL 159 11/14/2024 08:23 AM    HDL 39 11/14/2024 08:23 AM    TRIG 152 11/14/2024 08:23 AM     Glucose:  Recent Labs     02/22/25  1927 02/23/25  0139 02/23/25  0710   POCGLU 339* 120* 86     [id number]:  Lab Results   Component Value Date/Time    LABA1C 8.8 02/15/2025 01:47 AM     High Sensitivity TSH:   Lab Results   Component Value Date/Time    TSHHS 1.800 08/29/2019 10:06 AM     Free T3: No results found for: \"FT3\"  Free T4:No results found for: \"T4FREE\"    No orders to display        Scheduled Medicines   Medications:    insulin regular human  5 Units SubCUTAneous Dinner    sodium chloride flush   5-40 mL IntraVENous 2 times per day    clopidogrel  75 mg Oral Daily    docusate sodium  100 mg Oral BID    finasteride  5 mg Oral Daily    insulin regular human  10 Units SubCUTAneous QAM    insulin regular human  10 Units SubCUTAneous Lunch    metoprolol succinate  25 mg Oral Daily    potassium chloride  40 mEq Oral TID WC    rivaroxaban  20 mg Oral Dinner    spironolactone  25 mg Oral BID    tamsulosin  0.4 mg Oral Daily    torsemide  10 mg Oral BID    ampicillin IV  2,000 mg IntraVENous 6 times per day    insulin regular human  0-30 Units SubCUTAneous 4x Daily AC & HS      Infusions:    sodium chloride      dextrose           Objective:   Vitals: BP (!) 101/57   Pulse 59   Temp 97.7 °F (36.5 °C) (Oral)   Resp 17   Ht 1.829 m (6')   Wt (!) 136.9 kg (301 lb 13 oz)   SpO2 96%   BMI 40.93 kg/m²   General appearance: alert and cooperative with exam  Neck: no JVD or bruit  Thyroid : Normal lobes   Lungs: Has Vesicular Breath sounds has some somewhat diminished breath sounds  Heart:  ??  A-fib//cardiac pacemaker  Abdomen: soft, non-tender; bowel sounds normal; no masses,  no organomegaly  Musculoskeletal: Normal  Extremities: extremities normal, , bilateral leg edema has ulceration on the Right  foot  the debridement was done yesterday and again today  Neurologic:  Awake, alert, oriented to name, place and time.  Cranial nerves II-XII are grossly intact.  Motor is  intact.  Sensory neuropathy,  and gait is abnormal.    Assessment:     Patient Active Problem List:     MARINO on CPAP     New onset atrial fibrillation (HCC)     Type 2 diabetes mellitus with hyperglycemia, with long-term current use of insulin ([facility])     Class 3 severe obesity due to excess calories with body mass index (BMI) of 45.0 to 49.9 in adult     History of cardiac cath     Weakness of right arm     Acute CVA (cerebrovascular accident) ([facility])     Spastic hemiparesis of right dominant side (HCC)     Dysphagia due to recent stroke     Essential

## 2025-02-23 NOTE — CONSULTS
Daily Nutrient Needs:  Energy Requirements Based On: Formula  Weight Used for Energy Requirements: Current  Energy (kcal/day): 3731-2222 (MSJ)  Weight Used for Protein Requirements: Ideal  Protein (g/day):  (1.1-1.25 g/kg)  Method Used for Fluid Requirements: Standard renal  Fluid (ml/day):      Intake/Output Summary (Last 24 hours) at 2/23/2025 1316  Last data filed at 2/23/2025 1242  Gross per 24 hour   Intake 1420 ml   Output 1200 ml   Net 220 ml     Nutrition Diagnosis:   Inadequate protein intake related to cardiac dysfunction, renal dysfunction as evidenced by wounds, lab values    Nutrition Interventions:   Food and/or Nutrient Delivery: Continue Current Diet, Continue Oral Nutrition Supplement  Nutrition Education/Counseling: Education/Counseling initiated  Coordination of Nutrition Care: Continue to monitor while inpatient  Plan of Care discussed with: pt    Goals:  Goals: Meet at least 75% of estimated needs  Type of Goal: New goal       Nutrition Monitoring and Evaluation:   Behavioral-Environmental Outcomes: None Identified  Food/Nutrient Intake Outcomes: Food and Nutrient Intake, Supplement Intake  Physical Signs/Symptoms Outcomes: Biochemical Data, Meal Time Behavior, Skin, Weight, Nutrition Focused Physical Findings    Discharge Planning:    Recommend pursue outpatient diabetes education     Davide Glaser RD, LD  Contact: [phone]

## 2025-02-23 NOTE — CONSULTS
ADVANCED NEPHROLOGY CONSULT NOTE  Dr. Antonino Bower and Dr Rey Armstrong                Assessment    1.  Diabetic foot ulcer status post infection with surgery  #2 enterococcal bacteremia  #3 type 2 diabetes  #4 hypokalemia  #5 CKD 3 with acute renal failure  #6 congestive heart failure EF 50% on diuretic therapy  #7 atrial fibrillation on Xarelto        Plan   1.  Rehab in ARU center last night wound care supportive therapy pain control  #2 finish course of antibiotics per infectious disease  #3 monitor control glucose with insulin  #4 last potassium 3.8 monitor  #5 creatinine holding 2.2 GFR about 30 monitor for now combination factors for stage III kidney disease likely cardiorenal and slightly increased supportive care for now  #6 gentle diuresis as able supportive care  #7 heart rate control    Date:2/23/2025        Patient Name:Bandar De La Torre     YOB: 1960     Age:64 y.o.        Chief Complaint     Reason for Consult: Acute renal failure and CKD    History Obtained From   patient, electronic medical record    History of Present Illness   The patient is a 64 y.o. male who presents with diabetic foot ulcer after seeing me in endocrinology in the office setting sent to the hospital stable diuresis creatinine increased from baseline 1.5 up to 2.2 and low potassium with diuresis now renal function will take time to improve and potassium improved working on rehab therapy with ARU status post surgery of the toes patient denies any other complaints he does have little bit of a flat affect with depressed had prior CVA in the above setting patient sent to ARU for rehab currently hopefully going back home after that and renal asked to evaluate for monitoring acute on chronic kidney failure and above setting.        Patient Active Problem List   Diagnosis Code    MARINO on CPAP G47.33    New onset atrial fibrillation (HCC) I48.91    Type 2 diabetes mellitus with hyperglycemia, with long-term current use of  (36.3 °C) (Oral)   Resp 16   Ht 1.829 m (6')   Wt (!) 136.9 kg (301 lb 13 oz)   SpO2 98%   BMI 40.93 kg/m²      I/O: 02/22 0701 - 02/23 0700  In: 840 [P.O.:840]  Out: 1200 [Urine:1200]    General appearance: awake weak  HEENT: Head: Normal, normocephalic, atraumatic.  Neck: supple, symmetrical, trachea midline  Lungs: diminished breath sounds bilaterally  Heart: S1, S2 normal  Abdomen: abnormal findings:  soft NT  Extremities: edema trace positive brace on leg  Neurologic: Mental status: alertness: alert    Labs    CBC:  Recent Labs     02/21/25 0214 02/22/25 0122   WBC 8.9 6.5   RBC 5.22 5.33   HGB 14.0 14.3   HCT 43.8 44.3   MCV 83.9 83.1   RDW 16.0* 16.1*    215     CHEMISTRIES:  Recent Labs     02/21/25 0214 02/22/25  0122 02/22/25  1438    134* 131*   K 2.8* 3.3* 3.8   CL 91* 91* 88*   CO2 32 29 30   BUN 56* 61* 68*   CREATININE 1.9* 2.1* 2.2*   GLUCOSE 156* 216* 427*     PT/INR:No results for input(s): \"PROTIME\", \"INR\" in the last 72 hours.  APTT:No results for input(s): \"APTT\" in the last 72 hours.  LIVER PROFILE:No results for input(s): \"AST\", \"ALT\", \"BILIDIR\", \"BILITOT\", \"ALKPHOS\" in the last 72 hours.    Renal Labs  Albumin:    Lab Results   Component Value Date/Time    LABALBU 50 10/27/2021 02:04 PM     Calcium:    Lab Results   Component Value Date/Time    CALCIUM 9.9 02/22/2025 02:38 PM     Phosphorus:    Lab Results   Component Value Date/Time    PHOS 2.4 02/16/2025 01:49 AM     U/A:    Lab Results   Component Value Date/Time    NITRU Negative 02/06/2025 11:50 AM    COLORU YELLOW 02/06/2025 11:50 AM    PHUR 6.5 02/06/2025 11:50 AM    WBCUA 3 01/14/2025 08:29 PM    WBCUA 0-5 05/09/2022 09:32 AM    RBCUA 3 01/14/2025 08:29 PM    RBCUA 2 09/10/2022 06:30 PM    MUCUS RARE 01/14/2025 08:29 PM    TRICHOMONAS NONE SEEN 09/10/2022 06:30 PM    BACTERIA NEGATIVE 09/10/2022 06:30 PM    CLARITYU CLEAR 02/06/2025 11:50 AM    UROBILINOGEN <2 02/06/2025 11:50 AM    BILIRUBINUR Negative 02/06/2025

## 2025-02-23 NOTE — PLAN OF CARE
Problem: Chronic Conditions and Co-morbidities  Goal: Patient's chronic conditions and co-morbidity symptoms are monitored and maintained or improved  Outcome: Progressing  Flowsheets (Taken 2/22/2025 2040)  Care Plan - Patient's Chronic Conditions and Co-Morbidity Symptoms are Monitored and Maintained or Improved: Monitor and assess patient's chronic conditions and comorbid symptoms for stability, deterioration, or improvement     Problem: Discharge Planning  Goal: Discharge to home or other facility with appropriate resources  Outcome: Progressing  Flowsheets (Taken 2/22/2025 2040)  Discharge to home or other facility with appropriate resources: Identify barriers to discharge with patient and caregiver     Problem: Safety - Adult  Goal: Free from fall injury  2/22/2025 2044 by Judy Vázquez, RN  Outcome: Progressing  2/22/2025 1640 by Tiki Moran RN  Outcome: Progressing     Problem: Pain  Goal: Verbalizes/displays adequate comfort level or baseline comfort level  2/22/2025 2044 by Judy Vázquez, RN  Outcome: Progressing  2/22/2025 1640 by Tiki Moran RN  Outcome: Progressing     Problem: ABCDS Injury Assessment  Goal: Absence of physical injury  Outcome: Progressing

## 2025-02-23 NOTE — PLAN OF CARE
Problem: Chronic Conditions and Co-morbidities  Goal: Patient's chronic conditions and co-morbidity symptoms are monitored and maintained or improved  2/22/2025 2044 by Judy Vázquez RN  Outcome: Progressing  Flowsheets (Taken 2/22/2025 2040)  Care Plan - Patient's Chronic Conditions and Co-Morbidity Symptoms are Monitored and Maintained or Improved: Monitor and assess patient's chronic conditions and comorbid symptoms for stability, deterioration, or improvement     Problem: Discharge Planning  Goal: Discharge to home or other facility with appropriate resources  2/22/2025 2044 by Judy Vázquez RN  Outcome: Progressing  Flowsheets (Taken 2/22/2025 2040)  Discharge to home or other facility with appropriate resources: Identify barriers to discharge with patient and caregiver     Problem: Safety - Adult  Goal: Free from fall injury  2/23/2025 1012 by Tiki Moran RN  Outcome: Progressing  2/22/2025 2044 by Judy Vázquez RN  Outcome: Progressing     Problem: Pain  Goal: Verbalizes/displays adequate comfort level or baseline comfort level  2/23/2025 1012 by Tiki Moran RN  Outcome: Progressing  2/22/2025 2044 by Judy Vázquez RN  Outcome: Progressing     Problem: ABCDS Injury Assessment  Goal: Absence of physical injury  2/22/2025 2044 by Judy Vázquez RN  Outcome: Progressing

## 2025-02-24 LAB
ALBUMIN SERPL-MCNC: 3.3 G/DL (ref 3.4–5)
ALBUMIN/GLOB SERPL: 1 {RATIO} (ref 1.1–2.2)
ALP SERPL-CCNC: 74 U/L (ref 40–129)
ALT SERPL-CCNC: 17 U/L (ref 10–40)
ANION GAP SERPL CALCULATED.3IONS-SCNC: 14 MMOL/L (ref 9–17)
AST SERPL-CCNC: 19 U/L (ref 15–37)
BASOPHILS # BLD: 0.09 K/UL
BASOPHILS NFR BLD: 2 % (ref 0–1)
BILIRUB SERPL-MCNC: 0.5 MG/DL (ref 0–1)
BUN SERPL-MCNC: 48 MG/DL (ref 7–20)
CALCIUM SERPL-MCNC: 9.7 MG/DL (ref 8.3–10.6)
CHLORIDE SERPL-SCNC: 99 MMOL/L (ref 99–110)
CO2 SERPL-SCNC: 27 MMOL/L (ref 21–32)
CREAT SERPL-MCNC: 1.7 MG/DL (ref 0.8–1.3)
CRP SERPL HS-MCNC: 15.9 MG/L (ref 0–5)
EOSINOPHIL # BLD: 0.05 K/UL
EOSINOPHILS RELATIVE PERCENT: 1 % (ref 0–3)
ERYTHROCYTE [DISTWIDTH] IN BLOOD BY AUTOMATED COUNT: 17 % (ref 11.7–14.9)
ERYTHROCYTE [SEDIMENTATION RATE] IN BLOOD BY WESTERGREN METHOD: 44 MM/HR (ref 0–20)
GFR, ESTIMATED: 42 ML/MIN/1.73M2
GLUCOSE BLD-MCNC: 182 MG/DL (ref 74–99)
GLUCOSE BLD-MCNC: 196 MG/DL (ref 74–99)
GLUCOSE BLD-MCNC: 249 MG/DL (ref 74–99)
GLUCOSE BLD-MCNC: 267 MG/DL (ref 74–99)
GLUCOSE BLD-MCNC: 283 MG/DL (ref 74–99)
GLUCOSE SERPL-MCNC: 230 MG/DL (ref 74–99)
HCT VFR BLD AUTO: 40.8 % (ref 42–52)
HGB BLD-MCNC: 12.8 G/DL (ref 13.5–18)
IMM GRANULOCYTES # BLD AUTO: 0.06 K/UL
IMM GRANULOCYTES NFR BLD: 1 %
LYMPHOCYTES NFR BLD: 1.22 K/UL
LYMPHOCYTES RELATIVE PERCENT: 21 % (ref 24–44)
MCH RBC QN AUTO: 26.9 PG (ref 27–31)
MCHC RBC AUTO-ENTMCNC: 31.4 G/DL (ref 32–36)
MCV RBC AUTO: 85.9 FL (ref 78–100)
MICROORGANISM SPEC CULT: NORMAL
MICROORGANISM/AGENT SPEC: NORMAL
MONOCYTES NFR BLD: 0.73 K/UL
MONOCYTES NFR BLD: 12 % (ref 0–4)
NEUTROPHILS NFR BLD: 64 % (ref 36–66)
NEUTS SEG NFR BLD: 3.74 K/UL
PLATELET # BLD AUTO: 238 K/UL (ref 140–440)
PMV BLD AUTO: 11.1 FL (ref 7.5–11.1)
POTASSIUM SERPL-SCNC: 3.6 MMOL/L (ref 3.5–5.1)
PROT SERPL-MCNC: 6.6 G/DL (ref 6.4–8.2)
RBC # BLD AUTO: 4.75 M/UL (ref 4.6–6.2)
SERVICE CMNT-IMP: NORMAL
SODIUM SERPL-SCNC: 140 MMOL/L (ref 136–145)
SPECIMEN DESCRIPTION: NORMAL
SURGICAL PATHOLOGY REPORT: NORMAL
WBC OTHER # BLD: 5.9 K/UL (ref 4–10.5)

## 2025-02-24 PROCEDURE — 97542 WHEELCHAIR MNGMENT TRAINING: CPT

## 2025-02-24 PROCEDURE — 97535 SELF CARE MNGMENT TRAINING: CPT

## 2025-02-24 PROCEDURE — 36415 COLL VENOUS BLD VENIPUNCTURE: CPT

## 2025-02-24 PROCEDURE — 94150 VITAL CAPACITY TEST: CPT

## 2025-02-24 PROCEDURE — 6370000000 HC RX 637 (ALT 250 FOR IP): Performed by: INTERNAL MEDICINE

## 2025-02-24 PROCEDURE — 6360000002 HC RX W HCPCS: Performed by: PHYSICAL MEDICINE & REHABILITATION

## 2025-02-24 PROCEDURE — 1280000000 HC REHAB R&B

## 2025-02-24 PROCEDURE — 85652 RBC SED RATE AUTOMATED: CPT

## 2025-02-24 PROCEDURE — 97166 OT EVAL MOD COMPLEX 45 MIN: CPT

## 2025-02-24 PROCEDURE — 85025 COMPLETE CBC W/AUTO DIFF WBC: CPT

## 2025-02-24 PROCEDURE — 94761 N-INVAS EAR/PLS OXIMETRY MLT: CPT

## 2025-02-24 PROCEDURE — APPSS60 APP SPLIT SHARED TIME 46-60 MINUTES: Performed by: NURSE PRACTITIONER

## 2025-02-24 PROCEDURE — 97163 PT EVAL HIGH COMPLEX 45 MIN: CPT

## 2025-02-24 PROCEDURE — 99221 1ST HOSP IP/OBS SF/LOW 40: CPT | Performed by: INTERNAL MEDICINE

## 2025-02-24 PROCEDURE — 97530 THERAPEUTIC ACTIVITIES: CPT

## 2025-02-24 PROCEDURE — 80053 COMPREHEN METABOLIC PANEL: CPT

## 2025-02-24 PROCEDURE — 2500000003 HC RX 250 WO HCPCS: Performed by: INTERNAL MEDICINE

## 2025-02-24 PROCEDURE — 86140 C-REACTIVE PROTEIN: CPT

## 2025-02-24 PROCEDURE — 2580000003 HC RX 258: Performed by: PHYSICAL MEDICINE & REHABILITATION

## 2025-02-24 PROCEDURE — 97116 GAIT TRAINING THERAPY: CPT

## 2025-02-24 PROCEDURE — 2580000003 HC RX 258: Performed by: INTERNAL MEDICINE

## 2025-02-24 PROCEDURE — 94664 DEMO&/EVAL PT USE INHALER: CPT

## 2025-02-24 PROCEDURE — 82962 GLUCOSE BLOOD TEST: CPT

## 2025-02-24 PROCEDURE — 6370000000 HC RX 637 (ALT 250 FOR IP): Performed by: NURSE PRACTITIONER

## 2025-02-24 RX ORDER — TORSEMIDE 20 MG/1
10 TABLET ORAL DAILY
Status: DISPENSED | OUTPATIENT
Start: 2025-02-24

## 2025-02-24 RX ORDER — TORSEMIDE 20 MG/1
10 TABLET ORAL EVERY MORNING
Status: DISCONTINUED | OUTPATIENT
Start: 2025-02-24 | End: 2025-02-26

## 2025-02-24 RX ADMIN — POTASSIUM CHLORIDE 40 MEQ: 1500 TABLET, EXTENDED RELEASE ORAL at 08:08

## 2025-02-24 RX ADMIN — AMPICILLIN SODIUM 2000 MG: 2 INJECTION, POWDER, FOR SOLUTION INTRAVENOUS at 00:48

## 2025-02-24 RX ADMIN — POTASSIUM CHLORIDE 40 MEQ: 1500 TABLET, EXTENDED RELEASE ORAL at 17:33

## 2025-02-24 RX ADMIN — AMOXICILLIN AND CLAVULANATE POTASSIUM 1 TABLET: 875; 125 TABLET, FILM COATED ORAL at 20:42

## 2025-02-24 RX ADMIN — INSULIN HUMAN 10 UNITS: 500 INJECTION, SOLUTION SUBCUTANEOUS at 08:31

## 2025-02-24 RX ADMIN — INSULIN HUMAN 5 UNITS: 500 INJECTION, SOLUTION SUBCUTANEOUS at 21:43

## 2025-02-24 RX ADMIN — SODIUM CHLORIDE: 900 INJECTION INTRAVENOUS at 00:47

## 2025-02-24 RX ADMIN — TAMSULOSIN HYDROCHLORIDE 0.4 MG: 0.4 CAPSULE ORAL at 08:08

## 2025-02-24 RX ADMIN — RIVAROXABAN 20 MG: 20 TABLET, FILM COATED ORAL at 17:33

## 2025-02-24 RX ADMIN — INSULIN HUMAN 15 UNITS: 500 INJECTION, SOLUTION SUBCUTANEOUS at 12:54

## 2025-02-24 RX ADMIN — TORSEMIDE 10 MG: 20 TABLET ORAL at 12:47

## 2025-02-24 RX ADMIN — SODIUM CHLORIDE, PRESERVATIVE FREE 10 ML: 5 INJECTION INTRAVENOUS at 20:42

## 2025-02-24 RX ADMIN — SPIRONOLACTONE 25 MG: 50 TABLET ORAL at 20:42

## 2025-02-24 RX ADMIN — SPIRONOLACTONE 25 MG: 50 TABLET ORAL at 08:08

## 2025-02-24 RX ADMIN — POTASSIUM CHLORIDE 40 MEQ: 1500 TABLET, EXTENDED RELEASE ORAL at 12:47

## 2025-02-24 RX ADMIN — DOCUSATE SODIUM 100 MG: 100 CAPSULE, LIQUID FILLED ORAL at 08:10

## 2025-02-24 RX ADMIN — AMPICILLIN SODIUM 2000 MG: 2 INJECTION, POWDER, FOR SOLUTION INTRAVENOUS at 08:07

## 2025-02-24 RX ADMIN — TORSEMIDE 10 MG: 20 TABLET ORAL at 08:10

## 2025-02-24 RX ADMIN — INSULIN HUMAN 5 UNITS: 500 INJECTION, SOLUTION SUBCUTANEOUS at 17:34

## 2025-02-24 RX ADMIN — INSULIN HUMAN 10 UNITS: 500 INJECTION, SOLUTION SUBCUTANEOUS at 12:51

## 2025-02-24 RX ADMIN — SODIUM CHLORIDE, PRESERVATIVE FREE 10 ML: 5 INJECTION INTRAVENOUS at 08:15

## 2025-02-24 RX ADMIN — CLOPIDOGREL BISULFATE 75 MG: 75 TABLET ORAL at 08:11

## 2025-02-24 RX ADMIN — AMPICILLIN SODIUM 2000 MG: 2 INJECTION, POWDER, FOR SOLUTION INTRAVENOUS at 03:50

## 2025-02-24 RX ADMIN — INSULIN HUMAN 5 UNITS: 500 INJECTION, SOLUTION SUBCUTANEOUS at 17:33

## 2025-02-24 RX ADMIN — METOPROLOL SUCCINATE 25 MG: 25 TABLET, FILM COATED, EXTENDED RELEASE ORAL at 08:11

## 2025-02-24 RX ADMIN — FINASTERIDE 5 MG: 5 TABLET, FILM COATED ORAL at 08:11

## 2025-02-24 ASSESSMENT — PAIN SCALES - GENERAL: PAINLEVEL_OUTOF10: 0

## 2025-02-24 NOTE — PROGRESS NOTES
Occupational Therapy                              [facility] ARU OCCUPATIONAL THERAPY EVALUATION    Chart Review:  Past Medical History:   Diagnosis Date    Atrial fibrillation (HCC)     Cerebral artery occlusion with cerebral infarction (HCC)     History of cardiac cath 2017    --RCA has mid 50% stenosis and PLB branch has 70% stenosis noted. LVEDP very high    Hyperlipidemia     Hypertension     Type II or unspecified type diabetes mellitus without mention of complication, not stated as uncontrolled     Diagnsed about 1998    Unspecified sleep apnea      Past Surgical History:   Procedure Laterality Date    CARDIAC CATHETERIZATION      TOE AMPUTATION Right 2/19/2025    RIGHT FOOT 2ND AND 3RD TOE AMPUTATION WITH FOOT DEBRIDEMENT performed by Jakob Avalos MD at [facility] OR    TONSILLECTOMY      AT 15 YEARS OLD     Social History:  Social/Functional History  Lives With: Spouse  Type of Home: House  Home Layout: Multi-level, Able to Live on Main level with bedroom/bathroom  Home Access: Stairs to enter with rails  Entrance Stairs - Number of Steps: 7 to get from garage to main living area  Entrance Stairs - Rails: Left  Bathroom Shower/Tub: Walk-in shower  Bathroom Toilet: Handicap height  Bathroom Equipment: Shower chair, Grab bars in shower (Suction grab bars)  Bathroom Accessibility: Walker accessible  Home Equipment: Cane, Walker - Rolling, Reacher (Long handled sponge)  Has the patient had two or more falls in the past year or any fall with injury in the past year?: Yes (3 reported falls; \"legs won't hold me up\")  Receives Help From: Family  Prior Level of Assist for ADLs: Independent  Prior Level of Assist for Homemaking: Independent  Homemaking Responsibilities: Yes  Meal Prep Responsibility: Primary  Laundry Responsibility: No  Cleaning Responsibility: No  Bill Paying/Finance Responsibility: No  Shopping Responsibility: Primary  Dependent Care Responsibility: Secondary (5 dogs, 1 cat)  Health Care Management:  and infectious disease services due to the significant infection and Enterococcus faecalis bacteremia. Initially IV vancomycin was started empirically. He has been transitioned to IV ampicillin due to culture results. After initial localized debridement, on 2/19/2025 Dr. Avalos performed amputations of the second and third digits of the right foot with sharp debridement of the forefoot DFU. Patient has been restricted to right heel weightbearing only using a rigid soled shoe and walker. His blood sugars have continued to fluctuate significantly and his lower limb edema and hypokalemia have persisted. He has required adjustments for his blood pressure, CHF and diabetes. (Taken from MD H&P)    PTA, pt lives at home with his spouse and able to live on one level and was Ind with ADLs and IADLs utilizing RW and cane within home. Pt also uses AE at baseline such as reacher and long handled sponge. Today, pt was able to complete all ADLs ranging from SBA-CGA and Max A with footwear. Pt does some deficits in awareness of HTWB precautions of RLE. The QI, MMT, and ROM standardized assessments were used this date to determine the above performance deficits, which compromise pt's ability to safely complete ADLs/IADLs/mobility.  Pt will benefit from ARU OT services to increase functional performance and return to PLOF.           Prognosis: Good  Decision Making: Medium Complexity  Clinical Presentation:  Evolving c changing characteristics (I.e. strength and endurance)   Patient education:   ARU procotol, Role of O.T., O.T. plan of care  []   Patient goal was established and reviewed in Rehabtracker with patient and/or family this date.  REQUIRES OT FOLLOW-UP: Yes  Discharge Recommendations:  Home c assist, [facility] OT  Equipment Recommendations:  TBD    Goals:     Short Term Goals  Time Frame for Short Term Goals: STGs=LTGs  Long Term Goals  Time Frame for Long Term Goals : 7 days or until d/c  Long Term Goal 1: Pt will complete

## 2025-02-24 NOTE — CARE COORDINATION
Case Management Admission Note    Patient:Bandar De La Torre \"Emmanuel\"   :1960  MRN:[id number]  Rehab Dx/Hx: Other acute osteomyelitis, right ankle and foot (HCC) [M86.171]  Osteomyelitis of toe of right foot (HCC) [M86.9]    Chief Complaint:   Past Medical History:   Diagnosis Date    Atrial fibrillation (HCC)     Cerebral artery occlusion with cerebral infarction ([facility])     History of cardiac cath     --RCA has mid 50% stenosis and PLB branch has 70% stenosis noted. LVEDP very high    Hyperlipidemia     Hypertension     Type II or unspecified type diabetes mellitus without mention of complication, not stated as uncontrolled     Diagnsed about     Unspecified sleep apnea      Past Surgical History:   Procedure Laterality Date    CARDIAC CATHETERIZATION      TOE AMPUTATION Right 2025    RIGHT FOOT 2ND AND 3RD TOE AMPUTATION WITH FOOT DEBRIDEMENT performed by Jakob Avalos MD at [facility] OR    TONSILLECTOMY      AT 15 YEARS OLD     No Known Allergies  Precautions: falls    Date of Admit: 2025  Room #: [facility]5/1025-A    Current functional status at time of admit:  Home Living/DME Available:  Type of Home: House  Home Access: Stairs to enter with rails  Bathroom Shower/Tub: Walk-in shower  Bathroom Toilet: Handicap height  Bathroom Equipment: Shower chair, Grab bars in shower (Suction grab bars)  Home Equipment: Cane, Walker - Rolling, Reacher (Long handled sponge)    IADL Hx:  Homemaking Responsibilities: Yes  Active : No (reports not driving for 3 mo. due to vision changes)  Mode of Transportation: VanTRUDI  Occupation: Retired ([employer])    Spouse: Meredith  Family: Patient reported that his family is supportive.     Patient's Goal: \"be able to do everything myself, work on walking, and be able to mow my yard\" and return home.     Would you like Case Management to discuss the discharge plan with any other family members/significant others, and if so, who? \"my wife\"    Family's Goal:

## 2025-02-24 NOTE — CONSULTS
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 General surgery evaluated s/p bedside excisional debridement of necrotic tissue from wound at the plantar surface . MRI ordered. Blood cx x1 +enterococcus faecalis therefore ID consulted.   S/p 2nd&3rd toe amp with foot debridement per GS, Dr. Avalos   Per op note bones cut at MTP joint, 3rd toe sent for cx/pathology  Imaging: Plain films of right foot show acute displaced fracture of 2nd and 3rd distal phalanx. MRI right foot + OM second distal phalanx and the third middle and distal phalanges.  Overlying ulcers in both locations are noted.  Additional soft tissue ulceration at the plantar surface of the forefoot overlying the first MTP joint with likely adjacent surrounding cellulitis .  .  TTE findings noted. TEJ  with no evidence of endocarditis, mild MR.   EP evaluation and recommendations noted  Micro data: Surgical cx  NGTD.  Repeat blood cx 2/15 NGTD. Wound cx & + pansensitive Proteus mirabilis. Blood cx x 1  +enterococcus faecalis(s-vanc/amp)  WBC 6.5<--11.9 ( adm), CRP 15.9<-- 159<--113, ESR 52<--26. Procal 0.28<--0.13. Afebrile sice adm  Antimicrobial summary: IV Cefepime --; IV Vanc -; IV Ampicillin -; IV Ceftriaxone -    Right second and third toe fracture  Imaging as noted.  Per primary team  DM II A1C 7.5  On insulin, Endocrine following  SSS s/p pacemaker  Severe obesity, BMI 41.6  Allergy: no abx allergy  GFR 36  Comorbid conditions: SSS s/p pacemaker (), CVA, MARINO, HTN, DM II, CKD III, HLD, Obesity,     Plan  Therapeutic: Can DC IV Ampicillin and transition to Augmentin 875mg/125mg po q 12h for cumulative 2 weeks, EOT 3/4/25  Diagnostic:   Trend Pro-Brad,  Generalized edema 08/18/2022    Chronic kidney disease, stage II (mild) 05/12/2022    Benign essential microscopic hematuria 05/12/2022    Uncontrolled pain 02/23/2025    Generalized weakness 02/23/2025    Poorly controlled type 2 diabetes mellitus with peripheral neuropathy ([facility]) 02/23/2025    Status post amputation of toe 02/23/2025    Diabetic ulcer of right midfoot associated with type 2 diabetes mellitus, limited to breakdown of skin ([facility]) 02/23/2025    Osteomyelitis of second toe of right foot ([facility]) 02/22/2025    Pacemaker lead malfunction 02/17/2025    Sepsis ([facility]) 02/14/2025    Diabetic foot infection ([facility]) 02/14/2025    Bacteremia due to Enterococcus 02/14/2025    Cellulitis of foot 02/13/2025    Chronic kidney disease, stage 3b ([facility]) 11/10/2021    Congestive heart failure due to cardiomyopathy ([facility]) 10/25/2021    Frequent falls 11/16/2020    Chronic venous stasis dermatitis of both lower extremities 11/16/2020    History of CVA (cerebrovascular accident) 11/16/2020    Obesity hypoventilation syndrome 11/16/2020    Hypertension     Hyperlipidemia     Acute CVA (cerebrovascular accident) ([facility]) 11/20/2018    Spastic hemiparesis of right dominant side ([facility]) 11/20/2018    Dysphagia due to recent stroke 11/20/2018    Essential hypertension 11/20/2018    Morbid obesity with BMI of 40.0-44.9, adult 11/20/2018    Weakness of right arm 11/18/2018    History of cardiac cath 10/08/2018    Permanent atrial fibrillation ([facility]) 09/25/2018    Type 2 diabetes mellitus with hyperglycemia, with long-term current use of insulin ([facility]) 09/25/2018    Class 3 severe obesity due to excess calories with body mass index (BMI) of 45.0 to 49.9 in adult 09/25/2018    MARINO on CPAP 10/14/2013     Past Medical History:   Diagnosis Date    Atrial fibrillation ([facility])     Cerebral artery occlusion with cerebral infarction ([facility])     History of cardiac cath 2017    --RCA has mid 50% stenosis and PLB branch has 70% stenosis noted. LVEDP very high

## 2025-02-24 NOTE — PROGRESS NOTES
appropriate  Physical Therapy Plan  Current Treatment Recommendations: Strengthening, Balance training, Functional mobility training, Transfer training, IADL training, Endurance training, Wheelchair mobility training, Gait training, Stair training, Patient/Caregiver education & training, Safety education & training, Home exercise program, Equipment evaluation, education, & procurement, Neuromuscular re-education, Positioning, Group Therapy, Therapeutic activities, Pain management    PT Individual Minutes  Time In: 1300  Time Out: 1345  Minutes: 45      Pt eval time split due to scheduling issue       Number of Individual Minutes/Billable Intervention  Time in: 1115  Time [id number]  Total Minutes: 45      PT  Individual Evaluation 30   Individual tx performed as shown below   Gait Training 20   Therapeutic Exercise    Neuro Re-Ed    Therapeutic Activity 25   Wheelchair Propulsion 15   Group    Variance and Reason    TOTAL 90       Electronically signed by:    Desi Caldwell, PT, PT   2/24/2025, @NOW

## 2025-02-24 NOTE — PROGRESS NOTES
Nephrology Progress Note  2/24/2025 10:21 AM  Subjective:     Interval History: Bandar De La Torre is a 64 y.o. male with doing okay in general resting in bed        Data:   Scheduled Meds:   torsemide  10 mg Oral QAM    torsemide  10 mg Oral Daily    insulin regular human  5 Units SubCUTAneous Dinner    sodium chloride flush  5-40 mL IntraVENous 2 times per day    clopidogrel  75 mg Oral Daily    docusate sodium  100 mg Oral BID    finasteride  5 mg Oral Daily    insulin regular human  10 Units SubCUTAneous QAM    insulin regular human  10 Units SubCUTAneous Lunch    metoprolol succinate  25 mg Oral Daily    potassium chloride  40 mEq Oral TID WC    rivaroxaban  20 mg Oral Dinner    spironolactone  25 mg Oral BID    tamsulosin  0.4 mg Oral Daily    ampicillin IV  2,000 mg IntraVENous 6 times per day    insulin regular human  0-30 Units SubCUTAneous 4x Daily AC & HS     Continuous Infusions:   sodium chloride 10 mL/hr at 02/24/25 0047    dextrose           CBC   Recent Labs     02/22/25  0122 02/24/25  0730   WBC 6.5 5.9   HGB 14.3 12.8*   HCT 44.3 40.8*    238      BMP   Recent Labs     02/22/25  0122 02/22/25  1438 02/24/25  0730   * 131* 140   K 3.3* 3.8 3.6   CL 91* 88* 99   CO2 29 30 27   BUN 61* 68* 48*   CREATININE 2.1* 2.2* 1.7*     Hepatic:   Recent Labs     02/24/25  0730   AST 19   ALT 17   BILITOT 0.5   ALKPHOS 74     Troponin: No results for input(s): \"TROPONINI\" in the last 72 hours.  BNP: No results for input(s): \"BNP\" in the last 72 hours.  Lipids: No results for input(s): \"CHOL\", \"HDL\" in the last 72 hours.    Invalid input(s): \"LDLCALCU\"  ABGs: No results found for: \"PHART\", \"PO2ART\", \"QJU6SSB\"  INR: No results for input(s): \"INR\" in the last 72 hours.  Renal Labs  Albumin:    Lab Results   Component Value Date/Time    LABALBU 50 10/27/2021 02:04 PM     Calcium:    Lab Results   Component Value Date/Time    CALCIUM 9.7 02/24/2025 07:30 AM     Phosphorus:    Lab Results   Component  detect antibodies against dsDNA, histones, SS-A (Ro), SS-B   (La), Sam, Sam/RNP, Scl-70, Sharron-1, centromeric proteins, other   antigens extracted from the HEp-2 cell nucleus. FLORES RODOLFO assays   have been reported to have lower sensitivities than FLORES IFA for   systemic autoimmune rheumatic diseases (SARD).  Negative results do not necessarily rule out SARD.  Performed by [facility],  [address] [place] [address],[place] [address] [phone]  [email or web address], Simon Dunne MD, Lab. Director       24 Hour Urine for Creatinine Clearance:  No components found for: \"CREAT4\", \"UHRS10\", \"UTV10\"      Objective:   I/O: 02/23 0701 - 02/24 0700  In: 1520 [P.O.:1420]  Out: 3750 [Urine:3750]  I/O last 3 completed shifts:  In: 2120 [P.O.:2020; IV Piggyback:100]  Out: 4950 [Urine:4950]  I/O this shift:  In: 140 [P.O.:140]  Out: -   Vitals: /65   Pulse 60   Temp 97.5 °F (36.4 °C) (Oral)   Resp 18   Ht 1.829 m (6')   Wt (!) 142.3 kg (313 lb 11.4 oz)   SpO2 97%   BMI 42.55 kg/m²  {  General appearance: awake weak  HEENT: Head: Normal, normocephalic, atraumatic.  Neck: supple, symmetrical, trachea midline  Lungs: diminished breath sounds bilaterally  Heart: S1, S2 normal  Abdomen: abnormal findings:  soft nt  Extremities: edema trace dressing on foot  Neurologic: Mental status: alertness: alert        Assessment and Plan:      IMP:  1.  Diabetic foot ulcer status post infection with surgery  #2 enterococcal bacteremia  #3 type 2 diabetes  #4 hypokalemia  #5 CKD 3 with acute renal failure  #6 congestive heart failure EF 50% on diuretic therapy  #7 atrial fibrillation on Xarelto    Plan     #1 monitor wound care supportive treatment rehab ARU #2 finish course of antibiotics  #3 monitor control glucose  #4 potassium improved  #5 resolving acute renal failure creatinine holding stable  #6 monitor fluid volume status good urine output  #7 heart rate controlled supportive care improving               DECLAN TUCKER MD, MD

## 2025-02-24 NOTE — PROGRESS NOTES
Progress Note( Dr. Diehl)  2/24/2025  Subjective:   Admit Date: 2/22/2025  PCP: MEETA Diehl MD    Admitted For : Right foot infection and high blood glucose     Consulted For:  Right foot infection and high blood glucose     Interval History: Patient had  amputation of second and second and third toe of the right foot and debridement of the wound of the right foot done 2/19/2025 patient feeling a lot better    Patient was transferred to ARU as of 2/22/2025 evening    Patient has still hypokalemia and potassium being replaced    Blood glucose seem to be somewhat lower yesterday evening and this morning so he did not get his morning insulin as he does not meet the parameters    Denies any chest pains,   Denies SOB .   Denies nausea or vomiting.   No new bowel or bladder symptoms.       Intake/Output Summary (Last 24 hours) at 2/24/2025 0759  Last data filed at 2/24/2025 0700  Gross per 24 hour   Intake 1520 ml   Output 3750 ml   Net -2230 ml       DATA    CBC:   Recent Labs     02/22/25  0122   WBC 6.5   HGB 14.3       CMP:  Recent Labs     02/22/25  0122 02/22/25  1438   * 131*   K 3.3* 3.8   CL 91* 88*   CO2 29 30   BUN 61* 68*   CREATININE 2.1* 2.2*   CALCIUM 9.8 9.9     Lipids:   Lab Results   Component Value Date/Time    CHOL 159 11/14/2024 08:23 AM    HDL 39 11/14/2024 08:23 AM    TRIG 152 11/14/2024 08:23 AM     Glucose:  Recent Labs     02/23/25  1938 02/24/25  0246 02/24/25  0742   POCGLU 344* 267* 249*     [id number]:  Lab Results   Component Value Date/Time    LABA1C 8.8 02/15/2025 01:47 AM     High Sensitivity TSH:   Lab Results   Component Value Date/Time    TSHHS 1.800 08/29/2019 10:06 AM     Free T3: No results found for: \"FT3\"  Free T4:No results found for: \"T4FREE\"    No orders to display        Scheduled Medicines   Medications:    insulin regular human  5 Units SubCUTAneous Dinner    sodium chloride flush  5-40 mL IntraVENous 2 times per day    clopidogrel  75 mg Oral Daily

## 2025-02-25 LAB
GLUCOSE BLD-MCNC: 220 MG/DL (ref 74–99)
GLUCOSE BLD-MCNC: 226 MG/DL (ref 74–99)
GLUCOSE BLD-MCNC: 301 MG/DL (ref 74–99)
GLUCOSE BLD-MCNC: 347 MG/DL (ref 74–99)
GLUCOSE BLD-MCNC: 356 MG/DL (ref 74–99)

## 2025-02-25 PROCEDURE — 97530 THERAPEUTIC ACTIVITIES: CPT

## 2025-02-25 PROCEDURE — 99232 SBSQ HOSP IP/OBS MODERATE 35: CPT | Performed by: NURSE PRACTITIONER

## 2025-02-25 PROCEDURE — 2500000003 HC RX 250 WO HCPCS: Performed by: INTERNAL MEDICINE

## 2025-02-25 PROCEDURE — 97535 SELF CARE MNGMENT TRAINING: CPT

## 2025-02-25 PROCEDURE — 97116 GAIT TRAINING THERAPY: CPT

## 2025-02-25 PROCEDURE — 6370000000 HC RX 637 (ALT 250 FOR IP): Performed by: INTERNAL MEDICINE

## 2025-02-25 PROCEDURE — 94761 N-INVAS EAR/PLS OXIMETRY MLT: CPT

## 2025-02-25 PROCEDURE — 94150 VITAL CAPACITY TEST: CPT

## 2025-02-25 PROCEDURE — 6370000000 HC RX 637 (ALT 250 FOR IP): Performed by: NURSE PRACTITIONER

## 2025-02-25 PROCEDURE — 82962 GLUCOSE BLOOD TEST: CPT

## 2025-02-25 PROCEDURE — 97110 THERAPEUTIC EXERCISES: CPT

## 2025-02-25 PROCEDURE — 1280000000 HC REHAB R&B

## 2025-02-25 RX ADMIN — AMOXICILLIN AND CLAVULANATE POTASSIUM 1 TABLET: 875; 125 TABLET, FILM COATED ORAL at 20:41

## 2025-02-25 RX ADMIN — INSULIN HUMAN 10 UNITS: 500 INJECTION, SOLUTION SUBCUTANEOUS at 09:09

## 2025-02-25 RX ADMIN — TAMSULOSIN HYDROCHLORIDE 0.4 MG: 0.4 CAPSULE ORAL at 09:10

## 2025-02-25 RX ADMIN — INSULIN HUMAN 5 UNITS: 500 INJECTION, SOLUTION SUBCUTANEOUS at 17:31

## 2025-02-25 RX ADMIN — SODIUM CHLORIDE, PRESERVATIVE FREE 10 ML: 5 INJECTION INTRAVENOUS at 20:42

## 2025-02-25 RX ADMIN — INSULIN HUMAN 10 UNITS: 500 INJECTION, SOLUTION SUBCUTANEOUS at 14:07

## 2025-02-25 RX ADMIN — RIVAROXABAN 20 MG: 20 TABLET, FILM COATED ORAL at 17:31

## 2025-02-25 RX ADMIN — INSULIN HUMAN 20 UNITS: 500 INJECTION, SOLUTION SUBCUTANEOUS at 14:07

## 2025-02-25 RX ADMIN — SPIRONOLACTONE 25 MG: 50 TABLET ORAL at 09:11

## 2025-02-25 RX ADMIN — DOCUSATE SODIUM 100 MG: 100 CAPSULE, LIQUID FILLED ORAL at 09:10

## 2025-02-25 RX ADMIN — INSULIN HUMAN 20 UNITS: 500 INJECTION, SOLUTION SUBCUTANEOUS at 17:32

## 2025-02-25 RX ADMIN — AMOXICILLIN AND CLAVULANATE POTASSIUM 1 TABLET: 875; 125 TABLET, FILM COATED ORAL at 09:11

## 2025-02-25 RX ADMIN — POTASSIUM CHLORIDE 40 MEQ: 1500 TABLET, EXTENDED RELEASE ORAL at 09:11

## 2025-02-25 RX ADMIN — POTASSIUM CHLORIDE 40 MEQ: 1500 TABLET, EXTENDED RELEASE ORAL at 14:15

## 2025-02-25 RX ADMIN — FINASTERIDE 5 MG: 5 TABLET, FILM COATED ORAL at 09:11

## 2025-02-25 RX ADMIN — POTASSIUM CHLORIDE 40 MEQ: 1500 TABLET, EXTENDED RELEASE ORAL at 17:31

## 2025-02-25 RX ADMIN — TORSEMIDE 10 MG: 20 TABLET ORAL at 14:15

## 2025-02-25 RX ADMIN — INSULIN HUMAN 15 UNITS: 500 INJECTION, SOLUTION SUBCUTANEOUS at 22:05

## 2025-02-25 RX ADMIN — CLOPIDOGREL BISULFATE 75 MG: 75 TABLET ORAL at 09:11

## 2025-02-25 RX ADMIN — METOPROLOL SUCCINATE 25 MG: 25 TABLET, FILM COATED, EXTENDED RELEASE ORAL at 09:11

## 2025-02-25 RX ADMIN — TORSEMIDE 10 MG: 20 TABLET ORAL at 09:10

## 2025-02-25 ASSESSMENT — PAIN SCALES - GENERAL: PAINLEVEL_OUTOF10: 0

## 2025-02-25 NOTE — PROGRESS NOTES
Progress Note( Dr. Diehl)  2/25/2025  Subjective:   Admit Date: 2/22/2025  PCP: MEETA Diehl MD    Admitted For : Right foot infection and high blood glucose     Consulted For:  Right foot infection and high blood glucose     Interval History: Patient had  amputation of second and second and third toe of the right foot and debridement of the wound of the right foot done 2/19/2025 patient feeling a lot better    Patient was transferred to ARU as of 2/22/2025 evening    Patient has still hypokalemia and potassium being replaced    Blood glucose seem to be somewhat lower yesterday evening and this morning so he did not get his morning insulin as he does not meet the parameters    Denies any chest pains,   Denies SOB .   Denies nausea or vomiting.   No new bowel or bladder symptoms.       Intake/Output Summary (Last 24 hours) at 2/25/2025 0830  Last data filed at 2/25/2025 0600  Gross per 24 hour   Intake 540 ml   Output 2050 ml   Net -1510 ml       DATA    CBC:   Recent Labs     02/24/25  0730   WBC 5.9   HGB 12.8*       CMP:  Recent Labs     02/22/25  1438 02/24/25  0730   * 140   K 3.8 3.6   CL 88* 99   CO2 30 27   BUN 68* 48*   CREATININE 2.2* 1.7*   CALCIUM 9.9 9.7   BILITOT  --  0.5   ALKPHOS  --  74   AST  --  19   ALT  --  17     Lipids:   Lab Results   Component Value Date/Time    CHOL 159 11/14/2024 08:23 AM    HDL 39 11/14/2024 08:23 AM    TRIG 152 11/14/2024 08:23 AM     Glucose:  Recent Labs     02/24/25  1625 02/24/25  1957 02/25/25  0813   POCGLU 182* 196* 220*     [id number]:  Lab Results   Component Value Date/Time    LABA1C 8.8 02/15/2025 01:47 AM     High Sensitivity TSH:   Lab Results   Component Value Date/Time    TSHHS 1.800 08/29/2019 10:06 AM     Free T3: No results found for: \"FT3\"  Free T4:No results found for: \"T4FREE\"    No orders to display        Scheduled Medicines   Medications:    torsemide  10 mg Oral QAM    torsemide  10 mg Oral Daily    amoxicillin-clavulanate   Dysphagia due to recent stroke     Essential hypertension     Morbid obesity with body mass index of 45.0-49.9 in adult     Frequent falls     Chronic venous stasis dermatitis of both lower extremities     History of CVA (cerebrovascular accident)     Obesity hypoventilation syndrome     Hypertension     Hyperlipidemia     Congestive heart failure due to cardiomyopathy (HCC)     Stage 3b chronic kidney disease (HCC)     Chronic kidney disease, stage II (mild)     Benign essential microscopic hematuria     Stage 3a chronic kidney disease (HCC)     Generalized edema     Acute kidney injury superimposed on CKD (HCC)     Hypokalemia     Cellulitis of foot     Sepsis (HCC)     Diabetic foot infection (HCC)     Bacteremia due to Enterococcus      Plan:     Reviewed POC blood glucose . Labs and X ray results   Reviewed Current Medicines   On meal/ Correction bolus U500 regular insulin insulin regime /  Monitor Blood glucose frequently   Modified  the dose of Insulin/ other medicines as needed  Patient was transferred to ARU on evening of 2/22/2025  Patient participating in physical activities of rehab unit  Will follow     .     MEETA Diehl MD,

## 2025-02-25 NOTE — PROGRESS NOTES
Occupational Therapy  Physical Rehabilitation: OCCUPATIONAL THERAPY     [x] daily progress note       [] discharge       Patient Name:  Bandar De La Torre   :  1960 MRN: [id number]  Room:  [address] [place] [address] Date of Admission: 2025  Rehabilitation Diagnosis:   Other acute osteomyelitis, right ankle and foot (HCC) [M86.171]  Osteomyelitis of toe of right foot (HCC) [M86.9]       Date 2025       Day of ARU Week:  4   Time IN/OUT 7723-1746   Individual Tx Minutes 60   Group Tx Minutes    Co-Treat Minutes    Concurrent Tx Minutes    TOTAL Tx Time Mins 60   Variance Time    Variance Reason []   Refusal due to:     []   Medical hold/reason:    []   Illness   []   Off Unit for test/procedure  []   Extra time needed to complete task  []   Therapeutic need  []   Make up mins were attempted but pt unable to complete due to (specify)  []   Other (specify):   Restrictions Restrictions/Precautions: Fall Risk, General Precautions, Weight Bearing (Heel WB RLE, IV access R forearm, BS sensor L upper arm, pacemaker)         Communication with other providers: [x]   OK to see per nursing:     []   Spoke with team member regarding:      Subjective observations and cognitive status: Pt resting in bed on approach; pt stated his sugar was high and was waiting for his insulin from the pharmacy. Dell entered room to give patient meds. Pt stated he felt woozy and therapist \"was blurry.\" Pt was given insulin and was agreeable to in bed therex to begin.  Pt required some encouragement to participate.      Pain level/location:    /10       Location:    Discharge recommendations  Anticipated discharge date:  TBD  Destination: []home alone   []home alone w assist prn   [] home w/ family    [] Continuous supervision       []SNF    [] Assisted living     [] Other:   Continued therapy: []HHC OT  []OUTPATIENT  OT   [] No Further OT  Equipment needs: likely none      Toileting:   denied need        Toilet Transfers:   NA   Device Used:    Independent  Active : No (reports not driving for 3 mo. due to vision changes)  Patient's  Info: Wife  Mode of Transportation: Van, [employer]  Occupation: Retired ([employer])  Additional Comments: Pt has a regular flat bed    Objective                                                                                    Goals:  (Update in navigator)  Short Term Goals  Time Frame for Short Term Goals: STGs=LTGs:  Long Term Goals  Time Frame for Long Term Goals : 7 days or until d/c  Long Term Goal 1: Pt will complete grooming tasks Ind  Long Term Goal 2: Pt will complete total body bathing Ind  Long Term Goal 3: Pt will complete UB dressing Ind  Long Term Goal 4: Pt will complete LB dressing Ind  Long Term Goal 5: Pt will doff/don footwear Ind  Additional Goals?: Yes  Long Term Goal 6: Pt will complete toileting c Mod I  Long Term Goal 7: Pt will complete functional transfers (bed, chair, toilet, shower) c DME PRN and Mod I  Long Term Goal 8: Pt will perform therapy exercises to facilitate increased strength and endurance (emphasis on standing balance >10 min) c SBA  Long Term Goal 9: Pt will complete simple IADLs c DME PRN and Mod I:        Plan of Care                                                                              Times per week: 5 days per week for a minimum of 60 minutes/day plus group as appropriate for 60 minutes.  Treatment to include Occupational Therapy Plan  Current Treatment Recommendations: Strengthening, Balance training, Functional mobility training, Endurance training, Positioning, Equipment evaluation, education, & procurement, Safety education & training, Cognitive reorientation, Self-Care / ADL, Home management training, Return to work related activity, Coordination training, Group Therapy, Cognitive/Perceptual training    Electronically signed by   SUNSHINE Dawkins,  2/25/2025, 2:08 PM

## 2025-02-25 NOTE — PROGRESS NOTES
Physical Therapy  [x] daily progress note       [] discharge       Patient Name:  Bandar De La Torre   :  1960 MRN: [id number]  Room:  [address] [place] [address] Date of Admission: 2025  Rehabilitation Diagnosis:   Other acute osteomyelitis, right ankle and foot (HCC) [M86.171]  Osteomyelitis of toe of right foot (HCC) [M86.9]       Date 2025       Day of ARU Week:  4   Time IN/OUT 1100/1200   Individual Tx Minutes 60   Group Tx Minutes    Co-Treat Minutes    Concurrent Tx Minutes    TOTAL Tx Time Mins 60   Variance Time    Variance Time []   Refusal due to:     []   Medical hold/reason:    []   Illness   []   Off Unit for test/procedure  []   Extra time needed to complete task  []   Therapeutic need  []   Other (specify):   Restrictions Restrictions/Precautions  Restrictions/Precautions: Fall Risk, General Precautions, Weight Bearing (Heel WB RLE, IV access R forearm, BS sensor L upper arm, pacemaker)  Implants Present? : Pacemaker      Interdisciplinary communication [x]   Cleared for therapy per nursing     [x]   RN notified about issues during session-nursing states they forgot to look for heel offloading shoe yesterday and will do it today  []   RN updated on pt performance  []   Spoke with   []   Spoke with OT  []   Spoke with MD  []   Other:    Subjective observations and cognitive status: Pt in recliner, agreeable to therapy     Pain level/location:   0 /10       Location:    Discharge recommendations  Anticipated discharge date:  3/1  Destination: []home alone   []home alone with assist PRN     [x] home w/ family      [] Continuous supervision  []SNF    [] Assisted living     [] Other:  Continued therapy: [x]HHC PT  []OUTPATIENT  PT   [] No Further PT  []SNF PT  Caregiver training recommended: []Yes  [] No   Equipment needs: 2ww     Bed Mobility:           []   Pt received out of bed   Sit --> lying:  mod I  Bed features used: [] Yes  [] No       Transfers:    Sit--> Stand:  SBA with pt  Independent  Prior Level of Assist for Homemaking: Independent  Homemaking Responsibilities: Yes  Meal Prep Responsibility: Primary  Laundry Responsibility: No  Cleaning Responsibility: No  Bill Paying/Finance Responsibility: No  Shopping Responsibility: Primary  Dependent Care Responsibility: Secondary (5 dogs, 1 cat)  Health Care Management: Secondary (pt reports usnig pillbox that wife sets up)  Prior Level of Assist for Ambulation: Independent community ambulator, with or without device, Independent household ambulator, with or without device (Pt reports using cane in house and walker at bedside d/t dizziness and for urinal)  Prior Level of Assist for Transfers: Independent  Active : No (reports not driving for 3 mo. due to vision changes)  Patient's  Info: Wife  Mode of Transportation: Van, [employer]  Occupation: Retired ([employer])  Additional Comments: Pt has a regular flat bed    Objective                                                                                    Goals:  (Update in navigator)  Short Term Goals  Time Frame for Short Term Goals: 1 week  Short Term Goal 1: Pt will perform all functional transfers with mod I  Short Term Goal 2: Pt will ambulate with 2ww on level surface and 10' on unlevel surface with mod I, keeping WB precautions  Short Term Goal 3: Pt will negotiate curb step with 2ww mod I and 12 steps with B rails with supervision , keeping WB precautions  Short Term Goal 4: Pt will  light object with 2ww and reacher with mod I  Short Term Goal 5: Pt will propel w/c 150' including turns with mod I:   :        Plan of Care                                                                              Times per week: 5 days per week for a minimum of 60 minutes/day plus group as appropriate for 60 minutes.  Treatment to include Current Treatment Recommendations: Strengthening, Balance training, Functional mobility training, Transfer training, IADL training, Endurance

## 2025-02-25 NOTE — PROGRESS NOTES
Nephrology Progress Note  2/25/2025 10:13 AM  Subjective:     Interval History: Bandar De La Torre is a 64 y.o. male appears to be doing okay in general weak tired working with therapy    Data:   Scheduled Meds:   torsemide  10 mg Oral QAM    torsemide  10 mg Oral Daily    amoxicillin-clavulanate  1 tablet Oral 2 times per day    insulin regular human  5 Units SubCUTAneous Dinner    sodium chloride flush  5-40 mL IntraVENous 2 times per day    clopidogrel  75 mg Oral Daily    docusate sodium  100 mg Oral BID    finasteride  5 mg Oral Daily    insulin regular human  10 Units SubCUTAneous QAM    insulin regular human  10 Units SubCUTAneous Lunch    metoprolol succinate  25 mg Oral Daily    potassium chloride  40 mEq Oral TID WC    rivaroxaban  20 mg Oral Dinner    spironolactone  25 mg Oral BID    tamsulosin  0.4 mg Oral Daily    insulin regular human  0-30 Units SubCUTAneous 4x Daily AC & HS     Continuous Infusions:   sodium chloride 10 mL/hr at 02/24/25 0047    dextrose           CBC   Recent Labs     02/24/25  0730   WBC 5.9   HGB 12.8*   HCT 40.8*         BMP   Recent Labs     02/22/25  1438 02/24/25  0730   * 140   K 3.8 3.6   CL 88* 99   CO2 30 27   BUN 68* 48*   CREATININE 2.2* 1.7*     Hepatic:   Recent Labs     02/24/25  0730   AST 19   ALT 17   BILITOT 0.5   ALKPHOS 74     Troponin: No results for input(s): \"TROPONINI\" in the last 72 hours.  BNP: No results for input(s): \"BNP\" in the last 72 hours.  Lipids: No results for input(s): \"CHOL\", \"HDL\" in the last 72 hours.    Invalid input(s): \"LDLCALCU\"  ABGs: No results found for: \"PHART\", \"PO2ART\", \"JQT0FOD\"  INR: No results for input(s): \"INR\" in the last 72 hours.  Renal Labs  Albumin:    Lab Results   Component Value Date/Time    LABALBU 50 10/27/2021 02:04 PM     Calcium:    Lab Results   Component Value Date/Time    CALCIUM 9.7 02/24/2025 07:30 AM     Phosphorus:    Lab Results   Component Value Date/Time    PHOS 2.4 02/16/2025 01:49 AM

## 2025-02-25 NOTE — PROGRESS NOTES
Called Pharmacy and requested 10 units of insulin U-500 and 20 units of U-500 from the sliding scale dose.

## 2025-02-25 NOTE — PROGRESS NOTES
Occupational Therapy    Physical Rehabilitation: OCCUPATIONAL THERAPY     [x] daily progress note       [] discharge       Patient Name:  Bandar De La Torre   :  1960 MRN: [id number]  Room:  [address] [place] [address] Date of Admission: 2025  Rehabilitation Diagnosis:   Other acute osteomyelitis, right ankle and foot (HCC) [M86.171]  Osteomyelitis of toe of right foot (HCC) [M86.9]       Date 2025       Day of ARU Week:  4   Time IN/OUT 30/30   Individual Tx Minutes 60   Group Tx Minutes    Co-Treat Minutes    Concurrent Tx Minutes    TOTAL Tx Time Mins 60   Variance Time    Variance Reason []   Refusal due to:     []   Medical hold/reason:    []   Illness   []   Off Unit for test/procedure  []   Extra time needed to complete task  []   Therapeutic need  []   Make up mins were attempted but pt unable to complete due to (specify)  []   Other (specify):   Restrictions Restrictions/Precautions: Fall Risk, General Precautions, Weight Bearing (Heel WB RLE, IV access R forearm, BS sensor L upper arm, pacemaker)         Communication with other providers: [x]   OK to see per nursing:     [x]   Spoke with nurse Tate regarding:  medications and insulin due   Subjective observations and cognitive status: Pt in bed on approach and just finished breakfast. Pt agreeable to dress, complete hygiene, and go to therapy gym.       Pain level/location:    /10       Location:    Discharge recommendations  Anticipated discharge date:  TBD  Destination: []home alone   []home alone w assist prn   [x] home w/ family    [] Continuous supervision       []SNF    [] Assisted living     [] Other:   Continued therapy: [x]HHC OT  []OUTPATIENT  OT   [] No Further OT  Equipment needs: TBD       ADLs:    Oral Hygiene: Oral Hygiene  Assistance Needed: Independent  Comment: completed in w/c at sink  CARE Score: 6  Discharge Goal: Independent          LB Dressing: Lower Body Dressing  Assistance Needed: Supervision or touching

## 2025-02-25 NOTE — PROGRESS NOTES
Infectious Disease Progress Note  2025   Patient Name: Bandar De La Torre : 1960     Assessment  Right DFI with OM, s/p amputation 2nd & 3rd toe 25  Pt presented for management of right foot wound.   General surgery evaluated underwent bedside excisional debridement of necrotic tissue from wound at the plantar surface 2025 MRI confirmed OM of the second distal phalanx and the third middle and distal phalanges.  The patient underwent amputation of his second and third toe of the right foot with debridement per GS, Dr. Avalos 2025.  Surgical culture remained negative however wound culture showed pansensitive Proteus mirabilis.  Per op note bones were cut at MTP joint.  Surgical pathology resulted 2025 showing marginal section negative for OM. ID was consulted due blood cultures obtained were positive for Enterococcus faecalis with subsequent negative blood cultures. Enterococcus likely a contaminant as was not found in wound and subsequent blood cultures rapidly neg. TEJ showed no evidence of endocarditis.  EP did evaluate the patient recommendations were appreciated no indication for pacemaker removal warranted.  The patient had been managed on a course of intravenous antibiotics pending culture results.  The patient will continue antimicrobials for a cumulative 2-week from date of surgery..  Leukocytosis resolved, inflamm markers with dwt. WBC 6.5<--11.9 ( adm), CRP 15.9<-- 159<--113, ESR 44<--52<--26. Procal 0.28. Afebrile sice adm  Antimicrobial summary: IV Cefepime --; IV Vanc -; IV Ampicillin -; IV Ceftriaxone -; Augmentin 875/125mg -    DM II A1C 7.5  On insulin, Endocrinology following  SSS s/p pacemaker  Severe obesity, BMI 45.3  Allergy: no abx allergy  GFR 42  Comorbid conditions: SSS s/p pacemaker (), CVA, MARINO, HTN, DM II, CKD III, HLD, Obesity     Plan  Therapeutic: Continue Augmentin 875mg/125mg po q 12h for cumulative 2  (BMI) of 45.0 to 49.9 in adult    History of cardiac cath    Weakness of right arm    Acute CVA (cerebrovascular accident) (HCC)    Spastic hemiparesis of right dominant side (HCC)    Dysphagia due to recent stroke    Essential hypertension    Morbid obesity with BMI of 40.0-44.9, adult    Frequent falls    Chronic venous stasis dermatitis of both lower extremities    History of CVA (cerebrovascular accident)    Obesity hypoventilation syndrome    Hypertension    Hyperlipidemia    Congestive heart failure due to cardiomyopathy (HCC)    Chronic kidney disease, stage 3b (HCC)    Chronic kidney disease, stage II (mild)    Benign essential microscopic hematuria    Stage 3a chronic kidney disease (HCC)    Generalized edema    Acute kidney injury superimposed on CKD    Hypokalemia    Cellulitis of foot    Sepsis (HCC)    Diabetic foot infection (HCC)    Bacteremia due to Enterococcus    Pacemaker lead malfunction    Osteomyelitis of second toe of right foot (HCC)    Uncontrolled pain    Generalized weakness    Poorly controlled type 2 diabetes mellitus with peripheral neuropathy (HCC)    Status post amputation of toe    Diabetic ulcer of right midfoot associated with type 2 diabetes mellitus, limited to breakdown of skin (HCC)       Active Problems  Principal Problem:    Osteomyelitis of second toe of right foot (HCC)  Active Problems:    Permanent atrial fibrillation (HCC)    Morbid obesity with BMI of 40.0-44.9, adult    Chronic kidney disease, stage 3b (HCC)    Uncontrolled pain    Generalized weakness    Poorly controlled type 2 diabetes mellitus with peripheral neuropathy (HCC)    Status post amputation of toe    Diabetic ulcer of right midfoot associated with type 2 diabetes mellitus, limited to breakdown of skin (HCC)  Resolved Problems:    * No resolved hospital problems. *              Electronically signed by: Electronically signed by HAYLEY Faria CNP on 2/25/2025 at 7:58 AM

## 2025-02-25 NOTE — PROGRESS NOTES
Called pharmacy because dose of insulin is not on unit yet. Pharmacy had me check refrigerator and it was not there either. Pharmacist stated she would get it together again.

## 2025-02-25 NOTE — PROGRESS NOTES
Bandar De La Torre    : 1960  Acct #: [id number]  MRN: [id number]              PM&R Progress Note      Admitting diagnosis: ***    Comorbid diagnoses impacting rehabilitation: ***    Chief complaint: ***    Prior (baseline) level of function: Independent.    Current level of function:         Current  IRF-JAVIER and Goals:   Occupational Therapy:    Short Term Goals  Time Frame for Short Term Goals: STGs=LTGs :   Long Term Goals  Time Frame for Long Term Goals : 7 days or until d/c  Long Term Goal 1: Pt will complete grooming tasks Ind  Long Term Goal 2: Pt will complete total body bathing Ind  Long Term Goal 3: Pt will complete UB dressing Ind  Long Term Goal 4: Pt will complete LB dressing Ind  Long Term Goal 5: Pt will doff/don footwear Ind  Additional Goals?: Yes  Long Term Goal 6: Pt will complete toileting c Mod I  Long Term Goal 7: Pt will complete functional transfers (bed, chair, toilet, shower) c DME PRN and Mod I  Long Term Goal 8: Pt will perform therapy exercises to facilitate increased strength and endurance (emphasis on standing balance >10 min) c SBA  Long Term Goal 9: Pt will complete simple IADLs c DME PRN and Mod I :                                       Eating: Eating  Assistance Needed: Independent  Comment: Pt able to open packages/containers per pt report  CARE Score: 6  Discharge Goal: Independent       Oral Hygiene: Oral Hygiene  Assistance Needed: Setup or clean-up assistance  Comment: completed in W/C at sink  CARE Score: 5  Discharge Goal: Independent    UB/LB Bathing: Shower/Bathe Self  Assistance Needed: Supervision or touching assistance  Comment: Pt able to complete full body bathing c hand held shower head and weight shifting to clean bottom c SBA  CARE Score: 4  Discharge Goal: Independent    UB Dressing: Upper Body Dressing  Assistance Needed: Supervision or touching assistance  Comment: Pt able to retrieve clothes from closet and walk to toilet c CGA and RW and pt able to

## 2025-02-26 LAB
ANION GAP SERPL CALCULATED.3IONS-SCNC: 14 MMOL/L (ref 9–17)
BUN SERPL-MCNC: 54 MG/DL (ref 7–20)
CALCIUM SERPL-MCNC: 10.2 MG/DL (ref 8.3–10.6)
CHLORIDE SERPL-SCNC: 102 MMOL/L (ref 99–110)
CO2 SERPL-SCNC: 25 MMOL/L (ref 21–32)
CREAT SERPL-MCNC: 1.5 MG/DL (ref 0.8–1.3)
GFR, ESTIMATED: 47 ML/MIN/1.73M2
GLUCOSE BLD-MCNC: 105 MG/DL (ref 74–99)
GLUCOSE BLD-MCNC: 195 MG/DL (ref 74–99)
GLUCOSE BLD-MCNC: 300 MG/DL (ref 74–99)
GLUCOSE BLD-MCNC: 523 MG/DL (ref 74–99)
GLUCOSE BLD-MCNC: 92 MG/DL (ref 74–99)
GLUCOSE SERPL-MCNC: 101 MG/DL (ref 74–99)
POTASSIUM SERPL-SCNC: 3.5 MMOL/L (ref 3.5–5.1)
SODIUM SERPL-SCNC: 141 MMOL/L (ref 136–145)

## 2025-02-26 PROCEDURE — 97116 GAIT TRAINING THERAPY: CPT

## 2025-02-26 PROCEDURE — 2500000003 HC RX 250 WO HCPCS: Performed by: INTERNAL MEDICINE

## 2025-02-26 PROCEDURE — 82962 GLUCOSE BLOOD TEST: CPT

## 2025-02-26 PROCEDURE — 6370000000 HC RX 637 (ALT 250 FOR IP): Performed by: INTERNAL MEDICINE

## 2025-02-26 PROCEDURE — 1280000000 HC REHAB R&B

## 2025-02-26 PROCEDURE — 80048 BASIC METABOLIC PNL TOTAL CA: CPT

## 2025-02-26 PROCEDURE — 97535 SELF CARE MNGMENT TRAINING: CPT

## 2025-02-26 PROCEDURE — 6370000000 HC RX 637 (ALT 250 FOR IP): Performed by: NURSE PRACTITIONER

## 2025-02-26 PROCEDURE — 97530 THERAPEUTIC ACTIVITIES: CPT

## 2025-02-26 PROCEDURE — 94761 N-INVAS EAR/PLS OXIMETRY MLT: CPT

## 2025-02-26 PROCEDURE — 36415 COLL VENOUS BLD VENIPUNCTURE: CPT

## 2025-02-26 RX ORDER — TORSEMIDE 20 MG/1
20 TABLET ORAL EVERY MORNING
Status: DISPENSED | OUTPATIENT
Start: 2025-02-26

## 2025-02-26 RX ADMIN — TORSEMIDE 20 MG: 20 TABLET ORAL at 08:08

## 2025-02-26 RX ADMIN — POTASSIUM CHLORIDE 40 MEQ: 1500 TABLET, EXTENDED RELEASE ORAL at 08:08

## 2025-02-26 RX ADMIN — SODIUM CHLORIDE, PRESERVATIVE FREE 10 ML: 5 INJECTION INTRAVENOUS at 22:22

## 2025-02-26 RX ADMIN — TORSEMIDE 10 MG: 20 TABLET ORAL at 13:30

## 2025-02-26 RX ADMIN — DOCUSATE SODIUM 100 MG: 100 CAPSULE, LIQUID FILLED ORAL at 08:09

## 2025-02-26 RX ADMIN — INSULIN HUMAN 25 UNITS: 500 INJECTION, SOLUTION SUBCUTANEOUS at 22:23

## 2025-02-26 RX ADMIN — CLOPIDOGREL BISULFATE 75 MG: 75 TABLET ORAL at 08:08

## 2025-02-26 RX ADMIN — FINASTERIDE 5 MG: 5 TABLET, FILM COATED ORAL at 08:09

## 2025-02-26 RX ADMIN — AMOXICILLIN AND CLAVULANATE POTASSIUM 1 TABLET: 875; 125 TABLET, FILM COATED ORAL at 08:07

## 2025-02-26 RX ADMIN — POTASSIUM CHLORIDE 40 MEQ: 1500 TABLET, EXTENDED RELEASE ORAL at 18:16

## 2025-02-26 RX ADMIN — RIVAROXABAN 20 MG: 20 TABLET, FILM COATED ORAL at 18:16

## 2025-02-26 RX ADMIN — SPIRONOLACTONE 25 MG: 50 TABLET ORAL at 22:21

## 2025-02-26 RX ADMIN — SPIRONOLACTONE 25 MG: 50 TABLET ORAL at 08:09

## 2025-02-26 RX ADMIN — AMOXICILLIN AND CLAVULANATE POTASSIUM 1 TABLET: 875; 125 TABLET, FILM COATED ORAL at 22:21

## 2025-02-26 RX ADMIN — INSULIN HUMAN 15 UNITS: 500 INJECTION, SOLUTION SUBCUTANEOUS at 18:15

## 2025-02-26 RX ADMIN — METOPROLOL SUCCINATE 25 MG: 25 TABLET, FILM COATED, EXTENDED RELEASE ORAL at 08:09

## 2025-02-26 RX ADMIN — POTASSIUM CHLORIDE 40 MEQ: 1500 TABLET, EXTENDED RELEASE ORAL at 12:35

## 2025-02-26 RX ADMIN — INSULIN HUMAN 5 UNITS: 500 INJECTION, SOLUTION SUBCUTANEOUS at 12:27

## 2025-02-26 RX ADMIN — TAMSULOSIN HYDROCHLORIDE 0.4 MG: 0.4 CAPSULE ORAL at 08:09

## 2025-02-26 RX ADMIN — SODIUM CHLORIDE, PRESERVATIVE FREE 10 ML: 5 INJECTION INTRAVENOUS at 10:59

## 2025-02-26 NOTE — PROGRESS NOTES
Progress Note( Dr. Diehl)  2/26/2025  Subjective:   Admit Date: 2/22/2025  PCP: MEETA Diehl MD    Admitted For : Right foot infection and high blood glucose     Consulted For:  Right foot infection and high blood glucose     Interval History: Patient had  amputation of second and second and third toe of the right foot and debridement of the wound of the right foot done 2/19/2025 patient feeling a lot better    Patient was transferred to ARU as of 2/22/2025 evening    Patient has still hypokalemia and potassium being replaced    Blood glucose seem to be somewhat lowe this morning so he will  not get his morning insulin as he does not meet the parameters    Denies any chest pains,   Denies SOB .   Denies nausea or vomiting.   No new bowel or bladder symptoms.       Intake/Output Summary (Last 24 hours) at 2/26/2025 0815  Last data filed at 2/25/2025 2042  Gross per 24 hour   Intake 1306 ml   Output 700 ml   Net 606 ml       DATA    CBC:   Recent Labs     02/24/25  0730   WBC 5.9   HGB 12.8*       CMP:  Recent Labs     02/24/25  0730      K 3.6   CL 99   CO2 27   BUN 48*   CREATININE 1.7*   CALCIUM 9.7   BILITOT 0.5   ALKPHOS 74   AST 19   ALT 17     Lipids:   Lab Results   Component Value Date/Time    CHOL 159 11/14/2024 08:23 AM    HDL 39 11/14/2024 08:23 AM    TRIG 152 11/14/2024 08:23 AM     Glucose:  Recent Labs     02/25/25  2039 02/26/25  0411 02/26/25  0730   POCGLU 226* 92 105*     [id number]:  Lab Results   Component Value Date/Time    LABA1C 8.8 02/15/2025 01:47 AM     High Sensitivity TSH:   Lab Results   Component Value Date/Time    TSHHS 1.800 08/29/2019 10:06 AM     Free T3: No results found for: \"FT3\"  Free T4:No results found for: \"T4FREE\"    No orders to display        Scheduled Medicines   Medications:    torsemide  20 mg Oral QAM    torsemide  10 mg Oral Daily    amoxicillin-clavulanate  1 tablet Oral 2 times per day    [Held by provider] insulin regular human  5 Units  EXTENDED  ELECTROENCEPHALOGRAM  REPORT    Alexander Sainz  [id number]  1988    DATE OF SERVICE: 10/16/2020    DATE OF ADMISSION: 10/11/2020  7:05 AM    ADMITTING/REQUESTING PROVIDER:  Augie Calvo MD    METHODOLOGY   Electroencephalographic (EEG) recording is with electrodes placed according to the International 10-20 placement system.  Thirty two (32) channels of digital signal (sampling rate of 512/sec) including T1 and T2 was simultaneously recorded from the scalp and may include  EKG, EMG, and/or eye monitors.  Recording band pass was 0.1 to 512 hz.  Digital video recording of the patient is simultaneously recorded with the EEG.  The patient is instructed report clinical symptoms which may occur during the recording session.  EEG and video recording is stored and archived in digital format.  Activation procedures which include photic stimulation, hyperventilation and instructing patients to perform simple task are done in selected patients.   The EEG is displayed on a monitor screen and can be reviewed using different montages.  Computer assisted analysis is employed to detect spike and electrographic seizure activity.   The entire record is submitted for computer analysis.  The entire recording is visually reviewed and the times identified by computer analysis as being spikes or seizures are reviewed again.  Compresses spectral analysis (CSA) is also performed on the activity recorded from each individual channel.  This is displayed as a power display of frequencies from 0 to 30 Hz over time.   The CSA is reviewed looking for asymmetries in power between homologous areas of the scalp and then compared with the original EEG recording.     [employer] software was also utilized in the review of this study.  This software suite analyzes the EEG recording in multiple domains.  Coherence and rhythmicity is computed to identify EEG sections which may contain organized seizures.  Each channel undergoes analysis to  detect presence of spike and sharp waves which have special and morphological characteristic of epileptic activity.  The routine EEG recording is converted from spacial into frequency domain.  This is then displayed comparing homologous areas to identify areas of significant asymmetry.  Algorithm to identify non-cortically generated artifact is used to separate eye movement, EMG and other artifact from the EEG.      RECORDING TIMES  Start on 10/16/2020, 14:31  Stop on 10/16/2020, 15:15    A total of 44 minutes of EEG recording was obtained.    EEG FINDINGS  Background activity:   The background rhythm was characterized by sharply contoured delta-theta (3-6 Hz) activity   No clear posterior dominant rhythm seen.   Symmetry and continuity: the background was continuous; asymmetry with prominent slowing (delta > theta) noted on the right        Sleep:    Normal sleep transients including sleep spindles, K complexes, vertex waves and POSTS were seen.    Activation procedures:   Photic stimulation and hyperventilation were not performed   With stimulation, reactivity was noted on EEG.    Abnormal activity:   No epileptiform discharges, periodic discharges, lateralized rhythmic delta activity or electrographic seizures were seen.    EKG:   Regular rhythm seen    IMPRESSION:   This is an abnormal extended (44 minutes) EEG due to :  1) asymmetry with prominent right sided slowing seen, suggestive of underlying structural lesion  2) moderate to severe generalized slowing seen, suggestive of moderate to severe diffuse cerebral dysfunction.  No epileptiform activity or electrographic seizures seen.    CLINICAL CORRELATION IS RECOMMENDED    Daniella Alvarado MD, AIDE(), HANNA VELASCO.  Neurology-Epilepsy.  [facility].       obesity with body mass index of 45.0-49.9 in adult     Frequent falls     Chronic venous stasis dermatitis of both lower extremities     History of CVA (cerebrovascular accident)     Obesity hypoventilation syndrome     Hypertension     Hyperlipidemia     Congestive heart failure due to cardiomyopathy (HCC)     Stage 3b chronic kidney disease (HCC)     Chronic kidney disease, stage II (mild)     Benign essential microscopic hematuria     Stage 3a chronic kidney disease (HCC)     Generalized edema     Acute kidney injury superimposed on CKD (HCC)     Hypokalemia     Cellulitis of foot     Sepsis (HCC)     Diabetic foot infection (HCC)     Bacteremia due to Enterococcus      Plan:     Reviewed POC blood glucose . Labs and X ray results   Reviewed Current Medicines   On meal/ Correction bolus U500 regular insulin insulin regime /  Monitor Blood glucose frequently   Modified  the dose of Insulin/ other medicines as needed  Patient was transferred to ARU on evening of 2/22/2025  Patient participating in physical activities of rehab unit  Will follow     .     MEETA Diehl MD,

## 2025-02-26 NOTE — PROGRESS NOTES
Nephrology Progress Note  2/26/2025 9:15 AM  Subjective:     Interval History: Bandar De La Torre is a 64 y.o. male doing well except he gained some weight    Data:   Scheduled Meds:   torsemide  20 mg Oral QAM    torsemide  10 mg Oral Daily    amoxicillin-clavulanate  1 tablet Oral 2 times per day    [Held by provider] insulin regular human  5 Units SubCUTAneous Dinner    sodium chloride flush  5-40 mL IntraVENous 2 times per day    clopidogrel  75 mg Oral Daily    docusate sodium  100 mg Oral BID    finasteride  5 mg Oral Daily    insulin regular human  10 Units SubCUTAneous QAM    insulin regular human  10 Units SubCUTAneous Lunch    metoprolol succinate  25 mg Oral Daily    potassium chloride  40 mEq Oral TID WC    rivaroxaban  20 mg Oral Dinner    spironolactone  25 mg Oral BID    tamsulosin  0.4 mg Oral Daily    insulin regular human  0-30 Units SubCUTAneous 4x Daily AC & HS     Continuous Infusions:   sodium chloride 10 mL/hr at 02/24/25 0047    dextrose           CBC   Recent Labs     02/24/25  0730   WBC 5.9   HGB 12.8*   HCT 40.8*         BMP   Recent Labs     02/24/25  0730      K 3.6   CL 99   CO2 27   BUN 48*   CREATININE 1.7*     Hepatic:   Recent Labs     02/24/25  0730   AST 19   ALT 17   BILITOT 0.5   ALKPHOS 74     Troponin: No results for input(s): \"TROPONINI\" in the last 72 hours.  BNP: No results for input(s): \"BNP\" in the last 72 hours.  Lipids: No results for input(s): \"CHOL\", \"HDL\" in the last 72 hours.    Invalid input(s): \"LDLCALCU\"  ABGs: No results found for: \"PHART\", \"PO2ART\", \"JSA3GYQ\"  INR: No results for input(s): \"INR\" in the last 72 hours.  Renal Labs  Albumin:    Lab Results   Component Value Date/Time    LABALBU 50 10/27/2021 02:04 PM     Calcium:    Lab Results   Component Value Date/Time    CALCIUM 9.7 02/24/2025 07:30 AM     Phosphorus:    Lab Results   Component Value Date/Time    PHOS 2.4 02/16/2025 01:49 AM     U/A:    Lab Results   Component Value Date/Time

## 2025-02-26 NOTE — PLAN OF CARE
Problem: Safety - Adult  Goal: Free from fall injury  2/26/2025 1036 by Silvana Patterson, RN  Outcome: Progressing  2/25/2025 2314 by Arlene Grier, RN  Outcome: Progressing     Problem: Skin/Tissue Integrity  Goal: Skin integrity remains intact  Description: 1.  Monitor for areas of redness and/or skin breakdown  2.  Assess vascular access sites hourly  3.  Every 4-6 hours minimum:  Change oxygen saturation probe site  4.  Every 4-6 hours:  If on nasal continuous positive airway pressure, respiratory therapy assess nares and determine need for appliance change or resting period  2/26/2025 1036 by Silvana Patterson, RN  Outcome: Progressing  2/25/2025 2314 by Arlene Grier, RN  Outcome: Progressing

## 2025-02-26 NOTE — PLAN OF CARE
Problem: Chronic Conditions and Co-morbidities  Goal: Patient's chronic conditions and co-morbidity symptoms are monitored and maintained or improved  Outcome: Progressing     Problem: Discharge Planning  Goal: Discharge to home or other facility with appropriate resources  Outcome: Progressing     Problem: Safety - Adult  Goal: Free from fall injury  Outcome: Progressing     Problem: Pain  Goal: Verbalizes/displays adequate comfort level or baseline comfort level  Outcome: Progressing     Problem: ABCDS Injury Assessment  Goal: Absence of physical injury  Outcome: Progressing     Problem: Skin/Tissue Integrity  Goal: Skin integrity remains intact  Description: 1.  Monitor for areas of redness and/or skin breakdown  2.  Assess vascular access sites hourly  3.  Every 4-6 hours minimum:  Change oxygen saturation probe site  4.  Every 4-6 hours:  If on nasal continuous positive airway pressure, respiratory therapy assess nares and determine need for appliance change or resting period  Outcome: Progressing

## 2025-02-26 NOTE — FLOWSHEET NOTE
[x] daily progress note       [] discharge       Patient Name:  Bandar De La Torre   :  1960 MRN: [id number]  Room:  [address] [place] [address] Date of Admission: 2025  Rehabilitation Diagnosis:   Other acute osteomyelitis, right ankle and foot (HCC) [M86.171]  Osteomyelitis of toe of right foot (HCC) [M86.9]       Date 2025       Day of ARU Week:  5   Time IN/OUT 1616-0746   Individual Tx Minutes 60   TOTAL Tx Time Mins 60   Restrictions Restrictions/Precautions  Restrictions/Precautions: Fall Risk, General Precautions, Weight Bearing (Heel WB RLE, IV access R forearm, BS sensor L upper arm, pacemaker)  Implants Present? : Pacemaker      Communication with other providers: [x]   OK to see per nursing:     []   Spoke with team member regarding:      Subjective observations and cognitive status: Pt seen sitting up in recliner at beginning of treatment. Agreeable to therapy. Pt reported he needed to go to restroom. BP was 120/78 initially. After first walk, BP was 143/72. Towards end of treatment, BP was 138/69. Pt reported no symptoms during session.    Pain level/location: 0/10       Discharge recommendations  Anticipated discharge date:  tbd  Destination: []home alone   []home alone with assist PRN     [x] home w/ family      [] Continuous supervision  []SNF    [] Assisted living     [] Other:  Continued therapy: [x]C PT  []OUTPATIENT PT   [] No Further PT  []SNF PT  Caregiver training recommended: []Yes  [] No   Equipment needs: 2ww     Bed Mobility:           [x]   Pt received out of bed   Scooting:  mod I with bed rails (scooting up in bed while using BUEs and pushing with left LE)   Sit --> lying:  mod I with bed rails     Transfers:    Sit--> Stand:  SBA  Stand --> Sit:   SBA  Assistive device required for transfer:   RW  Pt maintained heel weight bearing on right foot.     Gait:    Distance:  60'+78'    Assistance:  SBA  Device:  RW  Gait Quality:  step-to pattern. Patient maintained heel weight bearing  [x]  bed alarm set    []  chair alarm set    []  Pt refused alarms                []  Telesitter activated      [x]  Gait belt used during tx session      [x]other: pt left in semi-mcintyre's position in bed with call light and passed to wound care nurse (Chin) at end of PT session.        Number of Minutes/Billable Intervention  Gait Training 45   Therapeutic Exercise    Neuro Re-Ed    Therapeutic Activity 15   Wheelchair Propulsion    Group    Other:    TOTAL 60     Social History  Social/Functional History  Lives With: Spouse  Type of Home: House  Home Layout: Multi-level, Able to Live on Main level with bedroom/bathroom  Home Access: Stairs to enter with rails  Entrance Stairs - Number of Steps: 7 to get from garage to main living area  Entrance Stairs - Rails: Left  Bathroom Shower/Tub: Walk-in shower  Bathroom Toilet: Handicap height  Bathroom Equipment: Shower chair, Grab bars in shower (Suction grab bars)  Bathroom Accessibility: Walker accessible  Home Equipment: Cane, Walker - Rolling, Reacher (Long handled sponge)  Has the patient had two or more falls in the past year or any fall with injury in the past year?: Yes (3 reported falls; \"legs won't hold me up\")  Receives Help From: Family  Prior Level of Assist for ADLs: Independent  Prior Level of Assist for Homemaking: Independent  Homemaking Responsibilities: Yes  Meal Prep Responsibility: Primary  Laundry Responsibility: No  Cleaning Responsibility: No  Bill Paying/Finance Responsibility: No  Shopping Responsibility: Primary  Dependent Care Responsibility: Secondary (5 dogs, 1 cat)  Health Care Management: Secondary (pt reports usnig pillbox that wife sets up)  Prior Level of Assist for Ambulation: Independent community ambulator, with or without device, Independent household ambulator, with or without device (Pt reports using cane in house and walker at bedside d/t dizziness and for urinal)  Prior Level of Assist for Transfers: Independent  Active  none

## 2025-02-26 NOTE — PROGRESS NOTES
Bandar De La Torre    : 1960  Acct #: [id number]  MRN: [id number]              PM&R Progress Note      Admitting diagnosis: ***    Comorbid diagnoses impacting rehabilitation: ***    Chief complaint: ***    Prior (baseline) level of function: Independent.    Current level of function:         Current  IRF-JAVIER and Goals:   Occupational Therapy:    Short Term Goals  Time Frame for Short Term Goals: STGs=LTGs :   Long Term Goals  Time Frame for Long Term Goals : 7 days or until d/c  Long Term Goal 1: Pt will complete grooming tasks Ind  Long Term Goal 2: Pt will complete total body bathing Ind  Long Term Goal 3: Pt will complete UB dressing Ind  Long Term Goal 4: Pt will complete LB dressing Ind  Long Term Goal 5: Pt will doff/don footwear Ind  Additional Goals?: Yes  Long Term Goal 6: Pt will complete toileting c Mod I  Long Term Goal 7: Pt will complete functional transfers (bed, chair, toilet, shower) c DME PRN and Mod I  Long Term Goal 8: Pt will perform therapy exercises to facilitate increased strength and endurance (emphasis on standing balance >10 min) c SBA  Long Term Goal 9: Pt will complete simple IADLs c DME PRN and Mod I :                                       Eating: Eating  Assistance Needed: Independent  Comment: Pt able to open packages/containers per pt report  CARE Score: 6  Discharge Goal: Independent       Oral Hygiene: Oral Hygiene  Assistance Needed: Independent  Comment: completed in w/c at sink  CARE Score: 6  Discharge Goal: Independent    UB/LB Bathing: Shower/Bathe Self  Assistance Needed: Supervision or touching assistance  Comment: Pt able to complete full body bathing c hand held shower head and weight shifting to clean bottom c SBA  CARE Score: 4  Discharge Goal: Independent    UB Dressing: Upper Body Dressing  Assistance Needed: Supervision or touching assistance  Comment: Pt able to retrieve clothes from closet and walk to toilet c CGA and RW and pt able to don shirt  CARE

## 2025-02-26 NOTE — PROGRESS NOTES
Physical Therapy  [x] daily progress note       [] discharge       Patient Name:  Bandar De La Torre   :  1960 MRN: [id number]  Room:  [address] [place] [address] Date of Admission: 2025  Rehabilitation Diagnosis:   Other acute osteomyelitis, right ankle and foot (HCC) [M86.171]  Osteomyelitis of toe of right foot (HCC) [M86.9]       Date 2025       Day of ARU Week:  5   Time IN//1035   Individual Tx Minutes 55   Group Tx Minutes    Co-Treat Minutes    Concurrent Tx Minutes    TOTAL Tx Time Mins 55   Variance Time -5(OT reported +5)   Variance Time []   Refusal due to:     []   Medical hold/reason:    []   Illness   []   Off Unit for test/procedure  []   Extra time needed to complete task  []   Therapeutic need  []   Attempted make-up minutes, however pt was unable to tolerate due to (specify)   []   Other (specify):   Restrictions Restrictions/Precautions  Restrictions/Precautions: Fall Risk, General Precautions, Weight Bearing (Heel WB RLE, IV access R forearm, BS sensor L upper arm, pacemaker)  Implants Present? : Pacemaker      Interdisciplinary communication [x]   Cleared for therapy per nursing     [x]   RN notified about issues during session-dizziness  []   RN updated on pt performance  []   Spoke with   []   Spoke with OT  []   Spoke with MD  []   Other:    Subjective observations and cognitive status: Handoff from OT, pt in w/c. Pt had dizziness upon standing today with BP drop from 110/59 to 99/50(hR 63 and 69 respectively). Pt able to continue with mobility but with caution. Pt states he had been on a \"pill as needed\" for dizziness at home, but did not take and could not recall what it was.      Pain level/location:   0 /10       Location:    Discharge recommendations  Anticipated discharge date:  tbd  Destination: []home alone   []home alone with assist PRN     [x] home w/ family      [] Continuous supervision  []SNF    [] Assisted living     [] Other:  Continued therapy: [x]C PT

## 2025-02-26 NOTE — CONSULTS
[facility] Contin[facility] Nurse  Consult Note       Bandar De La Torre  AGE: 64 y.o.   GENDER: male  : 1960  TODAY'S DATE:  2025    Subjective:     Reason for CWOCN Evaluation and Assessment: wound reassessment      Bandar De La Torre is a 64 y.o. male referred by:   [x] Physician  [] Nursing  [] Other:     Wound Identification:  Wound Type: diabetic, undetermined, and surgical incision  Contributing Factors: edema, venous stasis, diabetes, chronic pressure, decreased mobility, obesity, and anticoagulation therapy        PAST MEDICAL HISTORY        Diagnosis Date    Atrial fibrillation (HCC)     Cerebral artery occlusion with cerebral infarction (HCC)     History of cardiac cath     --RCA has mid 50% stenosis and PLB branch has 70% stenosis noted. LVEDP very high    Hyperlipidemia     Hypertension     Type II or unspecified type diabetes mellitus without mention of complication, not stated as uncontrolled     Diagnsed about     Unspecified sleep apnea        PAST SURGICAL HISTORY    Past Surgical History:   Procedure Laterality Date    CARDIAC CATHETERIZATION      TOE AMPUTATION Right 2025    RIGHT FOOT 2ND AND 3RD TOE AMPUTATION WITH FOOT DEBRIDEMENT performed by Jakob Avalos MD at [facility]    TONSILLECTOMY      AT 15 YEARS OLD       FAMILY HISTORY    Family History   Problem Relation Age of Onset    Diabetes Mother     Diabetes Father     Cancer Father     Cancer Maternal Grandfather         PROSTATE CANCER       SOCIAL HISTORY    Social History     Tobacco Use    Smoking status: Never    Smokeless tobacco: Never   Vaping Use    Vaping status: Never Used   Substance Use Topics    Alcohol use: No    Drug use: No       ALLERGIES    No Known Allergies    MEDICATIONS    No current facility-administered medications on file prior to encounter.     Current Outpatient Medications on File Prior to Encounter   Medication Sig Dispense Refill    insulin regular human (HUMULIN R U-500

## 2025-02-26 NOTE — PROGRESS NOTES
Occupational Therapy    Physical Rehabilitation: OCCUPATIONAL THERAPY     [x] daily progress note       [] discharge       Patient Name:  Bandar De La Torre   :  1960 MRN: [id number]  Room:  [address] [place] [address] Date of Admission: 2025  Rehabilitation Diagnosis:   Other acute osteomyelitis, right ankle and foot (HCC) [M86.171]  Osteomyelitis of toe of right foot (HCC) [M86.9]       Date 2025       Day of ARU Week:  5   Time IN/OUT    Individual Tx Minutes 65   Group Tx Minutes    Co-Treat Minutes    Concurrent Tx Minutes    TOTAL Tx Time Mins 65   Variance Time +5   Variance Reason []   Refusal due to:     []   Medical hold/reason:    []   Illness   []   Off Unit for test/procedure  [x]   Extra time needed to complete task  []   Therapeutic need  []   Make up mins were attempted but pt unable to complete due to (specify)  []   Other (specify):   Restrictions Restrictions/Precautions: Fall Risk, General Precautions, Weight Bearing (Heel WB RLE, IV access R forearm, BS sensor L upper arm, pacemaker)         Communication with other providers: [x]   OK to see per nursing:     []   Spoke with team member regarding:      Subjective observations and cognitive status: Pt in bed on approach and agreeable to session. Pt just finishing breakfast and ready for shower. Pt has been asking to shave face so he was given +5 min to finish task and PT Lillian was notified. Throughout session, pt was very aware of precautions and safety techniques during transfers/ADLs noting an improved level of awareness.      Pain level/location:    /10       Location:    Discharge recommendations  Anticipated discharge date:  TBD  Destination: []home alone   []home alone w assist prn   [x] home w/ family    [] Continuous supervision       []SNF    [] Assisted living     [] Other:   Continued therapy: [x]HHC OT  []OUTPATIENT  OT   [] No Further OT  Equipment needs: TBD       ADLs:    Oral Hygiene: Oral Hygiene  Assistance Needed:  Management: Secondary (pt reports usnig pillbox that wife sets up)  Prior Level of Assist for Ambulation: Independent community ambulator, with or without device, Independent household ambulator, with or without device (Pt reports using cane in house and walker at bedside d/t dizziness and for urinal)  Prior Level of Assist for Transfers: Independent  Active : No (reports not driving for 3 mo. due to vision changes)  Patient's  Info: Wife  Mode of Transportation: Van, SUV  Occupation: Retired ([employer])  Additional Comments: Pt has a regular flat bed    Objective                                                                                    Goals:  (Update in navigator)  Short Term Goals  Time Frame for Short Term Goals: STGs=LTGs:  Long Term Goals  Time Frame for Long Term Goals : 7 days or until d/c  Long Term Goal 1: Pt will complete grooming tasks Ind  Long Term Goal 2: Pt will complete total body bathing Ind  Long Term Goal 3: Pt will complete UB dressing Ind  Long Term Goal 4: Pt will complete LB dressing Ind  Long Term Goal 5: Pt will doff/don footwear Ind  Additional Goals?: Yes  Long Term Goal 6: Pt will complete toileting c Mod I  Long Term Goal 7: Pt will complete functional transfers (bed, chair, toilet, shower) c DME PRN and Mod I  Long Term Goal 8: Pt will perform therapy exercises to facilitate increased strength and endurance (emphasis on standing balance >10 min) c SBA  Long Term Goal 9: Pt will complete simple IADLs c DME PRN and Mod I:        Plan of Care                                                                              Times per week: 5 days per week for a minimum of 60 minutes/day plus group as appropriate for 60 minutes.  Treatment to include Occupational Therapy Plan  Current Treatment Recommendations: Strengthening, Balance training, Functional mobility training, Endurance training, Positioning, Equipment evaluation, education, & procurement, Safety education &

## 2025-02-26 NOTE — PATIENT CARE CONFERENCE
removal   [x] Maintaining O2 SATs at 92% or greater   [x] Has pain managed while on ARU         [x] Has had no skin breakdown while on ARU   [] Has improved skin integrity via wound measurements   [x] Has no signs/symptoms of infection at the wound site   [] Pressure wounds Stage/Location:    [] Arrived on unit with pressure wound  [x] Has been free from injury during hospitalization   [] Has experienced a fall during hospitalization  [] Ongoing education with patient/family with understanding demonstrated for:  [] Receives IV Fluids  [] Other:        NUTRITION  Weight - Scale: (!) 151.5 kg (334 lb) / Body mass index is 45.3 kg/m².  Current diet: ADULT DIET; Regular; 5 carb choices (75 gm/meal)  ADULT ORAL NUTRITION SUPPLEMENT; Breakfast, Dinner; Wound Healing Oral Supplement  ADULT ORAL NUTRITION SUPPLEMENT; Breakfast, Lunch; Fortified Gelatin Oral Supplement  Intake: % recent meals       Medical improvements/barriers: Progressing well, WB restrictions are being followed, using adaptive equipment        Team goals for next treatment period/Intervention for current barriers:   [x] Pt will increase activity tolerance for daily tasks.  [] Pt will improve bed mobility with reduced assist.  [x] Pt will improve safety in fx tasks with reduced cues/assist  [x] Pt will improve transfers with reduced assist  [x] Pt will improve toileting with reduced assist  [x] Pt will improve ADL's with use of adaptive equipment with reduced assist  [] Pt will improve pain mgmt for maximum participation in tx program  [] Pt will improve communication to get basic needs met on unit  [] Pt will improve swallowing for safe diet advancement with use of strategies  [x]  Plan for discharge to home.  []  Overall team goal being targeted this week:      Patient Strengths: Motivated, Cooperative, and Pleasant    Justification for Continued Stay  Based on my medical assessment of the patient and review of information from the  [] Jasmina Griffith, IRMA     []Natacha Trinidad,   [x]Desi RICHARDS,      [x] Jacques Jamil, HARSHA   [] Lea Ruano, HARSHA    [] Eber Remy RN    [] Denise Moore RN []     I have led this Team Conference and agree with the plan,  ARCHIE French MD, 2/27/2025, 1:06 PM  []   I, the Medical Director, was required to lead today's conference via telephone due to an unanticipated scheduling conflict.  I agree with the decisions made by the inter-disciplinary team at today's meeting.      Goals have been updated to reflect recent status.    Team conference note transcribed this date by: Nava Sam MA, CCC-SLP, Therapy Coordinator

## 2025-02-27 LAB
GLUCOSE BLD-MCNC: 130 MG/DL (ref 74–99)
GLUCOSE BLD-MCNC: 159 MG/DL (ref 74–99)
GLUCOSE BLD-MCNC: 224 MG/DL (ref 74–99)
GLUCOSE BLD-MCNC: 239 MG/DL (ref 74–99)
GLUCOSE BLD-MCNC: 319 MG/DL (ref 74–99)

## 2025-02-27 PROCEDURE — 6370000000 HC RX 637 (ALT 250 FOR IP): Performed by: INTERNAL MEDICINE

## 2025-02-27 PROCEDURE — 99232 SBSQ HOSP IP/OBS MODERATE 35: CPT | Performed by: NURSE PRACTITIONER

## 2025-02-27 PROCEDURE — 97530 THERAPEUTIC ACTIVITIES: CPT

## 2025-02-27 PROCEDURE — 6370000000 HC RX 637 (ALT 250 FOR IP): Performed by: NURSE PRACTITIONER

## 2025-02-27 PROCEDURE — 2500000003 HC RX 250 WO HCPCS: Performed by: INTERNAL MEDICINE

## 2025-02-27 PROCEDURE — 97110 THERAPEUTIC EXERCISES: CPT

## 2025-02-27 PROCEDURE — 99024 POSTOP FOLLOW-UP VISIT: CPT | Performed by: SURGERY

## 2025-02-27 PROCEDURE — 94150 VITAL CAPACITY TEST: CPT

## 2025-02-27 PROCEDURE — 97535 SELF CARE MNGMENT TRAINING: CPT

## 2025-02-27 PROCEDURE — 82962 GLUCOSE BLOOD TEST: CPT

## 2025-02-27 PROCEDURE — 97116 GAIT TRAINING THERAPY: CPT

## 2025-02-27 PROCEDURE — 94761 N-INVAS EAR/PLS OXIMETRY MLT: CPT

## 2025-02-27 PROCEDURE — 1280000000 HC REHAB R&B

## 2025-02-27 RX ADMIN — POTASSIUM CHLORIDE 40 MEQ: 1500 TABLET, EXTENDED RELEASE ORAL at 17:09

## 2025-02-27 RX ADMIN — INSULIN HUMAN 5 UNITS: 500 INJECTION, SOLUTION SUBCUTANEOUS at 22:13

## 2025-02-27 RX ADMIN — SODIUM CHLORIDE, PRESERVATIVE FREE 10 ML: 5 INJECTION INTRAVENOUS at 08:47

## 2025-02-27 RX ADMIN — SODIUM CHLORIDE, PRESERVATIVE FREE 10 ML: 5 INJECTION INTRAVENOUS at 21:28

## 2025-02-27 RX ADMIN — CLOPIDOGREL BISULFATE 75 MG: 75 TABLET ORAL at 08:45

## 2025-02-27 RX ADMIN — TORSEMIDE 20 MG: 20 TABLET ORAL at 09:09

## 2025-02-27 RX ADMIN — INSULIN HUMAN 10 UNITS: 500 INJECTION, SOLUTION SUBCUTANEOUS at 08:39

## 2025-02-27 RX ADMIN — INSULIN HUMAN 10 UNITS: 500 INJECTION, SOLUTION SUBCUTANEOUS at 12:39

## 2025-02-27 RX ADMIN — POTASSIUM CHLORIDE 40 MEQ: 1500 TABLET, EXTENDED RELEASE ORAL at 08:45

## 2025-02-27 RX ADMIN — AMOXICILLIN AND CLAVULANATE POTASSIUM 1 TABLET: 875; 125 TABLET, FILM COATED ORAL at 08:44

## 2025-02-27 RX ADMIN — RIVAROXABAN 20 MG: 20 TABLET, FILM COATED ORAL at 17:09

## 2025-02-27 RX ADMIN — POTASSIUM CHLORIDE 40 MEQ: 1500 TABLET, EXTENDED RELEASE ORAL at 12:39

## 2025-02-27 RX ADMIN — TAMSULOSIN HYDROCHLORIDE 0.4 MG: 0.4 CAPSULE ORAL at 08:44

## 2025-02-27 RX ADMIN — TORSEMIDE 10 MG: 20 TABLET ORAL at 14:35

## 2025-02-27 RX ADMIN — SODIUM CHLORIDE, PRESERVATIVE FREE 10 ML: 5 INJECTION INTRAVENOUS at 09:11

## 2025-02-27 RX ADMIN — DOCUSATE SODIUM 100 MG: 100 CAPSULE, LIQUID FILLED ORAL at 08:44

## 2025-02-27 RX ADMIN — FINASTERIDE 5 MG: 5 TABLET, FILM COATED ORAL at 08:45

## 2025-02-27 RX ADMIN — AMOXICILLIN AND CLAVULANATE POTASSIUM 1 TABLET: 875; 125 TABLET, FILM COATED ORAL at 21:28

## 2025-02-27 ASSESSMENT — PAIN SCALES - GENERAL
PAINLEVEL_OUTOF10: 0
PAINLEVEL_OUTOF10: 0

## 2025-02-27 NOTE — PROGRESS NOTES
Occupational Therapy    Physical Rehabilitation: OCCUPATIONAL THERAPY     [x] daily progress note       [] discharge       Patient Name:  Bandar De La Torre   :  1960 MRN: [id number]  Room:  [address] [place] [address] Date of Admission: 2025  Rehabilitation Diagnosis:   Other acute osteomyelitis, right ankle and foot (HCC) [M86.171]  Osteomyelitis of toe of right foot (HCC) [M86.9]       Date 2025       Day of ARU Week:  6   Time IN/OUT 1436/1536   Individual Tx Minutes 60   Group Tx Minutes    Co-Treat Minutes    Concurrent Tx Minutes    TOTAL Tx Time Mins 60   Variance Time    Variance Reason []   Refusal due to:     []   Medical hold/reason:    []   Illness   []   Off Unit for test/procedure  []   Extra time needed to complete task  []   Therapeutic need  []   Make up mins were attempted but pt unable to complete due to (specify)  []   Other (specify):   Restrictions Restrictions/Precautions: Fall Risk, General Precautions, Weight Bearing (Heel WB RLE, IV access R forearm, BS sensor L upper arm, pacemaker)         Communication with other providers: [x]   OK to see per nursing:     []   Spoke with team member regarding:      Subjective observations and cognitive status: Pt in recliner on approach and agreeable to session. Pt able to self talk through his steps and HTWB precautions but still needed some cues with reaching back to W/C c both hands. Also, during gym activities, pt mentioned worsening vision changes (tracking).      Pain level/location:    /10       Location:    Discharge recommendations  Anticipated discharge date:    Destination: []home alone   []home alone w assist prn   [x] home w/ family    [] Continuous supervision       []SNF    [] Assisted living     [] Other:   Continued therapy: [x]HHC OT  []OUTPATIENT  OT   [] No Further OT  Equipment needs: None       ADLs:    Donning and [place] Footwear: Putting On/Taking Off Footwear  Assistance Needed: Partial/moderate assistance  Comment: Pt  Plan  Current Treatment Recommendations: Strengthening, Balance training, Functional mobility training, Endurance training, Positioning, Equipment evaluation, education, & procurement, Safety education & training, Cognitive reorientation, Self-Care / ADL, Home management training, Return to work related activity, Coordination training, Group Therapy, Cognitive/Perceptual training    Electronically signed by   Leelee Crockett S/OT  2/27/2025, 3:53 PM

## 2025-02-27 NOTE — PROGRESS NOTES
Infectious Disease Progress Note  2025   Patient Name: Bandar De La Torre : 1960     Assessment  Right DFI with OM, s/p amputation 2nd & 3rd toe 25  Pt presented for management of right foot wound.   General surgery evaluated underwent bedside excisional debridement of necrotic tissue from wound at the plantar surface 2025 MRI confirmed OM of the second distal phalanx and the third middle and distal phalanges.  The patient underwent amputation of his second and third toe of the right foot with debridement per GS, Dr. Avalos 2025.  Surgical culture remained negative however wound culture showed pansensitive Proteus mirabilis.  Per op note bones were cut at MTP joint.  Surgical pathology resulted 2025 showing marginal section negative for OM. ID was consulted due blood cultures obtained were positive for Enterococcus faecalis with subsequent negative blood cultures. Enterococcus likely a contaminant as was not found in wound and subsequent blood cultures rapidly neg. TEJ showed no evidence of endocarditis.  EP did evaluate the patient recommendations were appreciated no indication for pacemaker removal warranted.  The patient had been managed on a course of intravenous antibiotics pending culture results.  The patient will continue antimicrobials for a cumulative 2-week from date of surgery..  Leukocytosis resolved, inflamm markers with dwt. WBC 6.5<--11.9 ( adm), CRP 15.9<-- 159<--113, ESR 44<--52<--26. Procal 0.28. Afebrile sice adm  Antimicrobial summary: IV Cefepime --; IV Vanc -; IV Ampicillin -; IV Ceftriaxone -; Augmentin 875/125mg -    DM II A1C 7.5  On insulin, Endocrinology following  SSS s/p pacemaker  Severe obesity, BMI 45.3  Allergy: no abx allergy  GFR 42  Comorbid conditions: SSS s/p pacemaker (), CVA, MARINO, HTN, DM II, CKD III, HLD, Obesity     Plan  Therapeutic: Continue Augmentin 875mg/125mg po q 12h for cumulative 2

## 2025-02-27 NOTE — PROGRESS NOTES
Bandar De La Torre    : 1960  Acct #: [id number]  MRN: [id number]              PM&R Progress Note      Admitting diagnosis: ***    Comorbid diagnoses impacting rehabilitation: ***    Chief complaint: ***    Prior (baseline) level of function: Independent.    Current level of function:         Current  IRF-JAVIER and Goals:   Occupational Therapy:    Short Term Goals  Time Frame for Short Term Goals: STGs=LTGs :   Long Term Goals  Time Frame for Long Term Goals : 7 days or until d/c  Long Term Goal 1: Pt will complete grooming tasks Ind  Long Term Goal 2: Pt will complete total body bathing Ind  Long Term Goal 3: Pt will complete UB dressing Ind  Long Term Goal 4: Pt will complete LB dressing Ind  Long Term Goal 5: Pt will doff/don footwear Ind  Additional Goals?: Yes  Long Term Goal 6: Pt will complete toileting c Mod I  Long Term Goal 7: Pt will complete functional transfers (bed, chair, toilet, shower) c DME PRN and Mod I  Long Term Goal 8: Pt will perform therapy exercises to facilitate increased strength and endurance (emphasis on standing balance >10 min) c SBA  Long Term Goal 9: Pt will complete simple IADLs c DME PRN and Mod I :                                       Eating: Eating  Assistance Needed: Independent  Comment: Pt able to open packages/containers per pt report  CARE Score: 6  Discharge Goal: Independent       Oral Hygiene: Oral Hygiene  Assistance Needed: Independent  Comment: Pt completed in W/C at sink  CARE Score: 6  Discharge Goal: Independent    UB/LB Bathing: Shower/Bathe Self  Assistance Needed: Supervision or touching assistance  Comment: Pt able to complete full body bathing c hand held shower head and long handled sponge to reach BLE c sup. Also, pt used weight shifting to clean bottom.  CARE Score: 4  Discharge Goal: Independent    UB Dressing: Upper Body Dressing  Assistance Needed: Supervision or touching assistance  Comment: Pt able to retrieve clothes from closet and walk to

## 2025-02-27 NOTE — PROGRESS NOTES
Went to bedside to change patients dressing and patient reported that the surgeon changed the dressing this morning. Dressing is clean dry and intact.

## 2025-02-27 NOTE — PROGRESS NOTES
Nephrology Progress Note  2/27/2025 9:39 AM  Subjective:     Interval History: Bandar De La Torre is a 64 y.o. male making good urine still some issues with sugar    Data:   Scheduled Meds:   torsemide  20 mg Oral QAM    torsemide  10 mg Oral Daily    amoxicillin-clavulanate  1 tablet Oral 2 times per day    insulin regular human  5 Units SubCUTAneous Dinner    sodium chloride flush  5-40 mL IntraVENous 2 times per day    clopidogrel  75 mg Oral Daily    docusate sodium  100 mg Oral BID    finasteride  5 mg Oral Daily    insulin regular human  10 Units SubCUTAneous QAM    insulin regular human  10 Units SubCUTAneous Lunch    metoprolol succinate  25 mg Oral Daily    potassium chloride  40 mEq Oral TID WC    rivaroxaban  20 mg Oral Dinner    spironolactone  25 mg Oral BID    tamsulosin  0.4 mg Oral Daily    insulin regular human  0-30 Units SubCUTAneous 4x Daily AC & HS     Continuous Infusions:   sodium chloride 10 mL/hr at 02/24/25 0047    dextrose           CBC   No results for input(s): \"WBC\", \"HGB\", \"HCT\", \"PLT\" in the last 72 hours.     BMP   Recent Labs     02/26/25  0739      K 3.5      CO2 25   BUN 54*   CREATININE 1.5*     Hepatic:   No results for input(s): \"AST\", \"ALT\", \"BILITOT\", \"ALKPHOS\" in the last 72 hours.    Invalid input(s): \"ALB\"    Troponin: No results for input(s): \"TROPONINI\" in the last 72 hours.  BNP: No results for input(s): \"BNP\" in the last 72 hours.  Lipids: No results for input(s): \"CHOL\", \"HDL\" in the last 72 hours.    Invalid input(s): \"LDLCALCU\"  ABGs: No results found for: \"PHART\", \"PO2ART\", \"HKG9MOA\"  INR: No results for input(s): \"INR\" in the last 72 hours.  Renal Labs  Albumin:    Lab Results   Component Value Date/Time    LABALBU 50 10/27/2021 02:04 PM     Calcium:    Lab Results   Component Value Date/Time    CALCIUM 10.2 02/26/2025 07:39 AM     Phosphorus:    Lab Results   Component Value Date/Time    PHOS 2.4 02/16/2025 01:49 AM     U/A:    Lab Results

## 2025-02-27 NOTE — CARE COORDINATION
CODYW met with patient and provided written communication following Care Conference. Spoke with spouse by phone. LISW informed patient of recommendations 2WW, HHC PT, OT, and Nursing. Patient stated he has a 2WW at home. Patient verbalized understanding and will choose an agency closer to discharge. Whiteboard updated.     Patient provided with list of [facility] participating C in the geographic area of the patient served. Patient selected [facility] and was provided with a comparative data handout from [facility]’s website. The patient (and/or Family) was educated on the quality outcomes for each provider. Patient (and/or Family) demonstrated understanding. Per patient/family request, referral made to [facility].      D/C Plan:  Estimated Date: March 6  DME: No new needs  HHC: PT, OT, ST, Nursing (Agency [facility])  To: Home with spouse (family will transport)

## 2025-02-27 NOTE — PROGRESS NOTES
GENERAL SURGERY PROGRESS NOTE    Bandar De La Torre is a 64 y.o. male with right foot and toe wounds.                 Subjective:  Doing ok today. Denies pain.   Working well with PT.    Objective:    Vitals: VITALS:  /67   Pulse 61   Temp 98.1 °F (36.7 °C) (Oral)   Resp 18   Ht 1.829 m (6')   Wt (!) 151.5 kg (334 lb)   SpO2 97%   BMI 45.30 kg/m²     I/O: 02/26 0701 - 02/27 0700  In: 1428 [P.O.:1428]  Out: -     Labs/Imaging Results:   Lab Results   Component Value Date/Time     02/26/2025 07:39 AM    K 3.5 02/26/2025 07:39 AM     02/26/2025 07:39 AM    CO2 25 02/26/2025 07:39 AM    BUN 54 02/26/2025 07:39 AM    CREATININE 1.5 02/26/2025 07:39 AM    GLUCOSE 101 02/26/2025 07:39 AM    CALCIUM 10.2 02/26/2025 07:39 AM      Lab Results   Component Value Date    WBC 5.9 02/24/2025    HGB 12.8 (L) 02/24/2025    HCT 40.8 (L) 02/24/2025    MCV 85.9 02/24/2025     02/24/2025         IV Fluids:   sodium chloride Last Rate: 10 mL/hr at 02/24/25 0047    dextrose    Scheduled Meds:   torsemide, 20 mg, Oral, QAM    torsemide, 10 mg, Oral, Daily    amoxicillin-clavulanate, 1 tablet, Oral, 2 times per day    insulin regular human, 5 Units, SubCUTAneous, Dinner    sodium chloride flush, 5-40 mL, IntraVENous, 2 times per day    clopidogrel, 75 mg, Oral, Daily    docusate sodium, 100 mg, Oral, BID    finasteride, 5 mg, Oral, Daily    insulin regular human, 10 Units, SubCUTAneous, QAM    insulin regular human, 10 Units, SubCUTAneous, Lunch    metoprolol succinate, 25 mg, Oral, Daily    potassium chloride, 40 mEq, Oral, TID WC    rivaroxaban, 20 mg, Oral, Dinner    spironolactone, 25 mg, Oral, BID    tamsulosin, 0.4 mg, Oral, Daily    insulin regular human, 0-30 Units, SubCUTAneous, 4x Daily AC & HS    Physical Exam:  General: A&O x 3, no distress.   HEENT: Anicteric sclerae, MMM.    Extremities: right foot with improving plantar wounds. Toe amputation incision intact with sutures.    Abdomen: Soft,

## 2025-02-27 NOTE — FLOWSHEET NOTE
[x] daily progress note       [] discharge       Patient Name:  Bandar De La Torre   :  1960 MRN: [id number]  Room:  [address] [place] [address] Date of Admission: 2025  Rehabilitation Diagnosis:   Other acute osteomyelitis, right ankle and foot (HCC) [M86.171]  Osteomyelitis of toe of right foot (HCC) [M86.9]       Date 2025       Day of ARU Week:  6   Time IN/OUT 3149-6698   Individual Tx Minutes 60   TOTAL Tx Time Mins 60   Restrictions Restrictions/Precautions  Restrictions/Precautions: Fall Risk, General Precautions, Weight Bearing (Heel WB RLE, IV access R forearm, BS sensor L upper arm, pacemaker)  Implants Present? : Pacemaker      Communication with other providers: [x]   OK to see per nursing:     []   Spoke with team member regarding:      Subjective observations and cognitive status: Pt seen sitting up in w/c at beginning of treatment. Agreeable to therapy. Pt passed from OT (Neeru). BP during session was 115/64.     Pain level/location: Pt did not report pain during session.    Discharge recommendations   Anticipated discharge date:  3-6-2025  Destination: []home alone   []home alone with assist PRN     [x] home w/ family      [] Continuous supervision  []SNF    [] Assisted living     [] Other:  Continued therapy: [x]C PT  []OUTPATIENT PT   [] No Further PT  []SNF PT  Caregiver training recommended: []Yes  [] No   Equipment needs: has RW        [x]   Pt received out of bed     Transfers:    Sit--> Stand:  SBA  Stand --> Sit:   SBA  Stand pivot transfers:  SBA  Car Transfers:  SBA  Assistive device required for transfer:   RW  pt did well with maintaining heel weight bearing on right foot    Gait:    Distance:  50'+52'+67'+30'   Assistance:  SBA  Device:  RW  Gait Quality:  step-to pattern; pt did well with maintaining heel weight bearing on right foot.  Pt limited by fatigue     Stairs   # Completed:  3 (4\" step height)   Assistance:  SBA  Supportive Device:  2 rails  pt did well with maintaining

## 2025-02-27 NOTE — PROGRESS NOTES
Physical Therapy  [x] daily progress note       [] discharge       Patient Name:  Bandar De La Torre   :  1960 MRN: [id number]  Room:  [address] [place], [place] [address] Date of Admission: 2025  Rehabilitation Diagnosis:   Other acute osteomyelitis, right ankle and foot (HCC) [M86.171]  Osteomyelitis of toe of right foot (HCC) [M86.9]       Date 2025       Day of ARU Week:  6   Time IN//1030   Individual Tx Minutes 60   Group Tx Minutes    Co-Treat Minutes    Concurrent Tx Minutes    TOTAL Tx Time Mins 60   Variance Time    Variance Time []   Refusal due to:     []   Medical hold/reason:    []   Illness   []   Off Unit for test/procedure  []   Extra time needed to complete task  []   Therapeutic need  []   Attempted make-up minutes, however pt was unable to tolerate due to (specify)   []   Other (specify):   Restrictions Restrictions/Precautions  Restrictions/Precautions: Fall Risk, General Precautions, Weight Bearing (Heel WB RLE, IV access R forearm, BS sensor L upper arm, pacemaker)  Implants Present? : Pacemaker      Interdisciplinary communication [x]   Cleared for therapy per nursing     []   RN notified about issues during session  []   RN updated on pt performance  []   Spoke with   []   Spoke with OT  []   Spoke with MD  []   Other:    Subjective observations and cognitive status: Pt in bed, agreeable to therapy     Pain level/location:   0 /10       Location:    Discharge recommendations  Anticipated discharge date:  tbd  Destination: []home alone   []home alone with assist PRN     [x] home w/ family      [] Continuous supervision  []SNF    [] Assisted living     [] Other:  Continued therapy: [x]HHC PT  []OUTPATIENT PT   [] No Further PT  []SNF PT  Caregiver training recommended: []Yes  [] No   Equipment needs: 2ww     PT IRF-JAVIER scores and goals for discharge assessment:   Roll Left and Right  Assistance Needed: Independent  CARE Score: 6  Discharge Goal: Independent    Sit to  Tolerance:   [x] Tolerated treatment with no adverse effects    [] Patient limited by fatigue  [] Patient limited by pain   [] Patient limited by medical complications:    [] Adverse reaction to Tx:   [] Significant change in status    Safety:       []  bed alarm set    [x]  chair alarm set    []  Pt refused alarms                []  Telesitter activated      [x]  Gait belt used during tx session      []other:       Number of Minutes/Billable Intervention  Gait Training 40   Therapeutic Exercise    Neuro Re-Ed    Therapeutic Activity 20   Wheelchair Propulsion    Group    Other:    TOTAL 60     Social History  Social/Functional History  Lives With: Spouse  Type of Home: House  Home Layout: Multi-level, Able to Live on Main level with bedroom/bathroom  Home Access: Stairs to enter with rails  Entrance Stairs - Number of Steps: 7 to get from garage to main living area  Entrance Stairs - Rails: Left  Bathroom Shower/Tub: Walk-in shower  Bathroom Toilet: Handicap height  Bathroom Equipment: Shower chair, Grab bars in shower (Suction grab bars)  Bathroom Accessibility: Walker accessible  Home Equipment: Cane, Walker - Rolling, Reacher (Long handled sponge)  Has the patient had two or more falls in the past year or any fall with injury in the past year?: Yes (3 reported falls; \"legs won't hold me up\")  Receives Help From: Family  Prior Level of Assist for ADLs: Independent  Prior Level of Assist for Homemaking: Independent  Homemaking Responsibilities: Yes  Meal Prep Responsibility: Primary  Laundry Responsibility: No  Cleaning Responsibility: No  Bill Paying/Finance Responsibility: No  Shopping Responsibility: Primary  Dependent Care Responsibility: Secondary (5 dogs, 1 cat)  Health Care Management: Secondary (pt reports usnig pillbox that wife sets up)  Prior Level of Assist for Ambulation: Independent community ambulator, with or without device, Independent household ambulator, with or without device (Pt reports using

## 2025-02-27 NOTE — PROGRESS NOTES
Progress Note( Dr. Diehl)  2/27/2025  Subjective:   Admit Date: 2/22/2025  PCP: MEETA Diehl MD    Admitted For : Right foot infection and high blood glucose     Consulted For:  Right foot infection and high blood glucose     Interval History: Patient had  amputation of second and second and third toe of the right foot and debridement of the wound of the right foot done 2/19/2025 patient feeling a lot better    Patient was transferred to ARU as of 2/22/2025 evening    Patient has still hypokalemia and potassium being replaced    Blood glucose was running very high yesterday evening and night as patient did not receive his supple scheduled insulin dose it was held due to running lower blood sugar previous day    Denies any chest pains,   Denies SOB .   Denies nausea or vomiting.   No new bowel or bladder symptoms.       Intake/Output Summary (Last 24 hours) at 2/27/2025 0847  Last data filed at 2/27/2025 0555  Gross per 24 hour   Intake 978 ml   Output --   Net 978 ml       DATA    CBC:   No results for input(s): \"WBC\", \"HGB\", \"PLT\" in the last 72 hours.   CMP:  Recent Labs     02/26/25  0739      K 3.5      CO2 25   BUN 54*   CREATININE 1.5*   CALCIUM 10.2     Lipids:   Lab Results   Component Value Date/Time    CHOL 159 11/14/2024 08:23 AM    HDL 39 11/14/2024 08:23 AM    TRIG 152 11/14/2024 08:23 AM     Glucose:  Recent Labs     02/26/25  2113 02/27/25  0227 02/27/25  0755   POCGLU 523* 319* 224*     [id number]:  Lab Results   Component Value Date/Time    LABA1C 8.8 02/15/2025 01:47 AM     High Sensitivity TSH:   Lab Results   Component Value Date/Time    TSHHS 1.800 08/29/2019 10:06 AM     Free T3: No results found for: \"FT3\"  Free T4:No results found for: \"T4FREE\"    No orders to display        Scheduled Medicines   Medications:    torsemide  20 mg Oral QAM    torsemide  10 mg Oral Daily    amoxicillin-clavulanate  1 tablet Oral 2 times per day    insulin regular human  5 Units

## 2025-02-28 LAB
ANION GAP SERPL CALCULATED.3IONS-SCNC: 12 MMOL/L (ref 9–17)
BUN SERPL-MCNC: 44 MG/DL (ref 7–20)
CALCIUM SERPL-MCNC: 9.2 MG/DL (ref 8.3–10.6)
CHLORIDE SERPL-SCNC: 103 MMOL/L (ref 99–110)
CO2 SERPL-SCNC: 23 MMOL/L (ref 21–32)
CREAT SERPL-MCNC: 1.4 MG/DL (ref 0.8–1.3)
GFR, ESTIMATED: 51 ML/MIN/1.73M2
GLUCOSE BLD-MCNC: 203 MG/DL (ref 74–99)
GLUCOSE BLD-MCNC: 204 MG/DL (ref 74–99)
GLUCOSE BLD-MCNC: 292 MG/DL (ref 74–99)
GLUCOSE BLD-MCNC: 325 MG/DL (ref 74–99)
GLUCOSE BLD-MCNC: 351 MG/DL (ref 74–99)
GLUCOSE SERPL-MCNC: 209 MG/DL (ref 74–99)
POTASSIUM SERPL-SCNC: 4 MMOL/L (ref 3.5–5.1)
SODIUM SERPL-SCNC: 139 MMOL/L (ref 136–145)

## 2025-02-28 PROCEDURE — 6370000000 HC RX 637 (ALT 250 FOR IP): Performed by: INTERNAL MEDICINE

## 2025-02-28 PROCEDURE — 1280000000 HC REHAB R&B

## 2025-02-28 PROCEDURE — 94761 N-INVAS EAR/PLS OXIMETRY MLT: CPT

## 2025-02-28 PROCEDURE — 97110 THERAPEUTIC EXERCISES: CPT

## 2025-02-28 PROCEDURE — 36415 COLL VENOUS BLD VENIPUNCTURE: CPT

## 2025-02-28 PROCEDURE — 6370000000 HC RX 637 (ALT 250 FOR IP): Performed by: NURSE PRACTITIONER

## 2025-02-28 PROCEDURE — 80048 BASIC METABOLIC PNL TOTAL CA: CPT

## 2025-02-28 PROCEDURE — 97116 GAIT TRAINING THERAPY: CPT

## 2025-02-28 PROCEDURE — 82962 GLUCOSE BLOOD TEST: CPT

## 2025-02-28 PROCEDURE — 97530 THERAPEUTIC ACTIVITIES: CPT

## 2025-02-28 PROCEDURE — 94150 VITAL CAPACITY TEST: CPT

## 2025-02-28 PROCEDURE — 2500000003 HC RX 250 WO HCPCS: Performed by: INTERNAL MEDICINE

## 2025-02-28 PROCEDURE — 97535 SELF CARE MNGMENT TRAINING: CPT

## 2025-02-28 RX ADMIN — RIVAROXABAN 20 MG: 20 TABLET, FILM COATED ORAL at 17:58

## 2025-02-28 RX ADMIN — TORSEMIDE 20 MG: 20 TABLET ORAL at 08:44

## 2025-02-28 RX ADMIN — INSULIN HUMAN 35 UNITS: 500 INJECTION, SOLUTION SUBCUTANEOUS at 12:36

## 2025-02-28 RX ADMIN — SODIUM CHLORIDE, PRESERVATIVE FREE 10 ML: 5 INJECTION INTRAVENOUS at 22:52

## 2025-02-28 RX ADMIN — CLOPIDOGREL BISULFATE 75 MG: 75 TABLET ORAL at 08:44

## 2025-02-28 RX ADMIN — SPIRONOLACTONE 25 MG: 50 TABLET ORAL at 08:44

## 2025-02-28 RX ADMIN — INSULIN HUMAN 10 UNITS: 500 INJECTION, SOLUTION SUBCUTANEOUS at 09:10

## 2025-02-28 RX ADMIN — INSULIN HUMAN 15 UNITS: 500 INJECTION, SOLUTION SUBCUTANEOUS at 20:42

## 2025-02-28 RX ADMIN — SODIUM CHLORIDE, PRESERVATIVE FREE 10 ML: 5 INJECTION INTRAVENOUS at 10:19

## 2025-02-28 RX ADMIN — POTASSIUM CHLORIDE 40 MEQ: 1500 TABLET, EXTENDED RELEASE ORAL at 08:44

## 2025-02-28 RX ADMIN — METOPROLOL SUCCINATE 25 MG: 25 TABLET, FILM COATED, EXTENDED RELEASE ORAL at 08:43

## 2025-02-28 RX ADMIN — AMOXICILLIN AND CLAVULANATE POTASSIUM 1 TABLET: 875; 125 TABLET, FILM COATED ORAL at 08:43

## 2025-02-28 RX ADMIN — INSULIN HUMAN 25 UNITS: 500 INJECTION, SOLUTION SUBCUTANEOUS at 18:35

## 2025-02-28 RX ADMIN — SPIRONOLACTONE 25 MG: 50 TABLET ORAL at 20:39

## 2025-02-28 RX ADMIN — POTASSIUM CHLORIDE 40 MEQ: 1500 TABLET, EXTENDED RELEASE ORAL at 17:58

## 2025-02-28 RX ADMIN — POTASSIUM CHLORIDE 40 MEQ: 1500 TABLET, EXTENDED RELEASE ORAL at 12:10

## 2025-02-28 RX ADMIN — FINASTERIDE 5 MG: 5 TABLET, FILM COATED ORAL at 08:44

## 2025-02-28 RX ADMIN — TAMSULOSIN HYDROCHLORIDE 0.4 MG: 0.4 CAPSULE ORAL at 08:44

## 2025-02-28 RX ADMIN — AMOXICILLIN AND CLAVULANATE POTASSIUM 1 TABLET: 875; 125 TABLET, FILM COATED ORAL at 20:39

## 2025-02-28 RX ADMIN — TORSEMIDE 10 MG: 20 TABLET ORAL at 14:18

## 2025-02-28 ASSESSMENT — PAIN SCALES - GENERAL
PAINLEVEL_OUTOF10: 0
PAINLEVEL_OUTOF10: 0

## 2025-02-28 NOTE — PLAN OF CARE
Problem: Chronic Conditions and Co-morbidities  Goal: Patient's chronic conditions and co-morbidity symptoms are monitored and maintained or improved  2/28/2025 1116 by Tiki Moran RN  Outcome: Progressing  2/28/2025 0019 by Emely Garcia RN  Outcome: Progressing     Problem: Discharge Planning  Goal: Discharge to home or other facility with appropriate resources  2/28/2025 1116 by Tiki Moran RN  Outcome: Progressing  2/28/2025 0019 by Emely Garcia RN  Outcome: Progressing     Problem: Safety - Adult  Goal: Free from fall injury  2/28/2025 1116 by Tiki Moran RN  Outcome: Progressing  2/28/2025 0019 by Emely Garcia RN  Outcome: Progressing     Problem: Pain  Goal: Verbalizes/displays adequate comfort level or baseline comfort level  2/28/2025 1116 by Tiki Moran RN  Outcome: Progressing  2/28/2025 0019 by Emely Garcia RN  Outcome: Progressing     Problem: ABCDS Injury Assessment  Goal: Absence of physical injury  2/28/2025 0019 by Emely Garcia RN  Outcome: Progressing     Problem: Skin/Tissue Integrity  Goal: Skin integrity remains intact  Description: 1.  Monitor for areas of redness and/or skin breakdown  2.  Assess vascular access sites hourly  3.  Every 4-6 hours minimum:  Change oxygen saturation probe site  4.  Every 4-6 hours:  If on nasal continuous positive airway pressure, respiratory therapy assess nares and determine need for appliance change or resting period  2/28/2025 0019 by Emely Garcia RN  Outcome: Progressing  Flowsheets (Taken 2/28/2025 0019)  Skin Integrity Remains Intact: Monitor for areas of redness and/or skin breakdown

## 2025-02-28 NOTE — PROGRESS NOTES
Progress Note( Dr. Diehl)  2/28/2025  Subjective:   Admit Date: 2/22/2025  PCP: MEETA Diehl MD    Admitted For : Right foot infection and high blood glucose     Consulted For:  Right foot infection and high blood glucose     Interval History: Patient had  amputation of second and second and third toe of the right foot and debridement of the wound of the right foot done 2/19/2025 patient feeling a lot better    Patient was transferred to ARU as of 2/22/2025 evening    Patient has still hypokalemia and potassium being replaced    Blood glucose was running up running somewhat better.  However there was some issue about delivery of insulin from pharmacy is seem to be some delay in the delivery of insulin both for correction bolus and meal bolus insulin they have been delivered at 2 different times is causing some issues for nurses to give insulin on time that is resulting in either hyperglycemia or hypoglycemia      Denies any chest pains,   Denies SOB .   Denies nausea or vomiting.   No new bowel or bladder symptoms.       Intake/Output Summary (Last 24 hours) at 2/28/2025 1544  Last data filed at 2/28/2025 1239  Gross per 24 hour   Intake 680 ml   Output --   Net 680 ml       DATA    CBC:   No results for input(s): \"WBC\", \"HGB\", \"PLT\" in the last 72 hours.   CMP:  Recent Labs     02/26/25  0739 02/28/25  0527    139   K 3.5 4.0    103   CO2 25 23   BUN 54* 44*   CREATININE 1.5* 1.4*   CALCIUM 10.2 9.2     Lipids:   Lab Results   Component Value Date/Time    CHOL 159 11/14/2024 08:23 AM    HDL 39 11/14/2024 08:23 AM    TRIG 152 11/14/2024 08:23 AM     Glucose:  Recent Labs     02/27/25  2124 02/28/25  0757 02/28/25  1159   POCGLU 159* 204* 351*     [id number]:  Lab Results   Component Value Date/Time    LABA1C 8.8 02/15/2025 01:47 AM     High Sensitivity TSH:   Lab Results   Component Value Date/Time    TSHHS 1.800 08/29/2019 10:06 AM     Free T3: No results found for: \"FT3\"  Free T4:No results  Weakness of right arm     Acute CVA (cerebrovascular accident) (HCC)     Spastic hemiparesis of right dominant side (HCC)     Dysphagia due to recent stroke     Essential hypertension     Morbid obesity with body mass index of 45.0-49.9 in adult     Frequent falls     Chronic venous stasis dermatitis of both lower extremities     History of CVA (cerebrovascular accident)     Obesity hypoventilation syndrome     Hypertension     Hyperlipidemia     Congestive heart failure due to cardiomyopathy (HCC)     Stage 3b chronic kidney disease (HCC)     Chronic kidney disease, stage II (mild)     Benign essential microscopic hematuria     Stage 3a chronic kidney disease (HCC)     Generalized edema     Acute kidney injury superimposed on CKD (HCC)     Hypokalemia     Cellulitis of foot     Sepsis (HCC)     Diabetic foot infection (HCC)     Bacteremia due to Enterococcus      Plan:     Reviewed POC blood glucose . Labs and X ray results   Reviewed Current Medicines   On meal/ Correction bolus U500 regular insulin insulin regime /  Monitor Blood glucose frequently   Modified  the dose of Insulin/ other medicines as needed  Patient was transferred to ARU on evening of 2/22/2025  Patient participating in physical activities of rehab unit  Will follow     .     MEETA Diehl MD,

## 2025-02-28 NOTE — PROGRESS NOTES
Bandar De La Torre    : 1960  Acct #: [id number]  MRN: [id number]              PM&R Progress Note      Admitting diagnosis: ***    Comorbid diagnoses impacting rehabilitation: ***    Chief complaint: ***    Prior (baseline) level of function: Independent.    Current level of function:         Current  IRF-JAVIER and Goals:   Occupational Therapy:    Short Term Goals  Time Frame for Short Term Goals: STGs=LTGs :   Long Term Goals  Time Frame for Long Term Goals : 7 days or until d/c  Long Term Goal 1: Pt will complete grooming tasks Ind  Long Term Goal 2: Pt will complete total body bathing Ind  Long Term Goal 3: Pt will complete UB dressing Ind  Long Term Goal 4: Pt will complete LB dressing Ind  Long Term Goal 5: Pt will doff/don footwear Ind  Additional Goals?: Yes  Long Term Goal 6: Pt will complete toileting c Mod I  Long Term Goal 7: Pt will complete functional transfers (bed, chair, toilet, shower) c DME PRN and Mod I  Long Term Goal 8: Pt will perform therapy exercises to facilitate increased strength and endurance (emphasis on standing balance >10 min) c SBA  Long Term Goal 9: Pt will complete simple IADLs c DME PRN and Mod I :                                       Eating: Eating  Assistance Needed: Independent  Comment: Pt able to open packages/containers per pt report  CARE Score: 6  Discharge Goal: Independent       Oral Hygiene: Oral Hygiene  Assistance Needed: Independent  Comment: Pt completed in W/C at sink  CARE Score: 6  Discharge Goal: Independent    UB/LB Bathing: Shower/Bathe Self  Assistance Needed: Supervision or touching assistance  Comment: Pt able to complete full body bathing c hand held shower head and long handled sponge to reach BLE c sup. Also, pt used weight shifting to clean bottom.  CARE Score: 4  Discharge Goal: Independent    UB Dressing: Upper Body Dressing  Assistance Needed: Supervision or touching assistance  Comment: Pt able to retrieve clothes from closet and walk to

## 2025-02-28 NOTE — PROGRESS NOTES
Nephrology Progress Note  2/28/2025 12:05 PM  Subjective:     Interval History: Bandar De La Torre is a 64 y.o. male  doing well in general no distress    Data:   Scheduled Meds:   insulin regular human  0-35 Units SubCUTAneous BID WC    insulin regular human  0-30 Units SubCUTAneous Dinner    insulin regular human  0-25 Units SubCUTAneous Nightly    torsemide  20 mg Oral QAM    torsemide  10 mg Oral Daily    amoxicillin-clavulanate  1 tablet Oral 2 times per day    sodium chloride flush  5-40 mL IntraVENous 2 times per day    clopidogrel  75 mg Oral Daily    docusate sodium  100 mg Oral BID    finasteride  5 mg Oral Daily    metoprolol succinate  25 mg Oral Daily    potassium chloride  40 mEq Oral TID WC    rivaroxaban  20 mg Oral Dinner    spironolactone  25 mg Oral BID    tamsulosin  0.4 mg Oral Daily     Continuous Infusions:   sodium chloride 10 mL/hr at 02/24/25 0047    dextrose           CBC   No results for input(s): \"WBC\", \"HGB\", \"HCT\", \"PLT\" in the last 72 hours.     BMP   Recent Labs     02/26/25  0739 02/28/25  0527    139   K 3.5 4.0    103   CO2 25 23   BUN 54* 44*   CREATININE 1.5* 1.4*     Hepatic:   No results for input(s): \"AST\", \"ALT\", \"BILITOT\", \"ALKPHOS\" in the last 72 hours.    Invalid input(s): \"ALB\"    Troponin: No results for input(s): \"TROPONINI\" in the last 72 hours.  BNP: No results for input(s): \"BNP\" in the last 72 hours.  Lipids: No results for input(s): \"CHOL\", \"HDL\" in the last 72 hours.    Invalid input(s): \"LDLCALCU\"  ABGs: No results found for: \"PHART\", \"PO2ART\", \"FWH3XMP\"  INR: No results for input(s): \"INR\" in the last 72 hours.  Renal Labs  Albumin:    Lab Results   Component Value Date/Time    LABALBU 50 10/27/2021 02:04 PM     Calcium:    Lab Results   Component Value Date/Time    CALCIUM 9.2 02/28/2025 05:27 AM     Phosphorus:    Lab Results   Component Value Date/Time    PHOS 2.4 02/16/2025 01:49 AM     U/A:    Lab Results   Component Value Date/Time    NITRU

## 2025-02-28 NOTE — PROGRESS NOTES
Physical Therapy      [x] daily progress note       [] discharge       Patient Name:  Bandar De La Torre   :  1960 MRN: [id number]  Room:  [address] [place] [address] Date of Admission: 2025  Rehabilitation Diagnosis:   Other acute osteomyelitis, right ankle and foot (HCC) [M86.171]  Osteomyelitis of toe of right foot (HCC) [M86.9]       Date 2025       Day of ARU Week:  7   Time IN/OUT 4889-1256  9543-9266   Individual Tx Minutes 58+62   TOTAL Tx Time Mins 120   Variance Time    Variance Time []   Refusal due to:     []   Medical hold/reason:    []   Illness   []   Off Unit for test/procedure  []   Extra time needed to complete task  []   Therapeutic need  []   Other (specify):   Restrictions Restrictions/Precautions  Restrictions/Precautions: Fall Risk, General Precautions, Weight Bearing (Heel WB RLE, IV access R forearm, BS sensor L upper arm, pacemaker)  Implants Present? : Pacemaker      Communication with other providers: [x]   OK to see per nursing:     []   Spoke with team member regarding:      Subjective observations and cognitive status: Pt resting in bed, pt willing to participate, has off-loading boot for R LE, sung boot L for attempts to equal out height; pt appears apprehensive about amb with new boot R stating \"I think you'll need another person\". Assured pt that boot will A with maintaining WB on heel, and visual ed provided on keeping step to pattern for improved balance. Pt able to return demo during amb on level surfaces  PM: Pt sitting in recliner with head down, reports feeling tired, feels upset that his BS is currently 381 and worried about why his BS are still high. Discussed with RN. Pt willing to participate with min encouragement.      Pain level/location: Reports no pain in AM   Discharge recommendations  Anticipated discharge date:  tbd  Destination: []home alone   []home alone with assist PRN     [x] home w/ family      [] Continuous supervision  []SNF    [x] Assisted living     []

## 2025-02-28 NOTE — PROGRESS NOTES
Occupational Therapy    Physical Rehabilitation: OCCUPATIONAL THERAPY     [x] daily progress note       [] discharge       Patient Name:  Bandar De La Torre   :  1960 MRN: [id number]  Room:  [address] [place] [address] Date of Admission: 2025  Rehabilitation Diagnosis:   Other acute osteomyelitis, right ankle and foot (HCC) [M86.171]  Osteomyelitis of toe of right foot (HCC) [M86.9]       Date 2025       Day of ARU Week:  7   Time IN/OUT 3600-9977   Individual Tx Minutes 60   Group Tx Minutes    Co-Treat Minutes    Concurrent Tx Minutes    TOTAL Tx Time Mins 60   Variance Time    Variance Reason []   Refusal due to:     []   Medical hold/reason:    []   Illness   []   Off Unit for test/procedure  []   Extra time needed to complete task  []   Therapeutic need  []   Make up mins were attempted but pt unable to complete due to (specify)  []   Other (specify):   Restrictions Restrictions/Precautions: Fall Risk, General Precautions, Weight Bearing (Heel WB RLE, IV access R forearm, BS sensor L upper arm, pacemaker)         Communication with other providers: [x]   OK to see per nursing:     []   Spoke with team member regarding:      Subjective observations and cognitive status: Pt sitting up in recliner on approach finishing with PT. Pt pleasant and agreeable to therapy.      Pain level/location:    /10       Location:    Discharge recommendations  Anticipated discharge date:    Destination: []home alone   []home alone w assist prn   [x] home w/ family    [] Continuous supervision       []SNF    [] Assisted living     [] Other:   Continued therapy: [x]HHC OT  []OUTPATIENT  OT   [] No Further OT  Equipment needs: None       ADLs:      Oral Hygiene: Oral Hygiene  Assistance Needed: Independent  Comment: seated at sink for oral care and grooming tasks  Reason if not Attempted: Not applicable  CARE Score: -  Discharge Goal: Independent    UB/LB Bathing: Shower/Bathe Self  Assistance Needed: Supervision or touching  Able to Live on Main level with bedroom/bathroom  Home Access: Stairs to enter with rails  Entrance Stairs - Number of Steps: 7 to get from garage to main living area  Entrance Stairs - Rails: Left  Bathroom Shower/Tub: Walk-in shower  Bathroom Toilet: Handicap height  Bathroom Equipment: Shower chair, Grab bars in shower (Suction grab bars)  Bathroom Accessibility: Walker accessible  Home Equipment: Cane, Walker - Rolling, Reacher (Long handled sponge)  Has the patient had two or more falls in the past year or any fall with injury in the past year?: Yes (3 reported falls; \"legs won't hold me up\")  Receives Help From: Family  Prior Level of Assist for ADLs: Independent  Prior Level of Assist for Homemaking: Independent  Homemaking Responsibilities: Yes  Meal Prep Responsibility: Primary  Laundry Responsibility: No  Cleaning Responsibility: No  Bill Paying/Finance Responsibility: No  Shopping Responsibility: Primary  Dependent Care Responsibility: Secondary (5 dogs, 1 cat)  Health Care Management: Secondary (pt reports usnig pillbox that wife sets up)  Prior Level of Assist for Ambulation: Independent community ambulator, with or without device, Independent household ambulator, with or without device (Pt reports using cane in house and walker at bedside d/t dizziness and for urinal)  Prior Level of Assist for Transfers: Independent  Active : No (reports not driving for 3 mo. due to vision changes)  Patient's  Info: Wife  Mode of Transportation: Van, SUV  Occupation: Retired ([employer])  Additional Comments: Pt has a regular flat bed    Objective                                                                                    Goals:  (Update in navigator)  Short Term Goals  Time Frame for Short Term Goals: STGs=LTGs:  Long Term Goals  Time Frame for Long Term Goals : 7 days or until d/c  Long Term Goal 1: Pt will complete grooming tasks Ind  Long Term Goal 2: Pt will complete total body bathing

## 2025-02-28 NOTE — PROGRESS NOTES
renal  Fluid (ml/day):      Nutrition Diagnosis:   Inadequate protein intake related to cardiac dysfunction, renal dysfunction as evidenced by wounds, lab values    Nutrition Interventions:   Food and/or Nutrient Delivery: Continue Current Diet, Continue Oral Nutrition Supplement  Nutrition Education/Counseling: Education/Counseling initiated  Coordination of Nutrition Care: Continue to monitor while inpatient  Plan of Care discussed with: patient    Goals:  Goals: Meet at least 75% of estimated needs  Type of Goal: Continue current goal  Previous Goal Met: Progressing toward Goal(s)    Nutrition Monitoring and Evaluation:   Behavioral-Environmental Outcomes: None Identified  Food/Nutrient Intake Outcomes: Diet Advancement/Tolerance, Food and Nutrient Intake, Supplement Intake  Physical Signs/Symptoms Outcomes: Skin, Weight, Biochemical Data, Meal Time Behavior    Discharge Planning:    Continue current diet     Lisa Dexter RD, LD  Contact: [phone]

## 2025-03-01 LAB
ERYTHROCYTE [DISTWIDTH] IN BLOOD BY AUTOMATED COUNT: 17.3 % (ref 11.7–14.9)
GLUCOSE BLD-MCNC: 158 MG/DL (ref 74–99)
GLUCOSE BLD-MCNC: 167 MG/DL (ref 74–99)
GLUCOSE BLD-MCNC: 209 MG/DL (ref 74–99)
GLUCOSE BLD-MCNC: 273 MG/DL (ref 74–99)
GLUCOSE BLD-MCNC: 292 MG/DL (ref 74–99)
GLUCOSE BLD-MCNC: 76 MG/DL (ref 74–99)
HCT VFR BLD AUTO: 39.2 % (ref 42–52)
HGB BLD-MCNC: 12.3 G/DL (ref 13.5–18)
MCH RBC QN AUTO: 26.9 PG (ref 27–31)
MCHC RBC AUTO-ENTMCNC: 31.4 G/DL (ref 32–36)
MCV RBC AUTO: 85.6 FL (ref 78–100)
PLATELET # BLD AUTO: 246 K/UL (ref 140–440)
PMV BLD AUTO: 10.6 FL (ref 7.5–11.1)
RBC # BLD AUTO: 4.58 M/UL (ref 4.6–6.2)
WBC OTHER # BLD: 5.6 K/UL (ref 4–10.5)

## 2025-03-01 PROCEDURE — 6370000000 HC RX 637 (ALT 250 FOR IP): Performed by: NURSE PRACTITIONER

## 2025-03-01 PROCEDURE — 6370000000 HC RX 637 (ALT 250 FOR IP): Performed by: INTERNAL MEDICINE

## 2025-03-01 PROCEDURE — 36415 COLL VENOUS BLD VENIPUNCTURE: CPT

## 2025-03-01 PROCEDURE — 94761 N-INVAS EAR/PLS OXIMETRY MLT: CPT

## 2025-03-01 PROCEDURE — 1280000000 HC REHAB R&B

## 2025-03-01 PROCEDURE — 82962 GLUCOSE BLOOD TEST: CPT

## 2025-03-01 PROCEDURE — 85027 COMPLETE CBC AUTOMATED: CPT

## 2025-03-01 PROCEDURE — 2500000003 HC RX 250 WO HCPCS: Performed by: INTERNAL MEDICINE

## 2025-03-01 RX ADMIN — CLOPIDOGREL BISULFATE 75 MG: 75 TABLET ORAL at 08:56

## 2025-03-01 RX ADMIN — AMOXICILLIN AND CLAVULANATE POTASSIUM 1 TABLET: 875; 125 TABLET, FILM COATED ORAL at 20:39

## 2025-03-01 RX ADMIN — POTASSIUM CHLORIDE 40 MEQ: 1500 TABLET, EXTENDED RELEASE ORAL at 08:56

## 2025-03-01 RX ADMIN — SPIRONOLACTONE 25 MG: 50 TABLET ORAL at 08:59

## 2025-03-01 RX ADMIN — SODIUM CHLORIDE, PRESERVATIVE FREE 10 ML: 5 INJECTION INTRAVENOUS at 09:01

## 2025-03-01 RX ADMIN — INSULIN HUMAN 10 UNITS: 500 INJECTION, SOLUTION SUBCUTANEOUS at 08:54

## 2025-03-01 RX ADMIN — POTASSIUM CHLORIDE 40 MEQ: 1500 TABLET, EXTENDED RELEASE ORAL at 12:51

## 2025-03-01 RX ADMIN — TORSEMIDE 10 MG: 20 TABLET ORAL at 12:51

## 2025-03-01 RX ADMIN — TORSEMIDE 20 MG: 20 TABLET ORAL at 08:58

## 2025-03-01 RX ADMIN — METOPROLOL SUCCINATE 25 MG: 25 TABLET, FILM COATED, EXTENDED RELEASE ORAL at 08:59

## 2025-03-01 RX ADMIN — TAMSULOSIN HYDROCHLORIDE 0.4 MG: 0.4 CAPSULE ORAL at 09:00

## 2025-03-01 RX ADMIN — INSULIN HUMAN 25 UNITS: 500 INJECTION, SOLUTION SUBCUTANEOUS at 18:03

## 2025-03-01 RX ADMIN — INSULIN HUMAN 20 UNITS: 500 INJECTION, SOLUTION SUBCUTANEOUS at 13:00

## 2025-03-01 RX ADMIN — POTASSIUM CHLORIDE 40 MEQ: 1500 TABLET, EXTENDED RELEASE ORAL at 17:38

## 2025-03-01 RX ADMIN — FINASTERIDE 5 MG: 5 TABLET, FILM COATED ORAL at 09:00

## 2025-03-01 RX ADMIN — AMOXICILLIN AND CLAVULANATE POTASSIUM 1 TABLET: 875; 125 TABLET, FILM COATED ORAL at 08:56

## 2025-03-01 RX ADMIN — RIVAROXABAN 20 MG: 20 TABLET, FILM COATED ORAL at 17:38

## 2025-03-01 NOTE — PROGRESS NOTES
Progress Note( Dr. Diehl)  3/1/2025  Subjective:   Admit Date: 2/22/2025  PCP: MEETA Deihl MD    Admitted For : Right foot infection and high blood glucose     Consulted For:  Right foot infection and high blood glucose     Interval History: Patient had  amputation of second and second and third toe of the right foot and debridement of the wound of the right foot done 2/19/2025 patient feeling a lot better    Patient was transferred to ARU as of 2/22/2025 evening    Patient has still hypokalemia and potassium being replaced    Blood glucose was running running somewhat better as pharmacy history  Meal plus correction bolus insulin schedules 2 out of    Denies any chest pains,   Denies SOB .   Denies nausea or vomiting.   No new bowel or bladder symptoms.       Intake/Output Summary (Last 24 hours) at 3/1/2025 0955  Last data filed at 2/28/2025 1955  Gross per 24 hour   Intake 900 ml   Output --   Net 900 ml       DATA    CBC:   Recent Labs     03/01/25  0758   WBC 5.6   HGB 12.3*         CMP:  Recent Labs     02/28/25  0527      K 4.0      CO2 23   BUN 44*   CREATININE 1.4*   CALCIUM 9.2     Lipids:   Lab Results   Component Value Date/Time    CHOL 159 11/14/2024 08:23 AM    HDL 39 11/14/2024 08:23 AM    TRIG 152 11/14/2024 08:23 AM     Glucose:  Recent Labs     03/01/25  0016 03/01/25  0107 03/01/25  0829   POCGLU 76 158* 167*     [id number]:  Lab Results   Component Value Date/Time    LABA1C 8.8 02/15/2025 01:47 AM     High Sensitivity TSH:   Lab Results   Component Value Date/Time    TSHHS 1.800 08/29/2019 10:06 AM     Free T3: No results found for: \"FT3\"  Free T4:No results found for: \"T4FREE\"    No orders to display        Scheduled Medicines   Medications:    insulin regular human  0-35 Units SubCUTAneous BID WC    insulin regular human  0-30 Units SubCUTAneous Dinner    [Held by provider] insulin regular human  0-25 Units SubCUTAneous Nightly    torsemide  20 mg Oral QAM     stasis dermatitis of both lower extremities     History of CVA (cerebrovascular accident)     Obesity hypoventilation syndrome     Hypertension     Hyperlipidemia     Congestive heart failure due to cardiomyopathy (HCC)     Stage 3b chronic kidney disease (HCC)     Chronic kidney disease, stage II (mild)     Benign essential microscopic hematuria     Stage 3a chronic kidney disease (HCC)     Generalized edema     Acute kidney injury superimposed on CKD (HCC)     Hypokalemia     Cellulitis of foot     Sepsis (HCC)     Diabetic foot infection (HCC)     Bacteremia due to Enterococcus      Plan:     Reviewed POC blood glucose . Labs and X ray results   Reviewed Current Medicines   On meal/ Correction bolus U500 regular insulin insulin regime /  Monitor Blood glucose frequently   Modified  the dose of Insulin/ other medicines as needed  Patient was transferred to ARU on evening of 2/22/2025  Patient participating in physical activities of rehab unit  Will follow     .     MEETA Diehl MD,

## 2025-03-01 NOTE — PROGRESS NOTES
Bandar De La Torre    : 1960  Acct #: [id number]  MRN: [id number]              PM&R Progress Note      Admitting diagnosis: ***    Comorbid diagnoses impacting rehabilitation: ***    Chief complaint: ***    Prior (baseline) level of function: Independent.    Current level of function:         Current  IRF-JAVIER and Goals:   Occupational Therapy:    Short Term Goals  Time Frame for Short Term Goals: STGs=LTGs :   Long Term Goals  Time Frame for Long Term Goals : 7 days or until d/c  Long Term Goal 1: Pt will complete grooming tasks Ind  Long Term Goal 2: Pt will complete total body bathing Ind  Long Term Goal 3: Pt will complete UB dressing Ind  Long Term Goal 4: Pt will complete LB dressing Ind  Long Term Goal 5: Pt will doff/don footwear Ind  Additional Goals?: Yes  Long Term Goal 6: Pt will complete toileting c Mod I  Long Term Goal 7: Pt will complete functional transfers (bed, chair, toilet, shower) c DME PRN and Mod I  Long Term Goal 8: Pt will perform therapy exercises to facilitate increased strength and endurance (emphasis on standing balance >10 min) c SBA  Long Term Goal 9: Pt will complete simple IADLs c DME PRN and Mod I :                                       Eating: Eating  Assistance Needed: Independent  Comment: Pt able to open packages/containers per pt report  CARE Score: 6  Discharge Goal: Independent       Oral Hygiene: Oral Hygiene  Assistance Needed: Independent  Comment: seated at sink for oral care and grooming tasks  Reason if not Attempted: Not applicable  CARE Score: -  Discharge Goal: Independent    UB/LB Bathing: Shower/Bathe Self  Assistance Needed: Supervision or touching assistance  Comment: Pt completed sinkside ADL c SBA in stance to wash bottom, good compliance with HTWB on RLE. uses LHS to wash distal BLEs  CARE Score: 4  Discharge Goal: Independent    UB Dressing: Upper Body Dressing  Assistance Needed: Supervision or touching assistance  Comment: Pt retrieved clothes from

## 2025-03-01 NOTE — PROGRESS NOTES
Nephrology Progress Note  3/1/2025 8:45 AM  Subjective:     Interval History: Bandar De La Torre is a 64 y.o. male appears doing okay in general no distress    Data:   Scheduled Meds:   insulin regular human  0-35 Units SubCUTAneous BID WC    insulin regular human  0-30 Units SubCUTAneous Dinner    [Held by provider] insulin regular human  0-25 Units SubCUTAneous Nightly    torsemide  20 mg Oral QAM    torsemide  10 mg Oral Daily    amoxicillin-clavulanate  1 tablet Oral 2 times per day    sodium chloride flush  5-40 mL IntraVENous 2 times per day    clopidogrel  75 mg Oral Daily    docusate sodium  100 mg Oral BID    finasteride  5 mg Oral Daily    metoprolol succinate  25 mg Oral Daily    potassium chloride  40 mEq Oral TID WC    rivaroxaban  20 mg Oral Dinner    spironolactone  25 mg Oral BID    tamsulosin  0.4 mg Oral Daily     Continuous Infusions:   sodium chloride 10 mL/hr at 02/24/25 0047    dextrose           CBC   No results for input(s): \"WBC\", \"HGB\", \"HCT\", \"PLT\" in the last 72 hours.     BMP   Recent Labs     02/28/25  0527      K 4.0      CO2 23   BUN 44*   CREATININE 1.4*     Hepatic:   No results for input(s): \"AST\", \"ALT\", \"BILITOT\", \"ALKPHOS\" in the last 72 hours.    Invalid input(s): \"ALB\"    Troponin: No results for input(s): \"TROPONINI\" in the last 72 hours.  BNP: No results for input(s): \"BNP\" in the last 72 hours.  Lipids: No results for input(s): \"CHOL\", \"HDL\" in the last 72 hours.    Invalid input(s): \"LDLCALCU\"  ABGs: No results found for: \"PHART\", \"PO2ART\", \"PNU4EVM\"  INR: No results for input(s): \"INR\" in the last 72 hours.  Renal Labs  Albumin:    Lab Results   Component Value Date/Time    LABALBU 50 10/27/2021 02:04 PM     Calcium:    Lab Results   Component Value Date/Time    CALCIUM 9.2 02/28/2025 05:27 AM     Phosphorus:    Lab Results   Component Value Date/Time    PHOS 2.4 02/16/2025 01:49 AM     U/A:    Lab Results   Component Value Date/Time    NITRU Negative

## 2025-03-02 VITALS
RESPIRATION RATE: 18 BRPM | TEMPERATURE: 98.1 F | WEIGHT: 315 LBS | HEIGHT: 72 IN | HEART RATE: 72 BPM | OXYGEN SATURATION: 97 % | DIASTOLIC BLOOD PRESSURE: 89 MMHG | SYSTOLIC BLOOD PRESSURE: 120 MMHG | BODY MASS INDEX: 42.66 KG/M2

## 2025-03-02 LAB
GLUCOSE BLD-MCNC: 214 MG/DL (ref 74–99)
GLUCOSE BLD-MCNC: 215 MG/DL (ref 74–99)
GLUCOSE BLD-MCNC: 244 MG/DL (ref 74–99)
GLUCOSE BLD-MCNC: 251 MG/DL (ref 74–99)
GLUCOSE BLD-MCNC: 330 MG/DL (ref 74–99)

## 2025-03-02 PROCEDURE — 6370000000 HC RX 637 (ALT 250 FOR IP): Performed by: INTERNAL MEDICINE

## 2025-03-02 PROCEDURE — 94761 N-INVAS EAR/PLS OXIMETRY MLT: CPT

## 2025-03-02 PROCEDURE — 82962 GLUCOSE BLOOD TEST: CPT

## 2025-03-02 PROCEDURE — 1280000000 HC REHAB R&B

## 2025-03-02 PROCEDURE — 6370000000 HC RX 637 (ALT 250 FOR IP): Performed by: NURSE PRACTITIONER

## 2025-03-02 RX ADMIN — TORSEMIDE 20 MG: 20 TABLET ORAL at 08:07

## 2025-03-02 RX ADMIN — INSULIN HUMAN 15 UNITS: 500 INJECTION, SOLUTION SUBCUTANEOUS at 09:46

## 2025-03-02 RX ADMIN — AMOXICILLIN AND CLAVULANATE POTASSIUM 1 TABLET: 875; 125 TABLET, FILM COATED ORAL at 08:10

## 2025-03-02 RX ADMIN — POTASSIUM CHLORIDE 40 MEQ: 1500 TABLET, EXTENDED RELEASE ORAL at 08:08

## 2025-03-02 RX ADMIN — INSULIN HUMAN 25 UNITS: 500 INJECTION, SOLUTION SUBCUTANEOUS at 17:34

## 2025-03-02 RX ADMIN — POTASSIUM CHLORIDE 40 MEQ: 1500 TABLET, EXTENDED RELEASE ORAL at 17:34

## 2025-03-02 RX ADMIN — CLOPIDOGREL BISULFATE 75 MG: 75 TABLET ORAL at 08:08

## 2025-03-02 RX ADMIN — METOPROLOL SUCCINATE 25 MG: 25 TABLET, FILM COATED, EXTENDED RELEASE ORAL at 08:08

## 2025-03-02 RX ADMIN — TORSEMIDE 10 MG: 20 TABLET ORAL at 14:32

## 2025-03-02 RX ADMIN — AMOXICILLIN AND CLAVULANATE POTASSIUM 1 TABLET: 875; 125 TABLET, FILM COATED ORAL at 20:53

## 2025-03-02 RX ADMIN — INSULIN HUMAN 15 UNITS: 500 INJECTION, SOLUTION SUBCUTANEOUS at 12:52

## 2025-03-02 RX ADMIN — SPIRONOLACTONE 25 MG: 50 TABLET ORAL at 20:48

## 2025-03-02 RX ADMIN — RIVAROXABAN 20 MG: 20 TABLET, FILM COATED ORAL at 17:34

## 2025-03-02 RX ADMIN — POTASSIUM CHLORIDE 40 MEQ: 1500 TABLET, EXTENDED RELEASE ORAL at 12:20

## 2025-03-02 RX ADMIN — FINASTERIDE 5 MG: 5 TABLET, FILM COATED ORAL at 08:08

## 2025-03-02 RX ADMIN — TAMSULOSIN HYDROCHLORIDE 0.4 MG: 0.4 CAPSULE ORAL at 08:08

## 2025-03-02 RX ADMIN — SPIRONOLACTONE 25 MG: 50 TABLET ORAL at 08:08

## 2025-03-02 NOTE — PROGRESS NOTES
Nephrology Progress Note  3/2/2025 9:08 AM  Subjective:     Interval History: Bandar De La Torre is a 64 y.o. male overall doing okay no distress discussed with him about sodium intake    Data:   Scheduled Meds:   insulin regular human  0-30 Units SubCUTAneous BID WC    insulin regular human  0-30 Units SubCUTAneous Dinner    [Held by provider] insulin regular human  0-25 Units SubCUTAneous Nightly    torsemide  20 mg Oral QAM    torsemide  10 mg Oral Daily    amoxicillin-clavulanate  1 tablet Oral 2 times per day    clopidogrel  75 mg Oral Daily    docusate sodium  100 mg Oral BID    finasteride  5 mg Oral Daily    metoprolol succinate  25 mg Oral Daily    potassium chloride  40 mEq Oral TID WC    rivaroxaban  20 mg Oral Dinner    spironolactone  25 mg Oral BID    tamsulosin  0.4 mg Oral Daily     Continuous Infusions:   sodium chloride 10 mL/hr at 02/24/25 0047    dextrose           CBC   Recent Labs     03/01/25  0758   WBC 5.6   HGB 12.3*   HCT 39.2*           BMP   Recent Labs     02/28/25  0527      K 4.0      CO2 23   BUN 44*   CREATININE 1.4*     Hepatic:   No results for input(s): \"AST\", \"ALT\", \"BILITOT\", \"ALKPHOS\" in the last 72 hours.    Invalid input(s): \"ALB\"    Troponin: No results for input(s): \"TROPONINI\" in the last 72 hours.  BNP: No results for input(s): \"BNP\" in the last 72 hours.  Lipids: No results for input(s): \"CHOL\", \"HDL\" in the last 72 hours.    Invalid input(s): \"LDLCALCU\"  ABGs: No results found for: \"PHART\", \"PO2ART\", \"SJS6UOI\"  INR: No results for input(s): \"INR\" in the last 72 hours.  Renal Labs  Albumin:    Lab Results   Component Value Date/Time    LABALBU 50 10/27/2021 02:04 PM     Calcium:    Lab Results   Component Value Date/Time    CALCIUM 9.2 02/28/2025 05:27 AM     Phosphorus:    Lab Results   Component Value Date/Time    PHOS 2.4 02/16/2025 01:49 AM     U/A:    Lab Results   Component Value Date/Time    NITRU Negative 02/06/2025 11:50 AM    COLORU YELLOW

## 2025-03-02 NOTE — PLAN OF CARE
Problem: Chronic Conditions and Co-morbidities  Goal: Patient's chronic conditions and co-morbidity symptoms are monitored and maintained or improved  3/2/2025 1349 by Charlie Christy LPN  Outcome: Progressing     Problem: Discharge Planning  Goal: Discharge to home or other facility with appropriate resources  3/2/2025 1349 by Charlie Christy LPN  Outcome: Progressing     Problem: Safety - Adult  Goal: Free from fall injury  3/2/2025 1349 by Charlie Christy LPN  Outcome: Progressing     Problem: Pain  Goal: Verbalizes/displays adequate comfort level or baseline comfort level  3/2/2025 1349 by Charlie Christy LPN  Outcome: Progressing     Problem: ABCDS Injury Assessment  Goal: Absence of physical injury  3/2/2025 1349 by Charlie Christy LPN  Outcome: Progressing     Problem: Skin/Tissue Integrity  Goal: Skin integrity remains intact  Description: 1.  Monitor for areas of redness and/or skin breakdown  2.  Assess vascular access sites hourly  3.  Every 4-6 hours minimum:  Change oxygen saturation probe site  4.  Every 4-6 hours:  If on nasal continuous positive airway pressure, respiratory therapy assess nares and determine need for appliance change or resting period  3/2/2025 1349 by Charlie Christy LPN  Outcome: Progressing

## 2025-03-02 NOTE — PROGRESS NOTES
(cerebrovascular accident)     Obesity hypoventilation syndrome     Hypertension     Hyperlipidemia     Congestive heart failure due to cardiomyopathy (HCC)     Stage 3b chronic kidney disease (HCC)     Chronic kidney disease, stage II (mild)     Benign essential microscopic hematuria     Stage 3a chronic kidney disease (HCC)     Generalized edema     Acute kidney injury superimposed on CKD (HCC)     Hypokalemia     Cellulitis of foot     Sepsis (HCC)     Diabetic foot infection (HCC)     Bacteremia due to Enterococcus      Plan:     Reviewed POC blood glucose . Labs and X ray results   Reviewed Current Medicines   On meal/ Correction bolus U500 regular insulin insulin regime /  Monitor Blood glucose frequently   Modified  the dose of Insulin/ other medicines as needed  Patient was transferred to ARU on evening of 2/22/2025  Patient participating in physical activities of rehab unit  Will follow     .     MEETA Diehl MD,

## 2025-03-03 LAB
ALBUMIN SERPL-MCNC: 3.7 G/DL (ref 3.4–5)
ALBUMIN/GLOB SERPL: 1.2 {RATIO} (ref 1.1–2.2)
ALP SERPL-CCNC: 83 U/L (ref 40–129)
ALT SERPL-CCNC: 18 U/L (ref 10–40)
ANION GAP SERPL CALCULATED.3IONS-SCNC: 14 MMOL/L (ref 9–17)
AST SERPL-CCNC: 18 U/L (ref 15–37)
BASOPHILS # BLD: 0.11 K/UL
BASOPHILS NFR BLD: 1 % (ref 0–1)
BILIRUB SERPL-MCNC: 0.6 MG/DL (ref 0–1)
BUN SERPL-MCNC: 41 MG/DL (ref 7–20)
CALCIUM SERPL-MCNC: 9.5 MG/DL (ref 8.3–10.6)
CHLORIDE SERPL-SCNC: 100 MMOL/L (ref 99–110)
CO2 SERPL-SCNC: 24 MMOL/L (ref 21–32)
CREAT SERPL-MCNC: 1.4 MG/DL (ref 0.8–1.3)
CRP SERPL HS-MCNC: 3.9 MG/L (ref 0–5)
EOSINOPHIL # BLD: 0.11 K/UL
EOSINOPHILS RELATIVE PERCENT: 1 % (ref 0–3)
ERYTHROCYTE [DISTWIDTH] IN BLOOD BY AUTOMATED COUNT: 17.7 % (ref 11.7–14.9)
ERYTHROCYTE [SEDIMENTATION RATE] IN BLOOD BY WESTERGREN METHOD: 33 MM/HR (ref 0–20)
GFR, ESTIMATED: 50 ML/MIN/1.73M2
GLUCOSE BLD-MCNC: 169 MG/DL (ref 74–99)
GLUCOSE BLD-MCNC: 221 MG/DL (ref 74–99)
GLUCOSE BLD-MCNC: 282 MG/DL (ref 74–99)
GLUCOSE BLD-MCNC: 305 MG/DL (ref 74–99)
GLUCOSE BLD-MCNC: 346 MG/DL (ref 74–99)
GLUCOSE SERPL-MCNC: 300 MG/DL (ref 74–99)
HCT VFR BLD AUTO: 42.7 % (ref 42–52)
HGB BLD-MCNC: 13.6 G/DL (ref 13.5–18)
IMM GRANULOCYTES # BLD AUTO: 0.05 K/UL
IMM GRANULOCYTES NFR BLD: 1 %
LYMPHOCYTES NFR BLD: 1.31 K/UL
LYMPHOCYTES RELATIVE PERCENT: 16 % (ref 24–44)
MCH RBC QN AUTO: 27.3 PG (ref 27–31)
MCHC RBC AUTO-ENTMCNC: 31.9 G/DL (ref 32–36)
MCV RBC AUTO: 85.7 FL (ref 78–100)
MONOCYTES NFR BLD: 0.63 K/UL
MONOCYTES NFR BLD: 8 % (ref 0–4)
NEUTROPHILS NFR BLD: 72 % (ref 36–66)
NEUTS SEG NFR BLD: 5.76 K/UL
PLATELET # BLD AUTO: 256 K/UL (ref 140–440)
PMV BLD AUTO: 11 FL (ref 7.5–11.1)
POTASSIUM SERPL-SCNC: 4.8 MMOL/L (ref 3.5–5.1)
PROT SERPL-MCNC: 6.9 G/DL (ref 6.4–8.2)
RBC # BLD AUTO: 4.98 M/UL (ref 4.6–6.2)
SODIUM SERPL-SCNC: 138 MMOL/L (ref 136–145)
WBC OTHER # BLD: 8 K/UL (ref 4–10.5)

## 2025-03-03 PROCEDURE — 36415 COLL VENOUS BLD VENIPUNCTURE: CPT

## 2025-03-03 PROCEDURE — 6370000000 HC RX 637 (ALT 250 FOR IP): Performed by: INTERNAL MEDICINE

## 2025-03-03 PROCEDURE — 80053 COMPREHEN METABOLIC PANEL: CPT

## 2025-03-03 PROCEDURE — 82962 GLUCOSE BLOOD TEST: CPT

## 2025-03-03 PROCEDURE — 86140 C-REACTIVE PROTEIN: CPT

## 2025-03-03 PROCEDURE — 1280000000 HC REHAB R&B

## 2025-03-03 PROCEDURE — 85652 RBC SED RATE AUTOMATED: CPT

## 2025-03-03 PROCEDURE — 97530 THERAPEUTIC ACTIVITIES: CPT

## 2025-03-03 PROCEDURE — 97116 GAIT TRAINING THERAPY: CPT

## 2025-03-03 PROCEDURE — 6370000000 HC RX 637 (ALT 250 FOR IP): Performed by: NURSE PRACTITIONER

## 2025-03-03 PROCEDURE — 99232 SBSQ HOSP IP/OBS MODERATE 35: CPT | Performed by: NURSE PRACTITIONER

## 2025-03-03 PROCEDURE — 85025 COMPLETE CBC W/AUTO DIFF WBC: CPT

## 2025-03-03 PROCEDURE — 97535 SELF CARE MNGMENT TRAINING: CPT

## 2025-03-03 RX ADMIN — AMOXICILLIN AND CLAVULANATE POTASSIUM 1 TABLET: 875; 125 TABLET, FILM COATED ORAL at 09:17

## 2025-03-03 RX ADMIN — POTASSIUM CHLORIDE 40 MEQ: 1500 TABLET, EXTENDED RELEASE ORAL at 17:04

## 2025-03-03 RX ADMIN — POTASSIUM CHLORIDE 40 MEQ: 1500 TABLET, EXTENDED RELEASE ORAL at 12:44

## 2025-03-03 RX ADMIN — TAMSULOSIN HYDROCHLORIDE 0.4 MG: 0.4 CAPSULE ORAL at 09:26

## 2025-03-03 RX ADMIN — INSULIN HUMAN 25 UNITS: 500 INJECTION, SOLUTION SUBCUTANEOUS at 19:12

## 2025-03-03 RX ADMIN — INSULIN HUMAN 10 UNITS: 500 INJECTION, SOLUTION SUBCUTANEOUS at 23:46

## 2025-03-03 RX ADMIN — FINASTERIDE 5 MG: 5 TABLET, FILM COATED ORAL at 09:17

## 2025-03-03 RX ADMIN — METOPROLOL SUCCINATE 25 MG: 25 TABLET, FILM COATED, EXTENDED RELEASE ORAL at 09:17

## 2025-03-03 RX ADMIN — CLOPIDOGREL BISULFATE 75 MG: 75 TABLET ORAL at 09:26

## 2025-03-03 RX ADMIN — TORSEMIDE 10 MG: 20 TABLET ORAL at 12:44

## 2025-03-03 RX ADMIN — SPIRONOLACTONE 25 MG: 50 TABLET ORAL at 09:17

## 2025-03-03 RX ADMIN — DOCUSATE SODIUM 100 MG: 100 CAPSULE, LIQUID FILLED ORAL at 09:17

## 2025-03-03 RX ADMIN — TORSEMIDE 20 MG: 20 TABLET ORAL at 09:17

## 2025-03-03 RX ADMIN — INSULIN HUMAN 15 UNITS: 500 INJECTION, SOLUTION SUBCUTANEOUS at 11:56

## 2025-03-03 RX ADMIN — POTASSIUM CHLORIDE 40 MEQ: 1500 TABLET, EXTENDED RELEASE ORAL at 09:16

## 2025-03-03 RX ADMIN — AMOXICILLIN AND CLAVULANATE POTASSIUM 1 TABLET: 875; 125 TABLET, FILM COATED ORAL at 22:31

## 2025-03-03 RX ADMIN — RIVAROXABAN 20 MG: 20 TABLET, FILM COATED ORAL at 17:04

## 2025-03-03 NOTE — PROGRESS NOTES
Occupational Therapy    Physical Rehabilitation: OCCUPATIONAL THERAPY     [x] daily progress note       [] discharge       Patient Name:  Bandar De La Torre   :  1960 MRN: [id number]  Room:  [address] [place] [address] Date of Admission: 2025  Rehabilitation Diagnosis:   Other acute osteomyelitis, right ankle and foot (HCC) [M86.171]  Osteomyelitis of toe of right foot (HCC) [M86.9]       Date 3/3/2025       Day of ARU Week:  3   Time IN/OUT 5355-0685   Individual Tx Minutes 67   Group Tx Minutes    Co-Treat Minutes    Concurrent Tx Minutes    TOTAL Tx Time Mins 67   Variance Time +7 (Informed PTA-Kesha of variance)    Variance Reason []   Refusal due to:     []   Medical hold/reason:    []   Illness   []   Off Unit for test/procedure  []   Extra time needed to complete task  []   Therapeutic need  []   Make up mins were attempted but pt unable to complete due to (specify)  []   Other (specify):   Restrictions Restrictions/Precautions: Fall Risk, General Precautions, Weight Bearing (Heel WB RLE, IV access R forearm, BS sensor L upper arm, pacemaker)         Communication with other providers: [x]   OK to see per nursing:     []   Spoke with team member regarding:      Subjective observations and cognitive status: Pt sitting EOB on approach; pleasant and agreeable to therapy.      Pain level/location:    /10       Location: none    Discharge recommendations  Anticipated discharge date:    Destination: []home alone   []home alone w assist prn   [x] home w/ family    [] Continuous supervision       []SNF    [] Assisted living     [] Other:   Continued therapy: [x]HHC OT  []OUTPATIENT  OT   [] No Further OT  Equipment needs: None       ADLs:    Oral Hygiene: Oral Hygiene  Assistance Needed: Independent  Comment: seated at sink  CARE Score: 6  Discharge Goal: Independent    UB/LB Bathing: Shower/Bathe Self  Assistance Needed: Independent  Comment: Pt completed full shower IND, lateral lean to wash bottom; uses LHS to

## 2025-03-03 NOTE — PROGRESS NOTES
Infectious Disease Progress Note  3/3/2025   Patient Name: Bandar De La Torre : 1960     Assessment  Right DFI with OM, s/p amputation 2nd & 3rd toe 25  Pt presented for management of right foot wound.   General surgery evaluated underwent bedside excisional debridement of necrotic tissue from wound at the plantar surface 2025 MRI confirmed OM of the second distal phalanx and the third middle and distal phalanges.  The patient underwent amputation of his second and third toe of the right foot with debridement per GS, Dr. Avalos 2025.  Surgical culture remained negative however wound culture showed pansensitive Proteus mirabilis.  Per op note bones were cut at MTP joint.  Surgical pathology resulted 2025 showing marginal section negative for OM. ID was consulted due blood cultures obtained were positive for Enterococcus faecalis with subsequent negative blood cultures. Enterococcus likely a contaminant as was not found in wound and subsequent blood cultures rapidly neg. TEJ showed no evidence of endocarditis.  EP did evaluate the patient recommendations were appreciated no indication for pacemaker removal warranted.  The patient had been managed on a course of intravenous antibiotics pending culture results.  The patient will continue antimicrobials for a cumulative 2-week from date of surgery..  Leukocytosis resolved, inflamm markers with dwt. WBC 6.5<--11.9 ( adm), CRP 15.9<-- 159<--113, ESR 44<--52<--26. Procal 0.28. Afebrile sice adm  Antimicrobial summary: IV Cefepime --; IV Vanc -; IV Ampicillin -; IV Ceftriaxone -; Augmentin 875/125mg -    DM II A1C 7.5  On insulin, Endocrinology following  SSS s/p pacemaker  Severe obesity, BMI 45.3  Allergy: no abx allergy  GFR 42  Comorbid conditions: SSS s/p pacemaker (), CVA, MARINO, HTN, DM II, CKD III, HLD, Obesity     Plan  Therapeutic: Continue Augmentin 875mg/125mg po q 12h for cumulative 2 weeks,

## 2025-03-03 NOTE — PROGRESS NOTES
assays   have been reported to have lower sensitivities than FLORES IFA for   systemic autoimmune rheumatic diseases (SARD).  Negative results do not necessarily rule out SARD.  Performed by [facility],  [address], [facility] [place] [address] [phone]  [email or web address], Simon Dunne MD, Lab. Director       24 Hour Urine for Creatinine Clearance:  No components found for: \"CREAT4\", \"UHRS10\", \"UTV10\"      Objective:   I/O: 03/02 0701 - 03/03 0700  In: 540 [P.O.:540]  Out: -   I/O last 3 completed shifts:  In: 780 [P.O.:780]  Out: -   I/O this shift:  In: 300 [P.O.:300]  Out: -   Vitals: BP (!) 119/59   Pulse 64   Temp 97.5 °F (36.4 °C) (Oral)   Resp 19   Ht 1.829 m (6')   Wt (!) 147.3 kg (324 lb 11.8 oz)   SpO2 97%   BMI 44.04 kg/m²  {  General appearance: awake weak  HEENT: Head: Normal, normocephalic, atraumatic.  Neck: supple, symmetrical, trachea midline  Lungs: diminished breath sounds bilaterally  Heart: S1, S2 normal  Abdomen: abnormal findings:  soft nt  Extremities: edema trace dressing on foot  Neurologic: Mental status: alertness: alert        Assessment and Plan:      IMP:  1.  Diabetic foot ulcer status post infection with surgery  #2 enterococcal bacteremia  #3 type 2 diabetes  #4 hypokalemia  #5 CKD 3 with acute renal failure  #6 congestive heart failure EF 50% on diuretic therapy  #7 atrial fibrillation on Xarelto    Plan     #1 maintain rehab ARU  #2 no fever  3 control glucose  #4 monitor renal function electrolytes stable  #5 work on discharge planning supportive care           DECLAN TUCKER MD, MD

## 2025-03-03 NOTE — PROGRESS NOTES
Times per week: 5 days per week for a minimum of 60 minutes/day plus group as appropriate for 60 minutes.  Treatment to include Current Treatment Recommendations: Strengthening, Balance training, Functional mobility training, Transfer training, IADL training, Endurance training, Wheelchair mobility training, Gait training, Stair training, Patient/Caregiver education & training, Safety education & training, Home exercise program, Equipment evaluation, education, & procurement, Neuromuscular re-education, Positioning, Group Therapy, Therapeutic activities, Pain management    Electronically signed by   Kesha Zuluaga, PTA #[id number]  3/3/2025, 1:57 PM

## 2025-03-04 LAB
GLUCOSE BLD-MCNC: 171 MG/DL (ref 74–99)
GLUCOSE BLD-MCNC: 192 MG/DL (ref 74–99)
GLUCOSE BLD-MCNC: 195 MG/DL (ref 74–99)
GLUCOSE BLD-MCNC: 260 MG/DL (ref 74–99)
GLUCOSE BLD-MCNC: 331 MG/DL (ref 74–99)

## 2025-03-04 PROCEDURE — 82962 GLUCOSE BLOOD TEST: CPT

## 2025-03-04 PROCEDURE — 6370000000 HC RX 637 (ALT 250 FOR IP): Performed by: INTERNAL MEDICINE

## 2025-03-04 PROCEDURE — 1280000000 HC REHAB R&B

## 2025-03-04 PROCEDURE — 94150 VITAL CAPACITY TEST: CPT

## 2025-03-04 PROCEDURE — 97530 THERAPEUTIC ACTIVITIES: CPT

## 2025-03-04 PROCEDURE — 97110 THERAPEUTIC EXERCISES: CPT

## 2025-03-04 PROCEDURE — 6370000000 HC RX 637 (ALT 250 FOR IP): Performed by: NURSE PRACTITIONER

## 2025-03-04 PROCEDURE — 97116 GAIT TRAINING THERAPY: CPT

## 2025-03-04 PROCEDURE — 94761 N-INVAS EAR/PLS OXIMETRY MLT: CPT

## 2025-03-04 RX ADMIN — CLOPIDOGREL BISULFATE 75 MG: 75 TABLET ORAL at 09:59

## 2025-03-04 RX ADMIN — AMOXICILLIN AND CLAVULANATE POTASSIUM 1 TABLET: 875; 125 TABLET, FILM COATED ORAL at 09:58

## 2025-03-04 RX ADMIN — RIVAROXABAN 20 MG: 20 TABLET, FILM COATED ORAL at 18:04

## 2025-03-04 RX ADMIN — POTASSIUM CHLORIDE 40 MEQ: 1500 TABLET, EXTENDED RELEASE ORAL at 09:58

## 2025-03-04 RX ADMIN — POTASSIUM CHLORIDE 40 MEQ: 1500 TABLET, EXTENDED RELEASE ORAL at 12:36

## 2025-03-04 RX ADMIN — INSULIN HUMAN 10 UNITS: 500 INJECTION, SOLUTION SUBCUTANEOUS at 09:59

## 2025-03-04 RX ADMIN — INSULIN HUMAN 10 UNITS: 500 INJECTION, SOLUTION SUBCUTANEOUS at 12:34

## 2025-03-04 RX ADMIN — TORSEMIDE 20 MG: 20 TABLET ORAL at 09:58

## 2025-03-04 RX ADMIN — FINASTERIDE 5 MG: 5 TABLET, FILM COATED ORAL at 09:58

## 2025-03-04 RX ADMIN — TORSEMIDE 10 MG: 20 TABLET ORAL at 12:36

## 2025-03-04 RX ADMIN — INSULIN HUMAN 10 UNITS: 500 INJECTION, SOLUTION SUBCUTANEOUS at 18:05

## 2025-03-04 RX ADMIN — TAMSULOSIN HYDROCHLORIDE 0.4 MG: 0.4 CAPSULE ORAL at 09:59

## 2025-03-04 RX ADMIN — POTASSIUM CHLORIDE 40 MEQ: 1500 TABLET, EXTENDED RELEASE ORAL at 18:04

## 2025-03-04 RX ADMIN — AMOXICILLIN AND CLAVULANATE POTASSIUM 1 TABLET: 875; 125 TABLET, FILM COATED ORAL at 21:28

## 2025-03-04 ASSESSMENT — PAIN SCALES - GENERAL: PAINLEVEL_OUTOF10: 0

## 2025-03-04 NOTE — PROGRESS NOTES
Sit to Stand  Assistance Needed: Supervision or touching assistance  Comment: SB-close supervision  CARE Score: 4  Discharge Goal: Independent  Chair/Bed-to-Chair Transfer  Assistance Needed: Supervision or touching assistance  Comment: SBA with RW  CARE Score: 4  Discharge Goal: Independent     Car Transfer  Assistance Needed: Supervision or touching assistance  Comment: Supervision with RW, demos safety without cueing  CARE Score: 4  Discharge Goal: Independent    Ambulation:    Walking Ability  Does the Patient Walk?: Yes     Walk 10 Feet  Assistance Needed: Supervision or touching assistance  Comment: SBA with RW with boot R, tennis shoe L, step to pattern  CARE Score: 4  Discharge Goal: Independent     Walk 50 Feet with Two Turns  Assistance Needed: Supervision or touching assistance  Comment: SB-close supervision with RW with boot R, tennis shoe L, step to pattern  Reason if not Attempted: Not attempted due to medical condition or safety concerns  CARE Score: 4  Discharge Goal: Independent     Walk 150 Feet  Assistance Needed: Supervision or touching assistance  Comment: SB-close supervision with RW, amb 203'+251'+155' max distances.  Reason if not Attempted: Not attempted due to medical condition or safety concerns  CARE Score: 4  Discharge Goal: Not Attempted     Walking 10 Feet on Uneven Surfaces  Assistance Needed: Supervision or touching assistance  Comment: SBA with RW, demos safety over carpet edge and obstacles  Reason if not Attempted: Not attempted due to medical condition or safety concerns  CARE Score: 4  Discharge Goal: Independent     1 Step (Curb)  Assistance Needed: Partial/moderate assistance  Comment: min A for balance when advancing AD off of 4' curb, demos correct seq  Reason if not Attempted: Not attempted due to medical condition or safety concerns  CARE Score: 3  Discharge Goal: Independent     4 Steps  Assistance Needed: Supervision or touching assistance  Comment: SB-CGA JUANPABLO daily

## 2025-03-04 NOTE — DISCHARGE INSTRUCTIONS
Your information:  Name: Bandar De La Torre  : 1960    Your instructions:    Pt is discharging to home with [facility]  [address], [address], [place] [id number]  [phone]  A representative from [facility] will contact you at home to schedule your home care needs.    What to do after you leave the hospital:    Recommended diet: {diet:13536}    Recommended activity: {discharge activity:00741}    The following personal items were collected during your admission and were returned to you:    Belongings  Dental Appliances: None  Vision - Corrective Lenses: None  Hearing Aid: None  Clothing: Sent home  Jewelry: None  Electronic Devices: , Cell Phone  Weapons (Notify Protective Services/Security): None  Home Medications: None  Valuables Given To: Family (Comment), Patient  Provide Name(s) of Who Valuable(s) Were Given To: Wife    Information obtained by:  By signing below, I understand that if any problems occur once I leave the hospital I am to contact ***.  I understand and acknowledge receipt of the instructions indicated above.

## 2025-03-04 NOTE — DISCHARGE INSTR - ACTIVITY
Pt is discharging to home with [facility]  [address], [address], [place] [address]  [phone]  A representative from [facility] will contact you at home to schedule your home care needs.

## 2025-03-04 NOTE — PROGRESS NOTES
Nephrology Progress Note  3/4/2025 10:17 AM  Subjective:     Interval History: Bandar De La Torre is a 64 y.o. male who appears to be doing okay in general no distress    Data:   Scheduled Meds:   insulin regular human  0-30 Units SubCUTAneous BID WC    insulin regular human  0-30 Units SubCUTAneous Dinner    [Held by provider] insulin regular human  0-25 Units SubCUTAneous Nightly    torsemide  20 mg Oral QAM    torsemide  10 mg Oral Daily    amoxicillin-clavulanate  1 tablet Oral 2 times per day    clopidogrel  75 mg Oral Daily    docusate sodium  100 mg Oral BID    finasteride  5 mg Oral Daily    metoprolol succinate  25 mg Oral Daily    potassium chloride  40 mEq Oral TID WC    rivaroxaban  20 mg Oral Dinner    spironolactone  25 mg Oral BID    tamsulosin  0.4 mg Oral Daily     Continuous Infusions:   sodium chloride 10 mL/hr at 02/24/25 0047    dextrose           CBC   Recent Labs     03/03/25  1004   WBC 8.0   HGB 13.6   HCT 42.7           BMP   Recent Labs     03/03/25  1004      K 4.8      CO2 24   BUN 41*   CREATININE 1.4*     Hepatic:   Recent Labs     03/03/25  1004   AST 18   ALT 18   BILITOT 0.6   ALKPHOS 83       Troponin: No results for input(s): \"TROPONINI\" in the last 72 hours.  BNP: No results for input(s): \"BNP\" in the last 72 hours.  Lipids: No results for input(s): \"CHOL\", \"HDL\" in the last 72 hours.    Invalid input(s): \"LDLCALCU\"  ABGs: No results found for: \"PHART\", \"PO2ART\", \"VAQ8XYY\"  INR: No results for input(s): \"INR\" in the last 72 hours.  Renal Labs  Albumin:    Lab Results   Component Value Date/Time    LABALBU 50 10/27/2021 02:04 PM     Calcium:    Lab Results   Component Value Date/Time    CALCIUM 9.5 03/03/2025 10:04 AM     Phosphorus:    Lab Results   Component Value Date/Time    PHOS 2.4 02/16/2025 01:49 AM     U/A:    Lab Results   Component Value Date/Time    NITRU Negative 02/06/2025 11:50 AM    COLORU YELLOW 02/06/2025 11:50 AM    PHUR 6.5 02/06/2025 11:50

## 2025-03-04 NOTE — CARE COORDINATION
Discussed discharge with patient. Notified him that [facility] was ordered. Provided a copy of the Important Message from Medicare form signed upon admission. Patient verbalized understanding.

## 2025-03-04 NOTE — PROGRESS NOTES
obesity with body mass index of 45.0-49.9 in adult     Frequent falls     Chronic venous stasis dermatitis of both lower extremities     History of CVA (cerebrovascular accident)     Obesity hypoventilation syndrome     Hypertension     Hyperlipidemia     Congestive heart failure due to cardiomyopathy (HCC)     Stage 3b chronic kidney disease (HCC)     Chronic kidney disease, stage II (mild)     Benign essential microscopic hematuria     Stage 3a chronic kidney disease (HCC)     Generalized edema     Acute kidney injury superimposed on CKD (HCC)     Hypokalemia     Cellulitis of foot     Sepsis (HCC)     Diabetic foot infection (HCC)     Bacteremia due to Enterococcus      Plan:     Reviewed POC blood glucose . Labs and X ray results   Reviewed Current Medicines   On meal/ Correction bolus U500 regular insulin insulin regime /  Monitor Blood glucose frequently   Modified  the dose of Insulin/ other medicines as needed  Patient was transferred to ARU on evening of 2/22/2025  Patient participating in physical activities of rehab unit  Will follow     .     MEETA Diehl MD,

## 2025-03-04 NOTE — PROGRESS NOTES
Gait belt used during tx session      []other:       Number of Minutes/Billable Intervention  Therapeutic Exercise 30   ADL Self-care    Neuro Re-Ed    Therapeutic Activity 30   Group    Other:    TOTAL 60       Social History  Social/Functional History  Lives With: Spouse  Type of Home: House  Home Layout: Multi-level, Able to Live on Main level with bedroom/bathroom  Home Access: Stairs to enter with rails  Entrance Stairs - Number of Steps: 7 to get from garage to main living area  Entrance Stairs - Rails: Left  Bathroom Shower/Tub: Walk-in shower  Bathroom Toilet: Handicap height  Bathroom Equipment: Shower chair, Grab bars in shower (Suction grab bars)  Bathroom Accessibility: Walker accessible  Home Equipment: Cane, Walker - Rolling, Reacher (Long handled sponge)  Has the patient had two or more falls in the past year or any fall with injury in the past year?: Yes (3 reported falls; \"legs won't hold me up\")  Receives Help From: Family  Prior Level of Assist for ADLs: Independent  Prior Level of Assist for Homemaking: Independent  Homemaking Responsibilities: Yes  Meal Prep Responsibility: Primary  Laundry Responsibility: No  Cleaning Responsibility: No  Bill Paying/Finance Responsibility: No  Shopping Responsibility: Primary  Dependent Care Responsibility: Secondary (5 dogs, 1 cat)  Health Care Management: Secondary (pt reports usnig pillbox that wife sets up)  Prior Level of Assist for Ambulation: Independent community ambulator, with or without device, Independent household ambulator, with or without device (Pt reports using cane in house and walker at bedside d/t dizziness and for urinal)  Prior Level of Assist for Transfers: Independent  Active : No (reports not driving for 3 mo. due to vision changes)  Patient's  Info: Wife  Mode of Transportation: Van, [employer]  Occupation: Retired ([employer])  Additional Comments: Pt has a regular flat bed    Objective

## 2025-03-04 NOTE — CONSULTS
Assessment Purple/maroon 03/04/25 0830   Drainage Amount None (dry) 03/04/25 0830   Odor None 03/04/25 0830   Berenice-wound Assessment Dry/flaky 03/04/25 0830   Margins Attached edges 03/04/25 0830   Number of days: 5       Response to treatment:  Well tolerated by patient.     Pain Assessment:  Severity:  none  Quality of pain:   Wound Pain Timing/Severity:   Premedicated: no    Plan:     Plan of Care: Wound 02/13/25 Foot Right;Plantar;Distal-Dressing/Treatment: Hydrofiber Ag, ABD, Roll gauze, Ace wrap  [REMOVED] Wound 02/14/25 Left;Plantar great toe plantar-Dressing/Treatment: Open to air  Wound 02/21/25 Toe (Comment  which one) Right;Plantar great-Dressing/Treatment: Hydrofiber Ag, ABD, Roll gauze, Ace wrap  Wound 02/22/25 Pretibial Left;Anterior-Dressing/Treatment: Open to air  Wound 02/26/25 Heel Left;Plantar-Dressing/Treatment: Open to air      Patient sitting up eating breakfast ok for wound care reassessment. Rt foot wounds diabetic/surgical 2nd/3rd toe amputations with sutures in tact. Lt heel diabetic wound purple/dry and left anterior leg with dry eschar/soft tissue. Wounds cleansed with NS measured and pictured. Rt great toe with slough tissue Dr Avalos informed will take a look at wounds.  Applied new dressings xeroform to incision, Opticell AG to rt toe/plantar wounds. Left heel/leg SUNSHINE. Pt is generally not at risk for skin breakdown AEB emmanuel.    Specialty Bed Required : no  [] Low Air Loss   [] Pressure Redistribution  [] Fluid Immersion  [] Bariatric  [] Total Pressure Relief  [] Other:     Discharge Plan:  Placement for patient upon discharge: tbd  Hospice Care: no  Patient appropriate for Outpatient Wound Care Center: yes referral made    Patient/Caregiver Teaching:  Level of patient/caregiver understanding able to: pt voiced understanding.          Electronically signed by Natacha Gerber RN,  on 3/4/2025 at 10:39 AM

## 2025-03-04 NOTE — PROGRESS NOTES
Physical Therapy    [x] daily progress note       [] discharge       Patient Name:  Bandar De La Torre   :  1960 MRN: [id number]  Room:  [address] [place] [address] Date of Admission: 2025  Rehabilitation Diagnosis:   Other acute osteomyelitis, right ankle and foot (HCC) [M86.171]  Osteomyelitis of toe of right foot (HCC) [M86.9]       Date 3/4/2025       Day of ARU Week:  4   Time IN/OUT 6955-7077  [id number]   Individual Tx Minutes 60+60   TOTAL Tx Time Mins 120   Variance Time    Variance Time []   Refusal due to:     []   Medical hold/reason:    []   Illness   []   Off Unit for test/procedure  []   Extra time needed to complete task  []   Therapeutic need  []   Other (specify):   Restrictions Restrictions/Precautions  Restrictions/Precautions: Fall Risk, General Precautions, Weight Bearing (Heel WB RLE, IV access R forearm, BS sensor L upper arm, pacemaker)  Implants Present? : Pacemaker      Communication with other providers: [x]   OK to see per nursing:     []   Spoke with team member regarding:      Subjective observations and cognitive status: Pt willing to participate, no new complaints, willing to trial stairs with rail/cane; reports wound care nurse just left, states \"its not good, she says there is yellow stuff around the outside of it\". Seemed down about situation. Wound care nurse confirmed conversation.   PM: Pt agreeable, reports unsure about going home still needing the RW but states \"We'll figure it out\".   Pain level/location: 0/10       Location:    Discharge recommendations  Anticipated discharge date:  3/6  Destination: []home alone   []home alone with assist PRN     [x] home w/ family      [] Continuous supervision  []SNF    [x] Assisted living     [] Other:  Continued therapy: [x][facility]HC PT  []OUTPATIENT PT   [] No Further PT  []SNF PT  Caregiver training recommended: []Yes  [] No   Equipment needs: 2ww     Bed Mobility:           []   Pt received out of bed   Rolling R/L:  Indep  Scooting:

## 2025-03-05 LAB
GLUCOSE BLD-MCNC: 134 MG/DL (ref 74–99)
GLUCOSE BLD-MCNC: 283 MG/DL (ref 74–99)
GLUCOSE BLD-MCNC: 285 MG/DL (ref 74–99)
GLUCOSE BLD-MCNC: 286 MG/DL (ref 74–99)
GLUCOSE BLD-MCNC: 381 MG/DL (ref 74–99)
GLUCOSE BLD-MCNC: 406 MG/DL (ref 74–99)

## 2025-03-05 PROCEDURE — 97110 THERAPEUTIC EXERCISES: CPT

## 2025-03-05 PROCEDURE — 97530 THERAPEUTIC ACTIVITIES: CPT

## 2025-03-05 PROCEDURE — 6370000000 HC RX 637 (ALT 250 FOR IP): Performed by: INTERNAL MEDICINE

## 2025-03-05 PROCEDURE — 94150 VITAL CAPACITY TEST: CPT

## 2025-03-05 PROCEDURE — 97116 GAIT TRAINING THERAPY: CPT

## 2025-03-05 PROCEDURE — 1280000000 HC REHAB R&B

## 2025-03-05 PROCEDURE — 6370000000 HC RX 637 (ALT 250 FOR IP): Performed by: SURGERY

## 2025-03-05 PROCEDURE — 94761 N-INVAS EAR/PLS OXIMETRY MLT: CPT

## 2025-03-05 PROCEDURE — 99024 POSTOP FOLLOW-UP VISIT: CPT | Performed by: SURGERY

## 2025-03-05 PROCEDURE — 82962 GLUCOSE BLOOD TEST: CPT

## 2025-03-05 PROCEDURE — 97535 SELF CARE MNGMENT TRAINING: CPT

## 2025-03-05 RX ADMIN — RIVAROXABAN 20 MG: 20 TABLET, FILM COATED ORAL at 17:50

## 2025-03-05 RX ADMIN — POTASSIUM CHLORIDE 40 MEQ: 1500 TABLET, EXTENDED RELEASE ORAL at 08:37

## 2025-03-05 RX ADMIN — TORSEMIDE 10 MG: 20 TABLET ORAL at 14:22

## 2025-03-05 RX ADMIN — INSULIN HUMAN 20 UNITS: 500 INJECTION, SOLUTION SUBCUTANEOUS at 18:27

## 2025-03-05 RX ADMIN — TAMSULOSIN HYDROCHLORIDE 0.4 MG: 0.4 CAPSULE ORAL at 08:37

## 2025-03-05 RX ADMIN — FINASTERIDE 5 MG: 5 TABLET, FILM COATED ORAL at 08:38

## 2025-03-05 RX ADMIN — POTASSIUM CHLORIDE 40 MEQ: 1500 TABLET, EXTENDED RELEASE ORAL at 11:49

## 2025-03-05 RX ADMIN — COLLAGENASE SANTYL: 250 OINTMENT TOPICAL at 11:32

## 2025-03-05 RX ADMIN — INSULIN HUMAN 20 UNITS: 500 INJECTION, SOLUTION SUBCUTANEOUS at 08:35

## 2025-03-05 RX ADMIN — SPIRONOLACTONE 25 MG: 50 TABLET ORAL at 20:23

## 2025-03-05 RX ADMIN — CLOPIDOGREL BISULFATE 75 MG: 75 TABLET ORAL at 08:37

## 2025-03-05 RX ADMIN — POTASSIUM CHLORIDE 40 MEQ: 1500 TABLET, EXTENDED RELEASE ORAL at 17:49

## 2025-03-05 RX ADMIN — TORSEMIDE 20 MG: 20 TABLET ORAL at 08:37

## 2025-03-05 RX ADMIN — INSULIN HUMAN 10 UNITS: 500 INJECTION, SOLUTION SUBCUTANEOUS at 15:51

## 2025-03-05 RX ADMIN — INSULIN HUMAN 30 UNITS: 500 INJECTION, SOLUTION SUBCUTANEOUS at 11:45

## 2025-03-05 ASSESSMENT — PAIN SCALES - GENERAL
PAINLEVEL_OUTOF10: 0
PAINLEVEL_OUTOF10: 0

## 2025-03-05 NOTE — PLAN OF CARE
Problem: Chronic Conditions and Co-morbidities  Goal: Patient's chronic conditions and co-morbidity symptoms are monitored and maintained or improved  3/5/2025 1507 by Keli Saldivar RN  Outcome: Progressing  3/5/2025 0513 by Mariam Persaud LPN  Outcome: Progressing     Problem: Safety - Adult  Goal: Free from fall injury  3/5/2025 1507 by Keli Saldivar RN  Outcome: Progressing  3/5/2025 0513 by Mariam Persaud LPN  Outcome: Progressing     Problem: Pain  Goal: Verbalizes/displays adequate comfort level or baseline comfort level  3/5/2025 1507 by Keli Saldivar RN  Outcome: Progressing  3/5/2025 0513 by Mariam Persaud LPN  Outcome: Progressing     Problem: ABCDS Injury Assessment  Goal: Absence of physical injury  3/5/2025 1507 by Keli Saldivar RN  Outcome: Progressing  3/5/2025 0513 by Mariam Persaud LPN  Outcome: Progressing     Problem: Skin/Tissue Integrity  Goal: Skin integrity remains intact  Description: 1.  Monitor for areas of redness and/or skin breakdown  2.  Assess vascular access sites hourly  3.  Every 4-6 hours minimum:  Change oxygen saturation probe site  4.  Every 4-6 hours:  If on nasal continuous positive airway pressure, respiratory therapy assess nares and determine need for appliance change or resting period  3/5/2025 1507 by Keli Saldivar RN  Outcome: Progressing  3/5/2025 0513 by Mariam Persaud LPN  Outcome: Progressing

## 2025-03-05 NOTE — PROGRESS NOTES
WBCUA 0-5 05/09/2022 09:32 AM    RBCUA 3 01/14/2025 08:29 PM    RBCUA 2 09/10/2022 06:30 PM    MUCUS RARE 01/14/2025 08:29 PM    TRICHOMONAS NONE SEEN 09/10/2022 06:30 PM    BACTERIA NEGATIVE 09/10/2022 06:30 PM    CLARITYU CLEAR 02/06/2025 11:50 AM    UROBILINOGEN <2 02/06/2025 11:50 AM    BILIRUBINUR Negative 02/06/2025 11:50 AM    BLOODU SMALL 09/10/2022 06:30 PM    GLUCOSEU >1000 02/06/2025 11:50 AM    KETUA Negative 02/06/2025 11:50 AM    AMORPHOUS PRESENT 12/07/2021 08:00 AM     ABG:  No results found for: \"PHART\", \"OGO5TQI\", \"PO2ART\", \"YLK1FWP\", \"BEART\", \"THGBART\", \"USN3TJP\", \"H0UBYYHB\"  HgBA1c:    Lab Results   Component Value Date/Time    LABA1C 8.8 02/15/2025 01:47 AM     Microalbumen/Creatinine ratio:  No components found for: \"RUCREAT\"  TSH:    Lab Results   Component Value Date/Time    TSH 0.759 01/11/2022 07:34 AM     IRON:  No results found for: \"IRON\"  Iron Saturation:  No components found for: \"PERCENTFE\"  TIBC:  No results found for: \"TIBC\"  FERRITIN:  No results found for: \"FERRITIN\"  RPR:  No results found for: \"RPR\"  FLORES:    Lab Results   Component Value Date/Time    FLORES None Detected 08/29/2019 10:06 AM    FLORES  08/29/2019 10:06 AM     (NOTE)  If suspicion of connective tissue disease is strong and FLORES EIA is   negative, consider testing for FLORES by IFA ([id number]).  INTERPRETIVE INFORMATION: Anti-Nuclear Antibodies (FLORES), IgG by   RODOLFO  Antinuclear Antibodies (FLORES), IgG by RODOLFO: FLORES specimens are   screened using enzyme-linked immunosorbent assay (RODOLFO)   methodology. All RODOLFO results reported as Detected are further   tested by indirect fluorescent assay (IFA) using HEp-2 substrate   with an IgG-specific conjugate. The FLORES RODOLFO screen is designed   to detect antibodies against dsDNA, histones, SS-A (Ro), SS-B   (La), Sam, Sam/RNP, Scl-70, Sharron-1, centromeric proteins, other   antigens extracted from the HEp-2 cell nucleus. FLORES RODOLFO assays   have been reported to have lower sensitivities

## 2025-03-05 NOTE — PROGRESS NOTES
I saw Emmanuel briefly while he was getting cleaned up in the bathroom this morning.  I reveiwed wound care photos.  Wounds look good for the most part. Mild fibrinous debris on the right great toe wound.    - wound care orders adjusted with addition of santyl for plantar foot wounds, toe amp site dressing simplified to just dry gauze changed every other day  - will re-assess the wounds in person this week prior to discharge    Electronically signed by Jakob Avalos MD on 3/5/2025 at 9:15 AM

## 2025-03-05 NOTE — PROGRESS NOTES
Bandar De La Torre    : 1960  Acct #: [id number]  MRN: [id number]              PM&R Progress Note      Admitting diagnosis: ***    Comorbid diagnoses impacting rehabilitation: ***    Chief complaint: ***    Prior (baseline) level of function: Independent.    Current level of function:         Current  IRF-JAVIER and Goals:   Occupational Therapy:    Short Term Goals  Time Frame for Short Term Goals: STGs=LTGs :   Long Term Goals  Time Frame for Long Term Goals : 7 days or until d/c  Long Term Goal 1: Pt will complete grooming tasks Ind  Long Term Goal 2: Pt will complete total body bathing Ind  Long Term Goal 3: Pt will complete UB dressing Ind  Long Term Goal 4: Pt will complete LB dressing Ind  Long Term Goal 5: Pt will doff/don footwear Ind  Additional Goals?: Yes  Long Term Goal 6: Pt will complete toileting c Mod I  Long Term Goal 7: Pt will complete functional transfers (bed, chair, toilet, shower) c DME PRN and Mod I  Long Term Goal 8: Pt will perform therapy exercises to facilitate increased strength and endurance (emphasis on standing balance >10 min) c SBA  Long Term Goal 9: Pt will complete simple IADLs c DME PRN and Mod I :                                       Eating: Eating  Assistance Needed: Independent  Comment: Pt able to open packages/containers per pt report  CARE Score: 6  Discharge Goal: Independent       Oral Hygiene: Oral Hygiene  Assistance Needed: Independent  Comment: seated at sink  CARE Score: 6  Discharge Goal: Independent    UB/LB Bathing: Shower/Bathe Self  Assistance Needed: Independent  Comment: Pt completed full shower IND, lateral lean to wash bottom; uses LHS to wash distal L LE  CARE Score: 6  Discharge Goal: Independent    UB Dressing: Upper Body Dressing  Assistance Needed: Supervision or touching assistance  Comment: Pt retrieved clothing from closet c SUP and able to doff/don T shirt  CARE Score: 4  Discharge Goal: Independent         LB Dressing: Lower Body

## 2025-03-05 NOTE — PROGRESS NOTES
Physical Therapy      [] daily progress note       [x] discharge       Patient Name:  Bandar De La Torre   :  1960 MRN: [id number]  Room:  [address] [place] [address] Date of Admission: 2025  Rehabilitation Diagnosis:   Other acute osteomyelitis, right ankle and foot (HCC) [M86.171]  Osteomyelitis of toe of right foot (HCC) [M86.9]       Date 3/5/2025       Day of ARU Week:  5   Time IN/OUT 6433-9971   Individual Tx Minutes 60   TOTAL Tx Time Mins 60   Variance Time    Variance Time []   Refusal due to:     []   Medical hold/reason:    []   Illness   []   Off Unit for test/procedure  []   Extra time needed to complete task  []   Therapeutic need  []   Other (specify):   Restrictions Restrictions/Precautions  Restrictions/Precautions: Fall Risk, General Precautions, Weight Bearing (Heel WB RLE, IV access R forearm, BS sensor L upper arm, pacemaker)  Implants Present? : Pacemaker      Communication with other providers: [x]   OK to see per nursing:     []   Spoke with team member regarding:      Subjective observations and cognitive status: Pt sitting EOB, reports \"feeling a little grumpy\" but agreeable to session.      Pain level/location:   0 /10       Location:    Discharge recommendations  Anticipated discharge date:  3/6  Destination: []home alone   []home alone with assist PRN     [x] home w/ family      [] Continuous supervision  []SNF    [x] Assisted living     [] Other:  Continued therapy: [x][facility]HC PT  []OUTPATIENT PT   [] No Further PT  []SNF PT  Caregiver training recommended: []Yes  [] No   Equipment needs: 2ww     PT QI     Bed Mobility:     Roll Left and Right  Assistance Needed: Independent  Comment: no bed features  CARE Score: 6  Discharge Goal: Independent    Sit to Lying  Assistance Needed: Independent  Comment: no bed features  CARE Score: 6  Discharge Goal: Independent    Lying to Sitting on Side of Bed  Assistance Needed: Independent  Comment: no bed features  CARE Score: 6  Discharge Goal:

## 2025-03-05 NOTE — PROGRESS NOTES
Level of Assist for Transfers: Independent  Active : No (reports not driving for 3 mo. due to vision changes)  Patient's  Info: Wife  Mode of Transportation: [employer]  Occupation: Retired ([employer])  Additional Comments: Pt has a regular flat bed    Objective                                                                                    Goals:  (Update in navigator)  Short Term Goals  Time Frame for Short Term Goals: STGs=LTGs:  Long Term Goals  Time Frame for Long Term Goals : 7 days or until d/c  Long Term Goal 1: Pt will complete grooming tasks Ind  Long Term Goal 2: Pt will complete total body bathing Ind  Long Term Goal 3: Pt will complete UB dressing Ind  Long Term Goal 4: Pt will complete LB dressing Ind  Long Term Goal 5: Pt will doff/don footwear Ind  Additional Goals?: Yes  Long Term Goal 6: Pt will complete toileting c Mod I  Long Term Goal 7: Pt will complete functional transfers (bed, chair, toilet, shower) c DME PRN and Mod I  Long Term Goal 8: Pt will perform therapy exercises to facilitate increased strength and endurance (emphasis on standing balance >10 min) c SBA  Long Term Goal 9: Pt will complete simple IADLs c DME PRN and Mod I:        Plan of Care                                                                              Times per week: 5 days per week for a minimum of 60 minutes/day plus group as appropriate for 60 minutes.  Treatment to include Occupational Therapy Plan  Current Treatment Recommendations: Strengthening, Balance training, Functional mobility training, Endurance training, Positioning, Equipment evaluation, education, & procurement, Safety education & training, Cognitive reorientation, Self-Care / ADL, Home management training, Return to work related activity, Coordination training, Group Therapy, Cognitive/Perceptual training    Electronically signed by   SUNSHINE Dawkins,  3/5/2025, 10:05 AM

## 2025-03-05 NOTE — PROGRESS NOTES
Spoke with [facility] and [facility] unable to provide daily dressing change visits. Clarified with Dr. Avalos and OK to change dressing 3 times per week and prn with plan to follow up weekly with him at outpatient wound clinic. Jemima was added today as well. Updated orders and [facility] liaison. Updated CM as well.

## 2025-03-05 NOTE — PROGRESS NOTES
falls     Chronic venous stasis dermatitis of both lower extremities     History of CVA (cerebrovascular accident)     Obesity hypoventilation syndrome     Hypertension     Hyperlipidemia     Congestive heart failure due to cardiomyopathy (HCC)     Stage 3b chronic kidney disease (HCC)     Chronic kidney disease, stage II (mild)     Benign essential microscopic hematuria     Stage 3a chronic kidney disease (HCC)     Generalized edema     Acute kidney injury superimposed on CKD (HCC)     Hypokalemia     Cellulitis of foot     Sepsis (HCC)     Diabetic foot infection (HCC)     Bacteremia due to Enterococcus      Plan:     Reviewed POC blood glucose . Labs and X ray results   Reviewed Current Medicines   On meal/ Correction bolus U500 regular insulin insulin regime /  Monitor Blood glucose frequently   Modified  the dose of Insulin/ other medicines as needed  Patient was transferred to ARU on evening of 2/22/2025  Patient participating in physical activities of rehab unit  Will follow     .     EMETA Diehl MD,

## 2025-03-05 NOTE — CARE COORDINATION
SUSIE called the office of Jakob Avalos MD to schedule follow up. The office declined to schedule with case management. They will call the patient directly to schedule. Notified them of discharge.     Scheduled appointment with Jakob Avalos MD Thursday Mar 13, 2025 2:45 PM at the [facility].     Left a voicemail with the office of MEETA Diehl MD to notify of discharge. Office to call patient directly to schedule.    Patient provided a list of follow up appointments. BIMS completed.

## 2025-03-05 NOTE — PATIENT CARE CONFERENCE
barriers:    Body Structures, Functions, Activity Limitations Requiring Skilled Therapeutic Intervention: Decreased functional mobility , Decreased body mechanics, Decreased strength, Decreased safe awareness, Decreased cognition, Decreased endurance, Increased pain, Decreased high-level IADLs, Decreased balance, Decreased sensation     Therapy Prognosis: Good  Decision Making: High Complexity  Clinical Presentation: unpredicatable presentation  Equipment needed at discharge:2ww      PT IRF-JAVIER scores since initial assessment  Bed Mobility:   Roll Left and Right  Assistance Needed: Independent  Comment: no bed features  CARE Score: 6  Discharge Goal: Independent    Sit to Lying  Assistance Needed: Independent  Comment: no bed features  CARE Score: 6  Discharge Goal: Independent    Lying to Sitting on Side of Bed  Assistance Needed: Independent  Comment: no bed features  CARE Score: 6  Discharge Goal: Independent    Transfers:    Sit to Stand  Assistance Needed: Independent  Comment: at RW  CARE Score: 6  Discharge Goal: Independent    Chair/Bed-to-Chair Transfer  Assistance Needed: Independent  Comment: with RW  CARE Score: 6  Discharge Goal: Independent         Car Transfer  Assistance Needed: Independent  Comment: using RW for support, demos safety in /out of car  CARE Score: 6  Discharge Goal: Independent    Ambulation:    Walking Ability  Does the Patient Walk?: Yes     Walk 10 Feet  Assistance Needed: Independent  Comment: at RW  CARE Score: 6  Discharge Goal: Independent     Walk 50 Feet with Two Turns  Assistance Needed: Independent  Comment: at RW  Reason if not Attempted: Not attempted due to medical condition or safety concerns  CARE Score: 6  Discharge Goal: Independent     Walk 150 Feet  Assistance Needed: Independent  Comment: at RW  Reason if not Attempted: Not attempted due to medical condition or safety concerns  CARE Score: 6  Discharge Goal: Not Attempted     Walking 10 Feet on Uneven

## 2025-03-06 VITALS
WEIGHT: 315 LBS | HEIGHT: 72 IN | TEMPERATURE: 97.5 F | RESPIRATION RATE: 16 BRPM | SYSTOLIC BLOOD PRESSURE: 146 MMHG | DIASTOLIC BLOOD PRESSURE: 77 MMHG | HEART RATE: 65 BPM | OXYGEN SATURATION: 98 % | BODY MASS INDEX: 42.66 KG/M2

## 2025-03-06 LAB
GLUCOSE BLD-MCNC: 117 MG/DL (ref 74–99)
GLUCOSE BLD-MCNC: 144 MG/DL (ref 74–99)
GLUCOSE BLD-MCNC: 221 MG/DL (ref 74–99)

## 2025-03-06 PROCEDURE — 6370000000 HC RX 637 (ALT 250 FOR IP): Performed by: INTERNAL MEDICINE

## 2025-03-06 PROCEDURE — 99024 POSTOP FOLLOW-UP VISIT: CPT | Performed by: SURGERY

## 2025-03-06 PROCEDURE — 82962 GLUCOSE BLOOD TEST: CPT

## 2025-03-06 RX ORDER — TORSEMIDE 10 MG/1
10 TABLET ORAL
Qty: 30 TABLET | Refills: 0 | Status: SHIPPED | OUTPATIENT
Start: 2025-03-06

## 2025-03-06 RX ORDER — TORSEMIDE 20 MG/1
20 TABLET ORAL EVERY MORNING
Qty: 30 TABLET | Refills: 0 | Status: SHIPPED | OUTPATIENT
Start: 2025-03-07

## 2025-03-06 RX ORDER — DOCUSATE SODIUM 100 MG/1
100 CAPSULE, LIQUID FILLED ORAL 2 TIMES DAILY PRN
Status: DISCONTINUED | OUTPATIENT
Start: 2025-03-06 | End: 2025-03-06 | Stop reason: HOSPADM

## 2025-03-06 RX ORDER — CLOPIDOGREL BISULFATE 75 MG/1
75 TABLET ORAL DAILY
Qty: 30 TABLET | Refills: 0 | Status: SHIPPED | OUTPATIENT
Start: 2025-03-06

## 2025-03-06 RX ORDER — METOPROLOL SUCCINATE 25 MG/1
25 TABLET, EXTENDED RELEASE ORAL DAILY
Qty: 30 TABLET | Refills: 0 | Status: SHIPPED | OUTPATIENT
Start: 2025-03-07

## 2025-03-06 RX ORDER — POTASSIUM CHLORIDE 1500 MG/1
40 TABLET, EXTENDED RELEASE ORAL
Qty: 180 TABLET | Refills: 0 | Status: SHIPPED | OUTPATIENT
Start: 2025-03-06

## 2025-03-06 RX ADMIN — CLOPIDOGREL BISULFATE 75 MG: 75 TABLET ORAL at 09:14

## 2025-03-06 RX ADMIN — COLLAGENASE SANTYL: 250 OINTMENT TOPICAL at 09:15

## 2025-03-06 RX ADMIN — FINASTERIDE 5 MG: 5 TABLET, FILM COATED ORAL at 09:13

## 2025-03-06 RX ADMIN — SPIRONOLACTONE 25 MG: 50 TABLET ORAL at 09:14

## 2025-03-06 RX ADMIN — TAMSULOSIN HYDROCHLORIDE 0.4 MG: 0.4 CAPSULE ORAL at 09:13

## 2025-03-06 RX ADMIN — TORSEMIDE 10 MG: 20 TABLET ORAL at 13:31

## 2025-03-06 RX ADMIN — TORSEMIDE 20 MG: 20 TABLET ORAL at 09:13

## 2025-03-06 RX ADMIN — POTASSIUM CHLORIDE 40 MEQ: 1500 TABLET, EXTENDED RELEASE ORAL at 13:32

## 2025-03-06 RX ADMIN — POTASSIUM CHLORIDE 40 MEQ: 1500 TABLET, EXTENDED RELEASE ORAL at 09:14

## 2025-03-06 RX ADMIN — INSULIN HUMAN 15 UNITS: 500 INJECTION, SOLUTION SUBCUTANEOUS at 13:30

## 2025-03-06 NOTE — PROGRESS NOTES
GENERAL SURGERY PROGRESS NOTE    Bandar De La Torre is a 64 y.o. male with right foot and toe wounds.                 Subjective:  Doing ok today. Reports doing ok with the fore foot offloading shoe. Plan is for d/c today.    Objective:    Vitals: VITALS:  /64   Pulse 59   Temp 97.5 °F (36.4 °C) (Oral)   Resp 16   Ht 1.829 m (6')   Wt (!) 150.4 kg (331 lb 9.2 oz)   SpO2 97%   BMI 44.97 kg/m²     I/O: 03/05 0701 - 03/06 0700  In: 700 [P.O.:700]  Out: -     Labs/Imaging Results:   Lab Results   Component Value Date/Time     03/03/2025 10:04 AM    K 4.8 03/03/2025 10:04 AM     03/03/2025 10:04 AM    CO2 24 03/03/2025 10:04 AM    BUN 41 03/03/2025 10:04 AM    CREATININE 1.4 03/03/2025 10:04 AM    GLUCOSE 300 03/03/2025 10:04 AM    CALCIUM 9.5 03/03/2025 10:04 AM      Lab Results   Component Value Date    WBC 8.0 03/03/2025    HGB 13.6 03/03/2025    HCT 42.7 03/03/2025    MCV 85.7 03/03/2025     03/03/2025         IV Fluids:   sodium chloride Last Rate: 10 mL/hr at 02/24/25 0047    dextrose    Scheduled Meds:   collagenase, , Topical, Daily    insulin regular human, 0-30 Units, SubCUTAneous, BID WC    insulin regular human, 0-30 Units, SubCUTAneous, Dinner    torsemide, 20 mg, Oral, QAM    torsemide, 10 mg, Oral, Daily    clopidogrel, 75 mg, Oral, Daily    finasteride, 5 mg, Oral, Daily    metoprolol succinate, 25 mg, Oral, Daily    potassium chloride, 40 mEq, Oral, TID WC    rivaroxaban, 20 mg, Oral, Dinner    spironolactone, 25 mg, Oral, BID    tamsulosin, 0.4 mg, Oral, Daily    Physical Exam:  General: A&O x 3, no distress.   HEENT: Anicteric sclerae, MMM.    Extremities: right foot toe amputation site clean and dry with sutures. Plantar wounds are smaller and mostly granulated. Mild fibrinous debris.    Abdomen: Soft, nontender, nondistended.       Assessment and Plan:  64 y.o. male with right foot osteomyelitis of toes 2-3. S/p toe amputation and foot debridement    Patient Active

## 2025-03-06 NOTE — PROGRESS NOTES
noticed.  Or the opposite- being so fidgety or restless that you have been moving around a lot more than usual.   [] 0.  No  [] 1.  Yes  [] 9. No Response [] 0.  Never or one day  [] 1.  2-6 days (several days)  [] 2.  7-11 days (half or more of days)  [] 3.  12-14 days (nearly every day   Thoughts that you would be better off dead, or of hurting yourself in some way.   [] 0.  No  [] 1.  Yes  [] 9. No Response [] 0.  Never or one day  [] 1.  2-6 days (several days)  [] 2.  7-11 days (half or more of days)  [] 3.  12-14 days (nearly every day     Social Isolation  \"How often do you feel lonely or isolated from those around you?\"  [x] 0.  Never  [] 1.  Rarely  [] 2.  Sometimes  [] 3.  Often  [] 4.  Always  [] 8.  Patient unable to respond    Pain Effect on Sleep  \"Over the past 5 days, how much of the time has pain made it hard for you to sleep at night?\"  []  0.  Does not apply - I have not had any pain or hurting in the past 5 days  [x]  1.  Rarely or not at all  []  2.  Occasionally  []  3.  Frequently  []  4.  Almost constantly  []  8.  Unable to answer  **If the patient answers \"0.  Does not apply\" to this question, skip the next two \"Pain Effect...\" questions**      Pain Interference with Therapy Activities  \"Over the past 5 days, how often have you limited your participation in rehabilitation therapy sessions due to pain?\"  [x]  1.  Rarely or not at all  []  2.  Occasionally  []  3.  Frequently  []  4.  Almost constantly  []  8.  Unable to answer    Pain Interference with Day-to-Day Activities:  \"Over the past 5 days, how often have you limited your day-to-day activities (excluding rehabilitation therapy session)?\"  [x]  1.  Rarely or not at all  []  2.  Occasionally  []  3.  Frequently  []  4.  Almost constantly  []  8.  Unable to answer    Verification that the patient's paper prescriptions were provided to the patient at time of discharge   (NEW QUESTION)  [x] Yes, the prescriptions were provided to the

## 2025-03-06 NOTE — PROGRESS NOTES
Needed: Independent  Comment: at RW  CARE Score: 6  Discharge Goal: Independent  Chair/Bed-to-Chair Transfer  Assistance Needed: Independent  Comment: with RW  CARE Score: 6  Discharge Goal: Independent     Car Transfer  Assistance Needed: Independent  Comment: using RW for support, demos safety in /out of car  CARE Score: 6  Discharge Goal: Independent    Ambulation:    Walking Ability  Does the Patient Walk?: Yes     Walk 10 Feet  Assistance Needed: Independent  Comment: at RW  CARE Score: 6  Discharge Goal: Independent     Walk 50 Feet with Two Turns  Assistance Needed: Independent  Comment: at RW  Reason if not Attempted: Not attempted due to medical condition or safety concerns  CARE Score: 6  Discharge Goal: Independent     Walk 150 Feet  Assistance Needed: Independent  Comment: at RW  Reason if not Attempted: Not attempted due to medical condition or safety concerns  CARE Score: 6  Discharge Goal: Not Attempted     Walking 10 Feet on Uneven Surfaces  Assistance Needed: Independent  Comment: diony safety and balance with RW  Reason if not Attempted: Not attempted due to medical condition or safety concerns  CARE Score: 6  Discharge Goal: Independent     1 Step (Curb)  Assistance Needed: Independent  Comment: demos correct seq, safety and balance with RW  Reason if not Attempted: Not attempted due to medical condition or safety concerns  CARE Score: 6  Discharge Goal: Independent     4 Steps  Assistance Needed: Independent  Comment: B rails, demos correct seq in recip pattern  Reason if not Attempted: Not attempted due to medical condition or safety concerns  CARE Score: 6  Discharge Goal: Supervision or touching assistance     12 Steps  Assistance Needed: Independent  Comment: JUANPABLO daily demos correct seq in recip pattern  Reason if not Attempted: Not attempted due to medical condition or safety concerns  CARE Score: 6  Discharge Goal: Supervision or touching assistance       Wheelchair:  w/c Ability: Wheelchair

## 2025-03-06 NOTE — PROGRESS NOTES
IFA for   systemic autoimmune rheumatic diseases (SARD).  Negative results do not necessarily rule out SARD.  Performed by [facility],  [address] [facility] [place] [address] [phone]  [email or web address], Simon Dunne MD, Lab. Director       24 Hour Urine for Creatinine Clearance:  No components found for: \"CREAT4\", \"UHRS10\", \"UTV10\"      Objective:   I/O: 03/05 0701 - 03/06 0700  In: 700 [P.O.:700]  Out: -   I/O last 3 completed shifts:  In: 820 [P.O.:820]  Out: -   No intake/output data recorded.  Vitals: BP 95/62   Pulse 61   Temp 98.4 °F (36.9 °C) (Oral)   Resp 18   Ht 1.829 m (6')   Wt (!) 150.4 kg (331 lb 9.2 oz)   SpO2 99%   BMI 44.97 kg/m²  {  General appearance: awake weak  HEENT: Head: Normal, normocephalic, atraumatic.  Neck: supple, symmetrical, trachea midline  Lungs: diminished breath sounds bilaterally  Heart: S1, S2 normal  Abdomen: abnormal findings:  soft nt  Extremities: edema trace dressing on foot  Neurologic: Mental status: alertness: alert          Assessment and Plan:      IMP:  1.  Diabetic foot ulcer status post infection with surgery  #2 enterococcal bacteremia  #3 type 2 diabetes  #4 hypokalemia  #5 CKD 3 with acute renal failure  #6 congestive heart failure EF 50% on diuretic therapy  #7 atrial fibrillation on Xarelto    Plan     1 wound care outpt  2 no fever  3 glcuose controlled  4 K stable  5 creat 1.4 to baseline  6 volume stable good diureis bp low stable HR stable  Ok dc today fu outpt 3-4 week             DECLAN TUCKER MD, MD

## 2025-03-06 NOTE — CARE COORDINATION
ARU  Discharge Summary    D/C Date: 03/06/2025    Patient discharged to: Home with spouse    Transported by: Spouse    Referrals made to: [facility]    Additional information: Follow up appointments scheduled with Jakob Avalos MD Thursday Mar 13, 2025 2:45 PM, Jakob Avalos MD  Tuesday Mar 25, 2025 3:45 PM, Antonino Bower MD Thursday Apr 24, 2025 9:15 AM, Juan Gunter MD Monday May 12, 2025 3:30 PM. SUSIE left a voicemail at the office of MEETA Diehl MD to notify of discharge, office to call patient directly. Patient notified, AVS updated.     Caregiver training: None requested    Discharge BIMS completed: [x]      IMM: [x]       Ph[facility]: [facility] [address]    Discharge clinical was uploaded to the [facility].     Discharge Assessment:     [x] OT Note 03/05 -Patient fully participated in the program and made good progress towards established goals.  Unfortunately pt did still require assist for donning post op shoe (pt struggles with managing footwear at baseline 2* decreased flexibility and body habitus).   [x] PT Note 03/05 -Patient fully participated in the program and met established goals. Pt initially with some difficulty following heel WB precautions. His surgeon was able to locate offsetting post op shoe and pt needed several sessions to adapt to balance changes, but was then able to exceed goals. Pt did not meet w/c goal as it became apparent that he would be able to use walker for short community distances and it was not a focus of treatment. Pt is recommended to wear post op shoe and use 2ww and f/u with [facility]. D/C home with spouse.

## 2025-03-06 NOTE — PROGRESS NOTES
Progress Note( Dr. Diehl)  3/5/2025  Subjective:   Admit Date: 2/22/2025  PCP: MEETA Diehl MD    Admitted For : Right foot infection and high blood glucose     Consulted For:  Right foot infection and high blood glucose     Interval History: Patient had  amputation of second and second and third toe of the right foot and debridement of the wound of the right foot done 2/19/2025 patient feeling a lot better    Patient was transferred to ARU as of 2/22/2025 evening    Patient l hypokalemia resolved as potassium being replaced and also added spironolactone  Patient continued with other diuretic torsemide    Blood glucose was running  somewhat yesterday as pharmacy did not deliver insulin on time   Meal plus correction bolus insulin schedules 2 out of    Denies any chest pains,   Denies SOB .   Denies nausea or vomiting.   No new bowel or bladder symptoms.       Intake/Output Summary (Last 24 hours) at 3/5/2025 2128  Last data filed at 3/5/2025 2025  Gross per 24 hour   Intake 820 ml   Output --   Net 820 ml       DATA    CBC:   Recent Labs     03/03/25  1004   WBC 8.0   HGB 13.6         CMP:  Recent Labs     03/03/25  1004      K 4.8      CO2 24   BUN 41*   CREATININE 1.4*   CALCIUM 9.5   BILITOT 0.6   ALKPHOS 83   AST 18   ALT 18     Lipids:   Lab Results   Component Value Date/Time    CHOL 159 11/14/2024 08:23 AM    HDL 39 11/14/2024 08:23 AM    TRIG 152 11/14/2024 08:23 AM     Glucose:  Recent Labs     03/05/25  1110 03/05/25  1421 03/05/25  1642   POCGLU 406* 381* 285*     [id number]:  Lab Results   Component Value Date/Time    LABA1C 8.8 02/15/2025 01:47 AM     High Sensitivity TSH:   Lab Results   Component Value Date/Time    TSHHS 1.800 08/29/2019 10:06 AM     Free T3: No results found for: \"FT3\"  Free T4:No results found for: \"T4FREE\"    No orders to display        Scheduled Medicines   Medications:    collagenase   Topical Daily    insulin regular human  0-30 Units SubCUTAneous

## 2025-03-06 NOTE — PROGRESS NOTES
Patient given discharge papers. All question answered, verbalized by patient. Patient left via wheelchair.

## 2025-03-06 NOTE — PLAN OF CARE
Problem: Chronic Conditions and Co-morbidities  Goal: Patient's chronic conditions and co-morbidity symptoms are monitored and maintained or improved  3/6/2025 1016 by Charlie Christy LPN  Outcome: Progressing     Problem: Discharge Planning  Goal: Discharge to home or other facility with appropriate resources  3/6/2025 1016 by Charlie Christy LPN  Outcome: Progressing     Problem: Safety - Adult  Goal: Free from fall injury  3/6/2025 1016 by Charlie Christy LPN  Outcome: Progressing     Problem: Pain  Goal: Verbalizes/displays adequate comfort level or baseline comfort level  3/6/2025 1016 by Charlie Christy LPN  Outcome: Progressing     Problem: ABCDS Injury Assessment  Goal: Absence of physical injury  3/6/2025 1016 by Charlie Christy LPN  Outcome: Progressing

## 2025-03-13 ENCOUNTER — HOSPITAL ENCOUNTER (OUTPATIENT)
Dept: WOUND CARE | Age: 65
Discharge: HOME | End: 2025-03-13
Attending: NURSE PRACTITIONER
Payer: MEDICARE

## 2025-03-13 VITALS
SYSTOLIC BLOOD PRESSURE: 116 MMHG | HEART RATE: 71 BPM | TEMPERATURE: 98.9 F | RESPIRATION RATE: 16 BRPM | DIASTOLIC BLOOD PRESSURE: 54 MMHG

## 2025-03-13 DIAGNOSIS — E11.621 DIABETIC ULCER OF TOE OF RIGHT FOOT ASSOCIATED WITH TYPE 2 DIABETES MELLITUS, WITH FAT LAYER EXPOSED (HCC): ICD-10-CM

## 2025-03-13 DIAGNOSIS — L97.512 DIABETIC ULCER OF TOE OF RIGHT FOOT ASSOCIATED WITH TYPE 2 DIABETES MELLITUS, WITH FAT LAYER EXPOSED (HCC): ICD-10-CM

## 2025-03-13 DIAGNOSIS — L97.912 TRAUMATIC ULCER OF LOWER EXTREMITY, RIGHT, WITH FAT LAYER EXPOSED (HCC): ICD-10-CM

## 2025-03-13 DIAGNOSIS — T81.89XD SURGICAL WOUND, NON HEALING, SUBSEQUENT ENCOUNTER: Primary | ICD-10-CM

## 2025-03-13 DIAGNOSIS — L97.412 DIABETIC ULCER OF RIGHT MIDFOOT ASSOCIATED WITH TYPE 2 DIABETES MELLITUS, WITH FAT LAYER EXPOSED (HCC): ICD-10-CM

## 2025-03-13 DIAGNOSIS — Z79.01 ANTICOAGULATED: ICD-10-CM

## 2025-03-13 DIAGNOSIS — E11.621 DIABETIC ULCER OF RIGHT MIDFOOT ASSOCIATED WITH TYPE 2 DIABETES MELLITUS, WITH FAT LAYER EXPOSED (HCC): ICD-10-CM

## 2025-03-13 DIAGNOSIS — E66.01 MORBID OBESITY WITH BMI OF 40.0-44.9, ADULT: ICD-10-CM

## 2025-03-13 DIAGNOSIS — L84 PRE-ULCERATIVE CALLUSES: ICD-10-CM

## 2025-03-13 DIAGNOSIS — Z89.429 STATUS POST AMPUTATION OF TOE: ICD-10-CM

## 2025-03-13 PROCEDURE — 11056 PARNG/CUTG B9 HYPRKR LES 2-4: CPT

## 2025-03-13 PROCEDURE — 99213 OFFICE O/P EST LOW 20 MIN: CPT

## 2025-03-13 PROCEDURE — 11042 DBRDMT SUBQ TIS 1ST 20SQCM/<: CPT

## 2025-03-13 RX ORDER — RANOLAZINE 500 MG/1
500 TABLET, EXTENDED RELEASE ORAL 2 TIMES DAILY
COMMUNITY

## 2025-03-13 NOTE — PATIENT INSTRUCTIONS
PHYSICIAN ORDERS AND DISCHARGE INSTRUCTIONS  NOTE: Upon discharge from the Wound Center, you will receive a patient experience survey via E-mail. We would be grateful if you would take the time to fill this survey out.  Wound care order history:   SHELBY's   Right       Left    Date    Vascular studies/Intervention: .     Cultures: .               Antibiotics: .               HbA1c:  .               Grafts:  .   Juxta Lites & Lymph Pumps:  Continuing wound care orders and information:              Residence: .              Continue home health care with[facility]   Your wound-care supplies will be provided by: .              Pharmacy: [facility] on [address]  Wound cleansing:      Do not scrub or use excessive force.    Wash hands with soap and water before and after dressing changes.    Prior to applying a clean dressing, cleanse wound with normal saline,    wound cleanser, or mild soap and water.     Ask your physician or nurse before getting the wound(s) wet in the shower.  Daily Wound management:    Keep weight off wounds and reposition every 2 hours.    Avoid standing for long periods of time.    Evaluate legs to the level of the heart or above for 30 minutes 4-5 times a day and/or when sitting.       When taking antibiotics take entire prescription as ordered by MD do not stop taking until medicine is all gone.     Documentation:  Compression: .   Offloading: .   Toenail Trim:         Orders for this week (3/13/2025):  Right great toe and plantar foot wounds   - Wash with mild liquid soap and water,  Apply Santyl to wound bed   Cover with Qwick aperture   Cover with ABD  Wrap with Conform and Coban     Right lateral foot wound-   Apply anasept and stimulen  Cover with ABD   Wrap with Conform and ACE wrap    Right toe amp site-  Apply anasept and stimulen to open area of suture line  Cover wit Xeroform   Cover with ABD   Wrap with Conform and ACE wrap     Left Heel-   Paint with Betadine   ABD heel cup  Secure with Conform

## 2025-03-15 PROBLEM — T81.89XD SURGICAL WOUND, NON HEALING, SUBSEQUENT ENCOUNTER: Status: ACTIVE | Noted: 2025-03-15

## 2025-03-15 PROBLEM — L97.512 DIABETIC ULCER OF TOE OF RIGHT FOOT ASSOCIATED WITH TYPE 2 DIABETES MELLITUS, WITH FAT LAYER EXPOSED: Status: ACTIVE | Noted: 2025-03-15

## 2025-03-15 PROBLEM — E11.621 DIABETIC ULCER OF TOE OF RIGHT FOOT ASSOCIATED WITH TYPE 2 DIABETES MELLITUS, WITH FAT LAYER EXPOSED: Status: ACTIVE | Noted: 2025-03-15

## 2025-03-15 PROBLEM — L84 PRE-ULCERATIVE CALLUSES: Status: ACTIVE | Noted: 2025-03-15

## 2025-03-15 PROBLEM — L97.912 TRAUMATIC ULCER OF LOWER EXTREMITY, RIGHT, WITH FAT LAYER EXPOSED (HCC): Status: ACTIVE | Noted: 2025-03-15

## 2025-03-15 PROBLEM — Z79.01 ANTICOAGULATED: Status: ACTIVE | Noted: 2025-03-15

## 2025-03-15 PROBLEM — L97.412 DIABETIC ULCER OF RIGHT MIDFOOT ASSOCIATED WITH TYPE 2 DIABETES MELLITUS, WITH FAT LAYER EXPOSED (HCC): Status: ACTIVE | Noted: 2025-02-23

## 2025-03-15 RX ORDER — TRIAMCINOLONE ACETONIDE 1 MG/G
OINTMENT TOPICAL PRN
OUTPATIENT
Start: 2025-03-15

## 2025-03-15 RX ORDER — SILVER SULFADIAZINE 10 MG/G
CREAM TOPICAL PRN
OUTPATIENT
Start: 2025-03-15

## 2025-03-15 RX ORDER — SODIUM CHLOR/HYPOCHLOROUS ACID 0.033 %
SOLUTION, IRRIGATION IRRIGATION PRN
OUTPATIENT
Start: 2025-03-15

## 2025-03-15 RX ORDER — LIDOCAINE 40 MG/G
CREAM TOPICAL PRN
OUTPATIENT
Start: 2025-03-15

## 2025-03-15 RX ORDER — BACITRACIN ZINC AND POLYMYXIN B SULFATE 500; 1000 [USP'U]/G; [USP'U]/G
OINTMENT TOPICAL PRN
OUTPATIENT
Start: 2025-03-15

## 2025-03-15 RX ORDER — MUPIROCIN 20 MG/G
OINTMENT TOPICAL PRN
OUTPATIENT
Start: 2025-03-15

## 2025-03-15 RX ORDER — BETAMETHASONE DIPROPIONATE 0.5 MG/G
CREAM TOPICAL PRN
OUTPATIENT
Start: 2025-03-15

## 2025-03-15 RX ORDER — NEOMYCIN/BACITRACIN/POLYMYXINB 3.5-400-5K
OINTMENT (GRAM) TOPICAL PRN
OUTPATIENT
Start: 2025-03-15

## 2025-03-15 RX ORDER — LIDOCAINE HYDROCHLORIDE 20 MG/ML
JELLY TOPICAL PRN
OUTPATIENT
Start: 2025-03-15

## 2025-03-15 RX ORDER — LIDOCAINE HYDROCHLORIDE 40 MG/ML
SOLUTION TOPICAL PRN
OUTPATIENT
Start: 2025-03-15

## 2025-03-15 RX ORDER — LIDOCAINE 50 MG/G
OINTMENT TOPICAL PRN
OUTPATIENT
Start: 2025-03-15

## 2025-03-15 RX ORDER — GINSENG 100 MG
CAPSULE ORAL PRN
OUTPATIENT
Start: 2025-03-15

## 2025-03-15 RX ORDER — GENTAMICIN SULFATE 1 MG/G
OINTMENT TOPICAL PRN
OUTPATIENT
Start: 2025-03-15

## 2025-03-15 RX ORDER — CLOBETASOL PROPIONATE 0.5 MG/G
OINTMENT TOPICAL PRN
OUTPATIENT
Start: 2025-03-15

## 2025-03-15 NOTE — PROGRESS NOTES
SYSTEMS      Constitutional: negative for anorexia, chills, fatigue, fevers, malaise, and sweats  Respiratory: negative for cough, hemoptysis, pleurisy/chest pain, sputum, and stridor  Cardiovascular: negative for chest pain, chest pressure/discomfort, exertional chest pressure/discomfort, near-syncope, orthopnea, palpitations, paroxysmal nocturnal dyspnea, syncope, and tachypnea  Integument/breast: positive for skin lesion(s)        Objective:      BP (!) 116/54   Pulse 71   Temp 98.9 °F (37.2 °C) (Temporal)   Resp 16     BP Readings from Last 3 Encounters:   03/13/25 (!) 116/54   03/06/25 (!) 146/77   02/22/25 115/64        PHYSICAL EXAM  General Appearance:   Alert, cooperative, no distress   Head:   Normocephalic, without obvious abnormality, atraumatic    Respiratory:   Equal chest rise, respirations unlabored, no wheezing   Neurologic:  Nonfocal, strength & sensation grossly intact   Psychiatric: Oriented x3, grossly appropriate judgement and insight      Dermatologic exam: Visual inspection of the periwound reveals the skin to be dry and edematous  Wound exam: see wound description below in procedure note      Assessment:       Bandar De La Torre  appears to have a non-healing wound of the Rt. foot 2nd toe & 3rd toes, right lateral 5th toe, right plantar foot, and right great toe. The etiology of the wound is felt to be diabetic, non-healing surgical, and traumatic. There are multiple complicating factors including edema, diabetes, obesity, and anticoagulation therapy.  A comprehensive wound management program would be helpful to heal this wound. Assessments completed include fall risk and nutritional, functional,and psychological status.  At this time appropriate care would include: periodic debridement and wound care as below.     Problem List Items Addressed This Visit          Endocrine    Diabetic ulcer of right midfoot associated with type 2 diabetes mellitus, with fat layer exposed (HCC)    Diabetic

## 2025-03-20 ENCOUNTER — HOSPITAL ENCOUNTER (OUTPATIENT)
Dept: WOUND CARE | Age: 65
Discharge: HOME | End: 2025-03-20
Attending: NURSE PRACTITIONER
Payer: MEDICARE

## 2025-03-20 VITALS
DIASTOLIC BLOOD PRESSURE: 66 MMHG | RESPIRATION RATE: 16 BRPM | HEART RATE: 85 BPM | TEMPERATURE: 96.9 F | SYSTOLIC BLOOD PRESSURE: 118 MMHG

## 2025-03-20 DIAGNOSIS — L84 PRE-ULCERATIVE CALLUSES: ICD-10-CM

## 2025-03-20 DIAGNOSIS — E11.621 DIABETIC ULCER OF TOE OF RIGHT FOOT ASSOCIATED WITH TYPE 2 DIABETES MELLITUS, WITH FAT LAYER EXPOSED: ICD-10-CM

## 2025-03-20 DIAGNOSIS — L97.912: ICD-10-CM

## 2025-03-20 DIAGNOSIS — T81.89XD SURGICAL WOUND, NON HEALING, SUBSEQUENT ENCOUNTER: ICD-10-CM

## 2025-03-20 DIAGNOSIS — L97.512 DIABETIC ULCER OF TOE OF RIGHT FOOT ASSOCIATED WITH TYPE 2 DIABETES MELLITUS, WITH FAT LAYER EXPOSED: ICD-10-CM

## 2025-03-20 DIAGNOSIS — T81.89XD SURGICAL WOUND, NON HEALING, SUBSEQUENT ENCOUNTER: Primary | ICD-10-CM

## 2025-03-20 DIAGNOSIS — E11.621 DIABETIC ULCER OF RIGHT MIDFOOT ASSOCIATED WITH TYPE 2 DIABETES MELLITUS, WITH FAT LAYER EXPOSED: ICD-10-CM

## 2025-03-20 DIAGNOSIS — L97.412 DIABETIC ULCER OF RIGHT MIDFOOT ASSOCIATED WITH TYPE 2 DIABETES MELLITUS, WITH FAT LAYER EXPOSED: ICD-10-CM

## 2025-03-20 DIAGNOSIS — Z79.01 ANTICOAGULATED: ICD-10-CM

## 2025-03-20 PROBLEM — M86.071 ACUTE HEMATOGENOUS OSTEOMYELITIS OF RIGHT FOOT (HCC): Status: ACTIVE | Noted: 2025-03-20

## 2025-03-20 PROBLEM — R26.9 GAIT DISTURBANCE: Status: ACTIVE | Noted: 2025-03-20

## 2025-03-20 PROBLEM — I44.2 COMPLETE HEART BLOCK (HCC): Status: ACTIVE | Noted: 2025-03-20

## 2025-03-20 PROBLEM — E66.813 OBESITY, CLASS III, BMI 40-49.9 (MORBID OBESITY) (HCC): Status: ACTIVE | Noted: 2018-11-20

## 2025-03-20 PROCEDURE — 11042 DBRDMT SUBQ TIS 1ST 20SQCM/<: CPT

## 2025-03-20 RX ORDER — LIDOCAINE HYDROCHLORIDE 40 MG/ML
SOLUTION TOPICAL PRN
OUTPATIENT
Start: 2025-03-20

## 2025-03-20 RX ORDER — NEOMYCIN/BACITRACIN/POLYMYXINB 3.5-400-5K
OINTMENT (GRAM) TOPICAL PRN
OUTPATIENT
Start: 2025-03-20

## 2025-03-20 RX ORDER — SILVER SULFADIAZINE 10 MG/G
CREAM TOPICAL PRN
OUTPATIENT
Start: 2025-03-20

## 2025-03-20 RX ORDER — BACITRACIN ZINC AND POLYMYXIN B SULFATE 500; 1000 [USP'U]/G; [USP'U]/G
OINTMENT TOPICAL PRN
OUTPATIENT
Start: 2025-03-20

## 2025-03-20 RX ORDER — LIDOCAINE 40 MG/G
CREAM TOPICAL PRN
OUTPATIENT
Start: 2025-03-20

## 2025-03-20 RX ORDER — CLOBETASOL PROPIONATE 0.5 MG/G
OINTMENT TOPICAL PRN
OUTPATIENT
Start: 2025-03-20

## 2025-03-20 RX ORDER — LIDOCAINE 50 MG/G
OINTMENT TOPICAL PRN
OUTPATIENT
Start: 2025-03-20

## 2025-03-20 RX ORDER — BETAMETHASONE DIPROPIONATE 0.5 MG/G
CREAM TOPICAL PRN
OUTPATIENT
Start: 2025-03-20

## 2025-03-20 RX ORDER — SODIUM CHLOR/HYPOCHLOROUS ACID 0.033 %
SOLUTION, IRRIGATION IRRIGATION PRN
OUTPATIENT
Start: 2025-03-20

## 2025-03-20 RX ORDER — TRIAMCINOLONE ACETONIDE 1 MG/G
OINTMENT TOPICAL PRN
OUTPATIENT
Start: 2025-03-20

## 2025-03-20 RX ORDER — LIDOCAINE HYDROCHLORIDE 20 MG/ML
JELLY TOPICAL PRN
OUTPATIENT
Start: 2025-03-20

## 2025-03-20 RX ORDER — GENTAMICIN SULFATE 1 MG/G
OINTMENT TOPICAL PRN
OUTPATIENT
Start: 2025-03-20

## 2025-03-20 RX ORDER — GINSENG 100 MG
CAPSULE ORAL PRN
OUTPATIENT
Start: 2025-03-20

## 2025-03-20 RX ORDER — MUPIROCIN 20 MG/G
OINTMENT TOPICAL PRN
OUTPATIENT
Start: 2025-03-20

## 2025-03-20 NOTE — PATIENT INSTRUCTIONS
PHYSICIAN ORDERS AND DISCHARGE INSTRUCTIONS  NOTE: Upon discharge from the Wound Center, you will receive a patient experience survey via E-mail. We would be grateful if you would take the time to fill this survey out.  Wound care order history:   SHELBY's   Right       Left    Date    Vascular studies/Intervention: .     Cultures: .               Antibiotics: .               HbA1c:  .               Grafts:  .   Juxta Lites & Lymph Pumps:  Continuing wound care orders and information:              Residence: .              [facility] care [facility]   Your wound-care supplies will be provided by: .              Pharmacy: [facility] on [address]  Wound cleansing:      Do not scrub or use excessive force.    Wash hands with soap and water before and after dressing changes.    Prior to applying a clean dressing, cleanse wound with normal saline,    wound cleanser, or mild soap and water.     Ask your physician or nurse before getting the wound(s) wet in the shower.  Daily Wound management:    Keep weight off wounds and reposition every 2 hours.    Avoid standing for long periods of time.    Evaluate legs to the level of the heart or above for 30 minutes 4-5 times a day and/or when sitting.       When taking antibiotics take entire prescription as ordered by MD do not stop taking until medicine is all gone.     Documentation:  Compression: .   Offloading: .   Toenail Trim:         Orders for this week (3/20/2025):    Right Great Toe, Plantar and Left Heel - Wash with mild liquid soap and water,  Qwick aperture to kris wound, secure with medipore tape  Apply Puracol to wound bed   Cover with ABD (abd heel cup to left heel)  Wrap with Conform and secure with tape  Tubi G  Change on Monday (if soiled) and Thursday (in clinic)    Right toe amp site- wash with mild soap and water  Apply puracol to open area of suture line  Cover with ABD   Wrap with Conform   Tubi  G  Change on Monday (if soiled) and Thursday

## 2025-03-20 NOTE — PROGRESS NOTES
Pink/red;Slough 03/20/25 1525   Drainage Amount Moderate (25-50%) 03/20/25 1525   Drainage Description Serosanguinous 03/20/25 1525   Odor None 03/20/25 1525   Berenice-wound Assessment Fragile;Dry/flaky 03/20/25 1525   Margins Defined edges;Unattached edges 03/20/25 1525   Wound Thickness Description not for Pressure Injury Full thickness 03/20/25 1525   Number of days: 7       Wound 03/20/25 #5 Left Heel (Active)   Wound Image   03/20/25 1602   Wound Cleansed Soap and water;Wound cleanser 03/20/25 1602   Offloading for Diabetic Foot Ulcers Other (comment) 03/20/25 1602   Wound Length (cm) 1 cm 03/20/25 1525   Wound Width (cm) 1.1 cm 03/20/25 1525   Wound Depth (cm) 0.1 cm 03/20/25 1525   Wound Surface Area (cm^2) 1.1 cm^2 03/20/25 1525   Wound Volume (cm^3) 0.11 cm^3 03/20/25 1525   Post-Procedure Length (cm) 1 cm 03/20/25 1548   Post-Procedure Width (cm) 1.1 cm 03/20/25 1548   Post-Procedure Depth (cm) 0.1 cm 03/20/25 1548   Post-Procedure Surface Area (cm^2) 1.1 cm^2 03/20/25 1548   Post-Procedure Volume (cm^3) 0.11 cm^3 03/20/25 1548   Distance Tunneling (cm) 0 cm 03/20/25 1525   Tunneling Position ___ O'Clock 0 03/20/25 1525   Undermining Starts ___ O'Clock 0 03/20/25 1525   Undermining Ends___ O'Clock 0 03/20/25 1525   Undermining Maxium Distance (cm) 0 03/20/25 1525   Wound Assessment Granulation tissue 03/20/25 1548   Drainage Amount Moderate (25-50%) 03/20/25 1548   Drainage Description Sanguinous;Serosanguinous 03/20/25 1548   Odor None 03/20/25 1525   Berenice-wound Assessment Hyperkeratosis (callous) 03/20/25 1525   Margins Defined edges 03/20/25 1525   Wound Thickness Description not for Pressure Injury Full thickness 03/20/25 1525   Number of days: 0       Percent of Wound(s) Debrided: approximately 100%    Total  Area  Debrided:  5 sq cm     Bleeding:  Minimal    Hemostasis Achieved:  by pressure    Procedural Pain:  0  / 10     Post Procedural Pain:  0 / 10     Response to treatment:  Well tolerated by

## 2025-03-27 ENCOUNTER — HOSPITAL ENCOUNTER (OUTPATIENT)
Dept: WOUND CARE | Age: 65
Discharge: HOME | End: 2025-03-27
Attending: NURSE PRACTITIONER
Payer: MEDICARE

## 2025-03-27 VITALS
HEART RATE: 82 BPM | TEMPERATURE: 98.6 F | RESPIRATION RATE: 16 BRPM | SYSTOLIC BLOOD PRESSURE: 112 MMHG | DIASTOLIC BLOOD PRESSURE: 60 MMHG

## 2025-03-27 DIAGNOSIS — Z79.01 ANTICOAGULATED: ICD-10-CM

## 2025-03-27 DIAGNOSIS — L97.912: ICD-10-CM

## 2025-03-27 DIAGNOSIS — L97.412 DIABETIC ULCER OF RIGHT MIDFOOT ASSOCIATED WITH TYPE 2 DIABETES MELLITUS, WITH FAT LAYER EXPOSED: ICD-10-CM

## 2025-03-27 DIAGNOSIS — T81.89XD SURGICAL WOUND, NON HEALING, SUBSEQUENT ENCOUNTER: ICD-10-CM

## 2025-03-27 DIAGNOSIS — E11.621 DIABETIC ULCER OF RIGHT MIDFOOT ASSOCIATED WITH TYPE 2 DIABETES MELLITUS, WITH FAT LAYER EXPOSED: ICD-10-CM

## 2025-03-27 DIAGNOSIS — T81.89XD SURGICAL WOUND, NON HEALING, SUBSEQUENT ENCOUNTER: Primary | ICD-10-CM

## 2025-03-27 DIAGNOSIS — L97.512 DIABETIC ULCER OF TOE OF RIGHT FOOT ASSOCIATED WITH TYPE 2 DIABETES MELLITUS, WITH FAT LAYER EXPOSED: ICD-10-CM

## 2025-03-27 DIAGNOSIS — L84 PRE-ULCERATIVE CALLUSES: ICD-10-CM

## 2025-03-27 DIAGNOSIS — E11.621 DIABETIC ULCER OF TOE OF RIGHT FOOT ASSOCIATED WITH TYPE 2 DIABETES MELLITUS, WITH FAT LAYER EXPOSED: ICD-10-CM

## 2025-03-27 PROCEDURE — 11042 DBRDMT SUBQ TIS 1ST 20SQCM/<: CPT

## 2025-03-27 PROCEDURE — 11042 DBRDMT SUBQ TIS 1ST 20SQCM/<: CPT | Performed by: SURGERY

## 2025-03-27 PROCEDURE — 11055 PARING/CUTG B9 HYPRKER LES 1: CPT

## 2025-03-27 RX ORDER — LIDOCAINE HYDROCHLORIDE 20 MG/ML
JELLY TOPICAL PRN
OUTPATIENT
Start: 2025-03-27

## 2025-03-27 RX ORDER — GENTAMICIN SULFATE 1 MG/G
OINTMENT TOPICAL PRN
OUTPATIENT
Start: 2025-03-27

## 2025-03-27 RX ORDER — BETAMETHASONE DIPROPIONATE 0.5 MG/G
CREAM TOPICAL PRN
OUTPATIENT
Start: 2025-03-27

## 2025-03-27 RX ORDER — MUPIROCIN 20 MG/G
OINTMENT TOPICAL PRN
OUTPATIENT
Start: 2025-03-27

## 2025-03-27 RX ORDER — CLOBETASOL PROPIONATE 0.5 MG/G
OINTMENT TOPICAL PRN
OUTPATIENT
Start: 2025-03-27

## 2025-03-27 RX ORDER — SODIUM CHLOR/HYPOCHLOROUS ACID 0.033 %
SOLUTION, IRRIGATION IRRIGATION PRN
OUTPATIENT
Start: 2025-03-27

## 2025-03-27 RX ORDER — NEOMYCIN/BACITRACIN/POLYMYXINB 3.5-400-5K
OINTMENT (GRAM) TOPICAL PRN
OUTPATIENT
Start: 2025-03-27

## 2025-03-27 RX ORDER — BACITRACIN ZINC AND POLYMYXIN B SULFATE 500; 1000 [USP'U]/G; [USP'U]/G
OINTMENT TOPICAL PRN
OUTPATIENT
Start: 2025-03-27

## 2025-03-27 RX ORDER — LIDOCAINE HYDROCHLORIDE 40 MG/ML
SOLUTION TOPICAL PRN
OUTPATIENT
Start: 2025-03-27

## 2025-03-27 RX ORDER — SILVER SULFADIAZINE 10 MG/G
CREAM TOPICAL PRN
OUTPATIENT
Start: 2025-03-27

## 2025-03-27 RX ORDER — TRIAMCINOLONE ACETONIDE 1 MG/G
OINTMENT TOPICAL PRN
OUTPATIENT
Start: 2025-03-27

## 2025-03-27 RX ORDER — LIDOCAINE 50 MG/G
OINTMENT TOPICAL PRN
OUTPATIENT
Start: 2025-03-27

## 2025-03-27 RX ORDER — GINSENG 100 MG
CAPSULE ORAL PRN
OUTPATIENT
Start: 2025-03-27

## 2025-03-27 RX ORDER — LIDOCAINE 40 MG/G
CREAM TOPICAL PRN
OUTPATIENT
Start: 2025-03-27

## 2025-03-27 NOTE — PATIENT INSTRUCTIONS
PHYSICIAN ORDERS AND DISCHARGE INSTRUCTIONS  NOTE: Upon discharge from the Wound Center, you will receive a patient experience survey via E-mail. We would be grateful if you would take the time to fill this survey out.  Wound care order history:   SHELBY's   Right       Left    Date    Vascular studies/Intervention: .     Cultures: .               Antibiotics: .               HbA1c:  .               Grafts:  .   Juxta Lites & Lymph Pumps:  Continuing wound care orders and information:              Residence: .              Continue home health care with[facility]   Your wound-care supplies will be provided by: .              Pharmacy: [facility] on [address]  Wound cleansing:      Do not scrub or use excessive force.    Wash hands with soap and water before and after dressing changes.    Prior to applying a clean dressing, cleanse wound with normal saline,    wound cleanser, or mild soap and water.     Ask your physician or nurse before getting the wound(s) wet in the shower.  Daily Wound management:    Keep weight off wounds and reposition every 2 hours.    Avoid standing for long periods of time.    Evaluate legs to the level of the heart or above for 30 minutes 4-5 times a day and/or when sitting.       When taking antibiotics take entire prescription as ordered by MD do not stop taking until medicine is all gone.     Documentation:  Compression: .   Offloading: .   Toenail Trim:         Orders for this week (3/27/2025):   Left Heel - Wash with mild liquid soap and water  Paint kris wound with Betadine   Cover with Ca Alginate   Apply Puracol to wound bed   Cover with ABD (abd heel cup to left heel)  Wrap with Conform and secure with tape\  Tubi G  Post Op shoe with Peg-assists for the left foot   Change on Monday and Thursday (in clinic)    Right Great Toe wound-   Apply puracol ag to toe wound   Cover with gentac   Post Op shoe for the right foot   Change on Monday and Thursday (in clinic)    Please dispense 30 day

## 2025-03-27 NOTE — PROGRESS NOTES
Wound Care Center Progress Note With Procedure    Bandar De La Torre  AGE: 64 y.o.   GENDER: male  : 1960  EPISODE DATE:  3/27/2025     Subjective:     Chief Complaint   Patient presents with    Wound Check     BLE          HISTORY of PRESENT ILLNESS     Bandar De La Torre is a 64 y.o. 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  .  The ulcers are of moderate severity.  The underlying cause of the wound is pressure, neuropathy and diabetes.       He had right toes 2 and 3 amputated and debridement of 2 wounds on his right foot on 2025.  Since that time he has been doing local wound cares.  The right foot wounds are markedly improved.  He has a new area on his left heel that had been a callus but has broken down into a wound today.     3/27/2025  Doing ok this week right foot shows continued improvement and is practically healed with only a very tiny wound on the plantar great toe.  Left heel is more macerated today and a little larger.    Wound Pain Timing/Severity: none  Quality of pain: N/A  Severity of pain:  0 / 10   Modifying Factors: edema, venous stasis, diabetes, chronic pressure, decreased mobility, shear force, and obesity  Associated Signs/Symptoms: drainage and numbness        PAST MEDICAL HISTORY        Diagnosis Date    Atrial fibrillation (HCC)     Cerebral artery occlusion with cerebral infarction (HCC)     History of cardiac cath     --RCA has mid 50% stenosis and PLB branch has 70% stenosis noted. LVEDP very high    Hyperlipidemia     Hypertension     Type II or unspecified type diabetes mellitus without mention of complication, not stated as uncontrolled     Diagnsed about     Unspecified sleep apnea        PAST SURGICAL HISTORY    Past Surgical History:   Procedure Laterality Date    CARDIAC CATHETERIZATION      PACEMAKER INSERTION

## 2025-04-03 ENCOUNTER — HOSPITAL ENCOUNTER (OUTPATIENT)
Dept: WOUND CARE | Age: 65
Discharge: HOME | End: 2025-04-03
Attending: NURSE PRACTITIONER
Payer: MEDICARE

## 2025-04-03 VITALS
DIASTOLIC BLOOD PRESSURE: 52 MMHG | HEART RATE: 61 BPM | RESPIRATION RATE: 18 BRPM | SYSTOLIC BLOOD PRESSURE: 136 MMHG | TEMPERATURE: 100 F

## 2025-04-03 DIAGNOSIS — E11.621 DIABETIC ULCER OF RIGHT MIDFOOT ASSOCIATED WITH TYPE 2 DIABETES MELLITUS, WITH FAT LAYER EXPOSED: ICD-10-CM

## 2025-04-03 DIAGNOSIS — T81.89XD SURGICAL WOUND, NON HEALING, SUBSEQUENT ENCOUNTER: ICD-10-CM

## 2025-04-03 DIAGNOSIS — Z79.01 ANTICOAGULATED: Primary | ICD-10-CM

## 2025-04-03 DIAGNOSIS — E11.621 DIABETIC ULCER OF TOE OF RIGHT FOOT ASSOCIATED WITH TYPE 2 DIABETES MELLITUS, WITH FAT LAYER EXPOSED: ICD-10-CM

## 2025-04-03 DIAGNOSIS — L84 PRE-ULCERATIVE CALLUSES: ICD-10-CM

## 2025-04-03 DIAGNOSIS — L97.512 DIABETIC ULCER OF TOE OF RIGHT FOOT ASSOCIATED WITH TYPE 2 DIABETES MELLITUS, WITH FAT LAYER EXPOSED: ICD-10-CM

## 2025-04-03 DIAGNOSIS — L97.912: ICD-10-CM

## 2025-04-03 DIAGNOSIS — L97.412 DIABETIC ULCER OF RIGHT MIDFOOT ASSOCIATED WITH TYPE 2 DIABETES MELLITUS, WITH FAT LAYER EXPOSED: ICD-10-CM

## 2025-04-03 PROCEDURE — 11042 DBRDMT SUBQ TIS 1ST 20SQCM/<: CPT

## 2025-04-03 PROCEDURE — 11042 DBRDMT SUBQ TIS 1ST 20SQCM/<: CPT | Performed by: SURGERY

## 2025-04-03 PROCEDURE — 11045 DBRDMT SUBQ TISS EACH ADDL: CPT | Performed by: SURGERY

## 2025-04-03 PROCEDURE — 11055 PARING/CUTG B9 HYPRKER LES 1: CPT

## 2025-04-03 PROCEDURE — 11045 DBRDMT SUBQ TISS EACH ADDL: CPT

## 2025-04-03 RX ORDER — LIDOCAINE HYDROCHLORIDE 20 MG/ML
JELLY TOPICAL PRN
OUTPATIENT
Start: 2025-04-03

## 2025-04-03 RX ORDER — SILVER SULFADIAZINE 10 MG/G
CREAM TOPICAL PRN
OUTPATIENT
Start: 2025-04-03

## 2025-04-03 RX ORDER — LIDOCAINE 50 MG/G
OINTMENT TOPICAL PRN
OUTPATIENT
Start: 2025-04-03

## 2025-04-03 RX ORDER — LIDOCAINE 40 MG/G
CREAM TOPICAL PRN
OUTPATIENT
Start: 2025-04-03

## 2025-04-03 RX ORDER — MUPIROCIN 20 MG/G
OINTMENT TOPICAL PRN
OUTPATIENT
Start: 2025-04-03

## 2025-04-03 RX ORDER — LIDOCAINE HYDROCHLORIDE 40 MG/ML
SOLUTION TOPICAL PRN
OUTPATIENT
Start: 2025-04-03

## 2025-04-03 RX ORDER — BETAMETHASONE DIPROPIONATE 0.5 MG/G
CREAM TOPICAL PRN
OUTPATIENT
Start: 2025-04-03

## 2025-04-03 RX ORDER — NEOMYCIN/BACITRACIN/POLYMYXINB 3.5-400-5K
OINTMENT (GRAM) TOPICAL PRN
OUTPATIENT
Start: 2025-04-03

## 2025-04-03 RX ORDER — GINSENG 100 MG
CAPSULE ORAL PRN
OUTPATIENT
Start: 2025-04-03

## 2025-04-03 RX ORDER — CLOBETASOL PROPIONATE 0.5 MG/G
OINTMENT TOPICAL PRN
OUTPATIENT
Start: 2025-04-03

## 2025-04-03 RX ORDER — SULFAMETHOXAZOLE AND TRIMETHOPRIM 800; 160 MG/1; MG/1
1 TABLET ORAL 2 TIMES DAILY
Qty: 20 TABLET | Refills: 0 | Status: SHIPPED | OUTPATIENT
Start: 2025-04-03 | End: 2025-04-13

## 2025-04-03 RX ORDER — SODIUM CHLOR/HYPOCHLOROUS ACID 0.033 %
SOLUTION, IRRIGATION IRRIGATION PRN
OUTPATIENT
Start: 2025-04-03

## 2025-04-03 RX ORDER — TRIAMCINOLONE ACETONIDE 1 MG/G
OINTMENT TOPICAL PRN
OUTPATIENT
Start: 2025-04-03

## 2025-04-03 RX ORDER — BACITRACIN ZINC AND POLYMYXIN B SULFATE 500; 1000 [USP'U]/G; [USP'U]/G
OINTMENT TOPICAL PRN
OUTPATIENT
Start: 2025-04-03

## 2025-04-03 RX ORDER — GENTAMICIN SULFATE 1 MG/G
OINTMENT TOPICAL PRN
OUTPATIENT
Start: 2025-04-03

## 2025-04-03 ASSESSMENT — PAIN DESCRIPTION - FREQUENCY: FREQUENCY: INTERMITTENT

## 2025-04-03 ASSESSMENT — PAIN DESCRIPTION - ONSET: ONSET: ON-GOING

## 2025-04-03 ASSESSMENT — PAIN DESCRIPTION - LOCATION: LOCATION: LEG

## 2025-04-03 ASSESSMENT — PAIN DESCRIPTION - DESCRIPTORS: DESCRIPTORS: BURNING

## 2025-04-03 ASSESSMENT — PAIN DESCRIPTION - PAIN TYPE: TYPE: ACUTE PAIN

## 2025-04-03 ASSESSMENT — PAIN DESCRIPTION - ORIENTATION: ORIENTATION: LEFT

## 2025-04-03 ASSESSMENT — PAIN SCALES - GENERAL: PAINLEVEL_OUTOF10: 3

## 2025-04-03 ASSESSMENT — PAIN - FUNCTIONAL ASSESSMENT: PAIN_FUNCTIONAL_ASSESSMENT: PREVENTS OR INTERFERES SOME ACTIVE ACTIVITIES AND ADLS

## 2025-04-03 NOTE — PROGRESS NOTES
Wound Care Center Progress Note With Procedure    Bandar De La Torre  AGE: 64 y.o.   GENDER: male  : 1960  EPISODE DATE:  4/3/2025     Subjective:     Chief Complaint   Patient presents with    Wound Check     BLE           HISTORY of PRESENT ILLNESS     Bandar De La Torre is a 64 y.o. 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  .  The ulcers are of moderate severity.  The underlying cause of the wound is pressure, neuropathy and diabetes.       He had right toes 2 and 3 amputated and debridement of 2 wounds on his right foot on 2025.  Since that time he has been doing local wound cares.  The right foot wounds are markedly improved.  He has a new area on his left heel that had been a callus but has broken down into a wound today.     3/27/2025  Doing ok this week right foot shows continued improvement and is practically healed with only a very tiny wound on the plantar great toe.  Left heel is more macerated today and a little larger.     4/3/2025  Left heel is worse today. More maceration. Left leg is edematous. Heel wound is larger and deeper centrally.  Mild surrounding erythema.    Wound Pain Timing/Severity: none  Quality of pain: N/A  Severity of pain:  0 / 10   Modifying Factors: edema, venous stasis, diabetes, chronic pressure, decreased mobility, shear force, and obesity  Associated Signs/Symptoms: drainage and numbness        PAST MEDICAL HISTORY        Diagnosis Date    Atrial fibrillation (HCC)     Cerebral artery occlusion with cerebral infarction (HCC)     History of cardiac cath     --RCA has mid 50% stenosis and PLB branch has 70% stenosis noted. LVEDP very high    Hyperlipidemia     Hypertension     Type II or unspecified type diabetes mellitus without mention of complication, not stated as uncontrolled     Diagnsed about     Unspecified

## 2025-04-03 NOTE — PROGRESS NOTES
Multilayer Compression Wrap   (Not Unna) Below the Knee    NAME:  Bandar De La Torre  YOB: 1960  MEDICAL RECORD NUMBER:  [id number]  DATE:  4/3/2025    Multilayer compression wrap: Removed old Multilayer wrap if indicated and wash leg with mild soap/water.  Applied moisturizing agent to dry skin as needed.   Applied primary and secondary dressing as ordered.  Applied multilayered dressing below the knee to left lower leg.  Instructed patient/caregiver not to remove dressing and to keep it clean and dry.   Instructed patient/caregiver on complications to report to provider, such as pain, numbness in toes, heavy drainage, and slippage of dressing.  Instructed patient on purpose of compression dressing and on activity and exercise recommendations.      Electronically signed by Jacquelyn Olmos LPN on 4/3/2025 at 4:14 PM

## 2025-04-03 NOTE — PATIENT INSTRUCTIONS
PHYSICIAN ORDERS AND DISCHARGE INSTRUCTIONS  NOTE: Upon discharge from the Wound Center, you will receive a patient experience survey via E-mail. We would be grateful if you would take the time to fill this survey out.  Wound care order history:   SHELBY's   Right       Left    Date    Vascular studies/Intervention: .     Cultures: .               Antibiotics: ordered on 4/03/25              HbA1c:  .               Grafts:  .   Juxta Lites & Lymph Pumps:  Continuing wound care orders and information:              Residence: .              Continue home health care with[facility]   Your wound-care supplies will be provided by: .              Pharmacy: [facility] on [address]  Wound cleansing:      Do not scrub or use excessive force.    Wash hands with soap and water before and after dressing changes.    Prior to applying a clean dressing, cleanse wound with normal saline,    wound cleanser, or mild soap and water.     Ask your physician or nurse before getting the wound(s) wet in the shower.  Daily Wound management:    Keep weight off wounds and reposition every 2 hours.    Avoid standing for long periods of time.    Evaluate legs to the level of the heart or above for 30 minutes 4-5 times a day and/or when sitting.       When taking antibiotics take entire prescription as ordered by MD do not stop taking until medicine is all gone.     Documentation:  Compression: .   Offloading: .   Toenail Trim:         Orders for this week (4/3/2025):   Left Heel - Wash with mild liquid soap and water  Paint kris wound with Betadine   Cover with Ca Alginate   Cover hematoma of lower leg with ABD for padding/protection   Apply Puracol to wound bed   Cover with Double ABD (abd heel cup to left heel)  Wrap with Coban 2 and secure with tape  Tubi G  Post Op shoe with Peg-assists for the left foot   Change on Monday and Thursday (in clinic)    Right Great Toe wound-   Apply puracol ag to toe wound   Cover with gentac   Post Op shoe for the right

## 2025-04-11 ENCOUNTER — HOSPITAL ENCOUNTER (OUTPATIENT)
Dept: WOUND CARE | Age: 65
Discharge: HOME | End: 2025-04-11
Attending: NURSE PRACTITIONER
Payer: MEDICARE

## 2025-04-11 VITALS — DIASTOLIC BLOOD PRESSURE: 53 MMHG | SYSTOLIC BLOOD PRESSURE: 113 MMHG | TEMPERATURE: 97.5 F | HEART RATE: 62 BPM

## 2025-04-11 DIAGNOSIS — T81.89XD SURGICAL WOUND, NON HEALING, SUBSEQUENT ENCOUNTER: ICD-10-CM

## 2025-04-11 DIAGNOSIS — L97.512 DIABETIC ULCER OF TOE OF RIGHT FOOT ASSOCIATED WITH TYPE 2 DIABETES MELLITUS, WITH FAT LAYER EXPOSED: ICD-10-CM

## 2025-04-11 DIAGNOSIS — L97.412 DIABETIC ULCER OF RIGHT MIDFOOT ASSOCIATED WITH TYPE 2 DIABETES MELLITUS, WITH FAT LAYER EXPOSED: ICD-10-CM

## 2025-04-11 DIAGNOSIS — E11.621 DIABETIC ULCER OF RIGHT MIDFOOT ASSOCIATED WITH TYPE 2 DIABETES MELLITUS, WITH FAT LAYER EXPOSED: ICD-10-CM

## 2025-04-11 DIAGNOSIS — Z79.01 ANTICOAGULATED: ICD-10-CM

## 2025-04-11 DIAGNOSIS — L84 PRE-ULCERATIVE CALLUSES: ICD-10-CM

## 2025-04-11 DIAGNOSIS — E11.621 DIABETIC ULCER OF TOE OF RIGHT FOOT ASSOCIATED WITH TYPE 2 DIABETES MELLITUS, WITH FAT LAYER EXPOSED: ICD-10-CM

## 2025-04-11 DIAGNOSIS — L97.912: Primary | ICD-10-CM

## 2025-04-11 DIAGNOSIS — L97.912: ICD-10-CM

## 2025-04-11 PROCEDURE — 11042 DBRDMT SUBQ TIS 1ST 20SQCM/<: CPT

## 2025-04-11 PROCEDURE — 11045 DBRDMT SUBQ TISS EACH ADDL: CPT

## 2025-04-11 RX ORDER — GENTAMICIN SULFATE 1 MG/G
OINTMENT TOPICAL PRN
OUTPATIENT
Start: 2025-04-11

## 2025-04-11 RX ORDER — BACITRACIN ZINC AND POLYMYXIN B SULFATE 500; 1000 [USP'U]/G; [USP'U]/G
OINTMENT TOPICAL PRN
OUTPATIENT
Start: 2025-04-11

## 2025-04-11 RX ORDER — LIDOCAINE HYDROCHLORIDE 40 MG/ML
SOLUTION TOPICAL PRN
OUTPATIENT
Start: 2025-04-11

## 2025-04-11 RX ORDER — SODIUM CHLOR/HYPOCHLOROUS ACID 0.033 %
SOLUTION, IRRIGATION IRRIGATION PRN
OUTPATIENT
Start: 2025-04-11

## 2025-04-11 RX ORDER — GENTAMICIN SULFATE 1 MG/G
OINTMENT TOPICAL PRN
Status: DISCONTINUED | OUTPATIENT
Start: 2025-04-11 | End: 2025-04-12 | Stop reason: HOSPADM

## 2025-04-11 RX ORDER — CLOBETASOL PROPIONATE 0.5 MG/G
OINTMENT TOPICAL PRN
OUTPATIENT
Start: 2025-04-11

## 2025-04-11 RX ORDER — LIDOCAINE 50 MG/G
OINTMENT TOPICAL PRN
OUTPATIENT
Start: 2025-04-11

## 2025-04-11 RX ORDER — SILVER SULFADIAZINE 10 MG/G
CREAM TOPICAL PRN
OUTPATIENT
Start: 2025-04-11

## 2025-04-11 RX ORDER — LIDOCAINE 50 MG/G
OINTMENT TOPICAL PRN
Status: DISCONTINUED | OUTPATIENT
Start: 2025-04-11 | End: 2025-04-12 | Stop reason: HOSPADM

## 2025-04-11 RX ORDER — NEOMYCIN/BACITRACIN/POLYMYXINB 3.5-400-5K
OINTMENT (GRAM) TOPICAL PRN
OUTPATIENT
Start: 2025-04-11

## 2025-04-11 RX ORDER — LIDOCAINE HYDROCHLORIDE 20 MG/ML
JELLY TOPICAL PRN
OUTPATIENT
Start: 2025-04-11

## 2025-04-11 RX ORDER — LIDOCAINE 40 MG/G
CREAM TOPICAL PRN
OUTPATIENT
Start: 2025-04-11

## 2025-04-11 RX ORDER — TRIAMCINOLONE ACETONIDE 1 MG/G
OINTMENT TOPICAL PRN
OUTPATIENT
Start: 2025-04-11

## 2025-04-11 RX ORDER — GINSENG 100 MG
CAPSULE ORAL PRN
OUTPATIENT
Start: 2025-04-11

## 2025-04-11 RX ORDER — MUPIROCIN 20 MG/G
OINTMENT TOPICAL PRN
OUTPATIENT
Start: 2025-04-11

## 2025-04-11 RX ORDER — BETAMETHASONE DIPROPIONATE 0.5 MG/G
CREAM TOPICAL PRN
OUTPATIENT
Start: 2025-04-11

## 2025-04-11 RX ADMIN — LIDOCAINE: 50 OINTMENT TOPICAL at 17:10

## 2025-04-11 ASSESSMENT — PAIN DESCRIPTION - ORIENTATION: ORIENTATION: LEFT

## 2025-04-11 ASSESSMENT — PAIN DESCRIPTION - FREQUENCY: FREQUENCY: INTERMITTENT

## 2025-04-11 ASSESSMENT — PAIN DESCRIPTION - PAIN TYPE: TYPE: ACUTE PAIN

## 2025-04-11 ASSESSMENT — PAIN SCALES - GENERAL: PAINLEVEL_OUTOF10: 1

## 2025-04-11 ASSESSMENT — PAIN - FUNCTIONAL ASSESSMENT: PAIN_FUNCTIONAL_ASSESSMENT: PREVENTS OR INTERFERES SOME ACTIVE ACTIVITIES AND ADLS

## 2025-04-11 ASSESSMENT — PAIN DESCRIPTION - DESCRIPTORS: DESCRIPTORS: BURNING

## 2025-04-11 NOTE — PATIENT INSTRUCTIONS
PHYSICIAN ORDERS AND DISCHARGE INSTRUCTIONS  NOTE: Upon discharge from the Wound Center, you will receive a patient experience survey via E-mail. We would be grateful if you would take the time to fill this survey out.  Wound care order history:   SHELBY's   Right       Left    Date    Vascular studies/Intervention: .     Cultures: .               Antibiotics: ordered on 4/03/25              HbA1c:  .               Grafts:  .   Juxta Lites & Lymph Pumps:  Continuing wound care orders and information:              Residence: .              [facility] care with[facility]   Your wound-care supplies will be provided by: .              Pharmacy: [facility] on [address]  Wound cleansing:      Do not scrub or use excessive force.    Wash hands with soap and water before and after dressing changes.    Prior to applying a clean dressing, cleanse wound with normal saline,    wound cleanser, or mild soap and water.     Ask your physician or nurse before getting the wound(s) wet in the shower.  Daily Wound management:    Keep weight off wounds and reposition every 2 hours.    Avoid standing for long periods of time.    Evaluate legs to the level of the heart or above for 30 minutes 4-5 times a day and/or when sitting.       When taking antibiotics take entire prescription as ordered by MD do not stop taking until medicine is all gone.     Documentation:  Compression: .   Offloading: .   Toenail Trim:         Orders for this week (4/11/2025):   Left Heel - Wash with mild liquid soap and water  Paint kris wound with Betadine   Apply Santyl, Lidocaine, Gentamicin and stimulen powder to wound bed , pack tunnel with puracol ag   Cover with Ca Alginate   Cover hematoma of lower leg with ABD for padding/protection   Cover with Double ABD (abd heel cup to left heel)  Wrap with Coban 2 and secure with tape  Post Op shoe   Change on Monday and Thursday (in clinic)    Please dispense 30 day quantity when sending supplies   Follow up with

## 2025-04-11 NOTE — PROGRESS NOTES
Multilayer Compression Wrap   (Not Unna) Below the Knee    NAME:  Bandar De La Torre  YOB: 1960  MEDICAL RECORD NUMBER:  [id number]  DATE:  4/11/2025    Multilayer compression wrap: Removed old Multilayer wrap if indicated and wash leg with mild soap/water.  Applied moisturizing agent to dry skin as needed.   Applied primary and secondary dressing as ordered.  Applied multilayered dressing below the knee to left lower leg.  Instructed patient/caregiver not to remove dressing and to keep it clean and dry.   Instructed patient/caregiver on complications to report to provider, such as pain, numbness in toes, heavy drainage, and slippage of dressing.  Instructed patient on purpose of compression dressing and on activity and exercise recommendations.  Patient tolerated treatment well.      Electronically signed by Victoria Horton RN on 4/11/2025 at 5:08 PM

## 2025-04-12 LAB
A/G RATIO: 1.1 RATIO (ref 0.8–2.6)
ALBUMIN: 3.4 G/DL (ref 3.5–5.2)
ALP BLD-CCNC: 84 U/L (ref 23–144)
ALT SERPL-CCNC: 14 U/L (ref 0–60)
AST SERPL-CCNC: 11 U/L (ref 0–55)
BILIRUB SERPL-MCNC: 0.3 MG/DL (ref 0–1.2)
BUN / CREAT RATIO: 22 (ref 7–25)
BUN BLDV-MCNC: 28 MG/DL (ref 3–29)
CALCIUM SERPL-MCNC: 9.5 MG/DL (ref 8.5–10.5)
CHLORIDE BLD-SCNC: 102 MEQ/L (ref 96–110)
CHOLESTEROL, TOTAL: 205 MG/DL
CO2: 24 MEQ/L (ref 19–32)
CREAT SERPL-MCNC: 1.3 MG/DL (ref 0.5–1.2)
ESTIMATED GLOMERULAR FILTRATION RATE CREATININE EQUATION: 61 MLS/MIN/1.73M2
FASTING STATUS: ABNORMAL
GLOBULIN: 3.2 G/DL (ref 1.9–3.6)
GLUCOSE BLD-MCNC: 168 MG/DL (ref 70–99)
HBA1C MFR BLD: 9.6 %
HDLC SERPL-MCNC: 38 MG/DL
LDL CHOLESTEROL: 150 MG/DL
POTASSIUM SERPL-SCNC: 4.9 MEQ/L (ref 3.4–5.3)
SODIUM BLD-SCNC: 141 MEQ/L (ref 135–148)
TOTAL PROTEIN: 6.6 G/DL (ref 6–8.3)
TRIGL SERPL-MCNC: 83 MG/DL
VLDLC SERPL CALC-MCNC: 17 MG/DL (ref 4–38)

## 2025-04-14 NOTE — PATIENT INSTRUCTIONS
PHYSICIAN ORDERS AND DISCHARGE INSTRUCTIONS  NOTE: Upon discharge from the Wound Center, you will receive a patient experience survey via E-mail. We would be grateful if you would take the time to fill this survey out.  Wound care order history:   SHELBY's   Right       Left    Date    Vascular studies/Intervention: .     Cultures: .               Antibiotics: ordered on 4/03/25              HbA1c:  .               Grafts:  .   Juxta Lites & Lymph Pumps:  Continuing wound care orders and information:              Residence: .              Continue home health care with:.[facility]   Your wound-care supplies will be provided by: .              Pharmacy: [facility] on [address]  Wound cleansing:      Do not scrub or use excessive force.    Wash hands with soap and water before and after dressing changes.    Prior to applying a clean dressing, cleanse wound with normal saline,    wound cleanser, or mild soap and water.     Ask your physician or nurse before getting the wound(s) wet in the shower.  Daily Wound management:    Keep weight off wounds and reposition every 2 hours.    Avoid standing for long periods of time.    Evaluate legs to the level of the heart or above for 30 minutes 4-5 times a day and/or when sitting.       When taking antibiotics take entire prescription as ordered by MD do not stop taking until medicine is all gone.     Documentation:  Compression: .   Offloading: .   Toenail Trim:         Orders for this week (4/14/2025):  Left Heel - Wash with mild liquid soap and water  Gently pack tunnel with 4x4 Puracol Ag+  Apply Santyl, Lidocaine, Gentamicin and stimulen powder to remaining wound bed  Qwick aperture to kris wound (run up posterior ankle)  Cover with Sorbex/Zetuvit  Secure with Conform  Include in wrap for lower leg  Post Op shoe   Change on Thursday (in clinic)    Bilateral Lower Leg - Wash with mild soap and water  Pack left lower leg wound with Silver nitrate dampened Conform  Cover all wounds with

## 2025-04-15 ENCOUNTER — HOSPITAL ENCOUNTER (OUTPATIENT)
Dept: WOUND CARE | Age: 65
Discharge: HOME | End: 2025-04-15
Attending: NURSE PRACTITIONER
Payer: MEDICARE

## 2025-04-15 VITALS — SYSTOLIC BLOOD PRESSURE: 95 MMHG | DIASTOLIC BLOOD PRESSURE: 68 MMHG | HEART RATE: 79 BPM

## 2025-04-15 DIAGNOSIS — T81.89XD SURGICAL WOUND, NON HEALING, SUBSEQUENT ENCOUNTER: Primary | ICD-10-CM

## 2025-04-15 DIAGNOSIS — L97.425 DIABETIC ULCER OF LEFT HEEL ASSOCIATED WITH TYPE 2 DIABETES MELLITUS, WITH MUSCLE INVOLVEMENT WITHOUT EVIDENCE OF NECROSIS: ICD-10-CM

## 2025-04-15 DIAGNOSIS — E11.621 DIABETIC ULCER OF TOE OF RIGHT FOOT ASSOCIATED WITH TYPE 2 DIABETES MELLITUS, WITH FAT LAYER EXPOSED: ICD-10-CM

## 2025-04-15 DIAGNOSIS — L97.512 DIABETIC ULCER OF TOE OF RIGHT FOOT ASSOCIATED WITH TYPE 2 DIABETES MELLITUS, WITH FAT LAYER EXPOSED: ICD-10-CM

## 2025-04-15 DIAGNOSIS — E11.621 DIABETIC ULCER OF RIGHT MIDFOOT ASSOCIATED WITH TYPE 2 DIABETES MELLITUS, WITH FAT LAYER EXPOSED: ICD-10-CM

## 2025-04-15 DIAGNOSIS — L97.412 DIABETIC ULCER OF RIGHT MIDFOOT ASSOCIATED WITH TYPE 2 DIABETES MELLITUS, WITH FAT LAYER EXPOSED: ICD-10-CM

## 2025-04-15 DIAGNOSIS — L98.9 SKIN LESION OF LEFT LEG: ICD-10-CM

## 2025-04-15 DIAGNOSIS — T14.90XA TRAUMATIC INJURY: ICD-10-CM

## 2025-04-15 DIAGNOSIS — Z79.01 ANTICOAGULATED: ICD-10-CM

## 2025-04-15 DIAGNOSIS — L84 PRE-ULCERATIVE CALLUSES: ICD-10-CM

## 2025-04-15 DIAGNOSIS — E11.621 DIABETIC ULCER OF LEFT HEEL ASSOCIATED WITH TYPE 2 DIABETES MELLITUS, WITH MUSCLE INVOLVEMENT WITHOUT EVIDENCE OF NECROSIS: ICD-10-CM

## 2025-04-15 PROCEDURE — 88305 TISSUE EXAM BY PATHOLOGIST: CPT | Performed by: PATHOLOGY

## 2025-04-15 PROCEDURE — 11104 PUNCH BX SKIN SINGLE LESION: CPT

## 2025-04-15 PROCEDURE — 11042 DBRDMT SUBQ TIS 1ST 20SQCM/<: CPT | Performed by: NURSE PRACTITIONER

## 2025-04-15 PROCEDURE — 11042 DBRDMT SUBQ TIS 1ST 20SQCM/<: CPT

## 2025-04-15 PROCEDURE — 11104 PUNCH BX SKIN SINGLE LESION: CPT | Performed by: NURSE PRACTITIONER

## 2025-04-15 RX ORDER — TRIAMCINOLONE ACETONIDE 1 MG/G
OINTMENT TOPICAL PRN
OUTPATIENT
Start: 2025-04-15

## 2025-04-15 RX ORDER — GINSENG 100 MG
CAPSULE ORAL PRN
OUTPATIENT
Start: 2025-04-15

## 2025-04-15 RX ORDER — LIDOCAINE 50 MG/G
OINTMENT TOPICAL PRN
Status: DISCONTINUED | OUTPATIENT
Start: 2025-04-15 | End: 2025-04-16 | Stop reason: HOSPADM

## 2025-04-15 RX ORDER — LIDOCAINE HYDROCHLORIDE 20 MG/ML
JELLY TOPICAL PRN
OUTPATIENT
Start: 2025-04-15

## 2025-04-15 RX ORDER — CLOBETASOL PROPIONATE 0.5 MG/G
OINTMENT TOPICAL PRN
OUTPATIENT
Start: 2025-04-15

## 2025-04-15 RX ORDER — LIDOCAINE HYDROCHLORIDE 40 MG/ML
SOLUTION TOPICAL PRN
OUTPATIENT
Start: 2025-04-15

## 2025-04-15 RX ORDER — LIDOCAINE 50 MG/G
OINTMENT TOPICAL PRN
Status: CANCELLED | OUTPATIENT
Start: 2025-04-15

## 2025-04-15 RX ORDER — NEOMYCIN/BACITRACIN/POLYMYXINB 3.5-400-5K
OINTMENT (GRAM) TOPICAL PRN
OUTPATIENT
Start: 2025-04-15

## 2025-04-15 RX ORDER — MUPIROCIN 20 MG/G
OINTMENT TOPICAL PRN
OUTPATIENT
Start: 2025-04-15

## 2025-04-15 RX ORDER — SILVER SULFADIAZINE 10 MG/G
CREAM TOPICAL PRN
OUTPATIENT
Start: 2025-04-15

## 2025-04-15 RX ORDER — BETAMETHASONE DIPROPIONATE 0.5 MG/G
CREAM TOPICAL PRN
OUTPATIENT
Start: 2025-04-15

## 2025-04-15 RX ORDER — SODIUM CHLOR/HYPOCHLOROUS ACID 0.033 %
SOLUTION, IRRIGATION IRRIGATION PRN
OUTPATIENT
Start: 2025-04-15

## 2025-04-15 RX ORDER — BACITRACIN ZINC AND POLYMYXIN B SULFATE 500; 1000 [USP'U]/G; [USP'U]/G
OINTMENT TOPICAL PRN
OUTPATIENT
Start: 2025-04-15

## 2025-04-15 RX ORDER — GENTAMICIN SULFATE 1 MG/G
OINTMENT TOPICAL PRN
Status: CANCELLED | OUTPATIENT
Start: 2025-04-15

## 2025-04-15 RX ORDER — GENTAMICIN SULFATE 1 MG/G
OINTMENT TOPICAL PRN
Status: DISCONTINUED | OUTPATIENT
Start: 2025-04-15 | End: 2025-04-16 | Stop reason: HOSPADM

## 2025-04-15 RX ORDER — LIDOCAINE 40 MG/G
CREAM TOPICAL PRN
OUTPATIENT
Start: 2025-04-15

## 2025-04-15 RX ADMIN — GENTAMICIN SULFATE: 1 OINTMENT TOPICAL at 10:20

## 2025-04-15 RX ADMIN — LIDOCAINE: 50 OINTMENT TOPICAL at 10:19

## 2025-04-15 ASSESSMENT — PAIN DESCRIPTION - ORIENTATION: ORIENTATION: LEFT

## 2025-04-15 ASSESSMENT — PAIN SCALES - GENERAL: PAINLEVEL_OUTOF10: 1

## 2025-04-15 ASSESSMENT — PAIN DESCRIPTION - DESCRIPTORS: DESCRIPTORS: SORE

## 2025-04-15 ASSESSMENT — PAIN DESCRIPTION - LOCATION: LOCATION: FOOT

## 2025-04-15 NOTE — PROGRESS NOTES
Wound Care Center Progress Note With Procedure    Bandar De La Torre  AGE: 64 y.o.   GENDER: male  : 1960  EPISODE DATE:  4/15/2025     Subjective:     Chief Complaint   Patient presents with    Wound Check     BLE          HISTORY of PRESENT ILLNESS     Bandar De La Torre is a 64 y.o. 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 The ulcers are of moderate severity.  The underlying cause of the wound is pressure, neuropathy and diabetes. Advanced wound care modalities established with initiation of care at the wound clinic.The patient has significant underlying medical conditions as below. MEETA Diehl MD     Wound Pain Timing/Severity: intermittent, moderate  Quality of pain: aching, tender, pressure  Severity of pain:  4 / 10   Modifying Factors: edema, venous stasis, diabetes, poor glucose control, chronic pressure, decreased mobility, shear force, obesity, and anticoagulation therapy  Associated Signs/Symptoms: edema, drainage, and pain    Living Situation  [x] Home [] SNF []  Assisted living  [] Other (ie rehab, etc.) [] Home Health  []  Transportation    Wound status:  4/15/2025       He had right toes 2 and 3 amputated and debridement of 2 wounds on his right foot on 2025.  Since that time he has been doing local wound care.  The right foot wounds are markedly improved.  He has a new area on his left heel that had been a callus but has broken down into a wound worsened by traumatic injury from Peg assist sole shifting and fell apart. The left heel wound is concerning with an area in the middle with significant depth. Will work to off load the wound within the dressing. Legs very edematous will use compression wraps to both lower legs. Patient refusing total contact cast. Regimen discussed and established with patient/family as below. The patients records

## 2025-04-15 NOTE — PROGRESS NOTES
Multilayer Compression Wrap   (Not Unna) Below the Knee    NAME:  Bandar De La Torre  YOB: 1960  MEDICAL RECORD NUMBER:  [id number]  DATE:  4/15/2025    Multilayer compression wrap: Removed old Multilayer wrap if indicated and wash leg with mild soap/water.  Applied moisturizing agent to dry skin as needed.   Applied primary and secondary dressing as ordered.  Applied multilayered dressing below the knee to right lower leg.  Applied multilayered dressing below the knee to left lower leg.  Instructed patient/caregiver not to remove dressing and to keep it clean and dry.   Instructed patient/caregiver on complications to report to provider, such as pain, numbness in toes, heavy drainage, and slippage of dressing.  Instructed patient on purpose of compression dressing and on activity and exercise recommendations.  Patient tolerated treatment well.      Electronically signed by Victoria Horton RN on 4/15/2025 at 10:15 AM

## 2025-04-17 ENCOUNTER — HOSPITAL ENCOUNTER (OUTPATIENT)
Dept: WOUND CARE | Age: 65
Discharge: HOME | End: 2025-04-17
Attending: NURSE PRACTITIONER
Payer: MEDICARE

## 2025-04-17 VITALS
RESPIRATION RATE: 18 BRPM | DIASTOLIC BLOOD PRESSURE: 80 MMHG | SYSTOLIC BLOOD PRESSURE: 131 MMHG | HEART RATE: 82 BPM | TEMPERATURE: 98.3 F

## 2025-04-17 DIAGNOSIS — L97.912: ICD-10-CM

## 2025-04-17 DIAGNOSIS — E11.621 DIABETIC ULCER OF TOE OF RIGHT FOOT ASSOCIATED WITH TYPE 2 DIABETES MELLITUS, WITH FAT LAYER EXPOSED: ICD-10-CM

## 2025-04-17 DIAGNOSIS — L84 PRE-ULCERATIVE CALLUSES: ICD-10-CM

## 2025-04-17 DIAGNOSIS — T81.89XD SURGICAL WOUND, NON HEALING, SUBSEQUENT ENCOUNTER: ICD-10-CM

## 2025-04-17 DIAGNOSIS — L97.512 DIABETIC ULCER OF TOE OF RIGHT FOOT ASSOCIATED WITH TYPE 2 DIABETES MELLITUS, WITH FAT LAYER EXPOSED: ICD-10-CM

## 2025-04-17 DIAGNOSIS — E11.621 DIABETIC ULCER OF RIGHT MIDFOOT ASSOCIATED WITH TYPE 2 DIABETES MELLITUS, WITH FAT LAYER EXPOSED: ICD-10-CM

## 2025-04-17 DIAGNOSIS — T81.89XD SURGICAL WOUND, NON HEALING, SUBSEQUENT ENCOUNTER: Primary | ICD-10-CM

## 2025-04-17 DIAGNOSIS — Z79.01 ANTICOAGULATED: ICD-10-CM

## 2025-04-17 DIAGNOSIS — L97.412 DIABETIC ULCER OF RIGHT MIDFOOT ASSOCIATED WITH TYPE 2 DIABETES MELLITUS, WITH FAT LAYER EXPOSED: ICD-10-CM

## 2025-04-17 LAB — SURGICAL PATHOLOGY REPORT: NORMAL

## 2025-04-17 PROCEDURE — 11042 DBRDMT SUBQ TIS 1ST 20SQCM/<: CPT | Performed by: SURGERY

## 2025-04-17 PROCEDURE — 11042 DBRDMT SUBQ TIS 1ST 20SQCM/<: CPT

## 2025-04-17 PROCEDURE — 11045 DBRDMT SUBQ TISS EACH ADDL: CPT

## 2025-04-17 RX ORDER — CLOBETASOL PROPIONATE 0.5 MG/G
OINTMENT TOPICAL PRN
OUTPATIENT
Start: 2025-04-17

## 2025-04-17 RX ORDER — NEOMYCIN/BACITRACIN/POLYMYXINB 3.5-400-5K
OINTMENT (GRAM) TOPICAL PRN
OUTPATIENT
Start: 2025-04-17

## 2025-04-17 RX ORDER — GINSENG 100 MG
CAPSULE ORAL PRN
OUTPATIENT
Start: 2025-04-17

## 2025-04-17 RX ORDER — SODIUM CHLOR/HYPOCHLOROUS ACID 0.033 %
SOLUTION, IRRIGATION IRRIGATION PRN
OUTPATIENT
Start: 2025-04-17

## 2025-04-17 RX ORDER — LIDOCAINE 50 MG/G
OINTMENT TOPICAL PRN
Status: DISCONTINUED | OUTPATIENT
Start: 2025-04-17 | End: 2025-04-18 | Stop reason: HOSPADM

## 2025-04-17 RX ORDER — MUPIROCIN 20 MG/G
OINTMENT TOPICAL PRN
OUTPATIENT
Start: 2025-04-17

## 2025-04-17 RX ORDER — LIDOCAINE 50 MG/G
OINTMENT TOPICAL PRN
OUTPATIENT
Start: 2025-04-17

## 2025-04-17 RX ORDER — GENTAMICIN SULFATE 1 MG/G
OINTMENT TOPICAL PRN
OUTPATIENT
Start: 2025-04-17

## 2025-04-17 RX ORDER — LIDOCAINE HYDROCHLORIDE 20 MG/ML
JELLY TOPICAL PRN
OUTPATIENT
Start: 2025-04-17

## 2025-04-17 RX ORDER — SILVER SULFADIAZINE 10 MG/G
CREAM TOPICAL PRN
OUTPATIENT
Start: 2025-04-17

## 2025-04-17 RX ORDER — LIDOCAINE HYDROCHLORIDE 40 MG/ML
SOLUTION TOPICAL PRN
OUTPATIENT
Start: 2025-04-17

## 2025-04-17 RX ORDER — LIDOCAINE 40 MG/G
CREAM TOPICAL PRN
OUTPATIENT
Start: 2025-04-17

## 2025-04-17 RX ORDER — TRIAMCINOLONE ACETONIDE 1 MG/G
OINTMENT TOPICAL PRN
OUTPATIENT
Start: 2025-04-17

## 2025-04-17 RX ORDER — GENTAMICIN SULFATE 1 MG/G
OINTMENT TOPICAL PRN
Status: DISCONTINUED | OUTPATIENT
Start: 2025-04-17 | End: 2025-04-18 | Stop reason: HOSPADM

## 2025-04-17 RX ORDER — BACITRACIN ZINC AND POLYMYXIN B SULFATE 500; 1000 [USP'U]/G; [USP'U]/G
OINTMENT TOPICAL PRN
OUTPATIENT
Start: 2025-04-17

## 2025-04-17 RX ORDER — BETAMETHASONE DIPROPIONATE 0.5 MG/G
CREAM TOPICAL PRN
OUTPATIENT
Start: 2025-04-17

## 2025-04-17 RX ADMIN — GENTAMICIN SULFATE: 1 OINTMENT TOPICAL at 16:16

## 2025-04-17 RX ADMIN — LIDOCAINE: 50 OINTMENT TOPICAL at 16:17

## 2025-04-17 ASSESSMENT — PAIN DESCRIPTION - PAIN TYPE: TYPE: ACUTE PAIN

## 2025-04-17 ASSESSMENT — PAIN DESCRIPTION - DESCRIPTORS: DESCRIPTORS: BURNING

## 2025-04-17 ASSESSMENT — PAIN SCALES - GENERAL: PAINLEVEL_OUTOF10: 2

## 2025-04-17 ASSESSMENT — PAIN - FUNCTIONAL ASSESSMENT: PAIN_FUNCTIONAL_ASSESSMENT: PREVENTS OR INTERFERES SOME ACTIVE ACTIVITIES AND ADLS

## 2025-04-17 ASSESSMENT — PAIN DESCRIPTION - ONSET: ONSET: ON-GOING

## 2025-04-17 ASSESSMENT — PAIN DESCRIPTION - FREQUENCY: FREQUENCY: INTERMITTENT

## 2025-04-17 ASSESSMENT — PAIN DESCRIPTION - LOCATION: LOCATION: LEG

## 2025-04-17 ASSESSMENT — PAIN DESCRIPTION - ORIENTATION: ORIENTATION: LEFT

## 2025-04-17 NOTE — PATIENT INSTRUCTIONS
PHYSICIAN ORDERS AND DISCHARGE INSTRUCTIONS  NOTE: Upon discharge from the Wound Center, you will receive a patient experience survey via E-mail. We would be grateful if you would take the time to fill this survey out.  Wound care order history:   SHELBY's   Right       Left    Date    Vascular studies/Intervention: .     Cultures: .               Antibiotics: ordered on 4/03/25              HbA1c:  .               Grafts:  .   Juxta Lites & Lymph Pumps:  Continuing wound care orders and information:              Residence: .              Continue home health care with[facility]   Your wound-care supplies will be provided by: .              Pharmacy: [facility] on [address]  Wound cleansing:      Do not scrub or use excessive force.    Wash hands with soap and water before and after dressing changes.    Prior to applying a clean dressing, cleanse wound with normal saline,    wound cleanser, or mild soap and water.     Ask your physician or nurse before getting the wound(s) wet in the shower.  Daily Wound management:    Keep weight off wounds and reposition every 2 hours.    Avoid standing for long periods of time.    Evaluate legs to the level of the heart or above for 30 minutes 4-5 times a day and/or when sitting.       When taking antibiotics take entire prescription as ordered by MD do not stop taking until medicine is all gone.     Documentation:  Compression: .   Offloading: .   Toenail Trim:         Orders for this week (4/17/2025):  Left Heel - Wash with mild liquid soap and water  Gently pack tunnel with 4x4 Puracol Ag+  Apply Santyl, Lidocaine, Gentamicin and stimulen powder to remaining wound bed  Qwick aperture to kris wound (run up posterior ankle)  Cover with Sorbex/Zetuvit  Secure with Conform   Post Op shoe   Change on Monday (at home) and Thursday (in clinic)    Bilateral Lower Leg - Wash with mild soap and water  Apply Santyl to wound (left anterior leg)  Apply Puracol ag to wound bed (right anterior leg)

## 2025-04-17 NOTE — PROGRESS NOTES
Multilayer Compression Wrap   (Not Unna) Below the Knee    NAME:  Bandar De La Torre  YOB: 1960  MEDICAL RECORD NUMBER:  [id number]  DATE:  4/17/2025    Multilayer compression wrap: Removed old Multilayer wrap if indicated and wash leg with mild soap/water.  Applied moisturizing agent to dry skin as needed.   Applied primary and secondary dressing as ordered.  Applied multilayered dressing below the knee to right lower leg.  Applied multilayered dressing below the knee to left lower leg.  Instructed patient/caregiver not to remove dressing and to keep it clean and dry.   Instructed patient/caregiver on complications to report to provider, such as pain, numbness in toes, heavy drainage, and slippage of dressing.  Instructed patient on purpose of compression dressing and on activity and exercise recommendations.      Electronically signed by Olga Christiansen RN on 4/17/2025 at 4:20 PM  
Nonselective enzymatic debridement performed with Santyl per physician order to wound(s) of the left heel and bilateral lower legs   Patient tolerated the procedure well.   
04/17/25 1523   Tunneling Position ___ O'Clock 0 04/17/25 1523   Undermining Starts ___ O'Clock 0 04/17/25 1523   Undermining Ends___ O'Clock 0 04/17/25 1523   Undermining Maxium Distance (cm) 0 04/17/25 1523   Wound Assessment Pink/red;Slough;Eschar moist;Devitalized tissue 04/17/25 1523   Drainage Amount Large (50-75% saturated) 04/17/25 1523   Drainage Description Serosanguinous 04/17/25 1523   Odor None 04/17/25 1523   Berenice-wound Assessment Fragile;Maceration 04/17/25 1523   Margins Defined edges;Unattached edges 04/17/25 1523   Wound Thickness Description not for Pressure Injury Full thickness 04/17/25 1523   Number of days: 28       Wound 04/11/25 #6 Right Anterior Lower Leg (Active)   Wound Image   04/17/25 1525   Wound Etiology Venous 04/15/25 0928   Dressing Status Clean;Dry;New dressing applied 04/15/25 0958   Wound Cleansed Soap and water;Wound cleanser 04/17/25 1523   Dressing/Treatment Collagen;Collagen with Ag;Alginate;Silicone border 04/11/25 1701   Offloading for Diabetic Foot Ulcers Post op shoe 04/17/25 1523   Wound Length (cm) 0.8 cm 04/17/25 1523   Wound Width (cm) 1 cm 04/17/25 1523   Wound Depth (cm) 0.1 cm 04/17/25 1523   Wound Surface Area (cm^2) 0.8 cm^2 04/17/25 1523   Change in Wound Size % (l*w) -60 04/17/25 1523   Wound Volume (cm^3) 0.08 cm^3 04/17/25 1523   Wound Healing % -60 04/17/25 1523   Post-Procedure Length (cm) 0.8 cm 04/17/25 1525   Post-Procedure Width (cm) 1 cm 04/17/25 1525   Post-Procedure Depth (cm) 0.1 cm 04/17/25 1525   Post-Procedure Surface Area (cm^2) 0.8 cm^2 04/17/25 1525   Post-Procedure Volume (cm^3) 0.08 cm^3 04/17/25 1525   Distance Tunneling (cm) 0 cm 04/17/25 1523   Tunneling Position ___ O'Clock 0 04/17/25 1523   Undermining Starts ___ O'Clock 0 04/17/25 1523   Undermining Ends___ O'Clock 0 04/17/25 1523   Undermining Maxium Distance (cm) 0 04/17/25 1523   Wound Assessment Pink/red 04/17/25 1523   Drainage Amount Small (< 25%) 04/17/25 1523   Drainage

## 2025-04-24 ENCOUNTER — HOSPITAL ENCOUNTER (INPATIENT)
Age: 65
LOS: 6 days | Discharge: NURSING, REHAB OR LONG-TERM CARE | DRG: 628 | End: 2025-04-30
Attending: SURGERY | Admitting: SURGERY
Payer: MEDICARE

## 2025-04-24 ENCOUNTER — HOSPITAL ENCOUNTER (OUTPATIENT)
Dept: WOUND CARE | Age: 65
Discharge: HOME | End: 2025-04-24
Attending: NURSE PRACTITIONER
Payer: MEDICARE

## 2025-04-24 VITALS
HEART RATE: 70 BPM | TEMPERATURE: 99.4 F | SYSTOLIC BLOOD PRESSURE: 142 MMHG | RESPIRATION RATE: 18 BRPM | DIASTOLIC BLOOD PRESSURE: 64 MMHG

## 2025-04-24 DIAGNOSIS — L08.9 LEFT FOOT INFECTION: ICD-10-CM

## 2025-04-24 DIAGNOSIS — Z79.01 ANTICOAGULATED: Primary | ICD-10-CM

## 2025-04-24 DIAGNOSIS — L84 PRE-ULCERATIVE CALLUSES: ICD-10-CM

## 2025-04-24 DIAGNOSIS — T81.89XD SURGICAL WOUND, NON HEALING, SUBSEQUENT ENCOUNTER: ICD-10-CM

## 2025-04-24 DIAGNOSIS — L97.912 TRAUMATIC ULCER OF LOWER EXTREMITY, RIGHT, WITH FAT LAYER EXPOSED (HCC): ICD-10-CM

## 2025-04-24 DIAGNOSIS — L97.412 DIABETIC ULCER OF RIGHT MIDFOOT ASSOCIATED WITH TYPE 2 DIABETES MELLITUS, WITH FAT LAYER EXPOSED (HCC): ICD-10-CM

## 2025-04-24 DIAGNOSIS — L97.512 DIABETIC ULCER OF TOE OF RIGHT FOOT ASSOCIATED WITH TYPE 2 DIABETES MELLITUS, WITH FAT LAYER EXPOSED (HCC): ICD-10-CM

## 2025-04-24 DIAGNOSIS — E11.621 DIABETIC ULCER OF RIGHT MIDFOOT ASSOCIATED WITH TYPE 2 DIABETES MELLITUS, WITH FAT LAYER EXPOSED (HCC): ICD-10-CM

## 2025-04-24 DIAGNOSIS — E11.621 DIABETIC ULCER OF TOE OF RIGHT FOOT ASSOCIATED WITH TYPE 2 DIABETES MELLITUS, WITH FAT LAYER EXPOSED (HCC): ICD-10-CM

## 2025-04-24 PROBLEM — E11.8 DM FOOT (HCC): Status: ACTIVE | Noted: 2025-04-24

## 2025-04-24 PROBLEM — E11.8 DIABETIC FOOT (HCC): Status: ACTIVE | Noted: 2025-04-24

## 2025-04-24 LAB
GLUCOSE BLD-MCNC: 238 MG/DL (ref 74–99)
GLUCOSE BLD-MCNC: 288 MG/DL (ref 74–99)

## 2025-04-24 PROCEDURE — 11044 DBRDMT BONE 1ST 20 SQ CM/<: CPT

## 2025-04-24 PROCEDURE — 11043 DBRDMT MUSC&/FSCA 1ST 20/<: CPT | Performed by: SURGERY

## 2025-04-24 PROCEDURE — 11047 DBRDMT BONE EACH ADDL: CPT

## 2025-04-24 PROCEDURE — 82962 GLUCOSE BLOOD TEST: CPT

## 2025-04-24 PROCEDURE — 2580000003 HC RX 258: Performed by: SURGERY

## 2025-04-24 PROCEDURE — 11046 DBRDMT MUSC&/FSCA EA ADDL: CPT

## 2025-04-24 PROCEDURE — 1200000000 HC SEMI PRIVATE

## 2025-04-24 PROCEDURE — 99222 1ST HOSP IP/OBS MODERATE 55: CPT | Performed by: SURGERY

## 2025-04-24 PROCEDURE — 6360000002 HC RX W HCPCS: Performed by: SURGERY

## 2025-04-24 PROCEDURE — 11043 DBRDMT MUSC&/FSCA 1ST 20/<: CPT

## 2025-04-24 PROCEDURE — 11046 DBRDMT MUSC&/FSCA EA ADDL: CPT | Performed by: SURGERY

## 2025-04-24 PROCEDURE — 6370000000 HC RX 637 (ALT 250 FOR IP): Performed by: STUDENT IN AN ORGANIZED HEALTH CARE EDUCATION/TRAINING PROGRAM

## 2025-04-24 PROCEDURE — 2500000003 HC RX 250 WO HCPCS: Performed by: SURGERY

## 2025-04-24 RX ORDER — EZETIMIBE 10 MG/1
10 TABLET ORAL DAILY
Status: DISCONTINUED | OUTPATIENT
Start: 2025-04-25 | End: 2025-04-30 | Stop reason: HOSPADM

## 2025-04-24 RX ORDER — MAGNESIUM SULFATE IN WATER 40 MG/ML
2000 INJECTION, SOLUTION INTRAVENOUS PRN
Status: DISCONTINUED | OUTPATIENT
Start: 2025-04-24 | End: 2025-04-30 | Stop reason: HOSPADM

## 2025-04-24 RX ORDER — BETAMETHASONE DIPROPIONATE 0.5 MG/G
CREAM TOPICAL PRN
OUTPATIENT
Start: 2025-04-24

## 2025-04-24 RX ORDER — POTASSIUM CHLORIDE 1500 MG/1
40 TABLET, EXTENDED RELEASE ORAL PRN
Status: DISCONTINUED | OUTPATIENT
Start: 2025-04-24 | End: 2025-04-30 | Stop reason: HOSPADM

## 2025-04-24 RX ORDER — TAMSULOSIN HYDROCHLORIDE 0.4 MG/1
0.4 CAPSULE ORAL DAILY
Status: DISCONTINUED | OUTPATIENT
Start: 2025-04-25 | End: 2025-04-30 | Stop reason: HOSPADM

## 2025-04-24 RX ORDER — SODIUM CHLORIDE 0.9 % (FLUSH) 0.9 %
5-40 SYRINGE (ML) INJECTION EVERY 12 HOURS SCHEDULED
Status: DISCONTINUED | OUTPATIENT
Start: 2025-04-24 | End: 2025-04-30 | Stop reason: HOSPADM

## 2025-04-24 RX ORDER — INSULIN LISPRO 100 [IU]/ML
0-8 INJECTION, SOLUTION INTRAVENOUS; SUBCUTANEOUS
Status: DISCONTINUED | OUTPATIENT
Start: 2025-04-24 | End: 2025-04-26

## 2025-04-24 RX ORDER — LIDOCAINE 50 MG/G
OINTMENT TOPICAL PRN
OUTPATIENT
Start: 2025-04-24

## 2025-04-24 RX ORDER — MUPIROCIN 20 MG/G
OINTMENT TOPICAL PRN
OUTPATIENT
Start: 2025-04-24

## 2025-04-24 RX ORDER — TORSEMIDE 20 MG/1
10 TABLET ORAL
Status: DISCONTINUED | OUTPATIENT
Start: 2025-04-24 | End: 2025-04-30 | Stop reason: HOSPADM

## 2025-04-24 RX ORDER — ENOXAPARIN SODIUM 100 MG/ML
40 INJECTION SUBCUTANEOUS DAILY
Status: DISCONTINUED | OUTPATIENT
Start: 2025-04-24 | End: 2025-04-24 | Stop reason: DRUGHIGH

## 2025-04-24 RX ORDER — ONDANSETRON 2 MG/ML
4 INJECTION INTRAMUSCULAR; INTRAVENOUS EVERY 6 HOURS PRN
Status: DISCONTINUED | OUTPATIENT
Start: 2025-04-24 | End: 2025-04-30 | Stop reason: HOSPADM

## 2025-04-24 RX ORDER — SODIUM CHLOR/HYPOCHLOROUS ACID 0.033 %
SOLUTION, IRRIGATION IRRIGATION PRN
OUTPATIENT
Start: 2025-04-24

## 2025-04-24 RX ORDER — LIDOCAINE HYDROCHLORIDE 40 MG/ML
SOLUTION TOPICAL PRN
OUTPATIENT
Start: 2025-04-24

## 2025-04-24 RX ORDER — GENTAMICIN SULFATE 1 MG/G
OINTMENT TOPICAL PRN
Status: DISCONTINUED | OUTPATIENT
Start: 2025-04-24 | End: 2025-04-25 | Stop reason: HOSPADM

## 2025-04-24 RX ORDER — TRIAMCINOLONE ACETONIDE 1 MG/G
OINTMENT TOPICAL PRN
OUTPATIENT
Start: 2025-04-24

## 2025-04-24 RX ORDER — ATORVASTATIN CALCIUM 10 MG/1
20 TABLET, FILM COATED ORAL DAILY
Status: DISCONTINUED | OUTPATIENT
Start: 2025-04-25 | End: 2025-04-30 | Stop reason: HOSPADM

## 2025-04-24 RX ORDER — HYDROMORPHONE HYDROCHLORIDE 1 MG/ML
0.5 INJECTION, SOLUTION INTRAMUSCULAR; INTRAVENOUS; SUBCUTANEOUS
Status: DISCONTINUED | OUTPATIENT
Start: 2025-04-24 | End: 2025-04-30 | Stop reason: HOSPADM

## 2025-04-24 RX ORDER — LIDOCAINE 40 MG/G
CREAM TOPICAL PRN
OUTPATIENT
Start: 2025-04-24

## 2025-04-24 RX ORDER — SPIRONOLACTONE 50 MG/1
25 TABLET, FILM COATED ORAL 2 TIMES DAILY
Status: DISCONTINUED | OUTPATIENT
Start: 2025-04-24 | End: 2025-04-30 | Stop reason: HOSPADM

## 2025-04-24 RX ORDER — METOPROLOL SUCCINATE 25 MG/1
25 TABLET, EXTENDED RELEASE ORAL DAILY
Status: DISCONTINUED | OUTPATIENT
Start: 2025-04-25 | End: 2025-04-30 | Stop reason: HOSPADM

## 2025-04-24 RX ORDER — SODIUM CHLORIDE 9 MG/ML
INJECTION, SOLUTION INTRAVENOUS PRN
Status: DISCONTINUED | OUTPATIENT
Start: 2025-04-24 | End: 2025-04-30 | Stop reason: HOSPADM

## 2025-04-24 RX ORDER — B12/LEVOMEFOLATE CALCIUM/B-6 2-1.13-25
1 TABLET ORAL DAILY
Status: DISCONTINUED | OUTPATIENT
Start: 2025-04-25 | End: 2025-04-30 | Stop reason: HOSPADM

## 2025-04-24 RX ORDER — RANOLAZINE 500 MG/1
500 TABLET, EXTENDED RELEASE ORAL 2 TIMES DAILY
Status: DISCONTINUED | OUTPATIENT
Start: 2025-04-24 | End: 2025-04-30 | Stop reason: HOSPADM

## 2025-04-24 RX ORDER — SODIUM CHLORIDE 0.9 % (FLUSH) 0.9 %
5-40 SYRINGE (ML) INJECTION PRN
Status: DISCONTINUED | OUTPATIENT
Start: 2025-04-24 | End: 2025-04-30 | Stop reason: HOSPADM

## 2025-04-24 RX ORDER — ENOXAPARIN SODIUM 100 MG/ML
30 INJECTION SUBCUTANEOUS 2 TIMES DAILY
Status: DISCONTINUED | OUTPATIENT
Start: 2025-04-24 | End: 2025-04-24

## 2025-04-24 RX ORDER — GLUCAGON 1 MG/ML
1 KIT INJECTION PRN
Status: DISCONTINUED | OUTPATIENT
Start: 2025-04-24 | End: 2025-04-30 | Stop reason: HOSPADM

## 2025-04-24 RX ORDER — LIDOCAINE HYDROCHLORIDE 20 MG/ML
JELLY TOPICAL PRN
OUTPATIENT
Start: 2025-04-24

## 2025-04-24 RX ORDER — GINSENG 100 MG
CAPSULE ORAL PRN
OUTPATIENT
Start: 2025-04-24

## 2025-04-24 RX ORDER — FINASTERIDE 5 MG/1
5 TABLET, FILM COATED ORAL DAILY
Status: DISCONTINUED | OUTPATIENT
Start: 2025-04-25 | End: 2025-04-30 | Stop reason: HOSPADM

## 2025-04-24 RX ORDER — CLOBETASOL PROPIONATE 0.5 MG/G
OINTMENT TOPICAL PRN
OUTPATIENT
Start: 2025-04-24

## 2025-04-24 RX ORDER — POTASSIUM CHLORIDE 7.45 MG/ML
10 INJECTION INTRAVENOUS PRN
Status: DISCONTINUED | OUTPATIENT
Start: 2025-04-24 | End: 2025-04-30 | Stop reason: HOSPADM

## 2025-04-24 RX ORDER — SILVER SULFADIAZINE 10 MG/G
CREAM TOPICAL PRN
OUTPATIENT
Start: 2025-04-24

## 2025-04-24 RX ORDER — INSULIN LISPRO 100 [IU]/ML
0-4 INJECTION, SOLUTION INTRAVENOUS; SUBCUTANEOUS EVERY 4 HOURS
Status: DISCONTINUED | OUTPATIENT
Start: 2025-04-24 | End: 2025-04-26

## 2025-04-24 RX ORDER — GENTAMICIN SULFATE 1 MG/G
OINTMENT TOPICAL PRN
OUTPATIENT
Start: 2025-04-24

## 2025-04-24 RX ORDER — ACETAMINOPHEN 325 MG/1
650 TABLET ORAL EVERY 4 HOURS PRN
Status: DISCONTINUED | OUTPATIENT
Start: 2025-04-24 | End: 2025-04-30 | Stop reason: HOSPADM

## 2025-04-24 RX ORDER — NEOMYCIN/BACITRACIN/POLYMYXINB 3.5-400-5K
OINTMENT (GRAM) TOPICAL PRN
OUTPATIENT
Start: 2025-04-24

## 2025-04-24 RX ORDER — OXYCODONE HYDROCHLORIDE 5 MG/1
5 TABLET ORAL EVERY 4 HOURS PRN
Status: DISCONTINUED | OUTPATIENT
Start: 2025-04-24 | End: 2025-04-30 | Stop reason: HOSPADM

## 2025-04-24 RX ORDER — CLOPIDOGREL BISULFATE 75 MG/1
75 TABLET ORAL DAILY
Status: DISCONTINUED | OUTPATIENT
Start: 2025-04-25 | End: 2025-04-28

## 2025-04-24 RX ORDER — BACITRACIN ZINC AND POLYMYXIN B SULFATE 500; 1000 [USP'U]/G; [USP'U]/G
OINTMENT TOPICAL PRN
OUTPATIENT
Start: 2025-04-24

## 2025-04-24 RX ORDER — DEXTROSE MONOHYDRATE 100 MG/ML
INJECTION, SOLUTION INTRAVENOUS CONTINUOUS PRN
Status: DISCONTINUED | OUTPATIENT
Start: 2025-04-24 | End: 2025-04-30 | Stop reason: HOSPADM

## 2025-04-24 RX ORDER — POTASSIUM CHLORIDE 1500 MG/1
40 TABLET, EXTENDED RELEASE ORAL 2 TIMES DAILY
Status: DISCONTINUED | OUTPATIENT
Start: 2025-04-24 | End: 2025-04-30 | Stop reason: HOSPADM

## 2025-04-24 RX ORDER — ONDANSETRON 4 MG/1
4 TABLET, ORALLY DISINTEGRATING ORAL EVERY 8 HOURS PRN
Status: DISCONTINUED | OUTPATIENT
Start: 2025-04-24 | End: 2025-04-30 | Stop reason: HOSPADM

## 2025-04-24 RX ADMIN — PIPERACILLIN AND TAZOBACTAM 4500 MG: 4; .5 INJECTION, POWDER, FOR SOLUTION INTRAVENOUS at 21:55

## 2025-04-24 RX ADMIN — TORSEMIDE 10 MG: 20 TABLET ORAL at 23:46

## 2025-04-24 RX ADMIN — RANOLAZINE 500 MG: 500 TABLET, EXTENDED RELEASE ORAL at 23:45

## 2025-04-24 RX ADMIN — INSULIN LISPRO 1 UNITS: 100 INJECTION, SOLUTION INTRAVENOUS; SUBCUTANEOUS at 23:46

## 2025-04-24 RX ADMIN — POTASSIUM CHLORIDE 40 MEQ: 1500 TABLET, EXTENDED RELEASE ORAL at 23:45

## 2025-04-24 RX ADMIN — SODIUM CHLORIDE, PRESERVATIVE FREE 10 ML: 5 INJECTION INTRAVENOUS at 21:55

## 2025-04-24 RX ADMIN — SPIRONOLACTONE 25 MG: 50 TABLET ORAL at 23:46

## 2025-04-24 NOTE — H&P
Department of General Surgery   Surgical Service Dr. Avalos   H&P Note    Date of Consult: 4/24/25    Reason for Admit:  worsening left DM foot wound    CHIEF COMPLAINT:  DM foot wound    History Obtained From:  patient    HISTORY OF PRESENT ILLNESS:    The patient is a 64 y.o. male who is known to me from prior right toe amputations and left heel wound which has been followed in wound care clinic.  Patient presented to wound care clinic with worsening left heel wound. Upon debridement there is significant necrotic tissue on the left heal. He denies fever or chills. Denies pain--he is neuropathic.      Past Medical History:    Past Medical History:   Diagnosis Date    Atrial fibrillation (HCC)     Cerebral artery occlusion with cerebral infarction (HCC)     History of cardiac cath 2017    --RCA has mid 50% stenosis and PLB branch has 70% stenosis noted. LVEDP very high    Hyperlipidemia     Hypertension     Type II or unspecified type diabetes mellitus without mention of complication, not stated as uncontrolled     Diagnsed about 1998    Unspecified sleep apnea        Past Surgical History:    Past Surgical History:   Procedure Laterality Date    CARDIAC CATHETERIZATION      PACEMAKER INSERTION      Done in the past at [facility] in October 2024 by Dr. Ocampo    TOE AMPUTATION Right 02/19/2025    RIGHT FOOT 2ND AND 3RD TOE AMPUTATION WITH FOOT DEBRIDEMENT performed by Jakob Avalos MD at [facility] OR    TONSILLECTOMY      AT 15 YEARS OLD       Current Medications:   No current facility-administered medications for this encounter.     Current Outpatient Medications   Medication Sig Dispense Refill    dapagliflozin (FARXIGA) 5 MG tablet Take 1 tablet by mouth every morning      ezetimibe (ZETIA) 10 MG tablet Take 1 tablet by mouth daily      simvastatin (ZOCOR) 40 MG tablet Take 1 tablet by mouth nightly      potassium chloride (KLOR-CON M20) 20 MEQ extended release tablet Take 2 tablets by mouth 2 times daily 360

## 2025-04-24 NOTE — PATIENT INSTRUCTIONS
PHYSICIAN ORDERS AND DISCHARGE INSTRUCTIONS  NOTE: Upon discharge from the Wound Center, you will receive a patient experience survey via E-mail. We would be grateful if you would take the time to fill this survey out.  Wound care order history:   SHELBY's   Right       Left    Date    Vascular studies/Intervention: .     Cultures: .               Antibiotics: ordered on 4/03/25, ordered on 04/24/25              HbA1c:  .               Grafts:  .   Juxta Lites & Lymph Pumps:  Continuing wound care orders and information:              Residence: .              Continue home health care with:.[facility]   Your wound-care supplies will be provided by: .              Pharmacy: [facility] on [facility]   Wound Vac: post-op- discussed with pt 04/24/25, discussed surgical debridement-pt sent to hospital on 04/24/25    Wound cleansing:     Do not scrub or use excessive force.    Wash hands with soap and water before and after dressing changes.    Prior to applying a clean dressing, cleanse wound with normal saline,    wound cleanser, or mild soap and water.     Ask your physician or nurse before getting the wound(s) wet in the shower.  Daily Wound management:    Keep weight off wounds and reposition every 2 hours.    Avoid standing for long periods of time.    Evaluate legs to the level of the heart or above for 30 minutes 4-5 times a day and/or when sitting.       When taking antibiotics take entire prescription as ordered by MD do not stop taking until medicine is all gone.     Documentation:  Compression: .   Offloading: .   Toenail Trim:         Orders for this week (4/24/2025):  Left Heel - Wash with mild liquid soap and water  Gently pack tunnel with 4x4 Puracol Ag+  Apply Betadine dampened 4 x 4   Cover with ABD  Secure with Kerlix  Post Op shoe   Change on Monday (at home) and Thursday (in clinic)    Bilateral Lower Leg - Wash with mild soap and water  Apply Santyl to wound (left anterior leg)  Apply Puracol ag to wound bed (right

## 2025-04-24 NOTE — PROGRESS NOTES
Wound Care Center Progress Note With Procedure    Bandar De La Torre  AGE: 64 y.o.   GENDER: male  : 1960  EPISODE DATE:  2025     Subjective:     Chief Complaint   Patient presents with    Wound Check     BLE         HISTORY of PRESENT ILLNESS   Bandar De La Torre is a 64 y.o. 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  .  The ulcers are of moderate severity.  The underlying cause of the wound is pressure, neuropathy and diabetes.       He had right toes 2 and 3 amputated and debridement of 2 wounds on his right foot on 2025.  Since that time he has been doing local wound cares.  The right foot wounds are markedly improved.  He has a new area on his left heel that had been a callus but has broken down into a wound today.     3/27/2025  Doing ok this week right foot shows continued improvement and is practically healed with only a very tiny wound on the plantar great toe.  Left heel is more macerated today and a little larger.     4/3/2025  Left heel is worse today. More maceration. Left leg is edematous. Heel wound is larger and deeper centrally.  Mild surrounding erythema.     2025  Doing better this week after changes made by Mackenzie.  Punch biopsy results pending. Emmanuel denies new complaints.     2025  Worsening of the left heel wound this week with much more necrotic tissue, mild odor. Denies increased pain or other complaints. Feels well.    Wound Pain Timing/Severity: none  Quality of pain: N/A  Severity of pain:  0 / 10   Modifying Factors: edema, venous stasis, diabetes, chronic pressure, decreased mobility, shear force, and obesity  Associated Signs/Symptoms: drainage and numbness                                                   PAST MEDICAL HISTORY        Diagnosis Date    Atrial fibrillation (HCC)     Cerebral artery occlusion with

## 2025-04-25 ENCOUNTER — ANESTHESIA (OUTPATIENT)
Dept: OPERATING ROOM | Age: 65
DRG: 628 | End: 2025-04-25
Payer: MEDICARE

## 2025-04-25 ENCOUNTER — ANESTHESIA EVENT (OUTPATIENT)
Dept: OPERATING ROOM | Age: 65
DRG: 628 | End: 2025-04-25
Payer: MEDICARE

## 2025-04-25 ENCOUNTER — APPOINTMENT (OUTPATIENT)
Dept: MRI IMAGING | Age: 65
DRG: 628 | End: 2025-04-25
Attending: SURGERY
Payer: MEDICARE

## 2025-04-25 PROBLEM — M86.9 PYOGENIC INFLAMMATION OF BONE (HCC): Status: ACTIVE | Noted: 2025-04-25

## 2025-04-25 PROBLEM — L08.9 LEFT FOOT INFECTION: Status: ACTIVE | Noted: 2025-04-25

## 2025-04-25 LAB
ANION GAP SERPL CALCULATED.3IONS-SCNC: 14 MMOL/L (ref 9–17)
BASOPHILS # BLD: 0.04 K/UL
BASOPHILS NFR BLD: 0 % (ref 0–1)
BUN SERPL-MCNC: 40 MG/DL (ref 7–20)
CALCIUM SERPL-MCNC: 9.7 MG/DL (ref 8.3–10.6)
CHLORIDE SERPL-SCNC: 102 MMOL/L (ref 99–110)
CO2 SERPL-SCNC: 25 MMOL/L (ref 21–32)
CREAT SERPL-MCNC: 1.3 MG/DL (ref 0.8–1.3)
CRP SERPL HS-MCNC: 112 MG/L (ref 0–5)
EOSINOPHIL # BLD: 0.03 K/UL
EOSINOPHILS RELATIVE PERCENT: 0 % (ref 0–3)
ERYTHROCYTE [DISTWIDTH] IN BLOOD BY AUTOMATED COUNT: 16.1 % (ref 11.7–14.9)
GFR, ESTIMATED: 58 ML/MIN/1.73M2
GLUCOSE BLD-MCNC: 230 MG/DL (ref 74–99)
GLUCOSE BLD-MCNC: 249 MG/DL (ref 74–99)
GLUCOSE BLD-MCNC: 256 MG/DL (ref 74–99)
GLUCOSE BLD-MCNC: 262 MG/DL (ref 74–99)
GLUCOSE BLD-MCNC: 290 MG/DL (ref 74–99)
GLUCOSE BLD-MCNC: 325 MG/DL (ref 74–99)
GLUCOSE SERPL-MCNC: 243 MG/DL (ref 74–99)
HCT VFR BLD AUTO: 36.8 % (ref 42–52)
HGB BLD-MCNC: 11.4 G/DL (ref 13.5–18)
IMM GRANULOCYTES # BLD AUTO: 0.05 K/UL
IMM GRANULOCYTES NFR BLD: 0 %
LYMPHOCYTES NFR BLD: 1.23 K/UL
LYMPHOCYTES RELATIVE PERCENT: 10 % (ref 24–44)
MCH RBC QN AUTO: 27.5 PG (ref 27–31)
MCHC RBC AUTO-ENTMCNC: 31 G/DL (ref 32–36)
MCV RBC AUTO: 88.9 FL (ref 78–100)
MONOCYTES NFR BLD: 1.45 K/UL
MONOCYTES NFR BLD: 12 % (ref 0–5)
NEUTROPHILS NFR BLD: 77 % (ref 36–66)
NEUTS SEG NFR BLD: 9.34 K/UL
PLATELET # BLD AUTO: 318 K/UL (ref 140–440)
PMV BLD AUTO: 10.8 FL (ref 7.5–11.1)
POTASSIUM SERPL-SCNC: 4.3 MMOL/L (ref 3.5–5.1)
RBC # BLD AUTO: 4.14 M/UL (ref 4.6–6.2)
SODIUM SERPL-SCNC: 140 MMOL/L (ref 136–145)
WBC OTHER # BLD: 12.1 K/UL (ref 4–10.5)

## 2025-04-25 PROCEDURE — 87075 CULTR BACTERIA EXCEPT BLOOD: CPT

## 2025-04-25 PROCEDURE — 3700000001 HC ADD 15 MINUTES (ANESTHESIA): Performed by: SURGERY

## 2025-04-25 PROCEDURE — 87040 BLOOD CULTURE FOR BACTERIA: CPT

## 2025-04-25 PROCEDURE — 3700000000 HC ANESTHESIA ATTENDED CARE: Performed by: SURGERY

## 2025-04-25 PROCEDURE — 6360000002 HC RX W HCPCS: Performed by: SURGERY

## 2025-04-25 PROCEDURE — 11044 DBRDMT BONE 1ST 20 SQ CM/<: CPT | Performed by: SURGERY

## 2025-04-25 PROCEDURE — 2500000003 HC RX 250 WO HCPCS: Performed by: NURSE ANESTHETIST, CERTIFIED REGISTERED

## 2025-04-25 PROCEDURE — 80048 BASIC METABOLIC PNL TOTAL CA: CPT

## 2025-04-25 PROCEDURE — 6370000000 HC RX 637 (ALT 250 FOR IP): Performed by: STUDENT IN AN ORGANIZED HEALTH CARE EDUCATION/TRAINING PROGRAM

## 2025-04-25 PROCEDURE — 87186 SC STD MICRODIL/AGAR DIL: CPT

## 2025-04-25 PROCEDURE — 2709999900 HC NON-CHARGEABLE SUPPLY: Performed by: SURGERY

## 2025-04-25 PROCEDURE — 6370000000 HC RX 637 (ALT 250 FOR IP): Performed by: SURGERY

## 2025-04-25 PROCEDURE — 87205 SMEAR GRAM STAIN: CPT

## 2025-04-25 PROCEDURE — 73723 MRI JOINT LWR EXTR W/O&W/DYE: CPT

## 2025-04-25 PROCEDURE — 85025 COMPLETE CBC W/AUTO DIFF WBC: CPT

## 2025-04-25 PROCEDURE — 2580000003 HC RX 258: Performed by: SURGERY

## 2025-04-25 PROCEDURE — 82962 GLUCOSE BLOOD TEST: CPT

## 2025-04-25 PROCEDURE — 6370000000 HC RX 637 (ALT 250 FOR IP): Performed by: NURSE PRACTITIONER

## 2025-04-25 PROCEDURE — 6360000002 HC RX W HCPCS: Performed by: NURSE ANESTHETIST, CERTIFIED REGISTERED

## 2025-04-25 PROCEDURE — 99223 1ST HOSP IP/OBS HIGH 75: CPT | Performed by: INTERNAL MEDICINE

## 2025-04-25 PROCEDURE — 6360000004 HC RX CONTRAST MEDICATION: Performed by: SURGERY

## 2025-04-25 PROCEDURE — 6360000002 HC RX W HCPCS: Performed by: STUDENT IN AN ORGANIZED HEALTH CARE EDUCATION/TRAINING PROGRAM

## 2025-04-25 PROCEDURE — 7100000001 HC PACU RECOVERY - ADDTL 15 MIN: Performed by: SURGERY

## 2025-04-25 PROCEDURE — 7100000000 HC PACU RECOVERY - FIRST 15 MIN: Performed by: SURGERY

## 2025-04-25 PROCEDURE — 11047 DBRDMT BONE EACH ADDL: CPT | Performed by: SURGERY

## 2025-04-25 PROCEDURE — 0QBM0ZZ EXCISION OF LEFT TARSAL, OPEN APPROACH: ICD-10-PCS | Performed by: SURGERY

## 2025-04-25 PROCEDURE — 3600000002 HC SURGERY LEVEL 2 BASE: Performed by: SURGERY

## 2025-04-25 PROCEDURE — 3600000012 HC SURGERY LEVEL 2 ADDTL 15MIN: Performed by: SURGERY

## 2025-04-25 PROCEDURE — A9579 GAD-BASE MR CONTRAST NOS,1ML: HCPCS | Performed by: SURGERY

## 2025-04-25 PROCEDURE — 1200000000 HC SEMI PRIVATE

## 2025-04-25 PROCEDURE — 87070 CULTURE OTHR SPECIMN AEROBIC: CPT

## 2025-04-25 PROCEDURE — 94761 N-INVAS EAR/PLS OXIMETRY MLT: CPT

## 2025-04-25 PROCEDURE — 87077 CULTURE AEROBIC IDENTIFY: CPT

## 2025-04-25 PROCEDURE — 86140 C-REACTIVE PROTEIN: CPT

## 2025-04-25 PROCEDURE — 2580000003 HC RX 258: Performed by: NURSE ANESTHETIST, CERTIFIED REGISTERED

## 2025-04-25 PROCEDURE — 36415 COLL VENOUS BLD VENIPUNCTURE: CPT

## 2025-04-25 PROCEDURE — 6360000002 HC RX W HCPCS: Performed by: ANESTHESIOLOGY

## 2025-04-25 PROCEDURE — 2580000003 HC RX 258: Performed by: STUDENT IN AN ORGANIZED HEALTH CARE EDUCATION/TRAINING PROGRAM

## 2025-04-25 RX ORDER — MEPERIDINE HYDROCHLORIDE 25 MG/ML
12.5 INJECTION INTRAMUSCULAR; INTRAVENOUS; SUBCUTANEOUS EVERY 5 MIN PRN
Status: DISCONTINUED | OUTPATIENT
Start: 2025-04-25 | End: 2025-04-25 | Stop reason: HOSPADM

## 2025-04-25 RX ORDER — GINSENG 100 MG
CAPSULE ORAL
Status: DISCONTINUED | OUTPATIENT
Start: 2025-04-25 | End: 2025-04-25 | Stop reason: HOSPADM

## 2025-04-25 RX ORDER — SODIUM CHLORIDE, SODIUM LACTATE, POTASSIUM CHLORIDE, CALCIUM CHLORIDE 600; 310; 30; 20 MG/100ML; MG/100ML; MG/100ML; MG/100ML
INJECTION, SOLUTION INTRAVENOUS CONTINUOUS
Status: DISCONTINUED | OUTPATIENT
Start: 2025-04-25 | End: 2025-04-25 | Stop reason: HOSPADM

## 2025-04-25 RX ORDER — FENTANYL CITRATE 50 UG/ML
50 INJECTION, SOLUTION INTRAMUSCULAR; INTRAVENOUS EVERY 5 MIN PRN
Status: DISCONTINUED | OUTPATIENT
Start: 2025-04-25 | End: 2025-04-25 | Stop reason: HOSPADM

## 2025-04-25 RX ORDER — SODIUM CHLORIDE 9 MG/ML
INJECTION, SOLUTION INTRAVENOUS PRN
Status: DISCONTINUED | OUTPATIENT
Start: 2025-04-25 | End: 2025-04-25 | Stop reason: HOSPADM

## 2025-04-25 RX ORDER — NALOXONE HYDROCHLORIDE 0.4 MG/ML
INJECTION, SOLUTION INTRAMUSCULAR; INTRAVENOUS; SUBCUTANEOUS PRN
Status: DISCONTINUED | OUTPATIENT
Start: 2025-04-25 | End: 2025-04-25 | Stop reason: HOSPADM

## 2025-04-25 RX ORDER — SODIUM CHLORIDE 0.9 % (FLUSH) 0.9 %
5-40 SYRINGE (ML) INJECTION PRN
Status: DISCONTINUED | OUTPATIENT
Start: 2025-04-25 | End: 2025-04-25 | Stop reason: HOSPADM

## 2025-04-25 RX ORDER — ONDANSETRON 2 MG/ML
4 INJECTION INTRAMUSCULAR; INTRAVENOUS
Status: COMPLETED | OUTPATIENT
Start: 2025-04-25 | End: 2025-04-25

## 2025-04-25 RX ORDER — HALOPERIDOL 5 MG/ML
1 INJECTION INTRAMUSCULAR
Status: DISCONTINUED | OUTPATIENT
Start: 2025-04-25 | End: 2025-04-25 | Stop reason: HOSPADM

## 2025-04-25 RX ORDER — FENTANYL CITRATE 50 UG/ML
INJECTION, SOLUTION INTRAMUSCULAR; INTRAVENOUS
Status: DISCONTINUED | OUTPATIENT
Start: 2025-04-25 | End: 2025-04-25 | Stop reason: SDUPTHER

## 2025-04-25 RX ORDER — ROCURONIUM BROMIDE 10 MG/ML
INJECTION, SOLUTION INTRAVENOUS
Status: DISCONTINUED | OUTPATIENT
Start: 2025-04-25 | End: 2025-04-25 | Stop reason: SDUPTHER

## 2025-04-25 RX ORDER — SODIUM CHLORIDE 0.9 % (FLUSH) 0.9 %
5-40 SYRINGE (ML) INJECTION EVERY 12 HOURS SCHEDULED
Status: DISCONTINUED | OUTPATIENT
Start: 2025-04-25 | End: 2025-04-25 | Stop reason: HOSPADM

## 2025-04-25 RX ORDER — PROPOFOL 10 MG/ML
INJECTION, EMULSION INTRAVENOUS
Status: DISCONTINUED | OUTPATIENT
Start: 2025-04-25 | End: 2025-04-25 | Stop reason: SDUPTHER

## 2025-04-25 RX ORDER — OXYCODONE HYDROCHLORIDE 5 MG/1
10 TABLET ORAL PRN
Status: DISCONTINUED | OUTPATIENT
Start: 2025-04-25 | End: 2025-04-25 | Stop reason: HOSPADM

## 2025-04-25 RX ORDER — LIDOCAINE HYDROCHLORIDE 20 MG/ML
INJECTION, SOLUTION INFILTRATION; PERINEURAL
Status: DISCONTINUED | OUTPATIENT
Start: 2025-04-25 | End: 2025-04-25 | Stop reason: SDUPTHER

## 2025-04-25 RX ORDER — DEXAMETHASONE SODIUM PHOSPHATE 4 MG/ML
INJECTION, SOLUTION INTRA-ARTICULAR; INTRALESIONAL; INTRAMUSCULAR; INTRAVENOUS; SOFT TISSUE
Status: DISCONTINUED | OUTPATIENT
Start: 2025-04-25 | End: 2025-04-25 | Stop reason: SDUPTHER

## 2025-04-25 RX ORDER — OXYCODONE HYDROCHLORIDE 5 MG/1
5 TABLET ORAL PRN
Status: DISCONTINUED | OUTPATIENT
Start: 2025-04-25 | End: 2025-04-25 | Stop reason: HOSPADM

## 2025-04-25 RX ORDER — LABETALOL HYDROCHLORIDE 5 MG/ML
10 INJECTION, SOLUTION INTRAVENOUS
Status: DISCONTINUED | OUTPATIENT
Start: 2025-04-25 | End: 2025-04-25 | Stop reason: HOSPADM

## 2025-04-25 RX ORDER — ACETAMINOPHEN 325 MG/1
650 TABLET ORAL
Status: DISCONTINUED | OUTPATIENT
Start: 2025-04-25 | End: 2025-04-25 | Stop reason: HOSPADM

## 2025-04-25 RX ORDER — SODIUM CHLORIDE 9 MG/ML
INJECTION, SOLUTION INTRAVENOUS
Status: DISCONTINUED | OUTPATIENT
Start: 2025-04-25 | End: 2025-04-25 | Stop reason: SDUPTHER

## 2025-04-25 RX ORDER — INSULIN GLARGINE 100 [IU]/ML
10 INJECTION, SOLUTION SUBCUTANEOUS NIGHTLY
Status: DISCONTINUED | OUTPATIENT
Start: 2025-04-25 | End: 2025-04-26

## 2025-04-25 RX ORDER — MIDAZOLAM HYDROCHLORIDE 1 MG/ML
INJECTION, SOLUTION INTRAMUSCULAR; INTRAVENOUS
Status: DISCONTINUED | OUTPATIENT
Start: 2025-04-25 | End: 2025-04-25 | Stop reason: SDUPTHER

## 2025-04-25 RX ORDER — BACITRACIN 50000 [IU]/1
INJECTION, POWDER, FOR SOLUTION INTRAMUSCULAR
Status: DISCONTINUED | OUTPATIENT
Start: 2025-04-25 | End: 2025-04-25 | Stop reason: HOSPADM

## 2025-04-25 RX ADMIN — EZETIMIBE 10 MG: 10 TABLET ORAL at 10:18

## 2025-04-25 RX ADMIN — POTASSIUM CHLORIDE 40 MEQ: 1500 TABLET, EXTENDED RELEASE ORAL at 10:17

## 2025-04-25 RX ADMIN — METOPROLOL SUCCINATE 25 MG: 25 TABLET, EXTENDED RELEASE ORAL at 10:17

## 2025-04-25 RX ADMIN — SPIRONOLACTONE 25 MG: 50 TABLET ORAL at 20:25

## 2025-04-25 RX ADMIN — SPIRONOLACTONE 25 MG: 50 TABLET ORAL at 10:17

## 2025-04-25 RX ADMIN — FINASTERIDE 5 MG: 5 TABLET, FILM COATED ORAL at 10:17

## 2025-04-25 RX ADMIN — PIPERACILLIN AND TAZOBACTAM 4500 MG: 4; .5 INJECTION, POWDER, FOR SOLUTION INTRAVENOUS at 16:33

## 2025-04-25 RX ADMIN — PROPOFOL 25 MG: 10 INJECTION, EMULSION INTRAVENOUS at 13:16

## 2025-04-25 RX ADMIN — PROPOFOL 25 MG: 10 INJECTION, EMULSION INTRAVENOUS at 13:11

## 2025-04-25 RX ADMIN — POTASSIUM CHLORIDE 40 MEQ: 1500 TABLET, EXTENDED RELEASE ORAL at 20:26

## 2025-04-25 RX ADMIN — EMPAGLIFLOZIN 10 MG: 10 TABLET, FILM COATED ORAL at 10:18

## 2025-04-25 RX ADMIN — VANCOMYCIN HYDROCHLORIDE 1000 MG: 1 INJECTION, POWDER, LYOPHILIZED, FOR SOLUTION INTRAVENOUS at 21:29

## 2025-04-25 RX ADMIN — RANOLAZINE 500 MG: 500 TABLET, EXTENDED RELEASE ORAL at 20:26

## 2025-04-25 RX ADMIN — FENTANYL CITRATE 50 MCG: 50 INJECTION, SOLUTION INTRAMUSCULAR; INTRAVENOUS at 12:59

## 2025-04-25 RX ADMIN — PIPERACILLIN AND TAZOBACTAM 4500 MG: 4; .5 INJECTION, POWDER, FOR SOLUTION INTRAVENOUS at 12:42

## 2025-04-25 RX ADMIN — GADOTERIDOL 20 ML: 279.3 INJECTION, SOLUTION INTRAVENOUS at 09:18

## 2025-04-25 RX ADMIN — PIPERACILLIN AND TAZOBACTAM 4500 MG: 4; .5 INJECTION, POWDER, FOR SOLUTION INTRAVENOUS at 23:48

## 2025-04-25 RX ADMIN — Medication 1 TABLET: at 16:32

## 2025-04-25 RX ADMIN — SUGAMMADEX 400 MG: 100 INJECTION, SOLUTION INTRAVENOUS at 13:08

## 2025-04-25 RX ADMIN — TORSEMIDE 10 MG: 20 TABLET ORAL at 16:32

## 2025-04-25 RX ADMIN — TAMSULOSIN HYDROCHLORIDE 0.4 MG: 0.4 CAPSULE ORAL at 10:17

## 2025-04-25 RX ADMIN — INSULIN LISPRO 2 UNITS: 100 INJECTION, SOLUTION INTRAVENOUS; SUBCUTANEOUS at 04:27

## 2025-04-25 RX ADMIN — PHENYLEPHRINE HYDROCHLORIDE 100 MCG: 10 INJECTION INTRAVENOUS at 12:47

## 2025-04-25 RX ADMIN — ONDANSETRON 4 MG: 2 INJECTION INTRAMUSCULAR; INTRAVENOUS at 13:10

## 2025-04-25 RX ADMIN — PIPERACILLIN AND TAZOBACTAM 4500 MG: 4; .5 INJECTION, POWDER, FOR SOLUTION INTRAVENOUS at 10:25

## 2025-04-25 RX ADMIN — PROPOFOL 50 MG: 10 INJECTION, EMULSION INTRAVENOUS at 13:00

## 2025-04-25 RX ADMIN — INSULIN LISPRO 3 UNITS: 100 INJECTION, SOLUTION INTRAVENOUS; SUBCUTANEOUS at 20:25

## 2025-04-25 RX ADMIN — MIDAZOLAM 2 MG: 1 INJECTION INTRAMUSCULAR; INTRAVENOUS at 12:35

## 2025-04-25 RX ADMIN — ATORVASTATIN CALCIUM 20 MG: 10 TABLET, FILM COATED ORAL at 10:18

## 2025-04-25 RX ADMIN — SODIUM CHLORIDE: 9 INJECTION, SOLUTION INTRAVENOUS at 12:36

## 2025-04-25 RX ADMIN — ROCURONIUM BROMIDE 50 MG: 10 INJECTION INTRAVENOUS at 12:37

## 2025-04-25 RX ADMIN — INSULIN GLARGINE 10 UNITS: 100 INJECTION, SOLUTION SUBCUTANEOUS at 20:24

## 2025-04-25 RX ADMIN — VANCOMYCIN HYDROCHLORIDE 2500 MG: 5 INJECTION, POWDER, LYOPHILIZED, FOR SOLUTION INTRAVENOUS at 05:36

## 2025-04-25 RX ADMIN — PROPOFOL 100 MG: 10 INJECTION, EMULSION INTRAVENOUS at 12:36

## 2025-04-25 RX ADMIN — PIPERACILLIN AND TAZOBACTAM 4500 MG: 4; .5 INJECTION, POWDER, FOR SOLUTION INTRAVENOUS at 01:26

## 2025-04-25 RX ADMIN — PHENYLEPHRINE HYDROCHLORIDE 100 MCG: 10 INJECTION INTRAVENOUS at 12:36

## 2025-04-25 RX ADMIN — RANOLAZINE 500 MG: 500 TABLET, EXTENDED RELEASE ORAL at 10:18

## 2025-04-25 RX ADMIN — INSULIN LISPRO 1 UNITS: 100 INJECTION, SOLUTION INTRAVENOUS; SUBCUTANEOUS at 10:15

## 2025-04-25 RX ADMIN — DEXAMETHASONE SODIUM PHOSPHATE 4 MG: 4 INJECTION, SOLUTION INTRAMUSCULAR; INTRAVENOUS at 12:51

## 2025-04-25 RX ADMIN — LIDOCAINE HYDROCHLORIDE 50 MG: 20 INJECTION, SOLUTION INFILTRATION; PERINEURAL at 12:36

## 2025-04-25 RX ADMIN — SODIUM CHLORIDE 3000 MG: 9 INJECTION, SOLUTION INTRAVENOUS at 12:40

## 2025-04-25 RX ADMIN — FENTANYL CITRATE 50 MCG: 50 INJECTION, SOLUTION INTRAMUSCULAR; INTRAVENOUS at 12:36

## 2025-04-25 RX ADMIN — INSULIN LISPRO 4 UNITS: 100 INJECTION, SOLUTION INTRAVENOUS; SUBCUTANEOUS at 16:53

## 2025-04-25 NOTE — PLAN OF CARE
Problem: Chronic Conditions and Co-morbidities  Goal: Patient's chronic conditions and co-morbidity symptoms are monitored and maintained or improved  Outcome: Progressing  Flowsheets (Taken 4/24/2025 1855 by Janee Stahl RN)  Care Plan - Patient's Chronic Conditions and Co-Morbidity Symptoms are Monitored and Maintained or Improved: Monitor and assess patient's chronic conditions and comorbid symptoms for stability, deterioration, or improvement     Problem: Discharge Planning  Goal: Discharge to home or other facility with appropriate resources  Outcome: Progressing  Flowsheets (Taken 4/24/2025 1855 by Janee Stahl RN)  Discharge to home or other facility with appropriate resources: Identify barriers to discharge with patient and caregiver     Problem: Safety - Adult  Goal: Free from fall injury  Outcome: Progressing

## 2025-04-25 NOTE — CARE COORDINATION
04/25/25 1512   Service Assessment   Patient Orientation Alert and Oriented   Cognition Alert   History Provided By Patient;Spouse;Medical Record   Support Systems Spouse/Significant Other   PCP Verified by CM Yes   Last Visit to PCP Within last 3 months   Prior Functional Level Independent in ADLs/IADLs   Current Functional Level Assistance with the following:;Bathing;Dressing;Toileting;Mobility   Can patient return to prior living arrangement Unknown at present  (most likely no)   Ability to make needs known: Good   Family able to assist with home care needs: Yes   Would you like for me to discuss the discharge plan with any other family members/significant others, and if so, who? Yes  (my wife)   Financial Resources Medicare  ([facility] Medicare)   Social/Functional History   Lives With Spouse   Type of Home House   Home Layout Multi-level;Able to Live on Main level with bedroom/bathroom  (has to do 8 steps to get into home ,then can stay on 1 floor.)   Home Access Stairs to enter with rails   Entrance Stairs - Number of Steps 8   Home Equipment Walker - Rolling;Cane   Active  No   Patient's  Info wife provides transportation.   Discharge Planning   Potential Assistance Purchasing Medications No   Condition of Participation: Discharge Planning   The Patient and/or Patient Representative was provided with a Choice of Provider? Patient   The Patient and/Or Patient Representative agree with the Discharge Plan? Yes   Freedom of Choice list was provided with basic dialogue that supports the patient's individualized plan of care/goals, treatment preferences, and shares the quality data associated with the providers?  Yes     Reviewed chart, discussed in IDR and spoke with pt/wife about discharge needs/plans. Pt lives with wife, he is active with [facility],.  Wife  is able to help at home but believes that pt's needs are greater that she can help with at this time  Gave pt PPO SNU list to review for short term

## 2025-04-25 NOTE — OP NOTE
Operative Note      Patient: Bandar De La Torre  YOB: 1960  MRN: [id number]    Date of Procedure: 4/25/2025    Pre-Op Diagnosis Codes:      * Left foot infection [L08.9]    Post-Op Diagnosis: Same       Procedure(s):  FOOT DEBRIDEMENT INCISION AND DRAINAGE with wound vac placement    Surgeon(s):  Jakob Avalos MD    Assistant:   * No surgical staff found *    Anesthesia: General    Estimated Blood Loss (mL): 20cc    Complications: None    Specimens:   ID Type Source Tests Collected by Time Destination   1 : Left heel wound Tissue Tissue CULTURE, TISSUE (WITH GRAM STAIN) Jakob Avalos MD 4/25/2025 1306        Implants:  * No implants in log *      Drains: * No LDAs found *    Findings:  Infection Present At Time Of Surgery (PATOS) (choose all levels that have infection present):  Osteomyelitis present on imaging. Opaque drainage and odor present in wound  Other Findings: moderate tissue loss over the calcaneous extending to periosteum/bone    Detailed Description of Procedure:   Prior to beginning the procedure, informed consent was obtained and consent was documented in the medical record. The patient was brought to the operating room and positioned supine on the operating table. Anesthesia was initiated and a time out was performed in which all were in agreement. Appropriate perioperative antibiotics were administered and the patient was prepped and draped in the usual sterile fashion.     The left heel was inspected and there was necrotic skin, subcutaneous tissue and fascia present. Excisional debridement was performed including epidermis, dermis, subcutaneous tissue, muscle/fascia, and bone. Using #15 blade scalpel, forceps, and rongeur, slough and necrotic/eschar was debrided to healthy appearing tissue. The area debrided was approximately 10 x 15 cm.     Ronguer was used to take bone biopsy specimen to be sent for culture.    The procedure was concluded. The skin was washed and

## 2025-04-25 NOTE — ANESTHESIA PRE PROCEDURE
Department of Anesthesiology  Preprocedure Note       Name:  Bandar De La Torre   Age:  64 y.o.  :  1960                                          MRN:  [id number]         Date:  2025      Surgeon: Surgeon(s):  Jakob Avalos MD    Procedure: Procedure(s):  FOOT DEBRIDEMENT INCISION AND DRAINAGE with wound vac placement vs amputation  LEG AMPUTATION BELOW KNEE    Medications prior to admission:   Prior to Admission medications    Medication Sig Start Date End Date Taking? Authorizing Provider   dapagliflozin (FARXIGA) 5 MG tablet Take 1 tablet by mouth every morning    April Díaz MD   ezetimibe (ZETIA) 10 MG tablet Take 1 tablet by mouth daily    April Díaz MD   simvastatin (ZOCOR) 40 MG tablet Take 1 tablet by mouth nightly    April Díaz MD   potassium chloride (KLOR-CON M20) 20 MEQ extended release tablet Take 2 tablets by mouth 2 times daily 25   Antonino Bower MD   spironolactone (ALDACTONE) 25 MG tablet Take 1 tablet by mouth 2 times daily 25   Antonino Bower MD   torsemide (DEMADEX) 10 MG tablet Take 1 tablet by mouth Daily with supper Use with torsemide 20 mg every morning daily. 25   Antonino Bower MD   torsemide (DEMADEX) 20 MG tablet Take 1 tablet by mouth every morning Use with torsemide 10 mg with dinner daily. 25   Antonino Bower MD   ranolazine (RANEXA) 500 MG extended release tablet Take 1 tablet by mouth 2 times daily    April Díaz MD   rivaroxaban (XARELTO) 20 MG TABS tablet Take 1 tablet by mouth Daily with supper 3/6/25   ARCHIE French MD   metoprolol succinate (TOPROL XL) 25 MG extended release tablet Take 1 tablet by mouth daily 3/7/25   ARCHIE French MD   clopidogrel (PLAVIX) 75 MG tablet Take 1 tablet by mouth daily 3/6/25   ARCHIE French MD   Continuous Glucose Sensor (DEXCOM G7 SENSOR) MISC USE AS DIRECTED AS NEEDED FOR 90 DAYS 24   Provider, Historical, MD   ONET[employer]CH VERIO

## 2025-04-26 LAB
ANION GAP SERPL CALCULATED.3IONS-SCNC: 18 MMOL/L (ref 9–17)
BASOPHILS # BLD: 0.04 K/UL
BASOPHILS NFR BLD: 0 % (ref 0–1)
BUN SERPL-MCNC: 49 MG/DL (ref 7–20)
CALCIUM SERPL-MCNC: 8.7 MG/DL (ref 8.3–10.6)
CHLORIDE SERPL-SCNC: 99 MMOL/L (ref 99–110)
CO2 SERPL-SCNC: 17 MMOL/L (ref 21–32)
CREAT SERPL-MCNC: 1.4 MG/DL (ref 0.8–1.3)
EOSINOPHIL # BLD: 0 K/UL
EOSINOPHILS RELATIVE PERCENT: 0 % (ref 0–3)
ERYTHROCYTE [DISTWIDTH] IN BLOOD BY AUTOMATED COUNT: 15.9 % (ref 11.7–14.9)
GFR, ESTIMATED: 53 ML/MIN/1.73M2
GLUCOSE BLD-MCNC: 235 MG/DL (ref 74–99)
GLUCOSE BLD-MCNC: 240 MG/DL (ref 74–99)
GLUCOSE BLD-MCNC: 324 MG/DL (ref 74–99)
GLUCOSE BLD-MCNC: 419 MG/DL (ref 74–99)
GLUCOSE BLD-MCNC: 433 MG/DL (ref 74–99)
GLUCOSE SERPL-MCNC: 481 MG/DL (ref 74–99)
HCT VFR BLD AUTO: 36.1 % (ref 42–52)
HGB BLD-MCNC: 11.2 G/DL (ref 13.5–18)
IMM GRANULOCYTES # BLD AUTO: 0.05 K/UL
IMM GRANULOCYTES NFR BLD: 0 %
LYMPHOCYTES NFR BLD: 0.6 K/UL
LYMPHOCYTES RELATIVE PERCENT: 5 % (ref 24–44)
MCH RBC QN AUTO: 27.9 PG (ref 27–31)
MCHC RBC AUTO-ENTMCNC: 31 G/DL (ref 32–36)
MCV RBC AUTO: 89.8 FL (ref 78–100)
MONOCYTES NFR BLD: 0.94 K/UL
MONOCYTES NFR BLD: 7 % (ref 0–5)
NEUTROPHILS NFR BLD: 88 % (ref 36–66)
NEUTS SEG NFR BLD: 11.59 K/UL
PLATELET # BLD AUTO: 323 K/UL (ref 140–440)
PMV BLD AUTO: 11.1 FL (ref 7.5–11.1)
POTASSIUM SERPL-SCNC: 5.4 MMOL/L (ref 3.5–5.1)
RBC # BLD AUTO: 4.02 M/UL (ref 4.6–6.2)
SODIUM SERPL-SCNC: 134 MMOL/L (ref 136–145)
WBC OTHER # BLD: 13.2 K/UL (ref 4–10.5)

## 2025-04-26 PROCEDURE — 6360000002 HC RX W HCPCS: Performed by: SURGERY

## 2025-04-26 PROCEDURE — 80048 BASIC METABOLIC PNL TOTAL CA: CPT

## 2025-04-26 PROCEDURE — 99024 POSTOP FOLLOW-UP VISIT: CPT | Performed by: NURSE PRACTITIONER

## 2025-04-26 PROCEDURE — 94761 N-INVAS EAR/PLS OXIMETRY MLT: CPT

## 2025-04-26 PROCEDURE — 85025 COMPLETE CBC W/AUTO DIFF WBC: CPT

## 2025-04-26 PROCEDURE — 6370000000 HC RX 637 (ALT 250 FOR IP): Performed by: SURGERY

## 2025-04-26 PROCEDURE — 2500000003 HC RX 250 WO HCPCS: Performed by: SURGERY

## 2025-04-26 PROCEDURE — APPNB15 APP NON BILLABLE TIME 0-15 MINS: Performed by: NURSE PRACTITIONER

## 2025-04-26 PROCEDURE — 82962 GLUCOSE BLOOD TEST: CPT

## 2025-04-26 PROCEDURE — 36415 COLL VENOUS BLD VENIPUNCTURE: CPT

## 2025-04-26 PROCEDURE — 1200000000 HC SEMI PRIVATE

## 2025-04-26 PROCEDURE — 6370000000 HC RX 637 (ALT 250 FOR IP): Performed by: INTERNAL MEDICINE

## 2025-04-26 PROCEDURE — 2580000003 HC RX 258: Performed by: SURGERY

## 2025-04-26 RX ADMIN — ATORVASTATIN CALCIUM 20 MG: 10 TABLET, FILM COATED ORAL at 08:41

## 2025-04-26 RX ADMIN — SPIRONOLACTONE 25 MG: 50 TABLET ORAL at 08:41

## 2025-04-26 RX ADMIN — VANCOMYCIN HYDROCHLORIDE 1000 MG: 1 INJECTION, POWDER, LYOPHILIZED, FOR SOLUTION INTRAVENOUS at 16:46

## 2025-04-26 RX ADMIN — POTASSIUM CHLORIDE 40 MEQ: 1500 TABLET, EXTENDED RELEASE ORAL at 21:15

## 2025-04-26 RX ADMIN — TORSEMIDE 10 MG: 20 TABLET ORAL at 16:40

## 2025-04-26 RX ADMIN — EZETIMIBE 10 MG: 10 TABLET ORAL at 08:41

## 2025-04-26 RX ADMIN — RANOLAZINE 500 MG: 500 TABLET, EXTENDED RELEASE ORAL at 21:15

## 2025-04-26 RX ADMIN — SODIUM CHLORIDE, PRESERVATIVE FREE 10 ML: 5 INJECTION INTRAVENOUS at 21:17

## 2025-04-26 RX ADMIN — POTASSIUM CHLORIDE 40 MEQ: 1500 TABLET, EXTENDED RELEASE ORAL at 08:41

## 2025-04-26 RX ADMIN — PIPERACILLIN AND TAZOBACTAM 4500 MG: 4; .5 INJECTION, POWDER, FOR SOLUTION INTRAVENOUS at 16:42

## 2025-04-26 RX ADMIN — VANCOMYCIN HYDROCHLORIDE 1000 MG: 1 INJECTION, POWDER, LYOPHILIZED, FOR SOLUTION INTRAVENOUS at 05:25

## 2025-04-26 RX ADMIN — INSULIN HUMAN 30 UNITS: 500 INJECTION, SOLUTION SUBCUTANEOUS at 12:46

## 2025-04-26 RX ADMIN — METOPROLOL SUCCINATE 25 MG: 25 TABLET, EXTENDED RELEASE ORAL at 08:41

## 2025-04-26 RX ADMIN — INSULIN LISPRO 6 UNITS: 100 INJECTION, SOLUTION INTRAVENOUS; SUBCUTANEOUS at 08:40

## 2025-04-26 RX ADMIN — FINASTERIDE 5 MG: 5 TABLET, FILM COATED ORAL at 08:41

## 2025-04-26 RX ADMIN — PIPERACILLIN AND TAZOBACTAM 4500 MG: 4; .5 INJECTION, POWDER, FOR SOLUTION INTRAVENOUS at 08:56

## 2025-04-26 RX ADMIN — Medication 1 TABLET: at 08:41

## 2025-04-26 RX ADMIN — INSULIN HUMAN 30 UNITS: 500 INJECTION, SOLUTION SUBCUTANEOUS at 17:08

## 2025-04-26 RX ADMIN — INSULIN HUMAN 5 UNITS: 500 INJECTION, SOLUTION SUBCUTANEOUS at 21:09

## 2025-04-26 RX ADMIN — INSULIN HUMAN 15 UNITS: 500 INJECTION, SOLUTION SUBCUTANEOUS at 17:09

## 2025-04-26 RX ADMIN — TAMSULOSIN HYDROCHLORIDE 0.4 MG: 0.4 CAPSULE ORAL at 08:41

## 2025-04-26 RX ADMIN — INSULIN HUMAN 15 UNITS: 500 INJECTION, SOLUTION SUBCUTANEOUS at 12:46

## 2025-04-26 RX ADMIN — EMPAGLIFLOZIN 10 MG: 10 TABLET, FILM COATED ORAL at 08:41

## 2025-04-26 RX ADMIN — SODIUM CHLORIDE, PRESERVATIVE FREE 10 ML: 5 INJECTION INTRAVENOUS at 08:44

## 2025-04-26 RX ADMIN — RANOLAZINE 500 MG: 500 TABLET, EXTENDED RELEASE ORAL at 08:41

## 2025-04-26 ASSESSMENT — PAIN DESCRIPTION - LOCATION: LOCATION: LEG

## 2025-04-26 ASSESSMENT — PAIN DESCRIPTION - DESCRIPTORS: DESCRIPTORS: BURNING

## 2025-04-26 ASSESSMENT — PAIN SCALES - GENERAL
PAINLEVEL_OUTOF10: 0
PAINLEVEL_OUTOF10: 1

## 2025-04-26 ASSESSMENT — PAIN DESCRIPTION - ORIENTATION: ORIENTATION: LEFT

## 2025-04-26 ASSESSMENT — PAIN DESCRIPTION - FREQUENCY: FREQUENCY: INTERMITTENT

## 2025-04-26 ASSESSMENT — PAIN - FUNCTIONAL ASSESSMENT: PAIN_FUNCTIONAL_ASSESSMENT: ACTIVITIES ARE NOT PREVENTED

## 2025-04-26 ASSESSMENT — PAIN DESCRIPTION - ONSET: ONSET: ON-GOING

## 2025-04-26 ASSESSMENT — PAIN DESCRIPTION - PAIN TYPE: TYPE: ACUTE PAIN

## 2025-04-26 NOTE — PLAN OF CARE
Problem: Chronic Conditions and Co-morbidities  Goal: Patient's chronic conditions and co-morbidity symptoms are monitored and maintained or improved  4/25/2025 2232 by Keara Wesley LPN  Outcome: Progressing  4/25/2025 1732 by Gilma Lezama RN  Outcome: Progressing     Problem: Discharge Planning  Goal: Discharge to home or other facility with appropriate resources  4/25/2025 2232 by Keara Wesley LPN  Outcome: Progressing  4/25/2025 1732 by Gilma Lezama RN  Outcome: Progressing     Problem: Safety - Adult  Goal: Free from fall injury  4/25/2025 2232 by Keara Wesley LPN  Outcome: Progressing  4/25/2025 1732 by Gilma Lezama RN  Outcome: Progressing     Problem: Nutrition Deficit:  Goal: Optimize nutritional status  4/25/2025 2232 by Keara Wesley LPN  Outcome: Progressing  4/25/2025 1732 by Gilma Lezama RN  Outcome: Progressing

## 2025-04-27 LAB
ANION GAP SERPL CALCULATED.3IONS-SCNC: 11 MMOL/L (ref 9–17)
ANION GAP SERPL CALCULATED.3IONS-SCNC: 12 MMOL/L (ref 9–17)
BASOPHILS # BLD: 0.05 K/UL
BASOPHILS NFR BLD: 0 % (ref 0–1)
BUN SERPL-MCNC: 50 MG/DL (ref 7–20)
BUN SERPL-MCNC: 51 MG/DL (ref 7–20)
CALCIUM SERPL-MCNC: 8.8 MG/DL (ref 8.3–10.6)
CALCIUM SERPL-MCNC: 9.1 MG/DL (ref 8.3–10.6)
CHLORIDE SERPL-SCNC: 105 MMOL/L (ref 99–110)
CHLORIDE SERPL-SCNC: 106 MMOL/L (ref 99–110)
CO2 SERPL-SCNC: 21 MMOL/L (ref 21–32)
CO2 SERPL-SCNC: 22 MMOL/L (ref 21–32)
CREAT SERPL-MCNC: 1.3 MG/DL (ref 0.8–1.3)
CREAT SERPL-MCNC: 1.3 MG/DL (ref 0.8–1.3)
DATE LAST DOSE: ABNORMAL
EOSINOPHIL # BLD: 0.07 K/UL
EOSINOPHILS RELATIVE PERCENT: 1 % (ref 0–3)
ERYTHROCYTE [DISTWIDTH] IN BLOOD BY AUTOMATED COUNT: 16 % (ref 11.7–14.9)
GFR, ESTIMATED: 56 ML/MIN/1.73M2
GFR, ESTIMATED: 58 ML/MIN/1.73M2
GLUCOSE BLD-MCNC: 151 MG/DL (ref 74–99)
GLUCOSE BLD-MCNC: 188 MG/DL (ref 74–99)
GLUCOSE BLD-MCNC: 200 MG/DL (ref 74–99)
GLUCOSE BLD-MCNC: 243 MG/DL (ref 74–99)
GLUCOSE BLD-MCNC: 266 MG/DL (ref 74–99)
GLUCOSE SERPL-MCNC: 197 MG/DL (ref 74–99)
GLUCOSE SERPL-MCNC: 218 MG/DL (ref 74–99)
HCT VFR BLD AUTO: 33.2 % (ref 42–52)
HGB BLD-MCNC: 10.7 G/DL (ref 13.5–18)
IMM GRANULOCYTES # BLD AUTO: 0.08 K/UL
IMM GRANULOCYTES NFR BLD: 1 %
LYMPHOCYTES NFR BLD: 0.99 K/UL
LYMPHOCYTES RELATIVE PERCENT: 9 % (ref 24–44)
MCH RBC QN AUTO: 28.1 PG (ref 27–31)
MCHC RBC AUTO-ENTMCNC: 32.2 G/DL (ref 32–36)
MCV RBC AUTO: 87.1 FL (ref 78–100)
MONOCYTES NFR BLD: 1.13 K/UL
MONOCYTES NFR BLD: 10 % (ref 0–5)
NEUTROPHILS NFR BLD: 80 % (ref 36–66)
NEUTS SEG NFR BLD: 9.32 K/UL
PLATELET # BLD AUTO: 318 K/UL (ref 140–440)
PMV BLD AUTO: 10.6 FL (ref 7.5–11.1)
POTASSIUM SERPL-SCNC: 4.4 MMOL/L (ref 3.5–5.1)
POTASSIUM SERPL-SCNC: 4.5 MMOL/L (ref 3.5–5.1)
RBC # BLD AUTO: 3.81 M/UL (ref 4.6–6.2)
SODIUM SERPL-SCNC: 139 MMOL/L (ref 136–145)
SODIUM SERPL-SCNC: 139 MMOL/L (ref 136–145)
TME LAST DOSE: ABNORMAL H
VANCOMYCIN DOSE: ABNORMAL MG
VANCOMYCIN SERPL-MCNC: 21.2 UG/ML (ref 10–20)
WBC OTHER # BLD: 11.6 K/UL (ref 4–10.5)

## 2025-04-27 PROCEDURE — 6360000002 HC RX W HCPCS: Performed by: SURGERY

## 2025-04-27 PROCEDURE — 1200000000 HC SEMI PRIVATE

## 2025-04-27 PROCEDURE — 80202 ASSAY OF VANCOMYCIN: CPT

## 2025-04-27 PROCEDURE — 2500000003 HC RX 250 WO HCPCS: Performed by: SURGERY

## 2025-04-27 PROCEDURE — 85025 COMPLETE CBC W/AUTO DIFF WBC: CPT

## 2025-04-27 PROCEDURE — APPNB15 APP NON BILLABLE TIME 0-15 MINS: Performed by: NURSE PRACTITIONER

## 2025-04-27 PROCEDURE — 6370000000 HC RX 637 (ALT 250 FOR IP): Performed by: INTERNAL MEDICINE

## 2025-04-27 PROCEDURE — 6370000000 HC RX 637 (ALT 250 FOR IP): Performed by: SURGERY

## 2025-04-27 PROCEDURE — 99024 POSTOP FOLLOW-UP VISIT: CPT | Performed by: NURSE PRACTITIONER

## 2025-04-27 PROCEDURE — 2580000003 HC RX 258: Performed by: SURGERY

## 2025-04-27 PROCEDURE — 36415 COLL VENOUS BLD VENIPUNCTURE: CPT

## 2025-04-27 PROCEDURE — 82962 GLUCOSE BLOOD TEST: CPT

## 2025-04-27 PROCEDURE — 94761 N-INVAS EAR/PLS OXIMETRY MLT: CPT

## 2025-04-27 PROCEDURE — 6370000000 HC RX 637 (ALT 250 FOR IP): Performed by: STUDENT IN AN ORGANIZED HEALTH CARE EDUCATION/TRAINING PROGRAM

## 2025-04-27 PROCEDURE — 5A09357 ASSISTANCE WITH RESPIRATORY VENTILATION, LESS THAN 24 CONSECUTIVE HOURS, CONTINUOUS POSITIVE AIRWAY PRESSURE: ICD-10-PCS | Performed by: SURGERY

## 2025-04-27 RX ADMIN — INSULIN HUMAN 5 UNITS: 500 INJECTION, SOLUTION SUBCUTANEOUS at 17:08

## 2025-04-27 RX ADMIN — INSULIN HUMAN 15 UNITS: 500 INJECTION, SOLUTION SUBCUTANEOUS at 17:08

## 2025-04-27 RX ADMIN — ATORVASTATIN CALCIUM 20 MG: 10 TABLET, FILM COATED ORAL at 08:24

## 2025-04-27 RX ADMIN — RANOLAZINE 500 MG: 500 TABLET, EXTENDED RELEASE ORAL at 08:23

## 2025-04-27 RX ADMIN — VANCOMYCIN HYDROCHLORIDE 1000 MG: 1 INJECTION, POWDER, LYOPHILIZED, FOR SOLUTION INTRAVENOUS at 16:50

## 2025-04-27 RX ADMIN — TAMSULOSIN HYDROCHLORIDE 0.4 MG: 0.4 CAPSULE ORAL at 08:24

## 2025-04-27 RX ADMIN — SODIUM CHLORIDE, PRESERVATIVE FREE 10 ML: 5 INJECTION INTRAVENOUS at 08:24

## 2025-04-27 RX ADMIN — VANCOMYCIN HYDROCHLORIDE 1000 MG: 1 INJECTION, POWDER, LYOPHILIZED, FOR SOLUTION INTRAVENOUS at 05:56

## 2025-04-27 RX ADMIN — TORSEMIDE 10 MG: 20 TABLET ORAL at 16:48

## 2025-04-27 RX ADMIN — INSULIN HUMAN 15 UNITS: 500 INJECTION, SOLUTION SUBCUTANEOUS at 12:37

## 2025-04-27 RX ADMIN — METOPROLOL SUCCINATE 25 MG: 25 TABLET, EXTENDED RELEASE ORAL at 08:24

## 2025-04-27 RX ADMIN — EZETIMIBE 10 MG: 10 TABLET ORAL at 08:24

## 2025-04-27 RX ADMIN — PIPERACILLIN AND TAZOBACTAM 4500 MG: 4; .5 INJECTION, POWDER, FOR SOLUTION INTRAVENOUS at 16:49

## 2025-04-27 RX ADMIN — POTASSIUM CHLORIDE 40 MEQ: 1500 TABLET, EXTENDED RELEASE ORAL at 22:01

## 2025-04-27 RX ADMIN — FINASTERIDE 5 MG: 5 TABLET, FILM COATED ORAL at 08:24

## 2025-04-27 RX ADMIN — PIPERACILLIN AND TAZOBACTAM 4500 MG: 4; .5 INJECTION, POWDER, FOR SOLUTION INTRAVENOUS at 08:27

## 2025-04-27 RX ADMIN — Medication 1 TABLET: at 08:23

## 2025-04-27 RX ADMIN — PIPERACILLIN AND TAZOBACTAM 4500 MG: 4; .5 INJECTION, POWDER, FOR SOLUTION INTRAVENOUS at 00:01

## 2025-04-27 RX ADMIN — INSULIN HUMAN 10 UNITS: 500 INJECTION, SOLUTION SUBCUTANEOUS at 12:37

## 2025-04-27 RX ADMIN — SPIRONOLACTONE 25 MG: 50 TABLET ORAL at 22:02

## 2025-04-27 RX ADMIN — RANOLAZINE 500 MG: 500 TABLET, EXTENDED RELEASE ORAL at 22:01

## 2025-04-27 RX ADMIN — INSULIN HUMAN 15 UNITS: 500 INJECTION, SOLUTION SUBCUTANEOUS at 08:00

## 2025-04-27 RX ADMIN — EMPAGLIFLOZIN 10 MG: 10 TABLET, FILM COATED ORAL at 08:24

## 2025-04-27 RX ADMIN — SODIUM CHLORIDE, PRESERVATIVE FREE 10 ML: 5 INJECTION INTRAVENOUS at 22:02

## 2025-04-27 NOTE — PLAN OF CARE
Problem: Chronic Conditions and Co-morbidities  Goal: Patient's chronic conditions and co-morbidity symptoms are monitored and maintained or improved  Outcome: Progressing     Problem: Discharge Planning  Goal: Discharge to home or other facility with appropriate resources  Outcome: Progressing     Problem: Safety - Adult  Goal: Free from fall injury  Outcome: Progressing     Problem: Nutrition Deficit:  Goal: Optimize nutritional status  Outcome: Progressing     Problem: Pain  Goal: Verbalizes/displays adequate comfort level or baseline comfort level  Outcome: Progressing     Problem: Skin/Tissue Integrity  Goal: Skin integrity remains intact  Description: 1.  Monitor for areas of redness and/or skin breakdown2.  Assess vascular access sites hourly3.  Every 4-6 hours minimum:  Change oxygen saturation probe site4.  Every 4-6 hours:  If on nasal continuous positive airway pressure, respiratory therapy assess nares and determine need for appliance change or resting period  Outcome: Progressing

## 2025-04-28 LAB
ANION GAP SERPL CALCULATED.3IONS-SCNC: 11 MMOL/L (ref 9–17)
BASOPHILS # BLD: 0.08 K/UL
BASOPHILS NFR BLD: 1 % (ref 0–1)
BUN SERPL-MCNC: 41 MG/DL (ref 7–20)
CALCIUM SERPL-MCNC: 9.2 MG/DL (ref 8.3–10.6)
CHLORIDE SERPL-SCNC: 106 MMOL/L (ref 99–110)
CO2 SERPL-SCNC: 23 MMOL/L (ref 21–32)
CREAT SERPL-MCNC: 1.1 MG/DL (ref 0.8–1.3)
EOSINOPHIL # BLD: 0.26 K/UL
EOSINOPHILS RELATIVE PERCENT: 3 % (ref 0–3)
ERYTHROCYTE [DISTWIDTH] IN BLOOD BY AUTOMATED COUNT: 16 % (ref 11.7–14.9)
GFR, ESTIMATED: 67 ML/MIN/1.73M2
GLUCOSE BLD-MCNC: 141 MG/DL (ref 74–99)
GLUCOSE BLD-MCNC: 214 MG/DL (ref 74–99)
GLUCOSE BLD-MCNC: 216 MG/DL (ref 74–99)
GLUCOSE BLD-MCNC: 245 MG/DL (ref 74–99)
GLUCOSE BLD-MCNC: 287 MG/DL (ref 74–99)
GLUCOSE SERPL-MCNC: 144 MG/DL (ref 74–99)
HCT VFR BLD AUTO: 36 % (ref 42–52)
HGB BLD-MCNC: 11 G/DL (ref 13.5–18)
IMM GRANULOCYTES # BLD AUTO: 0.04 K/UL
IMM GRANULOCYTES NFR BLD: 1 %
LYMPHOCYTES NFR BLD: 0.91 K/UL
LYMPHOCYTES RELATIVE PERCENT: 12 % (ref 24–44)
MCH RBC QN AUTO: 27.2 PG (ref 27–31)
MCHC RBC AUTO-ENTMCNC: 30.6 G/DL (ref 32–36)
MCV RBC AUTO: 89.1 FL (ref 78–100)
MONOCYTES NFR BLD: 0.93 K/UL
MONOCYTES NFR BLD: 12 % (ref 0–5)
NEUTROPHILS NFR BLD: 71 % (ref 36–66)
NEUTS SEG NFR BLD: 5.37 K/UL
PLATELET # BLD AUTO: 310 K/UL (ref 140–440)
PMV BLD AUTO: 10.5 FL (ref 7.5–11.1)
POTASSIUM SERPL-SCNC: 4.1 MMOL/L (ref 3.5–5.1)
RBC # BLD AUTO: 4.04 M/UL (ref 4.6–6.2)
SODIUM SERPL-SCNC: 140 MMOL/L (ref 136–145)
WBC OTHER # BLD: 7.6 K/UL (ref 4–10.5)

## 2025-04-28 PROCEDURE — 94761 N-INVAS EAR/PLS OXIMETRY MLT: CPT

## 2025-04-28 PROCEDURE — 6370000000 HC RX 637 (ALT 250 FOR IP): Performed by: STUDENT IN AN ORGANIZED HEALTH CARE EDUCATION/TRAINING PROGRAM

## 2025-04-28 PROCEDURE — 2580000003 HC RX 258: Performed by: SURGERY

## 2025-04-28 PROCEDURE — 97166 OT EVAL MOD COMPLEX 45 MIN: CPT

## 2025-04-28 PROCEDURE — 82962 GLUCOSE BLOOD TEST: CPT

## 2025-04-28 PROCEDURE — 2500000003 HC RX 250 WO HCPCS: Performed by: SURGERY

## 2025-04-28 PROCEDURE — 99232 SBSQ HOSP IP/OBS MODERATE 35: CPT | Performed by: INTERNAL MEDICINE

## 2025-04-28 PROCEDURE — 97530 THERAPEUTIC ACTIVITIES: CPT

## 2025-04-28 PROCEDURE — 6370000000 HC RX 637 (ALT 250 FOR IP): Performed by: INTERNAL MEDICINE

## 2025-04-28 PROCEDURE — 97535 SELF CARE MNGMENT TRAINING: CPT

## 2025-04-28 PROCEDURE — 97162 PT EVAL MOD COMPLEX 30 MIN: CPT

## 2025-04-28 PROCEDURE — 97605 NEG PRS WND THER DME<=50SQCM: CPT

## 2025-04-28 PROCEDURE — 1200000000 HC SEMI PRIVATE

## 2025-04-28 PROCEDURE — 6370000000 HC RX 637 (ALT 250 FOR IP): Performed by: SURGERY

## 2025-04-28 PROCEDURE — 99024 POSTOP FOLLOW-UP VISIT: CPT | Performed by: SURGERY

## 2025-04-28 PROCEDURE — 36415 COLL VENOUS BLD VENIPUNCTURE: CPT

## 2025-04-28 PROCEDURE — 80048 BASIC METABOLIC PNL TOTAL CA: CPT

## 2025-04-28 PROCEDURE — 85025 COMPLETE CBC W/AUTO DIFF WBC: CPT

## 2025-04-28 PROCEDURE — 6360000002 HC RX W HCPCS: Performed by: SURGERY

## 2025-04-28 RX ORDER — POLYETHYLENE GLYCOL 3350 17 G/17G
17 POWDER, FOR SOLUTION ORAL DAILY
Status: DISCONTINUED | OUTPATIENT
Start: 2025-04-28 | End: 2025-04-30 | Stop reason: HOSPADM

## 2025-04-28 RX ORDER — CLOPIDOGREL BISULFATE 75 MG/1
75 TABLET ORAL DAILY
Status: DISCONTINUED | OUTPATIENT
Start: 2025-04-28 | End: 2025-04-30 | Stop reason: HOSPADM

## 2025-04-28 RX ORDER — DOCUSATE SODIUM 100 MG/1
100 CAPSULE, LIQUID FILLED ORAL 2 TIMES DAILY
Status: DISCONTINUED | OUTPATIENT
Start: 2025-04-28 | End: 2025-04-30 | Stop reason: HOSPADM

## 2025-04-28 RX ADMIN — SPIRONOLACTONE 25 MG: 50 TABLET ORAL at 09:03

## 2025-04-28 RX ADMIN — INSULIN HUMAN 15 UNITS: 500 INJECTION, SOLUTION SUBCUTANEOUS at 09:07

## 2025-04-28 RX ADMIN — Medication 1 TABLET: at 09:04

## 2025-04-28 RX ADMIN — DOCUSATE SODIUM 100 MG: 100 CAPSULE, LIQUID FILLED ORAL at 12:37

## 2025-04-28 RX ADMIN — RANOLAZINE 500 MG: 500 TABLET, EXTENDED RELEASE ORAL at 09:04

## 2025-04-28 RX ADMIN — RIVAROXABAN 20 MG: 20 TABLET, FILM COATED ORAL at 16:28

## 2025-04-28 RX ADMIN — FINASTERIDE 5 MG: 5 TABLET, FILM COATED ORAL at 09:03

## 2025-04-28 RX ADMIN — CLOPIDOGREL BISULFATE 75 MG: 75 TABLET, FILM COATED ORAL at 12:37

## 2025-04-28 RX ADMIN — SODIUM CHLORIDE, PRESERVATIVE FREE 10 ML: 5 INJECTION INTRAVENOUS at 09:05

## 2025-04-28 RX ADMIN — SODIUM CHLORIDE, PRESERVATIVE FREE 10 ML: 5 INJECTION INTRAVENOUS at 21:45

## 2025-04-28 RX ADMIN — ATORVASTATIN CALCIUM 20 MG: 10 TABLET, FILM COATED ORAL at 09:04

## 2025-04-28 RX ADMIN — SPIRONOLACTONE 25 MG: 50 TABLET ORAL at 21:44

## 2025-04-28 RX ADMIN — INSULIN HUMAN 5 UNITS: 500 INJECTION, SOLUTION SUBCUTANEOUS at 22:21

## 2025-04-28 RX ADMIN — POTASSIUM CHLORIDE 40 MEQ: 1500 TABLET, EXTENDED RELEASE ORAL at 09:03

## 2025-04-28 RX ADMIN — INSULIN HUMAN 5 UNITS: 500 INJECTION, SOLUTION SUBCUTANEOUS at 12:44

## 2025-04-28 RX ADMIN — RANOLAZINE 500 MG: 500 TABLET, EXTENDED RELEASE ORAL at 21:44

## 2025-04-28 RX ADMIN — PIPERACILLIN AND TAZOBACTAM 4500 MG: 4; .5 INJECTION, POWDER, FOR SOLUTION INTRAVENOUS at 00:53

## 2025-04-28 RX ADMIN — PIPERACILLIN AND TAZOBACTAM 4500 MG: 4; .5 INJECTION, POWDER, FOR SOLUTION INTRAVENOUS at 16:35

## 2025-04-28 RX ADMIN — POTASSIUM CHLORIDE 40 MEQ: 1500 TABLET, EXTENDED RELEASE ORAL at 21:44

## 2025-04-28 RX ADMIN — VANCOMYCIN HYDROCHLORIDE 1000 MG: 1 INJECTION, POWDER, LYOPHILIZED, FOR SOLUTION INTRAVENOUS at 06:10

## 2025-04-28 RX ADMIN — METOPROLOL SUCCINATE 25 MG: 25 TABLET, EXTENDED RELEASE ORAL at 09:04

## 2025-04-28 RX ADMIN — INSULIN HUMAN 15 UNITS: 500 INJECTION, SOLUTION SUBCUTANEOUS at 12:36

## 2025-04-28 RX ADMIN — EZETIMIBE 10 MG: 10 TABLET ORAL at 09:04

## 2025-04-28 RX ADMIN — POLYETHYLENE GLYCOL (3350) 17 G: 17 POWDER, FOR SOLUTION ORAL at 12:37

## 2025-04-28 RX ADMIN — INSULIN HUMAN 15 UNITS: 500 INJECTION, SOLUTION SUBCUTANEOUS at 17:32

## 2025-04-28 RX ADMIN — TORSEMIDE 10 MG: 20 TABLET ORAL at 16:28

## 2025-04-28 RX ADMIN — PIPERACILLIN AND TAZOBACTAM 4500 MG: 4; .5 INJECTION, POWDER, FOR SOLUTION INTRAVENOUS at 09:26

## 2025-04-28 RX ADMIN — TAMSULOSIN HYDROCHLORIDE 0.4 MG: 0.4 CAPSULE ORAL at 09:03

## 2025-04-28 RX ADMIN — DOCUSATE SODIUM 100 MG: 100 CAPSULE, LIQUID FILLED ORAL at 21:44

## 2025-04-28 RX ADMIN — INSULIN HUMAN 5 UNITS: 500 INJECTION, SOLUTION SUBCUTANEOUS at 09:04

## 2025-04-28 RX ADMIN — INSULIN HUMAN 5 UNITS: 500 INJECTION, SOLUTION SUBCUTANEOUS at 17:31

## 2025-04-28 RX ADMIN — EMPAGLIFLOZIN 10 MG: 10 TABLET, FILM COATED ORAL at 09:03

## 2025-04-28 ASSESSMENT — PAIN - FUNCTIONAL ASSESSMENT: PAIN_FUNCTIONAL_ASSESSMENT: PREVENTS OR INTERFERES SOME ACTIVE ACTIVITIES AND ADLS

## 2025-04-28 ASSESSMENT — PAIN DESCRIPTION - PAIN TYPE: TYPE: SURGICAL PAIN

## 2025-04-28 ASSESSMENT — PAIN DESCRIPTION - FREQUENCY: FREQUENCY: INTERMITTENT

## 2025-04-28 ASSESSMENT — PAIN DESCRIPTION - ORIENTATION: ORIENTATION: LEFT

## 2025-04-28 ASSESSMENT — PAIN DESCRIPTION - DESCRIPTORS: DESCRIPTORS: SORE

## 2025-04-28 ASSESSMENT — PAIN DESCRIPTION - LOCATION: LOCATION: FOOT

## 2025-04-28 ASSESSMENT — PAIN SCALES - GENERAL: PAINLEVEL_OUTOF10: 1

## 2025-04-28 NOTE — CARE COORDINATION
Received referral for ARU.  Will review patients clinicals and await OT notes.  Thank you for the referral.

## 2025-04-28 NOTE — PLAN OF CARE
Problem: Chronic Conditions and Co-morbidities  Goal: Patient's chronic conditions and co-morbidity symptoms are monitored and maintained or improved  4/28/2025 0930 by Ling Yoon RN  Outcome: Progressing  4/27/2025 2346 by Deric Kilpatrick LPN  Outcome: Progressing     Problem: Discharge Planning  Goal: Discharge to home or other facility with appropriate resources  4/28/2025 0930 by Ling Yoon RN  Outcome: Progressing  4/27/2025 2346 by Deric Kilpatrick LPN  Outcome: Progressing     Problem: Safety - Adult  Goal: Free from fall injury  4/28/2025 0930 by Ling Yoon RN  Outcome: Progressing  4/27/2025 2346 by Deric Kilpatrick LPN  Outcome: Progressing     Problem: Nutrition Deficit:  Goal: Optimize nutritional status  4/28/2025 0930 by Ling Yoon RN  Outcome: Progressing  4/27/2025 2346 by Deric Kilpatrick LPN  Outcome: Progressing     Problem: Pain  Goal: Verbalizes/displays adequate comfort level or baseline comfort level  4/28/2025 0930 by Ling Yoon RN  Outcome: Progressing  4/27/2025 2346 by Deric Kilpatrick LPN  Outcome: Progressing     Problem: Skin/Tissue Integrity  Goal: Skin integrity remains intact  Description: 1.  Monitor for areas of redness and/or skin breakdown2.  Assess vascular access sites hourly3.  Every 4-6 hours minimum:  Change oxygen saturation probe site4.  Every 4-6 hours:  If on nasal continuous positive airway pressure, respiratory therapy assess nares and determine need for appliance change or resting period  4/28/2025 0930 by Ling Yoon RN  Outcome: Progressing  4/27/2025 2346 by Deric Kilpatrick LPN  Outcome: Progressing     Problem: ABCDS Injury Assessment  Goal: Absence of physical injury  Outcome: Progressing

## 2025-04-28 NOTE — ANESTHESIA POSTPROCEDURE EVALUATION
Department of Anesthesiology  Postprocedure Note    Patient: Bandar De La Torre  MRN: [id number]  YOB: 1960  Date of evaluation: 4/28/2025    Procedure Summary       Date: 04/25/25 Room / Location: [address]    Anesthesia Start: 1227 Anesthesia Stop: 1343    Procedure: FOOT DEBRIDEMENT INCISION AND DRAINAGE with wound vac placement (Left) Diagnosis:       Left foot infection      (Left foot infection [L08.9])    Surgeons: Jakob Avalos MD Responsible Provider: Prabhakar Cisneros MD    Anesthesia Type: general ASA Status: 3            Anesthesia Type: No value filed.    Savanna Phase I: Savanna Score: 9    Savanna Phase II:      Anesthesia Post Evaluation    Patient location during evaluation: PACU  Patient participation: complete - patient participated  Level of consciousness: awake  Airway patency: patent  Nausea & Vomiting: no nausea  Cardiovascular status: blood pressure returned to baseline  Respiratory status: acceptable  Hydration status: euvolemic  Multimodal analgesia pain management approach  Pain management: adequate    No notable events documented.

## 2025-04-28 NOTE — CARE COORDINATION
Reviewed chart, may need PT/OT evals to assist with discharge plan.  PS to Dr Avalos.      1200 Spoke with pt about discharge plan.  1st choice would be ARU , does not have a back up plan. Wife is working on it, pt asked that CM call her.      1330 referral to Mariam with ARU.      1500- Spoke with Meredith pt's wife, she is planning to go see Facilities to day to pick back up plan. She will updated this CM once back up place decided on.

## 2025-04-29 ENCOUNTER — TELEPHONE (OUTPATIENT)
Dept: WOUND CARE | Age: 65
End: 2025-04-29

## 2025-04-29 DIAGNOSIS — L97.512 DIABETIC ULCER OF TOE OF RIGHT FOOT ASSOCIATED WITH TYPE 2 DIABETES MELLITUS, WITH FAT LAYER EXPOSED (HCC): ICD-10-CM

## 2025-04-29 DIAGNOSIS — E11.621 DIABETIC ULCER OF TOE OF RIGHT FOOT ASSOCIATED WITH TYPE 2 DIABETES MELLITUS, WITH FAT LAYER EXPOSED (HCC): ICD-10-CM

## 2025-04-29 DIAGNOSIS — T81.89XD SURGICAL WOUND, NON HEALING, SUBSEQUENT ENCOUNTER: Primary | ICD-10-CM

## 2025-04-29 DIAGNOSIS — Z79.01 ANTICOAGULATED: ICD-10-CM

## 2025-04-29 DIAGNOSIS — L84 PRE-ULCERATIVE CALLUSES: ICD-10-CM

## 2025-04-29 DIAGNOSIS — L97.912 TRAUMATIC ULCER OF LOWER EXTREMITY, RIGHT, WITH FAT LAYER EXPOSED (HCC): ICD-10-CM

## 2025-04-29 LAB
GLUCOSE BLD-MCNC: 151 MG/DL (ref 74–99)
GLUCOSE BLD-MCNC: 189 MG/DL (ref 74–99)
GLUCOSE BLD-MCNC: 193 MG/DL (ref 74–99)
GLUCOSE BLD-MCNC: 207 MG/DL (ref 74–99)
GLUCOSE BLD-MCNC: 353 MG/DL (ref 74–99)

## 2025-04-29 PROCEDURE — 94761 N-INVAS EAR/PLS OXIMETRY MLT: CPT

## 2025-04-29 PROCEDURE — 99024 POSTOP FOLLOW-UP VISIT: CPT | Performed by: SURGERY

## 2025-04-29 PROCEDURE — 1200000000 HC SEMI PRIVATE

## 2025-04-29 PROCEDURE — 2580000003 HC RX 258: Performed by: INTERNAL MEDICINE

## 2025-04-29 PROCEDURE — 99232 SBSQ HOSP IP/OBS MODERATE 35: CPT | Performed by: INTERNAL MEDICINE

## 2025-04-29 PROCEDURE — 97535 SELF CARE MNGMENT TRAINING: CPT

## 2025-04-29 PROCEDURE — 6370000000 HC RX 637 (ALT 250 FOR IP): Performed by: STUDENT IN AN ORGANIZED HEALTH CARE EDUCATION/TRAINING PROGRAM

## 2025-04-29 PROCEDURE — 6370000000 HC RX 637 (ALT 250 FOR IP): Performed by: INTERNAL MEDICINE

## 2025-04-29 PROCEDURE — 6360000002 HC RX W HCPCS: Performed by: INTERNAL MEDICINE

## 2025-04-29 PROCEDURE — 97112 NEUROMUSCULAR REEDUCATION: CPT

## 2025-04-29 PROCEDURE — 82962 GLUCOSE BLOOD TEST: CPT

## 2025-04-29 PROCEDURE — 97530 THERAPEUTIC ACTIVITIES: CPT

## 2025-04-29 PROCEDURE — 2580000003 HC RX 258: Performed by: SURGERY

## 2025-04-29 PROCEDURE — 2500000003 HC RX 250 WO HCPCS: Performed by: SURGERY

## 2025-04-29 PROCEDURE — 6370000000 HC RX 637 (ALT 250 FOR IP): Performed by: SURGERY

## 2025-04-29 PROCEDURE — 6360000002 HC RX W HCPCS: Performed by: SURGERY

## 2025-04-29 RX ADMIN — FINASTERIDE 5 MG: 5 TABLET, FILM COATED ORAL at 09:31

## 2025-04-29 RX ADMIN — INSULIN HUMAN 15 UNITS: 500 INJECTION, SOLUTION SUBCUTANEOUS at 17:26

## 2025-04-29 RX ADMIN — INSULIN HUMAN 5 UNITS: 500 INJECTION, SOLUTION SUBCUTANEOUS at 12:54

## 2025-04-29 RX ADMIN — TAMSULOSIN HYDROCHLORIDE 0.4 MG: 0.4 CAPSULE ORAL at 09:31

## 2025-04-29 RX ADMIN — EZETIMIBE 10 MG: 10 TABLET ORAL at 09:31

## 2025-04-29 RX ADMIN — POTASSIUM CHLORIDE 40 MEQ: 1500 TABLET, EXTENDED RELEASE ORAL at 20:58

## 2025-04-29 RX ADMIN — RIVAROXABAN 20 MG: 20 TABLET, FILM COATED ORAL at 17:10

## 2025-04-29 RX ADMIN — INSULIN HUMAN 15 UNITS: 500 INJECTION, SOLUTION SUBCUTANEOUS at 09:42

## 2025-04-29 RX ADMIN — METOPROLOL SUCCINATE 25 MG: 25 TABLET, EXTENDED RELEASE ORAL at 09:30

## 2025-04-29 RX ADMIN — RANOLAZINE 500 MG: 500 TABLET, EXTENDED RELEASE ORAL at 20:58

## 2025-04-29 RX ADMIN — EMPAGLIFLOZIN 10 MG: 10 TABLET, FILM COATED ORAL at 09:31

## 2025-04-29 RX ADMIN — POLYETHYLENE GLYCOL (3350) 17 G: 17 POWDER, FOR SOLUTION ORAL at 09:31

## 2025-04-29 RX ADMIN — DOCUSATE SODIUM 100 MG: 100 CAPSULE, LIQUID FILLED ORAL at 20:58

## 2025-04-29 RX ADMIN — PIPERACILLIN AND TAZOBACTAM 4500 MG: 4; .5 INJECTION, POWDER, FOR SOLUTION INTRAVENOUS at 01:02

## 2025-04-29 RX ADMIN — CLOPIDOGREL BISULFATE 75 MG: 75 TABLET, FILM COATED ORAL at 09:31

## 2025-04-29 RX ADMIN — SPIRONOLACTONE 25 MG: 50 TABLET ORAL at 20:58

## 2025-04-29 RX ADMIN — Medication 1 TABLET: at 09:31

## 2025-04-29 RX ADMIN — SODIUM CHLORIDE, PRESERVATIVE FREE 10 ML: 5 INJECTION INTRAVENOUS at 09:31

## 2025-04-29 RX ADMIN — POTASSIUM CHLORIDE 40 MEQ: 1500 TABLET, EXTENDED RELEASE ORAL at 09:29

## 2025-04-29 RX ADMIN — ATORVASTATIN CALCIUM 20 MG: 10 TABLET, FILM COATED ORAL at 09:30

## 2025-04-29 RX ADMIN — CEFTRIAXONE SODIUM 2000 MG: 2 INJECTION, POWDER, FOR SOLUTION INTRAMUSCULAR; INTRAVENOUS at 09:40

## 2025-04-29 RX ADMIN — RANOLAZINE 500 MG: 500 TABLET, EXTENDED RELEASE ORAL at 09:31

## 2025-04-29 RX ADMIN — SODIUM CHLORIDE, PRESERVATIVE FREE 10 ML: 5 INJECTION INTRAVENOUS at 20:59

## 2025-04-29 RX ADMIN — SPIRONOLACTONE 25 MG: 50 TABLET ORAL at 09:30

## 2025-04-29 RX ADMIN — DOCUSATE SODIUM 100 MG: 100 CAPSULE, LIQUID FILLED ORAL at 09:31

## 2025-04-29 RX ADMIN — TORSEMIDE 10 MG: 20 TABLET ORAL at 17:10

## 2025-04-29 RX ADMIN — INSULIN HUMAN 15 UNITS: 500 INJECTION, SOLUTION SUBCUTANEOUS at 12:52

## 2025-04-29 NOTE — PLAN OF CARE
Problem: Chronic Conditions and Co-morbidities  Goal: Patient's chronic conditions and co-morbidity symptoms are monitored and maintained or improved  4/29/2025 1224 by Ling Yoon RN  Outcome: Progressing  4/29/2025 0118 by Deric Kilpatrick LPN  Outcome: Progressing     Problem: Discharge Planning  Goal: Discharge to home or other facility with appropriate resources  4/29/2025 1224 by Ling Yoon RN  Outcome: Progressing  4/29/2025 0118 by Deric Kilpatrick LPN  Outcome: Progressing     Problem: Safety - Adult  Goal: Free from fall injury  4/29/2025 1224 by Ling Yoon RN  Outcome: Progressing  4/29/2025 0118 by Deric Kilpatrick LPN  Outcome: Progressing     Problem: Nutrition Deficit:  Goal: Optimize nutritional status  4/29/2025 1224 by Ling Yoon RN  Outcome: Progressing  4/29/2025 0118 by Deric Kilpatrick LPN  Outcome: Progressing     Problem: Pain  Goal: Verbalizes/displays adequate comfort level or baseline comfort level  4/29/2025 1224 by Ling Yoon RN  Outcome: Progressing  4/29/2025 0118 by Deric Kilpatrick LPN  Outcome: Progressing     Problem: Skin/Tissue Integrity  Goal: Skin integrity remains intact  Description: 1.  Monitor for areas of redness and/or skin breakdown2.  Assess vascular access sites hourly3.  Every 4-6 hours minimum:  Change oxygen saturation probe site4.  Every 4-6 hours:  If on nasal continuous positive airway pressure, respiratory therapy assess nares and determine need for appliance change or resting period  4/29/2025 1224 by Ling Yoon RN  Outcome: Progressing  4/29/2025 0118 by Deric Kilpatrick LPN  Outcome: Progressing     Problem: ABCDS Injury Assessment  Goal: Absence of physical injury  4/29/2025 1224 by Ling Yoon RN  Outcome: Progressing  4/29/2025 0118 by Deric Kilpatrick LPN  Outcome: Progressing

## 2025-04-29 NOTE — CARE COORDINATION
Reviewed chart, plan is for ARU, prior auth pending.  Family working to determine backup plan. CM will continue to follow.

## 2025-04-30 ENCOUNTER — HOSPITAL ENCOUNTER (INPATIENT)
Age: 65
DRG: 560 | End: 2025-04-30
Attending: PHYSICAL MEDICINE & REHABILITATION | Admitting: PHYSICAL MEDICINE & REHABILITATION
Payer: MEDICARE

## 2025-04-30 VITALS
HEIGHT: 72 IN | TEMPERATURE: 98.1 F | HEART RATE: 62 BPM | BODY MASS INDEX: 42.66 KG/M2 | DIASTOLIC BLOOD PRESSURE: 58 MMHG | WEIGHT: 315 LBS | OXYGEN SATURATION: 100 % | RESPIRATION RATE: 18 BRPM | SYSTOLIC BLOOD PRESSURE: 122 MMHG

## 2025-04-30 PROBLEM — M86.672 CHRONIC OSTEOMYELITIS OF LEFT FOOT (HCC): Status: ACTIVE | Noted: 2025-04-30

## 2025-04-30 LAB
GLUCOSE BLD-MCNC: 141 MG/DL (ref 74–99)
GLUCOSE BLD-MCNC: 200 MG/DL (ref 74–99)
GLUCOSE BLD-MCNC: 227 MG/DL (ref 74–99)
GLUCOSE BLD-MCNC: 264 MG/DL (ref 74–99)
MICROORGANISM SPEC CULT: ABNORMAL
MICROORGANISM/AGENT SPEC: ABNORMAL
SERVICE CMNT-IMP: ABNORMAL
SPECIMEN DESCRIPTION: ABNORMAL

## 2025-04-30 PROCEDURE — 94761 N-INVAS EAR/PLS OXIMETRY MLT: CPT

## 2025-04-30 PROCEDURE — 6370000000 HC RX 637 (ALT 250 FOR IP): Performed by: INTERNAL MEDICINE

## 2025-04-30 PROCEDURE — 82962 GLUCOSE BLOOD TEST: CPT

## 2025-04-30 PROCEDURE — 2580000003 HC RX 258: Performed by: SURGERY

## 2025-04-30 PROCEDURE — 6370000000 HC RX 637 (ALT 250 FOR IP): Performed by: SURGERY

## 2025-04-30 PROCEDURE — 6370000000 HC RX 637 (ALT 250 FOR IP): Performed by: STUDENT IN AN ORGANIZED HEALTH CARE EDUCATION/TRAINING PROGRAM

## 2025-04-30 PROCEDURE — 97530 THERAPEUTIC ACTIVITIES: CPT

## 2025-04-30 PROCEDURE — 99024 POSTOP FOLLOW-UP VISIT: CPT | Performed by: SURGERY

## 2025-04-30 PROCEDURE — 97605 NEG PRS WND THER DME<=50SQCM: CPT

## 2025-04-30 PROCEDURE — 99223 1ST HOSP IP/OBS HIGH 75: CPT | Performed by: PHYSICAL MEDICINE & REHABILITATION

## 2025-04-30 PROCEDURE — 6360000002 HC RX W HCPCS: Performed by: INTERNAL MEDICINE

## 2025-04-30 PROCEDURE — 99232 SBSQ HOSP IP/OBS MODERATE 35: CPT | Performed by: INTERNAL MEDICINE

## 2025-04-30 PROCEDURE — 1280000000 HC REHAB R&B

## 2025-04-30 PROCEDURE — 2500000003 HC RX 250 WO HCPCS: Performed by: SURGERY

## 2025-04-30 PROCEDURE — 97112 NEUROMUSCULAR REEDUCATION: CPT

## 2025-04-30 PROCEDURE — 2580000003 HC RX 258: Performed by: INTERNAL MEDICINE

## 2025-04-30 RX ORDER — POLYETHYLENE GLYCOL 3350 17 G/17G
17 POWDER, FOR SOLUTION ORAL DAILY
Status: DISPENSED | OUTPATIENT
Start: 2025-05-01

## 2025-04-30 RX ORDER — DOCUSATE SODIUM 100 MG/1
100 CAPSULE, LIQUID FILLED ORAL 2 TIMES DAILY
Status: CANCELLED | OUTPATIENT
Start: 2025-04-30

## 2025-04-30 RX ORDER — MAGNESIUM SULFATE IN WATER 40 MG/ML
2000 INJECTION, SOLUTION INTRAVENOUS PRN
Status: CANCELLED | OUTPATIENT
Start: 2025-04-30

## 2025-04-30 RX ORDER — ONDANSETRON 2 MG/ML
4 INJECTION INTRAMUSCULAR; INTRAVENOUS EVERY 6 HOURS PRN
Status: CANCELLED | OUTPATIENT
Start: 2025-04-30

## 2025-04-30 RX ORDER — GLUCAGON 1 MG/ML
1 KIT INJECTION PRN
Status: ACTIVE | OUTPATIENT
Start: 2025-04-30

## 2025-04-30 RX ORDER — EZETIMIBE 10 MG/1
10 TABLET ORAL DAILY
Status: CANCELLED | OUTPATIENT
Start: 2025-05-01

## 2025-04-30 RX ORDER — SODIUM CHLORIDE 0.9 % (FLUSH) 0.9 %
5-40 SYRINGE (ML) INJECTION PRN
Status: CANCELLED | OUTPATIENT
Start: 2025-04-30

## 2025-04-30 RX ORDER — DEXTROSE MONOHYDRATE 100 MG/ML
INJECTION, SOLUTION INTRAVENOUS CONTINUOUS PRN
Status: ACTIVE | OUTPATIENT
Start: 2025-04-30

## 2025-04-30 RX ORDER — POTASSIUM CHLORIDE 1500 MG/1
40 TABLET, EXTENDED RELEASE ORAL 2 TIMES DAILY
Status: DISPENSED | OUTPATIENT
Start: 2025-04-30

## 2025-04-30 RX ORDER — RANOLAZINE 500 MG/1
500 TABLET, EXTENDED RELEASE ORAL 2 TIMES DAILY
Status: DISPENSED | OUTPATIENT
Start: 2025-04-30

## 2025-04-30 RX ORDER — BISACODYL 10 MG
10 SUPPOSITORY, RECTAL RECTAL DAILY PRN
Status: ACTIVE | OUTPATIENT
Start: 2025-04-30

## 2025-04-30 RX ORDER — GLUCAGON 1 MG/ML
1 KIT INJECTION PRN
Status: CANCELLED | OUTPATIENT
Start: 2025-04-30

## 2025-04-30 RX ORDER — FINASTERIDE 5 MG/1
5 TABLET, FILM COATED ORAL DAILY
Status: DISPENSED | OUTPATIENT
Start: 2025-05-01

## 2025-04-30 RX ORDER — FINASTERIDE 5 MG/1
5 TABLET, FILM COATED ORAL DAILY
Status: CANCELLED | OUTPATIENT
Start: 2025-05-01

## 2025-04-30 RX ORDER — DEXTROSE MONOHYDRATE 100 MG/ML
INJECTION, SOLUTION INTRAVENOUS CONTINUOUS PRN
Status: CANCELLED | OUTPATIENT
Start: 2025-04-30

## 2025-04-30 RX ORDER — HYDROMORPHONE HYDROCHLORIDE 1 MG/ML
0.5 INJECTION, SOLUTION INTRAMUSCULAR; INTRAVENOUS; SUBCUTANEOUS
Refills: 0 | Status: CANCELLED | OUTPATIENT
Start: 2025-04-30

## 2025-04-30 RX ORDER — POTASSIUM CHLORIDE 1500 MG/1
40 TABLET, EXTENDED RELEASE ORAL PRN
Status: DISCONTINUED | OUTPATIENT
Start: 2025-04-30 | End: 2025-04-30

## 2025-04-30 RX ORDER — SODIUM CHLORIDE 9 MG/ML
INJECTION, SOLUTION INTRAVENOUS PRN
Status: ACTIVE | OUTPATIENT
Start: 2025-04-30

## 2025-04-30 RX ORDER — POLYETHYLENE GLYCOL 3350 17 G/17G
17 POWDER, FOR SOLUTION ORAL DAILY
Status: CANCELLED | OUTPATIENT
Start: 2025-05-01

## 2025-04-30 RX ORDER — TORSEMIDE 20 MG/1
10 TABLET ORAL
Status: CANCELLED | OUTPATIENT
Start: 2025-04-30

## 2025-04-30 RX ORDER — TAMSULOSIN HYDROCHLORIDE 0.4 MG/1
0.4 CAPSULE ORAL DAILY
Status: DISPENSED | OUTPATIENT
Start: 2025-05-01

## 2025-04-30 RX ORDER — SODIUM CHLORIDE 0.9 % (FLUSH) 0.9 %
5-40 SYRINGE (ML) INJECTION PRN
Status: ACTIVE | OUTPATIENT
Start: 2025-04-30

## 2025-04-30 RX ORDER — EZETIMIBE 10 MG/1
10 TABLET ORAL DAILY
Status: DISPENSED | OUTPATIENT
Start: 2025-05-01

## 2025-04-30 RX ORDER — ONDANSETRON 2 MG/ML
4 INJECTION INTRAMUSCULAR; INTRAVENOUS EVERY 6 HOURS PRN
Status: ACTIVE | OUTPATIENT
Start: 2025-04-30

## 2025-04-30 RX ORDER — ONDANSETRON 4 MG/1
4 TABLET, ORALLY DISINTEGRATING ORAL EVERY 8 HOURS PRN
Status: CANCELLED | OUTPATIENT
Start: 2025-04-30

## 2025-04-30 RX ORDER — SPIRONOLACTONE 50 MG/1
25 TABLET, FILM COATED ORAL 2 TIMES DAILY
Status: CANCELLED | OUTPATIENT
Start: 2025-04-30

## 2025-04-30 RX ORDER — ONDANSETRON 4 MG/1
4 TABLET, ORALLY DISINTEGRATING ORAL EVERY 8 HOURS PRN
Status: ACTIVE | OUTPATIENT
Start: 2025-04-30

## 2025-04-30 RX ORDER — SODIUM CHLORIDE 9 MG/ML
INJECTION, SOLUTION INTRAVENOUS PRN
Status: CANCELLED | OUTPATIENT
Start: 2025-04-30

## 2025-04-30 RX ORDER — BISACODYL 10 MG
10 SUPPOSITORY, RECTAL RECTAL DAILY PRN
Status: CANCELLED | OUTPATIENT
Start: 2025-04-30

## 2025-04-30 RX ORDER — DOCUSATE SODIUM 100 MG/1
100 CAPSULE, LIQUID FILLED ORAL 2 TIMES DAILY
Status: DISPENSED | OUTPATIENT
Start: 2025-04-30

## 2025-04-30 RX ORDER — TORSEMIDE 20 MG/1
10 TABLET ORAL
Status: DISPENSED | OUTPATIENT
Start: 2025-04-30

## 2025-04-30 RX ORDER — ATORVASTATIN CALCIUM 10 MG/1
20 TABLET, FILM COATED ORAL DAILY
Status: DISCONTINUED | OUTPATIENT
Start: 2025-05-01 | End: 2025-05-01

## 2025-04-30 RX ORDER — OXYCODONE HYDROCHLORIDE 5 MG/1
5 TABLET ORAL EVERY 4 HOURS PRN
Refills: 0 | Status: ACTIVE | OUTPATIENT
Start: 2025-04-30

## 2025-04-30 RX ORDER — SODIUM CHLORIDE 0.9 % (FLUSH) 0.9 %
5-40 SYRINGE (ML) INJECTION EVERY 12 HOURS SCHEDULED
Status: CANCELLED | OUTPATIENT
Start: 2025-04-30

## 2025-04-30 RX ORDER — POTASSIUM CHLORIDE 7.45 MG/ML
10 INJECTION INTRAVENOUS PRN
Status: DISCONTINUED | OUTPATIENT
Start: 2025-04-30 | End: 2025-04-30

## 2025-04-30 RX ORDER — B12/LEVOMEFOLATE CALCIUM/B-6 2-1.13-25
1 TABLET ORAL DAILY
Status: DISPENSED | OUTPATIENT
Start: 2025-05-01

## 2025-04-30 RX ORDER — POTASSIUM CHLORIDE 1500 MG/1
40 TABLET, EXTENDED RELEASE ORAL PRN
Status: CANCELLED | OUTPATIENT
Start: 2025-04-30

## 2025-04-30 RX ORDER — B12/LEVOMEFOLATE CALCIUM/B-6 2-1.13-25
1 TABLET ORAL DAILY
Status: CANCELLED | OUTPATIENT
Start: 2025-05-01

## 2025-04-30 RX ORDER — MAGNESIUM SULFATE IN WATER 40 MG/ML
2000 INJECTION, SOLUTION INTRAVENOUS PRN
Status: DISCONTINUED | OUTPATIENT
Start: 2025-04-30 | End: 2025-04-30

## 2025-04-30 RX ORDER — ATORVASTATIN CALCIUM 10 MG/1
20 TABLET, FILM COATED ORAL DAILY
Status: CANCELLED | OUTPATIENT
Start: 2025-05-01

## 2025-04-30 RX ORDER — POTASSIUM CHLORIDE 1500 MG/1
40 TABLET, EXTENDED RELEASE ORAL 2 TIMES DAILY
Status: CANCELLED | OUTPATIENT
Start: 2025-04-30

## 2025-04-30 RX ORDER — CLOPIDOGREL BISULFATE 75 MG/1
75 TABLET ORAL DAILY
Status: CANCELLED | OUTPATIENT
Start: 2025-05-01

## 2025-04-30 RX ORDER — ACETAMINOPHEN 325 MG/1
650 TABLET ORAL EVERY 4 HOURS PRN
Status: ACTIVE | OUTPATIENT
Start: 2025-04-30

## 2025-04-30 RX ORDER — SPIRONOLACTONE 50 MG/1
25 TABLET, FILM COATED ORAL 2 TIMES DAILY
Status: DISPENSED | OUTPATIENT
Start: 2025-04-30

## 2025-04-30 RX ORDER — METOPROLOL SUCCINATE 25 MG/1
25 TABLET, EXTENDED RELEASE ORAL DAILY
Status: CANCELLED | OUTPATIENT
Start: 2025-05-01

## 2025-04-30 RX ORDER — OXYCODONE HYDROCHLORIDE 5 MG/1
5 TABLET ORAL EVERY 4 HOURS PRN
Refills: 0 | Status: CANCELLED | OUTPATIENT
Start: 2025-04-30

## 2025-04-30 RX ORDER — TAMSULOSIN HYDROCHLORIDE 0.4 MG/1
0.4 CAPSULE ORAL DAILY
Status: CANCELLED | OUTPATIENT
Start: 2025-05-01

## 2025-04-30 RX ORDER — ACETAMINOPHEN 325 MG/1
650 TABLET ORAL EVERY 4 HOURS PRN
Status: CANCELLED | OUTPATIENT
Start: 2025-04-30

## 2025-04-30 RX ORDER — RANOLAZINE 500 MG/1
500 TABLET, EXTENDED RELEASE ORAL 2 TIMES DAILY
Status: CANCELLED | OUTPATIENT
Start: 2025-04-30

## 2025-04-30 RX ORDER — CLOPIDOGREL BISULFATE 75 MG/1
75 TABLET ORAL DAILY
Status: DISPENSED | OUTPATIENT
Start: 2025-05-01

## 2025-04-30 RX ORDER — SODIUM CHLORIDE 0.9 % (FLUSH) 0.9 %
5-40 SYRINGE (ML) INJECTION EVERY 12 HOURS SCHEDULED
Status: DISPENSED | OUTPATIENT
Start: 2025-04-30

## 2025-04-30 RX ORDER — METOPROLOL SUCCINATE 25 MG/1
25 TABLET, EXTENDED RELEASE ORAL DAILY
Status: DISPENSED | OUTPATIENT
Start: 2025-05-01

## 2025-04-30 RX ORDER — POTASSIUM CHLORIDE 7.45 MG/ML
10 INJECTION INTRAVENOUS PRN
Status: CANCELLED | OUTPATIENT
Start: 2025-04-30

## 2025-04-30 RX ADMIN — SODIUM CHLORIDE, PRESERVATIVE FREE 10 ML: 5 INJECTION INTRAVENOUS at 21:03

## 2025-04-30 RX ADMIN — POTASSIUM CHLORIDE 40 MEQ: 1500 TABLET, EXTENDED RELEASE ORAL at 21:02

## 2025-04-30 RX ADMIN — INSULIN HUMAN 5 UNITS: 500 INJECTION, SOLUTION SUBCUTANEOUS at 21:27

## 2025-04-30 RX ADMIN — INSULIN HUMAN 15 UNITS: 500 INJECTION, SOLUTION SUBCUTANEOUS at 17:35

## 2025-04-30 RX ADMIN — CEFTRIAXONE SODIUM 2000 MG: 2 INJECTION, POWDER, FOR SOLUTION INTRAMUSCULAR; INTRAVENOUS at 10:53

## 2025-04-30 RX ADMIN — RANOLAZINE 500 MG: 500 TABLET, EXTENDED RELEASE ORAL at 08:52

## 2025-04-30 RX ADMIN — METOPROLOL SUCCINATE 25 MG: 25 TABLET, EXTENDED RELEASE ORAL at 08:53

## 2025-04-30 RX ADMIN — EZETIMIBE 10 MG: 10 TABLET ORAL at 08:53

## 2025-04-30 RX ADMIN — DOCUSATE SODIUM 100 MG: 100 CAPSULE, LIQUID FILLED ORAL at 08:53

## 2025-04-30 RX ADMIN — SODIUM CHLORIDE: 0.9 INJECTION, SOLUTION INTRAVENOUS at 10:50

## 2025-04-30 RX ADMIN — SPIRONOLACTONE 25 MG: 50 TABLET ORAL at 21:02

## 2025-04-30 RX ADMIN — INSULIN HUMAN 15 UNITS: 500 INJECTION, SOLUTION SUBCUTANEOUS at 08:52

## 2025-04-30 RX ADMIN — CLOPIDOGREL BISULFATE 75 MG: 75 TABLET, FILM COATED ORAL at 08:53

## 2025-04-30 RX ADMIN — DOCUSATE SODIUM 100 MG: 100 CAPSULE, LIQUID FILLED ORAL at 21:03

## 2025-04-30 RX ADMIN — POTASSIUM CHLORIDE 40 MEQ: 1500 TABLET, EXTENDED RELEASE ORAL at 08:53

## 2025-04-30 RX ADMIN — POLYETHYLENE GLYCOL (3350) 17 G: 17 POWDER, FOR SOLUTION ORAL at 08:52

## 2025-04-30 RX ADMIN — RANOLAZINE 500 MG: 500 TABLET, EXTENDED RELEASE ORAL at 21:03

## 2025-04-30 RX ADMIN — FINASTERIDE 5 MG: 5 TABLET, FILM COATED ORAL at 08:52

## 2025-04-30 RX ADMIN — INSULIN HUMAN 15 UNITS: 500 INJECTION, SOLUTION SUBCUTANEOUS at 10:57

## 2025-04-30 RX ADMIN — INSULIN HUMAN 10 UNITS: 500 INJECTION, SOLUTION SUBCUTANEOUS at 10:59

## 2025-04-30 RX ADMIN — RIVAROXABAN 20 MG: 20 TABLET, FILM COATED ORAL at 17:35

## 2025-04-30 RX ADMIN — TAMSULOSIN HYDROCHLORIDE 0.4 MG: 0.4 CAPSULE ORAL at 08:53

## 2025-04-30 RX ADMIN — Medication 1 TABLET: at 08:56

## 2025-04-30 RX ADMIN — ATORVASTATIN CALCIUM 20 MG: 10 TABLET, FILM COATED ORAL at 08:53

## 2025-04-30 RX ADMIN — EMPAGLIFLOZIN 10 MG: 10 TABLET, FILM COATED ORAL at 08:52

## 2025-04-30 RX ADMIN — TORSEMIDE 10 MG: 20 TABLET ORAL at 17:35

## 2025-04-30 RX ADMIN — SPIRONOLACTONE 25 MG: 50 TABLET ORAL at 08:53

## 2025-04-30 ASSESSMENT — PAIN SCALES - GENERAL
PAINLEVEL_OUTOF10: 0
PAINLEVEL_OUTOF10: 0

## 2025-04-30 NOTE — PLAN OF CARE
Problem: Chronic Conditions and Co-morbidities  Goal: Patient's chronic conditions and co-morbidity symptoms are monitored and maintained or improved  4/30/2025 1331 by Janee Stahl RN  Outcome: Completed  4/30/2025 1331 by Janee Stahl RN  Outcome: Progressing  Flowsheets (Taken 4/30/2025 0851)  Care Plan - Patient's Chronic Conditions and Co-Morbidity Symptoms are Monitored and Maintained or Improved: Monitor and assess patient's chronic conditions and comorbid symptoms for stability, deterioration, or improvement     Problem: Discharge Planning  Goal: Discharge to home or other facility with appropriate resources  4/30/2025 1331 by Janee Stahl RN  Outcome: Completed  4/30/2025 1331 by Janee Stahl RN  Outcome: Progressing  Flowsheets (Taken 4/30/2025 0851)  Discharge to home or other facility with appropriate resources: Identify barriers to discharge with patient and caregiver     Problem: Safety - Adult  Goal: Free from fall injury  4/30/2025 1331 by Janee Stahl RN  Outcome: Completed  4/30/2025 1331 by Janee Stahl RN  Outcome: Progressing     Problem: Nutrition Deficit:  Goal: Optimize nutritional status  4/30/2025 1331 by Janee Stahl RN  Outcome: Completed  4/30/2025 1331 by Janee Stahl RN  Outcome: Progressing     Problem: Pain  Goal: Verbalizes/displays adequate comfort level or baseline comfort level  4/30/2025 1331 by Janee Stahl RN  Outcome: Completed  4/30/2025 1331 by Janee Stahl RN  Outcome: Progressing     Problem: Skin/Tissue Integrity  Goal: Skin integrity remains intact  Description: 1.  Monitor for areas of redness and/or skin breakdown2.  Assess vascular access sites hourly3.  Every 4-6 hours minimum:  Change oxygen saturation probe site4.  Every 4-6 hours:  If on nasal continuous positive airway pressure, respiratory therapy assess nares and determine need for appliance change or resting period  4/30/2025 1331 by Janee Stahl RN  Outcome:

## 2025-04-30 NOTE — PLAN OF CARE
[facility] Interdisciplinary Plan of Care (IPOC)  [address] [place]  [address]  [phone]  Fax: [phone]    Bandar De La Torre    : 1960  [facility]: [id number]  MRN: [id number]   PHYSICIAN:  ARCHIE French MD  Primary Active Problems:   Active Hospital Problems    Diagnosis Date Noted    Acute osteomyelitis of left calcaneus (HCC) [M86.172] 2025    Paroxysmal atrial fibrillation (HCC) [I48.0] 2025    Acute blood loss anemia [D62] 2025    Chronic diastolic (congestive) heart failure (HCC) [I50.32] 2025    Benign prostatic hyperplasia with urinary hesitancy [N40.1, R39.11] 2025    Chronic osteomyelitis of left foot (HCC) [M86.672] 2025     Rehabilitation Diagnosis:     Local infection of the skin and subcutaneous tissue, unspecified [L08.9]  Chronic osteomyelitis of left foot (HCC) [M86.672]      CARE PLAN   NURSING:  Bandar De La Torre while on this unit will:     Bowel and Bladder   [] Be continent of bowel and bladder      [x] Have an adequate number of bowel movements   [] Urinate with no urinary retention >300ml in bladder   [] Bladder Scan: (details)   [] Complete bladder protocol with gill removal   [] Initiate Bladder Program to toilet every ___ hours   [] Initiate Bowel Program to toilet every ___hours   [] Bladder training    [] Bowel training  Pulmonary   [x] Maintain O2 SATs at 92% or greater  Pain Management   [x] Have pain managed while on ARU        [] Be pain free by discharge    [] Medication Management and Education  Maintenance of Skin Integrity/Wound Management   [] Have no skin breakdown while on ARU   [x] Have improved skin integrity via wound measurements   [] Have no signs/symptoms of infection via infection protection and monitoring at the          wound site  Fall Prevention   [x] Be free from injury during hospitalization via fall prevention measures     [] Disease  management and Education  Precautions   [x] Weight Bearing Precautions   [] Swallowing Precautions   [] Monitoring of Risks of Complications   [] DVT Prophylaxis    [] Fluid/electrolyte/Nutrition Management    [] Complete education with patient/family with understanding demonstrated for          in-room safety with transfers to bed, toilet, wheelchair, shower as well as                bathroom activities and hygiene.  [] Adjustment   [x] Other:   Nursing interventions may include bowel/bladder training, education for medical assistive devices, medication education, O2 saturation management, energy conservation, stress management techniques, fall prevention, alarms protocol, seating and positioning, skin/wound care, pressure relief instruction,dressing changes,  infection protection, DVT prophylaxis, and/or assistance with in room safety with transfers to bed, toilet, wheelchair, shower as well as bathroom activities and hygiene.     Patient/caregiver education for:   [x] Disease/sustained injury/management      [x] Medication Use   [x] Surgical intervention   [x] Safety/Precautions   [] Body mechanics and or joint protection   [x] Health maintenance       PHYSICAL THERAPY:  Goals:                  Short Term Goals  Time Frame for Short Term Goals: 12 days STG=LTG  Short Term Goal 1: Pt will perform bed mobility with mod I including wound vac line management  Short Term Goal 2: Pt will perform sit to stand and car transfer with SBA, w/c <>bed trasnfers with mod I without slideboard including wound vac line management  Short Term Goal 3: Pt will propel w/c 150' including household situations with mod I, including wound vac line management  Short Term Goal 4: Pt will  light object from standing using 2ww and reacher with min assist               These goals were reviewed with this patient at the time of assessment and Bandar De La Torre is in agreement.     Plan of Care: Pt to be seen 5 days per week for a  follow-up with his surgeon and primary care physician following rehab.  DVT prophylaxis: Continuing the DOAC (Xarelto) that he requires for his atrial arrhythmias will protect against new VTE.  However it does raise his risk for spontaneous hemorrhage and GI bleeding.  Therefore, I must monitor his hemoglobin regularly and I will initiate GI prophylaxis with a PPI (Protonix).  Weightbearing through his right lower limb will be limited but attempted daily as part of his therapy plan.  Uncontrolled pain: Limb elevation control swelling and local wound care to control healing.  Providing acetaminophen, IV Dilaudid and oral oxycodone for breakthrough pain.  Adjusting bowel regimen while on the analgesics.  Paroxysmal atrial fibrillation: Continuing Xarelto as his DOAC.  Providing metoprolol for rate control.  Monitoring his weights daily to detect any decompensation of CHF.  Providing antiplatelet therapy with Plavix and cholesterol medications (Lipitor and Zetia) for heart health measures.  Chronic diastolic congestive heart failure: Monitoring his weights daily to detect any decompensation.  Continuing ongoing diuresis with Aldactone and Demadex.  Supplementing potassium while on the diuretics.  Providing metoprolol for rate control.  Jardiance has been prescribed for both cardiac and diabetic indications.  Poorly controlled diabetes type 2 with peripheral neuropathy: Continuing a diet modified for carbohydrates.  Monitoring blood sugars with his Dexcom system.  Providing scheduled Humulin R and Jardiance under the direction of endocrinology.  Encouraging consistent oral intake.  Essential hypertension: Continuing metoprolol, Aldactone, Ranexa and Demadex for systolic blood pressure regulation.  Target systolic blood pressures 120-140.  Vital signs will be checked at rest and with activity.  BPH with urinary hesitancy: Continuing Proscar and Flomax.  Avoiding anticholinergic medication exposure when possible.

## 2025-04-30 NOTE — CARE COORDINATION
Spoke with Mariam guzman approved for ARU, plan to send today .  ARU nursing will call pt's nurse with time.

## 2025-04-30 NOTE — H&P
Bandar De La Torre    : 1960  [id number]  MRN: [id number]              History and physical    Date of face-to-face exam: 2025.  Time of face-to-face exam: 1755.    Admitting diagnosis: Osteomyelitis left foot (IGC 16)    Comorbid diagnoses impacting rehabilitation: Uncontrolled pain, bilateral lower limb weakness, gait disturbance, acute blood loss anemia, paroxysmal atrial fibrillation, poorly controlled diabetes type 2 with peripheral neuropathy, essential hypertension, hyperkalemia, morbid obesity class III (BMI 41.5), history of CVA, chronic diastolic congestive heart failure, BPH with urinary hesitancy    Chief complaint: Unsteadiness when trying to stand.    History of present illness: Patient is a 64-year-old right-hand-dominant male with chronic struggles with dysvascular complications of his diabetes (with neuropathy) hypertension and hyperlipidemia.  We treated him several months ago in the acute rehab unit when he had an open diabetic wound of the right foot that required surgery with partial foot removal.  In the process of recovering from that procedure, he developed a blister on his left foot (wearing shoes with no socks).  That wound progressed to gangrene and on 2025 he was directed to the hospital because of increasing pain, swelling and drainage.  He was found to have osteomyelitis of the left heel.  He required an I&D procedure by Dr. Avalos on 2025 with attention to the left heel wound.  Following the procedure, a VAC dressing was placed and the patient was restricted to no weightbearing through his left lower limb.  He was committed to IV antibiotics through 2025.  With his poor pain control, his diabetic neuropathy and generalized weakness, he has had significant difficulty performing his own transfers, toileting and self-care.  He has also had electrolyte disturbance and paroxysmal atrial fibrillation causing dizziness.  Acute care therapists have  (GLYCOLAX) packet 17 g, 17 g, Oral, Daily, Jakob Avalos MD    docusate sodium (COLACE) capsule 100 mg, 100 mg, Oral, BID, Jakob Avalos MD    [START ON 5/1/2025] clopidogrel (PLAVIX) tablet 75 mg, 75 mg, Oral, Daily, Jakob Avalos MD    [START ON 5/1/2025] cefTRIAXone (ROCEPHIN) 2,000 mg in sodium chloride 0.9 % 50 mL IVPB (addEASE), 2,000 mg, IntraVENous, Q24H, Jakob Avalos MD    bisacodyl (DULCOLAX) suppository 10 mg, 10 mg, Rectal, Daily PRN, ARCHIE French MD    Family History:   Family History   Problem Relation Age of Onset    Diabetes Mother     Diabetes Father     Cancer Father     Cancer Maternal Grandfather         PROSTATE CANCER       Exam:    Blood pressure 121/67, pulse 60, temperature 97.7 °F (36.5 °C), temperature source Oral, resp. rate 16, height 1.854 m (6' 1\"), weight (!) 142.8 kg (314 lb 12.8 oz), SpO2 98%.    General: The patient was seen sitting up in bed.  His left lower limb and foot were dressed.  He was talkative and appropriate.  In no distress.    HEENT: Symmetric facial expression.  No signs of trauma to his head or neck.  Visual fields were full.  Neck veins were flat.  MMM.    Pulmonary: Diminished breath sounds in the bases.  Somewhat labored breathing due to his girth.  No coughing or wheezing.    Cardiac: Distant heart sounds with occasional premature beats.  The rate was near 60.    Abdomen: Patient's abdomen was soft and nondistended.  Bowel sounds were present throughout.  There was no rebound, guarding or masses noted.    Spinal exam: Moist skin without open areas or gross malalignment.    Upper extremities: The patient was able to bring both hands up to meet mine.  He had fair  strength with poor dexterity.  Poor sensation in the fingers.  4/5  strength, digit abduction and thumb opposition bilaterally.  4+/5 proximal strength.    Lower extremities: His left lower limb was dressed at the ankle and foot.  His toes are mobile but

## 2025-04-30 NOTE — PLAN OF CARE
Problem: Chronic Conditions and Co-morbidities  Goal: Patient's chronic conditions and co-morbidity symptoms are monitored and maintained or improved  Outcome: Progressing  Flowsheets (Taken 4/30/2025 0851)  Care Plan - Patient's Chronic Conditions and Co-Morbidity Symptoms are Monitored and Maintained or Improved: Monitor and assess patient's chronic conditions and comorbid symptoms for stability, deterioration, or improvement     Problem: Discharge Planning  Goal: Discharge to home or other facility with appropriate resources  Outcome: Progressing  Flowsheets (Taken 4/30/2025 0851)  Discharge to home or other facility with appropriate resources: Identify barriers to discharge with patient and caregiver     Problem: Safety - Adult  Goal: Free from fall injury  Outcome: Progressing     Problem: Nutrition Deficit:  Goal: Optimize nutritional status  Outcome: Progressing     Problem: Pain  Goal: Verbalizes/displays adequate comfort level or baseline comfort level  Outcome: Progressing     Problem: Skin/Tissue Integrity  Goal: Skin integrity remains intact  Description: 1.  Monitor for areas of redness and/or skin breakdown2.  Assess vascular access sites hourly3.  Every 4-6 hours minimum:  Change oxygen saturation probe site4.  Every 4-6 hours:  If on nasal continuous positive airway pressure, respiratory therapy assess nares and determine need for appliance change or resting period  Outcome: Progressing     Problem: ABCDS Injury Assessment  Goal: Absence of physical injury  Outcome: Progressing

## 2025-04-30 NOTE — PROGRESS NOTES
Progress Note( Dr. Diehl)  4/30/2025  Subjective:   Admit Date: 4/30/2025  PCP: MEETA Diehl MD    Admitted For : Left foot /heel ulcer and also has left calcaneus osteomyelitis    Consulted For: Better control of blood glucose    Interval History: Patient with blood glucose improving with use of U500 insulin  Had debridement done of the left heel ulcer and wound vacuum was placed    Denies any chest pains,   Denies SOB .   Denies nausea or vomiting.   No new bowel or bladder symptoms.       Intake/Output Summary (Last 24 hours) at 4/30/2025 1853  Last data filed at 4/30/2025 1839  Gross per 24 hour   Intake 240 ml   Output 800 ml   Net -560 ml       DATA    CBC:   Recent Labs     04/28/25 0227   WBC 7.6   HGB 11.0*       CMP:  Recent Labs     04/28/25 0227      K 4.1      CO2 23   BUN 41*   CREATININE 1.1   CALCIUM 9.2     Lipids:   Lab Results   Component Value Date/Time    CHOL 205 04/11/2025 08:12 AM    HDL 38 04/11/2025 08:12 AM    TRIG 83 04/11/2025 08:12 AM     Glucose:  Recent Labs     04/30/25  0719 04/30/25  1056 04/30/25  1612   POCGLU 200* 264* 141*     [id number]:  Lab Results   Component Value Date/Time    LABA1C 9.6 04/11/2025 08:12 AM     High Sensitivity TSH:   Lab Results   Component Value Date/Time    TSHHS 1.800 08/29/2019 10:06 AM     Free T3: No results found for: \"FT3\"  Free T4:No results found for: \"T4FREE\"    No orders to display        Scheduled Medicines   Medications:    sodium chloride flush  5-40 mL IntraVENous 2 times per day    [START ON 5/1/2025] empagliflozin  10 mg Oral Daily    [START ON 5/1/2025] ezetimibe  10 mg Oral Daily    [START ON 5/1/2025] finasteride  5 mg Oral Daily    [START ON 5/1/2025] folic acid-pyridoxine-cyancobalamin 1.13-25-2 tablet  1 tablet Oral Daily    [START ON 5/1/2025] metoprolol succinate  25 mg Oral Daily    potassium chloride  40 mEq Oral BID    ranolazine  500 mg Oral BID    rivaroxaban  20 mg Oral Dinner    [START ON

## 2025-04-30 NOTE — PROGRESS NOTES
4 Eyes Skin Assessment     NAME:  Bandar De La Torre  YOB: 1960  MEDICAL RECORD NUMBER:  [id number]    The patient is being assessed for  Admission    I agree that at least one RN has performed a thorough Head to Toe Skin Assessment on the patient. ALL assessment sites listed below have been assessed.      Areas assessed by both nurses:    Head, Face, Ears, Shoulders, Back, Chest, Arms, Elbows, Hands, Sacrum. Buttock, Coccyx, Ischium, Legs. Feet and Heels, and Under Medical Devices         Does the Patient have a Wound? Yes wound(s) were present on assessment. LDA wound assessment was Initiated and completed by RN       David Prevention initiated by RN: No  Wound Care Orders initiated by RN: No    Pressure Injury (Stage 3,4, Unstageable, DTI, NWPT, and Complex wounds) if present, place Wound referral order by RN under : No    New Ostomies, if present place, Ostomy referral order under : No     Nurse 1 eSignature: Electronically signed by Tiki Moran RN on 4/30/25 at 3:43 PM EDT    **SHARE this note so that the co-signing nurse can place an eSignature**    Nurse 2 eSignature: Electronically signed by Brigitte Moran RN on 4/30/25 at 6:14 PM EDT

## 2025-04-30 NOTE — CARE COORDINATION
Called to check the status of ARU pre-cert.  Patient has been denied for ARU.  There is an option for a P2P that would need to be completed by 5pm today.  Phone# [phone].  Ref#  [id number].  PSd Dr. Avalos regarding P2P option.  Will follow for MD's decision on P2P competition.

## 2025-04-30 NOTE — CONSULTS
[facility] Nurse  Consult Note       Bandar De La Torre  AGE: 64 y.o.   GENDER: male  : 1960  TODAY'S DATE:  2025    Subjective:     Reason for CWOCN Evaluation and Assessment: NPWT dressing change      Bandar De La Torre is a 64 y.o. male referred by:   [x] Physician  [] Nursing  [] Other:     Wound Identification:  Wound Type: diabetic and traumatic  Contributing Factors: edema, diabetes, chronic pressure, decreased mobility, obesity, and anticoagulation therapy        PAST MEDICAL HISTORY        Diagnosis Date    Atrial fibrillation (HCC)     Cerebral artery occlusion with cerebral infarction (HCC)     History of cardiac cath     --RCA has mid 50% stenosis and PLB branch has 70% stenosis noted. LVEDP very high    Hyperlipidemia     Hypertension     Type II or unspecified type diabetes mellitus without mention of complication, not stated as uncontrolled     Diagnsed about     Unspecified sleep apnea        PAST SURGICAL HISTORY    Past Surgical History:   Procedure Laterality Date    CARDIAC CATHETERIZATION      FOOT DEBRIDEMENT Left 2025    FOOT DEBRIDEMENT INCISION AND DRAINAGE with wound vac placement performed by Jakob Avalos MD at [facility] OR    PACEMAKER INSERTION      Done in the past at [facility] in 2024 by Dr. Ocampo    TOE AMPUTATION Right 2025    RIGHT FOOT 2ND AND 3RD TOE AMPUTATION WITH FOOT DEBRIDEMENT performed by Jakob Avalos MD at [facility] OR    TONSILLECTOMY      AT 15 YEARS OLD       FAMILY HISTORY    Family History   Problem Relation Age of Onset    Diabetes Mother     Diabetes Father     Cancer Father     Cancer Maternal Grandfather         PROSTATE CANCER       SOCIAL HISTORY    Social History     Tobacco Use    Smoking status: Never    Smokeless tobacco: Never   Vaping Use    Vaping status: Never Used   Substance Use Topics    Alcohol use: No    Drug use: No       ALLERGIES    No Known Allergies    MEDICATIONS    No current 
Clinical Pharmacy Consult Sign Off Note    Bandar De La Torre was receiving therapy with vancomycin for the treatment of Bone/Joint Infection.  Orders for vancomycin have been discontinued by provider.  Will sign off on pharmacy to dose consult.  If medication is restarted and pharmacy is to manage dosing, please re-consult at that time.    Thank you,  Sheela Vera [facility], PharmD.  4/28/2025 10:39 AM      
Endocrinology   Consult Note  4/24/2025  6:07 PM     Primary Care provider: MEETA Diehl MD     Referring physician:  Jakob Avalos MD     Dear Doctor   Serena//Jodee     Thank You for the Consult     Pt. Was Admitted for : Left heel correction    Reason for Consult: Better control of blood glucose      History Obtained From:  Patient/ EMR       HISTORY OF PRESENT ILLNESS:                The patient is a 64 y.o. male with significant past medical history of atrial fibrillation, CVA, hypertension, hyperlipidemia, chronic kidney disease, type 2 diabetes mellitus WITH insulin resistance and on review 500 regular insulin sleep apnea, cardiac cardiac pacemaker has 2 amputations right foot 2nd and 3rd toe admitted to hospital with with severe ulcer on the left foot on the heel.  Patient had debridement done of the wound on 4/25/2025 with wound vacuum was placed.  Patient has been running higher blood glucose levels.  I was  consulted for better control of blood glucose.       ROS:   Pt's ROS done in detail.  Abnormal ROS are noted in Medical and Surgical History Section below:     Other Medical History:        Diagnosis Date    Atrial fibrillation (HCC)     Cerebral artery occlusion with cerebral infarction (HCC)     History of cardiac cath 2017    --RCA has mid 50% stenosis and PLB branch has 70% stenosis noted. LVEDP very high    Hyperlipidemia     Hypertension     Type II or unspecified type diabetes mellitus without mention of complication, not stated as uncontrolled     Diagnsed about 1998    Unspecified sleep apnea      Surgical History:        Procedure Laterality Date    CARDIAC CATHETERIZATION      PACEMAKER INSERTION      Done in the past at [facility] in October 2024 by Dr. Ocampo    TOE AMPUTATION Right 02/19/2025    RIGHT FOOT 2ND AND 3RD TOE AMPUTATION WITH FOOT DEBRIDEMENT performed by Jakob Avalos MD at [facility] OR    TONSILLECTOMY      AT 15 YEARS OLD       Allergies:  Patient has no known 
[facility] ACUTE CARE PHYSICAL THERAPY EVALUATION  Bandar De La Torre, 1960, 4108/4108-A, 4/28/2025    History  [place]:  The encounter diagnosis was Left foot infection.  Patient  has a past medical history of Atrial fibrillation (HCC), Cerebral artery occlusion with cerebral infarction (HCC), History of cardiac cath, Hyperlipidemia, Hypertension, Type II or unspecified type diabetes mellitus without mention of complication, not stated as uncontrolled, and Unspecified sleep apnea.  Patient  has a past surgical history that includes Tonsillectomy; Cardiac catheterization; Toe amputation (Right, 02/19/2025); and Pacemaker insertion.    Recommendation: Facility for intensive rehabilitation, anticipate 3 hours per day and 5 days per week.    Subjective:  Patient states:  \"I walked in here\"   Pain:  denies   Communication with other providers:  RN, co-eval with Shayy SALEH   Restrictions: NWB LLE, wound vac LLE, general precautions, falls, right post op shoe     Home Setup/Prior level of function  Social/Functional History  Lives With: Spouse  Type of Home: House  Home Layout: Two level, Able to Live on Main level with bedroom/bathroom  Home Access: Stairs to enter with rails  Entrance Stairs - Number of Steps: 7  Bathroom Shower/Tub: Walk-in shower  Bathroom Equipment: Shower chair  Home Equipment: Walker - Rolling, Cane, Sock aid, Reacher  Prior Level of Assist for ADLs: Independent (Has been doing a sponge bath lately due to inability to get feet wet with amputations and wounds)  Prior Level of Assist for Homemaking: Independent (shared with spouse)  Prior Level of Assist for Ambulation: Independent household ambulator, with or without device (Typically no AD but would intermittently use RW lately)  Prior Level of Assist for Transfers: Independent  Active : No  Patient's  Info: wife    Examination of body systems (includes body structures/functions, activity/participation 
IV Consult for PICC cancelled for now.  Patient will be admitted to ARU and IV antibiotics will be managed with PIV.  Pleas re-consult VAT near time of discharge home for appropriate device placement at that time.  
Infectious Disease Consult Note  2025   Patient Name: Bandar De La Torre : 1960     Assessment  Left heel stage pressure ulcer   Left calcaneus osteomyelitis  2025; S/p left heel I and D, and wound VAC placement  Requires MRSA, Pseudomonas and anerobic coverage  Plan for 6 week course of abx  Class II obesity  T2DM  Comorbid conditions: hx of CVA    Plan  Therapeutic:  Continue IV vancomycin and Zosyn  Diagnostic:  Trend CRP  F/u:  Tissue cx  Bone histopathology  Other:     Thank you for allowing me to consult in the care of this patient.  ------------------------  REASON FOR CONSULT: \"left calcaneal osteomyelitis, will need long term abx\"  Requested by: Jakob Avalos MD  HPI:Patient is a 64 y.o. male living with type 2 diabetes mellitus, hyperlipidemia, hypertension, atrial fibrillation who was admitted 2025 for further evaluation and management of left heel pressure wound.  He had seen Dr. Avalos at the wound care clinic where he was noted to have significant necrotic tissue on his left heel.  The patient had denied fever, chills, nausea or vomiting.  He is largely sedentary either sitting or lying down in his bed.  An MRI of the left foot showed calcaneal osteomyelitis, cellulitis and myositis of the hindfoot.  He received vancomycin and Zosyn.  Dr. Avalos performed the left foot debridement, incision and drainage with placement of wound VAC on his left heel.  Bone biopsies were taken.  ?  Infectious diseases service was consulted to evaluate the pt, and recommend further investigative and therapeutic measures.  Review and summary of old records:  ROS: Other systems reviewed Including eyes, ENT, respiratory, cardiovascular, GI, , dermatologic, neurologic, psych, hem/lymphatic, musculoskeletal and endocrine were negative other than what is mentioned above.     Patient Active Problem List    Diagnosis Date Noted    Hypokalemia 2022    Acute kidney injury superimposed on 
[facility] ACUTE CARE OCCUPATIONAL THERAPY EVALUATION  Bandar De La Torre, 1960, 4108/4108-A, 4/28/2025    Discharge Recommendation: Facility for intensive rehabilitation, anticipate 3 hours per day and 5 days per week.    History  [place]:  The encounter diagnosis was Left foot infection.  Patient  has a past medical history of Atrial fibrillation (HCC), Cerebral artery occlusion with cerebral infarction (HCC), History of cardiac cath, Hyperlipidemia, Hypertension, Type II or unspecified type diabetes mellitus without mention of complication, not stated as uncontrolled, and Unspecified sleep apnea.  Patient  has a past surgical history that includes Tonsillectomy; Cardiac catheterization; Toe amputation (Right, 02/19/2025); and Pacemaker insertion.    Subjective:  Patient states:  \"I don't want to be stuck in that wheelchair!\"   Pain:  denies pain.    Communication: RN, CM, co-eval with PT Denisha for safety and activity tolerance   Restrictions: general precautions, fall risk, NWB LLE, wound vac LLE, R post-op shoe    Home Setup/Prior level of function  Social/Functional History  Lives With: Spouse  Type of Home: House  Home Layout: Two level, Able to Live on Main level with bedroom/bathroom  Home Access: Stairs to enter with rails  Entrance Stairs - Number of Steps: 7  Bathroom Shower/Tub: Walk-in shower  Bathroom Equipment: Shower chair  Home Equipment: Walker - Rolling, Cane, Sock aid, Reacher  Prior Level of Assist for ADLs: Independent (Has been doing a sponge bath lately due to inability to get feet wet with amputations and wounds)  Prior Level of Assist for Homemaking: Independent (shared with spouse)  Prior Level of Assist for Ambulation: Independent household ambulator, with or without device (Typically no AD but would intermittently use RW lately)  Prior Level of Assist for Transfers: Independent  Active : No  Patient's  Info: wife    Examination of body systems (includes body 
mEq, PRN   Or  potassium chloride, 10 mEq, PRN  magnesium sulfate, 2,000 mg, PRN  ondansetron, 4 mg, Q8H PRN   Or  ondansetron, 4 mg, Q6H PRN  acetaminophen, 650 mg, Q4H PRN  oxyCODONE, 5 mg, Q4H PRN  HYDROmorphone, 0.5 mg, Q3H PRN        Data:     CBC: No results for input(s): \"WBC\", \"HGB\", \"PLT\", \"MCV\", \"RDW\", \"BANDSPCT\", \"BLASTSPCT\", \"METASPCT\", \"LYMPHOPCT\", \"PROMYELOPCT\", \"MONOPCT\", \"MYELOPCT\", \"EOSPCT\", \"BASOPCT\", \"MONOSABS\", \"LYMPHSABS\", \"EOSABS\", \"BASOSABS\" in the last 72 hours.    Invalid input(s): \"SEGSPCTL\", \"ATYLMREL\"  CMP:  No results for input(s): \"NA\", \"K\", \"CL\", \"CO2\", \"BUN\", \"CREATININE\", \"GFRAA\", \"GLUCOSE\", \"LABALBU\", \"CALCIUM\", \"BILITOT\", \"ALKPHOS\", \"AST\", \"ALT\" in the last 72 hours.  Lipids:   Lab Results   Component Value Date/Time    CHOL 205 04/11/2025 08:12 AM    HDL 38 04/11/2025 08:12 AM    TRIG 83 04/11/2025 08:12 AM     Hemoglobin A1C:   Lab Results   Component Value Date/Time    LABA1C 9.6 04/11/2025 08:12 AM     TSH:   Lab Results   Component Value Date/Time    TSH 0.759 01/11/2022 07:34 AM     Troponin:   Lab Results   Component Value Date/Time    TROPONINT 0.028 09/11/2022 09:20 AM    TROPONINT 0.033 09/10/2022 05:10 PM    TROPONINT 0.026 10/25/2021 12:02 AM     BNP: No results for input(s): \"PROBNP\" in the last 72 hours.  Lactic Acid: No results for input(s): \"LACTA\" in the last 72 hours.  UA:  Lab Results   Component Value Date/Time    NITRU TEST NOT PERFORMED. Test ordered incorrectly. 04/18/2025 08:32 AM    COLORU TEST NOT PERFORMED. Test ordered incorrectly. 04/18/2025 08:32 AM    PHUR TEST NOT PERFORMED. Test ordered incorrectly. 04/18/2025 08:32 AM    WBCUA 3 01/14/2025 08:29 PM    WBCUA 0-5 05/09/2022 09:32 AM    RBCUA 3 01/14/2025 08:29 PM    RBCUA 2 09/10/2022 06:30 PM    MUCUS RARE 01/14/2025 08:29 PM    TRICHOMONAS NONE SEEN 09/10/2022 06:30 PM    BACTERIA NEGATIVE 09/10/2022 06:30 PM    CLARITYU TEST NOT PERFORMED. Test ordered incorrectly. 04/18/2025 08:32 AM    
patient/caregiver understanding able to:   Voiced understanding.        Electronically signed by Chin Tinoco RN, CWOCN on 4/28/2025 at 1:44 PM

## 2025-04-30 NOTE — CARE COORDINATION
P2P completed.  Received approval for admission to ARU.  Auth#  [id number].      Met with patient and reiterated ARU. Criteria.  Explained to patient the required 3 hours of therapy a day.  Also explained the average length of stay is 11 days, could be longer or shorter depending on recommendations of therapy and Dr. French.  Patient expresses his understanding and states he's agreeable to admit to ARU.  Patient states that is ultimate goal is to return home at discharge from ARU like previous stay earlier this year.  Called and notified his wife (Meredith) of ARU admission.  Meredith states goal is for patient to return home at discharge from ARU.  Patient is NWB LLE and has 7 steps to enter the main level of the home where patient would be staying.  Patients wife states she's discussed with her son a possible lift chair being placed for patient to return home.      Patient meets criteria and is approved to come to ARU.   Patient able to admit once medically stable and after ARU Medical Director and  sign the pre-admission screen (PAS).    Will notify ARU team of admission.

## 2025-04-30 NOTE — PROGRESS NOTES
Pharmacist Review and Automatic Dose Adjustment of Prophylactic Enoxaparin    The reviewing pharmacist has made an adjustment to the ordered enoxaparin dose or converted to UFH per the approved [facility] protocol and table as identified below.      Bandar De La Torre is a 64 y.o. male.     No results for input(s): \"CREATININE\" in the last 72 hours.    Estimated Creatinine Clearance: 98 mL/min (based on SCr of 1.1 mg/dL).    No results for input(s): \"HGB\", \"HCT\", \"PLT\" in the last 72 hours.  No results for input(s): \"INR\" in the last 72 hours.    Height:   Ht Readings from Last 1 Encounters:   04/24/25 1.829 m (6')     Weight:  Wt Readings from Last 1 Encounters:   04/24/25 (!) 139.3 kg (307 lb 3.2 oz)         Plan: Based upon the patient's weight and renal function    Ordered: Enoxaparin 40mg SUBQ Daily    Changed/converted to    New Order: Enoxaparin 30mg SUBQ BID    Thank you,    Loyda Starkey, [facility]  4/24/2025, 5:57 PM  
    V2.0  [facility] [place]      Name:  Bandar De La Torre /Age/Sex: 1960  (64 y.o. male)   MRN & CSN:  [id number] & [id number] Encounter Date/Time: 2025 1:30 PM EDT    Location:  [address] [place] [address] PCP: MEETA Diehl MD       Hospital Day: 2    Assessment and Plan:   Bandar De La Torre is a 64 y.o. male with pmh of type 2 diabetes, hypertension, right toes amputation, a-fib, cva who was admitted by general surgeon for DM foot wound on left heel on . Internal medicine is consulted for medical management.     Plan:  Diabetic Foot ulcer  Foot infection  S/p foot debridement incision and drainage with wound vac placement by dr. Avalos  -pt tolerated well the procedure  -continue iv antibiotics zosyn and vancomycin  -pending blood culture and wound culture  -may need long term iv antibiotics  -dispo per general surgoen     Afib  - xarelto held due to expected surgery      Hx of CVA  -Continue aspirin, statin     T2DM  - MCSI and jardiance  - Hypoglycemic protocol   -add lantus 10 units at night  -monitor BS     HTN  -Continue BP meds      HLD  -Continue statin     Diet ADULT DIET; Regular; 5 carb choices (75 gm/meal)  ADULT ORAL NUTRITION SUPPLEMENT; Breakfast; Low Calorie/High Protein Oral Supplement  ADULT ORAL NUTRITION SUPPLEMENT; Lunch, Dinner; Wound Healing Oral Supplement   DVT Prophylaxis [] Lovenox, []  Heparin, [] SCDs, [] Ambulation,  [] Eliquis, [] Xarelto  [] Coumadin   Code Status Full Code   Disposition From: home  Expected Disposition: pending  Estimated Date of Discharge: pending  Patient requires continued admission due to medical condition   Surrogate Decision Maker/ POA      Subjective:     Chief Complaint: No chief complaint on file.       Bandar De La Torre is a 64 y.o. male is evaluated in the room. No complain at this moment. Wound vac is applied on left heel.          Review of Systems:    Review of Systems   All other systems reviewed and are 
    V2.0  [facility] Hospitalist Progress Note      Name:  Bandar De La Torre /Age/Sex: 1960  (64 y.o. male)   MRN & CSN:  [id number] & [id number] Encounter Date/Time: 2025 1:30 PM EDT    Location:  [address] [place] [address] PCP: MEETA Diehl MD       Hospital Day: 5    Assessment and Plan:   Bandar De La Torre is a 64 y.o. male with pmh of type 2 diabetes, hypertension, right toes amputation, a-fib, cva who was admitted by general surgeon for DM foot wound on left heel on . Internal medicine is consulted for medical management.     Plan:    Left calcaneus osteomyelitis   Left heel pressure ulcer  Blood culture NGTD.  Tissue culture with proteus mirabilis and streptococcus constellatus.  S/p foot debridement incision and drainage with wound vac placement  Continue zosyn, pending culture sensitivities  ID following: anticipate 6 week course of IV antibiotics  Ensure glucose control to assist with wound healing     Atrial fibrillation  Continue metoprolol for rate control  Discussed with surgeon who cleared pt to resume AC, Xarelto restarted post-op     Hyperkalemia -iatrogenic, resolved  History of hypokalemia  Potassium 5.4 in the setting of potassium tablets and Aldactone.  Briefly held Aldactone and potassium tablets, now resumed as patient has a significant tendency towards hypokalemia. No further episodes of hyperkalemia noted  Continue torsemide  Maintain telemetry  Follow BMP daily    CKD stage II  Baseline creatinine 1.1-1.3.  Avoid nephrotoxic agents    Heart failure with preserved ejection fraction  Continue Jardiance and torsemide  Monitor fluid status  Daily weights, strict intake/output     Insulin-dependent diabetes mellitus type 2  Continue Jardiance and insulin (basal/bolus/SSI regimen)  Glucose checks, hypoglycemia protocol     Essential hypertension  Continue metoprolol and torsemide  Aldactone held in the setting of hyperkalemia    Hx of CVA  -Continue statin.  Discussed with surgeon who cleared 
    V2.[place]  [facility] [place]      Name:  Bandar De La Torre /Age/Sex: 1960  (64 y.o. male)   MRN & CSN:  [id number] & [id number] Encounter Date/Time: 2025 1:30 PM EDT    Location:  [address] [place] [address] PCP: MEETA Diehl MD       Hospital Day: 3    Assessment and Plan:   Bandar De La Torre is a 64 y.o. male with pmh of type 2 diabetes, hypertension, right toes amputation, a-fib, cva who was admitted by general surgeon for DM foot wound on left heel on . Internal medicine is consulted for medical management.     Plan:    Left calcaneus osteomyelitis   Left heel pressure ulcer  Tissue culture and blood culture with NGTD.  S/p foot debridement incision and drainage with wound vac placement  On IV vancomycin and Zosyn per ID  Ensure glucose control to assist with wound healing     Atrial fibrillation  Continue metoprolol for rate control  Xarelto held in the setting of surgical intervention and postop state, recommend this be reinitiated as soon as cleared by surgeon postop     Hyperkalemia   Potassium 5.4 in the setting of potassium tablets and Aldactone.  Hold Aldactone and potassium tablets  Continue torsemide  Repeat BMP tonight to ensure no worsening  Maintain telemetry    CKD stage II  Baseline creatinine 1.1-1.3.  Avoid nephrotoxic agents    Heart failure with preserved ejection fraction  Continue Jardiance and torsemide  Monitor fluid status  Daily weights, strict intake/output     Insulin-dependent diabetes mellitus type 2  Continue Jardiance and insulin (basal/bolus/SSI regimen)  Glucose checks, hypoglycemia protocol     Essential hypertension  Continue metoprolol and torsemide  Aldactone held in the setting of hyperkalemia    Hx of CVA  -Continue statin.  Plavix on hold during postop state, recommend resuming when determined to be safe by surgeon  MARINO -Continue home CPAP  HLD -Continue statin   BPH -Continue Proscar and Flomax    Diet ADULT DIET; Regular; 5 carb choices (75 
    V2.0  [facility] [place]      Name:  Bandar De La Torre /Age/Sex: 1960  (64 y.o. male)   MRN & CSN:  [id number] & [id number] Encounter Date/Time: 2025 1:30 PM EDT    Location:  [address] [place] [address] PCP: MEETA Diehl MD       Hospital Day: 7    Assessment and Plan:   Bandar De La Torre is a 64 y.o. male with pmh of type 2 diabetes, hypertension, right toes amputation, a-fib, cva who was admitted by general surgeon for DM foot wound on left heel on . Internal medicine is consulted for medical management.     Plan:    Left calcaneus osteomyelitis   Left heel pressure ulcer  Blood culture NGTD.  Tissue culture with proteus mirabilis and streptococcus constellatus.  S/p foot debridement incision and drainage with wound vac placement  Was given IV Zosyn, changed to Rocephin 2000 mg QD through 25 per ID   ID following: anticipate 6 week course of IV antibiotics  Ensure glucose control to assist with wound healing  Wound CX: + proteus, perrini and streptococcus     Atrial fibrillation  Continue metoprolol for rate control  Discussed with surgeon who cleared pt to resume AC, Xarelto restarted post-op     Hyperkalemia -iatrogenic, resolved  History of hypokalemia  Potassium 5.4 in the setting of potassium tablets and Aldactone.  Briefly held Aldactone and potassium tablets, now resumed as patient has a significant tendency towards hypokalemia. No further episodes of hyperkalemia noted  Continue torsemide  Maintain telemetry  Follow BMP daily    CKD stage II  Baseline creatinine 1.1-1.3.  Avoid nephrotoxic agents    Heart failure with preserved ejection fraction  Continue Jardiance and torsemide  Monitor fluid status  Daily weights, strict intake/output     Insulin-dependent diabetes mellitus type 2  Continue Jardiance and insulin (basal/bolus/SSI regimen)  Glucose checks, hypoglycemia protocol     Essential hypertension  Continue metoprolol and torsemide  Aldactone held in the setting of 
  Occupational Therapy Treatment Note  Name: Bandar De La Torre MRN: [id number] :   1960   Date:  2025   Admission Date: 2025 Room:  [address] [place] [address]     Communication with other providers:  RN approved therapy session, co treat with PTA for safety, perfect serve with doctor about post op shoe (no longer needs to wear R post op shoe)    Subjective:  Patient states:  \"That chair hurts me too much\"  Pain: Denies pain  Restrictions: General precautions, fall risk, NWB LLE, wound vac LLE  No one at bedside    Objective:    Observation: Pt was supine in bed upon arrival, agreeable to session  Objective Measures: Stable    Treatment, including education:  Self Care Training:   Self care training was performed today.  Cues were given for safety, sequence, UE/LE placement, visual cues, and balance.    Activities performed today included donning shoes    Pt supine in bed upon arrival. Pt completed supine to sit with SBA. Pt completed scooting EOB with SBA. Pt initated donning post ope R shoe, unable to. Pt completed donning R post op shoe with DEP A. Pt completed sitting EOB 5-8 minutes with good balance in progression to ADLs.    Therapeutic Activity Training:   Therapeutic activity training was instructed today.  Cues were given for safety, sequence, UE/LE placement, visual cues, and balance.    Activities performed today included functional transfers    Pt completed sit to stand from EOB with MIN A x2 and use of WW. Pt initiated functional transfer to wheelchair with WW, unable to. Pt completed stand to sit on EOB with MIN A x2. Pt completed functional swoop transfer from EOB to wheelchair with MIN - MOD A. Pt completed functional swoop transfer x2 trials with MAX A x2 from wheelchair to EOB. Pt completed scooting back on EOB with MIN A. Pt left on EOB with all needs met and food in front, RN approved and aware.     Education: Role of OT, OT POC, safety, benefits of EOB/OOB activity, rationale for 
  Occupational Therapy Treatment Note  Name: Bandar De La Torre MRN: [id number] :   1960   Date:  2025   Admission Date: 2025 Room:  [address] [place] [address]     Communication with other providers:  RN approved therapy session, co treat with PTA for endurance    Subjective:  Patient states:  \"I want to stand\"  Pain: Denies pain  Restrictions: General precautions, fall risk, NWB LLE, wound vac LLE   No one at bedside    Objective:    Observation: Pt was sitting EOB with PTA upon arrival, agreeable to session  Objective Measures: Tele, stable     Treatment, including education:  Therapeutic Activity Training:   Therapeutic activity training was instructed today.  Cues were given for safety, sequence, UE/LE placement, visual cues, and balance.    Activities performed today included functional mobility    Pt sitting on side of bed with PTA upon arrival. Pt completed scooting EOB with SBA. Pt completed sit to stand from EOB with pushing off L side with MIN A x2 and WW. Pt completed sit to stand from EOB with pushing off R side with MIN A x2 and WW. Pt completed sit to stand from EOB with use of bed rail on R side with MOD A x2 and WW. Pt completed sit to stand from EOB x4 trials with pushing off R side with MIN A x2. Pt requires rest breaks to EOB in between due to endurance and VC for kicking out L foot. Pt left on side of bed with all needs met.     Education: Role of OT, OT POC, safety, benefits of EOB/OOB activity, rationale for treatment  Safety Measures: Gait belt used for safety of pt and therapist, Left sitting up on EOB, Alarm in place, call light and phone within reach, lines managed    Assessment / Impression:    Patient's tolerance of treatment: good  Adverse Reaction: none  Significant change in status and impact:  none  Barriers to improvement: endurance    Plan for Next Session:    Continue OT POC    Time in: 1154  Time out: 1226    Timed treatment minutes: 32   Total treatment time: 
  Pharmacy Note - Extended Infusion Beta-Lactam Adjustment    Piperacillin/Tazobactam 3375mg Q8h for treatment of  diabetic foot infection . Per [facility] Extended Infusion Beta-Lactam Policy, piperacillin/tazobactam will be changed to 4500mg loading dose followed by 4500mg Q8h extended infusion due to patient's BMI.    Estimated Creatinine Clearance: Estimated Creatinine Clearance: 98 mL/min (based on SCr of 1.1 mg/dL).    BMI: There is no height or weight on file to calculate BMI.    Please call with any questions.    Thank you,    Loyda Starkey, [facility]    
  Progress Note( Dr. Diehl)  4/27/2025  Subjective:   Admit Date: 4/24/2025  PCP: MEETA Diehl MD    Admitted For : Left foot /heel ulcer and also has left calcaneus osteomyelitis    Consulted For: Better control of blood glucose    Interval History: Patient with blood glucose improving with use of U500 insulin  Had debridement done of the left heel ulcer and wound vacuum was placed    Denies any chest pains,   Denies SOB .   Denies nausea or vomiting.   No new bowel or bladder symptoms.       Intake/Output Summary (Last 24 hours) at 4/27/2025 1049  Last data filed at 4/27/2025 0600  Gross per 24 hour   Intake 600 ml   Output 2050 ml   Net -1450 ml       DATA    CBC:   Recent Labs     04/25/25  0203 04/26/25  1337 04/27/25  0217   WBC 12.1* 13.2* 11.6*   HGB 11.4* 11.2* 10.7*    323 318    CMP:  Recent Labs     04/26/25  1337 04/26/25  2313 04/27/25  0217   * 139 139   K 5.4* 4.4 4.5   CL 99 105 106   CO2 17* 22 21   BUN 49* 51* 50*   CREATININE 1.4* 1.3 1.3   CALCIUM 8.7 8.8 9.1     Lipids:   Lab Results   Component Value Date/Time    CHOL 205 04/11/2025 08:12 AM    HDL 38 04/11/2025 08:12 AM    TRIG 83 04/11/2025 08:12 AM     Glucose:  Recent Labs     04/26/25  2107 04/27/25  0109 04/27/25  0623   POCGLU 235* 200* 188*     [id number]:  Lab Results   Component Value Date/Time    LABA1C 9.6 04/11/2025 08:12 AM     High Sensitivity TSH:   Lab Results   Component Value Date/Time    TSHHS 1.800 08/29/2019 10:06 AM     Free T3: No results found for: \"FT3\"  Free T4:No results found for: \"T4FREE\"    MRI ANKLE LEFT W WO CONTRAST   Final Result           Scheduled Medicines   Medications:    insulin regular human  15 Units SubCUTAneous Daily with breakfast    insulin regular human  15 Units SubCUTAneous Lunch    insulin regular human  15 Units SubCUTAneous Dinner    insulin regular human  0-30 Units SubCUTAneous 4x Daily AC & HS    vancomycin  1,000 mg IntraVENous Q12H    sodium chloride flush  5-40 mL 
  Progress Note( Dr. Diehl)  4/29/2025  Subjective:   Admit Date: 4/24/2025  PCP: MEETA Diehl MD    Admitted For : Left foot /heel ulcer and also has left calcaneus osteomyelitis    Consulted For: Better control of blood glucose    Interval History: Patient with blood glucose improving with use of U500 insulin  Had debridement done of the left heel ulcer and wound vacuum was placed    Denies any chest pains,   Denies SOB .   Denies nausea or vomiting.   No new bowel or bladder symptoms.       Intake/Output Summary (Last 24 hours) at 4/29/2025 2112  Last data filed at 4/29/2025 2039  Gross per 24 hour   Intake 1080 ml   Output 2980 ml   Net -1900 ml       DATA    CBC:   Recent Labs     04/27/25 0217 04/28/25 0227   WBC 11.6* 7.6   HGB 10.7* 11.0*    310    CMP:  Recent Labs     04/26/25  2313 04/27/25  0217 04/28/25 0227    139 140   K 4.4 4.5 4.1    106 106   CO2 22 21 23   BUN 51* 50* 41*   CREATININE 1.3 1.3 1.1   CALCIUM 8.8 9.1 9.2     Lipids:   Lab Results   Component Value Date/Time    CHOL 205 04/11/2025 08:12 AM    HDL 38 04/11/2025 08:12 AM    TRIG 83 04/11/2025 08:12 AM     Glucose:  Recent Labs     04/29/25  1128 04/29/25  1634 04/29/25 2050   POCGLU 207* 151* 189*     LjfvmoyjrbE2S:  Lab Results   Component Value Date/Time    LABA1C 9.6 04/11/2025 08:12 AM     High Sensitivity TSH:   Lab Results   Component Value Date/Time    TSHHS 1.800 08/29/2019 10:06 AM     Free T3: No results found for: \"FT3\"  Free T4:No results found for: \"T4FREE\"    MRI ANKLE LEFT W WO CONTRAST   Final Result           Scheduled Medicines   Medications:    cefTRIAXone (ROCEPHIN) IV  2,000 mg IntraVENous Q24H    polyethylene glycol  17 g Oral Daily    docusate sodium  100 mg Oral BID    clopidogrel  75 mg Oral Daily    insulin regular human  15 Units SubCUTAneous Daily with breakfast    insulin regular human  15 Units SubCUTAneous Lunch    insulin regular human  15 Units SubCUTAneous Dinner    insulin 
4 Eyes Skin Assessment     NAME:  Bandar De La Torre  YOB: 1960  MEDICAL RECORD NUMBER:  [id number]    The patient is being assessed for  Post-Op Surgical    I agree that at least one RN has performed a thorough Head to Toe Skin Assessment on the patient. ALL assessment sites listed below have been assessed.      Areas assessed by both nurses:    Head, Face, Ears, Shoulders, Back, Chest, Arms, Elbows, Hands, Sacrum. Buttock, Coccyx, Ischium, and Legs. Feet and Heels        Does the Patient have a Wound? Yes wound(s) were present on assessment. LDA wound assessment was Initiated and completed by RN       David Prevention initiated by RN: Yes  Wound Care Orders initiated by RN: Yes    Pressure Injury (Stage 3,4, Unstageable, DTI, NWPT, and Complex wounds) if present, place Wound referral order by RN under : Yes left heel wound covered with wound vac.    New Ostomies, if present place, Ostomy referral order under : No     Nurse 1 eSignature: Electronically signed by Gilma Lezama RN on 4/25/25 at 5:43 PM EDT    **SHARE this note so that the co-signing nurse can place an eSignature**    Nurse 2 eSignature: Electronically signed by Maegan Berry LPN on 4/25/25 at 6:30 PM EDT  2  
4 Eyes Skin Assessment     NAME:  Bandar De La Torre  YOB: 1960  MEDICAL RECORD NUMBER:  [id number]    The patient is being assessed for  Admission    I agree that at least one RN has performed a thorough Head to Toe Skin Assessment on the patient. ALL assessment sites listed below have been assessed.      Areas assessed by both nurses:    Head, Face, Ears, Shoulders, Back, Chest, Arms, Elbows, Hands, Sacrum. Buttock, Coccyx, Ischium, Legs. Feet and Heels, and Under Medical Devices         Does the Patient have a Wound? Yes wound(s) were present on assessment. LDA wound assessment was Initiated and completed by RN       David Prevention initiated by RN: Yes  Wound Care Orders initiated by RN: Yes    Pressure Injury (Stage 3,4, Unstageable, DTI, NWPT, and Complex wounds) if present, place Wound referral order by RN under : Yes    New Ostomies, if present place, Ostomy referral order under : No     Nurse 1 eSignature: Electronically signed by Janee Stahl RN on 4/24/25 at 6:45 PM EDT    **SHARE this note so that the co-signing nurse can place an eSignature**    Nurse 2 eSignature: Electronically signed by Carolina Ambriz RN on 4/24/25 at 1930 PM EDT   
Comprehensive Nutrition Assessment    Type and Reason for Visit:  Initial, Wound    Nutrition Recommendations/Plan:   Offer wound healing oral nutrition supplements BID with low calorie, high protein oral nutrition supplement daily   Continue current diet order, refer to endocrinology for insulin coverage   Encourage adequate hydration, refer to nephrology for fluid restrictions if needed   Please document all PO intakes in I/O   Monitor PO intakes, GI status, weights, fluids, labs, skin, glucose, and plan of care      Malnutrition Assessment:  Malnutrition Status:  At risk for malnutrition (04/25/25 1529)    Context:  Chronic Illness     Findings of the 6 clinical characteristics of malnutrition:  Energy Intake:  Unable to assess  Weight Loss:  Greater than 10% over 6 months (possibly intentional per office visit of desired wt loss)     Body Fat Loss:  Unable to assess     Muscle Mass Loss:  Unable to assess    Fluid Accumulation:  Severe Extremities (+3 pitting BLE)   Strength:  Not Performed    Nutrition Assessment:    Admitted with diabetic foot. Hx CKD, DMII, CVA, HTN, HLD, MARINO on CPAP, Hypokalemia. Currently on carb controlled 5 diet. OOR for wound debridement and I&D with wound vac placement. Pt requires increased nutrients needs for wound healing. Observed pt has lost ~15% in past 6 mos, intentional? for renal fx and diabetes mgt.Continues to have significant fluid build up in BLE. Monitor as high risk at this time.    Nutrition Related Findings:    +vanco, jardiance, aldactone +intolerant to ozempic taken off in past +uses cane to ambulate +POCT 230, GFR 58 Wound Type: Multiple, Pressure Injury, Unstageable, Venous Stasis, Wound Vac       Current Nutrition Intake & Therapies:    Average Meal Intake: Unable to assess (OOR)  Average Supplements Intake: None Ordered  ADULT DIET; Regular; 5 carb choices (75 gm/meal)    Anthropometric Measures:  Height: 182.9 cm (6')  Ideal Body Weight (IBW): 178 lbs (81 kg) 
Comprehensive Nutrition Assessment    Type and Reason for Visit:  Reassess    Nutrition Recommendations/Plan:   Continue carb controlled diet  Increase low des/high protein oral nutrition supplement to BID; prefers vanilla  Resume wound healing oral nutrition supplement BID once back in stock   Monitor weights, po intakes, labs, skin, POC     Malnutrition Assessment:  Malnutrition Status:  At risk for malnutrition (04/29/25 1334)    Context:  Chronic Illness       Nutrition Assessment:    Pt sitting up eating lunch, states he has been eating well, ate most of his lunch and was still working on it. Documented intakes show % of all meals consumed as well. He enjoys Ubaldo, states he has not been getting it, temporarily OOS. Pt agreeable to increase low des/high protein oral supplement to BID, prefers vanilla. Updated wt reflects much less of a wt loss; has been highly varied over the past year, 297-339#. Follow at moderate nutrition risk.    Nutrition Related Findings:    +jardiancem bowel meds, rocephin, aldactone, regular insulin. glucose 144-353 Wound Type: Multiple, Pressure Injury, Unstageable, Venous Stasis, Wound Vac       Current Nutrition Intake & Therapies:    Average Meal Intake: %  Average Supplements Intake: %  ADULT DIET; Regular; 5 carb choices (75 gm/meal)  ADULT ORAL NUTRITION SUPPLEMENT; Breakfast; Low Calorie/High Protein Oral Supplement  ADULT ORAL NUTRITION SUPPLEMENT; Lunch, Dinner; Wound Healing Oral Supplement    Anthropometric Measures:  Height: 182.9 cm (6')  Ideal Body Weight (IBW): 178 lbs (81 kg)    Admission Body Weight: 131.1 kg (289 lb 0.4 oz)  Current Body Weight: 114.2 kg (251 lb 12.3 oz), 162.4 % IBW. Weight Source: Bed scale  Current BMI (kg/m2): 34.1  Usual Body Weight: 153.4 kg (338 lb 3.2 oz) (311 lb 9.6 oz 2/13/25, 338 lb 3.2 oz 11/7/24)     % Weight Change (Calculated): -14.5  Weight Adjustment For: No Adjustment                 BMI Categories: Obese Class 2 
GENERAL SURGERY INPATIENT PROGRESS NOTE  [facility]    PATIENT: Bandar De La Torre, 64 y.o., male, MRN: [id number]    Hospital Day:  LOS: 2 days , Post-Op Day: 1 Day Post-Op I&D of left foot with wound vac placement                Subjective:    Pain: 1/10  Diet: ADULT DIET; Regular; 5 carb choices (75 gm/meal)  ADULT ORAL NUTRITION SUPPLEMENT; Breakfast; Low Calorie/High Protein Oral Supplement  ADULT ORAL NUTRITION SUPPLEMENT; Lunch, Dinner; Wound Healing Oral Supplement  Activity: as tolerated    Patient doing well with minimal pain.    Objective:    Vitals: /66   Pulse 60   Temp 97.8 °F (36.6 °C) (Oral)   Resp 17   Ht 1.829 m (6')   Wt 131.1 kg (289 lb 1.6 oz)   SpO2 98%   BMI 39.21 kg/m²     I/O: 04/24 1901 - 04/26 0700  In: 100   Out: 1905 [Urine:1900]    Physical Exam:  General Appearance:   Alert, cooperative, no distress    Head:   Normocephalic, atraumatic    Lungs:    Equal chest rise, respirations unlabored   Heart:   Regular rate and rhythm    Abdomen:    Soft, non-tender, no rebound or guarding    Extremities:  Left foot wound - vac in place   Neurologic:  Nonfocal, grossly intact        Labs/Imaging Results:   Recent Results (from the past 24 hours)   POCT Glucose    Collection Time: 04/25/25 11:07 AM   Result Value Ref Range    POC Glucose 256 (H) 74 - 99 mg/dL   POCT Glucose    Collection Time: 04/25/25 12:13 PM   Result Value Ref Range    POC Glucose 230 (H) 74 - 99 mg/dL   POCT Glucose    Collection Time: 04/25/25  4:26 PM   Result Value Ref Range    POC Glucose 262 (H) 74 - 99 mg/dL   POCT Glucose    Collection Time: 04/25/25  7:52 PM   Result Value Ref Range    POC Glucose 325 (H) 74 - 99 mg/dL   POCT Glucose    Collection Time: 04/26/25  6:28 AM   Result Value Ref Range    POC Glucose 324 (H) 74 - 99 mg/dL         Assessment:  Bandar De La Torre is a 64 y.o. male who is post-op day #1 Day Post-Op I&D of left foot with wound vac placement .      Stable postop  Diet: 
GENERAL SURGERY PROGRESS NOTE    Bandar De La Torre is a 64 y.o. male with left heel osteomyelitis s/p debridement and VAC placement.                Subjective:  Doing ok today. VAC changed by wound care today, wound with some improvement in appearance.    Objective:    Vitals: VITALS:  /69   Pulse 59   Temp 97.6 °F (36.4 °C) (Oral)   Resp 16   Ht 1.829 m (6')   Wt (!) 145 kg (319 lb 10.7 oz)   SpO2 97%   BMI 43.35 kg/m²     I/O: 04/29 0701 - 04/30 0700  In: 1080 [P.O.:1080]  Out: 3780 [Urine:3765; Drains:15]    Labs/Imaging Results:   Lab Results   Component Value Date/Time     04/28/2025 02:27 AM    K 4.1 04/28/2025 02:27 AM     04/28/2025 02:27 AM    CO2 23 04/28/2025 02:27 AM    BUN 41 04/28/2025 02:27 AM    CREATININE 1.1 04/28/2025 02:27 AM    GLUCOSE 144 04/28/2025 02:27 AM    CALCIUM 9.2 04/28/2025 02:27 AM      Lab Results   Component Value Date    WBC 7.6 04/28/2025    HGB 11.0 (L) 04/28/2025    HCT 36.0 (L) 04/28/2025    MCV 89.1 04/28/2025     04/28/2025       IV Fluids:   sodium chloride    dextrose    Scheduled Meds:   cefTRIAXone (ROCEPHIN) IV, 2,000 mg, IntraVENous, Q24H    polyethylene glycol, 17 g, Oral, Daily    docusate sodium, 100 mg, Oral, BID    clopidogrel, 75 mg, Oral, Daily    insulin regular human, 15 Units, SubCUTAneous, Daily with breakfast    insulin regular human, 15 Units, SubCUTAneous, Lunch    insulin regular human, 15 Units, SubCUTAneous, Dinner    insulin regular human, 0-30 Units, SubCUTAneous, 4x Daily AC & HS    sodium chloride flush, 5-40 mL, IntraVENous, 2 times per day    empagliflozin, 10 mg, Oral, Daily    ezetimibe, 10 mg, Oral, Daily    finasteride, 5 mg, Oral, Daily    folic acid-pyridoxine-cyancobalamin 1.13-25-2 tablet, 1 tablet, Oral, Daily    metoprolol succinate, 25 mg, Oral, Daily    potassium chloride, 40 mEq, Oral, BID    ranolazine, 500 mg, Oral, BID    rivaroxaban, 20 mg, Oral, Dinner    atorvastatin, 20 mg, Oral, Daily    
GENERAL SURGERY PROGRESS NOTE    Bandar De La Torre is a 64 y.o. male with left heel osteomyelitis s/p debridement and VAC placement.                Subjective:  Doing ok today. VAC with good seal. Has been seen by PT/OT.     Objective:    Vitals: VITALS:  /63   Pulse 60   Temp 97.3 °F (36.3 °C) (Oral)   Resp 16   Ht 1.829 m (6')   Wt (!) 144.2 kg (317 lb 14.4 oz)   SpO2 98%   BMI 43.11 kg/m²     I/O: 04/28 0701 - 04/29 0700  In: 960 [P.O.:960]  Out: 2325 [Urine:2225; Drains:100]    Labs/Imaging Results:   Lab Results   Component Value Date/Time     04/28/2025 02:27 AM    K 4.1 04/28/2025 02:27 AM     04/28/2025 02:27 AM    CO2 23 04/28/2025 02:27 AM    BUN 41 04/28/2025 02:27 AM    CREATININE 1.1 04/28/2025 02:27 AM    GLUCOSE 144 04/28/2025 02:27 AM    CALCIUM 9.2 04/28/2025 02:27 AM      Lab Results   Component Value Date    WBC 7.6 04/28/2025    HGB 11.0 (L) 04/28/2025    HCT 36.0 (L) 04/28/2025    MCV 89.1 04/28/2025     04/28/2025       IV Fluids:   sodium chloride    dextrose    Scheduled Meds:   polyethylene glycol, 17 g, Oral, Daily    docusate sodium, 100 mg, Oral, BID    clopidogrel, 75 mg, Oral, Daily    insulin regular human, 15 Units, SubCUTAneous, Daily with breakfast    insulin regular human, 15 Units, SubCUTAneous, Lunch    insulin regular human, 15 Units, SubCUTAneous, Dinner    insulin regular human, 0-30 Units, SubCUTAneous, 4x Daily AC & HS    sodium chloride flush, 5-40 mL, IntraVENous, 2 times per day    [COMPLETED] piperacillin-tazobactam, 4,500 mg, IntraVENous, Once **FOLLOWED BY** piperacillin-tazobactam, 4,500 mg, IntraVENous, Q8H    empagliflozin, 10 mg, Oral, Daily    ezetimibe, 10 mg, Oral, Daily    finasteride, 5 mg, Oral, Daily    folic acid-pyridoxine-cyancobalamin 1.13-25-2 tablet, 1 tablet, Oral, Daily    metoprolol succinate, 25 mg, Oral, Daily    potassium chloride, 40 mEq, Oral, BID    ranolazine, 500 mg, Oral, BID    rivaroxaban, 20 mg, Oral, 
GENERAL SURGERY PROGRESS NOTE    Bandar De La Torre is a 64 y.o. male with left heel osteomyelitis s/p debridement and VAC placement.                Subjective:  Reports some pain in his heel today. Denies other complaints.    Objective:    Vitals: VITALS:  /70   Pulse 60   Temp 97.7 °F (36.5 °C) (Oral)   Resp 20   Ht 1.829 m (6')   Wt (!) 142.9 kg (315 lb 1.6 oz)   SpO2 100%   BMI 42.74 kg/m²     I/O: 04/27 0701 - 04/28 0700  In: 250 [P.O.:240; I.V.:10]  Out: 1850 [Urine:1850]    Labs/Imaging Results:   Lab Results   Component Value Date/Time     04/28/2025 02:27 AM    K 4.1 04/28/2025 02:27 AM     04/28/2025 02:27 AM    CO2 23 04/28/2025 02:27 AM    BUN 41 04/28/2025 02:27 AM    CREATININE 1.1 04/28/2025 02:27 AM    GLUCOSE 144 04/28/2025 02:27 AM    CALCIUM 9.2 04/28/2025 02:27 AM      Lab Results   Component Value Date    WBC 7.6 04/28/2025    HGB 11.0 (L) 04/28/2025    HCT 36.0 (L) 04/28/2025    MCV 89.1 04/28/2025     04/28/2025       IV Fluids:   sodium chloride    dextrose    Scheduled Meds:   polyethylene glycol, 17 g, Oral, Daily    docusate sodium, 100 mg, Oral, BID    clopidogrel, 75 mg, Oral, Daily    insulin regular human, 15 Units, SubCUTAneous, Daily with breakfast    insulin regular human, 15 Units, SubCUTAneous, Lunch    insulin regular human, 15 Units, SubCUTAneous, Dinner    insulin regular human, 0-30 Units, SubCUTAneous, 4x Daily AC & HS    vancomycin, 1,000 mg, IntraVENous, Q12H    sodium chloride flush, 5-40 mL, IntraVENous, 2 times per day    [COMPLETED] piperacillin-tazobactam, 4,500 mg, IntraVENous, Once **FOLLOWED BY** piperacillin-tazobactam, 4,500 mg, IntraVENous, Q8H    empagliflozin, 10 mg, Oral, Daily    ezetimibe, 10 mg, Oral, Daily    finasteride, 5 mg, Oral, Daily    folic acid-pyridoxine-cyancobalamin 1.13-25-2 tablet, 1 tablet, Oral, Daily    metoprolol succinate, 25 mg, Oral, Daily    potassium chloride, 40 mEq, Oral, BID    ranolazine, 500 mg, 
GENERAL SURGERY PROGRESS NOTE    Bandar De La Torre is a 64 y.o. male with left heel osteomyelitis.                 Subjective:  MRI findings and surgical options discussed with Emmanuel today. Plan for OR today.    Objective:    Vitals: VITALS:  /66   Pulse 60   Temp 98.1 °F (36.7 °C) (Oral)   Resp 16   Wt 131.1 kg (289 lb 1.6 oz)   SpO2 95%   BMI 39.21 kg/m²     I/O: No intake/output data recorded.    Labs/Imaging Results:   Lab Results   Component Value Date/Time     04/25/2025 02:03 AM    K 4.3 04/25/2025 02:03 AM     04/25/2025 02:03 AM    CO2 25 04/25/2025 02:03 AM    BUN 40 04/25/2025 02:03 AM    CREATININE 1.3 04/25/2025 02:03 AM    GLUCOSE 243 04/25/2025 02:03 AM    CALCIUM 9.7 04/25/2025 02:03 AM      Lab Results   Component Value Date    WBC 12.1 (H) 04/25/2025    HGB 11.4 (L) 04/25/2025    HCT 36.8 (L) 04/25/2025    MCV 88.9 04/25/2025     04/25/2025       IV Fluids:   sodium chloride    dextrose    Scheduled Meds:   vancomycin, 1,000 mg, IntraVENous, Q12H    ceFAZolin, 3,000 mg, IntraVENous, On Call to OR    sodium chloride flush, 5-40 mL, IntraVENous, 2 times per day    [COMPLETED] piperacillin-tazobactam, 4,500 mg, IntraVENous, Once **FOLLOWED BY** piperacillin-tazobactam, 4,500 mg, IntraVENous, Q8H    [Held by provider] clopidogrel, 75 mg, Oral, Daily    empagliflozin, 10 mg, Oral, Daily    ezetimibe, 10 mg, Oral, Daily    finasteride, 5 mg, Oral, Daily    folic acid-pyridoxine-cyancobalamin 1.13-25-2 tablet, 1 tablet, Oral, Daily    metoprolol succinate, 25 mg, Oral, Daily    potassium chloride, 40 mEq, Oral, BID    ranolazine, 500 mg, Oral, BID    [Held by provider] rivaroxaban, 20 mg, Oral, Dinner    atorvastatin, 20 mg, Oral, Daily    spironolactone, 25 mg, Oral, BID    tamsulosin, 0.4 mg, Oral, Daily    torsemide, 10 mg, Oral, Dinner    insulin lispro, 0-4 Units, SubCUTAneous, Q4H    insulin lispro, 0-8 Units, SubCUTAneous, 4x Daily AC & HS    Physical Exam:  General: 
Infectious Disease Progress Note  2025   Patient Name: Bandar De La Torre : 1960     Assessment  Left heel stage 4 pressure ulcer   Left calcaneus osteomyelitis  History of stage IV heel pressure ulcer being managed by the wound care clinic but sent in for worsening wound.  2025; S/p left heel I and D, and wound VAC placement  Tissue culture: Proteus mirabilis, Streptococcus constellatus  Bone biopsy: In process  Afebrile, leukocytosis has resolved.  Clinically improving.  Class II obesity  T2DM  Comorbid conditions: hx of CVA     Plan  Therapeutic:  Discontinue vancomycin  Continue Zosyn, pending susceptibility results for P mirabilis and S constellatus  Anticipate 6-week course of IV antibiotics  Diagnostic:  Trend CRP  F/u:  Tissue cx  Bone histopathology  Other:     Reason for visit: F/u left heel stage for pressure ulcer and left calcaneus osteomyelitis   history:?Interval history noted  Denies n/v/d/f or untoward effects of antimicrobials  Physical Exam:  Vital Signs: /70   Pulse 60   Temp 97.7 °F (36.5 °C) (Oral)   Resp 20   Ht 1.829 m (6')   Wt (!) 142.9 kg (315 lb 1.6 oz)   SpO2 100%   BMI 42.74 kg/m²     Gen: alert and oriented X3, no distress  Skin: no stigmata of endocarditis  Wounds: Left heel wound VAC  HEMT: AT/NC Oropharynx pink, moist, and without lesions or exudates  Eyes: PERRLA, EOMI, conjunctiva pink, sclera anicteric.   Neck: Supple. Trachea midline. No LAD.  Chest: no distress and CTA. Good air movement.  Heart: RRR and no MRG.   Abd: soft, non-distended, no tenderness, no hepatomegaly. Normoactive bowel sounds.  Ext: no clubbing, cyanosis, or edema  Catheter Site: without erythema or tenderness  LDA:   Neuro: Mental status intact. CN 2-12 intact and no focal sensory or motor deficits     Radiologic / Imaging / TESTING  MRI ANKLE LEFT W WO CONTRAST  Result Date: 2025  EXAM:  MRI ANKLE LEFT W WO CONTRAST DATE OF EXAM:  2025 8:24 DEMOGRAPHICS: 64 years old 
Infectious Disease Progress Note  2025   Patient Name: Bandar De La Torre : 1960     Assessment  Left heel stage 4 pressure ulcer   Left calcaneus osteomyelitis  History of stage IV heel pressure ulcer being managed by the wound care clinic but sent in for worsening wound.  2025; S/p left heel I and D, and wound VAC placement  Tissue culture: Proteus mirabilis, Streptococcus constellatus  Bone biopsy: In process  Vancomycin and ceftriaxone was discontinued   Afebrile, leukocytosis has resolved.  Clinically improving.  Class IIi obesity  Bilateral venous stasis dermatitis  T2DM  Comorbid conditions: hx of CVA     Plan  Therapeutic:  6-week course of IV ceftriaxone 2g daily (EoT: 2025)  After discharge the following should be done:  Weekly labs drawn on Monday during the course of treatment  CBC with differential, CMP, ESR, CR  Cathflo for blocked vascular access as needed  Fax results to Attn: [facility]  # [phone]  See in clinic within one week after discharge  Disposition: SNF likely  Diagnostic:  Trend CRP  F/u:  Tissue cx  Bone histopathology presumed to be done, but does not appear so.   Other: PICC line was ordered on 2025    Reason for visit: F/u left heel stage for pressure ulcer and left calcaneus osteomyelitis   history:?Interval history noted  Denies n/v/d/f or untoward effects of antimicrobials  Physical Exam:  Vital Signs: /69   Pulse 59   Temp 97.6 °F (36.4 °C) (Oral)   Resp 16   Ht 1.829 m (6')   Wt (!) 145 kg (319 lb 10.7 oz)   SpO2 97%   BMI 43.35 kg/m²     Gen: alert and oriented X3, no distress  Skin: no stigmata of endocarditis  Wounds: Left heel wound VAC  HEMT: AT/NC Oropharynx pink, moist, and without lesions or exudates  Eyes: PERRLA, EOMI, conjunctiva pink, sclera anicteric.   Neck: Supple. Trachea midline. No LAD.  Chest: no distress and CTA. Good air movement.  Heart: RRR and no MRG.   Abd: reducible umbilical hernia, 
PHARMACY VANCOMYCIN MONITORING SERVICE  Pharmacy consulted by Dr. Grayson for monitoring and adjustment.    Indication for treatment: Vancomycin indication: Bone/Joint infection   Goal trough: Trough Goal: 15-20 mcg/mL  AUC/ROSE: 400-600    Risk Factors for MRSA Identified:   Purulent and/or complicated SSTI    Pertinent Laboratory Values:   Temp Readings from Last 3 Encounters:   04/25/25 97.9 °F (36.6 °C) (Oral)   04/24/25 99.4 °F (37.4 °C) (Temporal)   04/17/25 98.3 °F (36.8 °C) (Temporal)     Recent Labs     04/25/25  0203   WBC 12.1*     Recent Labs     04/25/25  0203   BUN 40*   CREATININE 1.3     Estimated Creatinine Clearance: 80 mL/min (based on SCr of 1.3 mg/dL).  No intake or output data in the 24 hours ending 04/25/25 0543  Last Encounter Weight:  Wt Readings from Last 3 Encounters:   04/25/25 131.1 kg (289 lb 1.6 oz)   04/24/25 (!) 139.3 kg (307 lb 3.2 oz)   03/06/25 (!) 150.4 kg (331 lb 9.2 oz)       Pertinent Cultures:   Date    Source    Results  4/24   Blood    To be collected    Vancomycin level:   TROUGH:  No results for input(s): \"VANCOTROUGH\" in the last 72 hours.  RANDOM:  No results for input(s): \"VANCORANDOM\" in the last 72 hours.    Assessment:  HPI: 64-year-old male with a history of atrial fibrillation, CVA, diabetes mellitus type 2 and hypertension presented to the ED for a wound check. Patient has a non-healing foot ulcer that has been ongoing for the past few weeks. He states he was on PO antibiotics that improved his symptoms for a while, but has now gotten worse.  SCr, BUN, and urine output: Scr = 1.3, BUN = 40, No UOP data  Day(s) of therapy: 1  Vancomycin concentration: TBD    Plan:  Loading dose of vancomycin administered: 2500 mg. Followed by a scheduled dose of vancomycin 1000 mg every 12 hours.   Predicted trough of 14.6 and AUC: 455 at steady-state  Pharmacy will continue to monitor patient and adjust therapy as indicated    VANCOMYCIN CONCENTRATION SCHEDULED FOR 4/27 
PHARMACY VANCOMYCIN MONITORING SERVICE  Pharmacy consulted by Dr. Grayson for monitoring and adjustment.    Indication for treatment: Vancomycin indication: Bone/Joint infection   Goal trough: Trough Goal: 15-20 mcg/mL  AUC/ROSE: 400-600    Risk Factors for MRSA Identified:   Purulent and/or complicated SSTI    Pertinent Laboratory Values:   Temp Readings from Last 3 Encounters:   04/26/25 97.4 °F (36.3 °C)   04/24/25 99.4 °F (37.4 °C) (Temporal)   04/17/25 98.3 °F (36.8 °C) (Temporal)     Recent Labs     04/25/25  0203 04/26/25  1337   WBC 12.1* 13.2*     Recent Labs     04/25/25  0203   BUN 40*   CREATININE 1.3     Estimated Creatinine Clearance: 80 mL/min (based on SCr of 1.3 mg/dL).    Intake/Output Summary (Last 24 hours) at 4/26/2025 1417  Last data filed at 4/26/2025 1319  Gross per 24 hour   Intake --   Output 3200 ml   Net -3200 ml     Last Encounter Weight:  Wt Readings from Last 3 Encounters:   04/25/25 131.1 kg (289 lb 1.6 oz)   04/24/25 (!) 139.3 kg (307 lb 3.2 oz)   03/06/25 (!) 150.4 kg (331 lb 9.2 oz)       Pertinent Cultures:   Date    Source    Results  4/25   Blood    NGTD  4/25                             Culture, Tissue                       pending    Vancomycin level:   TROUGH:  No results for input(s): \"VANCOTROUGH\" in the last 72 hours.  RANDOM:  No results for input(s): \"VANCORANDOM\" in the last 72 hours.    Assessment:  HPI: 64-year-old male with a history of atrial fibrillation, CVA, diabetes mellitus type 2 and hypertension presented to the ED for a wound check. Patient has a non-healing foot ulcer that has been ongoing for the past few weeks. He states he was on PO antibiotics that improved his symptoms for a while, but has now gotten worse.  SCr, BUN, and urine output: Scr = 1.3, BUN = 40, UOP = good  S/p left heel I and D, and wound VAC placement   Day(s) of therapy: 2  Vancomycin concentration: TBD    Plan:  Loading dose of vancomycin administered: 2500 mg. Followed by a scheduled 
PHARMACY VANCOMYCIN MONITORING SERVICE  Pharmacy consulted by Dr. Grayson for monitoring and adjustment.    Indication for treatment: Vancomycin indication: Bone/Joint infection   Goal trough: Trough Goal: 15-20 mcg/mL  AUC/ROSE: 400-600    Risk Factors for MRSA Identified:   Purulent and/or complicated SSTI    Pertinent Laboratory Values:   Temp Readings from Last 3 Encounters:   04/27/25 97.3 °F (36.3 °C)   04/24/25 99.4 °F (37.4 °C) (Temporal)   04/17/25 98.3 °F (36.8 °C) (Temporal)     Recent Labs     04/25/25  0203 04/26/25  1337 04/27/25  0217   WBC 12.1* 13.2* 11.6*     Recent Labs     04/26/25  1337 04/26/25  2313 04/27/25  0217   BUN 49* 51* 50*   CREATININE 1.4* 1.3 1.3     Estimated Creatinine Clearance: 85 mL/min (based on SCr of 1.3 mg/dL).    Intake/Output Summary (Last 24 hours) at 4/27/2025 1338  Last data filed at 4/27/2025 1237  Gross per 24 hour   Intake 840 ml   Output 1800 ml   Net -960 ml     Last Encounter Weight:  Wt Readings from Last 3 Encounters:   04/27/25 (!) 146 kg (321 lb 12.8 oz)   04/24/25 (!) 139.3 kg (307 lb 3.2 oz)   03/06/25 (!) 150.4 kg (331 lb 9.2 oz)       Pertinent Cultures:   Date    Source    Results  4/25   Blood    NGTD  4/25                             Culture, Tissue                       pending    Vancomycin level:   TROUGH:  No results for input(s): \"VANCOTROUGH\" in the last 72 hours.  RANDOM:    Recent Labs     04/27/25  0217   VANCORANDOM 21.2*       Assessment:  HPI: 64-year-old male with a history of atrial fibrillation, CVA, diabetes mellitus type 2 and hypertension presented to the ED for a wound check. Patient has a non-healing foot ulcer that has been ongoing for the past few weeks. He states he was on PO antibiotics that improved his symptoms for a while, but has now gotten worse.  SCr, BUN, and urine output: Scr = 1.3, BUN = 50, UOP = good  S/p left heel I and D, and wound VAC placement   Day(s) of therapy: 3  Vancomycin concentration:   4/27 - 21.2, ~ 9 
Physical Therapy    Physical Therapy Treatment Note  Name: Bandar De La Torre MRN: [id number] :   1960   Date:  2025   Admission Date: 2025 Room:  [address] [place] [address]   Restrictions/Precautions:           NWB LLE, wound vac LLE, general precautions, falls, right post op shoe   Communication with other providers:  cotx MOULTON  Subjective:  Patient states:  agreeable  Pain:   Location, Type, Intensity (0/10 to 10/10):   does not rate    Objective:    Observation:  in bed  Treatment, including education/measures:  Bed mob SBA sup<>sit, inc time in increments  Sitting balance EOB x~20' F+/G- static/dyn balance grade  Stand balance using FWW x2 sets x~1'-2' F- grade, nwb compliant.  SPT attempted modA x2 but pt unable to pivot safely and assisted back to bed.   Squat pivot transfer x multiple sets min-modA bed>w/c, w/c>bed mod-maxAx2 d/t higher surface destination  Discussed and edu, visual demo of transfer techs, attempts from both sides and pt improve when going toward L side in order to extend RLE for assisting to surface. Multiple trials before achieving  Vc for safe techs, LLE management and compensatory strats  Edu for w/c placement  Edu for slideboard for future session  Pt left safely in bed, alarm on and call light given. Notified nurse      Assessment / Impression:    Patient's tolerance of treatment:  good   Adverse Reaction: na  Significant change in status and impact:  na  Barriers to improvement:  weakness and endurance  Plan for Next Session:    Slide board transfer, cont stand balance                Time in:  1138  Time out:  1217  Timed treatment minutes:  39  Total treatment time:  39    Previously filed items:  Social/Functional History  Lives With: Spouse  Type of Home: House  Home Layout: Two level, Able to Live on Main level with bedroom/bathroom  Home Access: Stairs to enter with rails  Entrance Stairs - Number of Steps: 7  Bathroom Shower/Tub: Walk-in shower  Bathroom Equipment: Shower 
Pt back to room from MRI. Noted wound nurse consult. Offered to assess. Pt wishes to wait to see if going to OR today. Communicated with Dr. Avalos-stated no need for wound care to see today. BKA vs debridement with NPWT. Can meet on Monday if has NPWT.   
Pt returned back from OR  
Spiritual Health History and Assessment/Progress Note  [facility]    Spiritual/Emotional Needs,  ,  ,      Name: Bandar De La Torre MRN: [id number]    Age: 64 y.o.     Sex: male   Language: English   Orthodox: [employer]   Diabetic foot (HCC)     Date: 4/28/2025            Total Time Calculated: 3 min              Spiritual Assessment began in [facility]        Referral/Consult From: Rounding   Encounter Overview/Reason: Spiritual/Emotional Needs  Service Provided For: Patient    Val, Belief, Meaning:   Patient unable to assess at this time  Family/Friends No family/friends present      Importance and Influence:  Patient unable to assess at this time  Family/Friends No family/friends present    Community:  Patient Other: not expressed  Family/Friends No family/friends present    Assessment and Plan of Care:     Patient Interventions include: Facilitated expression of thoughts and feelings  Family/Friends Interventions include: No family/friends present    Patient Plan of Care: Spiritual Care available upon further referral  Family/Friends Plan of Care: No family/friends present    Electronically signed by IDANIA Patel on 4/28/2025 at 2:56 PM     
0 0 - 1 %    Immature Granulocytes % 0 0 %    Neutrophils Absolute 11.59 k/uL    Lymphocytes Absolute 0.60 k/uL    Monocytes Absolute 0.94 k/uL    Eosinophils Absolute 0.00 k/uL    Basophils Absolute 0.04 k/uL    Immature Granulocytes Absolute 0.05 k/uL   Basic Metabolic Panel w/ Reflex to MG    Collection Time: 04/26/25  1:37 PM   Result Value Ref Range    Sodium 134 (L) 136 - 145 mmol/L    Potassium 5.4 (H) 3.5 - 5.1 mmol/L    Chloride 99 99 - 110 mmol/L    CO2 17 (L) 21 - 32 mmol/L    Anion Gap 18 (H) 9 - 17 mmol/L    Glucose 481 (HH) 74 - 99 mg/dL    BUN 49 (H) 7 - 20 mg/dL    Creatinine 1.4 (H) 0.8 - 1.3 mg/dL    Est, Glom Filt Rate 53 (L) >60 mL/min/1.73m2    Calcium 8.7 8.3 - 10.6 mg/dL   POCT Glucose    Collection Time: 04/26/25  4:45 PM   Result Value Ref Range    POC Glucose 433 (H) 74 - 99 mg/dL   POCT Glucose    Collection Time: 04/26/25  8:11 PM   Result Value Ref Range    POC Glucose 240 (H) 74 - 99 mg/dL   POCT Glucose    Collection Time: 04/26/25  9:07 PM   Result Value Ref Range    POC Glucose 235 (H) 74 - 99 mg/dL   Basic Metabolic Panel    Collection Time: 04/26/25 11:13 PM   Result Value Ref Range    Sodium 139 136 - 145 mmol/L    Potassium 4.4 3.5 - 5.1 mmol/L    Chloride 105 99 - 110 mmol/L    CO2 22 21 - 32 mmol/L    Anion Gap 12 9 - 17 mmol/L    Glucose 218 (H) 74 - 99 mg/dL    BUN 51 (H) 7 - 20 mg/dL    Creatinine 1.3 0.8 - 1.3 mg/dL    Est, Glom Filt Rate 56 (L) >60 mL/min/1.73m2    Calcium 8.8 8.3 - 10.6 mg/dL   POCT Glucose    Collection Time: 04/27/25  1:09 AM   Result Value Ref Range    POC Glucose 200 (H) 74 - 99 mg/dL   Vancomycin,Random    Collection Time: 04/27/25  2:17 AM   Result Value Ref Range    Vancomycin Rm 21.2 (HH) 10 - 20 ug/mL    Vancomycin Random Dose amount Unknown     Vancomycin Random Date last dose UNK^Unknown^L     Vancomycin Random Time last dose UNK^Unknown^L    CBC with Auto Differential    Collection Time: 04/27/25  2:17 AM   Result Value Ref Range    WBC 11.6 
household ambulator, with or without device (Typically no AD but would intermittently use RW lately)  Prior Level of Assist for Transfers: Independent  Active : No  Patient's  Info: wife        Short Term Goals  Time Frame for Short Term Goals: 2 weeks  Short Term Goal 1: Pt will perform sit><supine indep  Short Term Goal 2: Pt will transition sit><stand while maintaining NWB LLE Madi  Short Term Goal 3: Pt will transfer between surfaces Madi  Short Term Goal 4: Pt will ambulate 5ft with RW NWB LLE Madi  Short Term Goal 5: Pt will propel WC 150ft SBA    Electronically signed by:    Ron Connelly, PTA  4/30/2025, 3:17 PM      
without erythema or tenderness  LDA:   Neuro: Mental status intact. CN 2-12 intact and no focal sensory or motor deficits     Radiologic / Imaging / TESTING  MRI ANKLE LEFT W WO CONTRAST  Result Date: 4/25/2025  EXAM:  MRI ANKLE LEFT W WO CONTRAST DATE OF EXAM:  4/25/2025 8:24 DEMOGRAPHICS: 64 years old Male CLINICAL STATEMENT: left heel wound, concern for osteomyelitis, need prior to OR on 4/25 GADOTERIDOL  279.3 MG/ML IV SOLN 20mL COMPARISON: None available. FINDINGS: Mild motion artifact is present on some of the obtained images, degrading image quality. The examination remains diagnostic. SOFT TISSUE AND MUSCULATURE An ulceration is visualized at the posterior heel soft tissues.  No focal collection is seen to suggest an abscess. Scattered areas of soft tissue and muscular with reticular enhancement are visualized at the hindfoot and are suggestive of cellulitis and myositis. BONES There is a rim of marrow edema at the posterior and inferior calcaneus adjacent to the soft tissue ulceration which demonstrates enhancement on the postcontrast  series (see image 14 of series 8 and 11). This is compatible with osteomyelitis. Scattered areas of marrow edema at the talus and calcaneus adjacent to the subtalar joints is favored to be degenerative in etiology. No fracture is identified. ARTICULAR STRUCTURES No sizable joint effusion. Mild subtalar osteoarthritis. IMPRESSION: 1. An ulceration at the posterior heel soft tissues. A rim of marrow edema with enhancement at the adjacent posterior-inferior calcaneus is compatible with osteomyelitis. 2. Cellulitis and myositis at the hindfoot. No evidence for a soft tissue abscess.  Dictated and Electronically Signed By: Kyaw Evans MD 4/25/2025 11:15           Labs:    Recent Results (from the past 24 hours)   POCT Glucose    Collection Time: 04/28/25 11:12 AM   Result Value Ref Range    POC Glucose 245 (H) 74 - 99 mg/dL   POCT Glucose    Collection Time: 04/28/25  4:25 PM 
regular human  15 Units SubCUTAneous Dinner    insulin regular human  0-30 Units SubCUTAneous 4x Daily AC & HS    sodium chloride flush  5-40 mL IntraVENous 2 times per day    empagliflozin  10 mg Oral Daily    ezetimibe  10 mg Oral Daily    finasteride  5 mg Oral Daily    folic acid-pyridoxine-cyancobalamin 1.13-25-2 tablet  1 tablet Oral Daily    metoprolol succinate  25 mg Oral Daily    potassium chloride  40 mEq Oral BID    ranolazine  500 mg Oral BID    rivaroxaban  20 mg Oral Dinner    atorvastatin  20 mg Oral Daily    spironolactone  25 mg Oral BID    tamsulosin  0.4 mg Oral Daily    torsemide  10 mg Oral Dinner      Infusions:    sodium chloride      dextrose       PRN Meds: sodium chloride flush, 5-40 mL, PRN  sodium chloride, , PRN  potassium chloride, 40 mEq, PRN   Or  potassium alternative oral replacement, 40 mEq, PRN   Or  potassium chloride, 10 mEq, PRN  magnesium sulfate, 2,000 mg, PRN  ondansetron, 4 mg, Q8H PRN   Or  ondansetron, 4 mg, Q6H PRN  acetaminophen, 650 mg, Q4H PRN  oxyCODONE, 5 mg, Q4H PRN  HYDROmorphone, 0.5 mg, Q3H PRN  glucose, 4 tablet, PRN  dextrose bolus, 125 mL, PRN   Or  dextrose bolus, 250 mL, PRN  glucagon (rDNA), 1 mg, PRN  dextrose, , Continuous PRN        Labs      Recent Results (from the past 24 hours)   POCT Glucose    Collection Time: 04/28/25 11:12 AM   Result Value Ref Range    POC Glucose 245 (H) 74 - 99 mg/dL   POCT Glucose    Collection Time: 04/28/25  4:25 PM   Result Value Ref Range    POC Glucose 214 (H) 74 - 99 mg/dL   POCT Glucose    Collection Time: 04/28/25  7:34 PM   Result Value Ref Range    POC Glucose 287 (H) 74 - 99 mg/dL   POCT Glucose    Collection Time: 04/28/25  9:34 PM   Result Value Ref Range    POC Glucose 353 (H) 74 - 99 mg/dL   POCT Glucose    Collection Time: 04/29/25  7:12 AM   Result Value Ref Range    POC Glucose 193 (H) 74 - 99 mg/dL        Imaging/Diagnostics Last 24 Hours   MRI ANKLE LEFT W WO CONTRAST  Result Date: 4/25/2025  EXAM:  MRI 
regime / and Oral Hypoglycemic drugs   Monitor Blood glucose frequently   Modified  the dose of Insulin/ other medicines as needed  Hyperkalemia so potassium and Aldactone will be withheld  Will follow     .     MEETA Diehl MD,   
STATEMENT: left heel wound, concern for osteomyelitis, need prior to OR on 4/25 GADOTERIDOL  279.3 MG/ML IV SOLN 20mL COMPARISON: None available. FINDINGS: Mild motion artifact is present on some of the obtained images, degrading image quality. The examination remains diagnostic. SOFT TISSUE AND MUSCULATURE An ulceration is visualized at the posterior heel soft tissues.  No focal collection is seen to suggest an abscess. Scattered areas of soft tissue and muscular with reticular enhancement are visualized at the hindfoot and are suggestive of cellulitis and myositis. BONES There is a rim of marrow edema at the posterior and inferior calcaneus adjacent to the soft tissue ulceration which demonstrates enhancement on the postcontrast  series (see image 14 of series 8 and 11). This is compatible with osteomyelitis. Scattered areas of marrow edema at the talus and calcaneus adjacent to the subtalar joints is favored to be degenerative in etiology. No fracture is identified. ARTICULAR STRUCTURES No sizable joint effusion. Mild subtalar osteoarthritis. IMPRESSION: 1. An ulceration at the posterior heel soft tissues. A rim of marrow edema with enhancement at the adjacent posterior-inferior calcaneus is compatible with osteomyelitis. 2. Cellulitis and myositis at the hindfoot. No evidence for a soft tissue abscess.  Dictated and Electronically Signed By: Kyaw Evans MD 4/25/2025 11:15          Electronically signed by Deidra Perez MD on 4/27/2025 at 2:08 PM

## 2025-04-30 NOTE — PROGRESS NOTES
ARU Admission Assessment - [facility]      A Complete drug regimen review was completed for this patient this date.   [x]  No clinically significant medication issue was identified   []  Yes, a clinically significant medication issue was identified     []  Adverse Drug Event:    []  Allergy:    []  Side Effect:    []  Ineffective Therapy:    []  Drug interaction:     []  Duplicate Therapy:    []  Untreated Indication:    []  Non-adherence:    []  Other:  Nursing contacted the physician:       Date:                Time:    Actions recommended by physician were completed:   Date:                 Time:  Action(s) Taken:             []  New Physician Order Received    []  Issue Noted by Physician; However No Action Required    []  Other:      *NEW QUESTION EFFECTIVE OCTOBER 1, 2024 as required by Medicare    COVID Vaccination  Is the patient up-to-date with their COVID vaccination?  *See CDC Guidelines Below      https://www.cdc.gov/covid/vaccines/stay-up-to-date.html    [x] A.  No, patient is NOT up to date.  Includes if they have not received the vaccine for ANY reason, including medical or Moravian reason.   Encourage patient to receive this after discharge.     [] B.  Yes, patient is up to date.       People ages 12 years and older Guidelines for Up to Date  *You are up to date when you have received:  1 dose of the 4450-2590 Moderna COVID-19 vaccine OR  1 dose of the 4976-1719 Pfizer-BioNTech COVID-19 vaccine OR  1 dose of the 4981-6405 Novavax vaccine unless you are receiving a COVID-19 vaccine for the very first time. If you have never received any COVID-19 vaccine and you choose to get Novavax, you need 2 doses of 9182-8190 Novavax COVID-19 vaccine to be up to date.    Information may be determined from medical record, interviews with the patient and/or family members or other healthcare providers but must meet criteria above.      Ethnicity  \"Are you of , /a, or Citizen of the [place] origin?\"  Check all  interview  Comatose - could not be aroused  [x]  0.  Behavior not present  []  1.  Behavior continuously present, does not fluctuate  []  2.  Behavior present, fluctuates (comes and goes, changes in severity)    Mood    \"Over the last 2 weeks, have you been bothered by any of the following problems?\" 1. Symptom Presence    0 = No  1 = Yes  9 = No Response 2. Symptom Frequency    0 = Never or 1 day  1 = 2-6 days (several days)  2 = 7-11 days (half or more of the days)  3 = 12-14 days (nearly every day)  **Leave blank if 'No Reponse'**      Enter scores in boxes    Column 1 Column 2   Little interest or pleasure in doing things   [x] 0.  No  [] 1.  Yes  [] 9. No Response [x] 0.  Never or one day  [] 1.  2-6 days (several days)  [] 2.  7-11 days (half or more of days)  [] 3.  12-14 days (nearly every day)     Feeling down, depressed, or hopeless   [x] 0.  No  [] 1.  Yes  [] 9. No Response [x] 0.  Never or one day  [] 1.  2-6 days (several days)  [] 2.  7-11 days (half or more of days)  [] 3.  12-14 days (nearly every day)   **If Question A or B in column 2 is coded “2” or “3”, CONTINUE asking the questions below in box.  If not, SKIP down to \"social isolation\" question and continue**     Trouble falling or staying asleep, or sleeping too much   [] 0.  No  [] 1.  Yes  [] 9. No Response [] 0.  Never or one day  [] 1.  2-6 days (several days)  [] 2.  7-11 days (half or more of days)  [] 3.  12-14 days (nearly every day   Feeling tired or having little energy   [] 0.  No  [] 1.  Yes  [] 9. No Response [] 0.  Never or one day  [] 1.  2-6 days (several days)  [] 2.  7-11 days (half or more of days)  [] 3.  12-14 days (nearly every day   Poor appetite or overeating     [] 0.  No  [] 1.  Yes  [] 9. No Response [] 0.  Never or one day  [] 1.  2-6 days (several days)  [] 2.  7-11 days (half or more of days)  [] 3.  12-14 days (nearly every day   Feeling bad about yourself - or that you are a failure or have let yourself or your

## 2025-05-01 PROBLEM — I48.0 PAROXYSMAL ATRIAL FIBRILLATION (HCC): Status: ACTIVE | Noted: 2025-05-01

## 2025-05-01 PROBLEM — D62 ACUTE BLOOD LOSS ANEMIA: Status: ACTIVE | Noted: 2025-05-01

## 2025-05-01 PROBLEM — N40.1 BENIGN PROSTATIC HYPERPLASIA WITH URINARY HESITANCY: Status: ACTIVE | Noted: 2025-05-01

## 2025-05-01 PROBLEM — I50.32 CHRONIC DIASTOLIC (CONGESTIVE) HEART FAILURE (HCC): Status: ACTIVE | Noted: 2025-05-01

## 2025-05-01 PROBLEM — M86.172 ACUTE OSTEOMYELITIS OF LEFT CALCANEUS (HCC): Status: ACTIVE | Noted: 2025-05-01

## 2025-05-01 PROBLEM — R39.11 BENIGN PROSTATIC HYPERPLASIA WITH URINARY HESITANCY: Status: ACTIVE | Noted: 2025-05-01

## 2025-05-01 LAB
GLUCOSE BLD-MCNC: 208 MG/DL (ref 74–99)
GLUCOSE BLD-MCNC: 217 MG/DL (ref 74–99)
GLUCOSE BLD-MCNC: 242 MG/DL (ref 74–99)
GLUCOSE BLD-MCNC: 261 MG/DL (ref 74–99)
MICROORGANISM SPEC CULT: NORMAL
MICROORGANISM SPEC CULT: NORMAL
SERVICE CMNT-IMP: NORMAL
SERVICE CMNT-IMP: NORMAL
SPECIMEN DESCRIPTION: NORMAL
SPECIMEN DESCRIPTION: NORMAL

## 2025-05-01 PROCEDURE — 2500000003 HC RX 250 WO HCPCS: Performed by: SURGERY

## 2025-05-01 PROCEDURE — 6360000002 HC RX W HCPCS: Performed by: SURGERY

## 2025-05-01 PROCEDURE — 6370000000 HC RX 637 (ALT 250 FOR IP): Performed by: SURGERY

## 2025-05-01 PROCEDURE — 97530 THERAPEUTIC ACTIVITIES: CPT

## 2025-05-01 PROCEDURE — 97535 SELF CARE MNGMENT TRAINING: CPT

## 2025-05-01 PROCEDURE — 97162 PT EVAL MOD COMPLEX 30 MIN: CPT

## 2025-05-01 PROCEDURE — 94150 VITAL CAPACITY TEST: CPT

## 2025-05-01 PROCEDURE — 94761 N-INVAS EAR/PLS OXIMETRY MLT: CPT

## 2025-05-01 PROCEDURE — 89220 SPUTUM SPECIMEN COLLECTION: CPT

## 2025-05-01 PROCEDURE — 1280000000 HC REHAB R&B

## 2025-05-01 PROCEDURE — 97542 WHEELCHAIR MNGMENT TRAINING: CPT

## 2025-05-01 PROCEDURE — 94664 DEMO&/EVAL PT USE INHALER: CPT

## 2025-05-01 PROCEDURE — 82962 GLUCOSE BLOOD TEST: CPT

## 2025-05-01 PROCEDURE — 2580000003 HC RX 258: Performed by: SURGERY

## 2025-05-01 PROCEDURE — 97166 OT EVAL MOD COMPLEX 45 MIN: CPT

## 2025-05-01 PROCEDURE — 99233 SBSQ HOSP IP/OBS HIGH 50: CPT | Performed by: PHYSICAL MEDICINE & REHABILITATION

## 2025-05-01 RX ORDER — ATORVASTATIN CALCIUM 10 MG/1
20 TABLET, FILM COATED ORAL NIGHTLY
Status: DISPENSED | OUTPATIENT
Start: 2025-05-02

## 2025-05-01 RX ADMIN — INSULIN HUMAN 10 UNITS: 500 INJECTION, SOLUTION SUBCUTANEOUS at 20:38

## 2025-05-01 RX ADMIN — ATORVASTATIN CALCIUM 20 MG: 10 TABLET, FILM COATED ORAL at 08:17

## 2025-05-01 RX ADMIN — METOPROLOL SUCCINATE 25 MG: 25 TABLET, EXTENDED RELEASE ORAL at 08:17

## 2025-05-01 RX ADMIN — INSULIN HUMAN 15 UNITS: 500 INJECTION, SOLUTION SUBCUTANEOUS at 12:42

## 2025-05-01 RX ADMIN — Medication 1 TABLET: at 08:29

## 2025-05-01 RX ADMIN — DOCUSATE SODIUM 100 MG: 100 CAPSULE, LIQUID FILLED ORAL at 08:17

## 2025-05-01 RX ADMIN — POLYETHYLENE GLYCOL (3350) 17 G: 17 POWDER, FOR SOLUTION ORAL at 08:16

## 2025-05-01 RX ADMIN — INSULIN HUMAN 5 UNITS: 500 INJECTION, SOLUTION SUBCUTANEOUS at 09:25

## 2025-05-01 RX ADMIN — SODIUM CHLORIDE, PRESERVATIVE FREE 10 ML: 5 INJECTION INTRAVENOUS at 20:39

## 2025-05-01 RX ADMIN — CLOPIDOGREL BISULFATE 75 MG: 75 TABLET, FILM COATED ORAL at 08:16

## 2025-05-01 RX ADMIN — INSULIN HUMAN 15 UNITS: 500 INJECTION, SOLUTION SUBCUTANEOUS at 17:45

## 2025-05-01 RX ADMIN — RANOLAZINE 500 MG: 500 TABLET, EXTENDED RELEASE ORAL at 08:17

## 2025-05-01 RX ADMIN — SODIUM CHLORIDE, PRESERVATIVE FREE 10 ML: 5 INJECTION INTRAVENOUS at 08:39

## 2025-05-01 RX ADMIN — INSULIN HUMAN 15 UNITS: 500 INJECTION, SOLUTION SUBCUTANEOUS at 08:40

## 2025-05-01 RX ADMIN — SPIRONOLACTONE 25 MG: 50 TABLET ORAL at 08:17

## 2025-05-01 RX ADMIN — POTASSIUM CHLORIDE 40 MEQ: 1500 TABLET, EXTENDED RELEASE ORAL at 08:17

## 2025-05-01 RX ADMIN — TAMSULOSIN HYDROCHLORIDE 0.4 MG: 0.4 CAPSULE ORAL at 08:16

## 2025-05-01 RX ADMIN — SPIRONOLACTONE 25 MG: 50 TABLET ORAL at 20:38

## 2025-05-01 RX ADMIN — TORSEMIDE 10 MG: 20 TABLET ORAL at 17:24

## 2025-05-01 RX ADMIN — CEFTRIAXONE SODIUM 2000 MG: 2 INJECTION, POWDER, FOR SOLUTION INTRAMUSCULAR; INTRAVENOUS at 08:41

## 2025-05-01 RX ADMIN — RIVAROXABAN 20 MG: 20 TABLET, FILM COATED ORAL at 17:24

## 2025-05-01 RX ADMIN — INSULIN HUMAN 5 UNITS: 500 INJECTION, SOLUTION SUBCUTANEOUS at 12:41

## 2025-05-01 RX ADMIN — FINASTERIDE 5 MG: 5 TABLET, FILM COATED ORAL at 08:17

## 2025-05-01 RX ADMIN — POTASSIUM CHLORIDE 40 MEQ: 1500 TABLET, EXTENDED RELEASE ORAL at 20:38

## 2025-05-01 RX ADMIN — RANOLAZINE 500 MG: 500 TABLET, EXTENDED RELEASE ORAL at 20:38

## 2025-05-01 RX ADMIN — INSULIN HUMAN 5 UNITS: 500 INJECTION, SOLUTION SUBCUTANEOUS at 17:48

## 2025-05-01 RX ADMIN — EZETIMIBE 10 MG: 10 TABLET ORAL at 08:17

## 2025-05-01 RX ADMIN — DOCUSATE SODIUM 100 MG: 100 CAPSULE, LIQUID FILLED ORAL at 20:38

## 2025-05-01 RX ADMIN — EMPAGLIFLOZIN 10 MG: 10 TABLET, FILM COATED ORAL at 08:17

## 2025-05-01 ASSESSMENT — PAIN SCALES - GENERAL: PAINLEVEL_OUTOF10: 0

## 2025-05-01 NOTE — PLAN OF CARE
Problem: Chronic Conditions and Co-morbidities  Goal: Patient's chronic conditions and co-morbidity symptoms are monitored and maintained or improved  5/1/2025 0924 by Rosanne Tompkins LPN  Outcome: Progressing    Problem: Safety - Adult  Goal: Free from fall injury  5/1/2025 0924 by Rosanne Tompkins LPN  Outcome: Progressing       Problem: Discharge Planning  Goal: Discharge to home or other facility with appropriate resources  5/1/2025 0924 by Rosanne Tompkins LPN  Outcome: Progressing

## 2025-05-01 NOTE — PROGRESS NOTES
Physical Therapy  [facility] ARU PHYSICAL THERAPY EVALUATION    Chart Review:  Past Medical History:   Diagnosis Date    Atrial fibrillation (HCC)     Cerebral artery occlusion with cerebral infarction (HCC)     History of cardiac cath 2017    --RCA has mid 50% stenosis and PLB branch has 70% stenosis noted. LVEDP very high    Hyperlipidemia     Hypertension     Type II or unspecified type diabetes mellitus without mention of complication, not stated as uncontrolled     Diagnsed about 1998    Unspecified sleep apnea      Past Surgical History:   Procedure Laterality Date    CARDIAC CATHETERIZATION      FOOT DEBRIDEMENT Left 4/25/2025    FOOT DEBRIDEMENT INCISION AND DRAINAGE with wound vac placement performed by Jakob Avalos MD at [facility] OR    PACEMAKER INSERTION      Done in the past at [facility] in October 2024 by Dr. Ocampo    TOE AMPUTATION Right 02/19/2025    RIGHT FOOT 2ND AND 3RD TOE AMPUTATION WITH FOOT DEBRIDEMENT performed by Jakob Avalos MD at [facility] OR    TONSILLECTOMY      AT 15 YEARS OLD     Fall History: Has the patient had two or more falls in the past year or any fall with injury in the past year?: Yes (reports 2 falls in the past year)  Social History:  Social/Functional History  Lives With: Spouse (Meredith)  Type of Home: House  Home Layout: Two level, Able to Live on Main level with bedroom/bathroom  Home Access: Stairs to enter with rails  Entrance Stairs - Number of Steps: 7 MAUREEN  Entrance Stairs - Rails: Left  Bathroom Shower/Tub: Walk-in shower (however was sponge bathing 2* BLE wounds)  Bathroom Toilet: Standard  Bathroom Equipment: Shower chair, Grab bars in shower, Grab bars around toilet  Bathroom Accessibility: Walker accessible (usually left walker outside door, definitely not W/C accessible)  Home Equipment: Walker - Rolling, Cane, Sock aid, Reacher  Has the patient had two or more falls in the past year or any fall with injury in the past year?: Yes (reports 2 falls in the past  w/c for mobility as pt will not be able to negotiate steps at discharge and gait is not recommended due to skin integrity of other foot. Feel pt will benefit from ARU-PT to improve transfers and w/c mobility to mod I to supervision, but family will need to make changes to home for pt to be able to enter.           Assessment:      Therapy Prognosis: Good  Decision Making: Medium Complexity  Clinical Presentation: evolving presentation      Patient education:   ARU schedule, ARU expectations for participation, plan of care, precautions  Treatment Initiated:  Functional mobility training, w/c training, patient education  Barriers to Improvement:  multiple chronic conditions  Discharge Recommendations:  home with spouse if chair lift in place   Equipment Recommendations:  chair lift, w/c    Goals:  Patient Goals   Patient Goals : get home or go to SNF  Short Term Goals  Time Frame for Short Term Goals: 12 days STG=LTG  Short Term Goal 1: Pt will perform bed mobility with mod I including wound vac line management  Short Term Goal 2: Pt will perform sit to stand and car transfer with SBA, w/c <>bed trasnfers with mod I without slideboard including wound vac line management  Short Term Goal 3: Pt will propel w/c 150' including household situations with mod I, including wound vac line management  Short Term Goal 4: Pt will  light object from standing using 2ww and reacher with min assist     Plan:    Requires PT Follow-Up: Yes  Pt will be seen at least 60 minutes per day for a minimum of 5 days per week, plus group therapy as appropriate  Physical Therapy Plan  Current Treatment Recommendations: Strengthening, ROM, Balance training, Functional mobility training, Transfer training, IADL training, Wheelchair mobility training, Neuromuscular re-education, Pain management, Patient/Caregiver education & training, Safety education & training, Home exercise program, Equipment evaluation, education, & procurement,  Positioning, Group Therapy, Therapeutic activities    PT Individual Minutes  Time In: 1030  Time Out: 1200  Minutes: 90                 PT  Individual Evaluation 30   Individual tx performed as shown below   Gait Training    Therapeutic Exercise    Neuro Re-Ed    Therapeutic Activity 45   Wheelchair Propulsion 15   Group    Variance and Reason    TOTAL 90       Electronically signed by:    Desi Caldwell, PT, PT   5/1/2025, @NOW

## 2025-05-01 NOTE — DISCHARGE SUMMARY
Physician Discharge Summary     Patient ID:  Bandar De La Torre  [id number]  64 y.o.  1960    Admit date: 4/24/2025    Discharge date and time: 4/30/2025  2:40 PM     Admission Physician: Dr. Avalos    Discharge Physician: Jakob Avalos MD    Admission Diagnoses: Diabetic foot (HCC) [E11.8]  DM foot (HCC) [E11.8]    Discharge Diagnoses: same    Admission Condition:fair    Discharged Condition: good    Indication for Admission: left heel osteomyelitis/ulcer    Hospital Course:  Patient was direct admitted after left heel wound was found to be worsening in Wound Care clinic. He underwent left heel debridement with Wound VAC placement and was continued on IV antibiotics during his admission. He was seen by ID, Endocrinology and the Hospitalist service who assisted with cares. Appropriate post hospital care plan was developed and he was able to be discharged to the Acute Rehab Unit with ongoing wound VAC and IV antibiotics.    Consults: Hospitalist, ID and Endocrinology    Outstanding Order Results       Date and Time Order Name Status Description    4/25/2025  1:15 PM Culture, Tissue (with Gram Stain) Preliminary     4/25/2025  6:30 AM Culture, Blood 1 Preliminary     4/25/2025  6:15 AM Culture, Blood 2 Preliminary             Treatments: antibiotics: vancomycin, Zosyn transitioned to ceftriaxone, therapies: PT and OT, and surgery: left heel debridement    Discharge Exam:  Physical Exam  General: awake, alert, in no acute distress  HEENT: mucous membranes moist  Respiratory: normal effort, no wheezes appreciated  CV: appears well perfused  Skin: warm and dry  Extremities: left heel with wound VAC in place  Neuro: no focal deficits noted  Psych: mood normal     Disposition: ARU    Patient Instructions:      Medication List        CONTINUE taking these medications      Dexcom G7 Sensor Misc     ReliOn Insulin Syringe 31G X 15/64\" 1 ML Misc  Generic drug: Insulin Syringe-Needle U-100            ASK your doctor

## 2025-05-01 NOTE — PROGRESS NOTES
Occupational Therapy                              [facility] ARU OCCUPATIONAL THERAPY EVALUATION    Chart Review:  Past Medical History:   Diagnosis Date    Atrial fibrillation (HCC)     Cerebral artery occlusion with cerebral infarction (HCC)     History of cardiac cath 2017    --RCA has mid 50% stenosis and PLB branch has 70% stenosis noted. LVEDP very high    Hyperlipidemia     Hypertension     Type II or unspecified type diabetes mellitus without mention of complication, not stated as uncontrolled     Diagnsed about 1998    Unspecified sleep apnea      Past Surgical History:   Procedure Laterality Date    CARDIAC CATHETERIZATION      FOOT DEBRIDEMENT Left 4/25/2025    FOOT DEBRIDEMENT INCISION AND DRAINAGE with wound vac placement performed by Jakob Avalos MD at [facility] OR    PACEMAKER INSERTION      Done in the past at [facility] in October 2024 by Dr. Ocampo    TOE AMPUTATION Right 02/19/2025    RIGHT FOOT 2ND AND 3RD TOE AMPUTATION WITH FOOT DEBRIDEMENT performed by Jakob Avalos MD at [facility] OR    TONSILLECTOMY      AT 15 YEARS OLD     Social History:  Social/Functional History  Lives With: Spouse (Meredith)  Type of Home: House  Home Layout: Two level, Able to Live on Main level with bedroom/bathroom  Home Access: Stairs to enter with rails  Entrance Stairs - Number of Steps: 7 MAUREEN  Entrance Stairs - Rails: Left  Bathroom Shower/Tub: Walk-in shower (however was sponge bathing 2* BLE wounds)  Bathroom Toilet: Standard  Bathroom Equipment: Shower chair, Grab bars in shower, Grab bars around toilet  Bathroom Accessibility: Walker accessible (usually left walker outside door, definitely not W/C accessible)  Home Equipment: Walker - Rolling, Cane, Sock aid, Reacher  Has the patient had two or more falls in the past year or any fall with injury in the past year?: Yes (reports 2 falls in the past year)  Prior Level of Assist for ADLs: Independent  Prior Level of Assist for Homemaking: Independent (shared with  Decreased ADL status, Decreased strength, Decreased endurance, Decreased sensation, Decreased balance, Decreased high-level IADLs, Decreased posture    Patient is a 64-year-old right-hand-dominant male with chronic struggles with dysvascular complications of his diabetes (with neuropathy) hypertension and hyperlipidemia.  We treated him several months ago in the acute rehab unit when he had an open diabetic wound of the right foot that required surgery with partial foot removal.  In the process of recovering from that procedure, he developed a blister on his left foot (wearing shoes with no socks).  That wound progressed to gangrene and on 4/24/2025 he was directed to the hospital because of increasing pain, swelling and drainage.  He was found to have osteomyelitis of the left heel.  He required an I&D procedure by Dr. Avalos on 4/25/2025 with attention to the left heel wound.  Following the procedure, a VAC dressing was placed and the patient was restricted to no weightbearing through his left lower limb.  He was committed to IV antibiotics through 6/5/2025.  With his poor pain control, his diabetic neuropathy and generalized weakness, he has had significant difficulty performing his own transfers, toileting and self-care.  He has also had electrolyte disturbance and paroxysmal atrial fibrillation causing dizziness. (Taken from MD CARABALLO)    PTA, pt lives c spouse and was completing ADLs Ind, some IADLs, and mod I for functional transfers/mobility.  Today, pt performed ADLs from EOB/bed level d/t difficulty maintaining NWB LLE status in stance.  Pt completed a slideboard transfer for the first time c min A (with downhill advantage from EOB>W/C) at end of evaluation; there was not time to then also attempt a drop arm BSC transfer so this is requested to be completed tomorrow.  While with modifications I am hopeful pt can meet mostly mod I goals, I am concerned about pt and family implementing this in home setting

## 2025-05-01 NOTE — PROGRESS NOTES
Progress Note( Dr. Diehl)  5/1/2025  Subjective:   Admit Date: 4/30/2025  PCP: MEETA Diehl MD    Admitted For : Left foot /heel ulcer and also has left calcaneus osteomyelitis    Consulted For: Better control of blood glucose    Interval History: Patient with blood glucose improving with use of U500 insulin  Had debridement done of the left heel ulcer and wound vacuum was placed    Denies any chest pains,   Denies SOB .   Denies nausea or vomiting.   No new bowel or bladder symptoms.       Intake/Output Summary (Last 24 hours) at 5/1/2025 0838  Last data filed at 5/1/2025 0549  Gross per 24 hour   Intake 250 ml   Output 3500 ml   Net -3250 ml       DATA    CBC:   No results for input(s): \"WBC\", \"HGB\", \"PLT\" in the last 72 hours.   CMP:  No results for input(s): \"NA\", \"K\", \"CL\", \"CO2\", \"BUN\", \"CREATININE\", \"GLU\", \"CALCIUM\", \"LABALBU\", \"BILITOT\", \"ALKPHOS\", \"AST\", \"ALT\" in the last 72 hours.    Invalid input(s): \"PROT\"    Lipids:   Lab Results   Component Value Date/Time    CHOL 205 04/11/2025 08:12 AM    HDL 38 04/11/2025 08:12 AM    TRIG 83 04/11/2025 08:12 AM     Glucose:  Recent Labs     04/30/25  1612 04/30/25 2002 05/01/25  0730   POCGLU 141* 227* 208*     TrkrkbsuofA8L:  Lab Results   Component Value Date/Time    LABA1C 9.6 04/11/2025 08:12 AM     High Sensitivity TSH:   Lab Results   Component Value Date/Time    TSHHS 1.800 08/29/2019 10:06 AM     Free T3: No results found for: \"FT3\"  Free T4:No results found for: \"T4FREE\"    No orders to display        Scheduled Medicines   Medications:    [START ON 5/2/2025] atorvastatin  20 mg Oral Nightly    sodium chloride flush  5-40 mL IntraVENous 2 times per day    empagliflozin  10 mg Oral Daily    ezetimibe  10 mg Oral Daily    finasteride  5 mg Oral Daily    folic acid-pyridoxine-cyancobalamin 1.13-25-2 tablet  1 tablet Oral Daily    metoprolol succinate  25 mg Oral Daily    potassium chloride  40 mEq Oral BID    ranolazine  500 mg Oral BID    rivaroxaban

## 2025-05-01 NOTE — PROGRESS NOTES
Comprehensive Nutrition Assessment    Type and Reason for Visit:  Initial, Consult (oral nutrition supplement)    Nutrition Recommendations/Plan:   Continue current carb controlled diet   Will continue to offer wound healing oral nutrition supplement during stay   Will continue to follow up during stay      Malnutrition Assessment:  Malnutrition Status:  At risk for malnutrition (05/01/25 1251)    Context:  Chronic Illness       Nutrition Assessment:    Admit to acute rehab unit with weakness, hx left foot osteomyelitis with wound debridement. Currently on carb controlled diet with hx DM. Meal intake %. Patient reports following meal plan during stay, used to limiting CHO at home. Order for oral nutrition supplements, wound healing and low calorie/high protein. Does not want Ensure but likes wound healing supplement. Will follow at moderate nutrition risk at this time with increased needs.    Nutrition Related Findings:    resting in bed waiting on lunch,   hx DM, CVA, HTN, CHF    meds noted: U-500 insulin with endo following, demadex, glycolax, jardiance, colace, foltx      was on ozempic this year Wound Type: Diabetic Ulcer, Venous Stasis       Current Nutrition Intake & Therapies:    Average Meal Intake: %  Average Supplements Intake: Unable to assess  ADULT ORAL NUTRITION SUPPLEMENT; Lunch, Dinner; Wound Healing Oral Supplement  ADULT ORAL NUTRITION SUPPLEMENT; Breakfast, Dinner; Low Calorie/High Protein Oral Supplement  ADULT DIET; Regular; 5 carb choices (75 gm/meal)    Anthropometric Measures:  Height: 185.4 cm (6' 1\")  Ideal Body Weight (IBW): 184 lbs (84 kg)    Admission Body Weight: 131.1 kg (289 lb 0.4 oz) (4/25/25)  Current Body Weight: 141.3 kg (311 lb 8.2 oz), 169.3 % IBW. Weight Source: Bed scale  Current BMI (kg/m2): 41.1  Usual Body Weight: 153.3 kg (338 lb) (11/7/24)     % Weight Change (Calculated): -7.8  Weight Adjustment For: No Adjustment                 BMI Categories: Obese Class

## 2025-05-01 NOTE — CARE COORDINATION
Case Management Admission Note    Patient:Bandar De La Torre   \"Emmanuel\" :1960  MRN:[id number]  Rehab Dx/Hx: Local infection of the skin and subcutaneous tissue, unspecified [L08.9]  Chronic osteomyelitis of left foot (HCC) [M86.672]    Chief Complaint:   Past Medical History:   Diagnosis Date    Atrial fibrillation (HCC)     Cerebral artery occlusion with cerebral infarction (HCC)     History of cardiac cath     --RCA has mid 50% stenosis and PLB branch has 70% stenosis noted. LVEDP very high    Hyperlipidemia     Hypertension     Type II or unspecified type diabetes mellitus without mention of complication, not stated as uncontrolled     Diagnsed about     Unspecified sleep apnea      Past Surgical History:   Procedure Laterality Date    CARDIAC CATHETERIZATION      FOOT DEBRIDEMENT Left 2025    FOOT DEBRIDEMENT INCISION AND DRAINAGE with wound vac placement performed by Jakob Avalos MD at [facility]    PACEMAKER INSERTION      Done in the past at [facility] in 2024 by Dr. Ocampo    TOE AMPUTATION Right 2025    RIGHT FOOT 2ND AND 3RD TOE AMPUTATION WITH FOOT DEBRIDEMENT performed by Jakob Avalos MD at [facility] OR    TONSILLECTOMY      AT 15 YEARS OLD     No Known Allergies  Precautions: falls    Date of Admit: 2025  Room #: 1018/1018-A    Current functional status at time of admit:  Home Living/DME Available:  Type of Home: House  Home Access: Stairs to enter with rails  Bathroom Shower/Tub: Walk-in shower (however was sponge bathing 2* BLE wounds)  Bathroom Toilet: Standard  Bathroom Equipment: Shower chair, Grab bars in shower, Grab bars around toilet  Home Equipment: Walker - Rolling, Cane, Sock aid, Reacher    IADL Hx:  Homemaking Responsibilities: Yes  Active : No  Mode of Transportation: SUV  Occupation: Retired    Spouse: Meredith   Family: Patient reported that his family is supportive.     Patient's Goal: \"about everything, I want to walk again\"     Would you  like Case Management to discuss the discharge plan with any other family members/significant others, and if so, who? \"my wife\"    Family's Goal: Spoke with spouse. Patient will need to be able to do steps. They are considering a chair lift. Her goal is for patient to \"care for himself\". If patient needs a WC they will need to resurface some floors. She is touring SNF facilities as a back up plan. Her goal is for patient to return home when he is ready.     SUSIE met with patient for admission assessment. Patient lives with spouse in a 2-level home in [place]. Able to live on main level. There are 7 steps to enter and a flight steps inside. Patient has PCP, was independent in ADLs PTA, and uses [facility] on [address] in [place] for prescriptions. Declined Meds to Bed at this time. Home DME includes a 2WW, shower chair, cane, and pre-existing HHC with [facility]. Patient reports access to food, utilities, and shelter and feels safe in their home environment. BIMS completed in initial assessment. Room whiteboard updated.     Discussed the required 3 hours of therapy a day and the average length of stay is 7 to 11 days, could vary depending on recommendations of the interdisciplinary team. Patient voiced understanding.     Patient provided with list of [facility] participating C in the geographic area of the patient served. Patient selected [facility] and was provided with a comparative data handout from [employer] website. The patient (and/or Family) was educated on the quality outcomes for each provider. Patient (and/or Family) demonstrated understanding. Per patient/family request, referral will be made closer to discharge. Patient reported that he will be going to a nursing home at discharge. His spouse is touring facilities. He reported his house is not WC accessible.      Provided patient with information on [facility].    Plan is to D/C home with spouse, with HHC and DME as recommended by therapy staff. F/U to be

## 2025-05-01 NOTE — PLAN OF CARE
Problem: Chronic Conditions and Co-morbidities  Goal: Patient's chronic conditions and co-morbidity symptoms are monitored and maintained or improved  4/30/2025 2011 by Velvet Dexter LPN  Outcome: Progressing  4/30/2025 1835 by Tiki Moran RN  Outcome: Progressing     Problem: Discharge Planning  Goal: Discharge to home or other facility with appropriate resources  4/30/2025 2011 by Velvet Dexter LPN  Outcome: Progressing  4/30/2025 1835 by Tiki Moran RN  Outcome: Progressing     Problem: Safety - Adult  Goal: Free from fall injury  4/30/2025 2011 by Velvet Dexter LPN  Outcome: Progressing  4/30/2025 1835 by Tiki Moran RN  Outcome: Progressing     Problem: ABCDS Injury Assessment  Goal: Absence of physical injury  4/30/2025 2011 by Velvet Dexter LPN  Outcome: Progressing  4/30/2025 1835 by Tiki Moran RN  Outcome: Progressing

## 2025-05-02 LAB
GLUCOSE BLD-MCNC: 126 MG/DL (ref 74–99)
GLUCOSE BLD-MCNC: 155 MG/DL (ref 74–99)
GLUCOSE BLD-MCNC: 164 MG/DL (ref 74–99)
GLUCOSE BLD-MCNC: 260 MG/DL (ref 74–99)

## 2025-05-02 PROCEDURE — 97530 THERAPEUTIC ACTIVITIES: CPT

## 2025-05-02 PROCEDURE — 97110 THERAPEUTIC EXERCISES: CPT

## 2025-05-02 PROCEDURE — 97542 WHEELCHAIR MNGMENT TRAINING: CPT

## 2025-05-02 PROCEDURE — 6360000002 HC RX W HCPCS: Performed by: SURGERY

## 2025-05-02 PROCEDURE — 94761 N-INVAS EAR/PLS OXIMETRY MLT: CPT

## 2025-05-02 PROCEDURE — 2500000003 HC RX 250 WO HCPCS: Performed by: SURGERY

## 2025-05-02 PROCEDURE — 1280000000 HC REHAB R&B

## 2025-05-02 PROCEDURE — 97605 NEG PRS WND THER DME<=50SQCM: CPT

## 2025-05-02 PROCEDURE — 6370000000 HC RX 637 (ALT 250 FOR IP): Performed by: PHYSICAL MEDICINE & REHABILITATION

## 2025-05-02 PROCEDURE — 82962 GLUCOSE BLOOD TEST: CPT

## 2025-05-02 PROCEDURE — 6370000000 HC RX 637 (ALT 250 FOR IP): Performed by: SURGERY

## 2025-05-02 PROCEDURE — 2580000003 HC RX 258: Performed by: SURGERY

## 2025-05-02 PROCEDURE — 2700000000 HC OXYGEN THERAPY PER DAY

## 2025-05-02 PROCEDURE — 94150 VITAL CAPACITY TEST: CPT

## 2025-05-02 PROCEDURE — 97535 SELF CARE MNGMENT TRAINING: CPT

## 2025-05-02 PROCEDURE — 99233 SBSQ HOSP IP/OBS HIGH 50: CPT | Performed by: PHYSICAL MEDICINE & REHABILITATION

## 2025-05-02 RX ADMIN — TAMSULOSIN HYDROCHLORIDE 0.4 MG: 0.4 CAPSULE ORAL at 09:36

## 2025-05-02 RX ADMIN — POTASSIUM CHLORIDE 40 MEQ: 1500 TABLET, EXTENDED RELEASE ORAL at 09:37

## 2025-05-02 RX ADMIN — INSULIN HUMAN 15 UNITS: 500 INJECTION, SOLUTION SUBCUTANEOUS at 17:56

## 2025-05-02 RX ADMIN — INSULIN HUMAN 15 UNITS: 500 INJECTION, SOLUTION SUBCUTANEOUS at 09:48

## 2025-05-02 RX ADMIN — INSULIN HUMAN 10 UNITS: 500 INJECTION, SOLUTION SUBCUTANEOUS at 13:09

## 2025-05-02 RX ADMIN — INSULIN HUMAN 15 UNITS: 500 INJECTION, SOLUTION SUBCUTANEOUS at 13:10

## 2025-05-02 RX ADMIN — Medication 1 TABLET: at 09:35

## 2025-05-02 RX ADMIN — SODIUM CHLORIDE, PRESERVATIVE FREE 10 ML: 5 INJECTION INTRAVENOUS at 09:37

## 2025-05-02 RX ADMIN — SODIUM CHLORIDE, PRESERVATIVE FREE 10 ML: 5 INJECTION INTRAVENOUS at 21:24

## 2025-05-02 RX ADMIN — EZETIMIBE 10 MG: 10 TABLET ORAL at 09:36

## 2025-05-02 RX ADMIN — ATORVASTATIN CALCIUM 20 MG: 10 TABLET, FILM COATED ORAL at 21:25

## 2025-05-02 RX ADMIN — CEFTRIAXONE SODIUM 2000 MG: 2 INJECTION, POWDER, FOR SOLUTION INTRAMUSCULAR; INTRAVENOUS at 09:53

## 2025-05-02 RX ADMIN — FINASTERIDE 5 MG: 5 TABLET, FILM COATED ORAL at 09:37

## 2025-05-02 RX ADMIN — RANOLAZINE 500 MG: 500 TABLET, EXTENDED RELEASE ORAL at 09:36

## 2025-05-02 RX ADMIN — METOPROLOL SUCCINATE 25 MG: 25 TABLET, EXTENDED RELEASE ORAL at 09:36

## 2025-05-02 RX ADMIN — POLYETHYLENE GLYCOL (3350) 17 G: 17 POWDER, FOR SOLUTION ORAL at 09:35

## 2025-05-02 RX ADMIN — RANOLAZINE 500 MG: 500 TABLET, EXTENDED RELEASE ORAL at 21:25

## 2025-05-02 RX ADMIN — SPIRONOLACTONE 25 MG: 50 TABLET ORAL at 09:37

## 2025-05-02 RX ADMIN — CLOPIDOGREL BISULFATE 75 MG: 75 TABLET, FILM COATED ORAL at 09:37

## 2025-05-02 RX ADMIN — RIVAROXABAN 20 MG: 20 TABLET, FILM COATED ORAL at 17:55

## 2025-05-02 RX ADMIN — POTASSIUM CHLORIDE 40 MEQ: 1500 TABLET, EXTENDED RELEASE ORAL at 21:24

## 2025-05-02 RX ADMIN — TORSEMIDE 10 MG: 20 TABLET ORAL at 17:55

## 2025-05-02 RX ADMIN — DOCUSATE SODIUM 100 MG: 100 CAPSULE, LIQUID FILLED ORAL at 21:25

## 2025-05-02 RX ADMIN — SPIRONOLACTONE 25 MG: 50 TABLET ORAL at 21:25

## 2025-05-02 RX ADMIN — DOCUSATE SODIUM 100 MG: 100 CAPSULE, LIQUID FILLED ORAL at 09:36

## 2025-05-02 RX ADMIN — EMPAGLIFLOZIN 10 MG: 10 TABLET, FILM COATED ORAL at 09:37

## 2025-05-02 NOTE — PROGRESS NOTES
Bandar De La Torre    : 1960  Acct #: [id number]  MRN: [id number]              PM&R Progress Note      Admitting diagnosis: Osteomyelitis left foot (IGC 16)     Comorbid diagnoses impacting rehabilitation: Uncontrolled pain, bilateral lower limb weakness, gait disturbance, acute blood loss anemia, paroxysmal atrial fibrillation, poorly controlled diabetes type 2 with peripheral neuropathy, essential hypertension, hyperkalemia, morbid obesity class III (BMI 41.5), history of CVA, chronic diastolic congestive heart failure, BPH with urinary hesitancy    Chief complaint: Unable to push to stand with his right leg today.  Minimal foot pain.    Prior (baseline) level of function: Independent.    Current level of function:         Current  IRF-JAVIER and Goals:   Occupational Therapy:    Short Term Goals  Time Frame for Short Term Goals: STGs=LTGs :   Long Term Goals  Time Frame for Long Term Goals : 7 days or until d/c  Long Term Goal 1: Pt will complete grooming tasks Ind  Long Term Goal 2: Pt will complete total body bathing c setup  Long Term Goal 3: Pt will complete UB dressing Ind (using W/C for setup)  Long Term Goal 4: Pt will complete LB dressing c setup  Long Term Goal 5: Pt will doff/don footwear c min A  Additional Goals?: Yes  Long Term Goal 6: Pt will complete toileting c min A  Long Term Goal 7: Pt will complete functional transfers (bed, chair, toilet) c DME PRN and mod I  Long Term Goal 8: Pt will perform therex/therax to facilitate increased strength/endurance/ax tolerance (c emphasis on BUE endurance) c SBA  Long Term Goal 9: Pt will complete simple IADLs from W/C level mod I :                                       Eating: Eating  Assistance Needed: Independent  Comment: able to open packages/containers, feed self  CARE Score: 6  Discharge Goal: Independent       Oral Hygiene: Oral Hygiene  Assistance Needed: Setup or clean-up assistance  Comment: seated EOB  CARE Score: 5  Discharge Goal:  50 Feet with Two Turns  Reason if not Attempted: Not attempted due to medical condition or safety concerns  CARE Score: 88  Discharge Goal: Not Attempted     Walk 150 Feet  Reason if not Attempted: Not attempted due to medical condition or safety concerns  CARE Score: 88  Discharge Goal: Not Attempted     Walking 10 Feet on Uneven Surfaces  Reason if not Attempted: Not attempted due to medical condition or safety concerns  CARE Score: 88  Discharge Goal: Not Attempted     1 Step (Curb)  Reason if not Attempted: Not attempted due to medical condition or safety concerns  CARE Score: 88  Discharge Goal: Not Attempted     4 Steps  Reason if not Attempted: Not attempted due to medical condition or safety concerns  CARE Score: 88  Discharge Goal: Not Attempted     12 Steps  Reason if not Attempted: Not attempted due to medical condition or safety concerns  CARE Score: 88  Discharge Goal: Not Attempted       Wheelchair:  w/c Ability: Wheelchair Ability  Uses a Wheelchair and/or Scooter?: Yes  Wheel 50 Feet with Two Turns  Assistance Needed: Supervision or touching assistance  CARE Score: 4  Discharge Goal: Independent  Wheel 150 Feet  Assistance Needed: Supervision or touching assistance  CARE Score: 4  Discharge Goal: Independent          Balance:        Object: Picking Up Object  Reason if not Attempted: Not attempted due to medical condition or safety concerns  CARE Score: 88  Discharge Goal: Partial/moderate assistance    I      Exam:    Blood pressure (!) 113/48, pulse 65, temperature 97.7 °F (36.5 °C), temperature source Oral, resp. rate 18, height 1.854 m (6' 1\"), weight (!) 141.3 kg (311 lb 9.6 oz), SpO2 100%.    General: Lying back in bed.  Passive in conversation.  In no distress.    HEENT: MMM.  No JVD.  Neck supple.    Pulmonary: His respirations are bit labored under his girth.  No wheezes or coughing noted.    Cardiac: Occasional premature beat.  Rate controlled.    Abdomen: Patient's abdomen is soft  hypertension: Continuing metoprolol, Aldactone, Ranexa and Demadex with parameters for systolic blood pressure regulation.  Target systolic blood pressures 120-140.  Vital signs will be checked at rest and with activity.  His blood pressure was just below the target range on medication.  Monitoring closely.  BPH with urinary hesitancy: Continuing Proscar and Flomax.  Avoiding anticholinergic medication exposure when possible.  Activating the bladder protocol should signs or symptoms suggest retention.

## 2025-05-02 NOTE — PROGRESS NOTES
Progress Note( Dr. Diehl)  5/2/2025  Subjective:   Admit Date: 4/30/2025  PCP: MEETA Diehl MD    Admitted For : Left foot /heel ulcer and also has left calcaneus osteomyelitis    Consulted For: Better control of blood glucose    Interval History: Patient with blood glucose improving with use of U500 insulin  Had debridement done of the left heel ulcer and wound vacuum was placed    Denies any chest pains,   Denies SOB .   Denies nausea or vomiting.   No new bowel or bladder symptoms.       Intake/Output Summary (Last 24 hours) at 5/2/2025 1821  Last data filed at 5/2/2025 1452  Gross per 24 hour   Intake 480 ml   Output 2350 ml   Net -1870 ml       DATA    CBC:   No results for input(s): \"WBC\", \"HGB\", \"PLT\" in the last 72 hours.   CMP:  No results for input(s): \"NA\", \"K\", \"CL\", \"CO2\", \"BUN\", \"CREATININE\", \"GLU\", \"CALCIUM\", \"LABALBU\", \"BILITOT\", \"ALKPHOS\", \"AST\", \"ALT\" in the last 72 hours.    Invalid input(s): \"PROT\"    Lipids:   Lab Results   Component Value Date/Time    CHOL 205 04/11/2025 08:12 AM    HDL 38 04/11/2025 08:12 AM    TRIG 83 04/11/2025 08:12 AM     Glucose:  Recent Labs     05/02/25  0824 05/02/25  1154 05/02/25  1633   POCGLU 164* 260* 155*     [id number]:  Lab Results   Component Value Date/Time    LABA1C 9.6 04/11/2025 08:12 AM     High Sensitivity TSH:   Lab Results   Component Value Date/Time    TSHHS 1.800 08/29/2019 10:06 AM     Free T3: No results found for: \"FT3\"  Free T4:No results found for: \"T4FREE\"    No orders to display        Scheduled Medicines   Medications:    atorvastatin  20 mg Oral Nightly    sodium chloride flush  5-40 mL IntraVENous 2 times per day    empagliflozin  10 mg Oral Daily    ezetimibe  10 mg Oral Daily    finasteride  5 mg Oral Daily    folic acid-pyridoxine-cyancobalamin 1.13-25-2 tablet  1 tablet Oral Daily    metoprolol succinate  25 mg Oral Daily    potassium chloride  40 mEq Oral BID    ranolazine  500 mg Oral BID    rivaroxaban  20 mg Oral Dinner  frequently   Modified  the dose of Insulin/ other medicines as needed  Patient was transferred to ARU   Patient be started participating in physical activities of ARU  Will follow     .     MEETA Diehl MD,

## 2025-05-02 NOTE — PROGRESS NOTES
Physical Therapy  [x] daily progress note       [] discharge       Patient Name:  Bandar De La Torre   :  1960 MRN: [id number]  Room:  [address] [place] [address] Date of Admission: 2025  Rehabilitation Diagnosis:   Local infection of the skin and subcutaneous tissue, unspecified [L08.9]  Chronic osteomyelitis of left foot (HCC) [M86.672]       Date 2025       Day of ARU Week:  3   Time IN/OUT 1796-0451   Individual Tx Minutes 60   TOTAL Tx Time Mins 60   Variance Time    Variance Time []   Refusal due to:     []   Medical hold/reason:    []   Illness   []   Off Unit for test/procedure  []   Extra time needed to complete task  []   Therapeutic need  []   Other (specify):   Restrictions Restrictions/Precautions  Restrictions/Precautions: General Precautions, Fall Risk, Weight Bearing (NWB LLE with wound vac, IV LUE)  Implants Present? : Pacemaker      Communication with other providers: [x]   OK to see per nursing:     []   Spoke with team member regarding:      Subjective observations and cognitive status: Pt is in semi fowlers c wound care finishing a dressing change on pt's foot and is agreeable to session. Pt was unable to eat breakfast before session d/t delayed blood sugar measurement. Ensure was given to pt before treatment began.   Pain level/location: 0/10          Vitals:    Discharge recommendations  Anticipated discharge date:  tbd  Destination: []home alone   []home with assist PRN     [] home with continuous supervision     []SNF   [] Assisted living     [] Other:   Continued therapy: []C PT  []OUTPATIENT  PT   [] No Further PT  Equipment needs: tbd     Bed Mobility:           []   Pt received out of bed   Scooting:  sup  Supine --> Sit:  sup c dizziness reported upon sitting up. Dizziness subsided within a few minutes.   Sit --> Supine:  sup.   Bed features used:    [] HOB elevated      [] Bed rail   [x] No     Transfers:    Sit--> Stand:  min-mod A. 4 reps in \\ bars. Min A for the first two

## 2025-05-02 NOTE — PROGRESS NOTES
Occupational Therapy    Physical Rehabilitation: OCCUPATIONAL THERAPY     [x] daily progress note       [] discharge       Patient Name:  Bandar De La Torre   :  1960 MRN: [id number]  Room:  [address] [place] [address] Date of Admission: 2025  Rehabilitation Diagnosis:   Local infection of the skin and subcutaneous tissue, unspecified [L08.9]  Chronic osteomyelitis of left foot (HCC) [M86.672]       Date 2025       Day of ARU Week:  3   Time IN/OUT 1045 - 1145 + 1303 - 1403   Individual Tx Minutes 60 + 60   Group Tx Minutes    Co-Treat Minutes    Concurrent Tx Minutes    TOTAL Tx Time Mins 60 + 60 = 120   Variance Time    Variance Reason []   Refusal due to:     []   Medical hold/reason:    []   Illness   []   Off Unit for test/procedure  []   Extra time needed to complete task  []   Therapeutic need  []   Make up mins were attempted but pt unable to complete due to (specify)  []   Other (specify):   Restrictions Restrictions/Precautions: General Precautions, Fall Risk, Weight Bearing (NWB LLE with wound vac, IV LUE)         Communication with other providers: [x]   OK to see per nursing:     []   Spoke with team member regarding:      Subjective observations and cognitive status: 1: Pt seated in w/c upon approach. Pt pleasant and agreeable to session. He reports the need for toileting, but voices his anxiety surrounding completing slide board transfer.    2: Pt seated EOB. Pt still pleasant and agreeable to OT session. Pt states it feels good to sit EOB.     Pain level/location:    1/10       Location: L foot   Vitals taken during session N/A   Discharge recommendations  Anticipated discharge date:  TBD  Destination: []home alone   []home alone w assist prn   [] home w/ family    [] Continuous supervision       []SNF    [] Assisted living     [] Other:   Continued therapy: []HHC OT  []OUTPATIENT  OT   [] No Further OT  Equipment needs: TBD       ADLs:    Eating: Eating  Assistance Needed: Independent  Comment: able  60 minutes/day plus group as appropriate for 60 minutes.  Treatment to include Occupational Therapy Plan  Current Treatment Recommendations: Strengthening, Balance training, Functional mobility training, Endurance training, Safety education & training, Patient/Caregiver education & training, Equipment evaluation, education, & procurement, Positioning, Self-Care / ADL, Home management training, Group Therapy    Electronically signed by   NIRU Guevara, OTR/L License #[id number]  5/2/2025, 3:25 PM

## 2025-05-02 NOTE — PLAN OF CARE
Problem: Chronic Conditions and Co-morbidities  Goal: Patient's chronic conditions and co-morbidity symptoms are monitored and maintained or improved  Outcome: Progressing     Problem: Discharge Planning  Goal: Discharge to home or other facility with appropriate resources  Outcome: Progressing     Problem: Safety - Adult  Goal: Free from fall injury  Outcome: Progressing     Problem: ABCDS Injury Assessment  Goal: Absence of physical injury  Outcome: Progressing     Problem: Nutrition Deficit:  Goal: Optimize nutritional status  Outcome: Progressing

## 2025-05-02 NOTE — CONSULTS
mellitus with hyperglycemia, with long-term current use of insulin ([facility])    Class 3 severe obesity due to excess calories with body mass index (BMI) of 45.0 to 49.9 in adult ([facility])    History of cardiac cath    Weakness of right arm    Acute CVA (cerebrovascular accident) ([facility])    Spastic hemiparesis of right dominant side ([facility])    Dysphagia due to recent stroke    Essential hypertension    Obesity, Class III, BMI 40-49.9 (morbid obesity) ([facility])    Frequent falls    Chronic venous stasis dermatitis of both lower extremities    History of CVA (cerebrovascular accident)    Obesity hypoventilation syndrome ([facility])    Hypertension    Hyperlipidemia    Congestive heart failure due to cardiomyopathy ([facility])    Chronic kidney disease, stage 3b (HCC)    Chronic kidney disease, stage II (mild)    Benign essential microscopic hematuria    Stage 3a chronic kidney disease ([facility])    Generalized edema    Acute kidney injury superimposed on CKD    Hypokalemia    Cellulitis of foot    Sepsis ([facility])    Diabetic foot infection ([facility])    Bacteremia due to Enterococcus    Pacemaker lead malfunction    Osteomyelitis of second toe of right foot ([facility])    Uncontrolled pain    Generalized weakness    Poorly controlled type 2 diabetes mellitus with peripheral neuropathy ([facility])    Status post amputation of toe    Diabetic ulcer of right midfoot associated with type 2 diabetes mellitus, limited to breakdown of skin ([facility])    Surgical wound of right foot, non healing, subsequent encounter    Diabetic ulcer of toe of right foot associated with type 2 diabetes mellitus, with fat layer exposed ([facility])    Traumatic ulcer of foot, right, with fat layer exposed ([facility])    Anticoagulated    Pre-ulcerative calluses    Acute hematogenous osteomyelitis of right foot ([facility])    Gait disturbance    Complete heart block ([facility])    Skin lesion of left leg    Traumatic injury left heel to an exisitng diabetic wound    Diabetic ulcer of left heel associated with type 2 diabetes  Volume (cm^3) 42 cm^3 04/24/25 1532   Distance Tunneling (cm) 0 cm 04/28/25 0920   Tunneling Position ___ O'Clock 0 04/28/25 0920   Undermining Starts ___ O'Clock 0 04/28/25 0920   Undermining Ends___ O'Clock 0 04/28/25 0920   Undermining Maxium Distance (cm) 0 04/28/25 0920   Wound Assessment Pink/red;Slough 05/02/25 0830   Drainage Amount Moderate (25-50%) 05/02/25 0830   Drainage Description Serosanguinous 05/02/25 0830   Odor None 05/02/25 0830   Berenice-wound Assessment Maceration 05/02/25 0830   Margins Defined edges 05/02/25 0830   Wound Thickness Description not for Pressure Injury Full thickness 05/02/25 0830   Number of days: 42       Wound 04/11/25 #6 Right Anterior Lower Leg (Active)   Wound Image   04/28/25 0920   Wound Etiology Venous 05/01/25 1946   Dressing Status Clean;Dry;Intact 05/01/25 1946   Wound Cleansed Not Cleansed 04/30/25 1457   Dressing/Treatment Hydrofiber Ag;Silicone border 04/30/25 0830   Offloading for Diabetic Foot Ulcers Post op shoe 04/25/25 1000   Wound Length (cm) 7 cm 04/28/25 0920   Wound Width (cm) 4 cm 04/28/25 0920   Wound Depth (cm) 0.1 cm 04/28/25 0920   Wound Surface Area (cm^2) 28 cm^2 04/28/25 0920   Change in Wound Size % (l*w) -5500 04/28/25 0920   Wound Volume (cm^3) 2.8 cm^3 04/28/25 0920   Wound Healing % -5500 04/28/25 0920   Post-Procedure Length (cm) 1 cm 04/24/25 1532   Post-Procedure Width (cm) 0.3 cm 04/24/25 1532   Post-Procedure Depth (cm) 0.1 cm 04/24/25 1532   Post-Procedure Surface Area (cm^2) 0.3 cm^2 04/24/25 1532   Post-Procedure Volume (cm^3) 0.03 cm^3 04/24/25 1532   Distance Tunneling (cm) 0 cm 04/28/25 0920   Tunneling Position ___ O'Clock 0 04/28/25 0920   Undermining Starts ___ O'Clock 0 04/28/25 0920   Undermining Ends___ O'Clock 0 04/28/25 0920   Undermining Maxium Distance (cm) 0 04/28/25 0920   Wound Assessment Eschar dry 05/01/25 1946   Drainage Amount Scant (moist but unmeasurable) 04/30/25 0830   Drainage Description Serosanguinous 04/30/25

## 2025-05-02 NOTE — PLAN OF CARE
Problem: Chronic Conditions and Co-morbidities  Goal: Patient's chronic conditions and co-morbidity symptoms are monitored and maintained or improved  5/2/2025 1238 by Arianne Harris, RN  Outcome: Progressing  5/2/2025 0402 by Agnes Schaefer LPN  Outcome: Progressing

## 2025-05-03 LAB
GLUCOSE BLD-MCNC: 141 MG/DL (ref 74–99)
GLUCOSE BLD-MCNC: 203 MG/DL (ref 74–99)
GLUCOSE BLD-MCNC: 263 MG/DL (ref 74–99)
GLUCOSE BLD-MCNC: 267 MG/DL (ref 74–99)

## 2025-05-03 PROCEDURE — 82962 GLUCOSE BLOOD TEST: CPT

## 2025-05-03 PROCEDURE — 6370000000 HC RX 637 (ALT 250 FOR IP): Performed by: PHYSICAL MEDICINE & REHABILITATION

## 2025-05-03 PROCEDURE — 94761 N-INVAS EAR/PLS OXIMETRY MLT: CPT

## 2025-05-03 PROCEDURE — 6370000000 HC RX 637 (ALT 250 FOR IP): Performed by: SURGERY

## 2025-05-03 PROCEDURE — 97110 THERAPEUTIC EXERCISES: CPT

## 2025-05-03 PROCEDURE — 2580000003 HC RX 258: Performed by: SURGERY

## 2025-05-03 PROCEDURE — 97535 SELF CARE MNGMENT TRAINING: CPT

## 2025-05-03 PROCEDURE — 97530 THERAPEUTIC ACTIVITIES: CPT

## 2025-05-03 PROCEDURE — 1280000000 HC REHAB R&B

## 2025-05-03 PROCEDURE — 6360000002 HC RX W HCPCS: Performed by: SURGERY

## 2025-05-03 PROCEDURE — 2500000003 HC RX 250 WO HCPCS: Performed by: SURGERY

## 2025-05-03 RX ADMIN — METOPROLOL SUCCINATE 25 MG: 25 TABLET, EXTENDED RELEASE ORAL at 09:08

## 2025-05-03 RX ADMIN — ATORVASTATIN CALCIUM 20 MG: 10 TABLET, FILM COATED ORAL at 20:18

## 2025-05-03 RX ADMIN — TAMSULOSIN HYDROCHLORIDE 0.4 MG: 0.4 CAPSULE ORAL at 09:09

## 2025-05-03 RX ADMIN — INSULIN HUMAN 15 UNITS: 500 INJECTION, SOLUTION SUBCUTANEOUS at 17:55

## 2025-05-03 RX ADMIN — TORSEMIDE 10 MG: 20 TABLET ORAL at 17:06

## 2025-05-03 RX ADMIN — Medication 1 TABLET: at 09:09

## 2025-05-03 RX ADMIN — RIVAROXABAN 20 MG: 20 TABLET, FILM COATED ORAL at 17:06

## 2025-05-03 RX ADMIN — DOCUSATE SODIUM 100 MG: 100 CAPSULE, LIQUID FILLED ORAL at 09:09

## 2025-05-03 RX ADMIN — SPIRONOLACTONE 25 MG: 50 TABLET ORAL at 20:17

## 2025-05-03 RX ADMIN — EZETIMIBE 10 MG: 10 TABLET ORAL at 09:08

## 2025-05-03 RX ADMIN — INSULIN HUMAN 10 UNITS: 500 INJECTION, SOLUTION SUBCUTANEOUS at 12:45

## 2025-05-03 RX ADMIN — INSULIN HUMAN 5 UNITS: 500 INJECTION, SOLUTION SUBCUTANEOUS at 21:10

## 2025-05-03 RX ADMIN — CEFTRIAXONE SODIUM 2000 MG: 2 INJECTION, POWDER, FOR SOLUTION INTRAMUSCULAR; INTRAVENOUS at 11:29

## 2025-05-03 RX ADMIN — POTASSIUM CHLORIDE 40 MEQ: 1500 TABLET, EXTENDED RELEASE ORAL at 20:18

## 2025-05-03 RX ADMIN — RANOLAZINE 500 MG: 500 TABLET, EXTENDED RELEASE ORAL at 20:17

## 2025-05-03 RX ADMIN — POLYETHYLENE GLYCOL (3350) 17 G: 17 POWDER, FOR SOLUTION ORAL at 09:09

## 2025-05-03 RX ADMIN — SPIRONOLACTONE 25 MG: 50 TABLET ORAL at 09:08

## 2025-05-03 RX ADMIN — SODIUM CHLORIDE, PRESERVATIVE FREE 10 ML: 5 INJECTION INTRAVENOUS at 20:19

## 2025-05-03 RX ADMIN — RANOLAZINE 500 MG: 500 TABLET, EXTENDED RELEASE ORAL at 09:09

## 2025-05-03 RX ADMIN — EMPAGLIFLOZIN 10 MG: 10 TABLET, FILM COATED ORAL at 09:09

## 2025-05-03 RX ADMIN — FINASTERIDE 5 MG: 5 TABLET, FILM COATED ORAL at 09:09

## 2025-05-03 RX ADMIN — INSULIN HUMAN 15 UNITS: 500 INJECTION, SOLUTION SUBCUTANEOUS at 12:31

## 2025-05-03 RX ADMIN — INSULIN HUMAN 10 UNITS: 500 INJECTION, SOLUTION SUBCUTANEOUS at 18:11

## 2025-05-03 RX ADMIN — POTASSIUM CHLORIDE 40 MEQ: 1500 TABLET, EXTENDED RELEASE ORAL at 09:09

## 2025-05-03 RX ADMIN — INSULIN HUMAN 5 UNITS: 500 INJECTION, SOLUTION SUBCUTANEOUS at 09:00

## 2025-05-03 RX ADMIN — CLOPIDOGREL BISULFATE 75 MG: 75 TABLET, FILM COATED ORAL at 09:09

## 2025-05-03 RX ADMIN — SODIUM CHLORIDE, PRESERVATIVE FREE 10 ML: 5 INJECTION INTRAVENOUS at 11:22

## 2025-05-03 ASSESSMENT — PAIN SCALES - GENERAL: PAINLEVEL_OUTOF10: 0

## 2025-05-03 NOTE — PROGRESS NOTES
Bandar De La Torre    : 1960  Acct #: [id number]  MRN: [id number]              PM&R Progress Note      Admitting diagnosis:  Osteomyelitis left foot (IGC 16)     Comorbid diagnoses impacting rehabilitation: Uncontrolled pain, bilateral lower limb weakness, gait disturbance, acute blood loss anemia, paroxysmal atrial fibrillation, poorly controlled diabetes type 2 with peripheral neuropathy, essential hypertension, hyperkalemia, morbid obesity class III (BMI 41.5), history of CVA, chronic diastolic congestive heart failure, BPH with urinary hesitancy     Chief complaint: Fatigue after therapy.  No chills or diarrhea.    Prior (baseline) level of function: Independent.    Current level of function:         Current  IRF-JAVIER and Goals:   Occupational Therapy:    Short Term Goals  Time Frame for Short Term Goals: STGs=LTGs :   Long Term Goals  Time Frame for Long Term Goals : 7 days or until d/c  Long Term Goal 1: Pt will complete grooming tasks Ind  Long Term Goal 2: Pt will complete total body bathing c setup  Long Term Goal 3: Pt will complete UB dressing Ind (using W/C for setup)  Long Term Goal 4: Pt will complete LB dressing c setup  Long Term Goal 5: Pt will doff/don footwear c min A  Additional Goals?: Yes  Long Term Goal 6: Pt will complete toileting c min A  Long Term Goal 7: Pt will complete functional transfers (bed, chair, toilet) c DME PRN and mod I  Long Term Goal 8: Pt will perform therex/therax to facilitate increased strength/endurance/ax tolerance (c emphasis on BUE endurance) c SBA  Long Term Goal 9: Pt will complete simple IADLs from W/C level mod I :                                       Eating: Eating  Assistance Needed: Independent  Comment: able to open packages/containers, feed self  CARE Score: 6  Discharge Goal: Independent       Oral Hygiene: Oral Hygiene  Assistance Needed: Independent  Comment: Seated in w/c at sink  CARE Score: 6  Discharge Goal: Independent    UB/LB Bathing:  Mobility:   Sit to Lying  Assistance Needed: Supervision or touching assistance  Comment: supervision with cues and assist for wound vac line management  CARE Score: 4  Discharge Goal: Independent  Roll Left and Right  Assistance Needed: Supervision or touching assistance  Comment: supervision due to need for assist with wound vac line management  CARE Score: 4  Discharge Goal: Independent  Lying to Sitting on Side of Bed  Assistance Needed: Supervision or touching assistance  Comment: supervision with cues and assist for wound vac line management  CARE Score: 4  Discharge Goal: Independent    Transfers:    Sit to Stand  Assistance Needed: Substantial/maximal assistance  Comment: max assist with B UE on rails with cues for NWB LLE x1 rep, then could not repeat  CARE Score: 2  Discharge Goal: Supervision or touching assistance  Chair/Bed-to-Chair Transfer  Assistance Needed: Substantial/maximal assistance  Comment: max assist w/c to bed with slideboard with pt needing multiple scoots to complete, but needed assist to not \"lose ground\" on board while establishing hand placement each time. cues for safe hand placement and to utilize bed rail once close enough  CARE Score: 2  Discharge Goal: Independent     Car Transfer  Comment: pt did not feel he could attempt this date due to fatigue  Reason if not Attempted: Not attempted due to medical condition or safety concerns  CARE Score: 88  Discharge Goal: Supervision or touching assistance    Ambulation:    Walking Ability  Does the Patient Walk?: No     Walk 10 Feet  Comment: do not feel with pt debility and recent healing of R foot wound that walking is feasible  Reason if not Attempted: Not attempted due to medical condition or safety concerns  CARE Score: 88  Discharge Goal: Not Attempted     Walk 50 Feet with Two Turns  Reason if not Attempted: Not attempted due to medical condition or safety concerns  CARE Score: 88  Discharge Goal: Not Attempted     Walk 150

## 2025-05-03 NOTE — PROGRESS NOTES
Physical Rehabilitation: OCCUPATIONAL THERAPY     [x] daily progress note       [] discharge       Patient Name:  Bandar De La Torre   :  1960 MRN: [id number]  Room:  [address] [place] [address] Date of Admission: 2025  Rehabilitation Diagnosis:   Local infection of the skin and subcutaneous tissue, unspecified [L08.9]  Chronic osteomyelitis of left foot (HCC) [M86.672]       Date 5/3/2025       Day of ARU Week:  4   Time IN/OUT 7364-7708   Individual Tx Minutes 60   Group Tx Minutes    Co-Treat Minutes    Concurrent Tx Minutes    TOTAL Tx Time Mins 60   Variance Time    Variance Reason []   Refusal due to:     []   Medical hold/reason:    []   Illness   []   Off Unit for test/procedure  []   Extra time needed to complete task  []   Therapeutic need  []   Make up mins were attempted but pt unable to complete due to (specify)  []   Other (specify):   Restrictions Restrictions/Precautions: General Precautions, Fall Risk, Weight Bearing (NWB LLE with wound vac, IV LUE)         Communication with other providers: [x]   OK to see per nursing:     []   Spoke with team member regarding:      Subjective observations and cognitive status: Pt sitting in w/c upon arrival and agreeable to therapy. Pt verbalizing difficulty maintaining NWB in LLE.     Pain level/location:  N/a  /10       Location:    Vitals taken during session    Discharge recommendations  Anticipated discharge date:  TBD  Destination: []home alone   []home alone w assist prn   [] home w/ family    [] Continuous supervision       []SNF    [] Assisted living     [] Other:   Continued therapy: []HHC OT  []OUTPATIENT  OT   [] No Further OT  Equipment needs: TBD       ADLs:    Toileting: Toileting Hygiene  Assistance needed: Partial/moderate assistance  Comment: Mod A to manage pj bottoms over hips this date while in stance, pt reaches to L side and pulls up while therapist pulls R side up, pt holding onto R GB to steady self c cueing to maintain NWB status  CARE  Other:      Patient/Caregiver Education and Training:   []   Adaptive Equipment Use  [x]   Bed Mobility/Transfer Technique/Safety  []   Energy Conservation Tips  []   Family training  [x]   Postural Awareness  [x]   Safety During Functional Activities  []   Reinforced Patient's Precautions       Treatment Plan for Next Session: Follow OT POC.      Assessment:  This pt demonstrated a positive response to today's treatment as evidenced by engaging in therapy.  The patient is making progress toward established goals as evidenced by QI scores. Ongoing deficits are observed in the areas of adherence to precautions and continued focus on this is recommended.       Treatment/Activity Tolerance:   [x] Tolerated treatment with no adverse effects    [] Patient limited by fatigue  [] Patient limited by pain   [] Patient limited by medical complications:    [] Adverse reaction to Tx:   [] Significant change in status    Safety:       [x]  bed alarm set    []  chair alarm set    []  Pt refused alarms                []  Telesitter activated      [x]  Gait belt used during tx session      []other:       Number of Minutes/Billable Intervention  Therapeutic Exercise 45   ADL Self-care 15   Neuro Re-Ed    Therapeutic Activity    Group    Other:    TOTAL 60       Social History  Social/Functional History  Lives With: Spouse (Meredith)  Type of Home: House  Home Layout: Two level, Able to Live on Main level with bedroom/bathroom  Home Access: Stairs to enter with rails  Entrance Stairs - Number of Steps: 7 MAUREEN  Entrance Stairs - Rails: Left  Bathroom Shower/Tub: Walk-in shower (however was sponge bathing 2* BLE wounds)  Bathroom Toilet: Standard  Bathroom Equipment: Shower chair, Grab bars in shower, Grab bars around toilet  Bathroom Accessibility: Walker accessible (usually left walker outside door, definitely not W/C accessible)  Home Equipment: Walker - Rolling, Cane, Sock aid, Reacher  Has the patient had two or more falls in the                                       Times per week: 5 days per week for a minimum of 60 minutes/day plus group as appropriate for 60 minutes.  Treatment to include Occupational Therapy Plan  Current Treatment Recommendations: Strengthening, Balance training, Functional mobility training, Endurance training, Safety education & training, Patient/Caregiver education & training, Equipment evaluation, education, & procurement, Positioning, Self-Care / ADL, Home management training, Group Therapy    Electronically signed by   SUNSHINE Carr,  5/3/2025, 2:31 PM

## 2025-05-03 NOTE — PLAN OF CARE
Problem: Chronic Conditions and Co-morbidities  Goal: Patient's chronic conditions and co-morbidity symptoms are monitored and maintained or improved  Outcome: Progressing     Problem: Discharge Planning  Goal: Discharge to home or other facility with appropriate resources  Outcome: Progressing     Problem: Safety - Adult  Goal: Free from fall injury  Outcome: Progressing     Problem: ABCDS Injury Assessment  Goal: Absence of physical injury  Outcome: Progressing     Problem: Nutrition Deficit:  Goal: Optimize nutritional status  Outcome: Progressing     Problem: Skin/Tissue Integrity  Goal: Skin integrity remains intact  Description: 1.  Monitor for areas of redness and/or skin breakdown2.  Assess vascular access sites hourly3.  Every 4-6 hours minimum:  Change oxygen saturation probe site4.  Every 4-6 hours:  If on nasal continuous positive airway pressure, respiratory therapy assess nares and determine need for appliance change or resting period  Outcome: Progressing     Problem: Pain  Goal: Verbalizes/displays adequate comfort level or baseline comfort level  Outcome: Progressing

## 2025-05-03 NOTE — PROGRESS NOTES
Physical Rehabilitation: OCCUPATIONAL THERAPY     [x] daily progress note       [] discharge       Patient Name:  Bandar De La Torre   :  1960 MRN: [id number]  Room:  [address] [place] [address] Date of Admission: 2025  Rehabilitation Diagnosis:   Local infection of the skin and subcutaneous tissue, unspecified [L08.9]  Chronic osteomyelitis of left foot (HCC) [M86.672]       Date 5/3/2025       Day of ARU Week:  4   Time IN/OUT 9500-5669   Individual Tx Minutes 60   Group Tx Minutes    Co-Treat Minutes    Concurrent Tx Minutes    TOTAL Tx Time Mins 60   Variance Time    Variance Reason []   Refusal due to:     []   Medical hold/reason:    []   Illness   []   Off Unit for test/procedure  []   Extra time needed to complete task  []   Therapeutic need  []   Make up mins were attempted but pt unable to complete due to (specify)  []   Other (specify):   Restrictions Restrictions/Precautions  Restrictions/Precautions: General Precautions, Fall Risk, Weight Bearing (NWB LLE with wound vac, IV LUE)  Implants Present? : Pacemaker      Communication with other providers: [x]   OK to see per nursing:     []   Spoke with team member regarding:      Subjective observations and cognitive status: \"I'm not having any pain.  I'm frustrated, because I'm not able to walk.\"  \"I don't want to change, but I would like to wash my face & brush me teeth.\"  \"I did poop.\"     Pain level/location:   0 /10       Location:    Vitals taken during treatment:     Discharge recommendations  Anticipated discharge date:     Destination: []home alone   []home alone w assist prn [] home w/ family    [] Continuous supervision       []SNF            [] Assisted living     [] Other:   Continued therapy: []HHC OT  []OUTPATIENT  OT   [] No Further OT  Equipment needs:         Eating/Feeding:         Extremity Used:        []    R UE []    L UE         Dentures: []   N/A    []    Partial []    Full  Adaptive Equipment Used:        Grooming:     Responsibilities: Yes  Meal Prep Responsibility: Primary  Laundry Responsibility: No  Cleaning Responsibility: Secondary  Bill Paying/Finance Responsibility: Secondary  Shopping Responsibility: No  Dependent Care Responsibility: No  Health Care Management: Secondary (wife sets up pillbox, pt takes them and also manages insulin)  Prior Level of Assist for Ambulation: Independent household ambulator, with or without device (would use RW \"sometimes\" (sounds like he would abandon); used it for dr appts)  Prior Level of Assist for Transfers: Independent  Active : No  Patient's  Info: wife  Mode of Transportation: SUV  Occupation: Retired  Additional Comments: Pt typically sleeps in a regular bed at home    Objective                                                                                    Goals:  (Update in navigator)  Short Term Goals  Time Frame for Short Term Goals: STGs=LTGs:  Long Term Goals  Time Frame for Long Term Goals : 7 days or until d/c  Long Term Goal 1: Pt will complete grooming tasks Ind  Long Term Goal 2: Pt will complete total body bathing c setup  Long Term Goal 3: Pt will complete UB dressing Ind (using W/C for setup)  Long Term Goal 4: Pt will complete LB dressing c setup  Long Term Goal 5: Pt will doff/don footwear c min A  Additional Goals?: Yes  Long Term Goal 6: Pt will complete toileting c min A  Long Term Goal 7: Pt will complete functional transfers (bed, chair, toilet) c DME PRN and mod I  Long Term Goal 8: Pt will perform therex/therax to facilitate increased strength/endurance/ax tolerance (c emphasis on BUE endurance) c SBA  Long Term Goal 9: Pt will complete simple IADLs from W/C level mod I:        Plan of Care                                                                              Times per week: 5 days per week for a minimum of 60 minutes/day plus group as appropriate for 60 minutes.  Treatment to include Occupational Therapy Plan  Current Treatment

## 2025-05-03 NOTE — PROGRESS NOTES
Progress Note( Dr. Diehl)  5/3/2025  Subjective:   Admit Date: 4/30/2025  PCP: MEETA Diehl MD    Admitted For : Left foot /heel ulcer and also has left calcaneus osteomyelitis    Consulted For: Better control of blood glucose    Interval History: Patient with blood glucose improving with use of U500 insulin  Had debridement done of the left heel ulcer and wound vacuum was placed    Denies any chest pains,   Denies SOB .   Denies nausea or vomiting.   No new bowel or bladder symptoms.       Intake/Output Summary (Last 24 hours) at 5/3/2025 0818  Last data filed at 5/3/2025 0646  Gross per 24 hour   Intake 480 ml   Output 1625 ml   Net -1145 ml       DATA    CBC:   No results for input(s): \"WBC\", \"HGB\", \"PLT\" in the last 72 hours.   CMP:  No results for input(s): \"NA\", \"K\", \"CL\", \"CO2\", \"BUN\", \"CREATININE\", \"GLU\", \"CALCIUM\", \"LABALBU\", \"BILITOT\", \"ALKPHOS\", \"AST\", \"ALT\" in the last 72 hours.    Invalid input(s): \"PROT\"    Lipids:   Lab Results   Component Value Date/Time    CHOL 205 04/11/2025 08:12 AM    HDL 38 04/11/2025 08:12 AM    TRIG 83 04/11/2025 08:12 AM     Glucose:  Recent Labs     05/02/25  1929 05/03/25  0221 05/03/25  0738   POCGLU 126* 141* 203*     [id number]:  Lab Results   Component Value Date/Time    LABA1C 9.6 04/11/2025 08:12 AM     High Sensitivity TSH:   Lab Results   Component Value Date/Time    TSHHS 1.800 08/29/2019 10:06 AM     Free T3: No results found for: \"FT3\"  Free T4:No results found for: \"T4FREE\"    No orders to display        Scheduled Medicines   Medications:    atorvastatin  20 mg Oral Nightly    sodium chloride flush  5-40 mL IntraVENous 2 times per day    empagliflozin  10 mg Oral Daily    ezetimibe  10 mg Oral Daily    finasteride  5 mg Oral Daily    folic acid-pyridoxine-cyancobalamin 1.13-25-2 tablet  1 tablet Oral Daily    metoprolol succinate  25 mg Oral Daily    potassium chloride  40 mEq Oral BID    ranolazine  500 mg Oral BID    rivaroxaban  20 mg Oral Dinner  frequently   Modified  the dose of Insulin/ other medicines as needed  Patient was transferred to ARU   Patient be started participating in physical activities of ARU  Will follow     .     MEETA Diehl MD,

## 2025-05-03 NOTE — PLAN OF CARE
Problem: Safety - Adult  Goal: Free from fall injury  5/3/2025 1547 by Ron Remy, RN  Outcome: Progressing  5/3/2025 0357 by Katina Mancera RN  Outcome: Progressing     Problem: Skin/Tissue Integrity  Goal: Skin integrity remains intact  Description: 1.  Monitor for areas of redness and/or skin breakdown2.  Assess vascular access sites hourly3.  Every 4-6 hours minimum:  Change oxygen saturation probe site4.  Every 4-6 hours:  If on nasal continuous positive airway pressure, respiratory therapy assess nares and determine need for appliance change or resting period  5/3/2025 0357 by Katina Mancera RN  Outcome: Progressing

## 2025-05-03 NOTE — PROGRESS NOTES
Physical Therapy  [x] daily progress note       [] discharge       Patient Name:  Bandar De La Torre   :  1960 MRN: [id number]  Room:  [address] [place] [address] Date of Admission: 2025  Rehabilitation Diagnosis:   Local infection of the skin and subcutaneous tissue, unspecified [L08.9]  Chronic osteomyelitis of left foot (HCC) [M86.672]       Date 5/3/2025       Day of ARU Week:  4   Time IN//934   Individual Tx Minutes 60   Group Tx Minutes    Co-Treat Minutes    Concurrent Tx Minutes    TOTAL Tx Time Mins 60   Variance Time    Variance Time []   Refusal due to:     []   Medical hold/reason:    []   Illness   []   Off Unit for test/procedure  []   Extra time needed to complete task  []   Therapeutic need  []   Other (specify):   Restrictions Restrictions/Precautions  Restrictions/Precautions: General Precautions, Fall Risk, Weight Bearing (NWB LLE with wound vac, IV LUE)  Implants Present? : Pacemaker      Interdisciplinary communication [x]   Cleared for therapy per nursing     []   RN notified about issues during session  []   RN updated on pt performance  []   Spoke with   []   Spoke with OT  []   Spoke with MD  []   Other:    Subjective observations and cognitive status: Pt in supine watching videos, but stops shortly after PT introduction. Pt reports he is frustrated with not being able to put weight on his foot     Pain level/location:   2 /10       Location: L foot   Discharge recommendations  Anticipated discharge date:  tbd  Destination: []home alone   []home alone with assist PRN     [x] home w/ family      [] Continuous supervision  []SNF    [] Assisted living     [] Other:  Continued therapy: [x]HHC PT  []OUTPATIENT  PT   [] No Further PT  []SNF PT  Caregiver training recommended: []Yes  [] No   Equipment needs: stair lift, Patient requires the assistance of a heavy-duty wheelchair to successfully complete daily living tasks of toileting, feeding, bathing, dressing, and grooming or any  Ambulation: Independent household ambulator, with or without device (would use RW \"sometimes\" (sounds like he would abandon); used it for dr appts)  Prior Level of Assist for Transfers: Independent  Active : No  Patient's  Info: wife  Mode of Transportation: SUV  Occupation: Retired  Additional Comments: Pt typically sleeps in a regular bed at home    Objective                                                                                    Goals:  (Update in navigator)  Short Term Goals  Time Frame for Short Term Goals: 12 days STG=LTG  Short Term Goal 1: Pt will perform bed mobility with mod I including wound vac line management  Short Term Goal 2: Pt will perform sit to stand and car transfer with SBA, w/c <>bed trasnfers with mod I without slideboard including wound vac line management  Short Term Goal 3: Pt will propel w/c 150' including household situations with mod I, including wound vac line management  Short Term Goal 4: Pt will  light object from standing using 2ww and reacher with min assist:   :        Plan of Care                                                                              Times per week: 5 days per week for a minimum of 60 minutes/day plus group as appropriate for 60 minutes.  Treatment to include Current Treatment Recommendations: Strengthening, ROM, Balance training, Functional mobility training, Transfer training, IADL training, Wheelchair mobility training, Neuromuscular re-education, Pain management, Patient/Caregiver education & training, Safety education & training, Home exercise program, Equipment evaluation, education, & procurement, Positioning, Group Therapy, Therapeutic activities    Electronically signed by   Desi Caldwell PT,  5/3/2025, 8:18 AM

## 2025-05-04 VITALS
TEMPERATURE: 98.1 F | RESPIRATION RATE: 18 BRPM | WEIGHT: 306.2 LBS | BODY MASS INDEX: 40.58 KG/M2 | OXYGEN SATURATION: 97 % | DIASTOLIC BLOOD PRESSURE: 51 MMHG | HEART RATE: 62 BPM | HEIGHT: 73 IN | SYSTOLIC BLOOD PRESSURE: 96 MMHG

## 2025-05-04 LAB
GLUCOSE BLD-MCNC: 115 MG/DL (ref 74–99)
GLUCOSE BLD-MCNC: 171 MG/DL (ref 74–99)
GLUCOSE BLD-MCNC: 178 MG/DL (ref 74–99)
GLUCOSE BLD-MCNC: 227 MG/DL (ref 74–99)
GLUCOSE BLD-MCNC: 248 MG/DL (ref 74–99)
GLUCOSE BLD-MCNC: 273 MG/DL (ref 74–99)

## 2025-05-04 PROCEDURE — 2580000003 HC RX 258: Performed by: SURGERY

## 2025-05-04 PROCEDURE — 6360000002 HC RX W HCPCS: Performed by: SURGERY

## 2025-05-04 PROCEDURE — 94664 DEMO&/EVAL PT USE INHALER: CPT

## 2025-05-04 PROCEDURE — 94761 N-INVAS EAR/PLS OXIMETRY MLT: CPT

## 2025-05-04 PROCEDURE — 2500000003 HC RX 250 WO HCPCS: Performed by: SURGERY

## 2025-05-04 PROCEDURE — 82962 GLUCOSE BLOOD TEST: CPT

## 2025-05-04 PROCEDURE — 6370000000 HC RX 637 (ALT 250 FOR IP): Performed by: SURGERY

## 2025-05-04 PROCEDURE — 1280000000 HC REHAB R&B

## 2025-05-04 PROCEDURE — 94150 VITAL CAPACITY TEST: CPT

## 2025-05-04 PROCEDURE — 6370000000 HC RX 637 (ALT 250 FOR IP): Performed by: PHYSICAL MEDICINE & REHABILITATION

## 2025-05-04 RX ADMIN — ATORVASTATIN CALCIUM 20 MG: 10 TABLET, FILM COATED ORAL at 20:20

## 2025-05-04 RX ADMIN — RANOLAZINE 500 MG: 500 TABLET, EXTENDED RELEASE ORAL at 20:20

## 2025-05-04 RX ADMIN — SPIRONOLACTONE 25 MG: 50 TABLET ORAL at 20:21

## 2025-05-04 RX ADMIN — RIVAROXABAN 20 MG: 20 TABLET, FILM COATED ORAL at 17:17

## 2025-05-04 RX ADMIN — EMPAGLIFLOZIN 10 MG: 10 TABLET, FILM COATED ORAL at 08:52

## 2025-05-04 RX ADMIN — EZETIMIBE 10 MG: 10 TABLET ORAL at 08:52

## 2025-05-04 RX ADMIN — INSULIN HUMAN 15 UNITS: 500 INJECTION, SOLUTION SUBCUTANEOUS at 08:33

## 2025-05-04 RX ADMIN — INSULIN HUMAN 15 UNITS: 500 INJECTION, SOLUTION SUBCUTANEOUS at 17:18

## 2025-05-04 RX ADMIN — FINASTERIDE 5 MG: 5 TABLET, FILM COATED ORAL at 08:52

## 2025-05-04 RX ADMIN — METOPROLOL SUCCINATE 25 MG: 25 TABLET, EXTENDED RELEASE ORAL at 08:52

## 2025-05-04 RX ADMIN — RANOLAZINE 500 MG: 500 TABLET, EXTENDED RELEASE ORAL at 08:52

## 2025-05-04 RX ADMIN — CLOPIDOGREL BISULFATE 75 MG: 75 TABLET, FILM COATED ORAL at 08:52

## 2025-05-04 RX ADMIN — TAMSULOSIN HYDROCHLORIDE 0.4 MG: 0.4 CAPSULE ORAL at 08:52

## 2025-05-04 RX ADMIN — POTASSIUM CHLORIDE 40 MEQ: 1500 TABLET, EXTENDED RELEASE ORAL at 08:51

## 2025-05-04 RX ADMIN — CEFTRIAXONE SODIUM 2000 MG: 2 INJECTION, POWDER, FOR SOLUTION INTRAMUSCULAR; INTRAVENOUS at 12:37

## 2025-05-04 RX ADMIN — INSULIN HUMAN 5 UNITS: 500 INJECTION, SOLUTION SUBCUTANEOUS at 20:18

## 2025-05-04 RX ADMIN — INSULIN HUMAN 15 UNITS: 500 INJECTION, SOLUTION SUBCUTANEOUS at 12:27

## 2025-05-04 RX ADMIN — SODIUM CHLORIDE, PRESERVATIVE FREE 10 ML: 5 INJECTION INTRAVENOUS at 12:40

## 2025-05-04 RX ADMIN — INSULIN HUMAN 4 UNITS: 500 INJECTION, SOLUTION SUBCUTANEOUS at 18:09

## 2025-05-04 RX ADMIN — SODIUM CHLORIDE, PRESERVATIVE FREE 10 ML: 5 INJECTION INTRAVENOUS at 20:22

## 2025-05-04 RX ADMIN — Medication 1 TABLET: at 08:52

## 2025-05-04 RX ADMIN — TORSEMIDE 10 MG: 20 TABLET ORAL at 17:17

## 2025-05-04 RX ADMIN — POTASSIUM CHLORIDE 40 MEQ: 1500 TABLET, EXTENDED RELEASE ORAL at 20:20

## 2025-05-04 RX ADMIN — SPIRONOLACTONE 25 MG: 50 TABLET ORAL at 08:52

## 2025-05-04 ASSESSMENT — PAIN SCALES - GENERAL: PAINLEVEL_OUTOF10: 0

## 2025-05-04 NOTE — PLAN OF CARE
Problem: Chronic Conditions and Co-morbidities  Goal: Patient's chronic conditions and co-morbidity symptoms are monitored and maintained or improved  Outcome: Progressing     Problem: Discharge Planning  Goal: Discharge to home or other facility with appropriate resources  Outcome: Progressing     Problem: Safety - Adult  Goal: Free from fall injury  5/4/2025 0231 by Katina Mancera RN  Outcome: Progressing  5/3/2025 1547 by Ron Remy RN  Outcome: Progressing     Problem: ABCDS Injury Assessment  Goal: Absence of physical injury  Outcome: Progressing     Problem: Nutrition Deficit:  Goal: Optimize nutritional status  Outcome: Progressing     Problem: Skin/Tissue Integrity  Goal: Skin integrity remains intact  Description: 1.  Monitor for areas of redness and/or skin breakdown2.  Assess vascular access sites hourly3.  Every 4-6 hours minimum:  Change oxygen saturation probe site4.  Every 4-6 hours:  If on nasal continuous positive airway pressure, respiratory therapy assess nares and determine need for appliance change or resting period  Outcome: Progressing     Problem: Pain  Goal: Verbalizes/displays adequate comfort level or baseline comfort level  Outcome: Progressing

## 2025-05-04 NOTE — PROGRESS NOTES
Progress Note( Dr. Diehl)  5/4/2025  Subjective:   Admit Date: 4/30/2025  PCP: MEETA Diehl MD    Admitted For : Left foot /heel ulcer and also has left calcaneus osteomyelitis    Consulted For: Better control of blood glucose    Interval History: Patient with blood glucose improving with use of U500 insulin  Had debridement done of the left heel ulcer and wound vacuum was placed    Denies any chest pains,   Denies SOB .   Denies nausea or vomiting.   No new bowel or bladder symptoms.       Intake/Output Summary (Last 24 hours) at 5/4/2025 1044  Last data filed at 5/4/2025 0844  Gross per 24 hour   Intake 200 ml   Output 3150 ml   Net -2950 ml       DATA    CBC:   No results for input(s): \"WBC\", \"HGB\", \"PLT\" in the last 72 hours.   CMP:  No results for input(s): \"NA\", \"K\", \"CL\", \"CO2\", \"BUN\", \"CREATININE\", \"GLU\", \"CALCIUM\", \"LABALBU\", \"BILITOT\", \"ALKPHOS\", \"AST\", \"ALT\" in the last 72 hours.    Invalid input(s): \"PROT\"    Lipids:   Lab Results   Component Value Date/Time    CHOL 205 04/11/2025 08:12 AM    HDL 38 04/11/2025 08:12 AM    TRIG 83 04/11/2025 08:12 AM     Glucose:  Recent Labs     05/03/25  1634 05/04/25  0220 05/04/25  0709   POCGLU 267* 115* 171*     [id number]:  Lab Results   Component Value Date/Time    LABA1C 9.6 04/11/2025 08:12 AM     High Sensitivity TSH:   Lab Results   Component Value Date/Time    TSHHS 1.800 08/29/2019 10:06 AM     Free T3: No results found for: \"FT3\"  Free T4:No results found for: \"T4FREE\"    No orders to display        Scheduled Medicines   Medications:    atorvastatin  20 mg Oral Nightly    sodium chloride flush  5-40 mL IntraVENous 2 times per day    empagliflozin  10 mg Oral Daily    ezetimibe  10 mg Oral Daily    finasteride  5 mg Oral Daily    folic acid-pyridoxine-cyancobalamin 1.13-25-2 tablet  1 tablet Oral Daily    metoprolol succinate  25 mg Oral Daily    potassium chloride  40 mEq Oral BID    ranolazine  500 mg Oral BID    rivaroxaban  20 mg Oral Dinner

## 2025-05-04 NOTE — PROGRESS NOTES
Night Shift RN Notes:  DM foot wound on left heel on [employer] wound vac on continuous suction @ 125 mmHg no leak noted and working great with serosanguinous output.  Repositioning of patient and floating heels with pillows were done to improve circulation and prevent pressure sores.  Blood sugar monitoring per order done

## 2025-05-05 LAB
GLUCOSE BLD-MCNC: 172 MG/DL (ref 74–99)
GLUCOSE BLD-MCNC: 182 MG/DL (ref 74–99)
GLUCOSE BLD-MCNC: 215 MG/DL (ref 74–99)
GLUCOSE BLD-MCNC: 219 MG/DL (ref 74–99)
GLUCOSE BLD-MCNC: 233 MG/DL (ref 74–99)
GLUCOSE BLD-MCNC: 268 MG/DL (ref 74–99)

## 2025-05-05 PROCEDURE — 97110 THERAPEUTIC EXERCISES: CPT

## 2025-05-05 PROCEDURE — 6370000000 HC RX 637 (ALT 250 FOR IP): Performed by: SURGERY

## 2025-05-05 PROCEDURE — 97530 THERAPEUTIC ACTIVITIES: CPT

## 2025-05-05 PROCEDURE — 2580000003 HC RX 258: Performed by: SURGERY

## 2025-05-05 PROCEDURE — 99024 POSTOP FOLLOW-UP VISIT: CPT | Performed by: SURGERY

## 2025-05-05 PROCEDURE — 82962 GLUCOSE BLOOD TEST: CPT

## 2025-05-05 PROCEDURE — 6360000002 HC RX W HCPCS: Performed by: SURGERY

## 2025-05-05 PROCEDURE — 97542 WHEELCHAIR MNGMENT TRAINING: CPT

## 2025-05-05 PROCEDURE — 6370000000 HC RX 637 (ALT 250 FOR IP): Performed by: PHYSICAL MEDICINE & REHABILITATION

## 2025-05-05 PROCEDURE — 99233 SBSQ HOSP IP/OBS HIGH 50: CPT | Performed by: PHYSICAL MEDICINE & REHABILITATION

## 2025-05-05 PROCEDURE — 2500000003 HC RX 250 WO HCPCS: Performed by: SURGERY

## 2025-05-05 PROCEDURE — 94761 N-INVAS EAR/PLS OXIMETRY MLT: CPT

## 2025-05-05 PROCEDURE — 97605 NEG PRS WND THER DME<=50SQCM: CPT

## 2025-05-05 PROCEDURE — 1280000000 HC REHAB R&B

## 2025-05-05 RX ORDER — HYDROCODONE POLISTIREX AND CHLORPHENIRAMINE POLISTIREX 10; 8 MG/5ML; MG/5ML
5 SUSPENSION, EXTENDED RELEASE ORAL EVERY 12 HOURS PRN
Refills: 0 | Status: DISCONTINUED | OUTPATIENT
Start: 2025-05-05 | End: 2025-05-05

## 2025-05-05 RX ADMIN — SODIUM CHLORIDE, PRESERVATIVE FREE 10 ML: 5 INJECTION INTRAVENOUS at 12:41

## 2025-05-05 RX ADMIN — INSULIN HUMAN 15 UNITS: 500 INJECTION, SOLUTION SUBCUTANEOUS at 12:31

## 2025-05-05 RX ADMIN — Medication 1 TABLET: at 08:45

## 2025-05-05 RX ADMIN — SODIUM CHLORIDE, PRESERVATIVE FREE 10 ML: 5 INJECTION INTRAVENOUS at 21:54

## 2025-05-05 RX ADMIN — INSULIN HUMAN 15 UNITS: 500 INJECTION, SOLUTION SUBCUTANEOUS at 08:29

## 2025-05-05 RX ADMIN — TAMSULOSIN HYDROCHLORIDE 0.4 MG: 0.4 CAPSULE ORAL at 08:44

## 2025-05-05 RX ADMIN — EMPAGLIFLOZIN 10 MG: 10 TABLET, FILM COATED ORAL at 08:44

## 2025-05-05 RX ADMIN — RIVAROXABAN 20 MG: 20 TABLET, FILM COATED ORAL at 17:43

## 2025-05-05 RX ADMIN — METOPROLOL SUCCINATE 25 MG: 25 TABLET, EXTENDED RELEASE ORAL at 08:44

## 2025-05-05 RX ADMIN — POTASSIUM CHLORIDE 40 MEQ: 1500 TABLET, EXTENDED RELEASE ORAL at 08:44

## 2025-05-05 RX ADMIN — SPIRONOLACTONE 25 MG: 50 TABLET ORAL at 21:53

## 2025-05-05 RX ADMIN — FINASTERIDE 5 MG: 5 TABLET, FILM COATED ORAL at 08:44

## 2025-05-05 RX ADMIN — EZETIMIBE 10 MG: 10 TABLET ORAL at 08:44

## 2025-05-05 RX ADMIN — RANOLAZINE 500 MG: 500 TABLET, EXTENDED RELEASE ORAL at 21:53

## 2025-05-05 RX ADMIN — SPIRONOLACTONE 25 MG: 50 TABLET ORAL at 08:42

## 2025-05-05 RX ADMIN — INSULIN HUMAN 10 UNITS: 500 INJECTION, SOLUTION SUBCUTANEOUS at 18:28

## 2025-05-05 RX ADMIN — INSULIN HUMAN 5 UNITS: 500 INJECTION, SOLUTION SUBCUTANEOUS at 12:31

## 2025-05-05 RX ADMIN — CLOPIDOGREL BISULFATE 75 MG: 75 TABLET, FILM COATED ORAL at 08:42

## 2025-05-05 RX ADMIN — INSULIN HUMAN 5 UNITS: 500 INJECTION, SOLUTION SUBCUTANEOUS at 22:11

## 2025-05-05 RX ADMIN — TORSEMIDE 10 MG: 20 TABLET ORAL at 17:43

## 2025-05-05 RX ADMIN — RANOLAZINE 500 MG: 500 TABLET, EXTENDED RELEASE ORAL at 08:44

## 2025-05-05 RX ADMIN — POTASSIUM CHLORIDE 40 MEQ: 1500 TABLET, EXTENDED RELEASE ORAL at 21:53

## 2025-05-05 RX ADMIN — CEFTRIAXONE SODIUM 2000 MG: 2 INJECTION, POWDER, FOR SOLUTION INTRAMUSCULAR; INTRAVENOUS at 11:38

## 2025-05-05 RX ADMIN — ATORVASTATIN CALCIUM 20 MG: 10 TABLET, FILM COATED ORAL at 21:53

## 2025-05-05 RX ADMIN — INSULIN HUMAN 15 UNITS: 500 INJECTION, SOLUTION SUBCUTANEOUS at 18:29

## 2025-05-05 ASSESSMENT — PAIN SCALES - GENERAL: PAINLEVEL_OUTOF10: 0

## 2025-05-05 NOTE — PLAN OF CARE
Problem: Chronic Conditions and Co-morbidities  Goal: Patient's chronic conditions and co-morbidity symptoms are monitored and maintained or improved  Outcome: Progressing  Flowsheets (Taken 5/4/2025 2024)  Care Plan - Patient's Chronic Conditions and Co-Morbidity Symptoms are Monitored and Maintained or Improved: Monitor and assess patient's chronic conditions and comorbid symptoms for stability, deterioration, or improvement     Problem: Discharge Planning  Goal: Discharge to home or other facility with appropriate resources  Outcome: Progressing  Flowsheets (Taken 5/4/2025 2024)  Discharge to home or other facility with appropriate resources: Identify barriers to discharge with patient and caregiver     Problem: Safety - Adult  Goal: Free from fall injury  Outcome: Progressing     Problem: ABCDS Injury Assessment  Goal: Absence of physical injury  Outcome: Progressing     Problem: Nutrition Deficit:  Goal: Optimize nutritional status  Outcome: Progressing     Problem: Skin/Tissue Integrity  Goal: Skin integrity remains intact  Description: 1.  Monitor for areas of redness and/or skin breakdown2.  Assess vascular access sites hourly3.  Every 4-6 hours minimum:  Change oxygen saturation probe site4.  Every 4-6 hours:  If on nasal continuous positive airway pressure, respiratory therapy assess nares and determine need for appliance change or resting period  Outcome: Progressing  Flowsheets (Taken 5/4/2025 2024)  Skin Integrity Remains Intact: Monitor for areas of redness and/or skin breakdown     Problem: Pain  Goal: Verbalizes/displays adequate comfort level or baseline comfort level  Outcome: Progressing

## 2025-05-05 NOTE — PROGRESS NOTES
Occupational Therapy  Physical Rehabilitation: OCCUPATIONAL THERAPY     [x] daily progress note       [] discharge       Patient Name:  Bandar De La Torre   :  1960 MRN: [id number]  Room:  [address] [place] [address] Date of Admission: 2025  Rehabilitation Diagnosis:   Local infection of the skin and subcutaneous tissue, unspecified [L08.9]  Chronic osteomyelitis of left foot (HCC) [M86.672]       Date 2025       Day of ARU Week:  6   Time IN/OUT 3716-3148   Individual Tx Minutes 60   Group Tx Minutes    Co-Treat Minutes    Concurrent Tx Minutes    TOTAL Tx Time Mins 60   Variance Time    Variance Reason []   Refusal due to:     []   Medical hold/reason:    []   Illness   []   Off Unit for test/procedure  []   Extra time needed to complete task  []   Therapeutic need  []   Make up mins were attempted but pt unable to complete due to (specify)  []   Other (specify):   Restrictions Restrictions/Precautions: General Precautions, Fall Risk, Weight Bearing (NWB LLE with wound vac, IV LUE)         Communication with other providers: [x]   OK to see per nursing:     []   Spoke with team member regarding:      Subjective observations and cognitive status: Pt sitting EOB on approach; pleasant and agreeable to therapy.      Pain level/location:    10       Location:    Vitals taken during session:    Discharge recommendations  Anticipated discharge date:  TBD  Destination: []home alone   []home alone w assist prn   [] home w/ family    [] Continuous supervision       []SNF    [] Assisted living     [] Other:   Continued therapy: []HHC OT  []OUTPATIENT  OT   [] No Further OT  Equipment needs: TBD     Toileting:   denied need     Toilet Transfers:   NA   Device Used:    []   Standard Toilet         []   Grab Bars           []  Bedside Commode       []   Elevated Toilet          []   Other:        Bed Mobility:           [x]   Pt received out of bed       Transfers:    Slide Board transfer: CGA from EOB<>W/C c S/U of slide

## 2025-05-05 NOTE — PROGRESS NOTES
GENERAL SURGERY PROGRESS NOTE    Bandar De La Torre is a 64 y.o. male with left heel osteomyelitis s/p debridement and VAC placement.                Subjective:  Doing ok today. VAC changed by wound care today, wound with some continued improvement in appearance.    Objective:    Vitals: VITALS:  /61   Pulse 60   Temp 97.3 °F (36.3 °C) (Oral)   Resp 18   Ht 1.854 m (6' 1\")   Wt (!) 137.9 kg (304 lb 1.6 oz)   SpO2 95%   BMI 40.12 kg/m²     I/O: 05/04 0701 - 05/05 0700  In: 120 [P.O.:120]  Out: 3645 [Urine:3645]    Labs/Imaging Results:   Lab Results   Component Value Date/Time     04/28/2025 02:27 AM    K 4.1 04/28/2025 02:27 AM     04/28/2025 02:27 AM    CO2 23 04/28/2025 02:27 AM    BUN 41 04/28/2025 02:27 AM    CREATININE 1.1 04/28/2025 02:27 AM    GLUCOSE 144 04/28/2025 02:27 AM    CALCIUM 9.2 04/28/2025 02:27 AM      Lab Results   Component Value Date    WBC 7.6 04/28/2025    HGB 11.0 (L) 04/28/2025    HCT 36.0 (L) 04/28/2025    MCV 89.1 04/28/2025     04/28/2025       IV Fluids:   sodium chloride    dextrose    Scheduled Meds:   atorvastatin, 20 mg, Oral, Nightly    sodium chloride flush, 5-40 mL, IntraVENous, 2 times per day    empagliflozin, 10 mg, Oral, Daily    ezetimibe, 10 mg, Oral, Daily    finasteride, 5 mg, Oral, Daily    folic acid-pyridoxine-cyancobalamin 1.13-25-2 tablet, 1 tablet, Oral, Daily    metoprolol succinate, 25 mg, Oral, Daily    potassium chloride, 40 mEq, Oral, BID    ranolazine, 500 mg, Oral, BID    rivaroxaban, 20 mg, Oral, Dinner    spironolactone, 25 mg, Oral, BID    tamsulosin, 0.4 mg, Oral, Daily    torsemide, 10 mg, Oral, Dinner    insulin regular human, 15 Units, SubCUTAneous, Daily with breakfast    insulin regular human, 15 Units, SubCUTAneous, Lunch    insulin regular human, 15 Units, SubCUTAneous, Dinner    insulin regular human, 0-30 Units, SubCUTAneous, 4x Daily AC & HS    polyethylene glycol, 17 g, Oral, Daily    docusate sodium, 100 mg,

## 2025-05-05 NOTE — PATIENT CARE CONFERENCE
since initial assessment:    ADLs:    Eating: Eating  Assistance Needed: Independent  Comment: X  CARE Score: 6  Discharge Goal: Independent       Oral Hygiene: Oral Hygiene  Assistance Needed: Independent  Comment: Seated in W/C at sink  CARE Score: 6  Discharge Goal: Independent    Toileting: Toileting Hygiene  Assistance needed: Substantial/maximal assistance  Comment: Pt requires assist manage pants up/down while in stance maintaining NWB precuations and requires assist to perform BM hygiene  CARE Score: 2  Discharge Goal: Partial/moderate assistance      UB/LB Bathing: Shower/Bathe Self  Assistance Needed: Partial/moderate assistance  Comment: Performed sinkside ADL; able to wash UB, chest, UEs, axilla, abdomen, anterior perineal area and thighs; required assist to wash buttocks while doing W/C pushup and c good compliance to NWB LLE. Pt did not wash distal BLEs this date  CARE Score: 3  Discharge Goal: Supervision or touching assistance    UB Dressing: Upper Body Dressing  Assistance Needed: Setup or clean-up assistance  Comment: to doff/don T shirt  CARE Score: 5  Discharge Goal: Independent         LB Dressing: Lower Body Dressing  Assistance Needed: Partial/moderate assistance  Comment: Pt did not completely doff pants; pt weightshifted to manage pants down in W/C but required assist to manage back up by doing W/C push up c good compliance to NWB on LLE.  CARE Score: 3  Discharge Goal: Set-up or clean-up assistance    Donning and [place] Footwear: Putting On/Taking Off Footwear  Assistance Needed: Dependent  Comment: Seated EOB bringing LEs to side of bed to doff/don socks with SBA; cues for positioning of LLE to not put excessive pressure on L heel  CARE Score: 1  Discharge Goal: Partial/moderate assistance      Toilet Transfers:   Toilet Transfer  Assistance needed: Supervision or touching assistance  Comment: CGA slide board W/C<>BSC frame over toilet  Reason if not Attempted: Not attempted due to  tolerance for daily tasks.  [] Pt will improve bed mobility with reduced assist.  [x] Pt will improve safety in fx tasks with reduced cues/assist  [x] Pt will improve transfers with reduced assist  [x] Pt will improve toileting with reduced assist  [x] Pt will improve ADL's with use of adaptive equipment with reduced assist  [] Pt will improve pain mgmt for maximum participation in tx program  [] Pt will improve communication to get basic needs met on unit  [] Pt will improve swallowing for safe diet advancement with use of strategies  []  Plan for discharge to home.  [x]  Overall team goal being targeted this week: Improve transfers with reduced assist     Patient Strengths: Motivated, Cooperative, and Pleasant    Justification for Continued Stay  Based on my medical assessment of the patient and review of information from the interdisciplinary team as part of this weekly team conference, the patient continues to meet the following criteria for IRF level of care:  The patient requires active and ongoing intervention of multiple therapy disciplines  The patient requires and intensive rehabilitation therapy program  The patient requires continued physician supervision by a rehabilitation physician  The patient requires 24 hours rehab nursing care  The patient requires an intensive and coordinated interdisciplinary team approach to the delivery of rehabilitative care.     Assessment/Plan   [x]  The patient is making good progression towards their long term goals and is actively participating in and has a reasonable expectation to continue to benefit from the intensive rehabilitation therapy program   []  The estimated discharge date has been changed from initial team conference due to:   []  The estimated discharge destination has been changed from initial team conference due to:         Ongoing tx following discharge: [x][facility] OT PT NSG     []OUTPATIENT     [] No Further Treatment     [] Family/Caregiver Training  []

## 2025-05-05 NOTE — PROGRESS NOTES
Physical Therapy      [x] daily progress note       [] discharge       Patient Name:  Bandar De La Torre   :  1960 MRN: [id number]  Room:  [address] [place] [address] Date of Admission: 2025  Rehabilitation Diagnosis:   Local infection of the skin and subcutaneous tissue, unspecified [L08.9]  Chronic osteomyelitis of left foot (HCC) [M86.672]       Date 2025       Day of ARU Week:  6   Time IN/OUT 6693-5030  2649-2653   Individual Tx Minutes 75+45   TOTAL Tx Time Mins 105   Variance Time    Variance Time []   Refusal due to:     []   Medical hold/reason:    []   Illness   []   Off Unit for test/procedure  []   Extra time needed to complete task  []   Therapeutic need  []   Other (specify):   Restrictions Restrictions/Precautions  Restrictions/Precautions: General Precautions, Fall Risk, Weight Bearing (NWB LLE with wound vac, IV LUE)  Implants Present? : Pacemaker      Communication with other providers: [x]   OK to see per nursing:     []   Spoke with team member regarding:      Subjective observations and cognitive status: Pt up sitting EOB, spouse present in room initially; pt  agreeable to session, reports feeling \"grouchy\" but no specific reason. Meds received, ate full breakfast.   Wound vac emitting foul odor this session. Pt returned to bed at end of AM session due to wound care RN present and Dr Avalos arriving to check pts wound of foot.  VS at rest: O2 sats 98%, HR 64, /67  Pt reports from garage is level however then 7 steps up to main level or 7 steps down to basement; reports basement BR is WC accessible due to not enclosed,  however main level BR is not. Reports he and his wife are looking into a stair lift \"possibly\" but then states \"this situation is temporary.    Pain level/location:   0 /10       Location:    Discharge recommendations  Anticipated discharge date:  tbd  Destination: []home alone   []home alone with assist PRN     [x] home w/ family      [] Continuous supervision  []SNF

## 2025-05-05 NOTE — CONSULTS
[facility] Nurse  Consult Note       Bandar De La Torre  AGE: 64 y.o.   GENDER: male  : 1960  TODAY'S DATE:  2025    Subjective:     Reason for  Evaluation and Assessment: NPWT dressing change to left heel.      Bandar De La Torre is a 64 y.o. male referred by:   [x] Physician  [] Nursing  [] Other:     Wound Identification:  Wound Type: venous, diabetic, traumatic, skin tear, and surgical   Contributing Factors: edema, lymphedema, diabetes, chronic pressure, decreased mobility, and obesity        PAST MEDICAL HISTORY        Diagnosis Date    Atrial fibrillation (HCC)     Cerebral artery occlusion with cerebral infarction (HCC)     History of cardiac cath     --RCA has mid 50% stenosis and PLB branch has 70% stenosis noted. LVEDP very high    Hyperlipidemia     Hypertension     Type II or unspecified type diabetes mellitus without mention of complication, not stated as uncontrolled     Diagnsed about     Unspecified sleep apnea        PAST SURGICAL HISTORY    Past Surgical History:   Procedure Laterality Date    CARDIAC CATHETERIZATION      FOOT DEBRIDEMENT Left 2025    FOOT DEBRIDEMENT INCISION AND DRAINAGE with wound vac placement performed by Jakob Avalos MD at [facility]    PACEMAKER INSERTION      Done in the past at [facility] in 2024 by Dr. Ocampo    TOE AMPUTATION Right 2025    RIGHT FOOT 2ND AND 3RD TOE AMPUTATION WITH FOOT DEBRIDEMENT performed by Jakob Avalos MD at [facility]    TONSILLECTOMY      AT 15 YEARS OLD       FAMILY HISTORY    Family History   Problem Relation Age of Onset    Diabetes Mother     Diabetes Father     Cancer Father     Cancer Maternal Grandfather         PROSTATE CANCER       SOCIAL HISTORY    Social History     Tobacco Use    Smoking status: Never    Smokeless tobacco: Never   Vaping Use    Vaping status: Never Used   Substance Use Topics    Alcohol use: No    Drug use: No       ALLERGIES    No Known  04/30/25 0830   Drainage Description Serosanguinous 04/30/25 0830   Odor None 04/30/25 0830   Berenice-wound Assessment Intact 04/30/25 0830   Margins Defined edges;Attached edges 04/30/25 0830   Wound Thickness Description not for Pressure Injury Partial thickness 04/30/25 0830   Number of days: 23       Wound 04/11/25 #7 Left Anterior Lower Leg cluster (Active)   Wound Image   05/05/25 1030   Wound Etiology Venous 05/05/25 1030   Dressing Status New dressing applied 05/05/25 1030   Wound Cleansed Cleansed with saline 05/05/25 1030   Dressing/Treatment Collagen;Silicone border 05/05/25 1030   Offloading for Diabetic Foot Ulcers Post op shoe 04/24/25 1518   Wound Length (cm) 1 cm 05/05/25 1030   Wound Width (cm) 1 cm 05/05/25 1030   Wound Depth (cm) 0.1 cm 05/05/25 1030   Wound Surface Area (cm^2) 1 cm^2 05/05/25 1030   Change in Wound Size % (l*w) -300 05/05/25 1030   Wound Volume (cm^3) 0.1 cm^3 05/05/25 1030   Wound Healing % -300 05/05/25 1030   Post-Procedure Length (cm) 1 cm 04/24/25 1532   Post-Procedure Width (cm) 1.3 cm 04/24/25 1532   Post-Procedure Depth (cm) 0.3 cm 04/24/25 1532   Post-Procedure Surface Area (cm^2) 1.3 cm^2 04/24/25 1532   Post-Procedure Volume (cm^3) 0.39 cm^3 04/24/25 1532   Distance Tunneling (cm) 0 cm 05/05/25 1030   Tunneling Position ___ O'Clock 0 05/05/25 1030   Undermining Starts ___ O'Clock 0 05/05/25 1030   Undermining Ends___ O'Clock 0 05/05/25 1030   Undermining Maxium Distance (cm) 0 05/05/25 1030   Wound Assessment Pink/red 05/05/25 1030   Drainage Amount Small (< 25%) 05/05/25 1030   Drainage Description Serosanguinous 05/05/25 1030   Odor None 05/05/25 1030   Berenice-wound Assessment Intact 05/05/25 1030   Margins Defined edges 05/05/25 1030   Wound Thickness Description not for Pressure Injury Full thickness 05/05/25 1030   Number of days: 23       Wound 04/28/25 Foot Left;Dorsal cluster (Active)   Wound Image   04/28/25 0920   Wound Etiology Traumatic 05/05/25 1030   Dressing

## 2025-05-06 LAB
GLUCOSE BLD-MCNC: 129 MG/DL (ref 74–99)
GLUCOSE BLD-MCNC: 151 MG/DL (ref 74–99)
GLUCOSE BLD-MCNC: 210 MG/DL (ref 74–99)
GLUCOSE BLD-MCNC: 214 MG/DL (ref 74–99)
GLUCOSE BLD-MCNC: 256 MG/DL (ref 74–99)
GLUCOSE BLD-MCNC: 312 MG/DL (ref 74–99)

## 2025-05-06 PROCEDURE — 94761 N-INVAS EAR/PLS OXIMETRY MLT: CPT

## 2025-05-06 PROCEDURE — 2500000003 HC RX 250 WO HCPCS: Performed by: SURGERY

## 2025-05-06 PROCEDURE — 1280000000 HC REHAB R&B

## 2025-05-06 PROCEDURE — 6370000000 HC RX 637 (ALT 250 FOR IP): Performed by: PHYSICAL MEDICINE & REHABILITATION

## 2025-05-06 PROCEDURE — 6360000002 HC RX W HCPCS: Performed by: SURGERY

## 2025-05-06 PROCEDURE — 82962 GLUCOSE BLOOD TEST: CPT

## 2025-05-06 PROCEDURE — 97535 SELF CARE MNGMENT TRAINING: CPT

## 2025-05-06 PROCEDURE — 99233 SBSQ HOSP IP/OBS HIGH 50: CPT | Performed by: PHYSICAL MEDICINE & REHABILITATION

## 2025-05-06 PROCEDURE — 2580000003 HC RX 258: Performed by: SURGERY

## 2025-05-06 PROCEDURE — 97542 WHEELCHAIR MNGMENT TRAINING: CPT

## 2025-05-06 PROCEDURE — 6370000000 HC RX 637 (ALT 250 FOR IP): Performed by: SURGERY

## 2025-05-06 PROCEDURE — 97530 THERAPEUTIC ACTIVITIES: CPT

## 2025-05-06 PROCEDURE — 97110 THERAPEUTIC EXERCISES: CPT

## 2025-05-06 RX ADMIN — METOPROLOL SUCCINATE 25 MG: 25 TABLET, EXTENDED RELEASE ORAL at 10:15

## 2025-05-06 RX ADMIN — CEFTRIAXONE SODIUM 2000 MG: 2 INJECTION, POWDER, FOR SOLUTION INTRAMUSCULAR; INTRAVENOUS at 10:19

## 2025-05-06 RX ADMIN — CLOPIDOGREL BISULFATE 75 MG: 75 TABLET, FILM COATED ORAL at 10:14

## 2025-05-06 RX ADMIN — SPIRONOLACTONE 25 MG: 50 TABLET ORAL at 10:14

## 2025-05-06 RX ADMIN — EMPAGLIFLOZIN 10 MG: 10 TABLET, FILM COATED ORAL at 10:15

## 2025-05-06 RX ADMIN — INSULIN HUMAN 15 UNITS: 500 INJECTION, SOLUTION SUBCUTANEOUS at 09:11

## 2025-05-06 RX ADMIN — FINASTERIDE 5 MG: 5 TABLET, FILM COATED ORAL at 10:15

## 2025-05-06 RX ADMIN — ATORVASTATIN CALCIUM 20 MG: 10 TABLET, FILM COATED ORAL at 20:14

## 2025-05-06 RX ADMIN — SODIUM CHLORIDE, PRESERVATIVE FREE 10 ML: 5 INJECTION INTRAVENOUS at 20:17

## 2025-05-06 RX ADMIN — INSULIN HUMAN 10 UNITS: 500 INJECTION, SOLUTION SUBCUTANEOUS at 12:02

## 2025-05-06 RX ADMIN — EZETIMIBE 10 MG: 10 TABLET ORAL at 10:15

## 2025-05-06 RX ADMIN — POTASSIUM CHLORIDE 40 MEQ: 1500 TABLET, EXTENDED RELEASE ORAL at 20:14

## 2025-05-06 RX ADMIN — SODIUM CHLORIDE, PRESERVATIVE FREE 10 ML: 5 INJECTION INTRAVENOUS at 10:18

## 2025-05-06 RX ADMIN — RIVAROXABAN 20 MG: 20 TABLET, FILM COATED ORAL at 18:24

## 2025-05-06 RX ADMIN — Medication 1 TABLET: at 10:15

## 2025-05-06 RX ADMIN — POTASSIUM CHLORIDE 40 MEQ: 1500 TABLET, EXTENDED RELEASE ORAL at 10:15

## 2025-05-06 RX ADMIN — INSULIN HUMAN 5 UNITS: 500 INJECTION, SOLUTION SUBCUTANEOUS at 09:08

## 2025-05-06 RX ADMIN — INSULIN HUMAN 15 UNITS: 500 INJECTION, SOLUTION SUBCUTANEOUS at 12:03

## 2025-05-06 RX ADMIN — RANOLAZINE 500 MG: 500 TABLET, EXTENDED RELEASE ORAL at 20:14

## 2025-05-06 RX ADMIN — RANOLAZINE 500 MG: 500 TABLET, EXTENDED RELEASE ORAL at 10:15

## 2025-05-06 RX ADMIN — TAMSULOSIN HYDROCHLORIDE 0.4 MG: 0.4 CAPSULE ORAL at 10:15

## 2025-05-06 RX ADMIN — SPIRONOLACTONE 25 MG: 50 TABLET ORAL at 20:14

## 2025-05-06 NOTE — PROGRESS NOTES
Occupational Therapy    Physical Rehabilitation: OCCUPATIONAL THERAPY     [x] daily progress note       [] discharge       Patient Name:  Bandar De La Torre   :  1960 MRN: [id number]  Room:  [address] [place] [address] Date of Admission: 2025  Rehabilitation Diagnosis:   Local infection of the skin and subcutaneous tissue, unspecified [L08.9]  Chronic osteomyelitis of left foot (HCC) [M86.672]       Date 2025       Day of ARU Week:  7   Time IN/OUT 3864-8471  1118-4002   Individual Tx Minutes 70+50   Group Tx Minutes    Co-Treat Minutes    Concurrent Tx Minutes    TOTAL Tx Time Mins 120   Variance Time    Variance Reason []   Refusal due to:     []   Medical hold/reason:    []   Illness   []   Off Unit for test/procedure  []   Extra time needed to complete task  []   Therapeutic need  []   Make up mins were attempted but pt unable to complete due to (specify)  []   Other (specify):   Restrictions Restrictions/Precautions: General Precautions, Fall Risk, Weight Bearing (NWB LLE with wound vac, IV LUE)         Communication with other providers: [x]   OK to see per nursing:     []   Spoke with team member regarding:      Subjective observations and cognitive status: Pt sitting EOB finishing breakfast with spouse in room; pleasant and agreeable to therapy.      Pain level/location:    /10       Location:    Vitals taken during session    Discharge recommendations  Anticipated discharge date:  TBD  Destination: []home alone   []home alone w assist prn   [] home w/ family    [] Continuous supervision       []SNF    [] Assisted living     [] Other:   Continued therapy: []HHC OT  []OUTPATIENT  OT   [] No Further OT  Equipment needs: TBD       ADLs:    Eating: Eating  Assistance Needed: Independent  Comment: X  CARE Score: 6  Discharge Goal: Independent       Oral Hygiene: Oral Hygiene  Assistance Needed: Independent  Comment: Seated in W/C at sink  CARE Score: 6  Discharge Goal: Independent    UB/LB Bathing: Shower/Bathe                                                                         Goals:  (Update in navigator)  Short Term Goals  Time Frame for Short Term Goals: STGs=LTGs:  Long Term Goals  Time Frame for Long Term Goals : 7 days or until d/c  Long Term Goal 1: Pt will complete grooming tasks Ind  Long Term Goal 2: Pt will complete total body bathing c setup  Long Term Goal 3: Pt will complete UB dressing Ind (using W/C for setup)  Long Term Goal 4: Pt will complete LB dressing c setup  Long Term Goal 5: Pt will doff/don footwear c min A  Additional Goals?: Yes  Long Term Goal 6: Pt will complete toileting c min A  Long Term Goal 7: Pt will complete functional transfers (bed, chair, toilet) c DME PRN and mod I  Long Term Goal 8: Pt will perform therex/therax to facilitate increased strength/endurance/ax tolerance (c emphasis on BUE endurance) c SBA  Long Term Goal 9: Pt will complete simple IADLs from W/C level mod I:        Plan of Care                                                                              Times per week: 5 days per week for a minimum of 60 minutes/day plus group as appropriate for 60 minutes.  Treatment to include Occupational Therapy Plan  Current Treatment Recommendations: Strengthening, Balance training, Functional mobility training, Endurance training, Safety education & training, Patient/Caregiver education & training, Equipment evaluation, education, & procurement, Positioning, Self-Care / ADL, Home management training, Group Therapy    Electronically signed by   SUNSHINE Dawkins,  5/6/2025, 9:57 AM

## 2025-05-06 NOTE — PROGRESS NOTES
Progress Note( Dr. Diehl)  5/6/2025  Subjective:   Admit Date: 4/30/2025  PCP: MEETA Diehl MD    Admitted For : Left foot /heel ulcer and also has left calcaneus osteomyelitis    Consulted For: Better control of blood glucose    Interval History: Patient with blood glucose improving with use of U500 insulin  Had debridement done of the left heel ulcer and wound vacuum was placed  His blood glucose were running a bit higher yesterday.  More active possibly today    Denies any chest pains,   Denies SOB .   Denies nausea or vomiting.   No new bowel or bladder symptoms.       Intake/Output Summary (Last 24 hours) at 5/6/2025 0817  Last data filed at 5/6/2025 0513  Gross per 24 hour   Intake 977 ml   Output 1600 ml   Net -623 ml       DATA    CBC:   No results for input(s): \"WBC\", \"HGB\", \"PLT\" in the last 72 hours.   CMP:  No results for input(s): \"NA\", \"K\", \"CL\", \"CO2\", \"BUN\", \"CREATININE\", \"GLU\", \"CALCIUM\", \"LABALBU\", \"BILITOT\", \"ALKPHOS\", \"AST\", \"ALT\" in the last 72 hours.    Invalid input(s): \"PROT\"    Lipids:   Lab Results   Component Value Date/Time    CHOL 205 04/11/2025 08:12 AM    HDL 38 04/11/2025 08:12 AM    TRIG 83 04/11/2025 08:12 AM     Glucose:  Recent Labs     05/05/25  1947 05/06/25  0209 05/06/25  0722   POCGLU 233* 210* 312*     [id number]:  Lab Results   Component Value Date/Time    LABA1C 9.6 04/11/2025 08:12 AM     High Sensitivity TSH:   Lab Results   Component Value Date/Time    TSHHS 1.800 08/29/2019 10:06 AM     Free T3: No results found for: \"FT3\"  Free T4:No results found for: \"T4FREE\"    No orders to display        Scheduled Medicines   Medications:    atorvastatin  20 mg Oral Nightly    sodium chloride flush  5-40 mL IntraVENous 2 times per day    empagliflozin  10 mg Oral Daily    ezetimibe  10 mg Oral Daily    finasteride  5 mg Oral Daily    folic acid-pyridoxine-cyancobalamin 1.13-25-2 tablet  1 tablet Oral Daily    metoprolol succinate  25 mg Oral Daily    potassium chloride   40 mEq Oral BID    ranolazine  500 mg Oral BID    rivaroxaban  20 mg Oral Dinner    spironolactone  25 mg Oral BID    tamsulosin  0.4 mg Oral Daily    torsemide  10 mg Oral Dinner    insulin regular human  15 Units SubCUTAneous Daily with breakfast    insulin regular human  15 Units SubCUTAneous Lunch    insulin regular human  15 Units SubCUTAneous Dinner    insulin regular human  0-30 Units SubCUTAneous 4x Daily AC & HS    polyethylene glycol  17 g Oral Daily    docusate sodium  100 mg Oral BID    clopidogrel  75 mg Oral Daily    cefTRIAXone (ROCEPHIN) IV  2,000 mg IntraVENous Q24H      Infusions:    sodium chloride      dextrose           Objective:   Vitals: BP (!) 143/69   Pulse 66   Temp 98 °F (36.7 °C) (Oral)   Resp 16   Ht 1.854 m (6' 1\")   Wt (!) 137.3 kg (302 lb 12.8 oz)   SpO2 99%   BMI 39.95 kg/m²   General appearance: alert and cooperative with exam  Neck: no JVD or bruit  Thyroid : Normal lobes   Lungs: Has Vesicular Breath sounds has sleep apnea on CPAP  Heart: Atrial fibrillation and also has cardiac pacemaker  Abdomen: soft, non-tender; bowel sounds normal; no masses,  no organomegaly  Musculoskeletal: Normal  Extremities: extremities normal, , no edema right foot 2nd and 3rd toe amputation left heel debridement done wound VAC and displaced  Neurologic:  Awake, alert, oriented to name, place and time.  Cranial nerves II-XII are grossly intact.  Motor is  intact.  Sensory neuropathy.,  and gait is abnormal.  And unstable    Assessment:     Patient Active Problem List:     MARINO on CPAP     Permanent atrial fibrillation ([facility])     Type 2 diabetes mellitus with hyperglycemia, with long-term current use of insulin ([facility])     Class 3 severe obesity due to excess calories with body mass index (BMI) of 45.0 to 49.9 in adult ([facility])     History of cardiac cath     Weakness of right arm     Acute CVA (cerebrovascular accident) ([facility])     Spastic hemiparesis of right dominant side ([facility])     Dysphagia due to

## 2025-05-06 NOTE — CARE COORDINATION
SUSIE met with patient and provided written communication following Care Conference. SUSIE informed patient of recommendations for WC, 2WW, Drop Arm BSC, HHC PT, OT, and nursing. Discussed that he will discharge home with wound vac and IV ATB. He reported that he has a 2WW at home. He stated that they are working on installing a stair lift. He is not certain it will be done by 05/13/2025. He reported that his wife is looking at SNFs as a back up plan. Patient would like [facility] if he returns home. Patient verbalized understanding. Whiteboard updated.     Left a voicemail for patient's spouse.      D/C Plan:  Estimated Date: May 13  DME:  WC, Drop- Arm BSC (Agency TBD)  HHC:  PT, OT, Nursing ([facility])  To:  Home with spouse vs SNF (family will transport)

## 2025-05-06 NOTE — PROGRESS NOTES
Progress Note( Dr. Diehl)  5/5/2025  Subjective:   Admit Date: 4/30/2025  PCP: MEETA Diehl MD    Admitted For : Left foot /heel ulcer and also has left calcaneus osteomyelitis    Consulted For: Better control of blood glucose    Interval History: Patient with blood glucose improving with use of U500 insulin  Had debridement done of the left heel ulcer and wound vacuum was placed    Denies any chest pains,   Denies SOB .   Denies nausea or vomiting.   No new bowel or bladder symptoms.       Intake/Output Summary (Last 24 hours) at 5/5/2025 2109  Last data filed at 5/5/2025 1233  Gross per 24 hour   Intake 737 ml   Output 1400 ml   Net -663 ml       DATA    CBC:   No results for input(s): \"WBC\", \"HGB\", \"PLT\" in the last 72 hours.   CMP:  No results for input(s): \"NA\", \"K\", \"CL\", \"CO2\", \"BUN\", \"CREATININE\", \"GLU\", \"CALCIUM\", \"LABALBU\", \"BILITOT\", \"ALKPHOS\", \"AST\", \"ALT\" in the last 72 hours.    Invalid input(s): \"PROT\"    Lipids:   Lab Results   Component Value Date/Time    CHOL 205 04/11/2025 08:12 AM    HDL 38 04/11/2025 08:12 AM    TRIG 83 04/11/2025 08:12 AM     Glucose:  Recent Labs     05/05/25  1108 05/05/25  1714 05/05/25  1947   POCGLU 215* 268* 233*     [id number]:  Lab Results   Component Value Date/Time    LABA1C 9.6 04/11/2025 08:12 AM     High Sensitivity TSH:   Lab Results   Component Value Date/Time    TSHHS 1.800 08/29/2019 10:06 AM     Free T3: No results found for: \"FT3\"  Free T4:No results found for: \"T4FREE\"    No orders to display        Scheduled Medicines   Medications:    atorvastatin  20 mg Oral Nightly    sodium chloride flush  5-40 mL IntraVENous 2 times per day    empagliflozin  10 mg Oral Daily    ezetimibe  10 mg Oral Daily    finasteride  5 mg Oral Daily    folic acid-pyridoxine-cyancobalamin 1.13-25-2 tablet  1 tablet Oral Daily    metoprolol succinate  25 mg Oral Daily    potassium chloride  40 mEq Oral BID    ranolazine  500 mg Oral BID    rivaroxaban  20 mg Oral Dinner

## 2025-05-06 NOTE — PROGRESS NOTES
Bandar De La Torre    : 1960  Acct #: [id number]  MRN: [id number]              PM&R Progress Note      Admitting diagnosis: ***    Comorbid diagnoses impacting rehabilitation: ***    Chief complaint: ***    Prior (baseline) level of function: Independent.    Current level of function:         Current  IRF-JAVIER and Goals:   Occupational Therapy:    Short Term Goals  Time Frame for Short Term Goals: STGs=LTGs :   Long Term Goals  Time Frame for Long Term Goals : 7 days or until d/c  Long Term Goal 1: Pt will complete grooming tasks Ind  Long Term Goal 2: Pt will complete total body bathing c setup  Long Term Goal 3: Pt will complete UB dressing Ind (using W/C for setup)  Long Term Goal 4: Pt will complete LB dressing c setup  Long Term Goal 5: Pt will doff/don footwear c min A  Additional Goals?: Yes  Long Term Goal 6: Pt will complete toileting c min A  Long Term Goal 7: Pt will complete functional transfers (bed, chair, toilet) c DME PRN and mod I  Long Term Goal 8: Pt will perform therex/therax to facilitate increased strength/endurance/ax tolerance (c emphasis on BUE endurance) c SBA  Long Term Goal 9: Pt will complete simple IADLs from W/C level mod I :                                       Eating: Eating  Assistance Needed: Independent  Comment: able to open packages/containers, feed self  CARE Score: 6  Discharge Goal: Independent       Oral Hygiene: Oral Hygiene  Assistance Needed: Independent  Comment: Seated in w/c at sink  CARE Score: 6  Discharge Goal: Independent    UB/LB Bathing: Shower/Bathe Self  Assistance Needed: Partial/moderate assistance  Comment: completed bathing EOB/in bed, able to bathe UB and LEs to ankle, required assist for B feet.  Able to wash groin in supine and partially bathe bottom in sidelying but required assist to fully complete  CARE Score: 3  Discharge Goal: Supervision or touching assistance    UB Dressing: Upper Body Dressing  Assistance Needed: Supervision or touching  feasible  Reason if not Attempted: Not attempted due to medical condition or safety concerns  CARE Score: 88  Discharge Goal: Not Attempted     Walk 50 Feet with Two Turns  Reason if not Attempted: Not attempted due to medical condition or safety concerns  CARE Score: 88  Discharge Goal: Not Attempted     Walk 150 Feet  Reason if not Attempted: Not attempted due to medical condition or safety concerns  CARE Score: 88  Discharge Goal: Not Attempted     Walking 10 Feet on Uneven Surfaces  Reason if not Attempted: Not attempted due to medical condition or safety concerns  CARE Score: 88  Discharge Goal: Not Attempted     1 Step (Curb)  Reason if not Attempted: Not attempted due to medical condition or safety concerns  CARE Score: 88  Discharge Goal: Not Attempted     4 Steps  Reason if not Attempted: Not attempted due to medical condition or safety concerns  CARE Score: 88  Discharge Goal: Not Attempted     12 Steps  Reason if not Attempted: Not attempted due to medical condition or safety concerns  CARE Score: 88  Discharge Goal: Not Attempted       Wheelchair:  w/c Ability: Wheelchair Ability  Uses a Wheelchair and/or Scooter?: Yes  Wheel 50 Feet with Two Turns  Assistance Needed: Independent  Comment: JUANPABLO UEs  CARE Score: 6  Discharge Goal: Independent  Wheel 150 Feet  Assistance Needed: Independent  Comment: JUANPABLO UEs  CARE Score: 6  Discharge Goal: Independent          Balance:        Object: Picking Up Object  Reason if not Attempted: Not attempted due to medical condition or safety concerns  CARE Score: 88  Discharge Goal: Partial/moderate assistance    I      Exam:    Blood pressure (!) 108/58, pulse 60, temperature 98.1 °F (36.7 °C), temperature source Oral, resp. rate 16, height 1.854 m (6' 1\"), weight (!) 137.9 kg (304 lb 1.6 oz), SpO2 98%.    General: Up in a wheelchair.  Struggling to move it with his arms.  Left leg elevated some.    HEENT: Clear speech.  Gazing right and left equally.

## 2025-05-06 NOTE — PROGRESS NOTES
Physical Therapy      [x] daily progress note       [] discharge       Patient Name:  Bandar De La Torre   :  1960 MRN: [id number]  Room:  [address] [place] [address] Date of Admission: 2025  Rehabilitation Diagnosis:   Local infection of the skin and subcutaneous tissue, unspecified [L08.9]  Chronic osteomyelitis of left foot (HCC) [M86.672]       Date 2025       Day of ARU Week:  7   Time IN/OUT 4562-4209   Individual Tx Minutes 60   TOTAL Tx Time Mins 60   Variance Time    Variance Time []   Refusal due to:     []   Medical hold/reason:    []   Illness   []   Off Unit for test/procedure  []   Extra time needed to complete task  []   Therapeutic need  []   Other (specify):   Restrictions Restrictions/Precautions  Restrictions/Precautions: General Precautions, Fall Risk, Weight Bearing (NWB LLE with wound vac, IV LUE)  Implants Present? : Pacemaker      Communication with other providers: [x]   OK to see per nursing:     []   Spoke with team member regarding:      Subjective observations and cognitive status: Pt resting in bed, agreeable to session. Pt reports feels that door would not be modified or stair lift be put in before discharge date next Tuesday. States \"I'll probably have to go somewhere.\"     Pain level/location: none   Vitals:    Discharge recommendations  Anticipated discharge date:  tbd  Destination: []home alone   []home alone with assist PRN     [x] home w/ family      [] Continuous supervision  []SNF    [] Assisted living     [] Other:  Continued therapy: [x]HHC PT  []OUTPATIENT  PT   [] No Further PT  []SNF PT  Caregiver training recommended: []Yes  [] No   Equipment needs: stair lift, Patient requires the assistance of a heavy-duty wheelchair to successfully complete daily living tasks of toileting, feeding, bathing, dressing, and grooming or any other daily living tasks in the home.  A heavy-duty wheelchair is necessary due to the patient's impaired ambulation and mobility restrictions(NWB LLE)  and patient would be unable to resolve these daily living tasks using a cane or walker.  The patient is capable of using a heavy-duty wheelchair safely in the home and can maneuver within their home with adequate access.  There is a caregiver available to provide assistance.  The patient has not expressed an unwillingness to use the wheelchair      Bed Mobility:           []   Pt received out of bed   Rolling R/L:  Indep  Scooting:  Indep  Supine --> Sit:  Indep  Sit --> Supine:  Indep  Bed features used:    [] HOB elevated      [] Bed rail                                    [x] No     Transfers:    Sit--> Stand:  CG-Min A, several trials from EOB with height elevated; pt able to demo NWB R LE 2/5 trials; In // bars pt able to maintain NWB 1/3 trials during transfer.   Stand --> Sit:   CG-Min A for controlled descent, cues to reach back with one hand to surface  SBT: to L/R SBA, min cues for board placement and chair set up.   Assistive device required for transfer:   SB, RW      Wheelchair Propulsion:  Distance:   225'   Assistance:   Indep, extra time due to fatigue   Extremities Used:   B UEs   Propelled WC in tight spaces, around obstacles Indep    Additional Therapeutic activities/exercises completed this date:     []   Nu-step:  Time:        Level:         #Steps:       []   Rebounder:    []  Seated     []  Standing        [x]   Balance training ; static standing at RW CGA x 5 trials, min cues for NWB first 2 attempts; at // bars CGA, standing ~ 30\" before needing to sit from fatigue. Maintains NWB L LE after standing.         []   Postural training    []   Supine ther ex (reps/sets):     []   Seated ther ex (reps/sets):     []   Standing ther ex (reps/sets):     []   Picked up object from floor                        []   Other:   []   Other:   []   Other:      Patient/Caregiver Education and Training:   []    Role of PT  []   Education about Dx  [x]   Use of call light for assist   []   HEP provided and

## 2025-05-07 LAB
GLUCOSE BLD-MCNC: 138 MG/DL (ref 74–99)
GLUCOSE BLD-MCNC: 182 MG/DL (ref 74–99)
GLUCOSE BLD-MCNC: 192 MG/DL (ref 74–99)
GLUCOSE BLD-MCNC: 235 MG/DL (ref 74–99)
GLUCOSE BLD-MCNC: 255 MG/DL (ref 74–99)

## 2025-05-07 PROCEDURE — 6360000002 HC RX W HCPCS: Performed by: SURGERY

## 2025-05-07 PROCEDURE — 99233 SBSQ HOSP IP/OBS HIGH 50: CPT | Performed by: PHYSICAL MEDICINE & REHABILITATION

## 2025-05-07 PROCEDURE — 97110 THERAPEUTIC EXERCISES: CPT

## 2025-05-07 PROCEDURE — 97605 NEG PRS WND THER DME<=50SQCM: CPT

## 2025-05-07 PROCEDURE — 6370000000 HC RX 637 (ALT 250 FOR IP): Performed by: PHYSICAL MEDICINE & REHABILITATION

## 2025-05-07 PROCEDURE — 6370000000 HC RX 637 (ALT 250 FOR IP): Performed by: SURGERY

## 2025-05-07 PROCEDURE — 97530 THERAPEUTIC ACTIVITIES: CPT

## 2025-05-07 PROCEDURE — 2580000003 HC RX 258: Performed by: SURGERY

## 2025-05-07 PROCEDURE — 1280000000 HC REHAB R&B

## 2025-05-07 PROCEDURE — 94761 N-INVAS EAR/PLS OXIMETRY MLT: CPT

## 2025-05-07 PROCEDURE — 97542 WHEELCHAIR MNGMENT TRAINING: CPT

## 2025-05-07 PROCEDURE — 2500000003 HC RX 250 WO HCPCS: Performed by: SURGERY

## 2025-05-07 PROCEDURE — 82962 GLUCOSE BLOOD TEST: CPT

## 2025-05-07 RX ADMIN — EZETIMIBE 10 MG: 10 TABLET ORAL at 08:29

## 2025-05-07 RX ADMIN — POTASSIUM CHLORIDE 40 MEQ: 1500 TABLET, EXTENDED RELEASE ORAL at 08:29

## 2025-05-07 RX ADMIN — FINASTERIDE 5 MG: 5 TABLET, FILM COATED ORAL at 08:29

## 2025-05-07 RX ADMIN — INSULIN HUMAN 5 UNITS: 500 INJECTION, SOLUTION SUBCUTANEOUS at 17:37

## 2025-05-07 RX ADMIN — RANOLAZINE 500 MG: 500 TABLET, EXTENDED RELEASE ORAL at 08:29

## 2025-05-07 RX ADMIN — CEFTRIAXONE SODIUM 2000 MG: 2 INJECTION, POWDER, FOR SOLUTION INTRAMUSCULAR; INTRAVENOUS at 12:40

## 2025-05-07 RX ADMIN — CLOPIDOGREL BISULFATE 75 MG: 75 TABLET, FILM COATED ORAL at 08:29

## 2025-05-07 RX ADMIN — SPIRONOLACTONE 25 MG: 50 TABLET ORAL at 08:29

## 2025-05-07 RX ADMIN — TAMSULOSIN HYDROCHLORIDE 0.4 MG: 0.4 CAPSULE ORAL at 08:29

## 2025-05-07 RX ADMIN — SPIRONOLACTONE 25 MG: 50 TABLET ORAL at 20:10

## 2025-05-07 RX ADMIN — INSULIN HUMAN 15 UNITS: 500 INJECTION, SOLUTION SUBCUTANEOUS at 12:21

## 2025-05-07 RX ADMIN — ATORVASTATIN CALCIUM 20 MG: 10 TABLET, FILM COATED ORAL at 20:10

## 2025-05-07 RX ADMIN — SODIUM CHLORIDE, PRESERVATIVE FREE 10 ML: 5 INJECTION INTRAVENOUS at 20:11

## 2025-05-07 RX ADMIN — INSULIN HUMAN 15 UNITS: 500 INJECTION, SOLUTION SUBCUTANEOUS at 08:49

## 2025-05-07 RX ADMIN — RANOLAZINE 500 MG: 500 TABLET, EXTENDED RELEASE ORAL at 20:10

## 2025-05-07 RX ADMIN — INSULIN HUMAN 15 UNITS: 500 INJECTION, SOLUTION SUBCUTANEOUS at 17:38

## 2025-05-07 RX ADMIN — TORSEMIDE 10 MG: 20 TABLET ORAL at 18:06

## 2025-05-07 RX ADMIN — INSULIN HUMAN 10 UNITS: 500 INJECTION, SOLUTION SUBCUTANEOUS at 12:20

## 2025-05-07 RX ADMIN — EMPAGLIFLOZIN 10 MG: 10 TABLET, FILM COATED ORAL at 08:29

## 2025-05-07 RX ADMIN — RIVAROXABAN 20 MG: 20 TABLET, FILM COATED ORAL at 18:06

## 2025-05-07 RX ADMIN — METOPROLOL SUCCINATE 25 MG: 25 TABLET, EXTENDED RELEASE ORAL at 08:29

## 2025-05-07 RX ADMIN — SODIUM CHLORIDE, PRESERVATIVE FREE 10 ML: 5 INJECTION INTRAVENOUS at 11:11

## 2025-05-07 RX ADMIN — POTASSIUM CHLORIDE 40 MEQ: 1500 TABLET, EXTENDED RELEASE ORAL at 20:10

## 2025-05-07 RX ADMIN — Medication 1 TABLET: at 08:29

## 2025-05-07 ASSESSMENT — PAIN SCALES - GENERAL: PAINLEVEL_OUTOF10: 0

## 2025-05-07 NOTE — PROGRESS NOTES
Comprehensive Nutrition Assessment    Type and Reason for Visit:  Reassess    Nutrition Recommendations/Plan:   Continue current carb controlled diet   Continue to offer wound healing oral nutrition supplement as available      Malnutrition Assessment:  Malnutrition Status:  At risk for malnutrition (05/01/25 1251)    Context:  Chronic Illness       Nutrition Assessment:    Remains on carb controlled diet with consistent meal intake, %. Agreeable to consume wound healing supplement when available. Reasonable intake during stay, will follow at low nutrition risk at this time.    Nutrition Related Findings:    receiving care on visit Wound Type: Diabetic Ulcer, Venous Stasis       Current Nutrition Intake & Therapies:    Average Meal Intake: %  Average Supplements Intake: Unable to assess  ADULT ORAL NUTRITION SUPPLEMENT; Lunch, Dinner; Wound Healing Oral Supplement  ADULT DIET; Regular; 5 carb choices (75 gm/meal)    Anthropometric Measures:  Height: 185.4 cm (6' 1\")  Ideal Body Weight (IBW): 184 lbs (84 kg)    Admission Body Weight: 131.1 kg (289 lb 0.4 oz) (4/25/25)  Current Body Weight: 136.5 kg (300 lb 14.9 oz), 169.3 % IBW. Weight Source: Bed scale  Current BMI (kg/m2): 39.7  Usual Body Weight: 153.3 kg (338 lb) (11/7/24)     % Weight Change (Calculated): -7.8  Weight Adjustment For: No Adjustment                 BMI Categories: Obese Class 3 (BMI 40.0 or greater)    Estimated Daily Nutrient Needs:  Energy Requirements Based On: Formula  Weight Used for Energy Requirements: Current  Energy (kcal/day): 2710 (mif[address] jeor)  Weight Used for Protein Requirements: Ideal  Protein (g/day): 101-126 (1.2-1.5 g/kg)  Method Used for Fluid Requirements: Defer to provider    Nutrition Diagnosis:   Inadequate protein-energy intake related to increase demand for energy/nutrients as evidenced by wounds    Nutrition Interventions:   Food and/or Nutrient Delivery: Continue Current Diet, Continue Oral Nutrition

## 2025-05-07 NOTE — PROGRESS NOTES
Occupational Therapy  Physical Rehabilitation: OCCUPATIONAL THERAPY     [x] daily progress note       [] discharge       Patient Name:  Bandar De La Torre   :  1960 MRN: [id number]  Room:  [address] [place] [address] Date of Admission: 2025  Rehabilitation Diagnosis:   Local infection of the skin and subcutaneous tissue, unspecified [L08.9]  Chronic osteomyelitis of left foot (HCC) [M86.672]       Date 2025       Day of ARU Week:  1   Time IN/OUT 8695-7645   Individual Tx Minutes 60   Group Tx Minutes    Co-Treat Minutes    Concurrent Tx Minutes    TOTAL Tx Time Mins 60   Variance Time    Variance Reason []   Refusal due to:     []   Medical hold/reason:    []   Illness   []   Off Unit for test/procedure  []   Extra time needed to complete task  []   Therapeutic need  []   Make up mins were attempted but pt unable to complete due to (specify)  []   Other (specify):   Restrictions Restrictions/Precautions: General Precautions, Fall Risk, Weight Bearing (NWB LLE with wound vac, IV LUE)         Communication with other providers: [x]   OK to see per nursing:     []   Spoke with team member regarding:      Subjective observations and cognitive status: Handoff from PTRamin in therapy gym, pleasant and agreeable to therapy.      Pain level/location:    /10       Location:    Vitals taken during session:    Discharge recommendations  Anticipated discharge date:    Destination: []home alone   []home alone w assist prn   [] home w/ family    [] Continuous supervision       []SNF    [] Assisted living     [] Other:   Continued therapy: []HHC OT  []OUTPATIENT  OT   [] No Further OT  Equipment needs: Bandar De La Torre requires a drop arm bedside commode due to being unable to use the toilet within the home and confined to a single room.  This patient requires a side transfer has a body configuration that requires extra width provided by a drop arm commode.       Toileting:   denied need      Toilet Transfers:   NA   Device

## 2025-05-07 NOTE — PROGRESS NOTES
Physical Therapy      [x] daily progress note       [] discharge       Patient Name:  Bandar De La Torre   :  1960 MRN: [id number]  Room:  [address] [place] [address] Date of Admission: 2025  Rehabilitation Diagnosis:   Local infection of the skin and subcutaneous tissue, unspecified [L08.9]  Chronic osteomyelitis of left foot (HCC) [M86.672]       Date 2025       Day of ARU Week:  1   Time IN/OUT 6511-9022   Individual Tx Minutes 60   TOTAL Tx Time Mins 60   Variance Time    Variance Time []   Refusal due to:     []   Medical hold/reason:    []   Illness   []   Off Unit for test/procedure  []   Extra time needed to complete task  []   Therapeutic need  []   Other (specify):   Restrictions Restrictions/Precautions  Restrictions/Precautions: General Precautions, Fall Risk, Weight Bearing (NWB LLE with wound vac, IV LUE)  Implants Present? : Pacemaker      Communication with other providers: [x]   OK to see per nursing:     []   Spoke with team member regarding:      Subjective observations and cognitive status: Pt received from OT in gym, pt agreeable to session, c/o fatigue from earlier therapy sessions     Pain level/location: 1/10       Location: \"burning\" pain in L heel   Vitals:    Discharge recommendations    Anticipated discharge date:  tbd  Destination: []home alone   []home alone with assist PRN     [x] home w/ family      [] Continuous supervision  []SNF    [] Assisted living     [] Other:  Continued therapy: [x]HHC PT  []OUTPATIENT  PT   [] No Further PT  []SNF PT  Caregiver training recommended: []Yes  [] No   Equipment needs: stair lift, Patient requires the assistance of a heavy-duty wheelchair to successfully complete daily living tasks of toileting, feeding, bathing, dressing, and grooming or any other daily living tasks in the home.  A heavy-duty wheelchair is necessary due to the patient's impaired ambulation and mobility restrictions(NWB LLE) and patient would be unable to resolve these daily

## 2025-05-07 NOTE — PROGRESS NOTES
Physical Therapy  [x] daily progress note       [] discharge       Patient Name:  Bandar De La Torre   :  1960 MRN: [id number]  Room:  [address] [place] [address] Date of Admission: 2025  Rehabilitation Diagnosis:   Local infection of the skin and subcutaneous tissue, unspecified [L08.9]  Chronic osteomyelitis of left foot (HCC) [M86.672]       Date 2025       Day of ARU Week:  1   Time IN//950   Individual Tx Minutes 60   Group Tx Minutes    Co-Treat Minutes    Concurrent Tx Minutes    TOTAL Tx Time Mins 60   Variance Time    Variance Time []   Refusal due to:     []   Medical hold/reason:    []   Illness   []   Off Unit for test/procedure  []   Extra time needed to complete task  []   Therapeutic need  []   Other (specify):   Restrictions Restrictions/Precautions  Restrictions/Precautions: General Precautions, Fall Risk, Weight Bearing (NWB LLE with wound vac, IV LUE)  Implants Present? : Pacemaker      Interdisciplinary communication [x]   Cleared for therapy per nursing     []   RN notified about issues during session  []   RN updated on pt performance  []   Spoke with   []   Spoke with OT  []   Spoke with MD  []   Other:    Subjective observations and cognitive status: Start time 830 but wound care walking in just prior to PT. PT allowed time for wound vac change prior to session start.      Pain level/location:  0  /10       Location:    Discharge recommendations  Anticipated discharge date:   however pt and spouse want SNF precert started  Destination: []home alone   []home alone with assist PRN     [x] home w/ family vs     [] Continuous supervision  [x]SNF    [] Assisted living     [] Other:  Continued therapy: []C PT  []OUTPATIENT  PT   [] No Further PT  []SNF PT  Caregiver training recommended: []Yes  [] No   Equipment needs: w/c, 2ww     Bed Mobility:           []   Pt received out of bed   Mod I      Transfers:    Sit--> Stand:  mod assist in // bar first attempt, then max  Equipment: Walker - Rolling, Cane, Sock aid, Reacher  Has the patient had two or more falls in the past year or any fall with injury in the past year?: Yes (reports 2 falls in the past year)  Prior Level of Assist for ADLs: Independent  Prior Level of Assist for Homemaking: Independent (shared with spouse)  Homemaking Responsibilities: Yes  Meal Prep Responsibility: Primary  Laundry Responsibility: No  Cleaning Responsibility: Secondary  Bill Paying/Finance Responsibility: Secondary  Shopping Responsibility: No  Dependent Care Responsibility: No  Health Care Management: Secondary (wife sets up pillbox, pt takes them and also manages insulin)  Prior Level of Assist for Ambulation: Independent household ambulator, with or without device (would use RW \"sometimes\" (sounds like he would abandon); used it for dr appts)  Prior Level of Assist for Transfers: Independent  Active : No  Patient's  Info: wife  Mode of Transportation: SUV  Occupation: Retired  Additional Comments: Pt typically sleeps in a regular bed at home    Objective                                                                                    Goals:  (Update in navigator)  Short Term Goals  Time Frame for Short Term Goals: 12 days STG=LTG  Short Term Goal 1: Pt will perform bed mobility with mod I including wound vac line management  Short Term Goal 2: Pt will perform sit to stand and car transfer with SBA, w/c <>bed trasnfers with mod I without slideboard including wound vac line management  Short Term Goal 3: Pt will propel w/c 150' including household situations with mod I, including wound vac line management  Short Term Goal 4: Pt will  light object from standing using 2ww and reacher with min assist:   :        Plan of Care                                                                              Times per week: 5 days per week for a minimum of 60 minutes/day plus group as appropriate for 60 minutes.  Treatment to include

## 2025-05-07 NOTE — CARE COORDINATION
Received a call from patient's spouse. They are requested a SNF referral. Patient has 7 steps to enter his home. They are working on installing a stair lift. They do not believe it will be completed prior to discharge. [facility] is coming out this Friday to start the stair lift process. SNF choices are 1. [facility] 2. [facility] and 3. [facility].     Received a call from Osei with [facility]. Patient's spouse sent him an email last night and he has already reviewed the patient's clinical. He requested that patient's IV line be changed to a PICC line prior to admission.  Patient has been clinically accepted. He will start prior auth with [employer] today. Awaiting determination.

## 2025-05-07 NOTE — PROGRESS NOTES
04/28/2025    BUN 41 (H) 04/28/2025     04/28/2025    K 4.1 04/28/2025     04/28/2025    CO2 23 04/28/2025     Lab Results   Component Value Date    ALT 14 04/11/2025    AST 11 04/11/2025    ALKPHOS 84 04/11/2025    BILITOT 0.3 04/11/2025       Expected length of stay  prior to a supervised level of function for discharge home with a walker and HHC OT/PT is ***    Recommendations:    ***

## 2025-05-07 NOTE — CONSULTS
[facility] Nurse  Consult Note       Bandar De La Torre  AGE: 64 y.o.   GENDER: male  : 1960  TODAY'S DATE:  2025    Subjective:     Reason for  Evaluation and Assessment: NPWT dressing change to left heel.      Bandar De La Torre is a 64 y.o. male referred by:   [x] Physician  [] Nursing  [] Other:     Wound Identification:  Wound Type: diabetic and surgical   Contributing Factors: edema, diabetes, chronic pressure, and decreased mobility        PAST MEDICAL HISTORY        Diagnosis Date    Atrial fibrillation (HCC)     Cerebral artery occlusion with cerebral infarction (HCC)     History of cardiac cath     --RCA has mid 50% stenosis and PLB branch has 70% stenosis noted. LVEDP very high    Hyperlipidemia     Hypertension     Type II or unspecified type diabetes mellitus without mention of complication, not stated as uncontrolled     Diagnsed about     Unspecified sleep apnea        PAST SURGICAL HISTORY    Past Surgical History:   Procedure Laterality Date    CARDIAC CATHETERIZATION      FOOT DEBRIDEMENT Left 2025    FOOT DEBRIDEMENT INCISION AND DRAINAGE with wound vac placement performed by Jakob Avalos MD at [facility] OR    PACEMAKER INSERTION      Done in the past at [facility] in 2024 by Dr. Ocampo    TOE AMPUTATION Right 2025    RIGHT FOOT 2ND AND 3RD TOE AMPUTATION WITH FOOT DEBRIDEMENT performed by Jakob Avalos MD at [facility] OR    TONSILLECTOMY      AT 15 YEARS OLD       FAMILY HISTORY    Family History   Problem Relation Age of Onset    Diabetes Mother     Diabetes Father     Cancer Father     Cancer Maternal Grandfather         PROSTATE CANCER       SOCIAL HISTORY    Social History     Tobacco Use    Smoking status: Never    Smokeless tobacco: Never   Vaping Use    Vaping status: Never Used   Substance Use Topics    Alcohol use: No    Drug use: No       ALLERGIES    No Known Allergies    MEDICATIONS    No current facility-administered    Dressing Status Clean;Dry;Intact 05/06/25 2013   Wound Cleansed Cleansed with saline 05/05/25 1030   Dressing/Treatment Silicone border 05/05/25 1030   Wound Length (cm) 1.3 cm 05/05/25 1030   Wound Width (cm) 0.2 cm 05/05/25 1030   Wound Depth (cm) 0.1 cm 05/05/25 1030   Wound Surface Area (cm^2) 0.26 cm^2 05/05/25 1030   Wound Volume (cm^3) 0.026 cm^3 05/05/25 1030   Distance Tunneling (cm) 0 cm 05/05/25 1030   Tunneling Position ___ O'Clock 0 05/05/25 1030   Undermining Starts ___ O'Clock 0 05/05/25 1030   Undermining Ends___ O'Clock 0 05/05/25 1030   Undermining Maxium Distance (cm) 0 05/05/25 1030   Wound Assessment Pink/red 05/05/25 1030   Drainage Amount Moderate (25-50%) 05/05/25 1030   Drainage Description Serosanguinous 05/05/25 1030   Odor None 05/06/25 2013   Berenice-wound Assessment Intact 05/05/25 1030   Margins Defined edges 05/05/25 1030   Wound Thickness Description not for Pressure Injury Partial thickness 05/05/25 1030   Number of days: 1       Response to treatment:  Well tolerated by patient.     Pain Assessment:  Severity:  none  Quality of pain:   Wound Pain Timing/Severity:   Premedicated: no    Plan:     Plan of Care: Wound 04/11/25 #6 Right Anterior Lower Leg-Dressing/Treatment: Open to air  Wound 04/11/25 #7 Left Anterior Lower Leg cluster-Dressing/Treatment: Collagen, Silicone border  Wound 04/28/25 Foot Left;Dorsal cluster-Dressing/Treatment: Open to air  Wound 05/05/25 Pretibial Proximal;Right-Dressing/Treatment: Silicone border  Wound 05/05/25 Radial Right-Dressing/Treatment: Silicone border  Wound 03/20/25 #5 Left Heel-Dressing/Treatment: Negative pressure wound therapy (black foam x 2)    Patient in bed wound care for NPWT dressing change to left heel. Removed dressing. Cleansed with NS measured and pictured. Has some slough/pink/red applied new vac dressing with black foam x 2 piece suction @ 125mmHG continuous. Change again on Friday. Pt is at mild  risk for skin breakdown AEB

## 2025-05-08 LAB
GLUCOSE BLD-MCNC: 142 MG/DL (ref 74–99)
GLUCOSE BLD-MCNC: 149 MG/DL (ref 74–99)
GLUCOSE BLD-MCNC: 184 MG/DL (ref 74–99)
GLUCOSE BLD-MCNC: 230 MG/DL (ref 74–99)
GLUCOSE BLD-MCNC: 238 MG/DL (ref 74–99)

## 2025-05-08 PROCEDURE — 1280000000 HC REHAB R&B

## 2025-05-08 PROCEDURE — 97110 THERAPEUTIC EXERCISES: CPT

## 2025-05-08 PROCEDURE — 97542 WHEELCHAIR MNGMENT TRAINING: CPT

## 2025-05-08 PROCEDURE — 97530 THERAPEUTIC ACTIVITIES: CPT

## 2025-05-08 PROCEDURE — 97535 SELF CARE MNGMENT TRAINING: CPT

## 2025-05-08 PROCEDURE — 6370000000 HC RX 637 (ALT 250 FOR IP): Performed by: PHYSICAL MEDICINE & REHABILITATION

## 2025-05-08 PROCEDURE — 2500000003 HC RX 250 WO HCPCS: Performed by: SURGERY

## 2025-05-08 PROCEDURE — 6370000000 HC RX 637 (ALT 250 FOR IP): Performed by: SURGERY

## 2025-05-08 PROCEDURE — 99232 SBSQ HOSP IP/OBS MODERATE 35: CPT | Performed by: PHYSICAL MEDICINE & REHABILITATION

## 2025-05-08 PROCEDURE — 2580000003 HC RX 258: Performed by: SURGERY

## 2025-05-08 PROCEDURE — 6360000002 HC RX W HCPCS: Performed by: SURGERY

## 2025-05-08 PROCEDURE — 82962 GLUCOSE BLOOD TEST: CPT

## 2025-05-08 PROCEDURE — 94761 N-INVAS EAR/PLS OXIMETRY MLT: CPT

## 2025-05-08 RX ADMIN — ATORVASTATIN CALCIUM 20 MG: 10 TABLET, FILM COATED ORAL at 20:29

## 2025-05-08 RX ADMIN — SPIRONOLACTONE 25 MG: 50 TABLET ORAL at 08:17

## 2025-05-08 RX ADMIN — INSULIN HUMAN 15 UNITS: 500 INJECTION, SOLUTION SUBCUTANEOUS at 17:59

## 2025-05-08 RX ADMIN — METOPROLOL SUCCINATE 25 MG: 25 TABLET, EXTENDED RELEASE ORAL at 08:16

## 2025-05-08 RX ADMIN — CLOPIDOGREL BISULFATE 75 MG: 75 TABLET, FILM COATED ORAL at 08:16

## 2025-05-08 RX ADMIN — TAMSULOSIN HYDROCHLORIDE 0.4 MG: 0.4 CAPSULE ORAL at 08:16

## 2025-05-08 RX ADMIN — RIVAROXABAN 20 MG: 20 TABLET, FILM COATED ORAL at 18:05

## 2025-05-08 RX ADMIN — EZETIMIBE 10 MG: 10 TABLET ORAL at 08:16

## 2025-05-08 RX ADMIN — CEFTRIAXONE SODIUM 2000 MG: 2 INJECTION, POWDER, FOR SOLUTION INTRAMUSCULAR; INTRAVENOUS at 13:13

## 2025-05-08 RX ADMIN — SPIRONOLACTONE 25 MG: 50 TABLET ORAL at 20:29

## 2025-05-08 RX ADMIN — INSULIN HUMAN 5 UNITS: 500 INJECTION, SOLUTION SUBCUTANEOUS at 17:59

## 2025-05-08 RX ADMIN — SODIUM CHLORIDE 25 ML/HR: 0.9 INJECTION, SOLUTION INTRAVENOUS at 13:10

## 2025-05-08 RX ADMIN — INSULIN HUMAN 15 UNITS: 500 INJECTION, SOLUTION SUBCUTANEOUS at 08:18

## 2025-05-08 RX ADMIN — Medication 1 TABLET: at 08:16

## 2025-05-08 RX ADMIN — POTASSIUM CHLORIDE 40 MEQ: 1500 TABLET, EXTENDED RELEASE ORAL at 08:16

## 2025-05-08 RX ADMIN — INSULIN HUMAN 15 UNITS: 500 INJECTION, SOLUTION SUBCUTANEOUS at 12:24

## 2025-05-08 RX ADMIN — RANOLAZINE 500 MG: 500 TABLET, EXTENDED RELEASE ORAL at 08:17

## 2025-05-08 RX ADMIN — RANOLAZINE 500 MG: 500 TABLET, EXTENDED RELEASE ORAL at 20:29

## 2025-05-08 RX ADMIN — INSULIN HUMAN 5 UNITS: 500 INJECTION, SOLUTION SUBCUTANEOUS at 13:06

## 2025-05-08 RX ADMIN — FINASTERIDE 5 MG: 5 TABLET, FILM COATED ORAL at 08:16

## 2025-05-08 RX ADMIN — SODIUM CHLORIDE, PRESERVATIVE FREE 10 ML: 5 INJECTION INTRAVENOUS at 20:37

## 2025-05-08 RX ADMIN — SODIUM CHLORIDE, PRESERVATIVE FREE 10 ML: 5 INJECTION INTRAVENOUS at 13:20

## 2025-05-08 RX ADMIN — TORSEMIDE 10 MG: 20 TABLET ORAL at 18:05

## 2025-05-08 RX ADMIN — POTASSIUM CHLORIDE 40 MEQ: 1500 TABLET, EXTENDED RELEASE ORAL at 20:28

## 2025-05-08 RX ADMIN — EMPAGLIFLOZIN 10 MG: 10 TABLET, FILM COATED ORAL at 08:17

## 2025-05-08 ASSESSMENT — PAIN SCALES - GENERAL: PAINLEVEL_OUTOF10: 0

## 2025-05-08 NOTE — PROGRESS NOTES
Occupational Therapy    Physical Rehabilitation: OCCUPATIONAL THERAPY     [x] daily progress note       [] discharge       Patient Name:  Bandar De La Torre   :  1960 MRN: [id number]  Room:  [address] [place] [address] Date of Admission: 2025  Rehabilitation Diagnosis:   Local infection of the skin and subcutaneous tissue, unspecified [L08.9]  Chronic osteomyelitis of left foot (HCC) [M86.672]       Date 2025       Day of ARU Week:  2   Time IN/OUT 5377-3773  1298-4103   Individual Tx Minutes 60+60   Group Tx Minutes    Co-Treat Minutes    Concurrent Tx Minutes    TOTAL Tx Time Mins 120   Variance Time    Variance Reason []   Refusal due to:     []   Medical hold/reason:    []   Illness   []   Off Unit for test/procedure  []   Extra time needed to complete task  []   Therapeutic need  []   Make up mins were attempted but pt unable to complete due to (specify)  []   Other (specify):   Restrictions Restrictions/Precautions: General Precautions, Fall Risk, Weight Bearing (NWB LLE with wound vac, IV LUE)         Communication with other providers: [x]   OK to see per nursing:     []   Spoke with team member regarding:      Subjective observations and cognitive status: Pt sitting EOB on approach; pleasant and agreeable to therapy.     Pt sitting up in W/C on approach; pleasant and agreeable to therapy.      Pain level/location:    /10       Location:    Vitals taken during session    Discharge recommendations  Anticipated discharge date:    Destination: []home alone   []home alone w assist prn   [] home w/ family    [] Continuous supervision       []SNF    [] Assisted living     [] Other:   Continued therapy: []C OT  []OUTPATIENT  OT   [] No Further OT  Equipment needs: Bandar De La Torre requires a drop arm bedside commode due to being unable to use the toilet within the home and confined to a single room.  This patient requires a side transfer has a body configuration that requires extra width provided by a drop  Toilet          []   Other:        Bed Mobility:           [x]   Pt received out of bed       Transfers:      Slide Board:  Pt able to s/u slide board and complete transfer c SBA   Assistive device required for transfer:   SB       Functional Mobility:  NA   Assistance:    Device:   []   Rolling Walker     []   Standard Walker []   Wheelchair        []   Cane       []   4-Wheeled Walker         []   Cardiac Walker       []   Other:        Additional Therapeutic activities/exercises completed this date:     [x]   ADL Training   [x]   Balance/Postural training     [x]   Bed/Transfer Training   [x]   Endurance Training: To increase BUE endurance for ADLs and transfers pt completed arm ergometer on mod resistance 16 mins, average 45-50 RPM.      []   Neuromuscular Re-ed   []   Nu-step:  Time:        Level:         #Steps:       [x]   Rebounder:    [x]  Seated     []  Standing  To increase UB/core strength, pt completed 2 sets of 20 reps c 3# ball.       []   Supine Ther Ex (reps/sets):     [x]   Seated Ther Ex (reps/sets):  To increase UB strength for functional transfers and ADLs, Pt completed 4 sets of 20 reps c 30# on rickshaw.      []   Standing Ther Ex (reps/sets):     []   Other:      Comments:  All intervention performed to increase pt's strength, endurance, ax tolerance, and balance in prep for increased I c ADL/IADLs and functional transfers/mobility.       Patient/Caregiver Education and Training:   []   Adaptive Equipment Use  []   Bed Mobility/Transfer Technique/Safety  []   Energy Conservation Tips  []   Family training  []   Postural Awareness  []   Safety During Functional Activities  []   Reinforced Patient's Precautions       Treatment Plan for Next Session: Continue OT POC         Treatment/Activity Tolerance:   [x] Tolerated treatment with no adverse effects    [] Patient limited by fatigue  [] Patient limited by pain   [] Patient limited by medical complications:    [] Adverse reaction to Tx:   []

## 2025-05-08 NOTE — PROGRESS NOTES
Physical Therapy      [x] daily progress note       [] discharge       Patient Name:  Bandar De La Torre   :  1960 MRN: [id number]  Room:  [address] [place] [address] Date of Admission: 2025  Rehabilitation Diagnosis:   Local infection of the skin and subcutaneous tissue, unspecified [L08.9]  Chronic osteomyelitis of left foot (HCC) [M86.672]       Date 2025       Day of ARU Week:  2   Time IN/OUT 4771-2221   Individual Tx Minutes 60   TOTAL Tx Time Mins 60   Variance Time    Variance Time []   Refusal due to:     []   Medical hold/reason:    []   Illness   []   Off Unit for test/procedure  []   Extra time needed to complete task  []   Therapeutic need  []   Other (specify):   Restrictions Restrictions/Precautions  Restrictions/Precautions: General Precautions, Fall Risk, Weight Bearing (NWB LLE with wound vac, IV LUE)  Implants Present? : Pacemaker      Communication with other providers: [x]   OK to see per nursing:     []   Spoke with team member regarding:      Subjective observations and cognitive status: Pt up in WC, agreeable to session     Pain level/location: 0/10       Location:    Vitals:    Discharge recommendations  Anticipated discharge date:  tbd  Destination: []home alone   []home alone with assist PRN     [x] home w/ family      [] Continuous supervision  []SNF    [] Assisted living     [] Other:  Continued therapy: [x]HHC PT  []OUTPATIENT  PT   [] No Further PT  []SNF PT  Caregiver training recommended: []Yes  [] No   Equipment needs: stair lift, Patient requires the assistance of a heavy-duty wheelchair to successfully complete daily living tasks of toileting, feeding, bathing, dressing, and grooming or any other daily living tasks in the home.  A heavy-duty wheelchair is necessary due to the patient's impaired ambulation and mobility restrictions(NWB LLE) and patient would be unable to resolve these daily living tasks using a cane or walker.  The patient is capable of using a heavy-duty

## 2025-05-08 NOTE — CARE COORDINATION
Left a voicemail for Osei with [employer] to follow up on prior auth. Awaiting return call.     Spoke with Osei. The [employer] prior auth is currently pending. Awaiting determination.

## 2025-05-08 NOTE — PROGRESS NOTES
40 mEq Oral BID    ranolazine  500 mg Oral BID    rivaroxaban  20 mg Oral Dinner    spironolactone  25 mg Oral BID    tamsulosin  0.4 mg Oral Daily    torsemide  10 mg Oral Dinner    insulin regular human  15 Units SubCUTAneous Daily with breakfast    insulin regular human  15 Units SubCUTAneous Lunch    insulin regular human  15 Units SubCUTAneous Dinner    insulin regular human  0-30 Units SubCUTAneous 4x Daily AC & HS    polyethylene glycol  17 g Oral Daily    docusate sodium  100 mg Oral BID    clopidogrel  75 mg Oral Daily    cefTRIAXone (ROCEPHIN) IV  2,000 mg IntraVENous Q24H      Infusions:    sodium chloride      dextrose           Objective:   Vitals: /77   Pulse 59   Temp 98 °F (36.7 °C) (Oral)   Resp 18   Ht 1.854 m (6' 1\")   Wt 134.9 kg (297 lb 4.8 oz)   SpO2 100%   BMI 39.22 kg/m²   General appearance: alert and cooperative with exam  Neck: no JVD or bruit  Thyroid : Normal lobes   Lungs: Has Vesicular Breath sounds has sleep apnea on CPAP  Heart: Atrial fibrillation and also has cardiac pacemaker  Abdomen: soft, non-tender; bowel sounds normal; no masses,  no organomegaly  Musculoskeletal: Normal  Extremities: extremities normal, , no edema right foot 2nd and 3rd toe amputation left heel debridement done wound VAC and displaced  Neurologic:  Awake, alert, oriented to name, place and time.  Cranial nerves II-XII are grossly intact.  Motor is  intact.  Sensory neuropathy.,  and gait is abnormal.  And unstable    Assessment:     Patient Active Problem List:     MARINO on CPAP     Permanent atrial fibrillation ([facility])     Type 2 diabetes mellitus with hyperglycemia, with long-term current use of insulin ([facility])     Class 3 severe obesity due to excess calories with body mass index (BMI) of 45.0 to 49.9 in adult ([facility])     History of cardiac cath     Weakness of right arm     Acute CVA (cerebrovascular accident) ([facility])     Spastic hemiparesis of right dominant side ([facility])     Dysphagia due to recent  insulin  regime / and Oral Hypoglycemic drugs   Monitor Blood glucose frequently   Modified  the dose of Insulin/ other medicines as needed  Patient was transferred to ARU   Patient be started participating in physical activities of ARU  Reviewed notes from surgery  Will follow     .     MEETA Diehl MD,

## 2025-05-08 NOTE — PROGRESS NOTES
Physical Medicine and Rehabilitation Progress Note    Name:  Bandar De La Torre Date/Time of Admission: 2025  2:41 PM    CSN: [id number] Attending Provider: ARCHIE French MD   Room/Bed:  1018/1018-A : 1960 Age: 64 y.o.   Date: 2025 Time: 6:18 PM     Admitting diagnosis: Osteomyelitis left foot (IGC 16)     Comorbid diagnoses impacting rehabilitation: Uncontrolled pain, bilateral lower limb weakness, gait disturbance, acute blood loss anemia, paroxysmal atrial fibrillation, poorly controlled diabetes type 2 with peripheral neuropathy, essential hypertension, hyperkalemia, morbid obesity class III (BMI 41.5), history of CVA, chronic diastolic congestive heart failure, BPH with urinary hesitancy    Expected length of stay prior to a contact-guard level of function for discharge home with a wheelchair and [facility] OT/PT is 2025.     Recommendations:     Osteomyelitis of the left heel with gait disturbance: The patient continues to struggle with pivoting using his altered right foot.  Once up to the wheelchair he can use his arms to propel it on level surfaces.  With his poor sensation, his poor pain control and the nonweightbearing to the left lower limb, he remains at some risk for fall with injury during standing ADLs and mobility activities.  Providing him adaptive equipment training with strengthening exercises, balance recovery training and conditioning development.  Local wound care continues at the left foot and there are no signs of deterioration of healing.  Wound care team continues to direct the local management of the wound.  He is compliant with pulmonary hygiene measures.  Continuing the IV Rocephin daily that he has been committed to through 2025.  Anticipating that at discharge he will need a PICC line.  Case management is introducing caregiver training opportunities to his wife.  The wife is struggling with managing his care and is wanting us to consider a skilled nursing unit.   13:26      MEDICATIONS:      atorvastatin  20 mg Oral Nightly    sodium chloride flush  5-40 mL IntraVENous 2 times per day    empagliflozin  10 mg Oral Daily    ezetimibe  10 mg Oral Daily    finasteride  5 mg Oral Daily    folic acid-pyridoxine-cyancobalamin 1.13-25-2 tablet  1 tablet Oral Daily    metoprolol succinate  25 mg Oral Daily    potassium chloride  40 mEq Oral BID    ranolazine  500 mg Oral BID    rivaroxaban  20 mg Oral Dinner    spironolactone  25 mg Oral BID    tamsulosin  0.4 mg Oral Daily    torsemide  10 mg Oral Dinner    insulin regular human  15 Units SubCUTAneous Daily with breakfast    insulin regular human  15 Units SubCUTAneous Lunch    insulin regular human  15 Units SubCUTAneous Dinner    insulin regular human  0-30 Units SubCUTAneous 4x Daily AC & HS    polyethylene glycol  17 g Oral Daily    docusate sodium  100 mg Oral BID    clopidogrel  75 mg Oral Daily    cefTRIAXone (ROCEPHIN) IV  2,000 mg IntraVENous Q24H         PRN Meds:  sodium chloride flush, 5-40 mL, PRN  sodium chloride, , PRN  ondansetron, 4 mg, Q8H PRN   Or  ondansetron, 4 mg, Q6H PRN  acetaminophen, 650 mg, Q4H PRN  oxyCODONE, 5 mg, Q4H PRN  HYDROmorphone, 0.5 mg, Q3H PRN  glucose, 4 tablet, PRN  dextrose bolus, 125 mL, PRN   Or  dextrose bolus, 250 mL, PRN  glucagon (rDNA), 1 mg, PRN  dextrose, , Continuous PRN  bisacodyl, 10 mg, Daily PRN            Electronically signed by: Germán Lara MD 5/8/2025  6:18 PM      This dictation was created with voice recognition software. While attempts have been made to review the dictation as it is transcribed, on occasion the spoken word can be misinterpreted by the technology leading to omissions or inappropriate words, phrases or sentences.

## 2025-05-08 NOTE — PROGRESS NOTES
Bandar De La Torre    : 1960  Acct #: [id number]  MRN: [id number]              PM&R Progress Note      Admitting diagnosis: Osteomyelitis left foot (IGC 16)     Comorbid diagnoses impacting rehabilitation: Uncontrolled pain, bilateral lower limb weakness, gait disturbance, acute blood loss anemia, paroxysmal atrial fibrillation, poorly controlled diabetes type 2 with peripheral neuropathy, essential hypertension, hyperkalemia, morbid obesity class III (BMI 41.5), history of CVA, chronic diastolic congestive heart failure, BPH with urinary hesitancy     Chief complaint: Struggling to come to standing or pivot transfer with the weightbearing restriction.    Prior (baseline) level of function: Independent.    Current level of function:         Current  IRF-JAVIER and Goals:   Occupational Therapy:    Short Term Goals  Time Frame for Short Term Goals: STGs=LTGs :   Long Term Goals  Time Frame for Long Term Goals : 7 days or until d/c  Long Term Goal 1: Pt will complete grooming tasks Ind  Long Term Goal 2: Pt will complete total body bathing c setup  Long Term Goal 3: Pt will complete UB dressing Ind (using W/C for setup)  Long Term Goal 4: Pt will complete LB dressing c setup  Long Term Goal 5: Pt will doff/don footwear c min A  Additional Goals?: Yes  Long Term Goal 6: Pt will complete toileting c min A  Long Term Goal 7: Pt will complete functional transfers (bed, chair, toilet) c DME PRN and mod I  Long Term Goal 8: Pt will perform therex/therax to facilitate increased strength/endurance/ax tolerance (c emphasis on BUE endurance) c SBA  Long Term Goal 9: Pt will complete simple IADLs from W/C level mod I :                                       Eating: Eating  Assistance Needed: Independent  Comment: X  CARE Score: 6  Discharge Goal: Independent       Oral Hygiene: Oral Hygiene  Assistance Needed: Independent  Comment: Seated in W/C at sink  CARE Score: 6  Discharge Goal: Independent    UB/LB Bathing:

## 2025-05-08 NOTE — PROGRESS NOTES
Progress Note( Dr. Diehl)  5/8/2025  Subjective:   Admit Date: 4/30/2025  PCP: MEETA Diehl MD    Admitted For : Left foot /heel ulcer and also has left calcaneus osteomyelitis    Consulted For: Better control of blood glucose    Interval History: Patient with blood glucose improving with use of U500 insulin  Had debridement done of the left heel ulcer and wound vacuum was placed  His blood glucose were running a bit higher yesterday.  More active possibly today    Denies any chest pains,   Denies SOB .   Denies nausea or vomiting.   No new bowel or bladder symptoms.       Intake/Output Summary (Last 24 hours) at 5/8/2025 0833  Last data filed at 5/8/2025 0225  Gross per 24 hour   Intake 240 ml   Output 1800 ml   Net -1560 ml       DATA    CBC:   No results for input(s): \"WBC\", \"HGB\", \"PLT\" in the last 72 hours.   CMP:  No results for input(s): \"NA\", \"K\", \"CL\", \"CO2\", \"BUN\", \"CREATININE\", \"GLU\", \"CALCIUM\", \"LABALBU\", \"BILITOT\", \"ALKPHOS\", \"AST\", \"ALT\" in the last 72 hours.    Invalid input(s): \"PROT\"    Lipids:   Lab Results   Component Value Date/Time    CHOL 205 04/11/2025 08:12 AM    HDL 38 04/11/2025 08:12 AM    TRIG 83 04/11/2025 08:12 AM     Glucose:  Recent Labs     05/07/25 2002 05/08/25  0230 05/08/25  0718   POCGLU 192* 142* 184*     [id number]:  Lab Results   Component Value Date/Time    LABA1C 9.6 04/11/2025 08:12 AM     High Sensitivity TSH:   Lab Results   Component Value Date/Time    TSHHS 1.800 08/29/2019 10:06 AM     Free T3: No results found for: \"FT3\"  Free T4:No results found for: \"T4FREE\"    No orders to display        Scheduled Medicines   Medications:    atorvastatin  20 mg Oral Nightly    sodium chloride flush  5-40 mL IntraVENous 2 times per day    empagliflozin  10 mg Oral Daily    ezetimibe  10 mg Oral Daily    finasteride  5 mg Oral Daily    folic acid-pyridoxine-cyancobalamin 1.13-25-2 tablet  1 tablet Oral Daily    metoprolol succinate  25 mg Oral Daily    potassium chloride

## 2025-05-09 ENCOUNTER — TELEPHONE (OUTPATIENT)
Dept: WOUND CARE | Age: 65
End: 2025-05-09

## 2025-05-09 LAB
GLUCOSE BLD-MCNC: 184 MG/DL (ref 74–99)
GLUCOSE BLD-MCNC: 214 MG/DL (ref 74–99)
GLUCOSE BLD-MCNC: 263 MG/DL (ref 74–99)
GLUCOSE BLD-MCNC: 265 MG/DL (ref 74–99)
GLUCOSE BLD-MCNC: 93 MG/DL (ref 74–99)

## 2025-05-09 PROCEDURE — 82962 GLUCOSE BLOOD TEST: CPT

## 2025-05-09 PROCEDURE — 6360000002 HC RX W HCPCS: Performed by: SURGERY

## 2025-05-09 PROCEDURE — 99232 SBSQ HOSP IP/OBS MODERATE 35: CPT | Performed by: PHYSICAL MEDICINE & REHABILITATION

## 2025-05-09 PROCEDURE — 97605 NEG PRS WND THER DME<=50SQCM: CPT

## 2025-05-09 PROCEDURE — 97530 THERAPEUTIC ACTIVITIES: CPT

## 2025-05-09 PROCEDURE — 97110 THERAPEUTIC EXERCISES: CPT

## 2025-05-09 PROCEDURE — 2500000003 HC RX 250 WO HCPCS: Performed by: SURGERY

## 2025-05-09 PROCEDURE — 94761 N-INVAS EAR/PLS OXIMETRY MLT: CPT

## 2025-05-09 PROCEDURE — 6370000000 HC RX 637 (ALT 250 FOR IP): Performed by: SURGERY

## 2025-05-09 PROCEDURE — 99024 POSTOP FOLLOW-UP VISIT: CPT | Performed by: SURGERY

## 2025-05-09 PROCEDURE — 1280000000 HC REHAB R&B

## 2025-05-09 PROCEDURE — 97542 WHEELCHAIR MNGMENT TRAINING: CPT

## 2025-05-09 PROCEDURE — 97535 SELF CARE MNGMENT TRAINING: CPT

## 2025-05-09 PROCEDURE — 2580000003 HC RX 258: Performed by: SURGERY

## 2025-05-09 PROCEDURE — 6370000000 HC RX 637 (ALT 250 FOR IP): Performed by: PHYSICAL MEDICINE & REHABILITATION

## 2025-05-09 RX ADMIN — SPIRONOLACTONE 25 MG: 50 TABLET ORAL at 08:27

## 2025-05-09 RX ADMIN — SODIUM CHLORIDE, PRESERVATIVE FREE 10 ML: 5 INJECTION INTRAVENOUS at 22:39

## 2025-05-09 RX ADMIN — INSULIN HUMAN 15 UNITS: 500 INJECTION, SOLUTION SUBCUTANEOUS at 12:49

## 2025-05-09 RX ADMIN — Medication 1 TABLET: at 08:26

## 2025-05-09 RX ADMIN — METOPROLOL SUCCINATE 25 MG: 25 TABLET, EXTENDED RELEASE ORAL at 08:26

## 2025-05-09 RX ADMIN — POTASSIUM CHLORIDE 40 MEQ: 1500 TABLET, EXTENDED RELEASE ORAL at 22:32

## 2025-05-09 RX ADMIN — RANOLAZINE 500 MG: 500 TABLET, EXTENDED RELEASE ORAL at 08:26

## 2025-05-09 RX ADMIN — CLOPIDOGREL BISULFATE 75 MG: 75 TABLET, FILM COATED ORAL at 08:26

## 2025-05-09 RX ADMIN — SPIRONOLACTONE 25 MG: 50 TABLET ORAL at 22:32

## 2025-05-09 RX ADMIN — INSULIN HUMAN 15 UNITS: 500 INJECTION, SOLUTION SUBCUTANEOUS at 17:43

## 2025-05-09 RX ADMIN — TAMSULOSIN HYDROCHLORIDE 0.4 MG: 0.4 CAPSULE ORAL at 08:26

## 2025-05-09 RX ADMIN — SODIUM CHLORIDE, PRESERVATIVE FREE 10 ML: 5 INJECTION INTRAVENOUS at 12:34

## 2025-05-09 RX ADMIN — EZETIMIBE 10 MG: 10 TABLET ORAL at 08:27

## 2025-05-09 RX ADMIN — EMPAGLIFLOZIN 10 MG: 10 TABLET, FILM COATED ORAL at 08:27

## 2025-05-09 RX ADMIN — RANOLAZINE 500 MG: 500 TABLET, EXTENDED RELEASE ORAL at 22:32

## 2025-05-09 RX ADMIN — TORSEMIDE 10 MG: 20 TABLET ORAL at 17:43

## 2025-05-09 RX ADMIN — INSULIN HUMAN 10 UNITS: 500 INJECTION, SOLUTION SUBCUTANEOUS at 13:05

## 2025-05-09 RX ADMIN — DOCUSATE SODIUM 100 MG: 100 CAPSULE, LIQUID FILLED ORAL at 08:27

## 2025-05-09 RX ADMIN — FINASTERIDE 5 MG: 5 TABLET, FILM COATED ORAL at 08:27

## 2025-05-09 RX ADMIN — CEFTRIAXONE SODIUM 2000 MG: 2 INJECTION, POWDER, FOR SOLUTION INTRAMUSCULAR; INTRAVENOUS at 12:52

## 2025-05-09 RX ADMIN — POTASSIUM CHLORIDE 40 MEQ: 1500 TABLET, EXTENDED RELEASE ORAL at 08:26

## 2025-05-09 RX ADMIN — INSULIN HUMAN 10 UNITS: 500 INJECTION, SOLUTION SUBCUTANEOUS at 18:15

## 2025-05-09 RX ADMIN — INSULIN HUMAN 15 UNITS: 500 INJECTION, SOLUTION SUBCUTANEOUS at 08:29

## 2025-05-09 RX ADMIN — POLYETHYLENE GLYCOL (3350) 17 G: 17 POWDER, FOR SOLUTION ORAL at 08:33

## 2025-05-09 RX ADMIN — ATORVASTATIN CALCIUM 20 MG: 10 TABLET, FILM COATED ORAL at 22:32

## 2025-05-09 RX ADMIN — RIVAROXABAN 20 MG: 20 TABLET, FILM COATED ORAL at 17:43

## 2025-05-09 NOTE — CARE COORDINATION
Received a voicemail from Osei with [facility]. He reported that the patient will need a peer to peer today by noon. He did not leave any details to call in the peer to peer.     SUSIE left a voicemail for Osei requesting the peer to peer information. Awaiting a return call.    Spoke with Osei at [facility]. Peer to Peer needed for [facility] Auth today by noon. [phone]   Ref # [id number]  Member ID: [id number]  Doctor notified.     Peer to Peer was completed. The denial was upheld.     Spoke with Osei at [facility] and requested for a copy of the peer to peer letter.     [facility] [facility] insurance authorization form was faxed to Dr. Avalos's office.     Spoke with Osei at [facility] he provided a # the patient call call to appeal. [phone]    Spoke with patient's spouse provided her with the #. She plans to call in appeal. Confirmed with her that patient's discharge date is 05/13/2025.     Order for HD WC, slideboard, and Drop Arm BSC was faxed to [facility].     Referral made to [facility].     Clinical for IV ATB was faxed to [facility].     Received a call from patient's spouse. She attempted to call in the appeal for SNF. She was notified by [facility] that she is not able to file an appeal until she receives a letter from them with the denial information.     Spoke with Osei at [facility]. He is going to call the spouse once they receive the denial letter from [facility].     Patient provided with his [facility] denial letter for [facility] SNF. He stated that he is not going to be able to discharge home. They plan to call in the appeal.    [facility] [facility] Insurance Auth Form and clinical was faxed to /[facility] for patient's home wound vac.

## 2025-05-09 NOTE — PLAN OF CARE
Problem: Safety - Adult  Goal: Free from fall injury  5/8/2025 2201 by Mouna Agudelo, RN  Outcome: Progressing     Problem: Skin/Tissue Integrity  Goal: Skin integrity remains intact  Description: 1.  Monitor for areas of redness and/or skin breakdown2.  Assess vascular access sites hourly3.  Every 4-6 hours minimum:  Change oxygen saturation probe site4.  Every 4-6 hours:  If on nasal continuous positive airway pressure, respiratory therapy assess nares and determine need for appliance change or resting period  5/9/2025 0815 by Ron Remy, RN  Outcome: Progressing  5/8/2025 2201 by Mouna Agudelo, RN  Outcome: Progressing

## 2025-05-09 NOTE — TELEPHONE ENCOUNTER
RTC r/t peer to peer appeal for skilled nursing- advised Meredith Rachelle that a peer to peer has been completed and denied. Advised her to await denial letter and appeal the denial per notes in file. Advised Meredith to call Dr Avalos's surgery office to inquire if they recvd any communication from [employer] r/t skilled facility.

## 2025-05-09 NOTE — PROGRESS NOTES
Physical Therapy      [] daily progress note       [x] discharge       Patient Name:  Bandar De La Torre   :  1960 MRN: [id number]  Room:  [address] [place] [address] Date of Admission: 2025  Rehabilitation Diagnosis:   Local infection of the skin and subcutaneous tissue, unspecified [L08.9]  Chronic osteomyelitis of left foot (HCC) [M86.672]       Date 2025       Day of ARU Week:  3   Time IN/OUT 9550-7015   Individual Tx Minutes 60   TOTAL Tx Time Mins 60   Variance Time    Variance Time []   Refusal due to:     []   Medical hold/reason:    []   Illness   []   Off Unit for test/procedure  []   Extra time needed to complete task  []   Therapeutic need  []   Other (specify):   Restrictions Restrictions/Precautions  Restrictions/Precautions: General Precautions, Fall Risk, Weight Bearing (NWB LLE with wound vac, IV LUE)  Implants Present? : Pacemaker      Communication with other providers: [x]   OK to see per nursing:     []   Spoke with team member regarding:      Subjective observations and cognitive status: Pt received in WC in gym, reports feeling grumpy, unsure of change in routine going to SNF tomorrow     Pain level/location:    2/10       Location: L heel described as \"burning\"    Discharge recommendations  Anticipated discharge date:  5/10  Destination: []home alone   []home alone with assist PRN     [] home w/ family      [] Continuous supervision  [x]SNF    [] Assisted living     [] Other:   Continued therapy: []HHC PT  []OUTPATIENT  PT   [] No Further PT  Equipment needs: none     PT QI     Bed Mobility:     Roll Left and Right  Assistance Needed: Independent  Comment: no bed features  CARE Score: 6  Discharge Goal: Independent    Sit to Lying  Assistance Needed: Independent  Comment: no bed features  CARE Score: 6  Discharge Goal: Independent    Lying to Sitting on Side of Bed  Assistance Needed: Independent  Comment: no bed features  CARE Score: 6  Discharge Goal: Independent    Transfers:    Sit to

## 2025-05-09 NOTE — PROGRESS NOTES
Occupational Therapy    Physical Rehabilitation: OCCUPATIONAL THERAPY     [x] daily progress note       [] discharge       Patient Name:  Bandar De La Torre   :  1960 MRN: [id number]  Room:  [address] [place] [address] Date of Admission: 2025  Rehabilitation Diagnosis:   Local infection of the skin and subcutaneous tissue, unspecified [L08.9]  Chronic osteomyelitis of left foot (HCC) [M86.672]       Date 2025       Day of ARU Week:  3   Time IN/OUT 5576-2292  6564-3930   Individual Tx Minutes 90+30   Group Tx Minutes    Co-Treat Minutes    Concurrent Tx Minutes    TOTAL Tx Time Mins 120   Variance Time    Variance Reason []   Refusal due to:     []   Medical hold/reason:    []   Illness   []   Off Unit for test/procedure  []   Extra time needed to complete task  []   Therapeutic need  []   Make up mins were attempted but pt unable to complete due to (specify)  []   Other (specify):   Restrictions Restrictions/Precautions: General Precautions, Fall Risk, Weight Bearing (NWB LLE with wound vac, IV LUE)         Communication with other providers: [x]   OK to see per nursing:     []   Spoke with team member regarding:      Subjective observations and cognitive status: Wound care finishing with patient on approach; pt pleasant and agreeable to therapy.      Pain level/location:    /10       Location:    Vitals taken during session    Discharge recommendations  Anticipated discharge date:    Destination: []home alone   []home alone w assist prn   [] home w/ family    [] Continuous supervision       []SNF    [] Assisted living     [] Other:   Continued therapy: []HHC OT  []OUTPATIENT  OT   [] No Further OT  Equipment needs: Bandar De La Torre requires a drop arm bedside commode due to being unable to use the toilet within the home and confined to a single room.  This patient requires a side transfer has a body configuration that requires extra width provided by a drop arm commode.       ADLs:    Eating:  ambulator, with or without device (would use RW \"sometimes\" (sounds like he would abandon); used it for dr appts)  Prior Level of Assist for Transfers: Independent  Active : No  Patient's  Info: wife  Mode of Transportation: SUV  Occupation: Retired  Additional Comments: Pt typically sleeps in a regular bed at home    Objective                                                                                    Goals:  (Update in navigator)  Short Term Goals  Time Frame for Short Term Goals: STGs=LTGs:  Long Term Goals  Time Frame for Long Term Goals : 7 days or until d/c  Long Term Goal 1: Pt will complete grooming tasks Ind  Long Term Goal 2: Pt will complete total body bathing c setup  Long Term Goal 3: Pt will complete UB dressing Ind (using W/C for setup)  Long Term Goal 4: Pt will complete LB dressing c setup  Long Term Goal 5: Pt will doff/don footwear c min A  Additional Goals?: Yes  Long Term Goal 6: Pt will complete toileting c min A  Long Term Goal 7: Pt will complete functional transfers (bed, chair, toilet) c DME PRN and mod I  Long Term Goal 8: Pt will perform therex/therax to facilitate increased strength/endurance/ax tolerance (c emphasis on BUE endurance) c SBA  Long Term Goal 9: Pt will complete simple IADLs from W/C level mod I:        Plan of Care                                                                              Times per week: 5 days per week for a minimum of 60 minutes/day plus group as appropriate for 60 minutes.  Treatment to include Occupational Therapy Plan  Current Treatment Recommendations: Strengthening, Balance training, Functional mobility training, Endurance training, Safety education & training, Patient/Caregiver education & training, Equipment evaluation, education, & procurement, Positioning, Self-Care / ADL, Home management training, Group Therapy    Electronically signed by   SUNSHINE Dawkins,  5/9/2025, 12:53 PM

## 2025-05-09 NOTE — CONSULTS
breakdown AEB David. Lt leg dressing changed with [employer]. Recommend repositioning with wedges and floating heels with pillows. Follow David orders.         Specialty Bed Required : yes  [] Low Air Loss   [x] Pressure Redistribution  [] Fluid Immersion  [] Bariatric  [] Total Pressure Relief  [] Other:     Discharge Plan:  Placement for patient upon discharge: tbd  Hospice Care: no  Patient appropriate for Outpatient Wound Care Center: yes    Patient/Caregiver Teaching:  Level of patient/caregiver understanding able to: pt voiced understanding         Electronically signed by Natacha Gerber RN,  on 5/9/2025 at 9:56 AM

## 2025-05-09 NOTE — PROGRESS NOTES
GENERAL SURGERY PROGRESS NOTE    Bandar De La Torre is a 64 y.o. male with left heel osteomyelitis s/p debridement and VAC placement.                Subjective:  Doing ok today. Denies problems with wound VAC. Reports some soreness in right foot--he has been using this foot more for transfers.    Objective:    Vitals: VITALS:  /67   Pulse 62   Temp 96.9 °F (36.1 °C) (Oral)   Resp 16   Ht 1.854 m (6' 1\")   Wt 134.7 kg (297 lb)   SpO2 98%   BMI 39.18 kg/m²     I/O: 05/08 0701 - 05/09 0700  In: 240 [P.O.:240]  Out: 750 [Urine:750]    Labs/Imaging Results:   Lab Results   Component Value Date/Time     04/28/2025 02:27 AM    K 4.1 04/28/2025 02:27 AM     04/28/2025 02:27 AM    CO2 23 04/28/2025 02:27 AM    BUN 41 04/28/2025 02:27 AM    CREATININE 1.1 04/28/2025 02:27 AM    GLUCOSE 144 04/28/2025 02:27 AM    CALCIUM 9.2 04/28/2025 02:27 AM      Lab Results   Component Value Date    WBC 7.6 04/28/2025    HGB 11.0 (L) 04/28/2025    HCT 36.0 (L) 04/28/2025    MCV 89.1 04/28/2025     04/28/2025       IV Fluids:   sodium chloride Last Rate: 25 mL/hr (05/08/25 1310)    dextrose    Scheduled Meds:   atorvastatin, 20 mg, Oral, Nightly    sodium chloride flush, 5-40 mL, IntraVENous, 2 times per day    empagliflozin, 10 mg, Oral, Daily    ezetimibe, 10 mg, Oral, Daily    finasteride, 5 mg, Oral, Daily    folic acid-pyridoxine-cyancobalamin 1.13-25-2 tablet, 1 tablet, Oral, Daily    metoprolol succinate, 25 mg, Oral, Daily    potassium chloride, 40 mEq, Oral, BID    ranolazine, 500 mg, Oral, BID    rivaroxaban, 20 mg, Oral, Dinner    spironolactone, 25 mg, Oral, BID    tamsulosin, 0.4 mg, Oral, Daily    torsemide, 10 mg, Oral, Dinner    insulin regular human, 15 Units, SubCUTAneous, Daily with breakfast    insulin regular human, 15 Units, SubCUTAneous, Lunch    insulin regular human, 15 Units, SubCUTAneous, Dinner    insulin regular human, 0-30 Units, SubCUTAneous, 4x Daily AC & HS    polyethylene  peripheral neuropathy (HCC)     Status post amputation of toe     Diabetic ulcer of right midfoot associated with type 2 diabetes mellitus, limited to breakdown of skin (HCC)     Surgical wound of right foot, non healing, subsequent encounter     Diabetic ulcer of toe of right foot associated with type 2 diabetes mellitus, with fat layer exposed (HCC)     Traumatic ulcer of foot, right, with fat layer exposed (HCC)     Anticoagulated     Pre-ulcerative calluses     Acute hematogenous osteomyelitis of right foot (HCC)     Gait disturbance     Complete heart block (HCC)     Skin lesion of left leg     Traumatic injury left heel to an exisitng diabetic wound     Diabetic ulcer of left heel associated with type 2 diabetes mellitus, with muscle involvement without evidence of necrosis (HCC)     Diabetic foot (HCC)     DM foot (HCC)    - continue vac therapy  - simple dressing with aquacel to right great toe  - non-weight bearing left leg  - will see intermittently while pt remains in ARU      Jakob Avalos MD

## 2025-05-09 NOTE — CARE COORDINATION
Bandar De La Torre requires a drop arm bedside commode due to being unable to use the toilet within the home and confined to a single room. This patient requires a side transfer has a body configuration that requires extra width provided by a drop arm commode.     Patient requires the assistance of a heavy-duty wheelchair to successfully complete daily living tasks of toileting, feeding, bathing, dressing, and grooming or any other daily living tasks in the home.  A heavy-duty wheelchair is necessary due to the patient's impaired ambulation and mobility restrictions(NWB LLE) and patient would be unable to resolve these daily living tasks using a cane or walker.  The patient is capable of using a heavy-duty wheelchair safely in the home and can maneuver within their home with adequate access.  There is a caregiver available to provide assistance.  The patient has not expressed an unwillingness to use the wheelchair    Length of need: Lifetime    Bandar De La Torre requires the assistance of 36 inch slideboard to successfully and safely complete transfers in order to bathe, get into and out of a car/bed. This is  necessary due to reduced overall strength to independently complete a squat pivot/standing transfer. Transfers cannot be safely completed without this device and would place Bandar De La Torre at greater risk of falls.

## 2025-05-09 NOTE — PROGRESS NOTES
Physical Medicine and Rehabilitation Progress Note    Name:  Bandar De La Torre Date/Time of Admission: 2025  2:41 PM    CSN: [id number] Attending Provider: ARCHIE French MD   Room/Bed:  1018/1018-A : 1960 Age: 64 y.o.   Date: 2025 Time: 5:14 PM     Admitting diagnosis: Osteomyelitis left foot (IGC 16)     Comorbid diagnoses impacting rehabilitation: Uncontrolled pain, bilateral lower limb weakness, gait disturbance, acute blood loss anemia, paroxysmal atrial fibrillation, poorly controlled diabetes type 2 with peripheral neuropathy, essential hypertension, hyperkalemia, morbid obesity class III (BMI 41.5), history of CVA, chronic diastolic congestive heart failure, BPH with urinary hesitancy     Expected length of stay prior to a contact-guard level of function for discharge home with a wheelchair and [facility] OT/PT is 2025.  Attempted discharge to SNF, this was denied by insurance.  Will do expedited appeal for that.     Recommendations:     Osteomyelitis of the left heel with gait disturbance: The patient continues to struggle with pivoting using his altered right foot.  Once up to the wheelchair he can use his arms to propel it on level surfaces.  With his poor sensation, his poor pain control and the nonweightbearing to the left lower limb, he remains at some risk for fall with injury during standing ADLs and mobility activities.  Providing him adaptive equipment training with strengthening exercises, balance recovery training and conditioning development.  Local wound care continues at the left foot and there are no signs of deterioration of healing.  Wound care team continues to direct the local management of the wound.  He is compliant with pulmonary hygiene measures.  Continuing the IV Rocephin daily that he has been committed to through 2025.  Anticipating that at discharge he will need a PICC line.  Case management is introducing caregiver training opportunities to his wife.  The

## 2025-05-09 NOTE — TELEPHONE ENCOUNTER
Patient admitted. Will f/u when d/c'd/    Electronically signed by Ana M Torres RN on 5/9/2025 at 12:46 PM

## 2025-05-10 LAB
GLUCOSE BLD-MCNC: 137 MG/DL (ref 74–99)
GLUCOSE BLD-MCNC: 151 MG/DL (ref 74–99)
GLUCOSE BLD-MCNC: 169 MG/DL (ref 74–99)
GLUCOSE BLD-MCNC: 212 MG/DL (ref 74–99)
GLUCOSE BLD-MCNC: 235 MG/DL (ref 74–99)
GLUCOSE BLD-MCNC: 277 MG/DL (ref 74–99)

## 2025-05-10 PROCEDURE — 6370000000 HC RX 637 (ALT 250 FOR IP): Performed by: SURGERY

## 2025-05-10 PROCEDURE — 1280000000 HC REHAB R&B

## 2025-05-10 PROCEDURE — 2500000003 HC RX 250 WO HCPCS: Performed by: SURGERY

## 2025-05-10 PROCEDURE — 2580000003 HC RX 258: Performed by: SURGERY

## 2025-05-10 PROCEDURE — 94150 VITAL CAPACITY TEST: CPT

## 2025-05-10 PROCEDURE — 99232 SBSQ HOSP IP/OBS MODERATE 35: CPT | Performed by: PHYSICAL MEDICINE & REHABILITATION

## 2025-05-10 PROCEDURE — 82962 GLUCOSE BLOOD TEST: CPT

## 2025-05-10 PROCEDURE — 6360000002 HC RX W HCPCS: Performed by: SURGERY

## 2025-05-10 PROCEDURE — 6370000000 HC RX 637 (ALT 250 FOR IP): Performed by: PHYSICAL MEDICINE & REHABILITATION

## 2025-05-10 RX ADMIN — METOPROLOL SUCCINATE 25 MG: 25 TABLET, EXTENDED RELEASE ORAL at 09:33

## 2025-05-10 RX ADMIN — RIVAROXABAN 20 MG: 20 TABLET, FILM COATED ORAL at 17:16

## 2025-05-10 RX ADMIN — SPIRONOLACTONE 25 MG: 50 TABLET ORAL at 09:33

## 2025-05-10 RX ADMIN — INSULIN HUMAN 15 UNITS: 500 INJECTION, SOLUTION SUBCUTANEOUS at 17:12

## 2025-05-10 RX ADMIN — INSULIN HUMAN 5 UNITS: 500 INJECTION, SOLUTION SUBCUTANEOUS at 12:20

## 2025-05-10 RX ADMIN — CEFTRIAXONE SODIUM 2000 MG: 2 INJECTION, POWDER, FOR SOLUTION INTRAMUSCULAR; INTRAVENOUS at 09:51

## 2025-05-10 RX ADMIN — RANOLAZINE 500 MG: 500 TABLET, EXTENDED RELEASE ORAL at 09:33

## 2025-05-10 RX ADMIN — POTASSIUM CHLORIDE 40 MEQ: 1500 TABLET, EXTENDED RELEASE ORAL at 21:01

## 2025-05-10 RX ADMIN — Medication 1 TABLET: at 09:34

## 2025-05-10 RX ADMIN — INSULIN HUMAN 15 UNITS: 500 INJECTION, SOLUTION SUBCUTANEOUS at 09:34

## 2025-05-10 RX ADMIN — SODIUM CHLORIDE, PRESERVATIVE FREE 10 ML: 5 INJECTION INTRAVENOUS at 09:34

## 2025-05-10 RX ADMIN — SPIRONOLACTONE 25 MG: 50 TABLET ORAL at 21:01

## 2025-05-10 RX ADMIN — SODIUM CHLORIDE, PRESERVATIVE FREE 10 ML: 5 INJECTION INTRAVENOUS at 21:03

## 2025-05-10 RX ADMIN — TAMSULOSIN HYDROCHLORIDE 0.4 MG: 0.4 CAPSULE ORAL at 09:32

## 2025-05-10 RX ADMIN — INSULIN HUMAN 15 UNITS: 500 INJECTION, SOLUTION SUBCUTANEOUS at 12:20

## 2025-05-10 RX ADMIN — RANOLAZINE 500 MG: 500 TABLET, EXTENDED RELEASE ORAL at 21:01

## 2025-05-10 RX ADMIN — CLOPIDOGREL BISULFATE 75 MG: 75 TABLET, FILM COATED ORAL at 09:33

## 2025-05-10 RX ADMIN — FINASTERIDE 5 MG: 5 TABLET, FILM COATED ORAL at 09:32

## 2025-05-10 RX ADMIN — INSULIN HUMAN 10 UNITS: 500 INJECTION, SOLUTION SUBCUTANEOUS at 17:16

## 2025-05-10 RX ADMIN — EMPAGLIFLOZIN 10 MG: 10 TABLET, FILM COATED ORAL at 09:33

## 2025-05-10 RX ADMIN — TORSEMIDE 10 MG: 20 TABLET ORAL at 17:16

## 2025-05-10 RX ADMIN — EZETIMIBE 10 MG: 10 TABLET ORAL at 09:33

## 2025-05-10 RX ADMIN — POTASSIUM CHLORIDE 40 MEQ: 1500 TABLET, EXTENDED RELEASE ORAL at 09:33

## 2025-05-10 RX ADMIN — ATORVASTATIN CALCIUM 20 MG: 10 TABLET, FILM COATED ORAL at 21:01

## 2025-05-10 NOTE — PROGRESS NOTES
Progress Note( Dr. Diehl)  5/10/2025  Subjective:   Admit Date: 4/30/2025  PCP: MEETA Diehl MD    Admitted For : Left foot /heel ulcer and also has left calcaneus osteomyelitis    Consulted For: Better control of blood glucose    Interval History: Patient with blood glucose improving with use of U500 insulin  Had debridement done of the left heel ulcer and wound vacuum was placed  His blood glucose were running a bit higher yesterday.  More active possibly today    Denies any chest pains,   Denies SOB .   Denies nausea or vomiting.   No new bowel or bladder symptoms.       Intake/Output Summary (Last 24 hours) at 5/10/2025 0901  Last data filed at 5/10/2025 0246  Gross per 24 hour   Intake 120 ml   Output 1600 ml   Net -1480 ml       DATA    CBC:   No results for input(s): \"WBC\", \"HGB\", \"PLT\" in the last 72 hours.   CMP:  No results for input(s): \"NA\", \"K\", \"CL\", \"CO2\", \"BUN\", \"CREATININE\", \"GLU\", \"CALCIUM\", \"LABALBU\", \"BILITOT\", \"ALKPHOS\", \"AST\", \"ALT\" in the last 72 hours.    Invalid input(s): \"PROT\"    Lipids:   Lab Results   Component Value Date/Time    CHOL 205 04/11/2025 08:12 AM    HDL 38 04/11/2025 08:12 AM    TRIG 83 04/11/2025 08:12 AM     Glucose:  Recent Labs     05/09/25  2220 05/10/25  0246 05/10/25  0737   POCGLU 93 137* 169*     [id number]:  Lab Results   Component Value Date/Time    LABA1C 9.6 04/11/2025 08:12 AM     High Sensitivity TSH:   Lab Results   Component Value Date/Time    TSHHS 1.800 08/29/2019 10:06 AM     Free T3: No results found for: \"FT3\"  Free T4:No results found for: \"T4FREE\"    No orders to display        Scheduled Medicines   Medications:    atorvastatin  20 mg Oral Nightly    sodium chloride flush  5-40 mL IntraVENous 2 times per day    empagliflozin  10 mg Oral Daily    ezetimibe  10 mg Oral Daily    finasteride  5 mg Oral Daily    folic acid-pyridoxine-cyancobalamin 1.13-25-2 tablet  1 tablet Oral Daily    metoprolol succinate  25 mg Oral Daily    potassium

## 2025-05-10 NOTE — PROGRESS NOTES
Rehab Therapy Aide Weekend Update    5/10/2025  Bandar NAYE Wil    Pt was mobilized this weekend under the guidance of PT/OT.    Time In: 1320  Time Out: 1345  Restrictions:    [x] Ok to see per NSG this date.  [x]  Gait belt used during activity and non-slip footwear        Pt was received [x] Out of bed  [] From Chair  Patient complaints: [x] No Complaints   [] Pain: (Location and level)   [] Nursing was advised  [] Nursing provided pain meds  Mobilization: [x] Wheelchair level  [] Rolling Walker  [] 4 Wheeled walker   [] Cane   [] No Device    Activity Completed:W/C ambulation for 104 ft with SBA  Patient Comments: Pt willing to get up and move around in the W/C    [x] Pt taken to the toilet.     [x] Pt assisted with hygiene     [x] Pt assisted with clothing    Activity Tolerance:   [x] Tolerated activity with no adverse effects    [] Patient limited by fatigue  [] Patient limited by pain (location)   [] Patient limited by medical complications: Dizziness, SOB, Anxiety, Other (specify)        []  Nursing was notified and activity was stopped.   [] Adverse reaction to activity:   [] Significant change in status:    Safety upon return to room:    [x]  bed alarm set    []  chair alarm set    []  Telesitter activated     [x] Call light within reach     []Assisted in setting pt up with meal tray.    Communication with Nursing or therapy:   Slmi Marcelino Rehab Therapy Aide  1:51 PM 5/10/2025

## 2025-05-10 NOTE — PLAN OF CARE
Problem: ABCDS Injury Assessment  Goal: Absence of physical injury  5/10/2025 1053 by Rosanne Tompkins LPN  Outcome: Progressing       Problem: Nutrition Deficit:  Goal: Optimize nutritional status  5/10/2025 1053 by Rosanne Tompkins LPN  Outcome: Progressing      Problem: Pain  Goal: Verbalizes/displays adequate comfort level or baseline comfort level  5/10/2025 1053 by Rosanne Tompkins LPN  Outcome: Progressing

## 2025-05-10 NOTE — PROGRESS NOTES
Physical Medicine and Rehabilitation Progress Note    Name:  Bandar De La Torre Date/Time of Admission: 2025  2:41 PM    CSN: [id number] Attending Provider: ARCHIE French MD   Room/Bed:  1018/1018-A : 1960 Age: 64 y.o.   Date: 5/10/2025 Time: 11:58 AM     Admitting diagnosis: Osteomyelitis left foot (IGC 16)     Comorbid diagnoses impacting rehabilitation: Uncontrolled pain, bilateral lower limb weakness, gait disturbance, acute blood loss anemia, paroxysmal atrial fibrillation, poorly controlled diabetes type 2 with peripheral neuropathy, essential hypertension, hyperkalemia, morbid obesity class III (BMI 41.5), history of CVA, chronic diastolic congestive heart failure, BPH with urinary hesitancy     Expected length of stay prior to a contact-guard level of function for discharge home with a wheelchair and [facility] OT/PT is 2025.  Attempted discharge to SNF, this was denied by insurance.  Will do expedited appeal for that.     Recommendations:     Osteomyelitis of the left heel with gait disturbance: The patient continues to struggle with pivoting using his altered right foot.  Once up to the wheelchair he can use his arms to propel it on level surfaces.  With his poor sensation, his poor pain control and the nonweightbearing to the left lower limb, he remains at some risk for fall with injury during standing ADLs and mobility activities.  Providing him adaptive equipment training with strengthening exercises, balance recovery training and conditioning development.  Local wound care continues at the left foot and there are no signs of deterioration of healing.  Wound care team continues to direct the local management of the wound.  He is compliant with pulmonary hygiene measures.  Continuing the IV Rocephin daily that he has been committed to through 2025.  Anticipating that at discharge he will need a PICC line.  Case management is introducing caregiver training opportunities to his wife.  The

## 2025-05-11 VITALS
TEMPERATURE: 98.6 F | SYSTOLIC BLOOD PRESSURE: 139 MMHG | BODY MASS INDEX: 38.71 KG/M2 | HEIGHT: 73 IN | RESPIRATION RATE: 18 BRPM | DIASTOLIC BLOOD PRESSURE: 63 MMHG | WEIGHT: 292.11 LBS | HEART RATE: 64 BPM | OXYGEN SATURATION: 99 %

## 2025-05-11 LAB
GLUCOSE BLD-MCNC: 137 MG/DL (ref 74–99)
GLUCOSE BLD-MCNC: 202 MG/DL (ref 74–99)
GLUCOSE BLD-MCNC: 238 MG/DL (ref 74–99)
GLUCOSE BLD-MCNC: 258 MG/DL (ref 74–99)

## 2025-05-11 PROCEDURE — 2500000003 HC RX 250 WO HCPCS: Performed by: SURGERY

## 2025-05-11 PROCEDURE — 6370000000 HC RX 637 (ALT 250 FOR IP): Performed by: SURGERY

## 2025-05-11 PROCEDURE — 6370000000 HC RX 637 (ALT 250 FOR IP): Performed by: PHYSICAL MEDICINE & REHABILITATION

## 2025-05-11 PROCEDURE — 2580000003 HC RX 258: Performed by: SURGERY

## 2025-05-11 PROCEDURE — 82962 GLUCOSE BLOOD TEST: CPT

## 2025-05-11 PROCEDURE — 94761 N-INVAS EAR/PLS OXIMETRY MLT: CPT

## 2025-05-11 PROCEDURE — 1280000000 HC REHAB R&B

## 2025-05-11 PROCEDURE — 6360000002 HC RX W HCPCS: Performed by: SURGERY

## 2025-05-11 RX ADMIN — SPIRONOLACTONE 25 MG: 50 TABLET ORAL at 08:51

## 2025-05-11 RX ADMIN — CEFTRIAXONE SODIUM 2000 MG: 2 INJECTION, POWDER, FOR SOLUTION INTRAMUSCULAR; INTRAVENOUS at 10:57

## 2025-05-11 RX ADMIN — INSULIN HUMAN 10 UNITS: 500 INJECTION, SOLUTION SUBCUTANEOUS at 17:22

## 2025-05-11 RX ADMIN — METOPROLOL SUCCINATE 25 MG: 25 TABLET, EXTENDED RELEASE ORAL at 08:51

## 2025-05-11 RX ADMIN — FINASTERIDE 5 MG: 5 TABLET, FILM COATED ORAL at 08:51

## 2025-05-11 RX ADMIN — INSULIN HUMAN 5 UNITS: 500 INJECTION, SOLUTION SUBCUTANEOUS at 13:23

## 2025-05-11 RX ADMIN — INSULIN HUMAN 15 UNITS: 500 INJECTION, SOLUTION SUBCUTANEOUS at 12:38

## 2025-05-11 RX ADMIN — CLOPIDOGREL BISULFATE 75 MG: 75 TABLET, FILM COATED ORAL at 08:52

## 2025-05-11 RX ADMIN — INSULIN HUMAN 5 UNITS: 500 INJECTION, SOLUTION SUBCUTANEOUS at 22:07

## 2025-05-11 RX ADMIN — SODIUM CHLORIDE, PRESERVATIVE FREE 10 ML: 5 INJECTION INTRAVENOUS at 09:01

## 2025-05-11 RX ADMIN — INSULIN HUMAN 15 UNITS: 500 INJECTION, SOLUTION SUBCUTANEOUS at 17:23

## 2025-05-11 RX ADMIN — Medication 1 TABLET: at 08:51

## 2025-05-11 RX ADMIN — TAMSULOSIN HYDROCHLORIDE 0.4 MG: 0.4 CAPSULE ORAL at 08:51

## 2025-05-11 RX ADMIN — INSULIN HUMAN 15 UNITS: 500 INJECTION, SOLUTION SUBCUTANEOUS at 08:57

## 2025-05-11 RX ADMIN — POTASSIUM CHLORIDE 40 MEQ: 1500 TABLET, EXTENDED RELEASE ORAL at 22:07

## 2025-05-11 RX ADMIN — INSULIN HUMAN 5 UNITS: 500 INJECTION, SOLUTION SUBCUTANEOUS at 08:54

## 2025-05-11 RX ADMIN — RIVAROXABAN 20 MG: 20 TABLET, FILM COATED ORAL at 17:21

## 2025-05-11 RX ADMIN — EMPAGLIFLOZIN 10 MG: 10 TABLET, FILM COATED ORAL at 08:51

## 2025-05-11 RX ADMIN — RANOLAZINE 500 MG: 500 TABLET, EXTENDED RELEASE ORAL at 08:51

## 2025-05-11 RX ADMIN — POTASSIUM CHLORIDE 40 MEQ: 1500 TABLET, EXTENDED RELEASE ORAL at 08:51

## 2025-05-11 RX ADMIN — RANOLAZINE 500 MG: 500 TABLET, EXTENDED RELEASE ORAL at 22:08

## 2025-05-11 RX ADMIN — SPIRONOLACTONE 25 MG: 50 TABLET ORAL at 22:07

## 2025-05-11 RX ADMIN — TORSEMIDE 10 MG: 20 TABLET ORAL at 17:21

## 2025-05-11 RX ADMIN — EZETIMIBE 10 MG: 10 TABLET ORAL at 08:51

## 2025-05-11 RX ADMIN — SODIUM CHLORIDE, PRESERVATIVE FREE 10 ML: 5 INJECTION INTRAVENOUS at 22:08

## 2025-05-11 RX ADMIN — ATORVASTATIN CALCIUM 20 MG: 10 TABLET, FILM COATED ORAL at 22:08

## 2025-05-11 NOTE — PROGRESS NOTES
(HCC)     Dysphagia due to recent stroke     Essential hypertension     Obesity, Class III, BMI 40-49.9 (morbid obesity) (HCC)     Frequent falls     Chronic venous stasis dermatitis of both lower extremities     History of CVA (cerebrovascular accident)     Obesity hypoventilation syndrome (HCC)     Hypertension     Hyperlipidemia     Congestive heart failure due to cardiomyopathy (HCC)     Chronic kidney disease, stage 3b (HCC)     Chronic kidney disease, stage II (mild)     Benign essential microscopic hematuria     Stage 3a chronic kidney disease (HCC)     Generalized edema     Acute kidney injury superimposed on CKD     Hypokalemia     Cellulitis of foot     Sepsis (HCC)     Diabetic foot infection (HCC)     Bacteremia due to Enterococcus     Pacemaker lead malfunction     Osteomyelitis of second toe of right foot (HCC)     Uncontrolled pain     Generalized weakness     Poorly controlled type 2 diabetes mellitus with peripheral neuropathy (HCC)     Status post amputation of toe     Diabetic ulcer of right midfoot associated with type 2 diabetes mellitus, limited to breakdown of skin (HCC)     Surgical wound of right foot, non healing, subsequent encounter     Diabetic ulcer of toe of right foot associated with type 2 diabetes mellitus, with fat layer exposed (HCC)     Traumatic ulcer of foot, right, with fat layer exposed (HCC)     Anticoagulated     Pre-ulcerative calluses     Acute hematogenous osteomyelitis of right foot (HCC)     Gait disturbance     Complete heart block (HCC)     Skin lesion of left leg     Traumatic injury left heel to an exisitng diabetic wound     Diabetic ulcer of left heel associated with type 2 diabetes mellitus, with muscle involvement without evidence of necrosis (HCC)     Diabetic foot (HCC)     DM foot (HCC)     Left foot infection     Pyogenic inflammation of bone (HCC)      Plan:     Reviewed POC blood glucose . Labs and X ray results   Reviewed Current Medicines   On meal/

## 2025-05-11 NOTE — PLAN OF CARE
Problem: Chronic Conditions and Co-morbidities  Goal: Patient's chronic conditions and co-morbidity symptoms are monitored and maintained or improved  5/11/2025 1039 by Rosanne Tompkins LPN  Outcome: Progressing     Problem: Safety - Adult  Goal: Free from fall injury  5/11/2025 1039 by Rosanne Tompkins LPN  Outcome: Progressing    Problem: ABCDS Injury Assessment  Goal: Absence of physical injury  5/11/2025 1039 by Rosanne Tompkins LPN  Outcome: Progressing

## 2025-05-11 NOTE — PLAN OF CARE
Problem: Chronic Conditions and Co-morbidities  Goal: Patient's chronic conditions and co-morbidity symptoms are monitored and maintained or improved  5/10/2025 2339 by Rima Snyder LPN  Outcome: Progressing  5/10/2025 1053 by Rosanne Tompkins LPN  Outcome: Progressing     Problem: Discharge Planning  Goal: Discharge to home or other facility with appropriate resources  5/10/2025 2339 by Rima Snyder LPN  Outcome: Progressing  5/10/2025 1053 by Rosanne Tompkins LPN  Outcome: Progressing     Problem: Safety - Adult  Goal: Free from fall injury  5/10/2025 2339 by Rima Snyder LPN  Outcome: Progressing  5/10/2025 1053 by Rosanne Tompkins LPN  Outcome: Progressing     Problem: ABCDS Injury Assessment  Goal: Absence of physical injury  5/10/2025 2339 by Rima Snyder LPN  Outcome: Progressing  5/10/2025 1053 by Rosanne Tompkins LPN  Outcome: Progressing     Problem: Nutrition Deficit:  Goal: Optimize nutritional status  5/10/2025 2339 by Rima Snyder LPN  Outcome: Progressing  5/10/2025 1053 by Rosanne Tompkins LPN  Outcome: Progressing     Problem: Skin/Tissue Integrity  Goal: Skin integrity remains intact  Description: 1.  Monitor for areas of redness and/or skin breakdown2.  Assess vascular access sites hourly3.  Every 4-6 hours minimum:  Change oxygen saturation probe site4.  Every 4-6 hours:  If on nasal continuous positive airway pressure, respiratory therapy assess nares and determine need for appliance change or resting period  5/10/2025 2339 by Rima Snyder LPN  Outcome: Progressing  5/10/2025 1053 by Rosanne Tompkins LPN  Outcome: Progressing     Problem: Pain  Goal: Verbalizes/displays adequate comfort level or baseline comfort level  5/10/2025 2339 by Rima Snyder LPN  Outcome: Progressing  5/10/2025 1053 by Rosanne Tompkins LPN  Outcome: Progressing

## 2025-05-12 LAB
GLUCOSE BLD-MCNC: 189 MG/DL (ref 74–99)
GLUCOSE BLD-MCNC: 267 MG/DL (ref 74–99)
GLUCOSE BLD-MCNC: 282 MG/DL (ref 74–99)
GLUCOSE BLD-MCNC: 288 MG/DL (ref 74–99)
GLUCOSE BLD-MCNC: 292 MG/DL (ref 74–99)

## 2025-05-12 PROCEDURE — 6370000000 HC RX 637 (ALT 250 FOR IP): Performed by: SURGERY

## 2025-05-12 PROCEDURE — 99233 SBSQ HOSP IP/OBS HIGH 50: CPT | Performed by: PHYSICAL MEDICINE & REHABILITATION

## 2025-05-12 PROCEDURE — 2500000003 HC RX 250 WO HCPCS: Performed by: SURGERY

## 2025-05-12 PROCEDURE — 97605 NEG PRS WND THER DME<=50SQCM: CPT

## 2025-05-12 PROCEDURE — 97530 THERAPEUTIC ACTIVITIES: CPT

## 2025-05-12 PROCEDURE — 99024 POSTOP FOLLOW-UP VISIT: CPT | Performed by: SURGERY

## 2025-05-12 PROCEDURE — 6360000002 HC RX W HCPCS: Performed by: SURGERY

## 2025-05-12 PROCEDURE — 6370000000 HC RX 637 (ALT 250 FOR IP): Performed by: PHYSICAL MEDICINE & REHABILITATION

## 2025-05-12 PROCEDURE — 97535 SELF CARE MNGMENT TRAINING: CPT

## 2025-05-12 PROCEDURE — 2580000003 HC RX 258: Performed by: SURGERY

## 2025-05-12 PROCEDURE — 1280000000 HC REHAB R&B

## 2025-05-12 PROCEDURE — 97110 THERAPEUTIC EXERCISES: CPT

## 2025-05-12 PROCEDURE — 97542 WHEELCHAIR MNGMENT TRAINING: CPT

## 2025-05-12 PROCEDURE — 82962 GLUCOSE BLOOD TEST: CPT

## 2025-05-12 RX ADMIN — POTASSIUM CHLORIDE 40 MEQ: 1500 TABLET, EXTENDED RELEASE ORAL at 08:52

## 2025-05-12 RX ADMIN — INSULIN HUMAN 10 UNITS: 500 INJECTION, SOLUTION SUBCUTANEOUS at 18:33

## 2025-05-12 RX ADMIN — INSULIN HUMAN 10 UNITS: 500 INJECTION, SOLUTION SUBCUTANEOUS at 22:24

## 2025-05-12 RX ADMIN — FINASTERIDE 5 MG: 5 TABLET, FILM COATED ORAL at 08:52

## 2025-05-12 RX ADMIN — METOPROLOL SUCCINATE 25 MG: 25 TABLET, EXTENDED RELEASE ORAL at 08:52

## 2025-05-12 RX ADMIN — RANOLAZINE 500 MG: 500 TABLET, EXTENDED RELEASE ORAL at 21:45

## 2025-05-12 RX ADMIN — INSULIN HUMAN 15 UNITS: 500 INJECTION, SOLUTION SUBCUTANEOUS at 17:33

## 2025-05-12 RX ADMIN — DOCUSATE SODIUM 100 MG: 100 CAPSULE, LIQUID FILLED ORAL at 08:52

## 2025-05-12 RX ADMIN — SODIUM CHLORIDE, PRESERVATIVE FREE 10 ML: 5 INJECTION INTRAVENOUS at 21:46

## 2025-05-12 RX ADMIN — Medication 1 TABLET: at 08:51

## 2025-05-12 RX ADMIN — RANOLAZINE 500 MG: 500 TABLET, EXTENDED RELEASE ORAL at 08:52

## 2025-05-12 RX ADMIN — EZETIMIBE 10 MG: 10 TABLET ORAL at 08:52

## 2025-05-12 RX ADMIN — SODIUM CHLORIDE, PRESERVATIVE FREE 10 ML: 5 INJECTION INTRAVENOUS at 08:56

## 2025-05-12 RX ADMIN — TAMSULOSIN HYDROCHLORIDE 0.4 MG: 0.4 CAPSULE ORAL at 08:51

## 2025-05-12 RX ADMIN — CEFTRIAXONE SODIUM 2000 MG: 2 INJECTION, POWDER, FOR SOLUTION INTRAMUSCULAR; INTRAVENOUS at 12:29

## 2025-05-12 RX ADMIN — INSULIN HUMAN 10 UNITS: 500 INJECTION, SOLUTION SUBCUTANEOUS at 12:41

## 2025-05-12 RX ADMIN — ATORVASTATIN CALCIUM 20 MG: 10 TABLET, FILM COATED ORAL at 21:44

## 2025-05-12 RX ADMIN — POTASSIUM CHLORIDE 40 MEQ: 1500 TABLET, EXTENDED RELEASE ORAL at 21:44

## 2025-05-12 RX ADMIN — INSULIN HUMAN 15 UNITS: 500 INJECTION, SOLUTION SUBCUTANEOUS at 12:08

## 2025-05-12 RX ADMIN — SPIRONOLACTONE 25 MG: 50 TABLET ORAL at 21:45

## 2025-05-12 RX ADMIN — SPIRONOLACTONE 25 MG: 50 TABLET ORAL at 08:51

## 2025-05-12 RX ADMIN — CLOPIDOGREL BISULFATE 75 MG: 75 TABLET, FILM COATED ORAL at 08:51

## 2025-05-12 RX ADMIN — INSULIN HUMAN 15 UNITS: 500 INJECTION, SOLUTION SUBCUTANEOUS at 07:56

## 2025-05-12 RX ADMIN — EMPAGLIFLOZIN 10 MG: 10 TABLET, FILM COATED ORAL at 08:52

## 2025-05-12 NOTE — CARE COORDINATION
Discussed discharge with patient. Notified him that every has been ordered for his discharge home tomorrow. Patient reported that his house is not ready for him to return. Provided a copy of the Important Message from Medicare form signed upon admission. Patient verbalized understanding.     Spoke with spouse by phone. Provided her with the Lisa Appeal #. She plans to call today.     Lisa appeal filed today [id number].     Concurrent review was sent to [facility] via [employer] Portal.     [employer] - Files SUCCESSFULLY uploaded for Case Control ID [id number] EMR Key: [id number] Your Submission Tracking ID is [id number]    Checked [employer] portal. Patient's home wound vac is submitted. Awaiting approval.     Provided patient with script for stair lift.    Spoke with Yessenia at the office of Dr. Avalos. She reported that the patient needs to follow up at the wound clinic.     3:34 PM - Lisa niño is in clinical review.   Appeal Started: 5/12/2025 9:29:04 AM  Medical Records Received: 5/12/2025 11:30:50 AM

## 2025-05-12 NOTE — PROGRESS NOTES
a diet modified for carbohydrates.  Monitoring blood sugars with his Dexcom system.  Providing scheduled Humulin R and Jardiance under the direction of endocrinology.  Encouraging consistent oral intake.  Essential hypertension: Continuing metoprolol, Aldactone, Ranexa and Demadex with parameters for systolic blood pressure regulation.  Target systolic blood pressures 120-140.  Vital signs are checked at rest and with activity.  His blood pressure trends continue to be a little under the target range on medication.  Monitoring closely.  Encouraging oral fluids.  BPH with urinary hesitancy: Continuing Proscar and Flomax.  Avoiding anticholinergic medication exposure when possible.  Activating the bladder protocol should signs or symptoms suggest retention.

## 2025-05-12 NOTE — CONSULTS
atrial fibrillation (HCC)    Type 2 diabetes mellitus with hyperglycemia, with long-term current use of insulin ([facility])    Class 3 severe obesity due to excess calories with body mass index (BMI) of 45.0 to 49.9 in adult ([facility])    History of cardiac cath    Weakness of right arm    Acute CVA (cerebrovascular accident) ([facility])    Spastic hemiparesis of right dominant side ([facility])    Dysphagia due to recent stroke    Essential hypertension    Obesity, Class III, BMI 40-49.9 (morbid obesity) ([facility])    Frequent falls    Chronic venous stasis dermatitis of both lower extremities    History of CVA (cerebrovascular accident)    Obesity hypoventilation syndrome (HCC)    Hypertension    Hyperlipidemia    Congestive heart failure due to cardiomyopathy (HCC)    Chronic kidney disease, stage 3b (HCC)    Chronic kidney disease, stage II (mild)    Benign essential microscopic hematuria    Stage 3a chronic kidney disease ([facility])    Generalized edema    Acute kidney injury superimposed on CKD    Hypokalemia    Cellulitis of foot    Sepsis ([facility])    Diabetic foot infection ([facility])    Bacteremia due to Enterococcus    Pacemaker lead malfunction    Osteomyelitis of second toe of right foot ([facility])    Uncontrolled pain    Generalized weakness    Poorly controlled type 2 diabetes mellitus with peripheral neuropathy ([facility])    Status post amputation of toe    Diabetic ulcer of right midfoot associated with type 2 diabetes mellitus, limited to breakdown of skin ([facility])    Surgical wound of right foot, non healing, subsequent encounter    Diabetic ulcer of toe of right foot associated with type 2 diabetes mellitus, with fat layer exposed ([facility])    Traumatic ulcer of foot, right, with fat layer exposed ([facility])    Anticoagulated    Pre-ulcerative calluses    Acute hematogenous osteomyelitis of right foot ([facility])    Gait disturbance    Complete heart block ([facility])    Skin lesion of left leg    Traumatic injury left heel to an exisitng diabetic wound    Diabetic ulcer

## 2025-05-12 NOTE — PATIENT CARE CONFERENCE
ACUTE REHAB TEAM CONFERENCE SUMMARY   [facility]    NAME: Bandar De La Torre  : 1960 ADMIT DATE: 2025    Rehab Admitting Dx:Local infection of the skin and subcutaneous tissue, unspecified [L08.9]  Chronic osteomyelitis of left foot (HCC) [M86.672]  Patient Comorbid Conditions:   Active Hospital Problems    Diagnosis Date Noted    Acute osteomyelitis of left calcaneus (HCC) [M86.172] 2025    Paroxysmal atrial fibrillation (HCC) [I48.0] 2025    Acute blood loss anemia [D62] 2025    Chronic diastolic (congestive) heart failure (HCC) [I50.32] 2025    Benign prostatic hyperplasia with urinary hesitancy [N40.1, R39.11] 2025    Chronic osteomyelitis of left foot (HCC) [M86.672] 2025     Date: 2025    CASE MANAGEMENT  Current issues/needs regarding patient and family discharge status: Appeal Outcome:  SD Agrees with termination of Hospital services  Liability: Beneficiary Liability Starts on 2025  [facility] PT, OT, RN, IV ATB, Wound VAC, Ordered DME: WC, slideboard, Drop-Arm BSC    Bandar De La Torre requires a drop arm bedside commode due to being unable to use the toilet within the home and confined to a single room. This patient requires a side transfer has a body configuration that requires extra width provided by a drop arm commode.      Patient requires the assistance of a heavy-duty wheelchair to successfully complete daily living tasks of toileting, feeding, bathing, dressing, and grooming or any other daily living tasks in the home.  A heavy-duty wheelchair is necessary due to the patient's impaired ambulation and mobility restrictions(NWB LLE) and patient would be unable to resolve these daily living tasks using a cane or walker.  The patient is capable of using a heavy-duty wheelchair safely in the home and can maneuver within their home with adequate access.  There is a caregiver available to provide assistance.  The patient has not

## 2025-05-12 NOTE — DISCHARGE INSTR - ACTIVITY
Pt will discharge home with medical supplies from   [facility]  [address] [place] [address]  [phone]    Pt will discharge home with wound vac from   [employer]  [address] [place] [address]  [phone]    [email or web address]  [place] [employer] [phone]  A.C.T.I.O.N [phone]

## 2025-05-12 NOTE — PROGRESS NOTES
Progress Note( Dr. Diehl)  5/12/2025  Subjective:   Admit Date: 4/30/2025  PCP: MEETA Diehl MD    Admitted For : Left foot /heel ulcer and also has left calcaneus osteomyelitis    Consulted For: Better control of blood glucose    Interval History: Patient with blood glucose improving with use of U500 insulin  Had debridement done of the left heel ulcer and wound vacuum was placed  His blood glucose were running a bit higher yesterday.  More active possibly today    Denies any chest pains,   Denies SOB .   Denies nausea or vomiting.   No new bowel or bladder symptoms.       Intake/Output Summary (Last 24 hours) at 5/12/2025 0705  Last data filed at 5/12/2025 0234  Gross per 24 hour   Intake --   Output 3370 ml   Net -3370 ml       DATA    CBC:   No results for input(s): \"WBC\", \"HGB\", \"PLT\" in the last 72 hours.   CMP:  No results for input(s): \"NA\", \"K\", \"CL\", \"CO2\", \"BUN\", \"CREATININE\", \"GLU\", \"CALCIUM\", \"LABALBU\", \"BILITOT\", \"ALKPHOS\", \"AST\", \"ALT\" in the last 72 hours.    Invalid input(s): \"PROT\"    Lipids:   Lab Results   Component Value Date/Time    CHOL 205 04/11/2025 08:12 AM    HDL 38 04/11/2025 08:12 AM    TRIG 83 04/11/2025 08:12 AM     Glucose:  Recent Labs     05/11/25  1135 05/11/25 2015 05/12/25  0357   POCGLU 238* 258* 189*     [id number]:  Lab Results   Component Value Date/Time    LABA1C 9.6 04/11/2025 08:12 AM     High Sensitivity TSH:   Lab Results   Component Value Date/Time    TSHHS 1.800 08/29/2019 10:06 AM     Free T3: No results found for: \"FT3\"  Free T4:No results found for: \"T4FREE\"    No orders to display        Scheduled Medicines   Medications:    atorvastatin  20 mg Oral Nightly    sodium chloride flush  5-40 mL IntraVENous 2 times per day    empagliflozin  10 mg Oral Daily    ezetimibe  10 mg Oral Daily    finasteride  5 mg Oral Daily    folic acid-pyridoxine-cyancobalamin 1.13-25-2 tablet  1 tablet Oral Daily    metoprolol succinate  25 mg Oral Daily    potassium chloride

## 2025-05-12 NOTE — PLAN OF CARE
Problem: Safety - Adult  Goal: Free from fall injury  5/12/2025 0744 by Ron Remy RN  Outcome: Progressing  5/11/2025 2342 by Samia Lopes LPN  Outcome: Progressing     Problem: Skin/Tissue Integrity  Goal: Skin integrity remains intact  Description: 1.  Monitor for areas of redness and/or skin breakdown2.  Assess vascular access sites hourly3.  Every 4-6 hours minimum:  Change oxygen saturation probe site4.  Every 4-6 hours:  If on nasal continuous positive airway pressure, respiratory therapy assess nares and determine need for appliance change or resting period  5/11/2025 2342 by Samia Lopes LPN  Outcome: Progressing      Speaking Coherently

## 2025-05-12 NOTE — DISCHARGE INSTRUCTIONS
Your information:  Name: Bandar De La Torre  : 1960    Your instructions:    Pt will discharge home with medical supplies from   [facility]  [address] [id number]  [phone]    Pt will discharge home with wound vac from   [employer]  [address] [place] [address]  [phone]    What to do after you leave the hospital:    Recommended diet: {diet:44150}    Recommended activity: {discharge activity:19533}    The following personal items were collected during your admission and were returned to you:    Belongings  Dental Appliances: None  Vision - Corrective Lenses: Eyeglasses, At home  Hearing Aid: None  Clothing: Sent home  Jewelry: None  Electronic Devices: Cell Phone,   Weapons (Notify Protective Services/Security): None  Home Medications: None  Valuables Given To: Patient  Provide Name(s) of Who Valuable(s) Were Given To: Bandar YANCEYKate Wil    Information obtained by:  By signing below, I understand that if any problems occur once I leave the hospital I am to contact ***.  I understand and acknowledge receipt of the instructions indicated above.

## 2025-05-12 NOTE — PROGRESS NOTES
Physical Therapy      [] daily progress note       [x] discharge       Patient Name:  Bandar De La Torre   :  1960 MRN: [id number]  Room:  [address] [place] [address] Date of Admission: 2025  Rehabilitation Diagnosis:   Local infection of the skin and subcutaneous tissue, unspecified [L08.9]  Chronic osteomyelitis of left foot (HCC) [M86.672]       Date 2025       Day of ARU Week:  6   Time IN/OUT 8889-3788  9061-9816   Individual Tx Minutes 44+16   TOTAL Tx Time Mins 60   Variance Time    Variance Time []   Refusal due to:     []   Medical hold/reason:    []   Illness   []   Off Unit for test/procedure  []   Extra time needed to complete task  []   Therapeutic need  []   Other (specify):   Restrictions Restrictions/Precautions  Restrictions/Precautions: General Precautions, Fall Risk, Weight Bearing (NWB LLE with wound vac, IV LUE)  Implants Present? : Pacemaker      Communication with other providers: [x]   OK to see per nursing:     []   Spoke with team member regarding:      Subjective observations and cognitive status: Pt up in WC, reports feeling \"very grumpy\", states \"I guess I'm gonna be living in my car, I'm going home tomorrow.\" Pt asking about order for stair lift, CM reports needing to request one from family Dr. Pt wanting to call spouse before leaving room req extra time.      Pain level/location:   1 /10       Location: L heel   Discharge recommendations  Anticipated discharge date:  5/10  Destination: []home alone   []home alone with assist PRN     [] home w/ family      [] Continuous supervision  [x]SNF    [] Assisted living     [] Other:   Continued therapy: [][facility]HC PT  []OUTPATIENT  PT   [] No Further PT  Equipment needs: none     PT QI     Bed Mobility:     Roll Left and Right  Assistance Needed: Independent  Comment: no bed features  CARE Score: 6  Discharge Goal: Independent    Sit to Lying  Assistance Needed: Independent  Comment: no bed features  CARE Score: 6  Discharge Goal:

## 2025-05-12 NOTE — PROGRESS NOTES
Occupational Therapy    Physical Rehabilitation: OCCUPATIONAL THERAPY     [x] daily progress note       [] discharge       Patient Name:  Bandar De La Torre   :  1960 MRN: [id number]  Room:  [address] [place] [address] Date of Admission: 2025  Rehabilitation Diagnosis:   Local infection of the skin and subcutaneous tissue, unspecified [L08.9]  Chronic osteomyelitis of left foot (HCC) [M86.672]       Date 2025       Day of ARU Week:  6   Time IN/OUT 4235-4697  6896-5274   Individual Tx Minutes 60+60   Group Tx Minutes    Co-Treat Minutes    Concurrent Tx Minutes    TOTAL Tx Time Mins 120   Variance Time    Variance Reason []   Refusal due to:     []   Medical hold/reason:    []   Illness   []   Off Unit for test/procedure  []   Extra time needed to complete task  []   Therapeutic need  []   Make up mins were attempted but pt unable to complete due to (specify)  []   Other (specify):   Restrictions Restrictions/Precautions: General Precautions, Fall Risk, Weight Bearing (NWB LLE with wound vac, IV LUE)         Communication with other providers: [x]   OK to see per nursing:     []   Spoke with team member regarding:      Subjective observations and cognitive status: Pt resting in bed on approach; \"I'm very grumpy\". Pt then stated, \"I guess I have to live in my car\"     Pt connected to IV on approach; stated he couldn't do therapy with the IV. Therapist educated that he could still work with IV. Pt agreeable.      Pain level/location:    /10       Location:    Vitals taken during session    Discharge recommendations  Anticipated discharge date:    Destination: []home alone   []home alone w assist prn   [] home w/ family    [] Continuous supervision       []SNF    [] Assisted living     [] Other:   Continued therapy: [][facility]HC OT  []OUTPATIENT  OT   [] No Further OT  Equipment needs: Bandar De La Torre requires a drop arm bedside commode due to being unable to use the toilet within the home and confined to a single

## 2025-05-13 LAB
ANION GAP SERPL CALCULATED.3IONS-SCNC: 13 MMOL/L (ref 9–17)
BUN SERPL-MCNC: 43 MG/DL (ref 7–20)
CALCIUM SERPL-MCNC: 9.8 MG/DL (ref 8.3–10.6)
CHLORIDE SERPL-SCNC: 103 MMOL/L (ref 99–110)
CO2 SERPL-SCNC: 21 MMOL/L (ref 21–32)
CREAT SERPL-MCNC: 1.3 MG/DL (ref 0.8–1.3)
ERYTHROCYTE [DISTWIDTH] IN BLOOD BY AUTOMATED COUNT: 16.5 % (ref 11.7–14.9)
GFR, ESTIMATED: 54 ML/MIN/1.73M2
GLUCOSE BLD-MCNC: 158 MG/DL (ref 74–99)
GLUCOSE BLD-MCNC: 174 MG/DL (ref 74–99)
GLUCOSE BLD-MCNC: 227 MG/DL (ref 74–99)
GLUCOSE BLD-MCNC: 234 MG/DL (ref 74–99)
GLUCOSE BLD-MCNC: 263 MG/DL (ref 74–99)
GLUCOSE SERPL-MCNC: 212 MG/DL (ref 74–99)
HCT VFR BLD AUTO: 39.6 % (ref 42–52)
HGB BLD-MCNC: 12.9 G/DL (ref 13.5–18)
MCH RBC QN AUTO: 28.5 PG (ref 27–31)
MCHC RBC AUTO-ENTMCNC: 32.6 G/DL (ref 32–36)
MCV RBC AUTO: 87.6 FL (ref 78–100)
PLATELET # BLD AUTO: 231 K/UL (ref 140–440)
PMV BLD AUTO: 11.5 FL (ref 7.5–11.1)
POTASSIUM SERPL-SCNC: 4.9 MMOL/L (ref 3.5–5.1)
RBC # BLD AUTO: 4.52 M/UL (ref 4.6–6.2)
SODIUM SERPL-SCNC: 137 MMOL/L (ref 136–145)
WBC OTHER # BLD: 7.5 K/UL (ref 4–10.5)

## 2025-05-13 PROCEDURE — 6370000000 HC RX 637 (ALT 250 FOR IP): Performed by: SURGERY

## 2025-05-13 PROCEDURE — 6360000002 HC RX W HCPCS: Performed by: SURGERY

## 2025-05-13 PROCEDURE — 6370000000 HC RX 637 (ALT 250 FOR IP): Performed by: PHYSICAL MEDICINE & REHABILITATION

## 2025-05-13 PROCEDURE — 2500000003 HC RX 250 WO HCPCS: Performed by: SURGERY

## 2025-05-13 PROCEDURE — 99233 SBSQ HOSP IP/OBS HIGH 50: CPT | Performed by: PHYSICAL MEDICINE & REHABILITATION

## 2025-05-13 PROCEDURE — 80048 BASIC METABOLIC PNL TOTAL CA: CPT

## 2025-05-13 PROCEDURE — 97110 THERAPEUTIC EXERCISES: CPT

## 2025-05-13 PROCEDURE — 97530 THERAPEUTIC ACTIVITIES: CPT

## 2025-05-13 PROCEDURE — 2580000003 HC RX 258: Performed by: SURGERY

## 2025-05-13 PROCEDURE — 94761 N-INVAS EAR/PLS OXIMETRY MLT: CPT

## 2025-05-13 PROCEDURE — 36415 COLL VENOUS BLD VENIPUNCTURE: CPT

## 2025-05-13 PROCEDURE — 97542 WHEELCHAIR MNGMENT TRAINING: CPT

## 2025-05-13 PROCEDURE — 82962 GLUCOSE BLOOD TEST: CPT

## 2025-05-13 PROCEDURE — 1280000000 HC REHAB R&B

## 2025-05-13 PROCEDURE — 85027 COMPLETE CBC AUTOMATED: CPT

## 2025-05-13 RX ORDER — POLYETHYLENE GLYCOL 3350 17 G/17G
17 POWDER, FOR SOLUTION ORAL DAILY PRN
Status: DISCONTINUED | OUTPATIENT
Start: 2025-05-13 | End: 2025-05-14 | Stop reason: HOSPADM

## 2025-05-13 RX ORDER — DOCUSATE SODIUM 100 MG/1
100 CAPSULE, LIQUID FILLED ORAL 2 TIMES DAILY PRN
Status: DISCONTINUED | OUTPATIENT
Start: 2025-05-13 | End: 2025-05-14 | Stop reason: HOSPADM

## 2025-05-13 RX ADMIN — INSULIN HUMAN 5 UNITS: 500 INJECTION, SOLUTION SUBCUTANEOUS at 13:01

## 2025-05-13 RX ADMIN — INSULIN HUMAN 10 UNITS: 500 INJECTION, SOLUTION SUBCUTANEOUS at 18:09

## 2025-05-13 RX ADMIN — EMPAGLIFLOZIN 10 MG: 10 TABLET, FILM COATED ORAL at 07:57

## 2025-05-13 RX ADMIN — METOPROLOL SUCCINATE 25 MG: 25 TABLET, EXTENDED RELEASE ORAL at 07:56

## 2025-05-13 RX ADMIN — ACETAMINOPHEN 650 MG: 325 TABLET ORAL at 07:56

## 2025-05-13 RX ADMIN — SODIUM CHLORIDE, PRESERVATIVE FREE 10 ML: 5 INJECTION INTRAVENOUS at 08:03

## 2025-05-13 RX ADMIN — FINASTERIDE 5 MG: 5 TABLET, FILM COATED ORAL at 07:56

## 2025-05-13 RX ADMIN — SPIRONOLACTONE 25 MG: 50 TABLET ORAL at 07:54

## 2025-05-13 RX ADMIN — POTASSIUM CHLORIDE 40 MEQ: 1500 TABLET, EXTENDED RELEASE ORAL at 21:41

## 2025-05-13 RX ADMIN — POTASSIUM CHLORIDE 40 MEQ: 1500 TABLET, EXTENDED RELEASE ORAL at 07:55

## 2025-05-13 RX ADMIN — Medication 1 TABLET: at 07:54

## 2025-05-13 RX ADMIN — TORSEMIDE 10 MG: 20 TABLET ORAL at 17:31

## 2025-05-13 RX ADMIN — POLYETHYLENE GLYCOL (3350) 17 G: 17 POWDER, FOR SOLUTION ORAL at 07:53

## 2025-05-13 RX ADMIN — TAMSULOSIN HYDROCHLORIDE 0.4 MG: 0.4 CAPSULE ORAL at 07:57

## 2025-05-13 RX ADMIN — INSULIN HUMAN 15 UNITS: 500 INJECTION, SOLUTION SUBCUTANEOUS at 17:32

## 2025-05-13 RX ADMIN — ATORVASTATIN CALCIUM 20 MG: 10 TABLET, FILM COATED ORAL at 21:41

## 2025-05-13 RX ADMIN — RANOLAZINE 500 MG: 500 TABLET, EXTENDED RELEASE ORAL at 07:54

## 2025-05-13 RX ADMIN — CEFTRIAXONE SODIUM 2000 MG: 2 INJECTION, POWDER, FOR SOLUTION INTRAMUSCULAR; INTRAVENOUS at 11:20

## 2025-05-13 RX ADMIN — INSULIN HUMAN 15 UNITS: 500 INJECTION, SOLUTION SUBCUTANEOUS at 12:09

## 2025-05-13 RX ADMIN — ACETAMINOPHEN 650 MG: 325 TABLET ORAL at 17:31

## 2025-05-13 RX ADMIN — EZETIMIBE 10 MG: 10 TABLET ORAL at 07:57

## 2025-05-13 RX ADMIN — RIVAROXABAN 20 MG: 20 TABLET, FILM COATED ORAL at 17:31

## 2025-05-13 RX ADMIN — RANOLAZINE 500 MG: 500 TABLET, EXTENDED RELEASE ORAL at 21:41

## 2025-05-13 RX ADMIN — INSULIN HUMAN 15 UNITS: 500 INJECTION, SOLUTION SUBCUTANEOUS at 07:57

## 2025-05-13 RX ADMIN — CLOPIDOGREL BISULFATE 75 MG: 75 TABLET, FILM COATED ORAL at 07:54

## 2025-05-13 RX ADMIN — SPIRONOLACTONE 25 MG: 50 TABLET ORAL at 21:41

## 2025-05-13 ASSESSMENT — PAIN SCALES - GENERAL
PAINLEVEL_OUTOF10: 3
PAINLEVEL_OUTOF10: 3
PAINLEVEL_OUTOF10: 5
PAINLEVEL_OUTOF10: 2
PAINLEVEL_OUTOF10: 3

## 2025-05-13 ASSESSMENT — PAIN - FUNCTIONAL ASSESSMENT
PAIN_FUNCTIONAL_ASSESSMENT: ACTIVITIES ARE NOT PREVENTED
PAIN_FUNCTIONAL_ASSESSMENT: ACTIVITIES ARE NOT PREVENTED

## 2025-05-13 ASSESSMENT — PAIN DESCRIPTION - PAIN TYPE: TYPE: SURGICAL PAIN

## 2025-05-13 ASSESSMENT — PAIN DESCRIPTION - DESCRIPTORS
DESCRIPTORS: BURNING
DESCRIPTORS: ACHING

## 2025-05-13 ASSESSMENT — PAIN DESCRIPTION - LOCATION
LOCATION: FOOT
LOCATION: FOOT

## 2025-05-13 ASSESSMENT — PAIN DESCRIPTION - ORIENTATION
ORIENTATION: RIGHT
ORIENTATION: LEFT

## 2025-05-13 NOTE — PROGRESS NOTES
Latest Reference Range & Units 05/11/25 07:13 05/11/25 11:35 05/11/25 16:50 05/11/25 20:15 05/12/25 03:57 05/12/25 11:28 05/12/25 16:35 05/12/25 20:48 05/13/25 02:16 05/13/25 07:16   POC Glucose 74 - 99 mg/dL 202 (H) 238 (H) 292 (H) 258 (H) 189 (H) 282 (H) 288 (H) 267 (H) 158 (H) 174 (H)   (H): Data is abnormally high

## 2025-05-13 NOTE — PROGRESS NOTES
BLE wounds)  Bathroom Toilet: Standard  Bathroom Equipment: Shower chair, Grab bars in shower, Grab bars around toilet  Bathroom Accessibility: Walker accessible (usually left walker outside door, definitely not W/C accessible)  Home Equipment: Walker - Rolling, Cane, Sock aid, Reacher  Has the patient had two or more falls in the past year or any fall with injury in the past year?: Yes (reports 2 falls in the past year)  Prior Level of Assist for ADLs: Independent  Prior Level of Assist for Homemaking: Independent (shared with spouse)  Homemaking Responsibilities: Yes  Meal Prep Responsibility: Primary  Laundry Responsibility: No  Cleaning Responsibility: Secondary  Bill Paying/Finance Responsibility: Secondary  Shopping Responsibility: No  Dependent Care Responsibility: No  Health Care Management: Secondary (wife sets up pillbox, pt takes them and also manages insulin)  Prior Level of Assist for Ambulation: Independent household ambulator, with or without device (would use RW \"sometimes\" (sounds like he would abandon); used it for dr appts)  Prior Level of Assist for Transfers: Independent  Active : No  Patient's  Info: wife  Mode of Transportation: SUV  Occupation: Retired  Additional Comments: Pt typically sleeps in a regular bed at home    Objective                                                                                    Goals:  (Update in navigator)  Short Term Goals  Time Frame for Short Term Goals: STGs=LTGs:  Long Term Goals  Time Frame for Long Term Goals : 7 days or until d/c  Long Term Goal 1: Pt will complete grooming tasks Ind  Long Term Goal 2: Pt will complete total body bathing c setup  Long Term Goal 3: Pt will complete UB dressing Ind (using W/C for setup)  Long Term Goal 4: Pt will complete LB dressing c setup  Long Term Goal 5: Pt will doff/don footwear c min A  Additional Goals?: Yes  Long Term Goal 6: Pt will complete toileting c min A  Long Term Goal 7: Pt will complete

## 2025-05-13 NOTE — HOME CARE
Pt cell's [phone]  Spoke Spouse she stated pt going home 5/14. Stated she spoke with Mary last week about I atbs. She stated that his lift to get him upstairs is not being put in so she doesn't know how its going to work. And she stated that she was upset that he is not staying longer because she can't care for all his medical needs. Offered emotional support. Reminded pt wife that she and pt will have to learn atb administration and she stated, '\"I can't do the atbs all the time and she stated he will have to learn with me. I told you people that things are not ready for him here.\" Explained that she was agreeable last week when she talked with Mary Fri 5/9. She verbalized understanding and stated again, \"I can't manage all his care.\" Wife reviewed demo info with me. Mary from [facility] talked with pt and he stated that he can't do his IV and wife won't do his IV.\" Called CM in ARU and left verbal that pt and spouse unwilling to learn atb therefore [facility] will not be able to accept pt. Notified [facility] that pt cancelled d/t no teachable pt.

## 2025-05-13 NOTE — PROGRESS NOTES
Physical Therapy      [] daily progress note       [x] discharge       Patient Name:  Bandar De La Torre   :  1960 MRN: [id number]  Room:  [address] [place] [address] Date of Admission: 2025  Rehabilitation Diagnosis:   Local infection of the skin and subcutaneous tissue, unspecified [L08.9]  Chronic osteomyelitis of left foot (HCC) [M86.672]       Date 2025       Day of ARU Week:  7   Time IN/OUT 0573-6063  9756-3192   Individual Tx Minutes 85+35   TOTAL Tx Time Mins 120   Variance Time    Variance Time []   Refusal due to:     []   Medical hold/reason:    []   Illness   []   Off Unit for test/procedure  []   Extra time needed to complete task  []   Therapeutic need  []   Other (specify):   Restrictions Restrictions/Precautions  Restrictions/Precautions: General Precautions, Fall Risk, Weight Bearing (NWB LLE with wound vac, IV LUE)  Implants Present? : Pacemaker      Communication with other providers: [x]   OK to see per nursing:     []   Spoke with team member regarding:      Subjective observations and cognitive status: Pt up in , agreeable to session  PM: Pt reports that his insurance denied his appeal, voices frustration stating \" I guess I need a set of knee pads so I can crawl around on my hands and knees at home\"; pt with many questions about discharge and kept stating that he wasn't getting his equipment; confirmed by CM pt will be going home by stretcher tomorrow and [facility] to set up tomorrows delivery time for equipment.  Pt voiced understanding.      Pain level/location:    L heel described as \"soreness\", no pain   Discharge recommendations  Anticipated discharge date:  tbd  Destination: []home alone   []home alone with assist PRN     [x] home w/ family      [] Continuous supervision  []SNF    [] Assisted living     [] Other:  Continued therapy: [x][facility] PT  []OUTPATIENT  PT   [] No Further PT  []SNF PT  Caregiver training recommended: []Yes  [] No   Equipment needs:

## 2025-05-13 NOTE — PROGRESS NOTES
Progress Note( Dr. Diehl)  5/13/2025  Subjective:   Admit Date: 4/30/2025  PCP: MEETA Diehl MD    Admitted For : Left foot /heel ulcer and also has left calcaneus osteomyelitis    Consulted For: Better control of blood glucose    Interval History: Patient with blood glucose improving with use of U500 insulin  Had debridement done of the left heel ulcer and wound vacuum was placed  His blood glucose were running a bit higher yesterday.  More active   Denies any chest pains,   Denies SOB .   Denies nausea or vomiting.   No new bowel or bladder symptoms.       Intake/Output Summary (Last 24 hours) at 5/13/2025 0819  Last data filed at 5/13/2025 0803  Gross per 24 hour   Intake 974 ml   Output 1870 ml   Net -896 ml       DATA    CBC:   No results for input(s): \"WBC\", \"HGB\", \"PLT\" in the last 72 hours.   CMP:  No results for input(s): \"NA\", \"K\", \"CL\", \"CO2\", \"BUN\", \"CREATININE\", \"GLU\", \"CALCIUM\", \"LABALBU\", \"BILITOT\", \"ALKPHOS\", \"AST\", \"ALT\" in the last 72 hours.    Invalid input(s): \"PROT\"    Lipids:   Lab Results   Component Value Date/Time    CHOL 205 04/11/2025 08:12 AM    HDL 38 04/11/2025 08:12 AM    TRIG 83 04/11/2025 08:12 AM     Glucose:  Recent Labs     05/12/25  2048 05/13/25  0216 05/13/25  0716   POCGLU 267* 158* 174*     IhhldpbiscW4B:  Lab Results   Component Value Date/Time    LABA1C 9.6 04/11/2025 08:12 AM     High Sensitivity TSH:   Lab Results   Component Value Date/Time    TSHHS 1.800 08/29/2019 10:06 AM     Free T3: No results found for: \"FT3\"  Free T4:No results found for: \"T4FREE\"    No orders to display        Scheduled Medicines   Medications:    atorvastatin  20 mg Oral Nightly    sodium chloride flush  5-40 mL IntraVENous 2 times per day    empagliflozin  10 mg Oral Daily    ezetimibe  10 mg Oral Daily    finasteride  5 mg Oral Daily    folic acid-pyridoxine-cyancobalamin 1.13-25-2 tablet  1 tablet Oral Daily    metoprolol succinate  25 mg Oral Daily    potassium chloride  40 mEq Oral  Medication asking to be refilled due to Joseph starting school again. Rx prepped and ready for signature from Dr. Crow.

## 2025-05-13 NOTE — CARE COORDINATION
[employer] appeal [id number]. Physician Review Completed. Review by a [facility] [place] reviewer has been completed. Awaiting financial liability determination.     [employer] Appeal Started:5/12/2025 9:29:04 AM  Medical Records Received:5/12/2025 11:30:50 AM  Outcome:CA Agrees with termination of Hospital services  Liability:Beneficiary Liability Starts on 05/14/2025    Home wound vac has been approved. Spoke with Wilmer with distribution and it will be delivered to the ARU 05/14/2025 8:30 AM.     Spoke with [facility]. Patient can discharge tomorrow after his 11:00 AM IV ATB dose. [employer] will deliver IV ATB tomorrow evening and [facility] will see patient Thursday.     Spoke with [employer] GALLEGOS and they will deliver DME to patient's home 05/14/2025 between 9:00 - 11:30 AM.     Received a call form Osei with [facility]. They are requesting the doctor to call in a fast track appeal for patient. [phone] Ref # [id number] Member ID# [id number]. I explained that a peer to peer has been completed. They stated that this is something different. Doctor notified.     SUSIE met with patient and provided written communication following Care Conference. Spoke with spouse by phone. SUSIE informed patient that WC, slideboard, Drop Arm BSC, [facility] PT, OT, and nursing, IV ATB, and wound vac.     She reported that she will  the patient's DME from [employer] [facility]. She reported that the  will be out on 5/16 to widen the door frames and [employer] will be out on 5/22 to install stair lift.     Patient verbalized understanding. Whiteboard updated.       D/C Plan:  Estimated Date: May 14  DME: WC, Drop- Arm BSC, slideboard ([employer] [facility])  HHC: PT, OT, Nursing ([facility])  To: Home with spouse (will need transport)    [facility] was notified that spouse will  DME.

## 2025-05-13 NOTE — PLAN OF CARE
Problem: Chronic Conditions and Co-morbidities  Goal: Patient's chronic conditions and co-morbidity symptoms are monitored and maintained or improved  5/13/2025 1045 by Velvet Alarcon LPN  Outcome: Progressing  5/13/2025 0023 by Rima Snyder LPN  Outcome: Progressing     Problem: Discharge Planning  Goal: Discharge to home or other facility with appropriate resources  5/13/2025 1045 by Velvet Alarcon LPN  Outcome: Progressing  5/13/2025 0023 by Rima Snyder LPN  Outcome: Progressing     Problem: Safety - Adult  Goal: Free from fall injury  5/13/2025 1045 by Velvet Alarcon LPN  Outcome: Progressing  5/13/2025 0023 by Rima Snyder LPN  Outcome: Progressing     Problem: ABCDS Injury Assessment  Goal: Absence of physical injury  5/13/2025 1045 by Velvet Alarcon LPN  Outcome: Progressing  5/13/2025 0023 by Rima Snyder LPN  Outcome: Progressing     Problem: Nutrition Deficit:  Goal: Optimize nutritional status  5/13/2025 1045 by Velvet Alarcon LPN  Outcome: Progressing  5/13/2025 0023 by Rima Snyder LPN  Outcome: Progressing     Problem: Skin/Tissue Integrity  Goal: Skin integrity remains intact  Description: 1.  Monitor for areas of redness and/or skin breakdown2.  Assess vascular access sites hourly3.  Every 4-6 hours minimum:  Change oxygen saturation probe site4.  Every 4-6 hours:  If on nasal continuous positive airway pressure, respiratory therapy assess nares and determine need for appliance change or resting period  5/13/2025 1045 by Velvet Alarcon LPN  Outcome: Progressing  5/13/2025 0023 by Rima Snyder LPN  Outcome: Progressing     Problem: Pain  Goal: Verbalizes/displays adequate comfort level or baseline comfort level  5/13/2025 1045 by Velvet Alarcon LPN  Outcome: Progressing  5/13/2025 0023 by Rima Snyder LPN  Outcome: Progressing

## 2025-05-14 VITALS
TEMPERATURE: 98.1 F | HEIGHT: 73 IN | RESPIRATION RATE: 16 BRPM | WEIGHT: 299.16 LBS | BODY MASS INDEX: 39.65 KG/M2 | SYSTOLIC BLOOD PRESSURE: 126 MMHG | HEART RATE: 59 BPM | DIASTOLIC BLOOD PRESSURE: 66 MMHG | OXYGEN SATURATION: 97 %

## 2025-05-14 DIAGNOSIS — Z51.89 VISIT FOR WOUND CHECK: Primary | ICD-10-CM

## 2025-05-14 LAB
GLUCOSE BLD-MCNC: 215 MG/DL (ref 74–99)
GLUCOSE BLD-MCNC: 230 MG/DL (ref 74–99)
GLUCOSE BLD-MCNC: 253 MG/DL (ref 74–99)

## 2025-05-14 PROCEDURE — 2500000003 HC RX 250 WO HCPCS: Performed by: SURGERY

## 2025-05-14 PROCEDURE — 97605 NEG PRS WND THER DME<=50SQCM: CPT

## 2025-05-14 PROCEDURE — 99024 POSTOP FOLLOW-UP VISIT: CPT | Performed by: SURGERY

## 2025-05-14 PROCEDURE — C1751 CATH, INF, PER/CENT/MIDLINE: HCPCS

## 2025-05-14 PROCEDURE — 99239 HOSP IP/OBS DSCHRG MGMT >30: CPT | Performed by: PHYSICAL MEDICINE & REHABILITATION

## 2025-05-14 PROCEDURE — 2580000003 HC RX 258: Performed by: SURGERY

## 2025-05-14 PROCEDURE — 36410 VNPNXR 3YR/> PHY/QHP DX/THER: CPT

## 2025-05-14 PROCEDURE — 6370000000 HC RX 637 (ALT 250 FOR IP): Performed by: SURGERY

## 2025-05-14 PROCEDURE — 76937 US GUIDE VASCULAR ACCESS: CPT

## 2025-05-14 PROCEDURE — 3E03329 INTRODUCTION OF OTHER ANTI-INFECTIVE INTO PERIPHERAL VEIN, PERCUTANEOUS APPROACH: ICD-10-PCS | Performed by: PHYSICAL MEDICINE & REHABILITATION

## 2025-05-14 PROCEDURE — 6360000002 HC RX W HCPCS: Performed by: SURGERY

## 2025-05-14 PROCEDURE — 82962 GLUCOSE BLOOD TEST: CPT

## 2025-05-14 PROCEDURE — 05HY33Z INSERTION OF INFUSION DEVICE INTO UPPER VEIN, PERCUTANEOUS APPROACH: ICD-10-PCS | Performed by: PHYSICAL MEDICINE & REHABILITATION

## 2025-05-14 RX ADMIN — SODIUM CHLORIDE, PRESERVATIVE FREE 10 ML: 5 INJECTION INTRAVENOUS at 10:46

## 2025-05-14 RX ADMIN — CEFTRIAXONE SODIUM 2000 MG: 2 INJECTION, POWDER, FOR SOLUTION INTRAMUSCULAR; INTRAVENOUS at 10:57

## 2025-05-14 RX ADMIN — POTASSIUM CHLORIDE 40 MEQ: 1500 TABLET, EXTENDED RELEASE ORAL at 10:45

## 2025-05-14 RX ADMIN — SPIRONOLACTONE 25 MG: 50 TABLET ORAL at 10:45

## 2025-05-14 RX ADMIN — RANOLAZINE 500 MG: 500 TABLET, EXTENDED RELEASE ORAL at 10:45

## 2025-05-14 RX ADMIN — FINASTERIDE 5 MG: 5 TABLET, FILM COATED ORAL at 10:45

## 2025-05-14 RX ADMIN — INSULIN HUMAN 15 UNITS: 500 INJECTION, SOLUTION SUBCUTANEOUS at 10:45

## 2025-05-14 RX ADMIN — INSULIN HUMAN 5 UNITS: 500 INJECTION, SOLUTION SUBCUTANEOUS at 10:44

## 2025-05-14 RX ADMIN — TAMSULOSIN HYDROCHLORIDE 0.4 MG: 0.4 CAPSULE ORAL at 10:45

## 2025-05-14 RX ADMIN — CLOPIDOGREL BISULFATE 75 MG: 75 TABLET, FILM COATED ORAL at 10:45

## 2025-05-14 RX ADMIN — EMPAGLIFLOZIN 10 MG: 10 TABLET, FILM COATED ORAL at 10:45

## 2025-05-14 RX ADMIN — METOPROLOL SUCCINATE 25 MG: 25 TABLET, EXTENDED RELEASE ORAL at 10:45

## 2025-05-14 RX ADMIN — Medication 1 TABLET: at 10:45

## 2025-05-14 RX ADMIN — EZETIMIBE 10 MG: 10 TABLET ORAL at 10:45

## 2025-05-14 NOTE — DISCHARGE SUMMARY
Patient Name: Bandar De La Torre  Patient :  1960  Patient MRN:   [id number]      Admission Date:  2025  Discharge Date: 2025    Admitting diagnosis: Osteomyelitis left foot (IGC 16)     Comorbid diagnoses impacting rehabilitation: Uncontrolled pain, bilateral lower limb weakness, gait disturbance, acute blood loss anemia, paroxysmal atrial fibrillation, poorly controlled diabetes type 2 with peripheral neuropathy, essential hypertension, hyperkalemia, morbid obesity class III (BMI 41.5), history of CVA, chronic diastolic congestive heart failure, BPH with urinary hesitancy    Discharging diagnosis: Osteomyelitis left foot (IGC 16)     Comorbid diagnoses impacting rehabilitation: Uncontrolled pain, bilateral lower limb weakness, gait disturbance, acute blood loss anemia, paroxysmal atrial fibrillation, poorly controlled diabetes type 2 with peripheral neuropathy, essential hypertension, hyperkalemia, morbid obesity class III (BMI 41.5), history of CVA, chronic diastolic congestive heart failure, BPH with urinary hesitancy     History of present illness: Patient is a 64-year-old right-hand-dominant male with chronic struggles with dysvascular complications of his diabetes (with neuropathy) hypertension and hyperlipidemia.  We treated him several months ago in the acute rehab unit when he had an open diabetic wound of the right foot that required surgery with partial foot removal.  In the process of recovering from that procedure, he developed a blister on his left foot (wearing shoes with no socks).  That wound progressed to gangrene and on 2025 he was directed to the hospital because of increasing pain, swelling and drainage.  He was found to have osteomyelitis of the left heel.  He required an I&D procedure by Dr. Avalos on 2025 with attention to the left heel wound.  Following the procedure, a VAC dressing was placed and the patient was restricted to no weightbearing through his left

## 2025-05-14 NOTE — DISCHARGE INSTR - COC
- No ventilator support    Rehab Therapies: Physical Therapy and Occupational Therapy  Weight Bearing Status/Restrictions: Non-weight bearing on left leg  Other Medical Equipment (for information only, NOT a DME order):  wheelchair  Other Treatments: na    Patient's personal belongings (please select all that are sent with patient):  None    RN SIGNATURE:  Electronically signed by Charlie Christy LPN on 5/14/25 at 11:41 AM EDT    CASE MANAGEMENT/SOCIAL WORK SECTION    Inpatient Status Date: ***    Readmission Risk Assessment Score:  [facility] RISK OF UNPLANNED READMISSION 2.0             20.1 Total Score        Discharging to Facility/ Agency   Name:   Address:   Phone:  Fax:    / signature: {Esignature:[id number]}    PHYSICIAN SECTION    Prognosis: {Prognosis:0505499646}    Condition at Discharge: { Patient Condition:756657059}    Rehab Potential (if transferring to Rehab): {Prognosis:9622937999}    Recommended Labs or Other Treatments After Discharge: ***    Physician Certification: I certify the above information and transfer of Bandar De La Torre  is necessary for the continuing treatment of the diagnosis listed and that he requires {Admit to Appropriate Level of Care:76994} for {GREATER/LESS:095394791} 30 days.     Update Admission H&P: {CHP DME Changes in HandP:730673014}    PHYSICIAN SIGNATURE:  {Esignature:[id number]}

## 2025-05-14 NOTE — CONSULTS
Midline meets therapeutic needs at this time:  IV Antibiotics at discharge:  Rocephin EOT 6/5/25. Arrowg+narcisa Blue Advance Single Lumen Midline inserted in SAPNA Basilic vessel  x1 attempts using sterile ultrasound guided technique.  Cannulated left cephalic but could not advance wire passed 4 cm. x  Brisk blood return, flushes without resistance.

## 2025-05-14 NOTE — CARE COORDINATION
Received a call from [facility]. They cancelled referral and will no longer be following patient for home care. Patient and spouse are declining to participate in IV ATB training and [facility] will not be able to accept patient and cancelled referral. It was reported that [facility] will not deliver the IV ATB until they get the okay from a Home Health Agency to complete the training.     Patient and spouse were notified that [facility] cancelled referral due to declining IV ATB training. Discussed a new [facility] agency and that nursing will not come out everyday to do his IV ATB. Patient reported that they are discussing private pay rates for [facility]. He stated that he can not return home because his house is not ready and does not want to do his own IV ATB. Spouse is going to stop by [facility] after work and they will notify case management what their plan is.     NOR[facility]ERTO/MARLYN was delivered to the patient. Provided patient with a list of follow up appointments. BIMS completed.     Received a call from Dr. French. He attempted Peer to Peer. No new determination.     Spoke with Osei at [facility]. He reported that patient plans to discharge to [facility] private pay. They will need a PASRR completed. Awaiting discharge location confirmation from patient or spouse.     Received a call from patient's spouse. Patient to be transferred to [facility] today.     Home wound vac proof of delivery form was faxed to [employer].     Patient does not meet criteria for stretcher transport. Spouse agreed to transport patient at discharge.     Spoke with Scot at [facility] and notified him that patient was not discharging home.     IV ATB script was put in patient's discharge folder.

## 2025-05-14 NOTE — PLAN OF CARE
Problem: Chronic Conditions and Co-morbidities  Goal: Patient's chronic conditions and co-morbidity symptoms are monitored and maintained or improved  5/13/2025 2153 by Velvet Dexter LPN  Outcome: Progressing  5/13/2025 1045 by Velvet Alarcon LPN  Outcome: Progressing     Problem: Discharge Planning  Goal: Discharge to home or other facility with appropriate resources  5/13/2025 2153 by Velvet Dexter LPN  Outcome: Progressing  5/13/2025 1045 by Velvet Alarcon LPN  Outcome: Progressing     Problem: Safety - Adult  Goal: Free from fall injury  5/13/2025 2153 by Velvet Dexter LPN  Outcome: Progressing  5/13/2025 1045 by Velvet Alarcon LPN  Outcome: Progressing     Problem: ABCDS Injury Assessment  Goal: Absence of physical injury  5/13/2025 2153 by Velvet Dexter LPN  Outcome: Progressing  5/13/2025 1045 by Velvet Alarcon LPN  Outcome: Progressing     Problem: Nutrition Deficit:  Goal: Optimize nutritional status  5/13/2025 2153 by Velvet Dexter LPN  Outcome: Progressing  5/13/2025 1045 by Velvet Alarcon LPN  Outcome: Progressing     Problem: Skin/Tissue Integrity  Goal: Skin integrity remains intact  Description: 1.  Monitor for areas of redness and/or skin breakdown2.  Assess vascular access sites hourly3.  Every 4-6 hours minimum:  Change oxygen saturation probe site4.  Every 4-6 hours:  If on nasal continuous positive airway pressure, respiratory therapy assess nares and determine need for appliance change or resting period  5/13/2025 2153 by Velvet Dexter LPN  Outcome: Progressing  5/13/2025 1045 by Velvet Alarcon LPN  Outcome: Progressing     Problem: Pain  Goal: Verbalizes/displays adequate comfort level or baseline comfort level  5/13/2025 2153 by Velvet Dexter LPN  Outcome: Progressing  5/13/2025 1045 by Velvet Alarcon LPN  Outcome: Progressing

## 2025-05-14 NOTE — PROGRESS NOTES
transferred to ARU   Patient be started participating in physical activities of ARU  Reviewed notes from surgery  Patient is going home later in the day even though it does not really be ideal situation I am afraid that his back again  Will follow     .     MEETA Diehl MD,

## 2025-05-14 NOTE — PROGRESS NOTES
Patient  and wife given discharge instruction. All questions answered. Patient left via wheelchair, with home wound vac.

## 2025-05-14 NOTE — PROGRESS NOTES
GENERAL SURGERY PROGRESS NOTE    Bandar De La Torre is a 64 y.o. male with left heel osteomyelitis s/p debridement and VAC placement.                Subjective:  Emmanuel is being prepared for discharge today. Initial plan was to d/c home due to being denied for placement. He did not feel comfortable being trained for IV infusions.  He now plans on paying out of pocket for stay at [facility] where his wound VAC and IV antibiotics can be continued.    Objective:    Vitals: VITALS:  /64   Pulse 60   Temp 97.3 °F (36.3 °C) (Oral)   Resp 16   Ht 1.854 m (6' 1\")   Wt 135.7 kg (299 lb 2.6 oz)   SpO2 97%   BMI 39.47 kg/m²     I/O: 05/13 0701 - 05/14 0700  In: 1000 [P.O.:990; I.V.:10]  Out: 2375 [Urine:2375]    Labs/Imaging Results:   Lab Results   Component Value Date/Time     05/13/2025 11:14 AM    K 4.9 05/13/2025 11:14 AM     05/13/2025 11:14 AM    CO2 21 05/13/2025 11:14 AM    BUN 43 05/13/2025 11:14 AM    CREATININE 1.3 05/13/2025 11:14 AM    GLUCOSE 212 05/13/2025 11:14 AM    CALCIUM 9.8 05/13/2025 11:14 AM      Lab Results   Component Value Date    WBC 7.5 05/13/2025    HGB 12.9 (L) 05/13/2025    HCT 39.6 (L) 05/13/2025    MCV 87.6 05/13/2025     05/13/2025       IV Fluids:   sodium chloride Last Rate: 25 mL/hr (05/08/25 1310)    dextrose    Scheduled Meds:   atorvastatin, 20 mg, Oral, Nightly    sodium chloride flush, 5-40 mL, IntraVENous, 2 times per day    empagliflozin, 10 mg, Oral, Daily    ezetimibe, 10 mg, Oral, Daily    finasteride, 5 mg, Oral, Daily    folic acid-pyridoxine-cyancobalamin 1.13-25-2 tablet, 1 tablet, Oral, Daily    metoprolol succinate, 25 mg, Oral, Daily    potassium chloride, 40 mEq, Oral, BID    ranolazine, 500 mg, Oral, BID    rivaroxaban, 20 mg, Oral, Dinner    spironolactone, 25 mg, Oral, BID    tamsulosin, 0.4 mg, Oral, Daily    torsemide, 10 mg, Oral, Dinner    insulin regular human, 15 Units, SubCUTAneous, Daily with breakfast    insulin regular human, 15

## 2025-05-14 NOTE — CONSULTS
[facility] Wound Ostomy Continence Nurse  Consult Note       Bandar De La Torre  AGE: 64 y.o.   GENDER: male  : 1960  TODAY'S DATE:  2025    Subjective:     Reason for Evaluation and Assessment: NPWT dressing change to left heel.       Bandar De La Torre is a 64 y.o. male referred by:   [x] Physician  [] Nursing  [] Other:     Wound Identification:  Wound Type: venous, diabetic, and traumatic  Contributing Factors: edema, diabetes, chronic pressure, decreased mobility, and obesity        PAST MEDICAL HISTORY        Diagnosis Date    Atrial fibrillation (HCC)     Cerebral artery occlusion with cerebral infarction (HCC)     History of cardiac cath     --RCA has mid 50% stenosis and PLB branch has 70% stenosis noted. LVEDP very high    Hyperlipidemia     Hypertension     Type II or unspecified type diabetes mellitus without mention of complication, not stated as uncontrolled     Diagnsed about     Unspecified sleep apnea        PAST SURGICAL HISTORY    Past Surgical History:   Procedure Laterality Date    CARDIAC CATHETERIZATION      FOOT DEBRIDEMENT Left 2025    FOOT DEBRIDEMENT INCISION AND DRAINAGE with wound vac placement performed by Jakob Avalos MD at [facility] OR    PACEMAKER INSERTION      Done in the past at [facility] in 2024 by Dr. Ocampo    TOE AMPUTATION Right 2025    RIGHT FOOT 2ND AND 3RD TOE AMPUTATION WITH FOOT DEBRIDEMENT performed by Jakob Avalos MD at [facility] OR    TONSILLECTOMY      AT 15 YEARS OLD       FAMILY HISTORY    Family History   Problem Relation Age of Onset    Diabetes Mother     Diabetes Father     Cancer Father     Cancer Maternal Grandfather         PROSTATE CANCER       SOCIAL HISTORY    Social History     Tobacco Use    Smoking status: Never    Smokeless tobacco: Never   Vaping Use    Vaping status: Never Used   Substance Use Topics    Alcohol use: No    Drug use: No       ALLERGIES    No Known Allergies    MEDICATIONS    No current

## 2025-05-14 NOTE — PROGRESS NOTES
Bandar De La Torre    : 1960  Acct #: [id number]  MRN: [id number]              PM&R Progress Note      Admitting diagnosis: Osteomyelitis left foot (IGC 16)     Comorbid diagnoses impacting rehabilitation: Uncontrolled pain, bilateral lower limb weakness, gait disturbance, acute blood loss anemia, paroxysmal atrial fibrillation, poorly controlled diabetes type 2 with peripheral neuropathy, essential hypertension, hyperkalemia, morbid obesity class III (BMI 41.5), history of CVA, chronic diastolic congestive heart failure, BPH with urinary hesitancy     Chief complaint: He is frustrated with the need for ongoing antibiotics and wound care at home.  He wanted to go to a nursing home.    Prior (baseline) level of function: Independent.    Current level of function:         Current  IRF-JAVIER and Goals:   Occupational Therapy:    Short Term Goals  Time Frame for Short Term Goals: STGs=LTGs :   Long Term Goals  Time Frame for Long Term Goals : 7 days or until d/c  Long Term Goal 1: Pt will complete grooming tasks Ind  Long Term Goal 2: Pt will complete total body bathing c setup  Long Term Goal 3: Pt will complete UB dressing Ind (using W/C for setup)  Long Term Goal 4: Pt will complete LB dressing c setup  Long Term Goal 5: Pt will doff/don footwear c min A  Additional Goals?: Yes  Long Term Goal 6: Pt will complete toileting c min A  Long Term Goal 7: Pt will complete functional transfers (bed, chair, toilet) c DME PRN and mod I  Long Term Goal 8: Pt will perform therex/therax to facilitate increased strength/endurance/ax tolerance (c emphasis on BUE endurance) c SBA  Long Term Goal 9: Pt will complete simple IADLs from W/C level mod I :                                       Eating: Eating  Assistance Needed: Independent  Comment: Pt able to open all packages/containers, feed self  CARE Score: 6  Discharge Goal: Independent       Oral Hygiene: Oral Hygiene  Assistance Needed: Independent  Comment: seated in W/C

## 2025-05-14 NOTE — CARE COORDINATION
ARU  Discharge Summary    D/C Date: 05/14/2025    Patient discharged to: Home with spouse and spouse transported patient to [facility] (private pay)    Transported by: Spouse    Referrals made to: [facility] and [facility]    Additional information: PASRR completed. Follow up appointments scheduled with Marco Gamboa Thursday May 22, 2025 9:45 AM, Jakob Avalos MD Thursday May 29, 2025 3:30 PM, Antonino Bower MD Friday Aug 8, 2025 10:00 AM. Left a voicemail for office of MEETA Diehl MD to notify of discharge. Office to call patient directly to schedule. Patient notified, AVS updated    Caregiver training: none requested    Discharge BIMS completed: [x]      IMM: [x]       Discharge Assessment:     [x] OT Note 05/13 -Patient fully participated in the program and made good progress towards established goals.  [x] OT Note 05/13 -Patient's medical status limited participation and/or progress towards established goals.  D/t multiple comorbidities pt demo's difficulty complying with NWB LLE status, requiring use of slideboard and completing ADLs from a seated or in bed level.  Pt's home environment has multiple accessibility barriers as well which impacted discharge planning.     05/15/2025 - Discharge Clinical was uploaded to the [employer] Portal.

## 2025-05-14 NOTE — PLAN OF CARE
Problem: Chronic Conditions and Co-morbidities  Goal: Patient's chronic conditions and co-morbidity symptoms are monitored and maintained or improved  5/14/2025 1114 by Charlie Christy LPN  Outcome: Progressing     Problem: Discharge Planning  Goal: Discharge to home or other facility with appropriate resources  5/14/2025 1114 by Charlie Christy LPN  Outcome: Progressing     Problem: Safety - Adult  Goal: Free from fall injury  5/14/2025 1114 by Charlie Christy LPN  Outcome: Progressing     Problem: ABCDS Injury Assessment  Goal: Absence of physical injury  5/14/2025 1114 by Charlie Christy LPN  Outcome: Progressing     Problem: Nutrition Deficit:  Goal: Optimize nutritional status  5/14/2025 1114 by Charlie Christy LPN  Outcome: Progressing     Problem: Skin/Tissue Integrity  Goal: Skin integrity remains intact  Description: 1.  Monitor for areas of redness and/or skin breakdown2.  Assess vascular access sites hourly3.  Every 4-6 hours minimum:  Change oxygen saturation probe site4.  Every 4-6 hours:  If on nasal continuous positive airway pressure, respiratory therapy assess nares and determine need for appliance change or resting period  5/14/2025 1114 by Charlie Christy LPN  Outcome: Progressing     Problem: Pain  Goal: Verbalizes/displays adequate comfort level or baseline comfort level  5/14/2025 1114 by Charlie Christy LPN  Outcome: Progressing

## 2025-05-22 ENCOUNTER — TELEPHONE (OUTPATIENT)
Dept: WOUND CARE | Age: 65
End: 2025-05-22

## 2025-05-22 ENCOUNTER — OFFICE VISIT (OUTPATIENT)
Dept: INFECTIOUS DISEASES | Age: 65
End: 2025-05-22
Payer: MEDICARE

## 2025-05-22 VITALS — SYSTOLIC BLOOD PRESSURE: 110 MMHG | DIASTOLIC BLOOD PRESSURE: 76 MMHG | HEART RATE: 63 BPM

## 2025-05-22 DIAGNOSIS — M86.672 CHRONIC OSTEOMYELITIS OF LEFT FOOT (HCC): Primary | ICD-10-CM

## 2025-05-22 DIAGNOSIS — B35.3 TINEA PEDIS OF BOTH FEET: ICD-10-CM

## 2025-05-22 DIAGNOSIS — A49.8 PROTEUS MIRABILIS INFECTION: ICD-10-CM

## 2025-05-22 DIAGNOSIS — Z79.2 RECEIVING INTRAVENOUS ANTIBIOTIC TREATMENT AS OUTPATIENT: ICD-10-CM

## 2025-05-22 DIAGNOSIS — A49.01 MSSA (METHICILLIN SUSCEPTIBLE STAPHYLOCOCCUS AUREUS) INFECTION: ICD-10-CM

## 2025-05-22 PROCEDURE — 99214 OFFICE O/P EST MOD 30 MIN: CPT | Performed by: INTERNAL MEDICINE

## 2025-05-22 PROCEDURE — 3074F SYST BP LT 130 MM HG: CPT | Performed by: INTERNAL MEDICINE

## 2025-05-22 PROCEDURE — 3078F DIAST BP <80 MM HG: CPT | Performed by: INTERNAL MEDICINE

## 2025-05-22 RX ORDER — GLUCAGON 3 MG/1
1 POWDER NASAL PRN
COMMUNITY

## 2025-05-22 RX ORDER — MICONAZOLE NITRATE 20 MG/G
CREAM TOPICAL
Qty: 92 G | Refills: 1 | Status: SHIPPED | OUTPATIENT
Start: 2025-05-22

## 2025-05-22 NOTE — PROGRESS NOTES
Bandar De La Torre (:  1960) is a 64 y.o. male, Established patient, here for evaluation of the following chief complaint(s):  Follow-Up from Hospital (Hospital f/u Left heel stage 4 pressure ulcer; left calcaneous osteomyelitis)      DISCUSSION:  Assessment & Plan  1. Left foot diabetic osteomyelitis.  He was admitted to the hospital on 2025, due to a worsening wound on his left heel. An incision and drainage (I&D) procedure were performed on 2025, followed by the application of a wound VAC. The tissue culture was positive for Proteus mirabilis and Streptococcus constellatus. Initially, he was on vancomycin and ceftriaxone, but now he is only on ceftriaxone 2 g daily via a left arm PICC line. The wound VAC is being changed three times a week. He will continue with the current antibiotic regimen until 2025, completing a total of 6 weeks of treatment.    2. Right hallux plantar ulcer.  He has a right hallux plantar ulcer with no pus or surrounding erythema. Debridement was performed yesterday, and a culture was sent. He should monitor the ulcer for any signs of infection and continue with regular wound care.    3. Fungal infection between toes.  A fungal infection was noted between his toes. He is advised to alternate between two pairs of shoes daily to reduce the risk of recurrence. A prescription for an antifungal cream has been provided, which should be applied to both feet.    Follow-up  The patient will follow up in 2 weeks.    PROCEDURE  The patient underwent an incision and drainage procedure on his left heel on 2025, followed by the application of a wound VAC. Additionally, the patient had a debridement procedure on his right big toe yesterday.    1. Chronic osteomyelitis of left foot (HCC)  2. Receiving intravenous antibiotic treatment as outpatient  -     Basic Metabolic Panel; Standing  -     CBC; Standing  -     C-Reactive Protein; Standing  -     Sedimentation

## 2025-05-29 ENCOUNTER — HOSPITAL ENCOUNTER (OUTPATIENT)
Dept: WOUND CARE | Age: 65
Discharge: HOME | End: 2025-05-29
Attending: NURSE PRACTITIONER
Payer: MEDICARE

## 2025-05-29 VITALS
TEMPERATURE: 98 F | HEART RATE: 60 BPM | SYSTOLIC BLOOD PRESSURE: 140 MMHG | DIASTOLIC BLOOD PRESSURE: 69 MMHG | RESPIRATION RATE: 16 BRPM

## 2025-05-29 DIAGNOSIS — Z79.01 ANTICOAGULATED: ICD-10-CM

## 2025-05-29 DIAGNOSIS — E11.621 DIABETIC ULCER OF TOE OF RIGHT FOOT ASSOCIATED WITH TYPE 2 DIABETES MELLITUS, WITH FAT LAYER EXPOSED (HCC): ICD-10-CM

## 2025-05-29 DIAGNOSIS — L97.912 TRAUMATIC ULCER OF LOWER EXTREMITY, RIGHT, WITH FAT LAYER EXPOSED (HCC): ICD-10-CM

## 2025-05-29 DIAGNOSIS — L97.512 DIABETIC ULCER OF TOE OF RIGHT FOOT ASSOCIATED WITH TYPE 2 DIABETES MELLITUS, WITH FAT LAYER EXPOSED (HCC): ICD-10-CM

## 2025-05-29 DIAGNOSIS — T81.89XD SURGICAL WOUND, NON HEALING, SUBSEQUENT ENCOUNTER: Primary | ICD-10-CM

## 2025-05-29 DIAGNOSIS — L84 PRE-ULCERATIVE CALLUSES: ICD-10-CM

## 2025-05-29 PROCEDURE — 11046 DBRDMT MUSC&/FSCA EA ADDL: CPT | Performed by: SURGERY

## 2025-05-29 PROCEDURE — 11043 DBRDMT MUSC&/FSCA 1ST 20/<: CPT

## 2025-05-29 PROCEDURE — 11046 DBRDMT MUSC&/FSCA EA ADDL: CPT

## 2025-05-29 PROCEDURE — 11043 DBRDMT MUSC&/FSCA 1ST 20/<: CPT | Performed by: SURGERY

## 2025-05-29 PROCEDURE — 11042 DBRDMT SUBQ TIS 1ST 20SQCM/<: CPT

## 2025-05-29 PROCEDURE — 11042 DBRDMT SUBQ TIS 1ST 20SQCM/<: CPT | Performed by: SURGERY

## 2025-05-29 ASSESSMENT — PAIN - FUNCTIONAL ASSESSMENT: PAIN_FUNCTIONAL_ASSESSMENT: PREVENTS OR INTERFERES SOME ACTIVE ACTIVITIES AND ADLS

## 2025-05-29 ASSESSMENT — PAIN DESCRIPTION - FREQUENCY: FREQUENCY: INTERMITTENT

## 2025-05-29 ASSESSMENT — PAIN DESCRIPTION - DESCRIPTORS: DESCRIPTORS: BURNING

## 2025-05-29 ASSESSMENT — PAIN DESCRIPTION - LOCATION: LOCATION: FOOT

## 2025-05-29 ASSESSMENT — PAIN DESCRIPTION - ONSET: ONSET: GRADUAL

## 2025-05-29 ASSESSMENT — PAIN DESCRIPTION - ORIENTATION: ORIENTATION: LEFT

## 2025-05-29 ASSESSMENT — PAIN SCALES - GENERAL: PAINLEVEL_OUTOF10: 1

## 2025-05-29 ASSESSMENT — PAIN DESCRIPTION - PAIN TYPE: TYPE: SURGICAL PAIN

## 2025-05-29 NOTE — PROGRESS NOTES
Change in Wound Size % (l*w) -50 05/29/25 1509   Wound Volume (cm^3) 0.075 cm^3 05/29/25 1509   Wound Healing % -50 05/29/25 1509   Post-Procedure Length (cm) 0.5 cm 05/29/25 1521   Post-Procedure Width (cm) 1.5 cm 05/29/25 1521   Post-Procedure Depth (cm) 0.1 cm 05/29/25 1521   Post-Procedure Surface Area (cm^2) 0.75 cm^2 05/29/25 1521   Post-Procedure Volume (cm^3) 0.075 cm^3 05/29/25 1521   Distance Tunneling (cm) 0 cm 05/29/25 1509   Tunneling Position ___ O'Clock 0 05/29/25 1509   Undermining Starts ___ O'Clock 0 05/29/25 1509   Undermining Ends___ O'Clock 0 05/29/25 1509   Undermining Maxium Distance (cm) 0 05/29/25 1509   Wound Assessment Eschar dry 05/29/25 1509   Drainage Amount None (dry) 05/29/25 1509   Drainage Description Serosanguinous 04/30/25 0830   Odor None 05/29/25 1509   Berenice-wound Assessment Intact 05/29/25 1509   Margins Attached edges 05/29/25 1509   Wound Thickness Description not for Pressure Injury Partial thickness 05/29/25 1509   Number of days: 47       Wound 04/11/25 #7 Left Anterior Lower Leg cluster (Active)   Wound Image   05/29/25 1508   Wound Etiology Venous 05/12/25 1030   Dressing Status Clean;Intact;Dry 05/14/25 0800   Wound Cleansed Soap and water;Wound cleanser 05/29/25 1509   Dressing/Treatment Hydrofiber Ag;Silicone border 05/12/25 1030   Offloading for Diabetic Foot Ulcers Other (comment) 05/29/25 1509   Wound Length (cm) 1 cm 05/29/25 1509   Wound Width (cm) 0.2 cm 05/29/25 1509   Wound Depth (cm) 0.1 cm 05/29/25 1509   Wound Surface Area (cm^2) 0.2 cm^2 05/29/25 1509   Change in Wound Size % (l*w) 20 05/29/25 1509   Wound Volume (cm^3) 0.02 cm^3 05/29/25 1509   Wound Healing % 20 05/29/25 1509   Post-Procedure Length (cm) 1 cm 05/29/25 1521   Post-Procedure Width (cm) 0.2 cm 05/29/25 1521   Post-Procedure Depth (cm) 0.1 cm 05/29/25 1521   Post-Procedure Surface Area (cm^2) 0.2 cm^2 05/29/25 1521   Post-Procedure Volume (cm^3) 0.02 cm^3 05/29/25 1521   Distance Tunneling

## 2025-05-29 NOTE — PATIENT INSTRUCTIONS
PHYSICIAN ORDERS AND DISCHARGE INSTRUCTIONS  NOTE: Upon discharge from the Wound Center, you will receive a patient experience survey via E-mail. We would be grateful if you would take the time to fill this survey out.  Wound care order history:   SHELBY's   Right       Left    Date    Vascular studies/Intervention: .     Cultures: .               Antibiotics: ordered on 4/03/25, ordered on 04/24/25              HbA1c:  .               Grafts:  .   Juxta Lites & Lymph Pumps:  Continuing wound care orders and information:              Residence: .              Continue [facility] health care with[facility]   Your wound-care supplies will be provided by: .              Pharmacy: [facility] on [facility]   Wound Vac: post-op- discussed with pt 04/24/25, discussed surgical debridement-pt sent to hospital on 04/24/25    Wound cleansing:     Do not scrub or use excessive force.    Wash hands with soap and water before and after dressing changes.    Prior to applying a clean dressing, cleanse wound with normal saline,    wound cleanser, or mild soap and water.     Ask your physician or nurse before getting the wound(s) wet in the shower.  Daily Wound management:    Keep weight off wounds and reposition every 2 hours.    Avoid standing for long periods of time.    Evaluate legs to the level of the heart or above for 30 minutes 4-5 times a day and/or when sitting.       When taking antibiotics take entire prescription as ordered by MD do not stop taking until medicine is all gone.     Documentation:  Compression: .   Offloading: .   Toenail Trim:         Orders for this week (5/29/2025):  Left Heel - Wash with mild liquid soap and water  WOUND VAC THERAPY:  DUODERM TO PERIWOUND FOR PROTECTION. APPLY BLACK FOAM TO WOUND bed MAY USE MEPITEL TO WOUND BED TO PREVENT BLACK FOAM FROM ADHERING TO WOUND BED. SECURE VAC. DRESSING WITH DRAPE.  SET WOUND VAC  CONTINUOUS SUCTION. CANISTER CHANGE WITH EACH DRESSING CHANGE OR ACCORDING TO VOLUME OF

## 2025-06-05 ENCOUNTER — HOSPITAL ENCOUNTER (OUTPATIENT)
Dept: WOUND CARE | Age: 65
Discharge: HOME | End: 2025-06-05
Attending: NURSE PRACTITIONER
Payer: MEDICARE

## 2025-06-05 ENCOUNTER — OFFICE VISIT (OUTPATIENT)
Dept: INFECTIOUS DISEASES | Age: 65
End: 2025-06-05
Payer: MEDICARE

## 2025-06-05 VITALS — SYSTOLIC BLOOD PRESSURE: 136 MMHG | DIASTOLIC BLOOD PRESSURE: 92 MMHG | HEART RATE: 71 BPM

## 2025-06-05 DIAGNOSIS — M86.672 CHRONIC OSTEOMYELITIS OF LEFT FOOT (HCC): Primary | ICD-10-CM

## 2025-06-05 DIAGNOSIS — T81.89XD SURGICAL WOUND, NON HEALING, SUBSEQUENT ENCOUNTER: ICD-10-CM

## 2025-06-05 DIAGNOSIS — Z89.429 STATUS POST AMPUTATION OF TOE: ICD-10-CM

## 2025-06-05 DIAGNOSIS — E66.01 MORBID OBESITY (HCC): ICD-10-CM

## 2025-06-05 DIAGNOSIS — L97.425 NON-PRESSURE CHRONIC ULCER OF LEFT HEEL AND MIDFOOT WITH MUSCLE INVOLVEMENT WITHOUT EVIDENCE OF NECROSIS (HCC): Primary | ICD-10-CM

## 2025-06-05 DIAGNOSIS — L97.512 DIABETIC ULCER OF TOE OF RIGHT FOOT ASSOCIATED WITH TYPE 2 DIABETES MELLITUS, WITH FAT LAYER EXPOSED (HCC): ICD-10-CM

## 2025-06-05 DIAGNOSIS — Z79.4 TYPE 2 DIABETES MELLITUS WITH FOOT ULCER, WITH LONG-TERM CURRENT USE OF INSULIN (HCC): ICD-10-CM

## 2025-06-05 DIAGNOSIS — L97.509 TYPE 2 DIABETES MELLITUS WITH FOOT ULCER, WITH LONG-TERM CURRENT USE OF INSULIN (HCC): ICD-10-CM

## 2025-06-05 DIAGNOSIS — E11.621 TYPE 2 DIABETES MELLITUS WITH FOOT ULCER, WITH LONG-TERM CURRENT USE OF INSULIN (HCC): ICD-10-CM

## 2025-06-05 DIAGNOSIS — E11.621 DIABETIC ULCER OF TOE OF RIGHT FOOT ASSOCIATED WITH TYPE 2 DIABETES MELLITUS, WITH FAT LAYER EXPOSED (HCC): ICD-10-CM

## 2025-06-05 PROBLEM — E11.628 TYPE 2 DIABETES MELLITUS WITH SKIN COMPLICATION, WITH LONG-TERM CURRENT USE OF INSULIN (HCC): Status: ACTIVE | Noted: 2025-06-05

## 2025-06-05 PROBLEM — L97.411 DIABETIC ULCER OF RIGHT MIDFOOT ASSOCIATED WITH TYPE 2 DIABETES MELLITUS, LIMITED TO BREAKDOWN OF SKIN (HCC): Status: RESOLVED | Noted: 2025-02-23 | Resolved: 2025-06-05

## 2025-06-05 PROCEDURE — 3075F SYST BP GE 130 - 139MM HG: CPT | Performed by: INTERNAL MEDICINE

## 2025-06-05 PROCEDURE — 11045 DBRDMT SUBQ TISS EACH ADDL: CPT

## 2025-06-05 PROCEDURE — 11042 DBRDMT SUBQ TIS 1ST 20SQCM/<: CPT | Performed by: NURSE PRACTITIONER

## 2025-06-05 PROCEDURE — 11045 DBRDMT SUBQ TISS EACH ADDL: CPT | Performed by: NURSE PRACTITIONER

## 2025-06-05 PROCEDURE — 99214 OFFICE O/P EST MOD 30 MIN: CPT | Performed by: INTERNAL MEDICINE

## 2025-06-05 PROCEDURE — 3080F DIAST BP >= 90 MM HG: CPT | Performed by: INTERNAL MEDICINE

## 2025-06-05 PROCEDURE — 11042 DBRDMT SUBQ TIS 1ST 20SQCM/<: CPT

## 2025-06-05 PROCEDURE — 11719 TRIM NAIL(S) ANY NUMBER: CPT | Performed by: NURSE PRACTITIONER

## 2025-06-05 RX ORDER — GENTAMICIN SULFATE 1 MG/G
OINTMENT TOPICAL PRN
OUTPATIENT
Start: 2025-06-05

## 2025-06-05 RX ORDER — CEFUROXIME AXETIL 500 MG/1
500 TABLET ORAL 2 TIMES DAILY
Qty: 84 TABLET | Refills: 0 | Status: SHIPPED | OUTPATIENT
Start: 2025-06-05 | End: 2025-07-17

## 2025-06-05 RX ORDER — GINSENG 100 MG
CAPSULE ORAL PRN
OUTPATIENT
Start: 2025-06-05

## 2025-06-05 RX ORDER — CLOBETASOL PROPIONATE 0.5 MG/G
OINTMENT TOPICAL PRN
OUTPATIENT
Start: 2025-06-05

## 2025-06-05 RX ORDER — MUPIROCIN 20 MG/G
OINTMENT TOPICAL PRN
OUTPATIENT
Start: 2025-06-05

## 2025-06-05 RX ORDER — LIDOCAINE 40 MG/G
CREAM TOPICAL PRN
OUTPATIENT
Start: 2025-06-05

## 2025-06-05 RX ORDER — BACITRACIN ZINC AND POLYMYXIN B SULFATE 500; 1000 [USP'U]/G; [USP'U]/G
OINTMENT TOPICAL PRN
OUTPATIENT
Start: 2025-06-05

## 2025-06-05 RX ORDER — NEOMYCIN/BACITRACIN/POLYMYXINB 3.5-400-5K
OINTMENT (GRAM) TOPICAL PRN
OUTPATIENT
Start: 2025-06-05

## 2025-06-05 RX ORDER — LIDOCAINE HYDROCHLORIDE 40 MG/ML
SOLUTION TOPICAL PRN
OUTPATIENT
Start: 2025-06-05

## 2025-06-05 RX ORDER — SILVER SULFADIAZINE 10 MG/G
CREAM TOPICAL PRN
OUTPATIENT
Start: 2025-06-05

## 2025-06-05 RX ORDER — SODIUM CHLOR/HYPOCHLOROUS ACID 0.033 %
SOLUTION, IRRIGATION IRRIGATION PRN
OUTPATIENT
Start: 2025-06-05

## 2025-06-05 RX ORDER — TRIAMCINOLONE ACETONIDE 1 MG/G
OINTMENT TOPICAL PRN
OUTPATIENT
Start: 2025-06-05

## 2025-06-05 RX ORDER — LIDOCAINE 50 MG/G
OINTMENT TOPICAL PRN
OUTPATIENT
Start: 2025-06-05

## 2025-06-05 RX ORDER — BETAMETHASONE DIPROPIONATE 0.5 MG/G
CREAM TOPICAL PRN
OUTPATIENT
Start: 2025-06-05

## 2025-06-05 RX ORDER — LIDOCAINE HYDROCHLORIDE 20 MG/ML
JELLY TOPICAL PRN
OUTPATIENT
Start: 2025-06-05

## 2025-06-05 NOTE — PATIENT INSTRUCTIONS
PHYSICIAN ORDERS AND DISCHARGE INSTRUCTIONS  NOTE: Upon discharge from the Wound Center, you will receive a patient experience survey via E-mail. We would be grateful if you would take the time to fill this survey out.  Wound care order history:   SHELBY's   Right       Left    Date    Vascular studies/Intervention: .     Cultures: .               Antibiotics: ordered on 4/03/25, ordered on 04/24/25              HbA1c:  .               Grafts:  .   Juxta Lites & Lymph Pumps:  Continuing wound care orders and information:              Residence: .              [facility] care [facility]   Your wound-care supplies will be provided by: .              Pharmacy: [facility] on [facility]   Wound Vac: post-op- discussed with pt 04/24/25, discussed surgical debridement-pt sent to hospital on 04/24/25    Wound cleansing:     Do not scrub or use excessive force.    Wash hands with soap and water before and after dressing changes.    Prior to applying a clean dressing, cleanse wound with normal saline,    wound cleanser, or mild soap and water.     Ask your physician or nurse before getting the wound(s) wet in the shower.  Daily Wound management:    Keep weight off wounds and reposition every 2 hours.    Avoid standing for long periods of time.    Evaluate legs to the level of the heart or above for 30 minutes 4-5 times a day and/or when sitting.       When taking antibiotics take entire prescription as ordered by MD do not stop taking until medicine is all gone.     Documentation:  Compression: .   Offloading: .   Toenail Trim:         Orders for this week (6/5/2025):  Left Heel - Wash with mild liquid soap and water  WOUND VAC THERAPY:  DUODERM TO PERIWOUND FOR PROTECTION. Apply Anasept and Stimulen powder (entire pack)  to wound bed. APPLY BLACK FOAM TO WOUND bed MAY USE MEPITEL/ Sorbact TO WOUND BED TO PREVENT BLACK FOAM FROM ADHERING TO WOUND BED. SECURE VAC. DRESSING WITH DRAPE.  SET WOUND VAC  CONTINUOUS SUCTION.

## 2025-06-05 NOTE — PROGRESS NOTES
(cm) 0 cm 06/05/25 1429   Tunneling Position ___ O'Clock 0 06/05/25 1429   Undermining Starts ___ O'Clock 0 06/05/25 1429   Undermining Ends___ O'Clock 0 06/05/25 1429   Undermining Maxium Distance (cm) 0 06/05/25 1429   Wound Assessment Pink/red 06/05/25 1429   Drainage Amount Scant (moist but unmeasurable) 06/05/25 1429   Drainage Description Serosanguinous 06/05/25 1429   Odor None 06/05/25 1429   Berenice-wound Assessment Hyperkeratosis (callous) 06/05/25 1429   Margins Defined edges 06/05/25 1429   Wound Thickness Description not for Pressure Injury Full thickness 06/05/25 1429   Number of days: 24       Total  Area  Debrided:  57 sq cm     Bleeding:  Minimal    Hemostasis Achieved:  by pressure    Procedural Pain:  0  / 10     Post Procedural Pain:  0 / 10     Response to treatment:  Well tolerated by patient.      Plan:       Patient Instructions   PHYSICIAN ORDERS AND DISCHARGE INSTRUCTIONS  NOTE: Upon discharge from the Wound Center, you will receive a patient experience survey via E-mail. We would be grateful if you would take the time to fill this survey out.  Wound care order history:   SHELBY's   Right       Left    Date    Vascular studies/Intervention: .     Cultures: .               Antibiotics: ordered on 4/03/25, ordered on 04/24/25              HbA1c:  .               Grafts:  .   Juxta Lites & Lymph Pumps:  Continuing wound care orders and information:              Residence: .              Continue [facility] health care with[facility]   Your wound-care supplies will be provided by: .              Pharmacy: [facility] on [address]   Wound Vac: post-op- discussed with pt 04/24/25, discussed surgical debridement-pt sent to hospital on 04/24/25    Wound cleansing:     Do not scrub or use excessive force.    Wash hands with soap and water before and after dressing changes.    Prior to applying a clean dressing, cleanse wound with normal saline,    wound cleanser, or mild soap and water.     Ask your physician or nurse

## 2025-06-05 NOTE — PROGRESS NOTES
Bandar De La Torre (:  1960) is a 64 y.o. male, Established patient, here for evaluation of the following chief complaint(s):  Follow-up (2 wk f/u Left heel stage 4 pressure ulcer)      DISCUSSION:  Assessment & Plan    1. Diabetic infection in the left foot.  Proteus mirabilis and Streptococcus constellatus calcaneus osteomyelitis. The wound appears to be healing, albeit at a slow pace. There is a suspicion of excessive moisture accumulation in the wound area. His C-reactive protein (CRP) levels have decreased from 114 to 40, indicating a positive response to the current antibiotic regimen. However, the levels are still above the desired range of 9 or below. The antibiotic course will be extended by an additional 6 weeks. He will transition from ceftriaxone to cefuroxime 500 mg, to be taken orally twice daily for 6 weeks. Weekly laboratory tests will be conducted for a minimum of 3 weeks to monitor his progress. The home care team will also perform weekly laboratory tests.      Follow-up  The patient will follow up in 2 weeks.    1. Chronic osteomyelitis of left foot (HCC)  -     cefUROXime (CEFTIN) 500 MG tablet; Take 1 tablet by mouth 2 times daily, Disp-84 tablet, R-0Print  -     Basic Metabolic Panel; Future  -     CBC; Standing  -     C-Reactive Protein; Standing  -     Hepatic Function Panel; Standing  -     Sedimentation Rate; Standing        Results  Laboratory Studies  Tissue culture positive for Proteus mirabilis and Streptococcus constellatus.      Laboratory Studies  Blood glucose levels are high. CRP is elevated, currently at 40, down from 114.    Return in about 2 weeks (around 2025).      Subjective   History of Present Illness    The patient is a 64-year-old male who presents for a follow-up visit for a left foot diabetic infection.Accompanied by [facility] staff: Farrah Madden    He has been managing his condition with a wound vacuum device. He reports that his heel was significantly

## 2025-06-05 NOTE — PLAN OF CARE
Problem: Wound:  Goal: Will show signs of wound healing; wound closure and no evidence of infection  Description: Will show signs of wound healing; wound closure and no evidence of infection  6/5/2025 1507 by Natacha Bello LPN  Outcome: Progressing  6/5/2025 1506 by Natacha Bello LPN  Outcome: Progressing     Problem: Wound:  Goal: Will show signs of wound healing; wound closure and no evidence of infection  Description: Will show signs of wound healing; wound closure and no evidence of infection  Outcome: Progressing

## 2025-06-10 NOTE — PATIENT INSTRUCTIONS
DRESSING WITH DRAPE.  SET WOUND VAC  CONTINUOUS SUCTION. CANISTER CHANGE WITH EACH DRESSING CHANGE OR ACCORDING TO VOLUME OF DRAINAGE.  WOUND VAC DRESSING TO BE CHANGED Tuesday, Thursday, Saturday     Bilateral Lower Leg and Great Toe - Wash with mild soap and water  Apply Puracol ag to wound beds  Cover all wounds with Ca Alginate  Cover with Gentac   Apply Tubi G to BLE   Change Monday (at home) and Thursday ([facility])     Follow up with Dr Avalos  In 1 weeks in the wound care center  Call [phone] for any questions or concerns.

## 2025-06-12 ENCOUNTER — HOSPITAL ENCOUNTER (OUTPATIENT)
Dept: WOUND CARE | Age: 65
Discharge: HOME | End: 2025-06-12
Attending: NURSE PRACTITIONER
Payer: MEDICARE

## 2025-06-12 DIAGNOSIS — L97.425 NON-PRESSURE CHRONIC ULCER OF LEFT HEEL AND MIDFOOT WITH MUSCLE INVOLVEMENT WITHOUT EVIDENCE OF NECROSIS (HCC): Primary | ICD-10-CM

## 2025-06-12 DIAGNOSIS — L97.912 NON-HEALING ULCER OF LOWER LEG, RIGHT, WITH FAT LAYER EXPOSED (HCC): ICD-10-CM

## 2025-06-12 PROCEDURE — 11046 DBRDMT MUSC&/FSCA EA ADDL: CPT

## 2025-06-12 PROCEDURE — 11043 DBRDMT MUSC&/FSCA 1ST 20/<: CPT

## 2025-06-12 PROCEDURE — 11043 DBRDMT MUSC&/FSCA 1ST 20/<: CPT | Performed by: SURGERY

## 2025-06-12 PROCEDURE — 11045 DBRDMT SUBQ TISS EACH ADDL: CPT

## 2025-06-12 PROCEDURE — 11046 DBRDMT MUSC&/FSCA EA ADDL: CPT | Performed by: SURGERY

## 2025-06-12 PROCEDURE — 11042 DBRDMT SUBQ TIS 1ST 20SQCM/<: CPT

## 2025-06-12 NOTE — WOUND CARE
WOUND VAC THERAPY:     DUODERM TO PERIWOUND FOR PROTECTION. APPLY BLACK FOAM TO WOUND  OF THE LEFT FOOT MAY USE MEPITEL TO WOUND BED TO PREVENT BLACK FOAM FROM ADHERING TO WOUND BED. SECURE VAC. DRESSING WITH DRAPE.    SET WOUND VAC  CONTINUOUS SUCTION. CANISTER CHANGE WITH EACH DRESSING CHANGE OR ACCORDING TO VOLUME OF DRAINAGE.    WOUND VAC DRESSING TO BE CHANGED MON, WED, FRI

## 2025-06-12 NOTE — PROGRESS NOTES
Wound Care Center Progress Note With Procedure    Bandar De La Torre  AGE: 64 y.o.   GENDER: male  : 1960  EPISODE DATE:  2025     Subjective:     Chief Complaint   Patient presents with    Wound Check     Bilateral lower legs feet         HISTORY of PRESENT ILLNESS   Expand All Collapse All                                                    Wound Care Center Progress Note With Procedure     Bandar De La Torre  AGE: 64 y.o.   GENDER: male  : 1960  EPISODE DATE:  2025      Subjective:           Chief Complaint   Patient presents with    Wound Check       BLE         HISTORY of PRESENT ILLNESS      Subjective  [id number]  Bandar De La Torre is a 64 y.o. male who presents today for wound evaluation of Acute on chronic diabetic, pressure, non-healing surgical, and neuropathic ulcer(s) of the left heel and right great toe as well as some smaller, superficial venous stasis wounds on bilateral legs.  The heel ulcer is of marked severity.  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       Emmanuel underwent heel debridement and VAC placement on 25. MRI in the hospital showed concern for osteomyelitis of the calcaneous.  He has been treated with IV antibiotics and is residing at [facility] until his home can be made wheelchair accessible.      2025  Emmanuel is seen and evaluated again today. He has some new superficial ulcers on the right lower leg, likely related to venous stasis and some trauma.  Left heel with slightly healthier tissue appearance but still moderate slough today. Wound VAC was held since Tuesday due to concerns for maceration.    Wound Pain Timing/Severity: mild  Quality of pain: dull, aching  Severity of pain:  1 / 10   Modifying Factors: edema, venous stasis, diabetes, poor glucose control, chronic pressure, decreased mobility, shear force, and obesity  Associated Signs/Symptoms: edema, drainage, and numbness              PAST MEDICAL HISTORY

## 2025-06-19 ENCOUNTER — HOSPITAL ENCOUNTER (OUTPATIENT)
Dept: WOUND CARE | Age: 65
Discharge: HOME | End: 2025-06-19
Attending: NURSE PRACTITIONER
Payer: MEDICARE

## 2025-06-19 ENCOUNTER — HOSPITAL ENCOUNTER (OUTPATIENT)
Age: 65
Setting detail: SPECIMEN
Discharge: HOME | End: 2025-06-19
Payer: MEDICARE

## 2025-06-19 DIAGNOSIS — L97.424 DIABETIC ULCER OF LEFT HEEL ASSOCIATED WITH TYPE 2 DIABETES MELLITUS, WITH NECROSIS OF BONE (HCC): Primary | ICD-10-CM

## 2025-06-19 DIAGNOSIS — E11.621 DIABETIC ULCER OF LEFT HEEL ASSOCIATED WITH TYPE 2 DIABETES MELLITUS, WITH NECROSIS OF BONE (HCC): Primary | ICD-10-CM

## 2025-06-19 PROCEDURE — 87186 SC STD MICRODIL/AGAR DIL: CPT

## 2025-06-19 PROCEDURE — 97606 NEG PRS WND THER DME>50 SQCM: CPT

## 2025-06-19 PROCEDURE — 11046 DBRDMT MUSC&/FSCA EA ADDL: CPT

## 2025-06-19 PROCEDURE — 87205 SMEAR GRAM STAIN: CPT

## 2025-06-19 PROCEDURE — 87077 CULTURE AEROBIC IDENTIFY: CPT

## 2025-06-19 PROCEDURE — 87070 CULTURE OTHR SPECIMN AEROBIC: CPT

## 2025-06-19 PROCEDURE — 11044 DBRDMT BONE 1ST 20 SQ CM/<: CPT

## 2025-06-19 PROCEDURE — 11043 DBRDMT MUSC&/FSCA 1ST 20/<: CPT

## 2025-06-19 RX ORDER — METRONIDAZOLE 500 MG/1
500 TABLET ORAL 3 TIMES DAILY
Qty: 42 TABLET | Refills: 0 | Status: SHIPPED | OUTPATIENT
Start: 2025-06-19 | End: 2025-07-03

## 2025-06-19 NOTE — PATIENT INSTRUCTIONS
PHYSICIAN ORDERS AND DISCHARGE INSTRUCTIONS  NOTE: Upon discharge from the Wound Center, you will receive a patient experience survey via E-mail. We would be grateful if you would take the time to fill this survey out.  Wound care order history:   SHELBY's   Right       Left    Date    Vascular studies/Intervention: .     Cultures: bone sent on 06/19/2025              Antibiotics: ordered on 4/03/25, ordered on 04/24/25, Order metronidazole on 06/19/2025              HbA1c:  .               Grafts:  .   Juxta Lites & Lymph Pumps:  Continuing wound care orders and information:              Residence: .              Continue home health care with[facility]   Your wound-care supplies will be provided by: .              Pharmacy: [facility] on [facility]   Wound Vac: post-op- discussed with pt 04/24/25, discussed surgical debridement-pt sent to hospital on 04/24/25    Wound cleansing:     Do not scrub or use excessive force.    Wash hands with soap and water before and after dressing changes.    Prior to applying a clean dressing, cleanse wound with normal saline,    wound cleanser, or mild soap and water.     Ask your physician or nurse before getting the wound(s) wet in the shower.  Daily Wound management:    Keep weight off wounds and reposition every 2 hours.    Avoid standing for long periods of time.    Evaluate legs to the level of the heart or above for 30 minutes 4-5 times a day and/or when sitting.       When taking antibiotics take entire prescription as ordered by MD do not stop taking until medicine is all gone.     Documentation:  Compression: .   Offloading: .   Toenail Trim:         Orders for this week (6/19/2025):  Left Heel - Wash with mild liquid soap and water  WOUND VAC THERAPY:  DUODERM TO PERIWOUND FOR PROTECTION (IF YOU DON\"T HAVE DUODERM PLEASE LINE WOUND WITH DRAPE) WHITE FOAM OVER EXPOSED BONE  Apply Anasept and Stimulen powder (entire pack)  to wound bed. APPLY BLACK FOAM TO WOUND bed MAY USE MEPITEL/

## 2025-06-19 NOTE — WOUND CARE
Negative Pressure Wound Therapy    NAME:  Bandar De La Torre  YOB: 1960  MEDICAL RECORD NUMBER:  [id number]  DATE:  6/19/2025    Applied Negative Pressure to Right Heel wound(s)/ulcer(s).  [x] Applied skin barrier prep to kris-wound.   [x] Cut strips of plastic drape to picture frame wound so that kris-wound is     covered with the drape.   [x] If bridging dressing to less prominent site, cover any intact skin that will come in contact with the Negative Pressure Therapy sponge, gauze or channel drain with plastic drape. The sponge should never touch intact skin.   [x] Cut sponge, gauze or channel drain to size which will fit into the wound/ulcer bed without being forced.   [x] Be sure the sponge is large enough to hold the entire round plastic flange which is attached to the tubing. Never allow flange to be larger than the sponge or it will produce suction damaging intact skin.  Total number of individual pieces of foam used within the wound bed: 2    [x] If bridging the dressing away from the primary site, be sure the bridge leads to a piece of sponge large enough to hold the entire flange without allowing any of the flange to overlap onto intact skin.   [x] Covered sponge, gauze or channel drain with plastic drape.   [x] Cut a hole in this plastic drape directly over the sponge the same size as the plastic drain tubing.   [x] Removed plastic liner from flange and apply it directly over the hole you cut.   [x] Removed the plastic cover from the flange.   [x] Attached the tubing to the wound/ulcer Negative Pressure Therapy and turn it on to be sure a vacuum is created and that there are no leaks.   [x] If air leaks occur, use plastic drape to patch them.   [x] Secured Negative Pressure Therapy dressing with ace wrap loosely if located on an extremity. Maintain tubing outside of ace wrap. Tubing must not exert pressure on intact skin.    Applied per  Guidelines      Electronically signed by

## 2025-06-19 NOTE — PROGRESS NOTES
Wound Care Center Progress Note With Procedure    Bandar De La Torre  AGE: 64 y.o.   GENDER: male  : 1960  EPISODE DATE:  2025     Subjective:     Chief Complaint   Patient presents with    Wound Check     Left foot          HISTORY of PRESENT ILLNESS   Bandar De La Torre is a 64 y.o. male who presents today for wound evaluation of Acute on chronic diabetic, pressure, non-healing surgical, and neuropathic ulcer(s) of the left heel and right great toe as well as some smaller, superficial venous stasis wounds on bilateral legs.  The heel ulcer is of marked severity.  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       Emmanuel underwent heel debridement and VAC placement on 25. MRI in the hospital showed concern for osteomyelitis of the calcaneous.  He has been treated with IV antibiotics and is residing at [facility] until his home can be made wheelchair accessible.      2025  Emmanuel is seen and evaluated again today. He has some new superficial ulcers on the right lower leg, likely related to venous stasis and some trauma.  Left heel with slightly healthier tissue appearance but still moderate slough today. Wound VAC was held since Tuesday due to concerns for maceration.     2025  Wound with some worsening today. Periwound with blanching erythema. He has more tissue loss at the base of the heel with a quarter sized area of exposed bone after debridement. Fragment of bone was taken for culture.  We discussed ongoing wound cares vs amputation.    Wound Pain Timing/Severity: mild  Quality of pain: dull, aching  Severity of pain:  1 / 10   Modifying Factors: edema, venous stasis, diabetes, poor glucose control, chronic pressure, decreased mobility, shear force, and obesity  Associated Signs/Symptoms: edema, drainage, and numbness          PAST MEDICAL HISTORY        Diagnosis Date    Atrial fibrillation (HCC)     Cerebral artery occlusion with cerebral infarction (HCC)

## 2025-06-22 LAB
MICROORGANISM SPEC CULT: ABNORMAL
MICROORGANISM/AGENT SPEC: ABNORMAL
SPECIMEN DESCRIPTION: ABNORMAL

## 2025-06-23 LAB
MICROORGANISM SPEC CULT: ABNORMAL
SPECIMEN DESCRIPTION: ABNORMAL

## 2025-06-24 ENCOUNTER — HOSPITAL ENCOUNTER (OUTPATIENT)
Age: 65
Setting detail: SPECIMEN
Discharge: HOME | End: 2025-06-24
Payer: MEDICARE

## 2025-06-24 LAB
ALBUMIN SERPL-MCNC: 3.8 G/DL (ref 3.4–5)
ALBUMIN/GLOB SERPL: 1.2 {RATIO} (ref 1.1–2.2)
ALP SERPL-CCNC: 121 U/L (ref 40–129)
ALT SERPL-CCNC: 18 U/L (ref 10–40)
ANION GAP SERPL CALCULATED.3IONS-SCNC: 19 MMOL/L (ref 9–17)
AST SERPL-CCNC: 15 U/L (ref 15–37)
BASOPHILS # BLD: 0.03 K/UL
BASOPHILS NFR BLD: 0 % (ref 0–1)
BILIRUB DIRECT SERPL-MCNC: 0.2 MG/DL (ref 0–0.3)
BILIRUB INDIRECT SERPL-MCNC: 0.2 MG/DL (ref 0–0.7)
BILIRUB SERPL-MCNC: 0.4 MG/DL (ref 0–1)
BUN SERPL-MCNC: 84 MG/DL (ref 7–20)
CALCIUM SERPL-MCNC: 9.9 MG/DL (ref 8.3–10.6)
CHLORIDE SERPL-SCNC: 84 MMOL/L (ref 99–110)
CO2 SERPL-SCNC: 31 MMOL/L (ref 21–32)
CREAT SERPL-MCNC: 1.8 MG/DL (ref 0.8–1.3)
CRP SERPL HS-MCNC: 76.7 MG/L (ref 0–5)
EOSINOPHIL # BLD: 0.02 K/UL
EOSINOPHILS RELATIVE PERCENT: 0 % (ref 0–3)
ERYTHROCYTE [DISTWIDTH] IN BLOOD BY AUTOMATED COUNT: 14.8 % (ref 11.7–14.9)
ERYTHROCYTE [SEDIMENTATION RATE] IN BLOOD BY WESTERGREN METHOD: 24 MM/HR (ref 0–20)
GFR, ESTIMATED: 39 ML/MIN/1.73M2
GLUCOSE SERPL-MCNC: 543 MG/DL (ref 74–99)
HCT VFR BLD AUTO: 43.9 % (ref 42–52)
HGB BLD-MCNC: 14.2 G/DL (ref 13.5–18)
IMM GRANULOCYTES # BLD AUTO: 0.13 K/UL
IMM GRANULOCYTES NFR BLD: 1 %
LYMPHOCYTES NFR BLD: 0.99 K/UL
LYMPHOCYTES RELATIVE PERCENT: 10 % (ref 24–44)
MCH RBC QN AUTO: 27.4 PG (ref 27–31)
MCHC RBC AUTO-ENTMCNC: 32.3 G/DL (ref 32–36)
MCV RBC AUTO: 84.6 FL (ref 78–100)
MONOCYTES NFR BLD: 0.82 K/UL
MONOCYTES NFR BLD: 8 % (ref 0–5)
NEUTROPHILS NFR BLD: 81 % (ref 36–66)
NEUTS SEG NFR BLD: 8.33 K/UL
PLATELET # BLD AUTO: 390 K/UL (ref 140–440)
PMV BLD AUTO: 11.1 FL (ref 7.5–11.1)
POTASSIUM SERPL-SCNC: 4.2 MMOL/L (ref 3.5–5.1)
PROT SERPL-MCNC: 7 G/DL (ref 6.4–8.2)
RBC # BLD AUTO: 5.19 M/UL (ref 4.6–6.2)
SODIUM SERPL-SCNC: 133 MMOL/L (ref 136–145)
WBC OTHER # BLD: 10.3 K/UL (ref 4–10.5)

## 2025-06-24 PROCEDURE — 85652 RBC SED RATE AUTOMATED: CPT

## 2025-06-24 PROCEDURE — 85025 COMPLETE CBC W/AUTO DIFF WBC: CPT

## 2025-06-24 PROCEDURE — 86140 C-REACTIVE PROTEIN: CPT

## 2025-06-24 PROCEDURE — 82248 BILIRUBIN DIRECT: CPT

## 2025-06-24 PROCEDURE — 80053 COMPREHEN METABOLIC PANEL: CPT

## 2025-06-24 RX ORDER — SULFAMETHOXAZOLE AND TRIMETHOPRIM 800; 160 MG/1; MG/1
1 TABLET ORAL 2 TIMES DAILY
Qty: 28 TABLET | Refills: 0 | Status: SHIPPED | OUTPATIENT
Start: 2025-06-24 | End: 2025-07-08

## 2025-06-24 NOTE — PROGRESS NOTES
Preliminary culture from specimen taken last week shows different bacteria/sensitivities.  I discussed antibiotic choice with Dr. Gamboa. Will prescribe Bactrim and have Emmanuel continue taking the metronidazole we started last week.      - script for bactrim sent to [facility] pharmacy and I called and spoke with Emmanuel's wife Meredith to relay the plan  - continue planned follow up with Dr. Gamboa on Thursday    Electronically signed by Jakob Avalos MD on 6/24/2025 at 10:55 AM

## 2025-06-26 ENCOUNTER — OFFICE VISIT (OUTPATIENT)
Dept: INFECTIOUS DISEASES | Age: 65
End: 2025-06-26
Payer: MEDICARE

## 2025-06-26 ENCOUNTER — HOSPITAL ENCOUNTER (OUTPATIENT)
Dept: WOUND CARE | Age: 65
Discharge: HOME | End: 2025-06-26
Attending: NURSE PRACTITIONER
Payer: MEDICARE

## 2025-06-26 VITALS — HEART RATE: 60 BPM | DIASTOLIC BLOOD PRESSURE: 85 MMHG | SYSTOLIC BLOOD PRESSURE: 136 MMHG

## 2025-06-26 VITALS
HEART RATE: 61 BPM | DIASTOLIC BLOOD PRESSURE: 71 MMHG | SYSTOLIC BLOOD PRESSURE: 125 MMHG | RESPIRATION RATE: 16 BRPM | TEMPERATURE: 98.2 F

## 2025-06-26 DIAGNOSIS — M86.372 CHRONIC MULTIFOCAL OSTEOMYELITIS OF LEFT FOOT (HCC): Primary | ICD-10-CM

## 2025-06-26 DIAGNOSIS — L97.424 DIABETIC ULCER OF LEFT HEEL ASSOCIATED WITH TYPE 2 DIABETES MELLITUS, WITH NECROSIS OF BONE (HCC): Primary | ICD-10-CM

## 2025-06-26 DIAGNOSIS — E11.621 DIABETIC ULCER OF LEFT HEEL ASSOCIATED WITH TYPE 2 DIABETES MELLITUS, WITH NECROSIS OF BONE (HCC): Primary | ICD-10-CM

## 2025-06-26 DIAGNOSIS — M86.672 CHRONIC OSTEOMYELITIS OF LEFT FOOT (HCC): ICD-10-CM

## 2025-06-26 DIAGNOSIS — Z79.4 TYPE 2 DIABETES MELLITUS WITH OTHER SKIN ULCER, WITH LONG-TERM CURRENT USE OF INSULIN (HCC): ICD-10-CM

## 2025-06-26 DIAGNOSIS — E11.622 TYPE 2 DIABETES MELLITUS WITH OTHER SKIN ULCER, WITH LONG-TERM CURRENT USE OF INSULIN (HCC): ICD-10-CM

## 2025-06-26 DIAGNOSIS — L97.512 DIABETIC ULCER OF TOE OF RIGHT FOOT ASSOCIATED WITH TYPE 2 DIABETES MELLITUS, WITH FAT LAYER EXPOSED (HCC): ICD-10-CM

## 2025-06-26 DIAGNOSIS — E11.621 DIABETIC ULCER OF TOE OF RIGHT FOOT ASSOCIATED WITH TYPE 2 DIABETES MELLITUS, WITH FAT LAYER EXPOSED (HCC): ICD-10-CM

## 2025-06-26 PROCEDURE — 3079F DIAST BP 80-89 MM HG: CPT | Performed by: INTERNAL MEDICINE

## 2025-06-26 PROCEDURE — 3075F SYST BP GE 130 - 139MM HG: CPT | Performed by: INTERNAL MEDICINE

## 2025-06-26 PROCEDURE — 11046 DBRDMT MUSC&/FSCA EA ADDL: CPT

## 2025-06-26 PROCEDURE — 97606 NEG PRS WND THER DME>50 SQCM: CPT

## 2025-06-26 PROCEDURE — 99214 OFFICE O/P EST MOD 30 MIN: CPT | Performed by: INTERNAL MEDICINE

## 2025-06-26 PROCEDURE — 11043 DBRDMT MUSC&/FSCA 1ST 20/<: CPT

## 2025-06-26 NOTE — PROGRESS NOTES
Bandar De La Torre (:  1960) is a 64 y.o. male, Established patient, here for evaluation of the following chief complaint(s):  Follow-up (2 wk f/u Left heel stage 4 pressure ulcer; left calcaneus OM)      DISCUSSION:  Assessment & Plan        1. Chronic multifocal osteomyelitis of left foot (HCC)  Assessment & Plan:  Left heel OM remains present. During debridement some bone fell off. Tissue cx Morganella morganii, Enterobacter cloacae and Proteus vulgaris. Ciprofloxacin was avoided because of prolonged QT. Continue Bactrim but S Cr and K will be measured today and next week. If he has JESUS then PICC line will be placed for a 6-week course of ertapenem   Orders:  -     Basic Metabolic Panel; Standing          Results  Laboratory Studies  Tissue culture positive for Proteus mirabilis and Streptococcus constellatus.      Laboratory Studies  Blood glucose levels are high. CRP is elevated, currently at 40, down from 114.    Return in about 1 week (around 7/3/2025).      Subjective   History of Present Illness    The patient is a 64-year-old male who presents for a follow-up visit for a left foot diabetic infection.Accompanied by [facility] staff: Farrah Madden    He has been managing his condition with a wound vacuum device. He reports that his heel was significantly debrided last week, a procedure that had not been previously performed. He is scheduled for a weekly visit to the wound center. He expresses a desire to return home due to financial constraints, as he has been paying for his care out of pocket. His wife is unable to assist with his care, and home health services are only available twice a week. He anticipates being discharged on Monday. He is currently in a nursing home and is seeking approval from his insurance company to cover the cost of his stay. He believes that returning home would allow him to better manage his insulin administration.    He reports elevated blood glucose levels, which he

## 2025-06-26 NOTE — ASSESSMENT & PLAN NOTE
Left heel OM remains present. During debridement some bone fell off. Tissue cx Morganella morganii, Enterobacter cloacae and Proteus vulgaris. Ciprofloxacin was avoided because of prolonged QT. Continue Bactrim but S Cr and K will be measured today and next week. If he has JESUS then PICC line will be placed for a 6-week course of ertapenem

## 2025-06-26 NOTE — PATIENT INSTRUCTIONS
WOUND BED TO PREVENT BLACK FOAM FROM ADHERING TO WOUND BED. SECURE VAC. DRESSING WITH DRAPE.  SET WOUND VAC  CONTINUOUS SUCTION. CANISTER CHANGE WITH EACH DRESSING CHANGE OR ACCORDING TO VOLUME OF DRAINAGE.  WOUND VAC DRESSING TO BE CHANGED Tuesday, Thursday, Saturday     Bilateral Lower Leg and Great Toe - Wash with mild soap and water  Apply Puracol ag to wound beds  Cover all wounds with Ca Alginate  Cover with Gentac   Apply Tubi G to BLE   Change Monday (at home) and Thursday ([facility])     Follow up with Dr Avalos  In 1 weeks in the wound care center  Call [phone] for any questions or concerns.

## 2025-06-27 LAB
BUN / CREAT RATIO: 42 (ref 7–25)
BUN BLDV-MCNC: 80 MG/DL (ref 3–29)
CALCIUM SERPL-MCNC: 9.6 MG/DL (ref 8.5–10.5)
CHLORIDE BLD-SCNC: 88 MEQ/L (ref 96–110)
CO2: 28 MEQ/L (ref 19–32)
CREAT SERPL-MCNC: 1.9 MG/DL (ref 0.5–1.2)
ESTIMATED GLOMERULAR FILTRATION RATE CREATININE EQUATION: 39 MLS/MIN/1.73M2
FASTING STATUS: ABNORMAL
GLUCOSE BLD-MCNC: 275 MG/DL (ref 70–99)
POTASSIUM SERPL-SCNC: 4.3 MEQ/L (ref 3.4–5.3)
SODIUM BLD-SCNC: 135 MEQ/L (ref 135–148)

## 2025-07-03 ENCOUNTER — OFFICE VISIT (OUTPATIENT)
Dept: INFECTIOUS DISEASES | Age: 65
End: 2025-07-03
Payer: MEDICARE

## 2025-07-03 ENCOUNTER — HOSPITAL ENCOUNTER (OUTPATIENT)
Dept: WOUND CARE | Age: 65
Discharge: HOME | End: 2025-07-03
Attending: NURSE PRACTITIONER
Payer: MEDICARE

## 2025-07-03 VITALS — TEMPERATURE: 97.5 F | SYSTOLIC BLOOD PRESSURE: 119 MMHG | HEART RATE: 72 BPM | DIASTOLIC BLOOD PRESSURE: 89 MMHG

## 2025-07-03 VITALS — HEART RATE: 64 BPM | DIASTOLIC BLOOD PRESSURE: 84 MMHG | SYSTOLIC BLOOD PRESSURE: 152 MMHG

## 2025-07-03 DIAGNOSIS — L97.509 TYPE 2 DIABETES MELLITUS WITH FOOT ULCER, WITH LONG-TERM CURRENT USE OF INSULIN (HCC): ICD-10-CM

## 2025-07-03 DIAGNOSIS — E11.621 DIABETIC ULCER OF TOE OF RIGHT FOOT ASSOCIATED WITH TYPE 2 DIABETES MELLITUS, WITH FAT LAYER EXPOSED (HCC): ICD-10-CM

## 2025-07-03 DIAGNOSIS — T81.89XD SURGICAL WOUND, NON HEALING, SUBSEQUENT ENCOUNTER: ICD-10-CM

## 2025-07-03 DIAGNOSIS — L97.912 TRAUMATIC ULCER OF LOWER EXTREMITY, RIGHT, WITH FAT LAYER EXPOSED (HCC): ICD-10-CM

## 2025-07-03 DIAGNOSIS — L08.9 DIABETIC FOOT INFECTION (HCC): Primary | ICD-10-CM

## 2025-07-03 DIAGNOSIS — Z79.01 ANTICOAGULATED: ICD-10-CM

## 2025-07-03 DIAGNOSIS — L97.412 DIABETIC ULCER OF RIGHT MIDFOOT ASSOCIATED WITH TYPE 2 DIABETES MELLITUS, WITH FAT LAYER EXPOSED (HCC): ICD-10-CM

## 2025-07-03 DIAGNOSIS — E11.621 DIABETIC ULCER OF RIGHT MIDFOOT ASSOCIATED WITH TYPE 2 DIABETES MELLITUS, WITH FAT LAYER EXPOSED (HCC): ICD-10-CM

## 2025-07-03 DIAGNOSIS — E66.01 MORBID OBESITY (HCC): ICD-10-CM

## 2025-07-03 DIAGNOSIS — E11.628 DIABETIC FOOT INFECTION (HCC): Primary | ICD-10-CM

## 2025-07-03 DIAGNOSIS — E11.621 DIABETIC ULCER OF LEFT HEEL ASSOCIATED WITH TYPE 2 DIABETES MELLITUS, WITH NECROSIS OF BONE (HCC): Primary | ICD-10-CM

## 2025-07-03 DIAGNOSIS — L97.424 DIABETIC ULCER OF LEFT HEEL ASSOCIATED WITH TYPE 2 DIABETES MELLITUS, WITH NECROSIS OF BONE (HCC): Primary | ICD-10-CM

## 2025-07-03 DIAGNOSIS — L97.512 DIABETIC ULCER OF TOE OF RIGHT FOOT ASSOCIATED WITH TYPE 2 DIABETES MELLITUS, WITH FAT LAYER EXPOSED (HCC): ICD-10-CM

## 2025-07-03 DIAGNOSIS — L97.922 NON-PRESSURE CHRONIC ULCER OF LEFT LOWER LEG WITH FAT LAYER EXPOSED (HCC): ICD-10-CM

## 2025-07-03 DIAGNOSIS — L84 PRE-ULCERATIVE CALLUSES: ICD-10-CM

## 2025-07-03 DIAGNOSIS — E11.621 TYPE 2 DIABETES MELLITUS WITH FOOT ULCER, WITH LONG-TERM CURRENT USE OF INSULIN (HCC): ICD-10-CM

## 2025-07-03 DIAGNOSIS — I87.2 VENOUS STASIS DERMATITIS OF BOTH LOWER EXTREMITIES: ICD-10-CM

## 2025-07-03 DIAGNOSIS — Z79.4 TYPE 2 DIABETES MELLITUS WITH FOOT ULCER, WITH LONG-TERM CURRENT USE OF INSULIN (HCC): ICD-10-CM

## 2025-07-03 DIAGNOSIS — N17.9 AKI (ACUTE KIDNEY INJURY): ICD-10-CM

## 2025-07-03 PROCEDURE — 11045 DBRDMT SUBQ TISS EACH ADDL: CPT | Performed by: NURSE PRACTITIONER

## 2025-07-03 PROCEDURE — 11046 DBRDMT MUSC&/FSCA EA ADDL: CPT

## 2025-07-03 PROCEDURE — 3079F DIAST BP 80-89 MM HG: CPT | Performed by: INTERNAL MEDICINE

## 2025-07-03 PROCEDURE — 3046F HEMOGLOBIN A1C LEVEL >9.0%: CPT | Performed by: INTERNAL MEDICINE

## 2025-07-03 PROCEDURE — 11043 DBRDMT MUSC&/FSCA 1ST 20/<: CPT

## 2025-07-03 PROCEDURE — 97606 NEG PRS WND THER DME>50 SQCM: CPT

## 2025-07-03 PROCEDURE — 11042 DBRDMT SUBQ TIS 1ST 20SQCM/<: CPT | Performed by: NURSE PRACTITIONER

## 2025-07-03 PROCEDURE — 99215 OFFICE O/P EST HI 40 MIN: CPT | Performed by: INTERNAL MEDICINE

## 2025-07-03 PROCEDURE — 3077F SYST BP >= 140 MM HG: CPT | Performed by: INTERNAL MEDICINE

## 2025-07-03 RX ORDER — CLOBETASOL PROPIONATE 0.5 MG/G
OINTMENT TOPICAL PRN
OUTPATIENT
Start: 2025-07-03

## 2025-07-03 RX ORDER — SODIUM CHLOR/HYPOCHLOROUS ACID 0.033 %
SOLUTION, IRRIGATION IRRIGATION PRN
OUTPATIENT
Start: 2025-07-03

## 2025-07-03 RX ORDER — SILVER SULFADIAZINE 10 MG/G
CREAM TOPICAL PRN
OUTPATIENT
Start: 2025-07-03

## 2025-07-03 RX ORDER — GENTAMICIN SULFATE 1 MG/G
OINTMENT TOPICAL PRN
Status: DISCONTINUED | OUTPATIENT
Start: 2025-07-03 | End: 2025-07-04 | Stop reason: HOSPADM

## 2025-07-03 RX ORDER — MUPIROCIN 2 %
OINTMENT (GRAM) TOPICAL PRN
OUTPATIENT
Start: 2025-07-03

## 2025-07-03 RX ORDER — BACITRACIN ZINC AND POLYMYXIN B SULFATE 500; 1000 [USP'U]/G; [USP'U]/G
OINTMENT TOPICAL PRN
OUTPATIENT
Start: 2025-07-03

## 2025-07-03 RX ORDER — TRIAMCINOLONE ACETONIDE 1 MG/G
OINTMENT TOPICAL PRN
OUTPATIENT
Start: 2025-07-03

## 2025-07-03 RX ORDER — BETAMETHASONE DIPROPIONATE 0.5 MG/G
CREAM TOPICAL PRN
OUTPATIENT
Start: 2025-07-03

## 2025-07-03 RX ORDER — GINSENG 100 MG
CAPSULE ORAL PRN
OUTPATIENT
Start: 2025-07-03

## 2025-07-03 RX ORDER — LIDOCAINE HYDROCHLORIDE 20 MG/ML
JELLY TOPICAL PRN
OUTPATIENT
Start: 2025-07-03

## 2025-07-03 RX ORDER — GENTAMICIN SULFATE 1 MG/G
OINTMENT TOPICAL PRN
OUTPATIENT
Start: 2025-07-03

## 2025-07-03 RX ORDER — LIDOCAINE 40 MG/G
CREAM TOPICAL PRN
OUTPATIENT
Start: 2025-07-03

## 2025-07-03 RX ORDER — LIDOCAINE HYDROCHLORIDE 40 MG/ML
SOLUTION TOPICAL PRN
OUTPATIENT
Start: 2025-07-03

## 2025-07-03 RX ORDER — NEOMYCIN/BACITRACIN/POLYMYXINB 3.5-400-5K
OINTMENT (GRAM) TOPICAL PRN
OUTPATIENT
Start: 2025-07-03

## 2025-07-03 RX ORDER — LIDOCAINE 50 MG/G
OINTMENT TOPICAL PRN
OUTPATIENT
Start: 2025-07-03

## 2025-07-03 RX ADMIN — GENTAMICIN SULFATE: 1 OINTMENT TOPICAL at 14:12

## 2025-07-03 NOTE — PATIENT INSTRUCTIONS
PHYSICIAN ORDERS AND DISCHARGE INSTRUCTIONS  NOTE: Upon discharge from the Wound Center, you will receive a patient experience survey via E-mail. We would be grateful if you would take the time to fill this survey out.  Wound care order history:   SHELBY's   Right       Left    Date    Vascular studies/Intervention: .     Cultures: bone sent on 06/19/2025              Antibiotics: ordered on 4/03/25, ordered on 04/24/25, Order metronidazole on 06/19/2025              HbA1c:  .               Grafts:  .   Juxta Lites & Lymph Pumps:  Continuing wound care orders and information:              Residence: .              Continue home health care with[facility]   Your wound-care supplies will be provided by: .              Pharmacy: [facility] on [facility]   Wound Vac: post-op- discussed with pt 04/24/25, discussed surgical debridement-pt sent to hospital on 04/24/25    Wound cleansing:     Do not scrub or use excessive force.    Wash hands with soap and water before and after dressing changes.    Prior to applying a clean dressing, cleanse wound with normal saline,    wound cleanser, or mild soap and water.     Ask your physician or nurse before getting the wound(s) wet in the shower.  Daily Wound management:    Keep weight off wounds and reposition every 2 hours.    Avoid standing for long periods of time.    Evaluate legs to the level of the heart or above for 30 minutes 4-5 times a day and/or when sitting.       When taking antibiotics take entire prescription as ordered by MD do not stop taking until medicine is all gone.     Documentation:  Compression: .   Offloading: .   Toenail Trim:         Orders for this week (7/3/2025):  Left Heel - Wash with mild liquid soap and water  WOUND VAC THERAPY:  DUODERM TO PERIWOUND FOR PROTECTION (IF YOU DON\"T HAVE DUODERM PLEASE LINE WOUND WITH DRAPE) WHITE FOAM OVER EXPOSED BONE  Apply Stimulen powder (entire pack)  to wound bed. APPLY BLACK FOAM TO WOUND bed MAY USE MEPITEL/ Sorbact TO

## 2025-07-03 NOTE — PROGRESS NOTES
Wound Care Center Progress Note With Procedure    Bandar De La Torre  AGE: 64 y.o.   GENDER: male  : 1960  EPISODE DATE:  7/3/2025     Subjective:     Chief Complaint   Patient presents with    Wound Check     Left heel          HISTORY of PRESENT ILLNESS     Bandar De La Torre is a 64 y.o. 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 The heel ulcers is of marked severity.  Patient  underwent heel debridement and VAC placement on 25. MRI in the hospital showed concern for osteomyelitis of the calcaneous. He has been treated with IV antibiotics and is residing at [place] until his home can be made wheelchair accessible. Advanced wound care modalities established with initiation of care at the wound clinic.The patient has significant underlying medical conditions as below. MEETA Diehl MD     Wound Pain Timing/Severity: intermittent, moderate  Quality of pain: aching, tender, pressure  Severity of pain:  4 / 10   Modifying Factors: edema, venous stasis, diabetes, poor glucose control, chronic pressure, decreased mobility, shear force, obesity, and anticoagulation therapy  Associated Signs/Symptoms: edema, drainage, and pain    Living Situation  [x] Home [] SNF []  Assisted living  [] Other (ie rehab, etc.) [x] Home Health  []  Transportation    Wound status:  7/3/2025       Patient presents for follow up of right foot and left lower leg and  heel wound. Patient doing well. Patient without concerns today. Bedside debridement completed, patient tolerated well. Wound vac in place to the left heel.  Wound vac dressing slid over back of heel when he came for his visit. Verbalizes would also like padding over the heel wound in case he bumps it. Will start compression wrap over the wound vac and add padding, this willl also positively impact his edema. Regimen

## 2025-07-03 NOTE — PROGRESS NOTES
Bandar De La Torre (:  1960) is a 64 y.o. male, Established patient, here for evaluation of the following chief complaint(s):  Follow-up (1 wk f/u Left heel stage 4 pressure ulcer)      DISCUSSION:  Assessment & Plan  1. Left calcaneus diabetic osteomyelitis.  His blood glucose levels have shown improvement. However, his renal function tests indicate elevated levels, likely due to dehydration from diuretic medications and Farxiga. The possibility of medication interactions or side effects contributing to his symptoms can not be ruled out. He is advised to increase his water intake. Bactrim will be discontinued due to concerns about potential kidney damage. A repeat lab order will be sent to home health care. He is encouraged to discuss any concerns regarding medication interactions or side effects with his nephrologist and primary care physician. Due to follow up with wound care this afternoon. His left calcaneus wound will be re-examined at the wound care clinic.     2. Medication Management.  He is currently taking Farxiga 10 mg daily, torsemide 20 mg in the morning and 10 mg in the evening, spironolactone 25 mg in the evening, and Klor-Con 40 mEq, two tablets twice a day. He is also on Flomax as prescribed by his urologist. He should continue these medications as prescribed. Any changes to his medication regimen should be discussed with his nephrologist and primary care physician.    Follow-up  A follow-up visit is scheduled for next week.    1. Diabetic foot infection (HCC)  -     Basic Metabolic Panel; Standing  -     CBC; Standing  -     C-Reactive Protein; Standing  -     Sedimentation Rate; Standing  -     Hepatic Function Panel; Standing  2. JESUS (acute kidney injury)  -     Basic Metabolic Panel; Standing            Results  Laboratory Studies  Tissue culture positive for Proteus mirabilis and Streptococcus constellatus.      Laboratory Studies  Blood glucose levels are high. CRP is elevated,

## 2025-07-03 NOTE — WOUND CARE
Negative Pressure    NAME:  Bandar De La Torre  YOB: 1960  MEDICAL RECORD NUMBER:  [id number]  DATE:  7/3/2025    Applied Negative Pressure to Left Heel wound(s)/ulcer(s).  [x] Applied mastisol to kris-wound.   [x] Cut strips of duoderm to picture frame wound so that kris-wound is  covered   [x] If bridging dressing to less prominent site, cover any intact skin that will come in contact with the Negative Pressure Therapy sponge, gauze or channel drain with plastic drape.  The sponge should never touch intact skin.   [x] Cut sponge, gauze or channel drain to size which will fit into the wound/ulcer bed without being forced.   [x] Be sure the sponge is large enough to hold the entire round plastic flange which is attached to the tubing.  Never allow flange to be larger than the sponge or it will produce suction damaging intact skin.  [x] If bridging the dressing away from the primary site, be sure the bridge leads to a piece of sponge large enough to hold the entire flange without allowing any of the flange to overlap onto intact skin.   [x] Covered sponge, gauze or channel drain with plastic drape.   [x] Cut a hole in this plastic drape directly over the sponge the same size as the plastic drain tubing.   [x] Removed plastic liner from flange and apply it directly over the hole you cut.   [x] Removed the plastic cover from the flange.   [x] Attached the tubing to the wound/ulcer Negative Pressure Therapy and turn it on to be sure a vacuum is created and that there are no leaks.   [x] If air leaks occur, use plastic drape to patch them.   [x] Secured Negative Pressure Therapy dressing with ace wrap loosely if located on an extremity. Maintain tubing outside of ace wrap. Tubing must not exert pressure on intact skin.    Applied per  Guidelines  Patient tolerated treatment well.      Electronically signed by Angelica Steele RN on 7/3/2025 at 1:28 PM

## 2025-07-03 NOTE — PLAN OF CARE
Problem: Wound:  Goal: Will show signs of wound healing; wound closure and no evidence of infection  Description: Will show signs of wound healing; wound closure and no evidence of infection  Outcome: Progressing  Intervention: Assess ankle, calf, or foot circumference blilaterally  Note: See Flowsheet  Intervention: Assess pain status  Note: See Flowsheet  Intervention: Assess wound size, appearance and drainage  Note: See Flowsheet  Intervention: Assess pedal pulses bilaterally if patient has a foot or leg ulcer  Note: See Flowsheet  Intervention: Doppler if unable to palpate pedal pulse  Note: See Flowsheet   Multilayer Compression Wrap   (Not Unna) Below the Knee    NAME:  Bandar De La Torre  YOB: 1960  MEDICAL RECORD NUMBER:  [id number]  DATE:  7/3/2025    Multilayer compression wrap: Removed old Multilayer wrap if indicated and wash leg with mild soap/water.  Applied moisturizing agent to dry skin as needed.   Applied primary and secondary dressing as ordered.  Applied multilayered dressing below the knee to right lower leg.  Applied multilayered dressing below the knee to left lower leg.  Instructed patient/caregiver not to remove dressing and to keep it clean and dry.   Instructed patient/caregiver on complications to report to provider, such as pain, numbness in toes, heavy drainage, and slippage of dressing.  Instructed patient on purpose of compression dressing and on activity and exercise recommendations.  Patient tolerated treatment well.       Electronically signed by Angelica Steele RN on 7/3/2025 at 1:30 PM

## 2025-07-08 ENCOUNTER — HOSPITAL ENCOUNTER (OUTPATIENT)
Age: 65
Setting detail: SPECIMEN
Discharge: HOME | End: 2025-07-08
Payer: MEDICARE

## 2025-07-08 LAB
ALBUMIN SERPL-MCNC: 3.1 G/DL (ref 3.4–5)
ALBUMIN/GLOB SERPL: 1.2 {RATIO} (ref 1.1–2.2)
ALP SERPL-CCNC: 143 U/L (ref 40–129)
ALT SERPL-CCNC: 37 U/L (ref 10–40)
ANION GAP SERPL CALCULATED.3IONS-SCNC: 13 MMOL/L (ref 9–17)
AST SERPL-CCNC: 37 U/L (ref 15–37)
BASOPHILS # BLD: 0.03 K/UL
BASOPHILS NFR BLD: 0 % (ref 0–1)
BILIRUB DIRECT SERPL-MCNC: 0.4 MG/DL (ref 0–0.3)
BILIRUB INDIRECT SERPL-MCNC: 0.3 MG/DL (ref 0–0.7)
BILIRUB SERPL-MCNC: 0.8 MG/DL (ref 0–1)
BUN SERPL-MCNC: 35 MG/DL (ref 7–20)
CALCIUM SERPL-MCNC: 9.4 MG/DL (ref 8.3–10.6)
CHLORIDE SERPL-SCNC: 96 MMOL/L (ref 99–110)
CO2 SERPL-SCNC: 25 MMOL/L (ref 21–32)
CREAT SERPL-MCNC: 1.3 MG/DL (ref 0.8–1.3)
CRP SERPL HS-MCNC: 339 MG/L (ref 0–5)
EOSINOPHIL # BLD: 0 K/UL
EOSINOPHILS RELATIVE PERCENT: 0 % (ref 0–3)
ERYTHROCYTE [DISTWIDTH] IN BLOOD BY AUTOMATED COUNT: 17.2 % (ref 11.7–14.9)
ERYTHROCYTE [SEDIMENTATION RATE] IN BLOOD BY WESTERGREN METHOD: 18 MM/HR (ref 0–20)
GFR, ESTIMATED: 57 ML/MIN/1.73M2
GLUCOSE SERPL-MCNC: 201 MG/DL (ref 74–99)
HCT VFR BLD AUTO: 43 % (ref 42–52)
HGB BLD-MCNC: 13.6 G/DL (ref 13.5–18)
IMM GRANULOCYTES # BLD AUTO: 0.09 K/UL
IMM GRANULOCYTES NFR BLD: 1 %
LYMPHOCYTES NFR BLD: 0.64 K/UL
LYMPHOCYTES RELATIVE PERCENT: 4 % (ref 24–44)
MCH RBC QN AUTO: 27.1 PG (ref 27–31)
MCHC RBC AUTO-ENTMCNC: 31.6 G/DL (ref 32–36)
MCV RBC AUTO: 85.8 FL (ref 78–100)
MONOCYTES NFR BLD: 1.18 K/UL
MONOCYTES NFR BLD: 7 % (ref 0–5)
NEUTROPHILS NFR BLD: 88 % (ref 36–66)
NEUTS SEG NFR BLD: 14.24 K/UL
PLATELET # BLD AUTO: 335 K/UL (ref 140–440)
PMV BLD AUTO: 10.8 FL (ref 7.5–11.1)
POTASSIUM SERPL-SCNC: 4.9 MMOL/L (ref 3.5–5.1)
PROT SERPL-MCNC: 5.7 G/DL (ref 6.4–8.2)
RBC # BLD AUTO: 5.01 M/UL (ref 4.6–6.2)
SODIUM SERPL-SCNC: 134 MMOL/L (ref 136–145)
WBC OTHER # BLD: 16.2 K/UL (ref 4–10.5)

## 2025-07-08 PROCEDURE — 86140 C-REACTIVE PROTEIN: CPT

## 2025-07-08 PROCEDURE — 85652 RBC SED RATE AUTOMATED: CPT

## 2025-07-08 PROCEDURE — 80053 COMPREHEN METABOLIC PANEL: CPT

## 2025-07-08 PROCEDURE — 85025 COMPLETE CBC W/AUTO DIFF WBC: CPT

## 2025-07-08 PROCEDURE — 82248 BILIRUBIN DIRECT: CPT

## 2025-07-09 ENCOUNTER — TELEPHONE (OUTPATIENT)
Dept: INFECTIOUS DISEASES | Age: 65
End: 2025-07-09

## 2025-07-09 NOTE — TELEPHONE ENCOUNTER
Pt called to say that he is having extreme diarrhea and what's to know what he should do.    Please advise.

## 2025-07-10 ENCOUNTER — HOSPITAL ENCOUNTER (OUTPATIENT)
Dept: WOUND CARE | Age: 65
Discharge: HOME | End: 2025-07-10
Attending: NURSE PRACTITIONER
Payer: MEDICARE

## 2025-07-10 VITALS — DIASTOLIC BLOOD PRESSURE: 59 MMHG | HEART RATE: 66 BPM | SYSTOLIC BLOOD PRESSURE: 129 MMHG | TEMPERATURE: 97.7 F

## 2025-07-10 DIAGNOSIS — E11.621 ULCER OF RIGHT GREAT TOE DUE TO DIABETES MELLITUS (HCC): ICD-10-CM

## 2025-07-10 DIAGNOSIS — S81.002A OPEN WOUND OF LEFT KNEE, INITIAL ENCOUNTER: ICD-10-CM

## 2025-07-10 DIAGNOSIS — Z79.4 TYPE 2 DIABETES MELLITUS WITH OTHER SKIN ULCER, WITH LONG-TERM CURRENT USE OF INSULIN (HCC): ICD-10-CM

## 2025-07-10 DIAGNOSIS — M86.172 ACUTE OSTEOMYELITIS OF LEFT CALCANEUS (HCC): ICD-10-CM

## 2025-07-10 DIAGNOSIS — L97.519 ULCER OF RIGHT GREAT TOE DUE TO DIABETES MELLITUS (HCC): ICD-10-CM

## 2025-07-10 DIAGNOSIS — L97.424 DIABETIC ULCER OF LEFT HEEL ASSOCIATED WITH TYPE 2 DIABETES MELLITUS, WITH NECROSIS OF BONE (HCC): Primary | ICD-10-CM

## 2025-07-10 DIAGNOSIS — E11.622 TYPE 2 DIABETES MELLITUS WITH OTHER SKIN ULCER, WITH LONG-TERM CURRENT USE OF INSULIN (HCC): ICD-10-CM

## 2025-07-10 DIAGNOSIS — E11.621 DIABETIC ULCER OF LEFT HEEL ASSOCIATED WITH TYPE 2 DIABETES MELLITUS, WITH NECROSIS OF BONE (HCC): Primary | ICD-10-CM

## 2025-07-10 PROCEDURE — 11046 DBRDMT MUSC&/FSCA EA ADDL: CPT

## 2025-07-10 PROCEDURE — 11043 DBRDMT MUSC&/FSCA 1ST 20/<: CPT | Performed by: NURSE PRACTITIONER

## 2025-07-10 PROCEDURE — 99213 OFFICE O/P EST LOW 20 MIN: CPT | Performed by: NURSE PRACTITIONER

## 2025-07-10 PROCEDURE — 11042 DBRDMT SUBQ TIS 1ST 20SQCM/<: CPT | Performed by: NURSE PRACTITIONER

## 2025-07-10 PROCEDURE — 11042 DBRDMT SUBQ TIS 1ST 20SQCM/<: CPT

## 2025-07-10 PROCEDURE — 11046 DBRDMT MUSC&/FSCA EA ADDL: CPT | Performed by: NURSE PRACTITIONER

## 2025-07-10 PROCEDURE — 11043 DBRDMT MUSC&/FSCA 1ST 20/<: CPT

## 2025-07-10 RX ORDER — DOXYCYCLINE HYCLATE 100 MG
100 TABLET ORAL 2 TIMES DAILY
Qty: 28 TABLET | Refills: 0 | Status: SHIPPED | OUTPATIENT
Start: 2025-07-10 | End: 2025-07-24

## 2025-07-10 NOTE — PROGRESS NOTES
(Prediabetes).         >6.5%    Diagnostic of diabetes. In the absence of                  unequivocal hyperglycemia, results should be                  confirmed by repeat testing.         <7%      Glycemic recommendation for nonpregnant adults                   with diabetes.  American Diabetes Association, Standards of Medical Care in   Diabetes,Volume 40; 2017          Diabetes education provided:    Metabolic syndrome: association of diabetes with dyslipidemia, HTN and obesity.  Diabetic Neuropathy: signs and therapy.  Foot care: advised to wash feet daily, pat dry and apply lotion at night, avoiding between toes. Need to look at feet daily and report to a physician any signs of inflammation or skin damage. Discussed diabetes shoes and socks.  Diabetic management related to wound healing    Smoking:   Tobacco Use      Smoking status: Never      Smokeless tobacco: Never    Anticoagulant/Antiplatelet therapy: [] N/A [] Aspirin [x] Plavix    []Warfarin/Coumadin [] Eliquis/Apixaban   [x] Rivaroxaban/Xarelto  [] Dabigatran/ Pradaxa [] Edoxaban/Lixiana    Immunosuppression: [x] No [] Yes    Obesity:  BMI Readings from Last 3 Encounters:   05/14/25 39.47 kg/m²   04/30/25 43.35 kg/m²   04/24/25 41.66 kg/m²        Patient educated on the 6 essential components necessary for wound healing: Circulation, Debridements, Proper Dressings and Topical Wound Products, Infection Control, Edema Control and Offloading.     Patient educated on those factors that negatively effect or impact wound healing: smoking, obesity, uncontrolled diabetes, anticoagulant and immunosuppressive regimens, inadequate nutrition, untreated arterial and venous disease if applicable and measures to manage edema.      Nutritional status: well nourished. Discussed need for increased protein and calories for wound healing and good sources of protein (just over 7 grams for every 20 pounds of body weight). Animal-based foods high in protein (meat, poultry,

## 2025-07-10 NOTE — PATIENT INSTRUCTIONS
PHYSICIAN ORDERS AND DISCHARGE INSTRUCTIONS  NOTE: Upon discharge from the Wound Center, you will receive a patient experience survey via E-mail. We would be grateful if you would take the time to fill this survey out.  Wound care order history:   SHELBY's   Right       Left    Date    Vascular studies/Intervention: .     Cultures: bone sent on 06/19/2025              Antibiotics: ordered on 4/03/25, ordered on 04/24/25, Order metronidazole on 06/19/2025              HbA1c:  .               Grafts:  .   Juxta Lites & Lymph Pumps:  Continuing wound care orders and information:              Residence: .              Continue [facility] health care with[facility]   Your wound-care supplies will be provided by: .              Pharmacy: [facility] on [facility]   Wound Vac: post-op- discussed with pt 04/24/25, discussed surgical debridement-pt sent to hospital on 04/24/25    Wound cleansing:     Do not scrub or use excessive force.    Wash hands with soap and water before and after dressing changes.    Prior to applying a clean dressing, cleanse wound with normal saline,    wound cleanser, or mild soap and water.     Ask your physician or nurse before getting the wound(s) wet in the shower.  Daily Wound management:    Keep weight off wounds and reposition every 2 hours.    Avoid standing for long periods of time.    Evaluate legs to the level of the heart or above for 30 minutes 4-5 times a day and/or when sitting.       When taking antibiotics take entire prescription as ordered by MD do not stop taking until medicine is all gone.     Documentation:  Compression: .   Offloading: .   Toenail Trim:         Orders for this week (7/10/2025):  Left Heel - Wash with mild liquid soap and water  WOUND VAC THERAPY:  DUODERM TO PERIWOUND FOR PROTECTION (IF YOU DON\"T HAVE DUODERM PLEASE LINE WOUND WITH DRAPE) WHITE FOAM OVER EXPOSED BONE  Apply Stimulen powder (entire pack)  to wound bed. APPLY BLACK FOAM TO WOUND bed MAY USE MEPITEL/ Sorbact TO

## 2025-07-10 NOTE — PLAN OF CARE
Problem: Wound:  Goal: Will show signs of wound healing; wound closure and no evidence of infection  Description: Will show signs of wound healing; wound closure and no evidence of infection  Outcome: Progressing  Intervention: Assess ankle, calf, or foot circumference blilaterally  Note: See Flowsheet   Intervention: Assess pain status  Note: See Flowsheet   Intervention: Assess wound size, appearance and drainage  Note: See Flowsheet   Intervention: Assess pedal pulses bilaterally if patient has a foot or leg ulcer  Note: See Flowsheet   Intervention: Doppler if unable to palpate pedal pulse  Note: See Flowsheet      Problem: Wound:  Goal: Will show signs of wound healing; wound closure and no evidence of infection  Description: Will show signs of wound healing; wound closure and no evidence of infection  Outcome: Progressing  Note: See Flowsheet   Intervention: Assess ankle, calf, or foot circumference blilaterally  Note: See Flowsheet   Intervention: Assess pain status  Note: See Flowsheet   Intervention: Assess wound size, appearance and drainage  Note: See Flowsheet   Intervention: Assess pedal pulses bilaterally if patient has a foot or leg ulcer  Note: See Flowsheet   Intervention: Doppler if unable to palpate pedal pulse  Note: See Flowsheet    Multilayer Compression Wrap   (Not Unna) Below the Knee    NAME:  Bandar De La Torre  YOB: 1960  MEDICAL RECORD NUMBER:  [id number]  DATE:  7/10/2025    Multilayer compression wrap: Removed old Multilayer wrap if indicated and wash leg with mild soap/water.  Applied moisturizing agent to dry skin as needed.   Applied primary and secondary dressing as ordered.  Applied multilayered dressing below the knee to left lower leg.  Instructed patient/caregiver not to remove dressing and to keep it clean and dry.   Instructed patient/caregiver on complications to report to provider, such as pain, numbness in toes, heavy drainage, and slippage of

## 2025-07-10 NOTE — WOUND CARE
Negative Pressure    NAME:  Bandar De La Torre  YOB: 1960  MEDICAL RECORD NUMBER:  [id number]  DATE:  7/10/2025    Applied Negative Pressure to left heel wound(s)/ulcer(s).  [x] Applied mastisol to kris-wound.   [x] Cut strips of duoderm to picture frame wound so that kris-wound is  covered   [x] If bridging dressing to less prominent site, cover any intact skin that will come in contact with the Negative Pressure Therapy sponge, gauze or channel drain with plastic drape.  The sponge should never touch intact skin.   [x] Cut sponge, gauze or channel drain to size which will fit into the wound/ulcer bed without being forced.   [x] Be sure the sponge is large enough to hold the entire round plastic flange which is attached to the tubing.  Never allow flange to be larger than the sponge or it will produce suction damaging intact skin.  [x] If bridging the dressing away from the primary site, be sure the bridge leads to a piece of sponge large enough to hold the entire flange without allowing any of the flange to overlap onto intact skin.   [x] Covered sponge, gauze or channel drain with plastic drape.   [x] Cut a hole in this plastic drape directly over the sponge the same size as the plastic drain tubing.   [x] Removed plastic liner from flange and apply it directly over the hole you cut.   [x] Removed the plastic cover from the flange.   [x] Attached the tubing to the wound/ulcer Negative Pressure Therapy and turn it on to be sure a vacuum is created and that there are no leaks.   [x] If air leaks occur, use plastic drape to patch them.   [x] Secured Negative Pressure Therapy dressing with ace wrap loosely if located on an extremity. Maintain tubing outside of ace wrap. Tubing must not exert pressure on intact skin.    Applied per  Guidelines      Electronically signed by Angelica Steele RN on 7/10/2025 at 3:50 PM

## 2025-07-15 ENCOUNTER — HOSPITAL ENCOUNTER (OUTPATIENT)
Age: 65
Setting detail: SPECIMEN
Discharge: HOME | End: 2025-07-15
Payer: MEDICARE

## 2025-07-15 DIAGNOSIS — R19.7 DIARRHEA OF PRESUMED INFECTIOUS ORIGIN: Primary | ICD-10-CM

## 2025-07-15 LAB
ABSOLUTE BANDS: 2.26 K/UL
ALBUMIN SERPL-MCNC: 2.7 G/DL (ref 3.4–5)
ALBUMIN/GLOB SERPL: 0.9 {RATIO} (ref 1.1–2.2)
ALP SERPL-CCNC: 723 U/L (ref 40–129)
ALT SERPL-CCNC: 263 U/L (ref 10–40)
ANION GAP SERPL CALCULATED.3IONS-SCNC: 15 MMOL/L (ref 9–17)
AST SERPL-CCNC: 298 U/L (ref 15–37)
BANDS: 12 %
BASOPHILS # BLD: 0 K/UL
BASOPHILS NFR BLD: 0 % (ref 0–1)
BILIRUB DIRECT SERPL-MCNC: 0.2 MG/DL (ref 0–0.3)
BILIRUB INDIRECT SERPL-MCNC: 0.1 MG/DL (ref 0–0.7)
BILIRUB SERPL-MCNC: 0.3 MG/DL (ref 0–1)
BUN SERPL-MCNC: 53 MG/DL (ref 7–20)
CALCIUM SERPL-MCNC: 8.5 MG/DL (ref 8.3–10.6)
CHLORIDE SERPL-SCNC: 94 MMOL/L (ref 99–110)
CO2 SERPL-SCNC: 22 MMOL/L (ref 21–32)
CREAT SERPL-MCNC: 1.5 MG/DL (ref 0.8–1.3)
CRP SERPL HS-MCNC: 208 MG/L (ref 0–5)
EOSINOPHIL # BLD: 0 K/UL
EOSINOPHILS RELATIVE PERCENT: 0 % (ref 0–3)
ERYTHROCYTE [DISTWIDTH] IN BLOOD BY AUTOMATED COUNT: 17.2 % (ref 11.7–14.9)
ERYTHROCYTE [SEDIMENTATION RATE] IN BLOOD BY WESTERGREN METHOD: 100 MM/HR (ref 0–20)
GFR, ESTIMATED: 47 ML/MIN/1.73M2
GLUCOSE SERPL-MCNC: 217 MG/DL (ref 74–99)
HCT VFR BLD AUTO: 37.1 % (ref 42–52)
HGB BLD-MCNC: 11.8 G/DL (ref 13.5–18)
LYMPHOCYTES NFR BLD: 1.69 K/UL
LYMPHOCYTES RELATIVE PERCENT: 9 % (ref 24–44)
MCH RBC QN AUTO: 26.5 PG (ref 27–31)
MCHC RBC AUTO-ENTMCNC: 31.8 G/DL (ref 32–36)
MCV RBC AUTO: 83.2 FL (ref 78–100)
METAMYELOCYTES ABSOLUTE COUNT: 0.56 K/UL
METAMYELOCYTES: 3 %
MONOCYTES NFR BLD: 1.5 K/UL
MONOCYTES NFR BLD: 8 % (ref 0–5)
NEUTROPHILS NFR BLD: 68 % (ref 36–66)
NEUTS SEG NFR BLD: 12.78 K/UL
PLATELET # BLD AUTO: 368 K/UL (ref 140–440)
PLATELET ESTIMATE: NORMAL
PMV BLD AUTO: 10.3 FL (ref 7.5–11.1)
POTASSIUM SERPL-SCNC: 4.8 MMOL/L (ref 3.5–5.1)
PROT SERPL-MCNC: 5.6 G/DL (ref 6.4–8.2)
RBC # BLD AUTO: 4.46 M/UL (ref 4.6–6.2)
RBC # BLD: ABNORMAL 10*6/UL
RBC # BLD: NORMAL 10*6/UL
SODIUM SERPL-SCNC: 131 MMOL/L (ref 136–145)
WBC # BLD: ABNORMAL 10*3/UL
WBC # BLD: NORMAL 10*3/UL
WBC OTHER # BLD: 18.8 K/UL (ref 4–10.5)

## 2025-07-15 PROCEDURE — 85025 COMPLETE CBC W/AUTO DIFF WBC: CPT

## 2025-07-15 PROCEDURE — 82248 BILIRUBIN DIRECT: CPT

## 2025-07-15 PROCEDURE — 86140 C-REACTIVE PROTEIN: CPT

## 2025-07-15 PROCEDURE — 85652 RBC SED RATE AUTOMATED: CPT

## 2025-07-15 PROCEDURE — 80053 COMPREHEN METABOLIC PANEL: CPT

## 2025-07-17 ENCOUNTER — HOSPITAL ENCOUNTER (OUTPATIENT)
Dept: WOUND CARE | Age: 65
Discharge: HOME | End: 2025-07-17
Attending: NURSE PRACTITIONER
Payer: MEDICARE

## 2025-07-17 ENCOUNTER — OFFICE VISIT (OUTPATIENT)
Dept: INFECTIOUS DISEASES | Age: 65
End: 2025-07-17
Payer: MEDICARE

## 2025-07-17 VITALS
HEART RATE: 64 BPM | TEMPERATURE: 98.1 F | DIASTOLIC BLOOD PRESSURE: 64 MMHG | RESPIRATION RATE: 18 BRPM | SYSTOLIC BLOOD PRESSURE: 152 MMHG

## 2025-07-17 VITALS — SYSTOLIC BLOOD PRESSURE: 163 MMHG | HEART RATE: 64 BPM | DIASTOLIC BLOOD PRESSURE: 82 MMHG

## 2025-07-17 DIAGNOSIS — Z79.2 RECEIVING INTRAVENOUS ANTIBIOTIC TREATMENT AS OUTPATIENT: Primary | ICD-10-CM

## 2025-07-17 DIAGNOSIS — L97.424 DIABETIC ULCER OF LEFT HEEL ASSOCIATED WITH TYPE 2 DIABETES MELLITUS, WITH NECROSIS OF BONE (HCC): ICD-10-CM

## 2025-07-17 DIAGNOSIS — I89.0 LYMPHEDEMA OF BOTH LOWER EXTREMITIES: ICD-10-CM

## 2025-07-17 DIAGNOSIS — T81.89XD SURGICAL WOUND, NON HEALING, SUBSEQUENT ENCOUNTER: ICD-10-CM

## 2025-07-17 DIAGNOSIS — E66.01 MORBID OBESITY (HCC): ICD-10-CM

## 2025-07-17 DIAGNOSIS — Z89.429 STATUS POST AMPUTATION OF TOE: ICD-10-CM

## 2025-07-17 DIAGNOSIS — L97.424 NON-PRESSURE CHRONIC ULCER OF LEFT HEEL AND MIDFOOT WITH NECROSIS OF BONE (HCC): Primary | ICD-10-CM

## 2025-07-17 DIAGNOSIS — Z79.4 TYPE 2 DIABETES MELLITUS WITH FOOT ULCER, WITH LONG-TERM CURRENT USE OF INSULIN (HCC): ICD-10-CM

## 2025-07-17 DIAGNOSIS — L97.509 TYPE 2 DIABETES MELLITUS WITH FOOT ULCER, WITH LONG-TERM CURRENT USE OF INSULIN (HCC): ICD-10-CM

## 2025-07-17 DIAGNOSIS — I87.2 VENOUS STASIS DERMATITIS OF BOTH LOWER EXTREMITIES: ICD-10-CM

## 2025-07-17 DIAGNOSIS — E11.621 DIABETIC ULCER OF TOE OF RIGHT FOOT ASSOCIATED WITH TYPE 2 DIABETES MELLITUS, WITH FAT LAYER EXPOSED (HCC): ICD-10-CM

## 2025-07-17 DIAGNOSIS — L97.512 DIABETIC ULCER OF TOE OF RIGHT FOOT ASSOCIATED WITH TYPE 2 DIABETES MELLITUS, WITH FAT LAYER EXPOSED (HCC): ICD-10-CM

## 2025-07-17 DIAGNOSIS — E11.621 DIABETIC ULCER OF LEFT HEEL ASSOCIATED WITH TYPE 2 DIABETES MELLITUS, WITH NECROSIS OF BONE (HCC): ICD-10-CM

## 2025-07-17 DIAGNOSIS — E11.65 POORLY CONTROLLED TYPE 2 DIABETES MELLITUS WITH PERIPHERAL NEUROPATHY (HCC): ICD-10-CM

## 2025-07-17 DIAGNOSIS — E11.621 TYPE 2 DIABETES MELLITUS WITH FOOT ULCER, WITH LONG-TERM CURRENT USE OF INSULIN (HCC): ICD-10-CM

## 2025-07-17 DIAGNOSIS — E11.42 POORLY CONTROLLED TYPE 2 DIABETES MELLITUS WITH PERIPHERAL NEUROPATHY (HCC): ICD-10-CM

## 2025-07-17 PROBLEM — L97.519 ULCER OF RIGHT GREAT TOE DUE TO DIABETES MELLITUS (HCC): Status: RESOLVED | Noted: 2025-07-10 | Resolved: 2025-07-17

## 2025-07-17 PROBLEM — R52 UNCONTROLLED PAIN: Status: RESOLVED | Noted: 2025-02-23 | Resolved: 2025-07-17

## 2025-07-17 PROBLEM — L97.912 NON-HEALING ULCER OF LOWER LEG, RIGHT, WITH FAT LAYER EXPOSED (HCC): Status: RESOLVED | Noted: 2025-03-15 | Resolved: 2025-07-17

## 2025-07-17 PROBLEM — T14.90XA TRAUMATIC INJURY: Status: RESOLVED | Noted: 2025-04-15 | Resolved: 2025-07-17

## 2025-07-17 PROBLEM — L97.922 NON-PRESSURE CHRONIC ULCER OF LEFT LOWER LEG WITH FAT LAYER EXPOSED (HCC): Status: RESOLVED | Noted: 2025-07-03 | Resolved: 2025-07-17

## 2025-07-17 PROCEDURE — 11043 DBRDMT MUSC&/FSCA 1ST 20/<: CPT

## 2025-07-17 PROCEDURE — 3046F HEMOGLOBIN A1C LEVEL >9.0%: CPT | Performed by: INTERNAL MEDICINE

## 2025-07-17 PROCEDURE — 3079F DIAST BP 80-89 MM HG: CPT | Performed by: INTERNAL MEDICINE

## 2025-07-17 PROCEDURE — 11046 DBRDMT MUSC&/FSCA EA ADDL: CPT

## 2025-07-17 PROCEDURE — 97606 NEG PRS WND THER DME>50 SQCM: CPT

## 2025-07-17 PROCEDURE — 99213 OFFICE O/P EST LOW 20 MIN: CPT | Performed by: INTERNAL MEDICINE

## 2025-07-17 PROCEDURE — 11042 DBRDMT SUBQ TIS 1ST 20SQCM/<: CPT

## 2025-07-17 PROCEDURE — 11042 DBRDMT SUBQ TIS 1ST 20SQCM/<: CPT | Performed by: NURSE PRACTITIONER

## 2025-07-17 PROCEDURE — 11046 DBRDMT MUSC&/FSCA EA ADDL: CPT | Performed by: NURSE PRACTITIONER

## 2025-07-17 PROCEDURE — 3077F SYST BP >= 140 MM HG: CPT | Performed by: INTERNAL MEDICINE

## 2025-07-17 PROCEDURE — 11043 DBRDMT MUSC&/FSCA 1ST 20/<: CPT | Performed by: NURSE PRACTITIONER

## 2025-07-17 RX ORDER — GENTAMICIN SULFATE 1 MG/G
OINTMENT TOPICAL PRN
OUTPATIENT
Start: 2025-07-17

## 2025-07-17 RX ORDER — TRIAMCINOLONE ACETONIDE 1 MG/G
OINTMENT TOPICAL PRN
OUTPATIENT
Start: 2025-07-17

## 2025-07-17 RX ORDER — GINSENG 100 MG
CAPSULE ORAL PRN
OUTPATIENT
Start: 2025-07-17

## 2025-07-17 RX ORDER — NEOMYCIN/BACITRACIN/POLYMYXINB 3.5-400-5K
OINTMENT (GRAM) TOPICAL PRN
OUTPATIENT
Start: 2025-07-17

## 2025-07-17 RX ORDER — LIDOCAINE 40 MG/G
CREAM TOPICAL PRN
OUTPATIENT
Start: 2025-07-17

## 2025-07-17 RX ORDER — LIDOCAINE HYDROCHLORIDE 20 MG/ML
JELLY TOPICAL PRN
OUTPATIENT
Start: 2025-07-17

## 2025-07-17 RX ORDER — LIDOCAINE HYDROCHLORIDE 40 MG/ML
SOLUTION TOPICAL PRN
OUTPATIENT
Start: 2025-07-17

## 2025-07-17 RX ORDER — BACITRACIN ZINC AND POLYMYXIN B SULFATE 500; 1000 [USP'U]/G; [USP'U]/G
OINTMENT TOPICAL PRN
OUTPATIENT
Start: 2025-07-17

## 2025-07-17 RX ORDER — SODIUM CHLOR/HYPOCHLOROUS ACID 0.033 %
SOLUTION, IRRIGATION IRRIGATION PRN
OUTPATIENT
Start: 2025-07-17

## 2025-07-17 RX ORDER — LIDOCAINE 50 MG/G
OINTMENT TOPICAL PRN
OUTPATIENT
Start: 2025-07-17

## 2025-07-17 RX ORDER — MUPIROCIN 2 %
OINTMENT (GRAM) TOPICAL PRN
OUTPATIENT
Start: 2025-07-17

## 2025-07-17 RX ORDER — BETAMETHASONE DIPROPIONATE 0.5 MG/G
CREAM TOPICAL PRN
OUTPATIENT
Start: 2025-07-17

## 2025-07-17 RX ORDER — SILVER SULFADIAZINE 10 MG/G
CREAM TOPICAL PRN
OUTPATIENT
Start: 2025-07-17

## 2025-07-17 RX ORDER — CLOBETASOL PROPIONATE 0.5 MG/G
OINTMENT TOPICAL PRN
OUTPATIENT
Start: 2025-07-17

## 2025-07-17 ASSESSMENT — PAIN DESCRIPTION - ORIENTATION: ORIENTATION: LEFT

## 2025-07-17 ASSESSMENT — PAIN SCALES - GENERAL: PAINLEVEL_OUTOF10: 1

## 2025-07-17 ASSESSMENT — PAIN DESCRIPTION - LOCATION: LOCATION: FOOT

## 2025-07-17 ASSESSMENT — PAIN DESCRIPTION - DESCRIPTORS: DESCRIPTORS: ACHING

## 2025-07-17 NOTE — PROGRESS NOTES
Multilayer Compression Wrap   (Not Unna) Below the Knee    NAME:  Bandar De La Torre  YOB: 1960  MEDICAL RECORD NUMBER:  [id number]  DATE:  7/17/2025    Multilayer compression wrap: Removed old Multilayer wrap if indicated and wash leg with mild soap/water.  Applied moisturizing agent to dry skin as needed.   Applied primary and secondary dressing as ordered.  Applied multilayered dressing below the knee to right lower leg.  Instructed patient/caregiver not to remove dressing and to keep it clean and dry.   Instructed patient/caregiver on complications to report to provider, such as pain, numbness in toes, heavy drainage, and slippage of dressing.  Instructed patient on purpose of compression dressing and on activity and exercise recommendations.  Negative Pressure Wound Therapy    NAME:  Bandar De La Torre  YOB: 1960  MEDICAL RECORD NUMBER:  [id number]  DATE:  7/17/2025    Applied Negative Pressure to RLE wound(s)/ulcer(s).  [x] Applied skin barrier prep to kris-wound.   [x] Cut strips of plastic drape to picture frame wound so that kris-wound is     covered with the drape.   [x] If bridging dressing to less prominent site, cover any intact skin that will come in contact with the Negative Pressure Therapy sponge, gauze or channel drain with plastic drape. The sponge should never touch intact skin.   [x] Cut sponge, gauze or channel drain to size which will fit into the wound/ulcer bed without being forced.   [x] Be sure the sponge is large enough to hold the entire round plastic flange which is attached to the tubing. Never allow flange to be larger than the sponge or it will produce suction damaging intact skin.  Total number of individual pieces of foam used within the wound bed: 2    [x] If bridging the dressing away from the primary site, be sure the bridge leads to a piece of sponge large enough to hold the entire flange without allowing any of the flange to overlap onto intact

## 2025-07-17 NOTE — PROGRESS NOTES
Bandar De La Torre (:  1960) is a 64 y.o. male, Established patient, here for evaluation of the following chief complaint(s):  Follow-up (2 wk f/u Left heel stage 4 pressure ulcer; left calcaneus osteomyelitis)      DISCUSSION:  Assessment & Plan  1. Left heel diabetic osteomyelitis.  The patient presented for a follow-up visit regarding his left heel diabetic osteomyelitis. He reported some improvement in his wound, with less bleeding and reduced maceration. The wound care clinic prescribed doxycycline that does not affect his kidneys, which he has been taking since last Thursday. His CRP levels have decreased but remain elevated, indicating ongoing inflammation. Culture results revealed the presence of Morganella and Proteus bacteria. His white blood cell count and liver enzymes are also elevated. A daily intravenous antibiotic regimen is recommended for a minimum of 4 weeks, with the duration to be determined by the wound's progress. Given the challenges associated with home administration of antibiotics, it is suggested that he visit the infusion center daily for treatment. This will alleviate the need for home administration. The use of Cipro was considered but not recommended due to potential cardiac risks. Bactrim was also considered but could potentially elevate his serum creatinine levels, which are already high. The goal is to prevent further harm while attempting to save his foot. He is advised to continue with doxycycline if it proves effective. Laboratory tests will be ordered for next week. If his liver enzymes increase further by next week, discontinuation of doxycycline may be necessary.    2. Diarrhea.  The patient reported that Imodium effectively managed his diarrhea, which was attributed to a stomach flu.    3. Diabetes mellitus.  The patient is advised to maintain his current regimen as it appears to be effective in controlling his blood glucose levels.    1. Receiving intravenous

## 2025-07-17 NOTE — PATIENT INSTRUCTIONS
WOUND BED TO PREVENT BLACK FOAM FROM ADHERING TO WOUND BED. SECURE VAC. DRESSING WITH DRAPE- use vac drape to bolster start with medial side, then lateral side, then vertically.  Pad heel (stirrup) with ABDs. Wrap with Coban 2 Lites   SET WOUND VAC  CONTINUOUS SUCTION. CANISTER CHANGE WITH EACH DRESSING CHANGE OR ACCORDING TO VOLUME OF DRAINAGE.  WOUND VAC DRESSING TO BE CHANGED Tuesday, Thursday, Saturday     Right Great Toe - Wash with mild soap and water  Apply Puracol ag to wound beds  Cover all wounds with Ca Alginate  Cover with Gentac   Change Monday (at home) and Thursday ([facility])     Left Proximal Anterior Lower Leg - Wash with mild soap and water  Apply Calcium Alginate  Cover with Gentac   Change On Thursday    Follow up with Dr Avalos  In 1 weeks in the wound care center  Call [phone] for any questions or concerns.

## 2025-07-17 NOTE — PROGRESS NOTES
Wound Care Center Progress Note With Procedure    Bandar De La Torre  AGE: 64 y.o.   GENDER: male  : 1960  EPISODE DATE:  2025     Subjective:     Chief Complaint   Patient presents with    Wound Check     Bilateral feet         HISTORY of PRESENT ILLNESS     Bandar De La Torre is a 64 y.o. 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 The heel ulcers is of marked severity.  Patient  underwent heel debridement and VAC placement on 25. MRI in the hospital showed concern for osteomyelitis of the calcaneous. He has been treated with IV antibiotics and is residing at [facility] until his home can be made wheelchair accessible. Advanced wound care modalities established with initiation of care at the wound clinic.The patient has significant underlying medical conditions as below. MEETA Diehl MD     Wound Pain Timing/Severity: intermittent, moderate  Quality of pain: aching, tender, pressure  Severity of pain:  4 / 10   Modifying Factors: edema, venous stasis, diabetes, poor glucose control, chronic pressure, decreased mobility, shear force, obesity, and anticoagulation therapy  Associated Signs/Symptoms: edema, drainage, and pain    Living Situation  [x] Home [] SNF []  Assisted living  [] Other (ie rehab, etc.) [x] Home Health  []  Transportation    Wound status:  2025       Patient presents for follow up of right great toe and left heel wound. Patient without concerns today. Bedside debridement completed, patient tolerated well. Wound vac in place to the left heel. Using compression wrap over the wound vac and add padding, also positively impact his edema. Regimen discussed and established with patient/family as below. The patients records were reviewed and discussed.  Time was given for questions. All questions were answered to the patients

## 2025-07-22 ENCOUNTER — HOSPITAL ENCOUNTER (OUTPATIENT)
Age: 65
Setting detail: SPECIMEN
Discharge: HOME | End: 2025-07-22
Payer: MEDICARE

## 2025-07-22 LAB
BASOPHILS # BLD: 0.08 K/UL
BASOPHILS NFR BLD: 1 % (ref 0–1)
EOSINOPHIL # BLD: 0.01 K/UL
EOSINOPHILS RELATIVE PERCENT: 0 % (ref 0–3)
ERYTHROCYTE [DISTWIDTH] IN BLOOD BY AUTOMATED COUNT: 18.1 % (ref 11.7–14.9)
ERYTHROCYTE [SEDIMENTATION RATE] IN BLOOD BY WESTERGREN METHOD: 88 MM/HR (ref 0–20)
HCT VFR BLD AUTO: 36.3 % (ref 42–52)
HGB BLD-MCNC: 11.5 G/DL (ref 13.5–18)
IMM GRANULOCYTES # BLD AUTO: 0.18 K/UL
IMM GRANULOCYTES NFR BLD: 1 %
LYMPHOCYTES NFR BLD: 1.13 K/UL
LYMPHOCYTES RELATIVE PERCENT: 8 % (ref 24–44)
MCH RBC QN AUTO: 26.7 PG (ref 27–31)
MCHC RBC AUTO-ENTMCNC: 31.7 G/DL (ref 32–36)
MCV RBC AUTO: 84.4 FL (ref 78–100)
MONOCYTES NFR BLD: 1 K/UL
MONOCYTES NFR BLD: 8 % (ref 0–5)
NEUTROPHILS NFR BLD: 82 % (ref 36–66)
NEUTS SEG NFR BLD: 11.01 K/UL
PLATELET # BLD AUTO: 530 K/UL (ref 140–440)
PMV BLD AUTO: 10.2 FL (ref 7.5–11.1)
RBC # BLD AUTO: 4.3 M/UL (ref 4.6–6.2)
WBC OTHER # BLD: 13.4 K/UL (ref 4–10.5)

## 2025-07-22 PROCEDURE — 82248 BILIRUBIN DIRECT: CPT

## 2025-07-22 PROCEDURE — 86140 C-REACTIVE PROTEIN: CPT

## 2025-07-22 PROCEDURE — 80053 COMPREHEN METABOLIC PANEL: CPT

## 2025-07-22 PROCEDURE — 85652 RBC SED RATE AUTOMATED: CPT

## 2025-07-22 PROCEDURE — 85025 COMPLETE CBC W/AUTO DIFF WBC: CPT

## 2025-07-24 ENCOUNTER — HOSPITAL ENCOUNTER (OUTPATIENT)
Dept: WOUND CARE | Age: 65
Discharge: HOME | End: 2025-07-24
Attending: NURSE PRACTITIONER
Payer: MEDICARE

## 2025-07-24 VITALS
TEMPERATURE: 98 F | SYSTOLIC BLOOD PRESSURE: 126 MMHG | RESPIRATION RATE: 19 BRPM | HEART RATE: 61 BPM | DIASTOLIC BLOOD PRESSURE: 66 MMHG

## 2025-07-24 DIAGNOSIS — L97.512 DIABETIC ULCER OF TOE OF RIGHT FOOT ASSOCIATED WITH TYPE 2 DIABETES MELLITUS, WITH FAT LAYER EXPOSED (HCC): ICD-10-CM

## 2025-07-24 DIAGNOSIS — Z79.4 TYPE 2 DIABETES MELLITUS WITH FOOT ULCER, WITH LONG-TERM CURRENT USE OF INSULIN (HCC): ICD-10-CM

## 2025-07-24 DIAGNOSIS — S81.802A TRAUMATIC OPEN WOUND OF LEFT LOWER LEG, INITIAL ENCOUNTER: ICD-10-CM

## 2025-07-24 DIAGNOSIS — E66.01 MORBID OBESITY (HCC): ICD-10-CM

## 2025-07-24 DIAGNOSIS — E11.621 DIABETIC ULCER OF TOE OF RIGHT FOOT ASSOCIATED WITH TYPE 2 DIABETES MELLITUS, WITH FAT LAYER EXPOSED (HCC): ICD-10-CM

## 2025-07-24 DIAGNOSIS — T81.89XD SURGICAL WOUND, NON HEALING, SUBSEQUENT ENCOUNTER: ICD-10-CM

## 2025-07-24 DIAGNOSIS — E11.621 TYPE 2 DIABETES MELLITUS WITH FOOT ULCER, WITH LONG-TERM CURRENT USE OF INSULIN (HCC): ICD-10-CM

## 2025-07-24 DIAGNOSIS — I89.0 LYMPHEDEMA OF BOTH LOWER EXTREMITIES: ICD-10-CM

## 2025-07-24 DIAGNOSIS — E11.65 POORLY CONTROLLED TYPE 2 DIABETES MELLITUS WITH PERIPHERAL NEUROPATHY (HCC): ICD-10-CM

## 2025-07-24 DIAGNOSIS — I87.2 VENOUS STASIS DERMATITIS OF BOTH LOWER EXTREMITIES: ICD-10-CM

## 2025-07-24 DIAGNOSIS — L97.424 NON-PRESSURE CHRONIC ULCER OF LEFT HEEL AND MIDFOOT WITH NECROSIS OF BONE (HCC): Primary | ICD-10-CM

## 2025-07-24 DIAGNOSIS — S81.801A TRAUMATIC OPEN WOUND OF RIGHT LOWER LEG, INITIAL ENCOUNTER: ICD-10-CM

## 2025-07-24 DIAGNOSIS — E11.42 POORLY CONTROLLED TYPE 2 DIABETES MELLITUS WITH PERIPHERAL NEUROPATHY (HCC): ICD-10-CM

## 2025-07-24 DIAGNOSIS — L97.509 TYPE 2 DIABETES MELLITUS WITH FOOT ULCER, WITH LONG-TERM CURRENT USE OF INSULIN (HCC): ICD-10-CM

## 2025-07-24 DIAGNOSIS — Z89.429 STATUS POST AMPUTATION OF TOE: ICD-10-CM

## 2025-07-24 PROCEDURE — 11042 DBRDMT SUBQ TIS 1ST 20SQCM/<: CPT | Performed by: NURSE PRACTITIONER

## 2025-07-24 PROCEDURE — 11043 DBRDMT MUSC&/FSCA 1ST 20/<: CPT | Performed by: NURSE PRACTITIONER

## 2025-07-24 PROCEDURE — 99213 OFFICE O/P EST LOW 20 MIN: CPT | Performed by: NURSE PRACTITIONER

## 2025-07-24 PROCEDURE — 11043 DBRDMT MUSC&/FSCA 1ST 20/<: CPT

## 2025-07-24 PROCEDURE — 11046 DBRDMT MUSC&/FSCA EA ADDL: CPT

## 2025-07-24 PROCEDURE — 11046 DBRDMT MUSC&/FSCA EA ADDL: CPT | Performed by: NURSE PRACTITIONER

## 2025-07-24 PROCEDURE — 99214 OFFICE O/P EST MOD 30 MIN: CPT

## 2025-07-24 PROCEDURE — 11042 DBRDMT SUBQ TIS 1ST 20SQCM/<: CPT

## 2025-07-24 PROCEDURE — 97605 NEG PRS WND THER DME<=50SQCM: CPT

## 2025-07-24 RX ORDER — LIDOCAINE HYDROCHLORIDE 20 MG/ML
JELLY TOPICAL PRN
OUTPATIENT
Start: 2025-07-24

## 2025-07-24 RX ORDER — TRIAMCINOLONE ACETONIDE 1 MG/G
OINTMENT TOPICAL PRN
OUTPATIENT
Start: 2025-07-24

## 2025-07-24 RX ORDER — GENTAMICIN SULFATE 1 MG/G
OINTMENT TOPICAL PRN
OUTPATIENT
Start: 2025-07-24

## 2025-07-24 RX ORDER — LIDOCAINE 50 MG/G
OINTMENT TOPICAL PRN
OUTPATIENT
Start: 2025-07-24

## 2025-07-24 RX ORDER — GINSENG 100 MG
CAPSULE ORAL PRN
OUTPATIENT
Start: 2025-07-24

## 2025-07-24 RX ORDER — CLOBETASOL PROPIONATE 0.5 MG/G
OINTMENT TOPICAL PRN
OUTPATIENT
Start: 2025-07-24

## 2025-07-24 RX ORDER — LIDOCAINE HYDROCHLORIDE 40 MG/ML
SOLUTION TOPICAL PRN
OUTPATIENT
Start: 2025-07-24

## 2025-07-24 RX ORDER — BACITRACIN ZINC AND POLYMYXIN B SULFATE 500; 1000 [USP'U]/G; [USP'U]/G
OINTMENT TOPICAL PRN
OUTPATIENT
Start: 2025-07-24

## 2025-07-24 RX ORDER — MUPIROCIN 2 %
OINTMENT (GRAM) TOPICAL PRN
OUTPATIENT
Start: 2025-07-24

## 2025-07-24 RX ORDER — LIDOCAINE 40 MG/G
CREAM TOPICAL PRN
OUTPATIENT
Start: 2025-07-24

## 2025-07-24 RX ORDER — SILVER SULFADIAZINE 10 MG/G
CREAM TOPICAL PRN
OUTPATIENT
Start: 2025-07-24

## 2025-07-24 RX ORDER — SODIUM CHLOR/HYPOCHLOROUS ACID 0.033 %
SOLUTION, IRRIGATION IRRIGATION PRN
OUTPATIENT
Start: 2025-07-24

## 2025-07-24 RX ORDER — NEOMYCIN/BACITRACIN/POLYMYXINB 3.5-400-5K
OINTMENT (GRAM) TOPICAL PRN
OUTPATIENT
Start: 2025-07-24

## 2025-07-24 RX ORDER — BETAMETHASONE DIPROPIONATE 0.5 MG/G
CREAM TOPICAL PRN
OUTPATIENT
Start: 2025-07-24

## 2025-07-24 NOTE — PROGRESS NOTES
Post  Debridement Measurements:Procedure Note    Indications:  Based on my examination of this patient's wound(s) today, sharp excision into muscle/fascia is required to promote healing and evaluate the extent of previous healing.    Performed by: HAYLEY Palm CNP    Consent obtained: Yes    Time out taken:  Yes    Pain Control: 2% Lidocaine topical spray    Debridement:Excisional Debridement    Using curette the wound(s) was/were sharply debrided down through and including the removal of muscle/fascia.        Devitalized Tissue Debrided:  fibrin, biofilm, slough, and exudate    Pre Debridement Measurements:  Are located in the Wound Documentation Flow Sheet    All active wounds listed below with today's date are evaluated  Wound(s)    debrided this date include # : 5     Post  Debridement Measurements:  Negative Pressure Wound Therapy Heel Left (Active)   Dressing Status Removed (comment # of pieces) 07/24/25 1529   Canister changed? Yes 07/24/25 1529   Unit Type KCI 07/17/25 1522   Mode Continuous 07/17/25 1522   Target Pressure (mmHg) 125 07/17/25 1522   Intensity Low 07/17/25 1522   Number of pieces placed 2 07/17/25 1522   Dressing Change Due 07/19/25 07/17/25 1522   Number of days: 90       Negative Pressure Wound Therapy Heel Left;Plantar (Active)   Number of days: 27       Wound 03/20/25 #5 Left Heel (Active)   Wound Image   07/24/25 1534   Wound Etiology Diabetic 07/17/25 1458   Dressing Status Clean;Dry;Intact;New dressing applied 07/17/25 1522   Wound Cleansed Soap and water;Wound cleanser 07/24/25 1529   Dressing/Treatment Hydrocolloid;Collagen;Foam;Negative pressure wound therapy 07/17/25 1522   Offloading for Diabetic Foot Ulcers Other (comment) 07/17/25 1458   Wound Length (cm) 6.2 cm 07/24/25 1529   Wound Width (cm) 6 cm 07/24/25 1529   Wound Depth (cm) 0.7 cm 07/24/25 1529   Wound Surface Area (cm^2) 37.2 cm^2 07/24/25 1529   Change in Wound Size % (l*w) -3281.82 07/24/25 1529   Wound

## 2025-07-24 NOTE — PATIENT INSTRUCTIONS
PHYSICIAN ORDERS AND DISCHARGE INSTRUCTIONS  NOTE: Upon discharge from the Wound Center, you will receive a patient experience survey via E-mail. We would be grateful if you would take the time to fill this survey out.  Wound care order history:   SHELBY's   Right       Left    Date    Vascular studies/Intervention: .     Cultures: bone sent on 06/19/2025              Antibiotics: ordered on 4/03/25, ordered on 04/24/25, Order metronidazole on 06/19/2025              HbA1c:  .               Grafts:  .   Juxta Lites & Lymph Pumps:  Continuing wound care orders and information:              Residence: .              Continue home health care with[facility]   Your wound-care supplies will be provided by: .              Pharmacy: [facility] on [facility]   Wound Vac: post-op- discussed with pt 04/24/25, discussed surgical debridement-pt sent to hospital on 04/24/25    Wound cleansing:     Do not scrub or use excessive force.    Wash hands with soap and water before and after dressing changes.    Prior to applying a clean dressing, cleanse wound with normal saline,    wound cleanser, or mild soap and water.     Ask your physician or nurse before getting the wound(s) wet in the shower.  Daily Wound management:    Keep weight off wounds and reposition every 2 hours.    Avoid standing for long periods of time.    Evaluate legs to the level of the heart or above for 30 minutes 4-5 times a day and/or when sitting.       When taking antibiotics take entire prescription as ordered by MD do not stop taking until medicine is all gone.     Documentation:  Compression: .   Offloading: .   Toenail Trim:         Orders for this week (7/24/2025):  Left Heel - Wash with mild liquid soap and water  WOUND VAC THERAPY:  DUODERM TO PERIWOUND FOR PROTECTION (IF YOU DON\"T HAVE DUODERM PLEASE LINE WOUND WITH DRAPE) WHITE FOAM OVER EXPOSED BONE  Apply Stimulen powder (entire pack)  to wound bed. APPLY BLACK FOAM TO WOUND bed MAY USE MEPITEL/ Sorbact TO

## 2025-07-24 NOTE — WOUND CARE
Multilayer Compression Wrap   (Not Unna) Below the Knee    NAME:  Bandar De La Torre  YOB: 1960  MEDICAL RECORD NUMBER:  [id number]  DATE:  7/24/2025    Multilayer compression wrap: Removed old Multilayer wrap if indicated and wash leg with mild soap/water.  Applied moisturizing agent to dry skin as needed.   Applied primary and secondary dressing as ordered.  Applied multilayered dressing below the knee to right lower leg.  Applied multilayered dressing below the knee to left lower leg.  Instructed patient/caregiver not to remove dressing and to keep it clean and dry.   Instructed patient/caregiver on complications to report to provider, such as pain, numbness in toes, heavy drainage, and slippage of dressing.  Instructed patient on purpose of compression dressing and on activity and exercise recommendations.      Electronically signed by Natacha Bello LPN on 7/24/2025 at 4:26 PM

## 2025-07-26 ENCOUNTER — HOSPITAL ENCOUNTER (OUTPATIENT)
Age: 65
Setting detail: SPECIMEN
Discharge: HOME | End: 2025-07-26
Payer: MEDICARE

## 2025-07-26 PROCEDURE — 84100 ASSAY OF PHOSPHORUS: CPT

## 2025-07-26 PROCEDURE — 82040 ASSAY OF SERUM ALBUMIN: CPT

## 2025-07-26 PROCEDURE — 82248 BILIRUBIN DIRECT: CPT

## 2025-07-26 PROCEDURE — 85025 COMPLETE CBC W/AUTO DIFF WBC: CPT

## 2025-07-26 PROCEDURE — 86140 C-REACTIVE PROTEIN: CPT

## 2025-07-26 PROCEDURE — 83735 ASSAY OF MAGNESIUM: CPT

## 2025-07-26 PROCEDURE — 80053 COMPREHEN METABOLIC PANEL: CPT

## 2025-07-28 LAB
ALBUMIN SERPL-MCNC: 3 G/DL (ref 3.4–5)
ALBUMIN SERPL-MCNC: 3 G/DL (ref 3.4–5)
ALBUMIN/GLOB SERPL: 1.1 {RATIO} (ref 1.1–2.2)
ALP SERPL-CCNC: 202 U/L (ref 40–129)
ALT SERPL-CCNC: 54 U/L (ref 10–40)
ANION GAP SERPL CALCULATED.3IONS-SCNC: 16 MMOL/L (ref 9–17)
AST SERPL-CCNC: 51 U/L (ref 15–37)
BASOPHILS # BLD: 0.07 K/UL (ref 0–0.2)
BASOPHILS NFR BLD: 1 % (ref 0–1)
BILIRUB DIRECT SERPL-MCNC: 0.2 MG/DL (ref 0–0.3)
BILIRUB INDIRECT SERPL-MCNC: ABNORMAL MG/DL (ref 0–0.7)
BILIRUB SERPL-MCNC: 0.3 MG/DL (ref 0–1)
BUN SERPL-MCNC: 38 MG/DL (ref 7–20)
CALCIUM SERPL-MCNC: 8.5 MG/DL (ref 8.3–10.6)
CHLORIDE SERPL-SCNC: 97 MMOL/L (ref 99–110)
CO2 SERPL-SCNC: 22 MMOL/L (ref 21–32)
CREAT SERPL-MCNC: 1.1 MG/DL (ref 0.8–1.3)
CRP SERPL HS-MCNC: 39.9 MG/L (ref 0–5)
EOSINOPHIL # BLD: 0.01 K/UL (ref 0–0.4)
EOSINOPHILS RELATIVE PERCENT: 0 % (ref 0–3)
ERYTHROCYTE [DISTWIDTH] IN BLOOD BY AUTOMATED COUNT: 18.5 % (ref 11.7–14.9)
GFR, ESTIMATED: 75 ML/MIN/1.73M2
GLUCOSE SERPL-MCNC: 103 MG/DL (ref 74–99)
HCT VFR BLD AUTO: 42 % (ref 42–52)
HGB BLD-MCNC: 12.1 G/DL (ref 13.5–18)
IMM GRANULOCYTES # BLD AUTO: 0.05 K/UL
IMM GRANULOCYTES NFR BLD: 1 %
LYMPHOCYTES NFR BLD: 0.61 K/UL (ref 1–4.8)
LYMPHOCYTES RELATIVE PERCENT: 6 % (ref 24–44)
MAGNESIUM SERPL-MCNC: 1.9 MG/DL (ref 1.8–2.4)
MCH RBC QN AUTO: 27.3 PG (ref 27–31)
MCHC RBC AUTO-ENTMCNC: 28.8 G/DL (ref 32–36)
MCV RBC AUTO: 94.8 FL (ref 78–100)
MONOCYTES NFR BLD: 1.01 K/UL (ref 0.1–0.8)
MONOCYTES NFR BLD: 10 % (ref 0–5)
NEUTROPHILS NFR BLD: 83 % (ref 36–66)
NEUTS SEG NFR BLD: 8.62 K/UL (ref 1.8–7.7)
NRBC BLD-RTO: 0 PER 100 WBC
NUCLEATED RED BLOOD CELLS: 0 PER 100 WBC
PHOSPHATE SERPL-MCNC: 3.4 MG/DL (ref 2.5–4.9)
PLATELET # BLD AUTO: 481 K/UL (ref 140–440)
PMV BLD AUTO: 9.6 FL (ref 7.5–11.1)
POTASSIUM SERPL-SCNC: 4.9 MMOL/L (ref 3.5–5.1)
PROT SERPL-MCNC: 5.8 G/DL (ref 6.4–8.2)
RBC # BLD AUTO: 4.43 M/UL (ref 4.6–6.2)
SODIUM SERPL-SCNC: 135 MMOL/L (ref 136–145)
WBC OTHER # BLD: 10.4 K/UL (ref 4–10.5)

## 2025-07-29 ENCOUNTER — OFFICE VISIT (OUTPATIENT)
Dept: INFECTIOUS DISEASES | Age: 65
End: 2025-07-29
Payer: MEDICARE

## 2025-07-29 ENCOUNTER — TELEPHONE (OUTPATIENT)
Dept: INFECTIOUS DISEASES | Age: 65
End: 2025-07-29

## 2025-07-29 VITALS — DIASTOLIC BLOOD PRESSURE: 72 MMHG | SYSTOLIC BLOOD PRESSURE: 124 MMHG | HEART RATE: 64 BPM

## 2025-07-29 DIAGNOSIS — L97.424 NON-PRESSURE CHRONIC ULCER OF LEFT HEEL AND MIDFOOT WITH NECROSIS OF BONE (HCC): Primary | ICD-10-CM

## 2025-07-29 PROCEDURE — 3078F DIAST BP <80 MM HG: CPT | Performed by: INTERNAL MEDICINE

## 2025-07-29 PROCEDURE — 99213 OFFICE O/P EST LOW 20 MIN: CPT | Performed by: INTERNAL MEDICINE

## 2025-07-29 PROCEDURE — 3074F SYST BP LT 130 MM HG: CPT | Performed by: INTERNAL MEDICINE

## 2025-07-29 RX ORDER — DOXYCYCLINE HYCLATE 100 MG
100 TABLET ORAL 2 TIMES DAILY
Qty: 56 TABLET | Refills: 0 | Status: SHIPPED | OUTPATIENT
Start: 2025-07-29 | End: 2025-08-26

## 2025-07-29 NOTE — PROGRESS NOTES
Bandar De La Torre (:  1960) is a 64 y.o. male, Established patient, here for evaluation of the following chief complaint(s):  Follow-up (1 wk f/u Left heel stage 4 pressure ulcer; left calcaneus osteomyelitis)      DISCUSSION:  Assessment & Plan    1. Left diabetic foot infection: Improving. Serum creatinine decreased from 1.5 to 1.1 on 2025, CRP  has fallen, CBC reduced from 13.4 to 10.4. Platelet count remains elevated.Seems to have improved with doxycycline.   - 2025wound cx: Proteus vulgaris, Morganella morganii, Enterobacter cloacae  - Prescribed Augmentin and doxycycline, both twice daily for 4 weeks.  - Prescriptions sent to pharmacy.  - Discussed risks and benefits of antibiotics, including potential side effects.    2. Constipation: Chronic.  - Advised to increase fiber intake to 28-35 g daily using ground flax seeds, silvia seeds, or Benefiber.  - Discussed importance of fiber for gut health and cholesterol reduction.  - Mentioned alternatives like psyllium.    3. Diabetes mellitus: Stable.  - Blood sugar levels well-controlled, recent reading of 125 mg/dL in the morning.  - Encouraged to continue current regimen and dietary habits, including cinnamon and apple cider vinegar.  - Discussed potential benefits in improving insulin sensitivity.    Follow-up  - Scheduled for next week.    1. 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      Laboratory Studies  Blood glucose levels are high. CRP is elevated, currently at 40, down from 114.      Laboratory Studies  Blood sugar was high.      Labs   - CRP: High   - White Cell Count: Up   - Liver Enzymes: 263      - Labs:    - Serum Creatinine: 2025, 1.1    - CRP: 2025, 39.9    - Complete Blood Count: 2025, 10.4    Return in about 1 week (around 2025).      Subjective

## 2025-07-29 NOTE — TELEPHONE ENCOUNTER
Desi, from [facility] went to pt's house to draw labs and pt stated that Dr. Moya told him at his appt on 7-29-25 that labs did not have to be drawn today.    Please advise.

## 2025-07-31 ENCOUNTER — HOSPITAL ENCOUNTER (OUTPATIENT)
Dept: WOUND CARE | Age: 65
Discharge: HOME | End: 2025-07-31
Attending: NURSE PRACTITIONER
Payer: MEDICARE

## 2025-07-31 VITALS
TEMPERATURE: 97.2 F | DIASTOLIC BLOOD PRESSURE: 70 MMHG | SYSTOLIC BLOOD PRESSURE: 167 MMHG | RESPIRATION RATE: 18 BRPM | HEART RATE: 60 BPM

## 2025-07-31 DIAGNOSIS — E66.01 MORBID OBESITY (HCC): ICD-10-CM

## 2025-07-31 DIAGNOSIS — L97.512 DIABETIC ULCER OF TOE OF RIGHT FOOT ASSOCIATED WITH TYPE 2 DIABETES MELLITUS, WITH FAT LAYER EXPOSED (HCC): ICD-10-CM

## 2025-07-31 DIAGNOSIS — Z79.4 TYPE 2 DIABETES MELLITUS WITH FOOT ULCER, WITH LONG-TERM CURRENT USE OF INSULIN (HCC): ICD-10-CM

## 2025-07-31 DIAGNOSIS — I89.0 LYMPHEDEMA OF BOTH LOWER EXTREMITIES: ICD-10-CM

## 2025-07-31 DIAGNOSIS — E11.621 DIABETIC ULCER OF TOE OF RIGHT FOOT ASSOCIATED WITH TYPE 2 DIABETES MELLITUS, WITH FAT LAYER EXPOSED (HCC): ICD-10-CM

## 2025-07-31 DIAGNOSIS — T81.89XD SURGICAL WOUND, NON HEALING, SUBSEQUENT ENCOUNTER: ICD-10-CM

## 2025-07-31 DIAGNOSIS — E11.42 POORLY CONTROLLED TYPE 2 DIABETES MELLITUS WITH PERIPHERAL NEUROPATHY (HCC): ICD-10-CM

## 2025-07-31 DIAGNOSIS — Z89.429 STATUS POST AMPUTATION OF TOE: ICD-10-CM

## 2025-07-31 DIAGNOSIS — I87.2 VENOUS STASIS DERMATITIS OF BOTH LOWER EXTREMITIES: ICD-10-CM

## 2025-07-31 DIAGNOSIS — E11.621 TYPE 2 DIABETES MELLITUS WITH FOOT ULCER, WITH LONG-TERM CURRENT USE OF INSULIN (HCC): ICD-10-CM

## 2025-07-31 DIAGNOSIS — L97.424 NON-PRESSURE CHRONIC ULCER OF LEFT HEEL AND MIDFOOT WITH NECROSIS OF BONE (HCC): Primary | ICD-10-CM

## 2025-07-31 DIAGNOSIS — S81.801D TRAUMATIC OPEN WOUND OF RIGHT LOWER LEG, SUBSEQUENT ENCOUNTER: ICD-10-CM

## 2025-07-31 DIAGNOSIS — E11.65 POORLY CONTROLLED TYPE 2 DIABETES MELLITUS WITH PERIPHERAL NEUROPATHY (HCC): ICD-10-CM

## 2025-07-31 DIAGNOSIS — S81.802D TRAUMATIC OPEN WOUND OF LEFT LOWER LEG, SUBSEQUENT ENCOUNTER: ICD-10-CM

## 2025-07-31 DIAGNOSIS — L97.509 TYPE 2 DIABETES MELLITUS WITH FOOT ULCER, WITH LONG-TERM CURRENT USE OF INSULIN (HCC): ICD-10-CM

## 2025-07-31 PROCEDURE — 11043 DBRDMT MUSC&/FSCA 1ST 20/<: CPT | Performed by: NURSE PRACTITIONER

## 2025-07-31 PROCEDURE — 11043 DBRDMT MUSC&/FSCA 1ST 20/<: CPT

## 2025-07-31 PROCEDURE — 11045 DBRDMT SUBQ TISS EACH ADDL: CPT | Performed by: NURSE PRACTITIONER

## 2025-07-31 PROCEDURE — 11042 DBRDMT SUBQ TIS 1ST 20SQCM/<: CPT

## 2025-07-31 PROCEDURE — 11042 DBRDMT SUBQ TIS 1ST 20SQCM/<: CPT | Performed by: NURSE PRACTITIONER

## 2025-07-31 PROCEDURE — 11046 DBRDMT MUSC&/FSCA EA ADDL: CPT

## 2025-07-31 PROCEDURE — 11046 DBRDMT MUSC&/FSCA EA ADDL: CPT | Performed by: NURSE PRACTITIONER

## 2025-07-31 RX ORDER — LIDOCAINE HYDROCHLORIDE 40 MG/ML
SOLUTION TOPICAL PRN
OUTPATIENT
Start: 2025-07-31

## 2025-07-31 RX ORDER — SILVER SULFADIAZINE 10 MG/G
CREAM TOPICAL PRN
OUTPATIENT
Start: 2025-07-31

## 2025-07-31 RX ORDER — LIDOCAINE 50 MG/G
OINTMENT TOPICAL PRN
OUTPATIENT
Start: 2025-07-31

## 2025-07-31 RX ORDER — SODIUM CHLOR/HYPOCHLOROUS ACID 0.033 %
SOLUTION, IRRIGATION IRRIGATION PRN
OUTPATIENT
Start: 2025-07-31

## 2025-07-31 RX ORDER — LIDOCAINE HYDROCHLORIDE 20 MG/ML
JELLY TOPICAL PRN
OUTPATIENT
Start: 2025-07-31

## 2025-07-31 RX ORDER — GENTAMICIN SULFATE 1 MG/G
OINTMENT TOPICAL PRN
OUTPATIENT
Start: 2025-07-31

## 2025-07-31 RX ORDER — MUPIROCIN 2 %
OINTMENT (GRAM) TOPICAL PRN
OUTPATIENT
Start: 2025-07-31

## 2025-07-31 RX ORDER — TRIAMCINOLONE ACETONIDE 1 MG/G
OINTMENT TOPICAL PRN
OUTPATIENT
Start: 2025-07-31

## 2025-07-31 RX ORDER — GINSENG 100 MG
CAPSULE ORAL PRN
OUTPATIENT
Start: 2025-07-31

## 2025-07-31 RX ORDER — BETAMETHASONE DIPROPIONATE 0.5 MG/G
CREAM TOPICAL PRN
OUTPATIENT
Start: 2025-07-31

## 2025-07-31 RX ORDER — NEOMYCIN/BACITRACIN/POLYMYXINB 3.5-400-5K
OINTMENT (GRAM) TOPICAL PRN
OUTPATIENT
Start: 2025-07-31

## 2025-07-31 RX ORDER — CLOBETASOL PROPIONATE 0.5 MG/G
OINTMENT TOPICAL PRN
OUTPATIENT
Start: 2025-07-31

## 2025-07-31 RX ORDER — LIDOCAINE 40 MG/G
CREAM TOPICAL PRN
OUTPATIENT
Start: 2025-07-31

## 2025-07-31 RX ORDER — BACITRACIN ZINC AND POLYMYXIN B SULFATE 500; 1000 [USP'U]/G; [USP'U]/G
OINTMENT TOPICAL PRN
OUTPATIENT
Start: 2025-07-31

## 2025-07-31 ASSESSMENT — PAIN SCALES - GENERAL: PAINLEVEL_OUTOF10: 0

## 2025-07-31 NOTE — PATIENT INSTRUCTIONS
PHYSICIAN ORDERS AND DISCHARGE INSTRUCTIONS  NOTE: Upon discharge from the Wound Center, you will receive a patient experience survey via E-mail. We would be grateful if you would take the time to fill this survey out.  Wound care order history:   SHELBY's   Right       Left    Date    Vascular studies/Intervention: .     Cultures: bone sent on 06/19/2025              Antibiotics: ordered on 4/03/25, ordered on 04/24/25, Order metronidazole on 06/19/2025              HbA1c:  .               Grafts:  .   Juxta Lites & Lymph Pumps:  Continuing wound care orders and information:              Residence: .              Continue home health care with:.[facility]   Your wound-care supplies will be provided by: .              Pharmacy: [facility]   Wound Vac: post-op- discussed with pt 04/24/25, discussed surgical debridement-pt sent to hospital on 04/24/25    Wound cleansing:     Do not scrub or use excessive force.    Wash hands with soap and water before and after dressing changes.    Prior to applying a clean dressing, cleanse wound with normal saline,    wound cleanser, or mild soap and water.     Ask your physician or nurse before getting the wound(s) wet in the shower.  Daily Wound management:    Keep weight off wounds and reposition every 2 hours.    Avoid standing for long periods of time.    Evaluate legs to the level of the heart or above for 30 minutes 4-5 times a day and/or when sitting.       When taking antibiotics take entire prescription as ordered by MD do not stop taking until medicine is all gone.     Documentation:  Compression: .   Offloading: .   Toenail Trim:         Orders for this week (7/31/2025):  Left Heel - Wash with mild liquid soap and water  WOUND VAC THERAPY:  DUODERM TO PERIWOUND FOR PROTECTION (IF YOU DON\"T HAVE DUODERM PLEASE LINE WOUND WITH DRAPE) WHITE FOAM OVER EXPOSED BONE  Apply Stimulen powder (entire pack)  to wound bed. APPLY BLACK FOAM TO WOUND bed MAY USE MEPITEL/ Sorbact TO

## 2025-07-31 NOTE — WOUND CARE
WOUND VAC THERAPY:     DUODERM TO PERIWOUND FOR PROTECTION. APPLY BLACK FOAM TO WOUND  APPLY WHITE FOAM TO TUNNELED AREAS  MAY USE MEPITEL TO WOUND BED TO PREVENT BLACK FOAM FROM ADHERING TO WOUND BED. SECURE VAC. DRESSING WITH DRAPE.    SET WOUND VAC  CONTINUOUS SUCTION. CANISTER CHANGE WITH EACH DRESSING CHANGE OR ACCORDING TO VOLUME OF DRAINAGE.    WOUND VAC DRESSING TO BE CHANGED MON, WED, FRI

## 2025-07-31 NOTE — PLAN OF CARE
Problem: Wound:  Goal: Will show signs of wound healing; wound closure and no evidence of infection  Description: Will show signs of wound healing; wound closure and no evidence of infection  7/31/2025 1625 by Natacha Bello LPN  Outcome: Progressing  7/31/2025 1620 by Natacha Bello LPN  Outcome: Progressing

## 2025-07-31 NOTE — PROGRESS NOTES
discharge from the Wound Center, you will receive a patient experience survey via E-mail. We would be grateful if you would take the time to fill this survey out.  Wound care order history:   SHELBY's   Right       Left    Date    Vascular studies/Intervention: .     Cultures: bone sent on 06/19/2025              Antibiotics: ordered on 4/03/25, ordered on 04/24/25, Order metronidazole on 06/19/2025              HbA1c:  .               Grafts:  .   Juxta Lites & Lymph Pumps:  Continuing wound care orders and information:              Residence: .              [facility] care with[facility]   Your wound-care supplies will be provided by: .              Pharmacy: [facility] on [facility]   Wound Vac: post-op- discussed with pt 04/24/25, discussed surgical debridement-pt sent to hospital on 04/24/25    Wound cleansing:     Do not scrub or use excessive force.    Wash hands with soap and water before and after dressing changes.    Prior to applying a clean dressing, cleanse wound with normal saline,    wound cleanser, or mild soap and water.     Ask your physician or nurse before getting the wound(s) wet in the shower.  Daily Wound management:    Keep weight off wounds and reposition every 2 hours.    Avoid standing for long periods of time.    Evaluate legs to the level of the heart or above for 30 minutes 4-5 times a day and/or when sitting.       When taking antibiotics take entire prescription as ordered by MD do not stop taking until medicine is all gone.     Documentation:  Compression: .   Offloading: .   Toenail Trim:         Orders for this week (7/31/2025):  Left Heel - Wash with mild liquid soap and water  WOUND VAC THERAPY:  DUODERM TO PERIWOUND FOR PROTECTION (IF YOU DON\"T HAVE DUODERM PLEASE LINE WOUND WITH DRAPE) WHITE FOAM OVER EXPOSED BONE  Apply Stimulen powder (entire pack)  to wound bed. APPLY BLACK FOAM TO WOUND bed MAY USE MEPITEL/ Sorbact TO WOUND BED TO PREVENT BLACK FOAM FROM ADHERING TO WOUND BED.

## 2025-07-31 NOTE — WOUND CARE
Multilayer Compression Wrap   (Not Unna) Below the Knee    NAME:  Bandar De La Torre  YOB: 1960  MEDICAL RECORD NUMBER:  [id number]  DATE:  7/31/2025    Multilayer compression wrap: Removed old Multilayer wrap if indicated and wash leg with mild soap/water.  Applied moisturizing agent to dry skin as needed.   Applied primary and secondary dressing as ordered.  Applied multilayered dressing below the knee to right lower leg.  Applied multilayered dressing below the knee to left lower leg.  Instructed patient/caregiver not to remove dressing and to keep it clean and dry.   Instructed patient/caregiver on complications to report to provider, such as pain, numbness in toes, heavy drainage, and slippage of dressing.  Instructed patient on purpose of compression dressing and on activity and exercise recommendations.      Electronically signed by Natacha Bello LPN on 7/31/2025 at 4:21 PM

## 2025-08-05 ENCOUNTER — HOSPITAL ENCOUNTER (OUTPATIENT)
Age: 65
Setting detail: SPECIMEN
Discharge: HOME | End: 2025-08-05
Payer: MEDICARE

## 2025-08-05 LAB
ALBUMIN SERPL-MCNC: 3.1 G/DL (ref 3.4–5)
ALBUMIN/GLOB SERPL: 1.1 {RATIO} (ref 1.1–2.2)
ALP SERPL-CCNC: 130 U/L (ref 40–129)
ALT SERPL-CCNC: 101 U/L (ref 10–40)
ANION GAP SERPL CALCULATED.3IONS-SCNC: 13 MMOL/L (ref 9–17)
AST SERPL-CCNC: 70 U/L (ref 15–37)
BASOPHILS # BLD: 0.04 K/UL
BASOPHILS NFR BLD: 1 % (ref 0–1)
BILIRUB DIRECT SERPL-MCNC: <0.2 MG/DL (ref 0–0.3)
BILIRUB INDIRECT SERPL-MCNC: ABNORMAL MG/DL (ref 0–0.7)
BILIRUB SERPL-MCNC: 0.2 MG/DL (ref 0–1)
BUN SERPL-MCNC: 38 MG/DL (ref 7–20)
CALCIUM SERPL-MCNC: 8.8 MG/DL (ref 8.3–10.6)
CHLORIDE SERPL-SCNC: 101 MMOL/L (ref 99–110)
CO2 SERPL-SCNC: 24 MMOL/L (ref 21–32)
CREAT SERPL-MCNC: 1.1 MG/DL (ref 0.8–1.3)
CRP SERPL HS-MCNC: 23.8 MG/L (ref 0–5)
EOSINOPHIL # BLD: 0.04 K/UL
EOSINOPHILS RELATIVE PERCENT: 1 % (ref 0–3)
ERYTHROCYTE [DISTWIDTH] IN BLOOD BY AUTOMATED COUNT: 17.2 % (ref 11.7–14.9)
GFR, ESTIMATED: 65 ML/MIN/1.73M2
GLUCOSE SERPL-MCNC: 62 MG/DL (ref 74–99)
HCT VFR BLD AUTO: 34.8 % (ref 42–52)
HGB BLD-MCNC: 10.8 G/DL (ref 13.5–18)
IMM GRANULOCYTES # BLD AUTO: 0.11 K/UL
IMM GRANULOCYTES NFR BLD: 2 %
LYMPHOCYTES NFR BLD: 1.16 K/UL
LYMPHOCYTES RELATIVE PERCENT: 16 % (ref 24–44)
MCH RBC QN AUTO: 26.2 PG (ref 27–31)
MCHC RBC AUTO-ENTMCNC: 31 G/DL (ref 32–36)
MCV RBC AUTO: 84.5 FL (ref 78–100)
MONOCYTES NFR BLD: 1.03 K/UL
MONOCYTES NFR BLD: 14 % (ref 0–5)
NEUTROPHILS NFR BLD: 68 % (ref 36–66)
NEUTS SEG NFR BLD: 5 K/UL
PLATELET # BLD AUTO: 442 K/UL (ref 140–440)
PMV BLD AUTO: 10 FL (ref 7.5–11.1)
POTASSIUM SERPL-SCNC: 4.6 MMOL/L (ref 3.5–5.1)
PROT SERPL-MCNC: 5.8 G/DL (ref 6.4–8.2)
RBC # BLD AUTO: 4.12 M/UL (ref 4.6–6.2)
SODIUM SERPL-SCNC: 139 MMOL/L (ref 136–145)
WBC OTHER # BLD: 7.4 K/UL (ref 4–10.5)

## 2025-08-05 PROCEDURE — 85652 RBC SED RATE AUTOMATED: CPT

## 2025-08-05 PROCEDURE — 82248 BILIRUBIN DIRECT: CPT

## 2025-08-05 PROCEDURE — 85025 COMPLETE CBC W/AUTO DIFF WBC: CPT

## 2025-08-05 PROCEDURE — 80053 COMPREHEN METABOLIC PANEL: CPT

## 2025-08-05 PROCEDURE — 86140 C-REACTIVE PROTEIN: CPT

## 2025-08-06 LAB — ERYTHROCYTE [SEDIMENTATION RATE] IN BLOOD BY WESTERGREN METHOD: 107 MM/HR (ref 0–20)

## 2025-08-07 ENCOUNTER — OFFICE VISIT (OUTPATIENT)
Dept: INFECTIOUS DISEASES | Age: 65
End: 2025-08-07
Payer: MEDICARE

## 2025-08-07 ENCOUNTER — HOSPITAL ENCOUNTER (OUTPATIENT)
Dept: WOUND CARE | Age: 65
Discharge: HOME | End: 2025-08-07
Attending: NURSE PRACTITIONER
Payer: MEDICARE

## 2025-08-07 VITALS — SYSTOLIC BLOOD PRESSURE: 122 MMHG | DIASTOLIC BLOOD PRESSURE: 44 MMHG | TEMPERATURE: 96.4 F | HEART RATE: 67 BPM

## 2025-08-07 VITALS — SYSTOLIC BLOOD PRESSURE: 132 MMHG | HEART RATE: 64 BPM | DIASTOLIC BLOOD PRESSURE: 70 MMHG

## 2025-08-07 DIAGNOSIS — E11.65 POORLY CONTROLLED TYPE 2 DIABETES MELLITUS WITH PERIPHERAL NEUROPATHY (HCC): ICD-10-CM

## 2025-08-07 DIAGNOSIS — E11.42 POORLY CONTROLLED TYPE 2 DIABETES MELLITUS WITH PERIPHERAL NEUROPATHY (HCC): ICD-10-CM

## 2025-08-07 DIAGNOSIS — L97.424 NON-PRESSURE CHRONIC ULCER OF LEFT HEEL AND MIDFOOT WITH NECROSIS OF BONE (HCC): Primary | ICD-10-CM

## 2025-08-07 DIAGNOSIS — E11.622 TYPE 2 DIABETES MELLITUS WITH OTHER SKIN ULCER, WITH LONG-TERM CURRENT USE OF INSULIN (HCC): ICD-10-CM

## 2025-08-07 DIAGNOSIS — M86.172 ACUTE OSTEOMYELITIS OF LEFT CALCANEUS (HCC): Primary | ICD-10-CM

## 2025-08-07 DIAGNOSIS — L97.512 DIABETIC ULCER OF TOE OF RIGHT FOOT ASSOCIATED WITH TYPE 2 DIABETES MELLITUS, WITH FAT LAYER EXPOSED (HCC): ICD-10-CM

## 2025-08-07 DIAGNOSIS — I89.0 LYMPHEDEMA OF BOTH LOWER EXTREMITIES: ICD-10-CM

## 2025-08-07 DIAGNOSIS — I87.2 VENOUS STASIS DERMATITIS OF BOTH LOWER EXTREMITIES: ICD-10-CM

## 2025-08-07 DIAGNOSIS — E66.01 MORBID OBESITY (HCC): ICD-10-CM

## 2025-08-07 DIAGNOSIS — Z89.429 STATUS POST AMPUTATION OF TOE: ICD-10-CM

## 2025-08-07 DIAGNOSIS — T81.89XD SURGICAL WOUND, NON HEALING, SUBSEQUENT ENCOUNTER: ICD-10-CM

## 2025-08-07 DIAGNOSIS — E11.621 DIABETIC ULCER OF TOE OF RIGHT FOOT ASSOCIATED WITH TYPE 2 DIABETES MELLITUS, WITH FAT LAYER EXPOSED (HCC): ICD-10-CM

## 2025-08-07 DIAGNOSIS — Z79.4 TYPE 2 DIABETES MELLITUS WITH OTHER SKIN ULCER, WITH LONG-TERM CURRENT USE OF INSULIN (HCC): ICD-10-CM

## 2025-08-07 PROCEDURE — 3075F SYST BP GE 130 - 139MM HG: CPT | Performed by: INTERNAL MEDICINE

## 2025-08-07 PROCEDURE — 3078F DIAST BP <80 MM HG: CPT | Performed by: INTERNAL MEDICINE

## 2025-08-07 PROCEDURE — 11046 DBRDMT MUSC&/FSCA EA ADDL: CPT

## 2025-08-07 PROCEDURE — 11043 DBRDMT MUSC&/FSCA 1ST 20/<: CPT | Performed by: NURSE PRACTITIONER

## 2025-08-07 PROCEDURE — 11043 DBRDMT MUSC&/FSCA 1ST 20/<: CPT

## 2025-08-07 PROCEDURE — 11046 DBRDMT MUSC&/FSCA EA ADDL: CPT | Performed by: NURSE PRACTITIONER

## 2025-08-07 PROCEDURE — 99213 OFFICE O/P EST LOW 20 MIN: CPT | Performed by: INTERNAL MEDICINE

## 2025-08-07 PROCEDURE — 97605 NEG PRS WND THER DME<=50SQCM: CPT

## 2025-08-07 PROCEDURE — 11042 DBRDMT SUBQ TIS 1ST 20SQCM/<: CPT | Performed by: NURSE PRACTITIONER

## 2025-08-07 ASSESSMENT — PAIN DESCRIPTION - LOCATION: LOCATION: FOOT

## 2025-08-07 ASSESSMENT — PAIN DESCRIPTION - FREQUENCY: FREQUENCY: INTERMITTENT

## 2025-08-07 ASSESSMENT — PAIN - FUNCTIONAL ASSESSMENT: PAIN_FUNCTIONAL_ASSESSMENT: ACTIVITIES ARE NOT PREVENTED

## 2025-08-07 ASSESSMENT — PAIN DESCRIPTION - PAIN TYPE: TYPE: ACUTE PAIN

## 2025-08-07 ASSESSMENT — PAIN DESCRIPTION - DESCRIPTORS: DESCRIPTORS: ACHING

## 2025-08-07 ASSESSMENT — PAIN DESCRIPTION - ORIENTATION: ORIENTATION: LEFT

## 2025-08-07 ASSESSMENT — PAIN SCALES - GENERAL: PAINLEVEL_OUTOF10: 1

## 2025-08-14 ENCOUNTER — HOSPITAL ENCOUNTER (OUTPATIENT)
Dept: WOUND CARE | Age: 65
Discharge: HOME | End: 2025-08-14
Attending: NURSE PRACTITIONER
Payer: MEDICARE

## 2025-08-14 VITALS
TEMPERATURE: 98 F | SYSTOLIC BLOOD PRESSURE: 100 MMHG | HEART RATE: 68 BPM | RESPIRATION RATE: 17 BRPM | DIASTOLIC BLOOD PRESSURE: 55 MMHG

## 2025-08-14 DIAGNOSIS — L97.509 TYPE 2 DIABETES MELLITUS WITH FOOT ULCER, WITH LONG-TERM CURRENT USE OF INSULIN (HCC): ICD-10-CM

## 2025-08-14 DIAGNOSIS — Z89.429 STATUS POST AMPUTATION OF TOE: ICD-10-CM

## 2025-08-14 DIAGNOSIS — E11.65 POORLY CONTROLLED TYPE 2 DIABETES MELLITUS WITH PERIPHERAL NEUROPATHY (HCC): ICD-10-CM

## 2025-08-14 DIAGNOSIS — E11.621 TYPE 2 DIABETES MELLITUS WITH FOOT ULCER, WITH LONG-TERM CURRENT USE OF INSULIN (HCC): ICD-10-CM

## 2025-08-14 DIAGNOSIS — T81.89XD SURGICAL WOUND, NON HEALING, SUBSEQUENT ENCOUNTER: ICD-10-CM

## 2025-08-14 DIAGNOSIS — I89.0 LYMPHEDEMA OF BOTH LOWER EXTREMITIES: ICD-10-CM

## 2025-08-14 DIAGNOSIS — E66.01 MORBID OBESITY (HCC): ICD-10-CM

## 2025-08-14 DIAGNOSIS — E11.621 DIABETIC ULCER OF TOE OF RIGHT FOOT ASSOCIATED WITH TYPE 2 DIABETES MELLITUS, WITH FAT LAYER EXPOSED (HCC): ICD-10-CM

## 2025-08-14 DIAGNOSIS — E11.42 POORLY CONTROLLED TYPE 2 DIABETES MELLITUS WITH PERIPHERAL NEUROPATHY (HCC): ICD-10-CM

## 2025-08-14 DIAGNOSIS — L97.424 NON-PRESSURE CHRONIC ULCER OF LEFT HEEL AND MIDFOOT WITH NECROSIS OF BONE (HCC): Primary | ICD-10-CM

## 2025-08-14 DIAGNOSIS — Z79.4 TYPE 2 DIABETES MELLITUS WITH FOOT ULCER, WITH LONG-TERM CURRENT USE OF INSULIN (HCC): ICD-10-CM

## 2025-08-14 DIAGNOSIS — L97.512 DIABETIC ULCER OF TOE OF RIGHT FOOT ASSOCIATED WITH TYPE 2 DIABETES MELLITUS, WITH FAT LAYER EXPOSED (HCC): ICD-10-CM

## 2025-08-14 DIAGNOSIS — I87.2 VENOUS STASIS DERMATITIS OF BOTH LOWER EXTREMITIES: ICD-10-CM

## 2025-08-14 PROCEDURE — 97597 DBRDMT OPN WND 1ST 20 CM/<: CPT

## 2025-08-14 PROCEDURE — 11042 DBRDMT SUBQ TIS 1ST 20SQCM/<: CPT

## 2025-08-14 PROCEDURE — 11042 DBRDMT SUBQ TIS 1ST 20SQCM/<: CPT | Performed by: NURSE PRACTITIONER

## 2025-08-14 PROCEDURE — 11045 DBRDMT SUBQ TISS EACH ADDL: CPT | Performed by: NURSE PRACTITIONER

## 2025-08-14 PROCEDURE — 11045 DBRDMT SUBQ TISS EACH ADDL: CPT

## 2025-08-14 PROCEDURE — 97597 DBRDMT OPN WND 1ST 20 CM/<: CPT | Performed by: NURSE PRACTITIONER

## 2025-08-14 RX ORDER — LIDOCAINE HYDROCHLORIDE 20 MG/ML
JELLY TOPICAL PRN
OUTPATIENT
Start: 2025-08-14

## 2025-08-14 RX ORDER — BETAMETHASONE DIPROPIONATE 0.5 MG/G
CREAM TOPICAL PRN
OUTPATIENT
Start: 2025-08-14

## 2025-08-14 RX ORDER — SILVER SULFADIAZINE 10 MG/G
CREAM TOPICAL PRN
OUTPATIENT
Start: 2025-08-14

## 2025-08-14 RX ORDER — LIDOCAINE 40 MG/G
CREAM TOPICAL PRN
OUTPATIENT
Start: 2025-08-14

## 2025-08-14 RX ORDER — MUPIROCIN 2 %
OINTMENT (GRAM) TOPICAL PRN
OUTPATIENT
Start: 2025-08-14

## 2025-08-14 RX ORDER — LIDOCAINE HYDROCHLORIDE 40 MG/ML
SOLUTION TOPICAL PRN
OUTPATIENT
Start: 2025-08-14

## 2025-08-14 RX ORDER — BACITRACIN ZINC AND POLYMYXIN B SULFATE 500; 1000 [USP'U]/G; [USP'U]/G
OINTMENT TOPICAL PRN
OUTPATIENT
Start: 2025-08-14

## 2025-08-14 RX ORDER — TRIAMCINOLONE ACETONIDE 1 MG/G
OINTMENT TOPICAL PRN
OUTPATIENT
Start: 2025-08-14

## 2025-08-14 RX ORDER — GENTAMICIN SULFATE 1 MG/G
OINTMENT TOPICAL PRN
OUTPATIENT
Start: 2025-08-14

## 2025-08-14 RX ORDER — SODIUM CHLOR/HYPOCHLOROUS ACID 0.033 %
SOLUTION, IRRIGATION IRRIGATION PRN
OUTPATIENT
Start: 2025-08-14

## 2025-08-14 RX ORDER — NEOMYCIN/BACITRACIN/POLYMYXINB 3.5-400-5K
OINTMENT (GRAM) TOPICAL PRN
OUTPATIENT
Start: 2025-08-14

## 2025-08-14 RX ORDER — LIDOCAINE 50 MG/G
OINTMENT TOPICAL PRN
OUTPATIENT
Start: 2025-08-14

## 2025-08-14 RX ORDER — GINSENG 100 MG
CAPSULE ORAL PRN
OUTPATIENT
Start: 2025-08-14

## 2025-08-14 RX ORDER — CLOBETASOL PROPIONATE 0.5 MG/G
OINTMENT TOPICAL PRN
OUTPATIENT
Start: 2025-08-14

## 2025-08-19 ENCOUNTER — HOSPITAL ENCOUNTER (OUTPATIENT)
Age: 65
Setting detail: SPECIMEN
Discharge: HOME | End: 2025-08-19
Payer: MEDICARE

## 2025-08-19 LAB
ALBUMIN SERPL-MCNC: 3.9 G/DL (ref 3.4–5)
ALBUMIN/GLOB SERPL: 1.3 {RATIO} (ref 1.1–2.2)
ALP SERPL-CCNC: 130 U/L (ref 40–129)
ALT SERPL-CCNC: 35 U/L (ref 10–40)
ANION GAP SERPL CALCULATED.3IONS-SCNC: 16 MMOL/L (ref 9–17)
AST SERPL-CCNC: 25 U/L (ref 15–37)
BASOPHILS # BLD: 0.05 K/UL
BASOPHILS NFR BLD: 1 % (ref 0–1)
BILIRUB DIRECT SERPL-MCNC: 0.2 MG/DL (ref 0–0.3)
BILIRUB INDIRECT SERPL-MCNC: 0.2 MG/DL (ref 0–0.7)
BILIRUB SERPL-MCNC: 0.4 MG/DL (ref 0–1)
BUN SERPL-MCNC: 56 MG/DL (ref 7–20)
CALCIUM SERPL-MCNC: 10 MG/DL (ref 8.3–10.6)
CHLORIDE SERPL-SCNC: 96 MMOL/L (ref 99–110)
CO2 SERPL-SCNC: 28 MMOL/L (ref 21–32)
CREAT SERPL-MCNC: 1.4 MG/DL (ref 0.8–1.3)
CRP SERPL HS-MCNC: 10.6 MG/L (ref 0–5)
EOSINOPHIL # BLD: 0.04 K/UL
EOSINOPHILS RELATIVE PERCENT: 0 % (ref 0–3)
ERYTHROCYTE [DISTWIDTH] IN BLOOD BY AUTOMATED COUNT: 17.2 % (ref 11.7–14.9)
GFR, ESTIMATED: 49 ML/MIN/1.73M2
GLUCOSE SERPL-MCNC: 87 MG/DL (ref 74–99)
HCT VFR BLD AUTO: 37 % (ref 42–52)
HGB BLD-MCNC: 11.2 G/DL (ref 13.5–18)
IMM GRANULOCYTES # BLD AUTO: 0.07 K/UL
IMM GRANULOCYTES NFR BLD: 1 %
LYMPHOCYTES NFR BLD: 1.5 K/UL
LYMPHOCYTES RELATIVE PERCENT: 17 % (ref 24–44)
MCH RBC QN AUTO: 26 PG (ref 27–31)
MCHC RBC AUTO-ENTMCNC: 30.3 G/DL (ref 32–36)
MCV RBC AUTO: 86 FL (ref 78–100)
MONOCYTES NFR BLD: 0.92 K/UL
MONOCYTES NFR BLD: 10 % (ref 0–5)
NEUTROPHILS NFR BLD: 71 % (ref 36–66)
NEUTS SEG NFR BLD: 6.47 K/UL
PLATELET # BLD AUTO: 509 K/UL (ref 140–440)
PMV BLD AUTO: 9.7 FL (ref 7.5–11.1)
POTASSIUM SERPL-SCNC: 4.9 MMOL/L (ref 3.5–5.1)
PROT SERPL-MCNC: 6.8 G/DL (ref 6.4–8.2)
RBC # BLD AUTO: 4.3 M/UL (ref 4.6–6.2)
SODIUM SERPL-SCNC: 140 MMOL/L (ref 136–145)
WBC OTHER # BLD: 9.1 K/UL (ref 4–10.5)

## 2025-08-19 PROCEDURE — 80053 COMPREHEN METABOLIC PANEL: CPT

## 2025-08-19 PROCEDURE — 86140 C-REACTIVE PROTEIN: CPT

## 2025-08-19 PROCEDURE — 82248 BILIRUBIN DIRECT: CPT

## 2025-08-19 PROCEDURE — 85025 COMPLETE CBC W/AUTO DIFF WBC: CPT

## 2025-08-19 PROCEDURE — 85652 RBC SED RATE AUTOMATED: CPT

## 2025-08-21 ENCOUNTER — OFFICE VISIT (OUTPATIENT)
Dept: INFECTIOUS DISEASES | Age: 65
End: 2025-08-21
Payer: MEDICARE

## 2025-08-21 ENCOUNTER — HOSPITAL ENCOUNTER (OUTPATIENT)
Dept: WOUND CARE | Age: 65
Discharge: HOME | End: 2025-08-21
Attending: NURSE PRACTITIONER
Payer: MEDICARE

## 2025-08-21 VITALS — HEART RATE: 64 BPM | DIASTOLIC BLOOD PRESSURE: 81 MMHG | SYSTOLIC BLOOD PRESSURE: 132 MMHG

## 2025-08-21 VITALS
HEART RATE: 65 BPM | SYSTOLIC BLOOD PRESSURE: 106 MMHG | TEMPERATURE: 97.2 F | DIASTOLIC BLOOD PRESSURE: 58 MMHG | RESPIRATION RATE: 16 BRPM

## 2025-08-21 DIAGNOSIS — Z79.4 TYPE 2 DIABETES MELLITUS WITH OTHER SKIN ULCER, WITH LONG-TERM CURRENT USE OF INSULIN (HCC): ICD-10-CM

## 2025-08-21 DIAGNOSIS — S81.002D OPEN WOUND OF LEFT KNEE, SUBSEQUENT ENCOUNTER: ICD-10-CM

## 2025-08-21 DIAGNOSIS — M86.372 CHRONIC MULTIFOCAL OSTEOMYELITIS OF LEFT FOOT (HCC): ICD-10-CM

## 2025-08-21 DIAGNOSIS — L97.424 NON-PRESSURE CHRONIC ULCER OF LEFT HEEL AND MIDFOOT WITH NECROSIS OF BONE (HCC): Primary | ICD-10-CM

## 2025-08-21 DIAGNOSIS — I89.0 LYMPHEDEMA OF BOTH LOWER EXTREMITIES: ICD-10-CM

## 2025-08-21 DIAGNOSIS — Z09 HOSPITAL DISCHARGE FOLLOW-UP: Primary | ICD-10-CM

## 2025-08-21 DIAGNOSIS — I87.2 VENOUS STASIS DERMATITIS OF BOTH LOWER EXTREMITIES: ICD-10-CM

## 2025-08-21 DIAGNOSIS — E11.622 TYPE 2 DIABETES MELLITUS WITH OTHER SKIN ULCER, WITH LONG-TERM CURRENT USE OF INSULIN (HCC): ICD-10-CM

## 2025-08-21 PROCEDURE — 3075F SYST BP GE 130 - 139MM HG: CPT | Performed by: INTERNAL MEDICINE

## 2025-08-21 PROCEDURE — 97606 NEG PRS WND THER DME>50 SQCM: CPT

## 2025-08-21 PROCEDURE — 11045 DBRDMT SUBQ TISS EACH ADDL: CPT

## 2025-08-21 PROCEDURE — 3079F DIAST BP 80-89 MM HG: CPT | Performed by: INTERNAL MEDICINE

## 2025-08-21 PROCEDURE — 11042 DBRDMT SUBQ TIS 1ST 20SQCM/<: CPT | Performed by: NURSE PRACTITIONER

## 2025-08-21 PROCEDURE — 11045 DBRDMT SUBQ TISS EACH ADDL: CPT | Performed by: NURSE PRACTITIONER

## 2025-08-21 PROCEDURE — 99214 OFFICE O/P EST MOD 30 MIN: CPT | Performed by: INTERNAL MEDICINE

## 2025-08-21 PROCEDURE — 11042 DBRDMT SUBQ TIS 1ST 20SQCM/<: CPT

## 2025-08-21 RX ORDER — LIDOCAINE 50 MG/G
OINTMENT TOPICAL PRN
OUTPATIENT
Start: 2025-08-21

## 2025-08-21 RX ORDER — MUPIROCIN 2 %
OINTMENT (GRAM) TOPICAL PRN
OUTPATIENT
Start: 2025-08-21

## 2025-08-21 RX ORDER — BACITRACIN ZINC AND POLYMYXIN B SULFATE 500; 1000 [USP'U]/G; [USP'U]/G
OINTMENT TOPICAL PRN
OUTPATIENT
Start: 2025-08-21

## 2025-08-21 RX ORDER — LIDOCAINE HYDROCHLORIDE 20 MG/ML
JELLY TOPICAL PRN
OUTPATIENT
Start: 2025-08-21

## 2025-08-21 RX ORDER — LIDOCAINE HYDROCHLORIDE 40 MG/ML
SOLUTION TOPICAL PRN
OUTPATIENT
Start: 2025-08-21

## 2025-08-21 RX ORDER — DOXYCYCLINE HYCLATE 100 MG
100 TABLET ORAL 2 TIMES DAILY
Qty: 28 TABLET | Refills: 0 | Status: SHIPPED | OUTPATIENT
Start: 2025-08-21 | End: 2025-09-04

## 2025-08-21 RX ORDER — GENTAMICIN SULFATE 1 MG/G
OINTMENT TOPICAL PRN
OUTPATIENT
Start: 2025-08-21

## 2025-08-21 RX ORDER — BETAMETHASONE DIPROPIONATE 0.5 MG/G
CREAM TOPICAL PRN
OUTPATIENT
Start: 2025-08-21

## 2025-08-21 RX ORDER — LIDOCAINE 40 MG/G
CREAM TOPICAL PRN
OUTPATIENT
Start: 2025-08-21

## 2025-08-21 RX ORDER — GINSENG 100 MG
CAPSULE ORAL PRN
OUTPATIENT
Start: 2025-08-21

## 2025-08-21 RX ORDER — CLOBETASOL PROPIONATE 0.5 MG/G
OINTMENT TOPICAL PRN
OUTPATIENT
Start: 2025-08-21

## 2025-08-21 RX ORDER — NEOMYCIN/BACITRACIN/POLYMYXINB 3.5-400-5K
OINTMENT (GRAM) TOPICAL PRN
OUTPATIENT
Start: 2025-08-21

## 2025-08-21 RX ORDER — TRIAMCINOLONE ACETONIDE 1 MG/G
OINTMENT TOPICAL PRN
OUTPATIENT
Start: 2025-08-21

## 2025-08-21 RX ORDER — SILVER SULFADIAZINE 10 MG/G
CREAM TOPICAL PRN
OUTPATIENT
Start: 2025-08-21

## 2025-08-21 RX ORDER — SODIUM CHLOR/HYPOCHLOROUS ACID 0.033 %
SOLUTION, IRRIGATION IRRIGATION PRN
OUTPATIENT
Start: 2025-08-21

## 2025-08-21 ASSESSMENT — PAIN SCALES - GENERAL: PAINLEVEL_OUTOF10: 0

## 2025-08-23 LAB — ERYTHROCYTE [SEDIMENTATION RATE] IN BLOOD BY WESTERGREN METHOD: 29 MM/HR (ref 0–20)

## 2025-08-26 ENCOUNTER — HOSPITAL ENCOUNTER (OUTPATIENT)
Age: 65
Setting detail: SPECIMEN
Discharge: HOME | End: 2025-08-26
Payer: MEDICARE

## 2025-08-26 LAB
ALBUMIN SERPL-MCNC: 3.6 G/DL (ref 3.4–5)
ALBUMIN/GLOB SERPL: 1.3 {RATIO} (ref 1.1–2.2)
ALP SERPL-CCNC: 119 U/L (ref 40–129)
ALT SERPL-CCNC: 26 U/L (ref 10–40)
ANION GAP SERPL CALCULATED.3IONS-SCNC: 14 MMOL/L (ref 9–17)
AST SERPL-CCNC: 22 U/L (ref 15–37)
BILIRUB DIRECT SERPL-MCNC: 0.2 MG/DL (ref 0–0.3)
BILIRUB INDIRECT SERPL-MCNC: 0.1 MG/DL (ref 0–0.7)
BILIRUB SERPL-MCNC: 0.3 MG/DL (ref 0–1)
BUN SERPL-MCNC: 56 MG/DL (ref 7–20)
CALCIUM SERPL-MCNC: 8.9 MG/DL (ref 8.3–10.6)
CHLORIDE SERPL-SCNC: 98 MMOL/L (ref 99–110)
CO2 SERPL-SCNC: 24 MMOL/L (ref 21–32)
CREAT SERPL-MCNC: 1.4 MG/DL (ref 0.8–1.3)
CRP SERPL HS-MCNC: 30.9 MG/L (ref 0–5)
GFR, ESTIMATED: 51 ML/MIN/1.73M2
GLUCOSE SERPL-MCNC: 132 MG/DL (ref 74–99)
POTASSIUM SERPL-SCNC: 4.8 MMOL/L (ref 3.5–5.1)
PROT SERPL-MCNC: 6.3 G/DL (ref 6.4–8.2)
SODIUM SERPL-SCNC: 137 MMOL/L (ref 136–145)

## 2025-08-26 PROCEDURE — 80053 COMPREHEN METABOLIC PANEL: CPT

## 2025-08-26 PROCEDURE — 85025 COMPLETE CBC W/AUTO DIFF WBC: CPT

## 2025-08-26 PROCEDURE — 86140 C-REACTIVE PROTEIN: CPT

## 2025-08-26 PROCEDURE — 85652 RBC SED RATE AUTOMATED: CPT

## 2025-08-26 PROCEDURE — 82248 BILIRUBIN DIRECT: CPT

## 2025-08-27 LAB
BASOPHILS # BLD: 0.04 K/UL
BASOPHILS NFR BLD: 1 % (ref 0–1)
EOSINOPHIL # BLD: 0.03 K/UL
EOSINOPHILS RELATIVE PERCENT: 0 % (ref 0–3)
ERYTHROCYTE [DISTWIDTH] IN BLOOD BY AUTOMATED COUNT: 17.4 % (ref 11.7–14.9)
ERYTHROCYTE [SEDIMENTATION RATE] IN BLOOD BY WESTERGREN METHOD: 84 MM/HR (ref 0–20)
HCT VFR BLD AUTO: 35.2 % (ref 42–52)
HGB BLD-MCNC: 10.5 G/DL (ref 13.5–18)
IMM GRANULOCYTES # BLD AUTO: 0.05 K/UL
IMM GRANULOCYTES NFR BLD: 1 %
LYMPHOCYTES NFR BLD: 1.38 K/UL
LYMPHOCYTES RELATIVE PERCENT: 18 % (ref 24–44)
MCH RBC QN AUTO: 25.7 PG (ref 27–31)
MCHC RBC AUTO-ENTMCNC: 29.8 G/DL (ref 32–36)
MCV RBC AUTO: 86.3 FL (ref 78–100)
MONOCYTES NFR BLD: 0.87 K/UL
MONOCYTES NFR BLD: 11 % (ref 0–5)
NEUTROPHILS NFR BLD: 69 % (ref 36–66)
NEUTS SEG NFR BLD: 5.33 K/UL
PLATELET # BLD AUTO: 367 K/UL (ref 140–440)
PMV BLD AUTO: 10.9 FL (ref 7.5–11.1)
RBC # BLD AUTO: 4.08 M/UL (ref 4.6–6.2)
WBC OTHER # BLD: 7.7 K/UL (ref 4–10.5)

## 2025-08-28 ENCOUNTER — HOSPITAL ENCOUNTER (OUTPATIENT)
Dept: WOUND CARE | Age: 65
Discharge: HOME | End: 2025-08-28
Attending: NURSE PRACTITIONER
Payer: MEDICARE

## 2025-08-28 VITALS
SYSTOLIC BLOOD PRESSURE: 118 MMHG | HEART RATE: 62 BPM | DIASTOLIC BLOOD PRESSURE: 50 MMHG | TEMPERATURE: 97 F | RESPIRATION RATE: 16 BRPM

## 2025-08-28 DIAGNOSIS — Z79.4 TYPE 2 DIABETES MELLITUS WITH OTHER SKIN ULCER, WITH LONG-TERM CURRENT USE OF INSULIN (HCC): ICD-10-CM

## 2025-08-28 DIAGNOSIS — I89.0 LYMPHEDEMA OF BOTH LOWER EXTREMITIES: ICD-10-CM

## 2025-08-28 DIAGNOSIS — I87.2 VENOUS STASIS DERMATITIS OF BOTH LOWER EXTREMITIES: ICD-10-CM

## 2025-08-28 DIAGNOSIS — S81.802A TRAUMATIC OPEN WOUND OF LEFT LOWER LEG, INITIAL ENCOUNTER: ICD-10-CM

## 2025-08-28 DIAGNOSIS — E11.622 TYPE 2 DIABETES MELLITUS WITH OTHER SKIN ULCER, WITH LONG-TERM CURRENT USE OF INSULIN (HCC): ICD-10-CM

## 2025-08-28 DIAGNOSIS — L97.424 NON-PRESSURE CHRONIC ULCER OF LEFT HEEL AND MIDFOOT WITH NECROSIS OF BONE (HCC): Primary | ICD-10-CM

## 2025-08-28 DIAGNOSIS — S81.002D OPEN WOUND OF LEFT KNEE, SUBSEQUENT ENCOUNTER: ICD-10-CM

## 2025-08-28 PROCEDURE — 29581 APPL MULTLAYER CMPRN SYS LEG: CPT

## 2025-08-28 PROCEDURE — 11042 DBRDMT SUBQ TIS 1ST 20SQCM/<: CPT

## 2025-08-28 PROCEDURE — 11045 DBRDMT SUBQ TISS EACH ADDL: CPT | Performed by: NURSE PRACTITIONER

## 2025-08-28 PROCEDURE — 11042 DBRDMT SUBQ TIS 1ST 20SQCM/<: CPT | Performed by: NURSE PRACTITIONER

## 2025-08-28 PROCEDURE — 11045 DBRDMT SUBQ TISS EACH ADDL: CPT

## 2025-08-28 PROCEDURE — 97605 NEG PRS WND THER DME<=50SQCM: CPT

## 2025-08-29 ENCOUNTER — HOSPITAL ENCOUNTER (OUTPATIENT)
Dept: WOUND CARE | Age: 65
Discharge: HOME | End: 2025-08-29
Attending: NURSE PRACTITIONER
Payer: MEDICARE

## 2025-08-29 ENCOUNTER — HOSPITAL ENCOUNTER (OUTPATIENT)
Age: 65
Setting detail: SPECIMEN
Discharge: HOME | End: 2025-08-29
Payer: MEDICARE

## 2025-08-29 VITALS
RESPIRATION RATE: 20 BRPM | DIASTOLIC BLOOD PRESSURE: 58 MMHG | SYSTOLIC BLOOD PRESSURE: 107 MMHG | HEART RATE: 65 BPM | TEMPERATURE: 97.7 F

## 2025-08-29 DIAGNOSIS — L97.424 NON-PRESSURE CHRONIC ULCER OF LEFT HEEL AND MIDFOOT WITH NECROSIS OF BONE (HCC): ICD-10-CM

## 2025-08-29 DIAGNOSIS — Z89.429 STATUS POST AMPUTATION OF TOE: ICD-10-CM

## 2025-08-29 DIAGNOSIS — L97.509 TYPE 2 DIABETES MELLITUS WITH FOOT ULCER, WITH LONG-TERM CURRENT USE OF INSULIN (HCC): ICD-10-CM

## 2025-08-29 DIAGNOSIS — E11.65 POORLY CONTROLLED TYPE 2 DIABETES MELLITUS WITH PERIPHERAL NEUROPATHY (HCC): ICD-10-CM

## 2025-08-29 DIAGNOSIS — Z79.4 TYPE 2 DIABETES MELLITUS WITH FOOT ULCER, WITH LONG-TERM CURRENT USE OF INSULIN (HCC): ICD-10-CM

## 2025-08-29 DIAGNOSIS — I87.2 VENOUS STASIS DERMATITIS OF BOTH LOWER EXTREMITIES: ICD-10-CM

## 2025-08-29 DIAGNOSIS — E11.621 DIABETIC ULCER OF TOE OF RIGHT FOOT ASSOCIATED WITH TYPE 2 DIABETES MELLITUS, WITH FAT LAYER EXPOSED (HCC): Primary | ICD-10-CM

## 2025-08-29 DIAGNOSIS — T81.89XD SURGICAL WOUND, NON HEALING, SUBSEQUENT ENCOUNTER: ICD-10-CM

## 2025-08-29 DIAGNOSIS — L97.512 DIABETIC ULCER OF TOE OF RIGHT FOOT ASSOCIATED WITH TYPE 2 DIABETES MELLITUS, WITH FAT LAYER EXPOSED (HCC): Primary | ICD-10-CM

## 2025-08-29 DIAGNOSIS — E66.01 MORBID OBESITY (HCC): ICD-10-CM

## 2025-08-29 DIAGNOSIS — E11.42 POORLY CONTROLLED TYPE 2 DIABETES MELLITUS WITH PERIPHERAL NEUROPATHY (HCC): ICD-10-CM

## 2025-08-29 DIAGNOSIS — I89.0 LYMPHEDEMA OF BOTH LOWER EXTREMITIES: ICD-10-CM

## 2025-08-29 DIAGNOSIS — E11.621 TYPE 2 DIABETES MELLITUS WITH FOOT ULCER, WITH LONG-TERM CURRENT USE OF INSULIN (HCC): ICD-10-CM

## 2025-08-29 PROCEDURE — 17250 CHEM CAUT OF GRANLTJ TISSUE: CPT

## 2025-08-29 PROCEDURE — 11044 DBRDMT BONE 1ST 20 SQ CM/<: CPT

## 2025-08-29 PROCEDURE — 88311 DECALCIFY TISSUE: CPT | Performed by: PATHOLOGY

## 2025-08-29 PROCEDURE — 88305 TISSUE EXAM BY PATHOLOGIST: CPT | Performed by: PATHOLOGY

## 2025-08-29 PROCEDURE — 97605 NEG PRS WND THER DME<=50SQCM: CPT

## 2025-08-29 PROCEDURE — 11047 DBRDMT BONE EACH ADDL: CPT

## 2025-08-29 RX ORDER — LIDOCAINE HYDROCHLORIDE 40 MG/ML
SOLUTION TOPICAL PRN
OUTPATIENT
Start: 2025-08-29

## 2025-08-29 RX ORDER — LIDOCAINE HYDROCHLORIDE 20 MG/ML
JELLY TOPICAL PRN
OUTPATIENT
Start: 2025-08-29

## 2025-08-29 RX ORDER — GENTAMICIN SULFATE 1 MG/G
OINTMENT TOPICAL PRN
OUTPATIENT
Start: 2025-08-29

## 2025-08-29 RX ORDER — MUPIROCIN 2 %
OINTMENT (GRAM) TOPICAL PRN
OUTPATIENT
Start: 2025-08-29

## 2025-08-29 RX ORDER — BETAMETHASONE DIPROPIONATE 0.5 MG/G
CREAM TOPICAL PRN
OUTPATIENT
Start: 2025-08-29

## 2025-08-29 RX ORDER — NEOMYCIN/BACITRACIN/POLYMYXINB 3.5-400-5K
OINTMENT (GRAM) TOPICAL PRN
OUTPATIENT
Start: 2025-08-29

## 2025-08-29 RX ORDER — BACITRACIN ZINC AND POLYMYXIN B SULFATE 500; 1000 [USP'U]/G; [USP'U]/G
OINTMENT TOPICAL PRN
OUTPATIENT
Start: 2025-08-29

## 2025-08-29 RX ORDER — GINSENG 100 MG
CAPSULE ORAL PRN
OUTPATIENT
Start: 2025-08-29

## 2025-08-29 RX ORDER — SODIUM CHLOR/HYPOCHLOROUS ACID 0.033 %
SOLUTION, IRRIGATION IRRIGATION PRN
OUTPATIENT
Start: 2025-08-29

## 2025-08-29 RX ORDER — TRIAMCINOLONE ACETONIDE 1 MG/G
OINTMENT TOPICAL PRN
OUTPATIENT
Start: 2025-08-29

## 2025-08-29 RX ORDER — LIDOCAINE 50 MG/G
OINTMENT TOPICAL PRN
OUTPATIENT
Start: 2025-08-29

## 2025-08-29 RX ORDER — LIDOCAINE 40 MG/G
CREAM TOPICAL PRN
OUTPATIENT
Start: 2025-08-29

## 2025-08-29 RX ORDER — SILVER SULFADIAZINE 10 MG/G
CREAM TOPICAL PRN
OUTPATIENT
Start: 2025-08-29

## 2025-08-29 RX ORDER — CLOBETASOL PROPIONATE 0.5 MG/G
OINTMENT TOPICAL PRN
OUTPATIENT
Start: 2025-08-29

## 2025-09-03 LAB — SURGICAL PATHOLOGY REPORT: NORMAL

## 2025-09-04 ENCOUNTER — HOSPITAL ENCOUNTER (OUTPATIENT)
Age: 65
Setting detail: SPECIMEN
Discharge: HOME | End: 2025-09-04
Payer: MEDICARE

## 2025-09-04 ENCOUNTER — HOSPITAL ENCOUNTER (OUTPATIENT)
Dept: WOUND CARE | Age: 65
Discharge: HOME | End: 2025-09-04
Attending: NURSE PRACTITIONER
Payer: MEDICARE

## 2025-09-04 VITALS
HEART RATE: 61 BPM | DIASTOLIC BLOOD PRESSURE: 68 MMHG | SYSTOLIC BLOOD PRESSURE: 114 MMHG | TEMPERATURE: 96.8 F | RESPIRATION RATE: 18 BRPM

## 2025-09-04 DIAGNOSIS — I87.2 VENOUS STASIS DERMATITIS OF BOTH LOWER EXTREMITIES: ICD-10-CM

## 2025-09-04 DIAGNOSIS — T81.89XD SURGICAL WOUND, NON HEALING, SUBSEQUENT ENCOUNTER: ICD-10-CM

## 2025-09-04 DIAGNOSIS — E11.42 POORLY CONTROLLED TYPE 2 DIABETES MELLITUS WITH PERIPHERAL NEUROPATHY (HCC): ICD-10-CM

## 2025-09-04 DIAGNOSIS — I89.0 LYMPHEDEMA OF BOTH LOWER EXTREMITIES: ICD-10-CM

## 2025-09-04 DIAGNOSIS — E11.65 POORLY CONTROLLED TYPE 2 DIABETES MELLITUS WITH PERIPHERAL NEUROPATHY (HCC): ICD-10-CM

## 2025-09-04 DIAGNOSIS — E11.621 DIABETIC ULCER OF LEFT HEEL ASSOCIATED WITH TYPE 2 DIABETES MELLITUS, WITH MUSCLE INVOLVEMENT WITHOUT EVIDENCE OF NECROSIS (HCC): Primary | ICD-10-CM

## 2025-09-04 DIAGNOSIS — E66.01 MORBID OBESITY (HCC): ICD-10-CM

## 2025-09-04 DIAGNOSIS — Z79.4 TYPE 2 DIABETES MELLITUS WITH FOOT ULCER, WITH LONG-TERM CURRENT USE OF INSULIN (HCC): ICD-10-CM

## 2025-09-04 DIAGNOSIS — L97.425 DIABETIC ULCER OF LEFT HEEL ASSOCIATED WITH TYPE 2 DIABETES MELLITUS, WITH MUSCLE INVOLVEMENT WITHOUT EVIDENCE OF NECROSIS (HCC): Primary | ICD-10-CM

## 2025-09-04 DIAGNOSIS — L97.509 TYPE 2 DIABETES MELLITUS WITH FOOT ULCER, WITH LONG-TERM CURRENT USE OF INSULIN (HCC): ICD-10-CM

## 2025-09-04 DIAGNOSIS — S81.802D TRAUMATIC OPEN WOUND OF LEFT LOWER LEG, SUBSEQUENT ENCOUNTER: ICD-10-CM

## 2025-09-04 DIAGNOSIS — Z89.429 STATUS POST AMPUTATION OF TOE: ICD-10-CM

## 2025-09-04 DIAGNOSIS — E11.621 TYPE 2 DIABETES MELLITUS WITH FOOT ULCER, WITH LONG-TERM CURRENT USE OF INSULIN (HCC): ICD-10-CM

## 2025-09-04 LAB
ALBUMIN SERPL-MCNC: 3.4 G/DL (ref 3.4–5)
ALBUMIN/GLOB SERPL: 1.4 {RATIO} (ref 1.1–2.2)
ALP SERPL-CCNC: 106 U/L (ref 40–129)
ALT SERPL-CCNC: 28 U/L (ref 10–40)
ANION GAP SERPL CALCULATED.3IONS-SCNC: 12 MMOL/L (ref 9–17)
AST SERPL-CCNC: 22 U/L (ref 15–37)
BASOPHILS # BLD: 0.04 K/UL
BASOPHILS NFR BLD: 1 % (ref 0–1)
BILIRUB DIRECT SERPL-MCNC: 0.2 MG/DL (ref 0–0.3)
BILIRUB INDIRECT SERPL-MCNC: 0.1 MG/DL (ref 0–0.7)
BILIRUB SERPL-MCNC: 0.4 MG/DL (ref 0–1)
BUN SERPL-MCNC: 50 MG/DL (ref 7–20)
CALCIUM SERPL-MCNC: 9.2 MG/DL (ref 8.3–10.6)
CHLORIDE SERPL-SCNC: 101 MMOL/L (ref 99–110)
CO2 SERPL-SCNC: 24 MMOL/L (ref 21–32)
CREAT SERPL-MCNC: 1.3 MG/DL (ref 0.8–1.3)
CRP SERPL HS-MCNC: 16.3 MG/L (ref 0–5)
EOSINOPHIL # BLD: 0.1 K/UL
EOSINOPHILS RELATIVE PERCENT: 1 % (ref 0–3)
ERYTHROCYTE [DISTWIDTH] IN BLOOD BY AUTOMATED COUNT: 16.3 % (ref 11.7–14.9)
ERYTHROCYTE [SEDIMENTATION RATE] IN BLOOD BY WESTERGREN METHOD: 77 MM/HR (ref 0–20)
GFR, ESTIMATED: 55 ML/MIN/1.73M2
GLUCOSE SERPL-MCNC: 77 MG/DL (ref 74–99)
HCT VFR BLD AUTO: 33.9 % (ref 42–52)
HGB BLD-MCNC: 10 G/DL (ref 13.5–18)
IMM GRANULOCYTES # BLD AUTO: 0.05 K/UL
IMM GRANULOCYTES NFR BLD: 1 %
LYMPHOCYTES NFR BLD: 1.32 K/UL
LYMPHOCYTES RELATIVE PERCENT: 18 % (ref 24–44)
MCH RBC QN AUTO: 24.8 PG (ref 27–31)
MCHC RBC AUTO-ENTMCNC: 29.5 G/DL (ref 32–36)
MCV RBC AUTO: 83.9 FL (ref 78–100)
MONOCYTES NFR BLD: 0.86 K/UL
MONOCYTES NFR BLD: 12 % (ref 0–5)
NEUTROPHILS NFR BLD: 68 % (ref 36–66)
NEUTS SEG NFR BLD: 5.1 K/UL
PLATELET # BLD AUTO: 358 K/UL (ref 140–440)
PMV BLD AUTO: 10.3 FL (ref 7.5–11.1)
POTASSIUM SERPL-SCNC: 4.7 MMOL/L (ref 3.5–5.1)
PROT SERPL-MCNC: 6 G/DL (ref 6.4–8.2)
RBC # BLD AUTO: 4.04 M/UL (ref 4.6–6.2)
SODIUM SERPL-SCNC: 138 MMOL/L (ref 136–145)
WBC OTHER # BLD: 7.5 K/UL (ref 4–10.5)

## 2025-09-04 PROCEDURE — 11043 DBRDMT MUSC&/FSCA 1ST 20/<: CPT

## 2025-09-04 PROCEDURE — 86140 C-REACTIVE PROTEIN: CPT

## 2025-09-04 PROCEDURE — 82248 BILIRUBIN DIRECT: CPT

## 2025-09-04 PROCEDURE — 80053 COMPREHEN METABOLIC PANEL: CPT

## 2025-09-04 PROCEDURE — 11043 DBRDMT MUSC&/FSCA 1ST 20/<: CPT | Performed by: NURSE PRACTITIONER

## 2025-09-04 PROCEDURE — 85025 COMPLETE CBC W/AUTO DIFF WBC: CPT

## 2025-09-04 PROCEDURE — 85652 RBC SED RATE AUTOMATED: CPT

## 2025-09-04 RX ORDER — SODIUM CHLOR/HYPOCHLOROUS ACID 0.033 %
SOLUTION, IRRIGATION IRRIGATION PRN
OUTPATIENT
Start: 2025-09-04

## 2025-09-04 RX ORDER — GINSENG 100 MG
CAPSULE ORAL PRN
OUTPATIENT
Start: 2025-09-04

## 2025-09-04 RX ORDER — GENTAMICIN SULFATE 1 MG/G
OINTMENT TOPICAL PRN
OUTPATIENT
Start: 2025-09-04

## 2025-09-04 RX ORDER — TRIAMCINOLONE ACETONIDE 1 MG/G
OINTMENT TOPICAL PRN
OUTPATIENT
Start: 2025-09-04

## 2025-09-04 RX ORDER — NEOMYCIN/BACITRACIN/POLYMYXINB 3.5-400-5K
OINTMENT (GRAM) TOPICAL PRN
OUTPATIENT
Start: 2025-09-04

## 2025-09-04 RX ORDER — BACITRACIN ZINC AND POLYMYXIN B SULFATE 500; 1000 [USP'U]/G; [USP'U]/G
OINTMENT TOPICAL PRN
OUTPATIENT
Start: 2025-09-04

## 2025-09-04 RX ORDER — SILVER SULFADIAZINE 10 MG/G
CREAM TOPICAL PRN
OUTPATIENT
Start: 2025-09-04

## 2025-09-04 RX ORDER — MUPIROCIN 2 %
OINTMENT (GRAM) TOPICAL PRN
OUTPATIENT
Start: 2025-09-04

## 2025-09-04 RX ORDER — LIDOCAINE HYDROCHLORIDE 20 MG/ML
JELLY TOPICAL PRN
OUTPATIENT
Start: 2025-09-04

## 2025-09-04 RX ORDER — LIDOCAINE 40 MG/G
CREAM TOPICAL PRN
OUTPATIENT
Start: 2025-09-04

## 2025-09-04 RX ORDER — BETAMETHASONE DIPROPIONATE 0.5 MG/G
CREAM TOPICAL PRN
OUTPATIENT
Start: 2025-09-04

## 2025-09-04 RX ORDER — LIDOCAINE 50 MG/G
OINTMENT TOPICAL PRN
OUTPATIENT
Start: 2025-09-04

## 2025-09-04 RX ORDER — LIDOCAINE HYDROCHLORIDE 40 MG/ML
SOLUTION TOPICAL PRN
OUTPATIENT
Start: 2025-09-04

## 2025-09-04 RX ORDER — CLOBETASOL PROPIONATE 0.5 MG/G
OINTMENT TOPICAL PRN
OUTPATIENT
Start: 2025-09-04

## 2025-09-04 ASSESSMENT — PAIN DESCRIPTION - PAIN TYPE: TYPE: ACUTE PAIN

## 2025-09-04 ASSESSMENT — PAIN SCALES - GENERAL: PAINLEVEL_OUTOF10: 1

## 2025-09-04 ASSESSMENT — PAIN DESCRIPTION - ONSET: ONSET: PROGRESSIVE

## 2025-09-04 ASSESSMENT — PAIN DESCRIPTION - ORIENTATION: ORIENTATION: LEFT

## 2025-09-04 ASSESSMENT — PAIN DESCRIPTION - FREQUENCY: FREQUENCY: INTERMITTENT

## 2025-09-04 ASSESSMENT — PAIN DESCRIPTION - LOCATION: LOCATION: FOOT

## 2025-09-04 ASSESSMENT — PAIN DESCRIPTION - DESCRIPTORS: DESCRIPTORS: SORE

## 2025-09-04 ASSESSMENT — PAIN - FUNCTIONAL ASSESSMENT: PAIN_FUNCTIONAL_ASSESSMENT: PREVENTS OR INTERFERES SOME ACTIVE ACTIVITIES AND ADLS

## (undated) DEVICE — INTENDED FOR TISSUE SEPARATION, AND OTHER PROCEDURES THAT REQUIRE A SHARP SURGICAL BLADE TO PUNCTURE OR CUT.: Brand: BARD-PARKER ® STAINLESS STEEL BLADES

## (undated) DEVICE — DRAPE,EXTREMITY,89X128,STERILE: Brand: MEDLINE

## (undated) DEVICE — SUTURE NONABSORBABLE MONOFILAMENT 4-0 FS-2 18 IN ETHILON 662H

## (undated) DEVICE — DRESSING,GAUZE,XEROFORM,CURAD,1"X8",ST: Brand: CURAD

## (undated) DEVICE — SPONGE GZ W4XL8IN COT WVN 12 PLY

## (undated) DEVICE — YANKAUER,FLEXIBLE HANDLE,REGLR CAPACITY: Brand: MEDLINE INDUSTRIES, INC.

## (undated) DEVICE — Z INACTIVE USE 2660663 SOLUTION IRRIG 1000ML STRIL H2O USP PLAS POUR BTL

## (undated) DEVICE — GLOVE ORANGE PI 7   MSG9070

## (undated) DEVICE — MARKER SURG SKIN UTIL REGULAR/FINE 2 TIP W/ RUL AND 9 LBL

## (undated) DEVICE — PACK,BASIC,IX: Brand: MEDLINE

## (undated) DEVICE — YANKAUER,BULB TIP,W/O VENT,RIGID,STERILE: Brand: MEDLINE

## (undated) DEVICE — GLOVE ORANGE PI 7 1/2   MSG9075

## (undated) DEVICE — DRESSING,GAUZE,XEROFORM,CURAD,5"X9",ST: Brand: CURAD

## (undated) DEVICE — SUTURE VICRYL 3-0  CTD SH-1 J219H

## (undated) DEVICE — SYRINGE IRRIG 60ML SFT PLIABLE BLB EZ TO GRP 1 HND USE W/

## (undated) DEVICE — ELECTRODE ES AD CRDLSS PT RET REM POLYHESIVE

## (undated) DEVICE — TUBE CULTURE LF UNIFORM BOTTOM STER

## (undated) DEVICE — SPONGE LAP W18XL18IN WHT COT 4 PLY FLD STRUNG RADPQ DISP ST 2 PER PACK

## (undated) DEVICE — BANDAGE,ELASTIC,ESMARK,STERILE,4"X9',LF: Brand: MEDLINE

## (undated) DEVICE — PENCIL ES CRD L10FT HND SWCHING ROCK SWCH W/ EDGE COAT BLDE

## (undated) DEVICE — BANDAGE,GAUZE,BULKEE II,4.5"X4.1YD,STRL: Brand: MEDLINE

## (undated) DEVICE — COUNTER NDL 30 COUNT FOAM STRP SGL MAG

## (undated) DEVICE — TUBING, SUCTION, 9/32" X 10', STRAIGHT: Brand: MEDLINE

## (undated) DEVICE — GLOVE SURG SZ 6 THK91MIL LTX FREE SYN POLYISOPRENE ANTI

## (undated) DEVICE — DRAPE SHEET ULTRAGARD: Brand: MEDLINE

## (undated) DEVICE — GOWN,SIRUS,POLYRNF,BRTHSLV,XLN/XL,20/CS: Brand: MEDLINE

## (undated) DEVICE — CANISTER NEG PRSS 500ML TBNG CLMP CONN W/O GEL DISP INFOVAC

## (undated) DEVICE — NEEDLE HYPO 25GA L1.5IN BLU POLYPR HUB S STL REG BVL STR

## (undated) DEVICE — SOLUTION PREP PAINT POV IOD FOR SKIN MUCOUS MEM

## (undated) DEVICE — Z INACTIVE USE 2863041 SPONGE GZ W4XL4IN 100% COT 16 PLY RADPQ HIGHLY ABSRB

## (undated) DEVICE — SUTURE VICRYL + SZ 0 L18IN ABSRB UD L36MM CT-1 1/2 CIR VCP840D

## (undated) DEVICE — LINER,SEMI-RIGID,3000CC,50EA/CS: Brand: MEDLINE

## (undated) DEVICE — FAN SPRAY KIT: Brand: PULSAVAC®

## (undated) DEVICE — SUTURE VICRYL SZ 3-0 L18IN ABSRB UD L26MM SH 1/2 CIR J864D

## (undated) DEVICE — SUTURE VICRYL SZ 3-0 L27IN ABSRB UD L36MM CT-1 1/2 CIR J258H

## (undated) DEVICE — PAD,ABDOMINAL,5"X9",ST,LF,25/BX: Brand: MEDLINE INDUSTRIES, INC.

## (undated) DEVICE — Z DISCONTINUED NO SUB IDED TRAY PREP DRY W/ PREM GLV 2 APPL 6 SPNG 2 UNDPD 1 OVERWRAP

## (undated) DEVICE — Device

## (undated) DEVICE — TRAY PREP DRY W/ PREM GLV 2 APPL 6 SPNG 2 UNDPD 1 OVERWRAP

## (undated) DEVICE — Z DISCONTINUED USE 2220129 SUTURE VCRL SZ 3-0 L18IN ABSRB UD W/O NDL POLYGLACTIN 910 J110T

## (undated) DEVICE — SUTURE PROL SZ 3-0 L30IN NONABSORBABLE BLU L26MM CT-2 1/2 8422H

## (undated) DEVICE — BLADE SAW W19.5XL86MM THK1.27MM

## (undated) DEVICE — CONTAINER,SPECIMEN,OR STERILE,4OZ: Brand: MEDLINE

## (undated) DEVICE — TOWEL,OR,DSP,ST,BLUE,STD,6/PK,12PK/CS: Brand: MEDLINE

## (undated) DEVICE — SYRINGE MED 10ML LUERLOCK TIP W/O SFTY DISP

## (undated) DEVICE — ELECTRODE ES L2.75IN S STL INSUL BLDE W/ SL EDGE

## (undated) DEVICE — SOLUTION IV 1000ML 0.9% SOD CHL FOR IRRIG PLAS CONT

## (undated) DEVICE — WAX SURG 2.5GM HEMSTAT BNE BEESWAX PARAFFIN ISO PALMITATE

## (undated) DEVICE — BANDAGE COMPR M W6INXL10YD WHT BGE VELC E MTRX HK AND LOOP

## (undated) DEVICE — CONVERTORS STOCKINETTE: Brand: CONVERTORS